# Patient Record
Sex: FEMALE | Race: WHITE | URBAN - METROPOLITAN AREA
[De-identification: names, ages, dates, MRNs, and addresses within clinical notes are randomized per-mention and may not be internally consistent; named-entity substitution may affect disease eponyms.]

---

## 2017-05-04 ENCOUNTER — OUTPATIENT (OUTPATIENT)
Dept: OUTPATIENT SERVICES | Facility: HOSPITAL | Age: 77
LOS: 1 days | Discharge: HOME | End: 2017-05-04

## 2017-05-25 ENCOUNTER — APPOINTMENT (OUTPATIENT)
Dept: UROLOGY | Facility: CLINIC | Age: 77
End: 2017-05-25

## 2017-05-25 VITALS
WEIGHT: 224 LBS | HEART RATE: 53 BPM | DIASTOLIC BLOOD PRESSURE: 83 MMHG | BODY MASS INDEX: 39.69 KG/M2 | SYSTOLIC BLOOD PRESSURE: 150 MMHG | HEIGHT: 63 IN

## 2017-06-28 DIAGNOSIS — E78.00 PURE HYPERCHOLESTEROLEMIA, UNSPECIFIED: ICD-10-CM

## 2017-06-28 DIAGNOSIS — E06.3 AUTOIMMUNE THYROIDITIS: ICD-10-CM

## 2017-06-28 DIAGNOSIS — E11.65 TYPE 2 DIABETES MELLITUS WITH HYPERGLYCEMIA: ICD-10-CM

## 2017-11-16 ENCOUNTER — OUTPATIENT (OUTPATIENT)
Dept: OUTPATIENT SERVICES | Facility: HOSPITAL | Age: 77
LOS: 1 days | Discharge: HOME | End: 2017-11-16

## 2017-11-16 DIAGNOSIS — E06.3 AUTOIMMUNE THYROIDITIS: ICD-10-CM

## 2017-11-16 DIAGNOSIS — E11.65 TYPE 2 DIABETES MELLITUS WITH HYPERGLYCEMIA: ICD-10-CM

## 2017-11-16 DIAGNOSIS — E78.00 PURE HYPERCHOLESTEROLEMIA, UNSPECIFIED: ICD-10-CM

## 2018-03-22 ENCOUNTER — RX RENEWAL (OUTPATIENT)
Age: 78
End: 2018-03-22

## 2018-05-15 ENCOUNTER — RX RENEWAL (OUTPATIENT)
Age: 78
End: 2018-05-15

## 2018-07-27 NOTE — ASU PATIENT PROFILE, ADULT - PMH
Hyperlipidemia, unspecified hyperlipidemia type    Hypertension, unspecified type    Type 2 diabetes mellitus with complication, without long-term current use of insulin

## 2018-07-27 NOTE — ASU PATIENT PROFILE, ADULT - PSH
History of surgery  RIGHT PARTIAL NEPHRECTOMY  History of surgery  CHOLECYSTECOMY  History of surgery  CYST REMOVED  RIGHT BREAST  History of surgery  TUBAL LIGATION  History of surgery  CATARACT RIGHT EYE  History of surgery  BILATERAL KNEE REPLACEMENT  History of surgery  LEFT LOWER LOBECTOMY

## 2018-07-30 ENCOUNTER — OUTPATIENT (OUTPATIENT)
Dept: OUTPATIENT SERVICES | Facility: HOSPITAL | Age: 78
LOS: 1 days | Discharge: HOME | End: 2018-07-30

## 2018-07-30 VITALS — DIASTOLIC BLOOD PRESSURE: 78 MMHG | RESPIRATION RATE: 17 BRPM | HEART RATE: 62 BPM | SYSTOLIC BLOOD PRESSURE: 166 MMHG

## 2018-07-30 VITALS
HEART RATE: 57 BPM | TEMPERATURE: 207 F | WEIGHT: 293 LBS | DIASTOLIC BLOOD PRESSURE: 84 MMHG | SYSTOLIC BLOOD PRESSURE: 174 MMHG | HEIGHT: 55 IN | OXYGEN SATURATION: 95 % | RESPIRATION RATE: 17 BRPM

## 2018-07-30 DIAGNOSIS — Z98.890 OTHER SPECIFIED POSTPROCEDURAL STATES: Chronic | ICD-10-CM

## 2018-08-02 DIAGNOSIS — I10 ESSENTIAL (PRIMARY) HYPERTENSION: ICD-10-CM

## 2018-08-02 DIAGNOSIS — E78.00 PURE HYPERCHOLESTEROLEMIA, UNSPECIFIED: ICD-10-CM

## 2018-08-02 DIAGNOSIS — H26.9 UNSPECIFIED CATARACT: ICD-10-CM

## 2018-08-02 DIAGNOSIS — E11.9 TYPE 2 DIABETES MELLITUS WITHOUT COMPLICATIONS: ICD-10-CM

## 2018-09-15 ENCOUNTER — EMERGENCY (EMERGENCY)
Facility: HOSPITAL | Age: 78
LOS: 0 days | Discharge: HOME | End: 2018-09-15
Attending: EMERGENCY MEDICINE | Admitting: EMERGENCY MEDICINE

## 2018-09-15 VITALS
DIASTOLIC BLOOD PRESSURE: 93 MMHG | RESPIRATION RATE: 18 BRPM | SYSTOLIC BLOOD PRESSURE: 184 MMHG | OXYGEN SATURATION: 98 % | HEIGHT: 63 IN | TEMPERATURE: 98 F | WEIGHT: 225.09 LBS | HEART RATE: 59 BPM

## 2018-09-15 DIAGNOSIS — Z98.890 OTHER SPECIFIED POSTPROCEDURAL STATES: Chronic | ICD-10-CM

## 2018-09-15 DIAGNOSIS — S80.211A ABRASION, RIGHT KNEE, INITIAL ENCOUNTER: ICD-10-CM
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
## 2018-09-15 DIAGNOSIS — S09.90XA UNSPECIFIED INJURY OF HEAD, INITIAL ENCOUNTER: ICD-10-CM
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
## 2018-09-15 DIAGNOSIS — Z23 ENCOUNTER FOR IMMUNIZATION: ICD-10-CM

## 2018-09-15 DIAGNOSIS — E78.5 HYPERLIPIDEMIA, UNSPECIFIED: ICD-10-CM

## 2018-09-15 DIAGNOSIS — E11.9 TYPE 2 DIABETES MELLITUS WITHOUT COMPLICATIONS: ICD-10-CM

## 2018-09-15 DIAGNOSIS — S00.01XA ABRASION OF SCALP, INITIAL ENCOUNTER: ICD-10-CM
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
## 2018-09-15 DIAGNOSIS — I10 ESSENTIAL (PRIMARY) HYPERTENSION: ICD-10-CM
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
## 2018-09-15 DIAGNOSIS — S80.212A ABRASION, LEFT KNEE, INITIAL ENCOUNTER: ICD-10-CM

## 2018-09-15 RX ORDER — TETANUS TOXOID, REDUCED DIPHTHERIA TOXOID AND ACELLULAR PERTUSSIS VACCINE, ADSORBED 5; 2.5; 8; 8; 2.5 [IU]/.5ML; [IU]/.5ML; UG/.5ML; UG/.5ML; UG/.5ML
0.5 SUSPENSION INTRAMUSCULAR ONCE
Qty: 0 | Refills: 0 | Status: COMPLETED | OUTPATIENT
Start: 2018-09-15 | End: 2018-09-15

## 2018-09-15 RX ORDER — TETANUS TOXOID, REDUCED DIPHTHERIA TOXOID AND ACELLULAR PERTUSSIS VACCINE, ADSORBED 5; 2.5; 8; 8; 2.5 [IU]/.5ML; [IU]/.5ML; UG/.5ML; UG/.5ML; UG/.5ML
0.5 SUSPENSION INTRAMUSCULAR ONCE
Qty: 0 | Refills: 0 | Status: DISCONTINUED | OUTPATIENT
Start: 2018-09-15 | End: 2018-09-15

## 2018-09-15 RX ORDER — AMLODIPINE BESYLATE 2.5 MG/1
1 TABLET ORAL
Qty: 0 | Refills: 0 | COMMUNITY

## 2018-09-15 RX ADMIN — TETANUS TOXOID, REDUCED DIPHTHERIA TOXOID AND ACELLULAR PERTUSSIS VACCINE, ADSORBED 0.5 MILLILITER(S): 5; 2.5; 8; 8; 2.5 SUSPENSION INTRAMUSCULAR at 18:40

## 2018-09-15 NOTE — ED PROVIDER NOTE - OBJECTIVE STATEMENT
77 y/o female presents to the ED with daughter c/o "I was walking down the stairs on Wednesday and I made a quick turn lost my footing and found myself at the bottom of the flight. I have a headache, neck pain  and feel dizzy. I was seen at [facility] today and they send me here for a cat scan." no nausea/ vomiting

## 2018-09-15 NOTE — ED ADULT TRIAGE NOTE - CHIEF COMPLAINT QUOTE
Pt fell down 6 steps on Wednesday and hit head pt is unsure of LOC Pt has abrasions to both lower legs. Pt has been compalining of dizziness and not feeling right

## 2018-09-15 NOTE — ED PROVIDER NOTE - ATTENDING CONTRIBUTION TO CARE
78 year old woman p/w fall. 3 nights ago lost her footing and tripped fell down stairs unsure about LOC but sustained abrasions to both knees and hematoma/laceration to right side of scalp. Bleeding stopped with pressure. Felt well afterward so did not seek medical attention until daughter convinced her to come today. Continues to have intermittent headaches, occasional dizziness but otherwise feels well. Denies pain/trauma elsewhere. Unk last tdap. On exam well appearing, large hematoma with old abrasion/laceration to right side of scalp, head otherwise atraumatic, PERRL/EOMI, moist oral mucosa, no c/t/l spine tenderness, FROM throughout extremities, normal gait, pelvis stable, abrasions to b/l shins, lungs clear, heart RRR, abd s/ntnd, neuro nonfocal GCS 15. A/p: fall, closed head trauma, abrasions - CT, update tetanus, reassess.

## 2018-09-15 NOTE — ED ADULT NURSE NOTE - NSIMPLEMENTINTERV_GEN_ALL_ED
Implemented All Fall Risk Interventions:  [place] to call system. Call bell, personal items and telephone within reach. Instruct patient to call for assistance. Room bathroom lighting operational. Non-slip footwear when patient is off stretcher. Physically safe environment: no spills, clutter or unnecessary equipment. Stretcher in lowest position, wheels locked, appropriate side rails in place. Provide visual cue, wrist band, yellow gown, etc. Monitor gait and stability. Monitor for mental status changes and reorient to person, place, and time. Review medications for side effects contributing to fall risk. Reinforce activity limits and safety measures with patient and family.

## 2018-09-15 NOTE — ED PROVIDER NOTE - MEDICAL DECISION MAKING DETAILS
I personally evaluated the patient. I reviewed the Resident’s or Physician Assistant’s note (as assigned above), and agree with the findings and plan except as documented in my note.  Chart reviewed. CT head and cspine negative. Will D/C to follow up with PCP.

## 2018-09-16 PROBLEM — E11.8 TYPE 2 DIABETES MELLITUS WITH UNSPECIFIED COMPLICATIONS: Chronic | Status: ACTIVE | Noted: 2018-07-30

## 2018-09-16 PROBLEM — E78.5 HYPERLIPIDEMIA, UNSPECIFIED: Chronic | Status: ACTIVE | Noted: 2018-07-30

## 2018-09-16 PROBLEM — I10 ESSENTIAL (PRIMARY) HYPERTENSION: Chronic | Status: ACTIVE | Noted: 2018-07-30

## 2019-02-21 ENCOUNTER — OUTPATIENT (OUTPATIENT)
Dept: OUTPATIENT SERVICES | Facility: HOSPITAL | Age: 79
LOS: 1 days | Discharge: HOME | End: 2019-02-21

## 2019-02-21 DIAGNOSIS — Z98.890 OTHER SPECIFIED POSTPROCEDURAL STATES: Chronic | ICD-10-CM

## 2019-02-21 DIAGNOSIS — R91.1 SOLITARY PULMONARY NODULE: ICD-10-CM

## 2019-02-21 LAB — GLUCOSE BLDC GLUCOMTR-MCNC: 94 MG/DL — SIGNIFICANT CHANGE UP (ref 70–99)

## 2019-03-08 ENCOUNTER — OUTPATIENT (OUTPATIENT)
Dept: OUTPATIENT SERVICES | Facility: HOSPITAL | Age: 79
LOS: 1 days | Discharge: HOME | End: 2019-03-08

## 2019-03-08 VITALS
TEMPERATURE: 97 F | OXYGEN SATURATION: 96 % | HEIGHT: 63 IN | SYSTOLIC BLOOD PRESSURE: 148 MMHG | RESPIRATION RATE: 17 BRPM | HEART RATE: 63 BPM | WEIGHT: 225.53 LBS | DIASTOLIC BLOOD PRESSURE: 70 MMHG

## 2019-03-08 DIAGNOSIS — Z98.890 OTHER SPECIFIED POSTPROCEDURAL STATES: Chronic | ICD-10-CM

## 2019-03-08 DIAGNOSIS — F17.200 NICOTINE DEPENDENCE, UNSPECIFIED, UNCOMPLICATED: ICD-10-CM

## 2019-03-08 DIAGNOSIS — J44.9 CHRONIC OBSTRUCTIVE PULMONARY DISEASE, UNSPECIFIED: ICD-10-CM

## 2019-03-08 DIAGNOSIS — R06.02 SHORTNESS OF BREATH: ICD-10-CM

## 2019-03-08 DIAGNOSIS — E78.1 PURE HYPERGLYCERIDEMIA: ICD-10-CM

## 2019-03-08 DIAGNOSIS — K21.9 GASTRO-ESOPHAGEAL REFLUX DISEASE WITHOUT ESOPHAGITIS: ICD-10-CM

## 2019-03-08 DIAGNOSIS — C80.1 MALIGNANT (PRIMARY) NEOPLASM, UNSPECIFIED: ICD-10-CM

## 2019-03-08 DIAGNOSIS — Z01.818 ENCOUNTER FOR OTHER PREPROCEDURAL EXAMINATION: ICD-10-CM

## 2019-03-08 DIAGNOSIS — I10 ESSENTIAL (PRIMARY) HYPERTENSION: ICD-10-CM

## 2019-03-08 DIAGNOSIS — E66.9 OBESITY, UNSPECIFIED: ICD-10-CM

## 2019-03-08 DIAGNOSIS — R06.01 ORTHOPNEA: ICD-10-CM

## 2019-03-08 LAB
ALBUMIN SERPL ELPH-MCNC: 4.1 G/DL — SIGNIFICANT CHANGE UP (ref 3.5–5.2)
ALP SERPL-CCNC: 111 U/L — SIGNIFICANT CHANGE UP (ref 30–115)
ALT FLD-CCNC: 23 U/L — SIGNIFICANT CHANGE UP (ref 0–41)
ANION GAP SERPL CALC-SCNC: 12 MMOL/L — SIGNIFICANT CHANGE UP (ref 7–14)
APTT BLD: 29.4 SEC — SIGNIFICANT CHANGE UP (ref 27–39.2)
AST SERPL-CCNC: 14 U/L — SIGNIFICANT CHANGE UP (ref 0–41)
BILIRUB SERPL-MCNC: 0.3 MG/DL — SIGNIFICANT CHANGE UP (ref 0.2–1.2)
BUN SERPL-MCNC: 26 MG/DL — HIGH (ref 10–20)
CALCIUM SERPL-MCNC: 10.2 MG/DL — HIGH (ref 8.5–10.1)
CHLORIDE SERPL-SCNC: 103 MMOL/L — SIGNIFICANT CHANGE UP (ref 98–110)
CO2 SERPL-SCNC: 25 MMOL/L — SIGNIFICANT CHANGE UP (ref 17–32)
CREAT SERPL-MCNC: 0.9 MG/DL — SIGNIFICANT CHANGE UP (ref 0.7–1.5)
ESTIMATED AVERAGE GLUCOSE: 143 MG/DL — HIGH (ref 68–114)
GLUCOSE SERPL-MCNC: 165 MG/DL — HIGH (ref 70–99)
HBA1C BLD-MCNC: 6.6 % — HIGH (ref 4–5.6)
HCT VFR BLD CALC: 38.5 % — SIGNIFICANT CHANGE UP (ref 37–47)
HGB BLD-MCNC: 12.3 G/DL — SIGNIFICANT CHANGE UP (ref 12–16)
INR BLD: 0.97 RATIO — SIGNIFICANT CHANGE UP (ref 0.65–1.3)
MCHC RBC-ENTMCNC: 27.9 PG — SIGNIFICANT CHANGE UP (ref 27–31)
MCHC RBC-ENTMCNC: 31.9 G/DL — LOW (ref 32–37)
MCV RBC AUTO: 87.3 FL — SIGNIFICANT CHANGE UP (ref 81–99)
NRBC # BLD: 0 /100 WBCS — SIGNIFICANT CHANGE UP (ref 0–0)
PLATELET # BLD AUTO: 198 K/UL — SIGNIFICANT CHANGE UP (ref 130–400)
POTASSIUM SERPL-MCNC: 4.8 MMOL/L — SIGNIFICANT CHANGE UP (ref 3.5–5)
POTASSIUM SERPL-SCNC: 4.8 MMOL/L — SIGNIFICANT CHANGE UP (ref 3.5–5)
PROT SERPL-MCNC: 6.2 G/DL — SIGNIFICANT CHANGE UP (ref 6–8)
PROTHROM AB SERPL-ACNC: 11.2 SEC — SIGNIFICANT CHANGE UP (ref 9.95–12.87)
RBC # BLD: 4.41 M/UL — SIGNIFICANT CHANGE UP (ref 4.2–5.4)
RBC # FLD: 14.5 % — SIGNIFICANT CHANGE UP (ref 11.5–14.5)
SODIUM SERPL-SCNC: 140 MMOL/L — SIGNIFICANT CHANGE UP (ref 135–146)
WBC # BLD: 8.91 K/UL — SIGNIFICANT CHANGE UP (ref 4.8–10.8)
WBC # FLD AUTO: 8.91 K/UL — SIGNIFICANT CHANGE UP (ref 4.8–10.8)

## 2019-03-08 RX ORDER — DULAGLUTIDE 4.5 MG/.5ML
0 INJECTION, SOLUTION SUBCUTANEOUS
Qty: 0 | Refills: 0 | COMMUNITY

## 2019-03-08 RX ORDER — OMEPRAZOLE 10 MG/1
0 CAPSULE, DELAYED RELEASE ORAL
Qty: 0 | Refills: 0 | COMMUNITY

## 2019-03-08 RX ORDER — SACCHAROMYCES BOULARDII 250 MG
0 POWDER IN PACKET (EA) ORAL
Qty: 0 | Refills: 0 | COMMUNITY

## 2019-03-08 RX ORDER — LINAGLIPTIN 5 MG/1
1 TABLET, FILM COATED ORAL
Qty: 0 | Refills: 0 | COMMUNITY

## 2019-03-08 RX ORDER — METOPROLOL TARTRATE 50 MG
1 TABLET ORAL
Qty: 0 | Refills: 0 | COMMUNITY

## 2019-03-08 RX ORDER — METFORMIN HYDROCHLORIDE 850 MG/1
1 TABLET ORAL
Qty: 0 | Refills: 0 | COMMUNITY

## 2019-03-08 NOTE — H&P PST ADULT - HISTORY OF PRESENT ILLNESS
78 year old female here for lymph node bx had a pet scan and lymph node "appeared"  fos=1  denies chest palp + sob  denies recent uri or uti within past 2 weeks patient had gi virus feeling well now

## 2019-03-08 NOTE — H&P PST ADULT - PMH
COPD (chronic obstructive pulmonary disease)    Diverticulitis    GERD (gastroesophageal reflux disease)    Hyperlipidemia, unspecified hyperlipidemia type    Hypertension, unspecified type    Lung cancer    Obesity    SOB (shortness of breath)    Type 2 diabetes mellitus with complication, without long-term current use of insulin

## 2019-03-14 ENCOUNTER — OUTPATIENT (OUTPATIENT)
Dept: OUTPATIENT SERVICES | Facility: HOSPITAL | Age: 79
LOS: 1 days | Discharge: HOME | End: 2019-03-14
Payer: MEDICARE

## 2019-03-14 ENCOUNTER — RESULT REVIEW (OUTPATIENT)
Age: 79
End: 2019-03-14

## 2019-03-14 DIAGNOSIS — Z98.890 OTHER SPECIFIED POSTPROCEDURAL STATES: Chronic | ICD-10-CM

## 2019-03-14 PROCEDURE — 88188 FLOWCYTOMETRY/READ 9-15: CPT

## 2019-03-14 NOTE — PROGRESS NOTE ADULT - SUBJECTIVE AND OBJECTIVE BOX
Interventional Radiology Outpatient Documentation    PREOPERATIVE DAY OF PROCEDURE EVALUATION:     I have personally seen and examined this patient. I agree with the history and physical which I have reviewed and noted any changes below:     Plan is for     biopsy of a retroperitoneal lymph node with ct guidance and conscious sedation    Procedure/ risks/ benefits/ goals/ alternatives were explained. All questions answered. Informed content obtained from patient. Consent placed in chart.

## 2019-03-14 NOTE — PROGRESS NOTE ADULT - SUBJECTIVE AND OBJECTIVE BOX
INTERVENTIONAL RADIOLOGY BRIEF-OPERATIVE NOTE    Procedure: CT guided biopsy of a 1.8 cm FDG avid left paraaortic retroperitoneal lymph node    Pre-Op Diagnosis: FDG avid left paraaortic retroperitoneal lymph node of unknown primary; right renal cell carcinoma s/p partial nephrectomy, thyroid carcinoma, lung carcinoma s/p left lower lobectomy, multiple FDG avid left pleural masses    Post-Op Diagnosis: Same    Attending: Alexandre  Resident: Ananya    Anesthesia (type):  [ ] General Anesthesia  [x ] Sedation  [ ] Spinal Anesthesia  [ x] Local/Regional    Contrast: None    Estimated Blood Loss: Minimal, < 5 cc    Condition:   [ ] Critical  [ ] Serious  [ ] Fair   [ c] Good    Findings/Follow up Plan of Care:  1. Initial CT scan once again identifies a 1.8 cm left paraaortic lymph node   2. Under serial CT guidance a 20G/19G coaxial needle system was advanced into the lesion of concern  3. Four 20G core needle biopsies were taken with specimen adequacy confirmed by attending pathologist   4. No immediate complications   5. Follow up cytology and surgical pathology results   6. See full dictated report in PACS for further procedural details.     Specimens Removed: Four 20G core needle biopsy specimens of a left 1.8 cm FDG avid paraaortic retroperitoneal lymph node    Implants: None    Complications: None    Disposition: IR recovery room      Please call Interventional Radiology [phone] with any questions, concerns, or issues. PO feed based on infant cues.

## 2019-03-18 LAB — TM INTERPRETATION: SIGNIFICANT CHANGE UP

## 2019-03-19 DIAGNOSIS — R59.0 LOCALIZED ENLARGED LYMPH NODES: ICD-10-CM

## 2019-03-19 DIAGNOSIS — Z85.850 PERSONAL HISTORY OF MALIGNANT NEOPLASM OF THYROID: ICD-10-CM

## 2019-03-19 DIAGNOSIS — Z90.2 ACQUIRED ABSENCE OF LUNG [PART OF]: ICD-10-CM

## 2019-03-19 DIAGNOSIS — Z90.5 ACQUIRED ABSENCE OF KIDNEY: ICD-10-CM

## 2019-03-19 DIAGNOSIS — Z85.528 PERSONAL HISTORY OF OTHER MALIGNANT NEOPLASM OF KIDNEY: ICD-10-CM

## 2019-03-19 DIAGNOSIS — Z85.118 PERSONAL HISTORY OF OTHER MALIGNANT NEOPLASM OF BRONCHUS AND LUNG: ICD-10-CM

## 2019-03-19 LAB — NON-GYNECOLOGICAL CYTOLOGY STUDY: SIGNIFICANT CHANGE UP

## 2019-03-21 LAB — SURGICAL PATHOLOGY STUDY: SIGNIFICANT CHANGE UP

## 2019-03-28 PROBLEM — J44.9 CHRONIC OBSTRUCTIVE PULMONARY DISEASE, UNSPECIFIED: Chronic | Status: ACTIVE | Noted: 2019-03-08

## 2019-03-28 PROBLEM — K57.92 DIVERTICULITIS OF INTESTINE, PART UNSPECIFIED, WITHOUT PERFORATION OR ABSCESS WITHOUT BLEEDING: Chronic | Status: ACTIVE | Noted: 2019-03-08

## 2019-03-28 PROBLEM — C34.90 MALIGNANT NEOPLASM OF UNSPECIFIED PART OF UNSPECIFIED BRONCHUS OR LUNG: Chronic | Status: ACTIVE | Noted: 2019-03-08

## 2019-03-28 PROBLEM — E66.9 OBESITY, UNSPECIFIED: Chronic | Status: ACTIVE | Noted: 2019-03-08

## 2019-03-28 PROBLEM — K21.9 GASTRO-ESOPHAGEAL REFLUX DISEASE WITHOUT ESOPHAGITIS: Chronic | Status: ACTIVE | Noted: 2019-03-08

## 2019-03-28 PROBLEM — R06.02 SHORTNESS OF BREATH: Chronic | Status: ACTIVE | Noted: 2019-03-08

## 2019-04-03 ENCOUNTER — OUTPATIENT (OUTPATIENT)
Dept: OUTPATIENT SERVICES | Facility: HOSPITAL | Age: 79
LOS: 1 days | Discharge: HOME | End: 2019-04-03

## 2019-04-03 ENCOUNTER — APPOINTMENT (OUTPATIENT)
Dept: HEMATOLOGY ONCOLOGY | Facility: CLINIC | Age: 79
End: 2019-04-03

## 2019-04-03 ENCOUNTER — LABORATORY RESULT (OUTPATIENT)
Age: 79
End: 2019-04-03

## 2019-04-03 VITALS
BODY MASS INDEX: 39.69 KG/M2 | HEIGHT: 63 IN | WEIGHT: 224 LBS | HEART RATE: 56 BPM | RESPIRATION RATE: 14 BRPM | TEMPERATURE: 96.7 F | DIASTOLIC BLOOD PRESSURE: 70 MMHG | SYSTOLIC BLOOD PRESSURE: 140 MMHG

## 2019-04-03 DIAGNOSIS — Z87.891 PERSONAL HISTORY OF NICOTINE DEPENDENCE: ICD-10-CM

## 2019-04-03 DIAGNOSIS — Z86.69 PERSONAL HISTORY OF OTHER DISEASES OF THE NERVOUS SYSTEM AND SENSE ORGANS: ICD-10-CM

## 2019-04-03 DIAGNOSIS — Z85.850 PERSONAL HISTORY OF MALIGNANT NEOPLASM OF THYROID: ICD-10-CM

## 2019-04-03 DIAGNOSIS — Z80.42 FAMILY HISTORY OF MALIGNANT NEOPLASM OF PROSTATE: ICD-10-CM

## 2019-04-03 DIAGNOSIS — Z78.9 OTHER SPECIFIED HEALTH STATUS: ICD-10-CM

## 2019-04-03 DIAGNOSIS — Z98.890 OTHER SPECIFIED POSTPROCEDURAL STATES: Chronic | ICD-10-CM

## 2019-04-03 DIAGNOSIS — Z86.79 PERSONAL HISTORY OF OTHER DISEASES OF THE CIRCULATORY SYSTEM: ICD-10-CM

## 2019-04-03 DIAGNOSIS — Z87.19 PERSONAL HISTORY OF OTHER DISEASES OF THE DIGESTIVE SYSTEM: ICD-10-CM

## 2019-04-03 DIAGNOSIS — Z81.8 FAMILY HISTORY OF OTHER MENTAL AND BEHAVIORAL DISORDERS: ICD-10-CM

## 2019-04-03 DIAGNOSIS — Z82.49 FAMILY HISTORY OF ISCHEMIC HEART DISEASE AND OTHER DISEASES OF THE CIRCULATORY SYSTEM: ICD-10-CM

## 2019-04-03 DIAGNOSIS — Z85.528 PERSONAL HISTORY OF OTHER MALIGNANT NEOPLASM OF KIDNEY: ICD-10-CM

## 2019-04-04 LAB — TSH SERPL-ACNC: 1.67 UIU/ML

## 2019-04-04 NOTE — HISTORY OF PRESENT ILLNESS
[de-identified] : Ms. 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 Cody Calixto.  She is here with her daughter Brandy.  \par \par She had a history of Kidney Cancer s/p partial nephrectomy and distant history of Thyroid Cancer s/p partial thyroidectomy.  She also had a Left Lower Lobectomy for Stage IA lung cancer. For dates see bellow.\par \par She had CT scan and subsequent PET/CT that showed multiple left pleural based nodules in addition to a para-aortic lymph node.  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 The differential diagnosis includes low grade follicular lymphoma and CD10+ marginal zone lymphoma. She denies any B-symptoms at this time other than recent weight loss of 10lbs over last month, however she reports she had not been eating due to a recent GI bug.\par \par She is here for further recommendations. \par Radiological Work-up:\par PET/CT (2/21/19) IMPRESSION: Since August 20, 2010: 1. Multiple sites of left pleural-based FDG avid nodularity, catalogued above, with max SUV 5.2, suspicious for biological tumor activity. Metastatic or primary pleural neoplasm are considerations. 2. Intra-abdominal FDG avid 1.7 x 1.2 cm left para-aortic lymph node, max SUV 5.4, suspicious for lymph node metastasis. \par 1/30/2019: [facility] [place]:  comparison was made to Ct from 7/2018 Multiple Plural nodules along the posterior paramediastinal surface, some are mildly larger there are new nodules as well. largest nodule 10 mm\par CT Head (9/15/18) IMPRESSION: 1. Mild periventricular and subcortical white matter chronic small vessel ischemic changes. 2. No acute fractures, mass effect, midline shift or hemorrhage. \par CT cervical spine (9/15/18) IMPRESSION: 1. Degenerative changes of the cervical spine C2-3 through C6-7 worse at C5-6 as described above. 2. Straightening of normal cervical lordosis with mild anterolisthesis of C3 anterior on C4, C6 on C7, C7 on T1 and T1 on T2. 3. No acute fractures or dislocations. \par 1/22/18: MR L spine: advance lymbar spondylosis, disck osteophytes, severe neuroforamin narrowing with  ipingment of L4 nerve,  3.5 cm AAA, 8 mm adrenal nodule.\par Pathology:\par (3/14/19) Final Diagnosis Left parotid lymph node, needle biopsy: Low grade B-cell lymphoma. \par Immunostains performed with adequate controls on sections from block 1A show the infiltrating cells are CD20+ CD79a+ B-cells that coexpress CD10, CD23(dim variable), and BCL2. They are negative for CD5, CD43, cyclin D1, and MUM1. BCL6 is difficult to determine due to the high background staining. Ki67 labels 5-10% of these cells. CD21 highlights scattered follicular dendritic cell meshworks.  Per report ([id number]), flow cytometry studies performed at [facility] show monotypic B-cells (10% of cells), positive for kappa, CD19, CD20, CD10; negative CD5. Heterogeneous population of T-cells (with normal CD4 to CD8 ratio), and polytypic B-cells are also present. The findings are diagnostic of low grade B-cell lymphoma. The differential diagnosis includes low grade follicular lymphoma and CD10+ marginal zone lymphoma.\par \par Health Maintenance:\par Colonoscopy \par Mammogram \par \par Recent blood work is in HIE. Was normal except for a high sugar and calcium of 10.2

## 2019-04-04 NOTE — REASON FOR VISIT
[Initial Consultation] : an initial consultation [Family Member] : family member [FreeTextEntry2] : Low grade Lymphoma

## 2019-04-04 NOTE — END OF VISIT
[FreeTextEntry3] : Patient seen and examined with Dr. Guerrero, who agrees with the above assessment and plan.\par

## 2019-04-04 NOTE — REVIEW OF SYSTEMS
[Fatigue] : fatigue [Recent Change In Weight] : ~T recent weight change [Constipation] : constipation [Diarrhea] : diarrhea [Negative] : Allergic/Immunologic [Fever] : no fever [Night Sweats] : no night sweats [Chest Pain] : no chest pain [Shortness Of Breath] : no shortness of breath [Easy Bleeding] : no tendency for easy bleeding [FreeTextEntry2] : reports 10lb weight loss due to recent GI bug [FreeTextEntry4] : reports dry mouth from medication [FreeTextEntry7] : reports diverticulosis [FreeTextEntry8] : urinary leakage sees Dr. Cobb [de-identified] : sciatica pain on lyrica

## 2019-04-04 NOTE — ASSESSMENT
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 if only area affected can consider Radiation or observation since she is asymptomatic \par - denies B symptoms\par -\par \par # Pleural Nodules on scans, these are not new, at least since 2018. They are growing slowly, asymptomatic \par - unclear etiology maybe her indolent  lymphoma,  recurrence of RCC, thyroid cancer unlike lung \par -we discuses options including further observation or a possible FNA biopsy they agreed for biopsy\par - will arrange for lung nodule biopsy\par \par She will RTC post biopsy and we will go from there.\par \par Will order thyroid cancer markers including TSH, thyroglobulin today.\par \par RTC in about 1 month, pending biopsy report

## 2019-04-04 NOTE — CONSULT LETTER
[Dear  ___] : Dear  [unfilled], [Consult Letter:] : I had the pleasure of evaluating your patient, [unfilled]. [Please see my note below.] : Please see my note below. [Consult Closing:] : Thank you very much for allowing me to participate in the care of this patient.  If you have any questions, please do not hesitate to contact me. [Sincerely,] : Sincerely, [DrJoaquín  ___] : Dr. RANGEL [FreeTextEntry3] : Dr. Zaheer Guerrero [DrJoaquín ___] : Dr. RANGEL

## 2019-04-04 NOTE — PHYSICAL EXAM
[Restricted in physically strenuous activity but ambulatory and able to carry out work of a light or sedentary nature] : Status 1- Restricted in physically strenuous activity but ambulatory and able to carry out work of a light or sedentary nature, e.g., light house work, office work [Obese] : obese [Normal] : affect appropriate [de-identified] : wearing glasses [de-identified] : trace LE edema

## 2019-04-05 LAB
THYROGLOB AB SERPL IA-ACNC: <1.8 IU/ML
THYROGLOB AB SERPL-ACNC: <20 IU/ML
THYROGLOB SERPL-MCNC: 38 NG/ML

## 2019-04-09 DIAGNOSIS — R91.8 OTHER NONSPECIFIC ABNORMAL FINDING OF LUNG FIELD: ICD-10-CM

## 2019-04-09 DIAGNOSIS — C85.90 NON-HODGKIN LYMPHOMA, UNSPECIFIED, UNSPECIFIED SITE: ICD-10-CM

## 2019-04-09 DIAGNOSIS — Z85.850 PERSONAL HISTORY OF MALIGNANT NEOPLASM OF THYROID: ICD-10-CM

## 2019-04-09 DIAGNOSIS — Z85.118 PERSONAL HISTORY OF OTHER MALIGNANT NEOPLASM OF BRONCHUS AND LUNG: ICD-10-CM

## 2019-04-09 DIAGNOSIS — Z85.528 PERSONAL HISTORY OF OTHER MALIGNANT NEOPLASM OF KIDNEY: ICD-10-CM

## 2019-04-10 ENCOUNTER — FORM ENCOUNTER (OUTPATIENT)
Age: 79
End: 2019-04-10

## 2019-07-10 ENCOUNTER — LABORATORY RESULT (OUTPATIENT)
Age: 79
End: 2019-07-10

## 2019-07-10 ENCOUNTER — APPOINTMENT (OUTPATIENT)
Dept: HEMATOLOGY ONCOLOGY | Facility: CLINIC | Age: 79
End: 2019-07-10
Payer: MEDICARE

## 2019-07-10 VITALS
WEIGHT: 212 LBS | SYSTOLIC BLOOD PRESSURE: 141 MMHG | DIASTOLIC BLOOD PRESSURE: 79 MMHG | TEMPERATURE: 96.5 F | HEIGHT: 63 IN | BODY MASS INDEX: 37.56 KG/M2 | HEART RATE: 66 BPM | RESPIRATION RATE: 18 BRPM

## 2019-07-10 LAB
HCT VFR BLD CALC: 40.3 %
HGB BLD-MCNC: 13.2 G/DL
MCHC RBC-ENTMCNC: 29 PG
MCHC RBC-ENTMCNC: 32.8 G/DL
MCV RBC AUTO: 88.6 FL
PLATELET # BLD AUTO: 241 K/UL
PMV BLD: 11 FL
RBC # BLD: 4.55 M/UL
RBC # FLD: 15 %
WBC # FLD AUTO: 8.97 K/UL

## 2019-07-10 PROCEDURE — 99213 OFFICE O/P EST LOW 20 MIN: CPT

## 2019-07-11 LAB
ALBUMIN SERPL ELPH-MCNC: 4.4 G/DL
ALP BLD-CCNC: 98 U/L
ALT SERPL-CCNC: 20 U/L
ANION GAP SERPL CALC-SCNC: 11 MMOL/L
AST SERPL-CCNC: 13 U/L
BILIRUB SERPL-MCNC: 0.4 MG/DL
BUN SERPL-MCNC: 25 MG/DL
CALCIUM SERPL-MCNC: 10.5 MG/DL
CHLORIDE SERPL-SCNC: 103 MMOL/L
CO2 SERPL-SCNC: 27 MMOL/L
CREAT SERPL-MCNC: 0.9 MG/DL
GLUCOSE SERPL-MCNC: 139 MG/DL
POTASSIUM SERPL-SCNC: 4.4 MMOL/L
PROT SERPL-MCNC: 6.8 G/DL
SODIUM SERPL-SCNC: 141 MMOL/L

## 2019-07-17 ENCOUNTER — FORM ENCOUNTER (OUTPATIENT)
Age: 79
End: 2019-07-17

## 2019-07-18 ENCOUNTER — OUTPATIENT (OUTPATIENT)
Dept: OUTPATIENT SERVICES | Facility: HOSPITAL | Age: 79
LOS: 1 days | Discharge: HOME | End: 2019-07-18
Payer: MEDICARE

## 2019-07-18 DIAGNOSIS — Z98.890 OTHER SPECIFIED POSTPROCEDURAL STATES: Chronic | ICD-10-CM

## 2019-07-18 DIAGNOSIS — R22.2 LOCALIZED SWELLING, MASS AND LUMP, TRUNK: ICD-10-CM

## 2019-07-18 PROCEDURE — 74177 CT ABD & PELVIS W/CONTRAST: CPT | Mod: 26

## 2019-07-18 PROCEDURE — 71260 CT THORAX DX C+: CPT | Mod: 26

## 2019-07-18 NOTE — REASON FOR VISIT
[Family Member] : family member [Follow-Up Visit] : a follow-up [FreeTextEntry2] : Low grade Lymphoma

## 2019-07-18 NOTE — ASSESSMENT
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 if only area affected can consider Radiation or observation since she is asymptomatic \par - denies B symptoms\par - CT C/A/P ordered if no progression will observe if only one are will consider Radiation \par \par # Pleural Nodules on scans, these are not new, at least since 2018, asymptomatic \par - unclear etiology maybe her indolent  lymphoma, since they resolved \par \par #History of Thyroid caner she states it was a partial thyroidectomy \par -this explains the presence of positive thyroglobulin 21 tumor marker \par \par RTC in 6 months, will call with CT results.

## 2019-07-18 NOTE — REVIEW OF SYSTEMS
[Fatigue] : fatigue [Recent Change In Weight] : ~T recent weight change [Constipation] : constipation [Diarrhea] : diarrhea [Negative] : Allergic/Immunologic [Fever] : no fever [Night Sweats] : no night sweats [Chest Pain] : no chest pain [Shortness Of Breath] : no shortness of breath [Easy Bleeding] : no tendency for easy bleeding [FreeTextEntry2] : Intentional weight loss  [FreeTextEntry4] : reports dry mouth from medication [FreeTextEntry7] : reports diverticulosis [FreeTextEntry8] : urinary leakage sees Dr. Cobb [de-identified] : sciatica pain on lyrica

## 2019-07-18 NOTE — CONSULT LETTER
[Dear  ___] : Dear  [unfilled], [Consult Letter:] : I had the pleasure of evaluating your patient, [unfilled]. [Please see my note below.] : Please see my note below. [Consult Closing:] : Thank you very much for allowing me to participate in the care of this patient.  If you have any questions, please do not hesitate to contact me. [Sincerely,] : Sincerely, [DrJoaquín  ___] : Dr. RANGEL [DrJoaquín ___] : Dr. RANGEL [FreeTextEntry3] : Dr. Zaheer Guerrero

## 2019-07-18 NOTE — HISTORY OF PRESENT ILLNESS
[de-identified] : Ms. 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 Cody Calixto.  She is here with her daughter Brandy.  \par \par She had a history of Kidney Cancer s/p partial nephrectomy and distant history of Thyroid Cancer s/p partial thyroidectomy.  She also had a Left Lower Lobectomy for Stage IA lung cancer. For dates see bellow.\par \par She had CT scan and subsequent PET/CT that showed multiple left pleural based nodules in addition to a para-aortic lymph node.  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 The differential diagnosis includes low grade follicular lymphoma and CD10+ marginal zone lymphoma. She denies any B-symptoms at this time other than recent weight loss of 10lbs over last month, however she reports she had not been eating due to a recent GI bug.\par \par She is here for further recommendations. \par Radiological Work-up:\par PET/CT (2/21/19) IMPRESSION: Since August 20, 2010: 1. Multiple sites of left pleural-based FDG avid nodularity, catalogued above, with max SUV 5.2, suspicious for biological tumor activity. Metastatic or primary pleural neoplasm are considerations. 2. Intra-abdominal FDG avid 1.7 x 1.2 cm left para-aortic lymph node, max SUV 5.4, suspicious for lymph node metastasis. \par 1/30/2019: [facility] [place]:  comparison was made to Ct from 7/2018 Multiple Plural nodules along the posterior paramediastinal surface, some are mildly larger there are new nodules as well. largest nodule 10 mm\par CT Head (9/15/18) IMPRESSION: 1. Mild periventricular and subcortical white matter chronic small vessel ischemic changes. 2. No acute fractures, mass effect, midline shift or hemorrhage. \par CT cervical spine (9/15/18) IMPRESSION: 1. Degenerative changes of the cervical spine C2-3 through C6-7 worse at C5-6 as described above. 2. Straightening of normal cervical lordosis with mild anterolisthesis of C3 anterior on C4, C6 on C7, C7 on T1 and T1 on T2. 3. No acute fractures or dislocations. \par 1/22/18: MR L spine: advance lymbar spondylosis, disck osteophytes, severe neuroforamin narrowing with  ipingment of L4 nerve,  3.5 cm AAA, 8 mm adrenal nodule.\par Pathology:\par (3/14/19) Final Diagnosis Left parotid lymph node, needle biopsy: Low grade B-cell lymphoma. \par Immunostains performed with adequate controls on sections from block 1A show the infiltrating cells are CD20+ CD79a+ B-cells that coexpress CD10, CD23(dim variable), and BCL2. They are negative for CD5, CD43, cyclin D1, and MUM1. BCL6 is difficult to determine due to the high background staining. Ki67 labels 5-10% of these cells. CD21 highlights scattered follicular dendritic cell meshworks.  Per report ([id number]), flow cytometry studies performed at [facility] show monotypic B-cells (10% of cells), positive for kappa, CD19, CD20, CD10; negative CD5. Heterogeneous population of T-cells (with normal CD4 to CD8 ratio), and polytypic B-cells are also present. The findings are diagnostic of low grade B-cell lymphoma. The differential diagnosis includes low grade follicular lymphoma and CD10+ marginal zone lymphoma.\par \par Health Maintenance:\par Colonoscopy \par Mammogram \par \par Recent blood work is in HIE. Was normal except for a high sugar and calcium of 10.2 [de-identified] : 7/10/19\par She was supposed to go for biopsy of pleural based nodularity that resolved prior to procedure.  She will see PULM in a few weeks.  She has been purposely losing weight, using Trajenta.  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  Denies any fevers, chills, unintentional weight loss, or night sweats.\par CT Chest (4/11/19) IMPRESSION:Since February 21, 2019;1. Interval resolution of previously described left-sided FDG avid pleural-based nodularity. No biopsy performed.2. Multiple stable nodules, as above.3. Unchanged left periaortic 1.4 cm lymph node, previously noted to be FDG avid.4. Stable right hydronephrosis and right-sided ureteral calculi.5. Stable infrarenal abdominal aortic aneurysm measuring up to 3.7 cm.

## 2019-07-18 NOTE — PHYSICAL EXAM
[Restricted in physically strenuous activity but ambulatory and able to carry out work of a light or sedentary nature] : Status 1- Restricted in physically strenuous activity but ambulatory and able to carry out work of a light or sedentary nature, e.g., light house work, office work [Obese] : obese [Normal] : affect appropriate [de-identified] : wearing glasses [de-identified] : trace LE edema

## 2019-07-22 ENCOUNTER — OUTPATIENT (OUTPATIENT)
Dept: OUTPATIENT SERVICES | Facility: HOSPITAL | Age: 79
LOS: 1 days | Discharge: HOME | End: 2019-07-22
Payer: MEDICARE

## 2019-07-22 DIAGNOSIS — Z98.890 OTHER SPECIFIED POSTPROCEDURAL STATES: Chronic | ICD-10-CM

## 2019-07-22 DIAGNOSIS — N20.0 CALCULUS OF KIDNEY: ICD-10-CM

## 2019-07-22 PROCEDURE — 74019 RADEX ABDOMEN 2 VIEWS: CPT | Mod: 26

## 2019-07-24 ENCOUNTER — OUTPATIENT (OUTPATIENT)
Dept: OUTPATIENT SERVICES | Facility: HOSPITAL | Age: 79
LOS: 1 days | Discharge: HOME | End: 2019-07-24
Payer: MEDICARE

## 2019-07-24 VITALS
RESPIRATION RATE: 14 BRPM | HEART RATE: 79 BPM | DIASTOLIC BLOOD PRESSURE: 78 MMHG | WEIGHT: 202.6 LBS | HEIGHT: 63 IN | TEMPERATURE: 99 F | SYSTOLIC BLOOD PRESSURE: 145 MMHG

## 2019-07-24 DIAGNOSIS — Z98.890 OTHER SPECIFIED POSTPROCEDURAL STATES: Chronic | ICD-10-CM

## 2019-07-24 DIAGNOSIS — Z01.818 ENCOUNTER FOR OTHER PREPROCEDURAL EXAMINATION: ICD-10-CM

## 2019-07-24 DIAGNOSIS — N13.2 HYDRONEPHROSIS WITH RENAL AND URETERAL CALCULOUS OBSTRUCTION: ICD-10-CM

## 2019-07-24 LAB
ALBUMIN SERPL ELPH-MCNC: 4 G/DL — SIGNIFICANT CHANGE UP (ref 3.5–5.2)
ALP SERPL-CCNC: 99 U/L — SIGNIFICANT CHANGE UP (ref 30–115)
ALT FLD-CCNC: 17 U/L — SIGNIFICANT CHANGE UP (ref 0–41)
ANION GAP SERPL CALC-SCNC: 11 MMOL/L — SIGNIFICANT CHANGE UP (ref 7–14)
APPEARANCE UR: CLEAR — SIGNIFICANT CHANGE UP
APTT BLD: 29.6 SEC — SIGNIFICANT CHANGE UP (ref 27–39.2)
AST SERPL-CCNC: 12 U/L — SIGNIFICANT CHANGE UP (ref 0–41)
BACTERIA # UR AUTO: NEGATIVE — SIGNIFICANT CHANGE UP
BASOPHILS # BLD AUTO: 0.05 K/UL — SIGNIFICANT CHANGE UP (ref 0–0.2)
BASOPHILS NFR BLD AUTO: 0.7 % — SIGNIFICANT CHANGE UP (ref 0–1)
BILIRUB SERPL-MCNC: 0.3 MG/DL — SIGNIFICANT CHANGE UP (ref 0.2–1.2)
BILIRUB UR-MCNC: NEGATIVE — SIGNIFICANT CHANGE UP
BUN SERPL-MCNC: 22 MG/DL — HIGH (ref 10–20)
CALCIUM SERPL-MCNC: 10.7 MG/DL — HIGH (ref 8.5–10.1)
CHLORIDE SERPL-SCNC: 103 MMOL/L — SIGNIFICANT CHANGE UP (ref 98–110)
CO2 SERPL-SCNC: 26 MMOL/L — SIGNIFICANT CHANGE UP (ref 17–32)
COLOR SPEC: SIGNIFICANT CHANGE UP
CREAT SERPL-MCNC: 0.9 MG/DL — SIGNIFICANT CHANGE UP (ref 0.7–1.5)
DIFF PNL FLD: SIGNIFICANT CHANGE UP
EOSINOPHIL # BLD AUTO: 0.13 K/UL — SIGNIFICANT CHANGE UP (ref 0–0.7)
EOSINOPHIL NFR BLD AUTO: 1.7 % — SIGNIFICANT CHANGE UP (ref 0–8)
EPI CELLS # UR: 2 /HPF — SIGNIFICANT CHANGE UP (ref 0–5)
GLUCOSE SERPL-MCNC: 106 MG/DL — HIGH (ref 70–99)
GLUCOSE UR QL: NEGATIVE — SIGNIFICANT CHANGE UP
HCT VFR BLD CALC: 36.4 % — LOW (ref 37–47)
HGB BLD-MCNC: 11.8 G/DL — LOW (ref 12–16)
HYALINE CASTS # UR AUTO: 1 /LPF — SIGNIFICANT CHANGE UP (ref 0–7)
IMM GRANULOCYTES NFR BLD AUTO: 0.3 % — SIGNIFICANT CHANGE UP (ref 0.1–0.3)
INR BLD: 0.93 RATIO — SIGNIFICANT CHANGE UP (ref 0.65–1.3)
KETONES UR-MCNC: NEGATIVE — SIGNIFICANT CHANGE UP
LEUKOCYTE ESTERASE UR-ACNC: ABNORMAL
LYMPHOCYTES # BLD AUTO: 1.85 K/UL — SIGNIFICANT CHANGE UP (ref 1.2–3.4)
LYMPHOCYTES # BLD AUTO: 24.5 % — SIGNIFICANT CHANGE UP (ref 20.5–51.1)
MCHC RBC-ENTMCNC: 28.2 PG — SIGNIFICANT CHANGE UP (ref 27–31)
MCHC RBC-ENTMCNC: 32.4 G/DL — SIGNIFICANT CHANGE UP (ref 32–37)
MCV RBC AUTO: 87.1 FL — SIGNIFICANT CHANGE UP (ref 81–99)
MONOCYTES # BLD AUTO: 0.6 K/UL — SIGNIFICANT CHANGE UP (ref 0.1–0.6)
MONOCYTES NFR BLD AUTO: 7.9 % — SIGNIFICANT CHANGE UP (ref 1.7–9.3)
NEUTROPHILS # BLD AUTO: 4.9 K/UL — SIGNIFICANT CHANGE UP (ref 1.4–6.5)
NEUTROPHILS NFR BLD AUTO: 64.9 % — SIGNIFICANT CHANGE UP (ref 42.2–75.2)
NITRITE UR-MCNC: NEGATIVE — SIGNIFICANT CHANGE UP
NRBC # BLD: 0 /100 WBCS — SIGNIFICANT CHANGE UP (ref 0–0)
PH UR: 6 — SIGNIFICANT CHANGE UP (ref 5–8)
PLATELET # BLD AUTO: 197 K/UL — SIGNIFICANT CHANGE UP (ref 130–400)
POTASSIUM SERPL-MCNC: 4.3 MMOL/L — SIGNIFICANT CHANGE UP (ref 3.5–5)
POTASSIUM SERPL-SCNC: 4.3 MMOL/L — SIGNIFICANT CHANGE UP (ref 3.5–5)
PROT SERPL-MCNC: 6.6 G/DL — SIGNIFICANT CHANGE UP (ref 6–8)
PROT UR-MCNC: NEGATIVE — SIGNIFICANT CHANGE UP
PROTHROM AB SERPL-ACNC: 10.7 SEC — SIGNIFICANT CHANGE UP (ref 9.95–12.87)
RBC # BLD: 4.18 M/UL — LOW (ref 4.2–5.4)
RBC # FLD: 14.7 % — HIGH (ref 11.5–14.5)
RBC CASTS # UR COMP ASSIST: 5 /HPF — HIGH (ref 0–4)
SODIUM SERPL-SCNC: 140 MMOL/L — SIGNIFICANT CHANGE UP (ref 135–146)
SP GR SPEC: 1.02 — SIGNIFICANT CHANGE UP (ref 1.01–1.02)
UROBILINOGEN FLD QL: SIGNIFICANT CHANGE UP
WBC # BLD: 7.55 K/UL — SIGNIFICANT CHANGE UP (ref 4.8–10.8)
WBC # FLD AUTO: 7.55 K/UL — SIGNIFICANT CHANGE UP (ref 4.8–10.8)
WBC UR QL: 11 /HPF — HIGH (ref 0–5)

## 2019-07-24 PROCEDURE — 93010 ELECTROCARDIOGRAM REPORT: CPT

## 2019-07-24 NOTE — H&P PST ADULT - REASON FOR ADMISSION
PT PRESENTS TO PAST WITH NO SOB, CP, PALPITATIONS, DYSURIA, UTI OR URI AT PRESENT.   PT ABLE TO WALK UP 1-- FLIGHTS OF STEPS WITH NO SOB- VERY SLOW.   AS PER THE PT, THIS IS HIS/HER COMPLETE MEDICAL AND SURGICAL HX, INCLUDING MEDICATIONS PRESCRIBED AND OVER THE COUNTER  PT PRESENTS TO PAST WITH H/O-   KIDNEY  STONES.

## 2019-07-24 NOTE — H&P PST ADULT - NSICDXPASTMEDICALHX_GEN_ALL_CORE_FT
PAST MEDICAL HISTORY:  Cancer of kidney     Cancer of thyroid     COPD (chronic obstructive pulmonary disease)     Diverticulitis     GERD (gastroesophageal reflux disease)     Hyperlipidemia, unspecified hyperlipidemia type     Hypertension, unspecified type     Lung cancer     Obesity     ARTIE (obstructive sleep apnea) NO CPAP MACHINE PER PT.    SOB (shortness of breath)     Type 2 diabetes mellitus with complication, without long-term current use of insulin

## 2019-07-24 NOTE — H&P PST ADULT - ATTENDING COMMENTS
History of moderate to severe RIGHT hydronephrosis secondary to 2 proximal right ureteral stones measuring 5 mm and 7 mm in size.   oral possible risks, benefits, options were fully reviewed in detail regarding stent placement versus ESWL versus ureteroscopy, laser lithotripsy and stent placement, etc.  The patient has opted for cystoscopy, RIGHT ureteroscopy, laser lithotripsy and RIGHT ureteral stent placement which will be performed today.  The informed consent series was fully reviewed in detail.  All questions have been answered.

## 2019-07-24 NOTE — H&P PST ADULT - NSICDXPASTSURGICALHX_GEN_ALL_CORE_FT
PAST SURGICAL HISTORY:  History of surgery LEFT LOWER LOBECTOMY    History of surgery BILATERAL KNEE REPLACEMENT    History of surgery CATARACT RIGHT EYE    History of surgery TUBAL LIGATION    History of surgery CYST REMOVED  RIGHT BREAST    History of surgery CHOLECYSTECOMY    History of surgery RIGHT PARTIAL NEPHRECTOMY

## 2019-07-25 LAB
ESTIMATED AVERAGE GLUCOSE: 140 MG/DL — HIGH (ref 68–114)
HBA1C BLD-MCNC: 6.5 % — HIGH (ref 4–5.6)

## 2019-07-26 LAB
CULTURE RESULTS: NO GROWTH — SIGNIFICANT CHANGE UP
SPECIMEN SOURCE: SIGNIFICANT CHANGE UP

## 2019-07-26 NOTE — ASU PATIENT PROFILE, ADULT - PMH
Cancer of kidney    Cancer of thyroid    COPD (chronic obstructive pulmonary disease)    Diverticulitis    GERD (gastroesophageal reflux disease)    Hyperlipidemia, unspecified hyperlipidemia type    Hypertension, unspecified type    Lung cancer    Obesity    ARTIE (obstructive sleep apnea)  NO CPAP MACHINE PER PT.  SOB (shortness of breath)    Type 2 diabetes mellitus with complication, without long-term current use of insulin

## 2019-07-26 NOTE — ASU PATIENT PROFILE, ADULT - PSH
History of surgery  BILATERAL CATARACT SURGERY  History of surgery  RIGHT PARTIAL NEPHRECTOMY  History of surgery  CHOLECYSTECOMY  History of surgery  CYST REMOVED  RIGHT BREAST  History of surgery  TUBAL LIGATION  History of surgery  CATARACT RIGHT EYE  History of surgery  BILATERAL KNEE REPLACEMENT  History of surgery  LEFT LOWER LOBECTOMY

## 2019-07-29 ENCOUNTER — OUTPATIENT (OUTPATIENT)
Dept: OUTPATIENT SERVICES | Facility: HOSPITAL | Age: 79
LOS: 1 days | Discharge: HOME | End: 2019-07-29

## 2019-07-29 VITALS
WEIGHT: 212.08 LBS | SYSTOLIC BLOOD PRESSURE: 140 MMHG | HEIGHT: 63 IN | DIASTOLIC BLOOD PRESSURE: 72 MMHG | HEART RATE: 67 BPM | TEMPERATURE: 97 F | OXYGEN SATURATION: 98 % | RESPIRATION RATE: 17 BRPM

## 2019-07-29 VITALS — SYSTOLIC BLOOD PRESSURE: 130 MMHG | HEART RATE: 67 BPM | DIASTOLIC BLOOD PRESSURE: 67 MMHG | RESPIRATION RATE: 18 BRPM

## 2019-07-29 DIAGNOSIS — Z98.890 OTHER SPECIFIED POSTPROCEDURAL STATES: Chronic | ICD-10-CM

## 2019-07-29 DIAGNOSIS — N13.2 HYDRONEPHROSIS WITH RENAL AND URETERAL CALCULOUS OBSTRUCTION: ICD-10-CM

## 2019-07-29 LAB — GLUCOSE BLDC GLUCOMTR-MCNC: 122 MG/DL — HIGH (ref 70–99)

## 2019-07-29 RX ORDER — ONDANSETRON 8 MG/1
4 TABLET, FILM COATED ORAL ONCE
Refills: 0 | Status: DISCONTINUED | OUTPATIENT
Start: 2019-07-29 | End: 2019-07-30

## 2019-07-29 RX ORDER — DULAGLUTIDE 4.5 MG/.5ML
0 INJECTION, SOLUTION SUBCUTANEOUS
Qty: 0 | Refills: 0 | DISCHARGE

## 2019-07-29 RX ORDER — SODIUM CHLORIDE 9 MG/ML
1000 INJECTION, SOLUTION INTRAVENOUS
Refills: 0 | Status: DISCONTINUED | OUTPATIENT
Start: 2019-07-29 | End: 2019-07-30

## 2019-07-29 RX ORDER — HYDROMORPHONE HYDROCHLORIDE 2 MG/ML
0.5 INJECTION INTRAMUSCULAR; INTRAVENOUS; SUBCUTANEOUS
Refills: 0 | Status: DISCONTINUED | OUTPATIENT
Start: 2019-07-29 | End: 2019-07-30

## 2019-07-29 RX ADMIN — SODIUM CHLORIDE 100 MILLILITER(S): 9 INJECTION, SOLUTION INTRAVENOUS at 16:58

## 2019-07-29 NOTE — CHART NOTE - NSCHARTNOTEFT_GEN_A_CORE
PACU ANESTHESIA ADMISSION NOTE      Procedure:   Post op diagnosis:      ____  Intubated  TV:______       Rate: ______      FiO2: ______    ___x_  Patent Airway    __x__  Full return of protective reflexes    __x__  Full recovery from anesthesia / back to baseline status    Vitals:  T(C): 35.9 (07-29-19 @ 15:19), Max: 35.9 (07-29-19 @ 14:13)  HR: 67 (07-29-19 @ 15:19) (67 - 67)  BP: 140/72 (07-29-19 @ 15:19) (140/72 - 140/72)  RR: 17 (07-29-19 @ 15:19) (17 - 17)  SpO2: 98% (07-29-19 @ 15:19) (98% - 98%)    Mental Status:  _x___ Awake   _x____ Alert   _____ Drowsy   _____ Sedated    Nausea/Vomiting:  ____ NO  ___x__Yes,   See Post - Op Orders          Pain Scale (0-10):  _____    Treatment: ____ None    __x__ See Post - Op/PCA Orders    Post - Operative Fluids:   ____ Oral   ____x See Post - Op Orders    Plan: Discharge:   _x___Home       _____Floor     _____Critical Care    _____  Other:_________________    Comments: uneventful anesthesia course no complications. VItals stable. Pt transferred to PACU

## 2019-08-01 DIAGNOSIS — N20.1 CALCULUS OF URETER: ICD-10-CM

## 2019-08-01 DIAGNOSIS — J44.9 CHRONIC OBSTRUCTIVE PULMONARY DISEASE, UNSPECIFIED: ICD-10-CM

## 2019-08-01 DIAGNOSIS — I10 ESSENTIAL (PRIMARY) HYPERTENSION: ICD-10-CM

## 2019-08-01 DIAGNOSIS — Z79.84 LONG TERM (CURRENT) USE OF ORAL HYPOGLYCEMIC DRUGS: ICD-10-CM

## 2019-08-01 DIAGNOSIS — G47.33 OBSTRUCTIVE SLEEP APNEA (ADULT) (PEDIATRIC): ICD-10-CM

## 2019-08-01 DIAGNOSIS — E11.9 TYPE 2 DIABETES MELLITUS WITHOUT COMPLICATIONS: ICD-10-CM

## 2019-08-01 DIAGNOSIS — N13.30 UNSPECIFIED HYDRONEPHROSIS: ICD-10-CM

## 2019-08-01 DIAGNOSIS — E66.9 OBESITY, UNSPECIFIED: ICD-10-CM

## 2019-08-09 ENCOUNTER — OUTPATIENT (OUTPATIENT)
Dept: OUTPATIENT SERVICES | Facility: HOSPITAL | Age: 79
LOS: 1 days | Discharge: HOME | End: 2019-08-09
Payer: MEDICARE

## 2019-08-09 DIAGNOSIS — Z98.890 OTHER SPECIFIED POSTPROCEDURAL STATES: Chronic | ICD-10-CM

## 2019-08-09 DIAGNOSIS — N20.0 CALCULUS OF KIDNEY: ICD-10-CM

## 2019-08-09 PROBLEM — C64.9 MALIGNANT NEOPLASM OF UNSPECIFIED KIDNEY, EXCEPT RENAL PELVIS: Chronic | Status: ACTIVE | Noted: 2019-07-24

## 2019-08-09 PROBLEM — G47.33 OBSTRUCTIVE SLEEP APNEA (ADULT) (PEDIATRIC): Chronic | Status: ACTIVE | Noted: 2019-07-24

## 2019-08-09 PROBLEM — C73 MALIGNANT NEOPLASM OF THYROID GLAND: Chronic | Status: ACTIVE | Noted: 2019-07-24

## 2019-08-09 PROCEDURE — 74019 RADEX ABDOMEN 2 VIEWS: CPT | Mod: 26

## 2019-08-10 NOTE — END OF VISIT
[FreeTextEntry1] : Healthy appearing 4 year-old child. \par The patient is awake, alert and in no acute distress.  \par Normal appearing eyes, lips, ears, nose.  \par Skin in warm, pink, well perfused.\par Good respiratory effort with no audible wheezing without use of a stethoscope. \par \par Exam of right thumb\par Cast removed today, tolerated well. \par +rash on the dorsal aspect of the wrist. No signs of infection \par +ecchymosis over nail bed, new nail appears to be forming. \par no ttp over fracture site \par Able to flex and extend the IP and MCP joint without difficulty. \par Sensation grossly intact\par BCR in digits  [FreeTextEntry3] : Patient seen and examined with Dr. Guerrero, who agrees with the above assessment and plan.\par

## 2019-08-28 ENCOUNTER — APPOINTMENT (OUTPATIENT)
Dept: HEMATOLOGY ONCOLOGY | Facility: CLINIC | Age: 79
End: 2019-08-28

## 2019-08-28 ENCOUNTER — OUTPATIENT (OUTPATIENT)
Dept: OUTPATIENT SERVICES | Facility: HOSPITAL | Age: 79
LOS: 1 days | Discharge: HOME | End: 2019-08-28
Payer: MEDICARE

## 2019-08-28 ENCOUNTER — LABORATORY RESULT (OUTPATIENT)
Age: 79
End: 2019-08-28

## 2019-08-28 ENCOUNTER — OUTPATIENT (OUTPATIENT)
Dept: OUTPATIENT SERVICES | Facility: HOSPITAL | Age: 79
LOS: 1 days | Discharge: HOME | End: 2019-08-28

## 2019-08-28 DIAGNOSIS — Z98.890 OTHER SPECIFIED POSTPROCEDURAL STATES: Chronic | ICD-10-CM

## 2019-08-28 DIAGNOSIS — Z85.118 PERSONAL HISTORY OF OTHER MALIGNANT NEOPLASM OF BRONCHUS AND LUNG: ICD-10-CM

## 2019-08-28 DIAGNOSIS — N20.1 CALCULUS OF URETER: ICD-10-CM

## 2019-08-28 DIAGNOSIS — C85.90 NON-HODGKIN LYMPHOMA, UNSPECIFIED, UNSPECIFIED SITE: ICD-10-CM

## 2019-08-28 DIAGNOSIS — R22.2 LOCALIZED SWELLING, MASS AND LUMP, TRUNK: ICD-10-CM

## 2019-08-28 LAB
ALBUMIN SERPL ELPH-MCNC: 4.2 G/DL
ALP BLD-CCNC: 103 U/L
ALT SERPL-CCNC: 19 U/L
ANION GAP SERPL CALC-SCNC: 11 MMOL/L
AST SERPL-CCNC: 15 U/L
BILIRUB SERPL-MCNC: 0.3 MG/DL
BUN SERPL-MCNC: 28 MG/DL
CALCIUM SERPL-MCNC: 10 MG/DL
CHLORIDE SERPL-SCNC: 105 MMOL/L
CO2 SERPL-SCNC: 26 MMOL/L
CREAT SERPL-MCNC: 0.9 MG/DL
GLUCOSE SERPL-MCNC: 233 MG/DL
HCT VFR BLD CALC: 37.2 %
HGB BLD-MCNC: 11.8 G/DL
MCHC RBC-ENTMCNC: 28.9 PG
MCHC RBC-ENTMCNC: 31.7 G/DL
MCV RBC AUTO: 91 FL
PLATELET # BLD AUTO: 186 K/UL
PMV BLD: 11.4 FL
POTASSIUM SERPL-SCNC: 4.9 MMOL/L
PROT SERPL-MCNC: 6.2 G/DL
RBC # BLD: 4.09 M/UL
RBC # FLD: 14 %
SODIUM SERPL-SCNC: 142 MMOL/L
WBC # FLD AUTO: 6.73 K/UL

## 2019-08-28 PROCEDURE — 74019 RADEX ABDOMEN 2 VIEWS: CPT | Mod: 26

## 2019-08-30 DIAGNOSIS — N20.1 CALCULUS OF URETER: ICD-10-CM

## 2019-08-30 DIAGNOSIS — Z02.9 ENCOUNTER FOR ADMINISTRATIVE EXAMINATIONS, UNSPECIFIED: ICD-10-CM

## 2019-09-09 NOTE — ED ADULT TRIAGE NOTE - MODE OF ARRIVAL
[Dear  ___] : Dear  [unfilled], [Consult Letter:] : I had the pleasure of evaluating your patient, [unfilled]. [Please see my note below.] : Please see my note below. [( Thank you for referring [unfilled] for consultation for _____ )] : Thank you for referring [unfilled] for consultation for [unfilled] [Sincerely,] : Sincerely, [Consult Closing:] : Thank you very much for allowing me to participate in the care of this patient.  If you have any questions, please do not hesitate to contact me. [DrLewis  ___] : Dr. JOHNSON [DrLewis ___] : Dr. JOHNSON Walk in [FreeTextEntry3] : Ken Sol MD, MPH \par System Director of Thoracic Surgery \par Director of Comprehensive Lung and Foregut [facility] \par Professor Cardiovascular & Thoracic Surgery  \par [employer] at [facility]\par  [FreeTextEntry2] : Liliana Sanchez MD (PCP) \par Tiffanie Brooks MD (OB/GYN) \par Geo Cuevas MD (Hem/Onc)

## 2019-10-30 ENCOUNTER — OUTPATIENT (OUTPATIENT)
Dept: OUTPATIENT SERVICES | Facility: HOSPITAL | Age: 79
LOS: 1 days | Discharge: HOME | End: 2019-10-30
Payer: MEDICARE

## 2019-10-30 DIAGNOSIS — Z98.890 OTHER SPECIFIED POSTPROCEDURAL STATES: Chronic | ICD-10-CM

## 2019-10-30 DIAGNOSIS — N20.1 CALCULUS OF URETER: ICD-10-CM

## 2019-10-30 PROCEDURE — 76775 US EXAM ABDO BACK WALL LIM: CPT | Mod: 26

## 2019-11-06 NOTE — ASU DISCHARGE PLAN (ADULT/PEDIATRIC) - FOR NEXT 24 HOURS DO NOT:
Statement Selected Pt is a 64  male with hx of polysubstance abuse, and mood disorder, here for s/p fall, and iliopsoas abscess, presented with mild mood lability symptoms while here. Pt seen, upset about his medical condition, voiced thoughts to hurt himself out of frustration, but denies si/hi. pt has two outpt psychiatrists for medication management. he reports depressed mood, but denies feeling hopeless or helpless. pt aware that his home psychiatric medications will be re-started, VPA raised to 500 mg BID and will slowly add seroquel 100 mg qhs as his home dose is 200 mg qhs. can continue with remeron 30 mg qhs. no indication for psychiatric admission.

## 2019-12-09 ENCOUNTER — OUTPATIENT (OUTPATIENT)
Dept: OUTPATIENT SERVICES | Facility: HOSPITAL | Age: 79
LOS: 1 days | Discharge: HOME | End: 2019-12-09
Payer: MEDICARE

## 2019-12-09 DIAGNOSIS — N20.0 CALCULUS OF KIDNEY: ICD-10-CM

## 2019-12-09 DIAGNOSIS — Z98.890 OTHER SPECIFIED POSTPROCEDURAL STATES: Chronic | ICD-10-CM

## 2019-12-09 PROCEDURE — 74019 RADEX ABDOMEN 2 VIEWS: CPT | Mod: 26

## 2020-01-08 ENCOUNTER — APPOINTMENT (OUTPATIENT)
Dept: HEMATOLOGY ONCOLOGY | Facility: CLINIC | Age: 80
End: 2020-01-08
Payer: MEDICARE

## 2020-01-08 ENCOUNTER — OUTPATIENT (OUTPATIENT)
Dept: OUTPATIENT SERVICES | Facility: HOSPITAL | Age: 80
LOS: 1 days | Discharge: HOME | End: 2020-01-08

## 2020-01-08 ENCOUNTER — LABORATORY RESULT (OUTPATIENT)
Age: 80
End: 2020-01-08

## 2020-01-08 VITALS
RESPIRATION RATE: 18 BRPM | HEIGHT: 63 IN | SYSTOLIC BLOOD PRESSURE: 136 MMHG | HEART RATE: 57 BPM | BODY MASS INDEX: 38.98 KG/M2 | WEIGHT: 220 LBS | DIASTOLIC BLOOD PRESSURE: 91 MMHG | TEMPERATURE: 97.6 F

## 2020-01-08 DIAGNOSIS — Z98.890 OTHER SPECIFIED POSTPROCEDURAL STATES: Chronic | ICD-10-CM

## 2020-01-08 LAB
ALBUMIN SERPL ELPH-MCNC: 3.8 G/DL
ALP BLD-CCNC: 99 U/L
ALT SERPL-CCNC: 22 U/L
ANION GAP SERPL CALC-SCNC: 15 MMOL/L
AST SERPL-CCNC: 18 U/L
BILIRUB SERPL-MCNC: <0.2 MG/DL
BUN SERPL-MCNC: 29 MG/DL
CALCIUM SERPL-MCNC: 9.9 MG/DL
CHLORIDE SERPL-SCNC: 106 MMOL/L
CO2 SERPL-SCNC: 23 MMOL/L
CREAT SERPL-MCNC: 1 MG/DL
GLUCOSE SERPL-MCNC: 177 MG/DL
HCT VFR BLD CALC: 35.7 %
HGB BLD-MCNC: 11.3 G/DL
LDH SERPL-CCNC: 166
MCHC RBC-ENTMCNC: 27.5 PG
MCHC RBC-ENTMCNC: 31.7 G/DL
MCV RBC AUTO: 86.9 FL
PLATELET # BLD AUTO: 218 K/UL
PMV BLD: 11.7 FL
POTASSIUM SERPL-SCNC: 4.8 MMOL/L
PROT SERPL-MCNC: 6.1 G/DL
RBC # BLD: 4.11 M/UL
RBC # FLD: 14.8 %
SODIUM SERPL-SCNC: 144 MMOL/L
WBC # FLD AUTO: 7.77 K/UL

## 2020-01-08 PROCEDURE — 99213 OFFICE O/P EST LOW 20 MIN: CPT

## 2020-01-10 NOTE — PHYSICAL EXAM
[Restricted in physically strenuous activity but ambulatory and able to carry out work of a light or sedentary nature] : Status 1- Restricted in physically strenuous activity but ambulatory and able to carry out work of a light or sedentary nature, e.g., light house work, office work [Obese] : obese [de-identified] : wearing glasses [Normal] : affect appropriate [de-identified] : trace LE edema

## 2020-01-10 NOTE — ASSESSMENT
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

## 2020-01-10 NOTE — CONSULT LETTER
[Consult Letter:] : I had the pleasure of evaluating your patient, [unfilled]. [Dear  ___] : Dear  [unfilled], [Please see my note below.] : Please see my note below. [Consult Closing:] : Thank you very much for allowing me to participate in the care of this patient.  If you have any questions, please do not hesitate to contact me. [Sincerely,] : Sincerely, [FreeTextEntry3] : Dr. Zaheer Guerrero [DrJoaquín  ___] : Dr. RANGEL [DrJoaquín ___] : Dr. RANGEL

## 2020-01-10 NOTE — REVIEW OF SYSTEMS
[Night Sweats] : no night sweats [Fever] : no fever [Chest Pain] : no chest pain [Recent Change In Weight] : ~T recent weight change [Fatigue] : fatigue [Shortness Of Breath] : no shortness of breath [Diarrhea] : diarrhea [Constipation] : constipation [Easy Bleeding] : no tendency for easy bleeding [Negative] : Allergic/Immunologic [FreeTextEntry2] : Intentional weight loss  [FreeTextEntry7] : reports diverticulosis [FreeTextEntry8] : urinary leakage sees Dr. Cobb [de-identified] : sciatica pain on lyrica [FreeTextEntry4] : reports dry mouth from medication

## 2020-01-10 NOTE — HISTORY OF PRESENT ILLNESS
[de-identified] : 7/10/19\par She was supposed to go for biopsy of pleural based nodularity that resolved prior to procedure.  She will see PULM in a few weeks.  She has been purposely losing weight, using Trajenta.  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  Denies any fevers, chills, unintentional weight loss, or night sweats.\par CT Chest (4/11/19) IMPRESSION:Since February 21, 2019;1. Interval resolution of previously described left-sided FDG avid pleural-based nodularity. No biopsy performed.2. Multiple stable nodules, as above.3. Unchanged left periaortic 1.4 cm lymph node, previously noted to be FDG avid.4. Stable right hydronephrosis and right-sided ureteral calculi.5. Stable infrarenal abdominal aortic aneurysm measuring up to 3.7 cm.\par \par 1/8/20\par She is here for follow up. Since last visit she had a CT C/A/P in 7/18/2019 that showed  Stable 1.1 cm left para-aortic lymph node on series 2 image 62. No new lymph nodes in the abdomen and pelvis. \par Interval development of moderate to severe right hydroureteronephrosis leading to 2 proximal ureteral calculi \par (superior calculus measuring 5 x 4 x 5 mm, 1000 Hounsfield units and the inferior adjacent calculus measuring 5 \par x 7 x 6 mm, 573 Hounsfield units). \par Bilateral intrarenal calculi. \par Stable 3.7 x 4.3 cm simple appearing right adnexal cyst. Recommend one-year follow-up pelvic ultrasound.\par Impression: \par Since April 11, 2019. \par No significant interval change in multiple left-sided pleural-based nodules, largest approximately 1.6 cm \par (remeasured on earlier study), left lung base (series 5/149). \par Post left lower lobectomy.\par \par She was seen by Urology and had lithotripsy. \par \par She feels well.  [de-identified] : Ms. 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 Cody Calixto.  She is here with her daughter Brandy.  \par \par She had a history of Kidney Cancer s/p partial nephrectomy and distant history of Thyroid Cancer s/p partial thyroidectomy.  She also had a Left Lower Lobectomy for Stage IA lung cancer. For dates see bellow.\par \par She had CT scan and subsequent PET/CT that showed multiple left pleural based nodules in addition to a para-aortic lymph node.  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 The differential diagnosis includes low grade follicular lymphoma and CD10+ marginal zone lymphoma. She denies any B-symptoms at this time other than recent weight loss of 10lbs over last month, however she reports she had not been eating due to a recent GI bug.\par \par She is here for further recommendations. \par Radiological Work-up:\par PET/CT (2/21/19) IMPRESSION: Since August 20, 2010: 1. Multiple sites of left pleural-based FDG avid nodularity, catalogued above, with max SUV 5.2, suspicious for biological tumor activity. Metastatic or primary pleural neoplasm are considerations. 2. Intra-abdominal FDG avid 1.7 x 1.2 cm left para-aortic lymph node, max SUV 5.4, suspicious for lymph node metastasis. \par 1/30/2019: [facility] [place]:  comparison was made to Ct from 7/2018 Multiple Plural nodules along the posterior paramediastinal surface, some are mildly larger there are new nodules as well. largest nodule 10 mm\par CT Head (9/15/18) IMPRESSION: 1. Mild periventricular and subcortical white matter chronic small vessel ischemic changes. 2. No acute fractures, mass effect, midline shift or hemorrhage. \par CT cervical spine (9/15/18) IMPRESSION: 1. Degenerative changes of the cervical spine C2-3 through C6-7 worse at C5-6 as described above. 2. Straightening of normal cervical lordosis with mild anterolisthesis of C3 anterior on C4, C6 on C7, C7 on T1 and T1 on T2. 3. No acute fractures or dislocations. \par 1/22/18: MR L spine: advance lymbar spondylosis, disck osteophytes, severe neuroforamin narrowing with  ipingment of L4 nerve,  3.5 cm AAA, 8 mm adrenal nodule.\par Pathology:\par (3/14/19) Final Diagnosis Left parotid lymph node, needle biopsy: Low grade B-cell lymphoma. \par Immunostains performed with adequate controls on sections from block 1A show the infiltrating cells are CD20+ CD79a+ B-cells that coexpress CD10, CD23(dim variable), and BCL2. They are negative for CD5, CD43, cyclin D1, and MUM1. BCL6 is difficult to determine due to the high background staining. Ki67 labels 5-10% of these cells. CD21 highlights scattered follicular dendritic cell meshworks.  Per report ([id number]), flow cytometry studies performed at [facility] [email or web address] show monotypic B-cells (10% of cells), positive for kappa, CD19, CD20, CD10; negative CD5. Heterogeneous population of T-cells (with normal CD4 to CD8 ratio), and polytypic B-cells are also present. The findings are diagnostic of low grade B-cell lymphoma. The differential diagnosis includes low grade follicular lymphoma and CD10+ marginal zone lymphoma.\par \par Health Maintenance:\par Colonoscopy \par Mammogram \par \par Recent blood work is in [facility]. Was normal except for a high sugar and calcium of 10.2

## 2020-01-10 NOTE — REASON FOR VISIT
[Follow-Up Visit] : a follow-up [Family Member] : family member [FreeTextEntry2] : Low grade Lymphoma

## 2020-01-13 DIAGNOSIS — C85.90 NON-HODGKIN LYMPHOMA, UNSPECIFIED, UNSPECIFIED SITE: ICD-10-CM

## 2020-06-04 ENCOUNTER — OUTPATIENT (OUTPATIENT)
Dept: OUTPATIENT SERVICES | Facility: HOSPITAL | Age: 80
LOS: 1 days | Discharge: HOME | End: 2020-06-04
Payer: MEDICARE

## 2020-06-04 DIAGNOSIS — Z98.890 OTHER SPECIFIED POSTPROCEDURAL STATES: Chronic | ICD-10-CM

## 2020-06-04 DIAGNOSIS — R22.31 LOCALIZED SWELLING, MASS AND LUMP, RIGHT UPPER LIMB: ICD-10-CM

## 2020-06-04 PROCEDURE — 93971 EXTREMITY STUDY: CPT | Mod: 26,RT

## 2020-07-08 ENCOUNTER — APPOINTMENT (OUTPATIENT)
Dept: HEMATOLOGY ONCOLOGY | Facility: CLINIC | Age: 80
End: 2020-07-08
Payer: MEDICARE

## 2020-07-08 ENCOUNTER — OUTPATIENT (OUTPATIENT)
Dept: OUTPATIENT SERVICES | Facility: HOSPITAL | Age: 80
LOS: 1 days | Discharge: HOME | End: 2020-07-08

## 2020-07-08 ENCOUNTER — LABORATORY RESULT (OUTPATIENT)
Age: 80
End: 2020-07-08

## 2020-07-08 VITALS
HEIGHT: 63 IN | HEART RATE: 55 BPM | WEIGHT: 209 LBS | SYSTOLIC BLOOD PRESSURE: 155 MMHG | BODY MASS INDEX: 37.03 KG/M2 | DIASTOLIC BLOOD PRESSURE: 70 MMHG | RESPIRATION RATE: 18 BRPM

## 2020-07-08 DIAGNOSIS — Z98.890 OTHER SPECIFIED POSTPROCEDURAL STATES: Chronic | ICD-10-CM

## 2020-07-08 LAB
ALBUMIN SERPL ELPH-MCNC: 4.2 G/DL
ALP BLD-CCNC: 98 U/L
ALT SERPL-CCNC: 18 U/L
ANION GAP SERPL CALC-SCNC: 13 MMOL/L
AST SERPL-CCNC: 15 U/L
BILIRUB SERPL-MCNC: 0.3 MG/DL
BUN SERPL-MCNC: 33 MG/DL
CALCIUM SERPL-MCNC: 10.2 MG/DL
CHLORIDE SERPL-SCNC: 102 MMOL/L
CO2 SERPL-SCNC: 24 MMOL/L
CREAT SERPL-MCNC: 1.1 MG/DL
GLUCOSE SERPL-MCNC: 138 MG/DL
HCT VFR BLD CALC: 36.1 %
HGB BLD-MCNC: 11.3 G/DL
LDH SERPL-CCNC: 147
MCHC RBC-ENTMCNC: 25.9 PG
MCHC RBC-ENTMCNC: 31.3 G/DL
MCV RBC AUTO: 82.8 FL
PLATELET # BLD AUTO: 213 K/UL
PMV BLD: 10.7 FL
POTASSIUM SERPL-SCNC: 5 MMOL/L
PROT SERPL-MCNC: 6.5 G/DL
RBC # BLD: 4.36 M/UL
RBC # FLD: 14.9 %
SODIUM SERPL-SCNC: 139 MMOL/L
WBC # FLD AUTO: 6.25 K/UL

## 2020-07-08 PROCEDURE — 99213 OFFICE O/P EST LOW 20 MIN: CPT

## 2020-07-12 NOTE — END OF VISIT
[FreeTextEntry3] : Patient seen and examined with Dr. Guerrero, who agrees with the above assessment and plan.\par 
[facility]

## 2020-07-12 NOTE — ASSESSMENT
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 We decided on observation \par - denies B symptoms\par - No change in Node size in July of 2019\par -She is pending a  CT  in July 2020  will follow up the results\par \par \par # Pleural Nodules on scans, these are not new, at least since 2018, \par - unclear etiology maybe her indolent  lymphoma, since they resolved \par -repeat CT chest pending\par \par #History of Thyroid caner she states it was a partial thyroidectomy \par -this explains the presence of positive thyroglobulin 21 tumor marker \par \par #History of Lung Cancer and Plural Based Nodules \par -CT chest is pending \par \par #Adnexal  cyst  was simple appearing  Pelvis sono was recommended. If CT chest looks clear will order Pelvic Sono\par \par RTC in 6 months,  blood work today

## 2020-07-12 NOTE — CONSULT LETTER
[Dear  ___] : Dear  [unfilled], [Consult Letter:] : I had the pleasure of evaluating your patient, [unfilled]. [Sincerely,] : Sincerely, [Consult Closing:] : Thank you very much for allowing me to participate in the care of this patient.  If you have any questions, please do not hesitate to contact me. [Please see my note below.] : Please see my note below. [FreeTextEntry3] : Dr. Zaheer Guerrero [DrJoaquín  ___] : Dr. RANGEL [DrJoaquín ___] : Dr. RANGEL

## 2020-07-12 NOTE — HISTORY OF PRESENT ILLNESS
[de-identified] : Ms. 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 Cody Calixto.  She is here with her daughter Brandy.  \par \par She had a history of Kidney Cancer s/p partial nephrectomy and distant history of Thyroid Cancer s/p partial thyroidectomy.  She also had a Left Lower Lobectomy for Stage IA lung cancer. For dates see bellow.\par \par She had CT scan and subsequent PET/CT that showed multiple left pleural based nodules in addition to a para-aortic lymph node.  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 The differential diagnosis includes low grade follicular lymphoma and CD10+ marginal zone lymphoma. She denies any B-symptoms at this time other than recent weight loss of 10lbs over last month, however she reports she had not been eating due to a recent GI bug.\par \par She is here for further recommendations. \par Radiological Work-up:\par PET/CT (2/21/19) IMPRESSION: Since August 20, 2010: 1. Multiple sites of left pleural-based FDG avid nodularity, catalogued above, with max SUV 5.2, suspicious for biological tumor activity. Metastatic or primary pleural neoplasm are considerations. 2. Intra-abdominal FDG avid 1.7 x 1.2 cm left para-aortic lymph node, max SUV 5.4, suspicious for lymph node metastasis. \par 1/30/2019: [facility] [place]:  comparison was made to Ct from 7/2018 Multiple Plural nodules along the posterior paramediastinal surface, some are mildly larger there are new nodules as well. largest nodule 10 mm\par CT Head (9/15/18) IMPRESSION: 1. Mild periventricular and subcortical white matter chronic small vessel ischemic changes. 2. No acute fractures, mass effect, midline shift or hemorrhage. \par CT cervical spine (9/15/18) IMPRESSION: 1. Degenerative changes of the cervical spine C2-3 through C6-7 worse at C5-6 as described above. 2. Straightening of normal cervical lordosis with mild anterolisthesis of C3 anterior on C4, C6 on C7, C7 on T1 and T1 on T2. 3. No acute fractures or dislocations. \par 1/22/18: MR L spine: advance lymbar spondylosis, disck osteophytes, severe neuroforamin narrowing with  ipingment of L4 nerve,  3.5 cm AAA, 8 mm adrenal nodule.\par Pathology:\par (3/14/19) Final Diagnosis Left parotid lymph node, needle biopsy: Low grade B-cell lymphoma. \par Immunostains performed with adequate controls on sections from block 1A show the infiltrating cells are CD20+ CD79a+ B-cells that coexpress CD10, CD23(dim variable), and BCL2. They are negative for CD5, CD43, cyclin D1, and MUM1. BCL6 is difficult to determine due to the high background staining. Ki67 labels 5-10% of these cells. CD21 highlights scattered follicular dendritic cell meshworks.  Per report ([id number]), flow cytometry studies performed at [facility] [employer] show monotypic B-cells (10% of cells), positive for kappa, CD19, CD20, CD10; negative CD5. Heterogeneous population of T-cells (with normal CD4 to CD8 ratio), and polytypic B-cells are also present. The findings are diagnostic of low grade B-cell lymphoma. The differential diagnosis includes low grade follicular lymphoma and CD10+ marginal zone lymphoma.\par \par Health Maintenance:\par Colonoscopy \par Mammogram \par \par Recent blood work is in [facility]. Was normal except for a high sugar and calcium of 10.2 [de-identified] : 7/10/19\par She was supposed to go for biopsy of pleural based nodularity that resolved prior to procedure.  She will see PULM in a few weeks.  She has been purposely losing weight, using Trajenta.  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  Denies any fevers, chills, unintentional weight loss, or night sweats.\par CT Chest (4/11/19) IMPRESSION:Since February 21, 2019;1. Interval resolution of previously described left-sided FDG avid pleural-based nodularity. No biopsy performed.2. Multiple stable nodules, as above.3. Unchanged left periaortic 1.4 cm lymph node, previously noted to be FDG avid.4. Stable right hydronephrosis and right-sided ureteral calculi.5. Stable infrarenal abdominal aortic aneurysm measuring up to 3.7 cm.\par \par 1/8/20\par She is here for follow up. Since last visit she had a CT C/A/P in 7/18/2019 that showed  Stable 1.1 cm left para-aortic lymph node on series 2 image 62. No new lymph nodes in the abdomen and pelvis. \par Interval development of moderate to severe right hydroureteronephrosis leading to 2 proximal ureteral calculi \par (superior calculus measuring 5 x 4 x 5 mm, 1000 Hounsfield units and the inferior adjacent calculus measuring 5 \par x 7 x 6 mm, 573 Hounsfield units). \par Bilateral intrarenal calculi. \par Stable 3.7 x 4.3 cm simple appearing right adnexal cyst. Recommend one-year follow-up pelvic ultrasound.\par Impression: \par Since April 11, 2019. \par No significant interval change in multiple left-sided pleural-based nodules, largest approximately 1.6 cm \par (remeasured on earlier study), left lung base (series 5/149). \par Post left lower lobectomy.\par \par She was seen by Urology and had lithotripsy. \par \par She feels well. \par \par 7/8/20\par She is here for follow up. She feels well. No B symptoms. She is pening a CT chest ordered by Dr. Morales

## 2020-07-12 NOTE — PHYSICAL EXAM
[Restricted in physically strenuous activity but ambulatory and able to carry out work of a light or sedentary nature] : Status 1- Restricted in physically strenuous activity but ambulatory and able to carry out work of a light or sedentary nature, e.g., light house work, office work [Obese] : obese [Normal] : affect appropriate [de-identified] : trace LE edema [de-identified] : wearing glasses

## 2020-07-12 NOTE — REVIEW OF SYSTEMS
[Fever] : no fever [Night Sweats] : no night sweats [Fatigue] : fatigue [Recent Change In Weight] : ~T recent weight change [Chest Pain] : no chest pain [Constipation] : constipation [Diarrhea] : diarrhea [Shortness Of Breath] : no shortness of breath [Easy Bleeding] : no tendency for easy bleeding [FreeTextEntry2] : Intentional weight loss  [Negative] : Allergic/Immunologic [FreeTextEntry7] : reports diverticulosis [FreeTextEntry8] : urinary leakage sees Dr. Cobb [FreeTextEntry4] : reports dry mouth from medication [de-identified] : sciatica pain on lyrica

## 2020-07-14 ENCOUNTER — OUTPATIENT (OUTPATIENT)
Dept: OUTPATIENT SERVICES | Facility: HOSPITAL | Age: 80
LOS: 1 days | Discharge: HOME | End: 2020-07-14
Payer: MEDICARE

## 2020-07-14 ENCOUNTER — RESULT REVIEW (OUTPATIENT)
Age: 80
End: 2020-07-14

## 2020-07-14 DIAGNOSIS — Z98.890 OTHER SPECIFIED POSTPROCEDURAL STATES: Chronic | ICD-10-CM

## 2020-07-14 DIAGNOSIS — R91.8 OTHER NONSPECIFIC ABNORMAL FINDING OF LUNG FIELD: ICD-10-CM

## 2020-07-14 PROCEDURE — 71250 CT THORAX DX C-: CPT | Mod: 26

## 2020-07-28 DIAGNOSIS — C85.90 NON-HODGKIN LYMPHOMA, UNSPECIFIED, UNSPECIFIED SITE: ICD-10-CM

## 2020-08-11 ENCOUNTER — OUTPATIENT (OUTPATIENT)
Dept: OUTPATIENT SERVICES | Facility: HOSPITAL | Age: 80
LOS: 1 days | Discharge: HOME | End: 2020-08-11
Payer: MEDICARE

## 2020-08-11 DIAGNOSIS — Z98.890 OTHER SPECIFIED POSTPROCEDURAL STATES: Chronic | ICD-10-CM

## 2020-08-11 DIAGNOSIS — N20.0 CALCULUS OF KIDNEY: ICD-10-CM

## 2020-08-11 PROCEDURE — 76775 US EXAM ABDO BACK WALL LIM: CPT | Mod: 26

## 2020-10-30 NOTE — H&P PST ADULT - FAMILY HISTORY
independent
Mother  Still living? Unknown  Family history of dementia, Age at diagnosis: Age Unknown     Father  Still living? Unknown  Family history of cancer, Age at diagnosis: Age Unknown     Grandparent  Still living? Unknown  Family history of cancer, Age at diagnosis: Age Unknown

## 2021-01-11 ENCOUNTER — OUTPATIENT (OUTPATIENT)
Dept: OUTPATIENT SERVICES | Facility: HOSPITAL | Age: 81
LOS: 1 days | Discharge: HOME | End: 2021-01-11

## 2021-01-11 ENCOUNTER — APPOINTMENT (OUTPATIENT)
Dept: HEMATOLOGY ONCOLOGY | Facility: CLINIC | Age: 81
End: 2021-01-11
Payer: MEDICARE

## 2021-01-11 ENCOUNTER — LABORATORY RESULT (OUTPATIENT)
Age: 81
End: 2021-01-11

## 2021-01-11 VITALS
RESPIRATION RATE: 18 BRPM | HEART RATE: 70 BPM | WEIGHT: 216 LBS | BODY MASS INDEX: 38.27 KG/M2 | DIASTOLIC BLOOD PRESSURE: 80 MMHG | HEIGHT: 63 IN | SYSTOLIC BLOOD PRESSURE: 130 MMHG

## 2021-01-11 DIAGNOSIS — Z98.890 OTHER SPECIFIED POSTPROCEDURAL STATES: Chronic | ICD-10-CM

## 2021-01-11 PROCEDURE — 99213 OFFICE O/P EST LOW 20 MIN: CPT

## 2021-01-14 NOTE — PHYSICAL EXAM
[Restricted in physically strenuous activity but ambulatory and able to carry out work of a light or sedentary nature] : Status 1- Restricted in physically strenuous activity but ambulatory and able to carry out work of a light or sedentary nature, e.g., light house work, office work [Obese] : obese [Normal] : affect appropriate [de-identified] : wearing glasses [de-identified] : trace LE edema [de-identified] : Erythematous skin in bilateral LEs

## 2021-01-14 NOTE — HISTORY OF PRESENT ILLNESS
[de-identified] : Ms. 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 Cody Calixto.  She is here with her daughter Brandy.  \par \par She had a history of Kidney Cancer s/p partial nephrectomy and distant history of Thyroid Cancer s/p partial thyroidectomy.  She also had a Left Lower Lobectomy for Stage IA lung cancer. For dates see bellow.\par \par She had CT scan and subsequent PET/CT that showed multiple left pleural based nodules in addition to a para-aortic lymph node.  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 The differential diagnosis includes low grade follicular lymphoma and CD10+ marginal zone lymphoma. She denies any B-symptoms at this time other than recent weight loss of 10lbs over last month, however she reports she had not been eating due to a recent GI bug.\par \par She is here for further recommendations. \par Radiological Work-up:\par PET/CT (2/21/19) IMPRESSION: Since August 20, 2010: 1. Multiple sites of left pleural-based FDG avid nodularity, catalogued above, with max SUV 5.2, suspicious for biological tumor activity. Metastatic or primary pleural neoplasm are considerations. 2. Intra-abdominal FDG avid 1.7 x 1.2 cm left para-aortic lymph node, max SUV 5.4, suspicious for lymph node metastasis. \par 1/30/2019: [facility] [place]:  comparison was made to Ct from 7/2018 Multiple Plural nodules along the posterior paramediastinal surface, some are mildly larger there are new nodules as well. largest nodule 10 mm\par CT Head (9/15/18) IMPRESSION: 1. Mild periventricular and subcortical white matter chronic small vessel ischemic changes. 2. No acute fractures, mass effect, midline shift or hemorrhage. \par CT cervical spine (9/15/18) IMPRESSION: 1. Degenerative changes of the cervical spine C2-3 through C6-7 worse at C5-6 as described above. 2. Straightening of normal cervical lordosis with mild anterolisthesis of C3 anterior on C4, C6 on C7, C7 on T1 and T1 on T2. 3. No acute fractures or dislocations. \par 1/22/18: MR L spine: advance lymbar spondylosis, disck osteophytes, severe neuroforamin narrowing with  ipingment of L4 nerve,  3.5 cm AAA, 8 mm adrenal nodule.\par Pathology:\par (3/14/19) Final Diagnosis Left parotid lymph node, needle biopsy: Low grade B-cell lymphoma. \par Immunostains performed with adequate controls on sections from block 1A show the infiltrating cells are CD20+ CD79a+ B-cells that coexpress CD10, CD23(dim variable), and BCL2. They are negative for CD5, CD43, cyclin D1, and MUM1. BCL6 is difficult to determine due to the high background staining. Ki67 labels 5-10% of these cells. CD21 highlights scattered follicular dendritic cell meshworks.  Per report ([id number]), flow cytometry studies performed at [facility] [employer] show monotypic B-cells (10% of cells), positive for kappa, CD19, CD20, CD10; negative CD5. Heterogeneous population of T-cells (with normal CD4 to CD8 ratio), and polytypic B-cells are also present. The findings are diagnostic of low grade B-cell lymphoma. The differential diagnosis includes low grade follicular lymphoma and CD10+ marginal zone lymphoma.\par \par Health Maintenance:\par Colonoscopy \par Mammogram \par \par Recent blood work is in [facility]. Was normal except for a high sugar and calcium of 10.2 [de-identified] : 7/10/19\par She was supposed to go for biopsy of pleural based nodularity that resolved prior to procedure.  She will see PULM in a few weeks.  She has been purposely losing weight, using Trajenta.  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  Denies any fevers, chills, unintentional weight loss, or night sweats.\par CT Chest (4/11/19) IMPRESSION:Since February 21, 2019;1. Interval resolution of previously described left-sided FDG avid pleural-based nodularity. No biopsy performed.2. Multiple stable nodules, as above.3. Unchanged left periaortic 1.4 cm lymph node, previously noted to be FDG avid.4. Stable right hydronephrosis and right-sided ureteral calculi.5. Stable infrarenal abdominal aortic aneurysm measuring up to 3.7 cm.\par \par 1/8/20\par She is here for follow up. Since last visit she had a CT C/A/P in 7/18/2019 that showed  Stable 1.1 cm left para-aortic lymph node on series 2 image 62. No new lymph nodes in the abdomen and pelvis. \par Interval development of moderate to severe right hydroureteronephrosis leading to 2 proximal ureteral calculi \par (superior calculus measuring 5 x 4 x 5 mm, 1000 Hounsfield units and the inferior adjacent calculus measuring 5 \par x 7 x 6 mm, 573 Hounsfield units). \par Bilateral intrarenal calculi. \par Stable 3.7 x 4.3 cm simple appearing right adnexal cyst. Recommend one-year follow-up pelvic ultrasound.\par Impression: \par Since April 11, 2019. \par No significant interval change in multiple left-sided pleural-based nodules, largest approximately 1.6 cm \par (remeasured on earlier study), left lung base (series 5/149). \par Post left lower lobectomy.\par \par She was seen by Urology and had lithotripsy. \par \par She feels well. \par \par 7/8/20\par She is here for follow up. She feels well. No B symptoms. She is pening a CT chest ordered by Dr. Morales\par \par 1/11/2021: The patient is here for follow up. She has no B symptoms , no cough , no chest pain , no new palpable lymph nodes. She is complaing of sciatica, going for an injection next week. She also had LEs cellulitis , completed once course of antibiotics.

## 2021-01-14 NOTE — ASSESSMENT
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 On observation since she is asymptomatic. 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 We will repeat CT now\par - She will follow with her pulmonologist Dr Benjamin Lake on 1/20/21\par \par # History of Thyroid caner she states it was a partial thyroidectomy \par - This explains the presence of positive thyroglobulin 21 tumor marker \par \par # History of Lung Cancer and Plural Based Nodules \par - CT chest 7/2020 reviewed\par \par # Adnexal cyst :  simple appearing.   Pelvis sono was recommended. \par \par RTC in 6 months,  blood work today \par We will call her with results of blood work and CTs  and if any changes will bring her back sooner\par The patient was seen and examined by Dr Guerrero who agreed with the above plan

## 2021-01-14 NOTE — REVIEW OF SYSTEMS
[Fatigue] : fatigue [Recent Change In Weight] : ~T recent weight change [Constipation] : constipation [Diarrhea] : diarrhea [Negative] : Allergic/Immunologic [Fever] : no fever [Night Sweats] : no night sweats [Chest Pain] : no chest pain [Shortness Of Breath] : no shortness of breath [Easy Bleeding] : no tendency for easy bleeding [FreeTextEntry2] : Intentional weight loss  [FreeTextEntry4] : reports dry mouth from medication [FreeTextEntry7] : reports diverticulosis [FreeTextEntry8] : urinary leakage sees Dr. Cobb [de-identified] : sciatica pain on lyrica

## 2021-01-21 DIAGNOSIS — C85.90 NON-HODGKIN LYMPHOMA, UNSPECIFIED, UNSPECIFIED SITE: ICD-10-CM

## 2021-02-09 ENCOUNTER — RESULT REVIEW (OUTPATIENT)
Age: 81
End: 2021-02-09

## 2021-02-09 ENCOUNTER — OUTPATIENT (OUTPATIENT)
Dept: OUTPATIENT SERVICES | Facility: HOSPITAL | Age: 81
LOS: 1 days | Discharge: HOME | End: 2021-02-09
Payer: MEDICARE

## 2021-02-09 DIAGNOSIS — Z98.890 OTHER SPECIFIED POSTPROCEDURAL STATES: Chronic | ICD-10-CM

## 2021-02-09 DIAGNOSIS — C85.90 NON-HODGKIN LYMPHOMA, UNSPECIFIED, UNSPECIFIED SITE: ICD-10-CM

## 2021-02-09 PROCEDURE — 71260 CT THORAX DX C+: CPT | Mod: 26

## 2021-02-09 PROCEDURE — 74177 CT ABD & PELVIS W/CONTRAST: CPT | Mod: 26

## 2021-02-16 LAB
ALBUMIN MFR SERPL ELPH: 52.4 %
ALBUMIN SERPL ELPH-MCNC: 3.7 G/DL
ALBUMIN SERPL-MCNC: 3.5 G/DL
ALBUMIN/GLOB SERPL: 1.1 RATIO
ALP BLD-CCNC: 107 U/L
ALPHA1 GLOB MFR SERPL ELPH: 6.1 %
ALPHA1 GLOB SERPL ELPH-MCNC: 0.4 G/DL
ALPHA2 GLOB MFR SERPL ELPH: 11.6 %
ALPHA2 GLOB SERPL ELPH-MCNC: 0.8 G/DL
ALT SERPL-CCNC: 16 U/L
ANION GAP SERPL CALC-SCNC: 10 MMOL/L
AST SERPL-CCNC: 13 U/L
B-GLOBULIN MFR SERPL ELPH: 13.6 %
B-GLOBULIN SERPL ELPH-MCNC: 0.9 G/DL
BILIRUB SERPL-MCNC: 0.2 MG/DL
BUN SERPL-MCNC: 21 MG/DL
CALCIUM SERPL-MCNC: 10.5 MG/DL
CHLORIDE SERPL-SCNC: 103 MMOL/L
CO2 SERPL-SCNC: 27 MMOL/L
CREAT SERPL-MCNC: 0.8 MG/DL
DEPRECATED KAPPA LC FREE/LAMBDA SER: 1.58 RATIO
GAMMA GLOB FLD ELPH-MCNC: 1.1 G/DL
GAMMA GLOB MFR SERPL ELPH: 16.3 %
GLUCOSE SERPL-MCNC: 205 MG/DL
HCT VFR BLD CALC: 33.3 %
HGB BLD-MCNC: 10 G/DL
IGA SER QL IEP: 204 MG/DL
IGG SER QL IEP: 1184 MG/DL
IGM SER QL IEP: 40 MG/DL
INTERPRETATION SERPL IEP-IMP: NORMAL
KAPPA LC CSF-MCNC: 2.49 MG/DL
KAPPA LC SERPL-MCNC: 3.93 MG/DL
LDH SERPL-CCNC: 199
M PROTEIN SPEC IFE-MCNC: NORMAL
MCHC RBC-ENTMCNC: 24.9 PG
MCHC RBC-ENTMCNC: 30 G/DL
MCV RBC AUTO: 83 FL
PLATELET # BLD AUTO: 294 K/UL
PMV BLD: 11.3 FL
POTASSIUM SERPL-SCNC: 4.9 MMOL/L
PROT SERPL-MCNC: 6.7 G/DL
RBC # BLD: 4.01 M/UL
RBC # FLD: 18.5 %
SODIUM SERPL-SCNC: 140 MMOL/L
WBC # FLD AUTO: 8.72 K/UL

## 2021-02-18 ENCOUNTER — NON-APPOINTMENT (OUTPATIENT)
Age: 81
End: 2021-02-18

## 2021-03-04 ENCOUNTER — OUTPATIENT (OUTPATIENT)
Dept: OUTPATIENT SERVICES | Facility: HOSPITAL | Age: 81
LOS: 1 days | Discharge: HOME | End: 2021-03-04
Payer: MEDICARE

## 2021-03-04 DIAGNOSIS — Z98.890 OTHER SPECIFIED POSTPROCEDURAL STATES: Chronic | ICD-10-CM

## 2021-03-04 DIAGNOSIS — N39.0 URINARY TRACT INFECTION, SITE NOT SPECIFIED: ICD-10-CM

## 2021-03-04 DIAGNOSIS — N20.0 CALCULUS OF KIDNEY: ICD-10-CM

## 2021-03-04 DIAGNOSIS — R30.0 DYSURIA: ICD-10-CM

## 2021-03-04 PROCEDURE — 76770 US EXAM ABDO BACK WALL COMP: CPT | Mod: 26

## 2021-03-08 DIAGNOSIS — E11.9 TYPE 2 DIABETES MELLITUS W/OUT COMPLICATIONS: ICD-10-CM

## 2021-03-08 DIAGNOSIS — E78.01 FAMILIAL HYPERCHOLESTEROLEMIA: ICD-10-CM

## 2021-03-08 DIAGNOSIS — K21.00 GASTRO-ESOPHAGEAL REFLUX DISEASE WITH ESOPHAGITIS, WITHOUT BLEEDING: ICD-10-CM

## 2021-03-22 ENCOUNTER — APPOINTMENT (OUTPATIENT)
Dept: PULMONOLOGY | Facility: CLINIC | Age: 81
End: 2021-03-22
Payer: MEDICARE

## 2021-03-22 VITALS
SYSTOLIC BLOOD PRESSURE: 128 MMHG | BODY MASS INDEX: 34.55 KG/M2 | OXYGEN SATURATION: 93 % | HEIGHT: 63 IN | WEIGHT: 195 LBS | RESPIRATION RATE: 16 BRPM | HEART RATE: 70 BPM | DIASTOLIC BLOOD PRESSURE: 78 MMHG

## 2021-03-22 PROCEDURE — 99213 OFFICE O/P EST LOW 20 MIN: CPT

## 2021-03-22 NOTE — PHYSICAL EXAM
[No Acute Distress] : no acute distress [Normal Oropharynx] : normal oropharynx [Normal Appearance] : normal appearance [No Neck Mass] : no neck mass [Normal Rate/Rhythm] : normal rate/rhythm [Normal S1, S2] : normal s1, s2 [No Murmurs] : no murmurs [No Abnormalities] : no abnormalities [Benign] : benign [Normal Gait] : normal gait [No Clubbing] : no clubbing [No Cyanosis] : no cyanosis [No Edema] : no edema [FROM] : FROM [Normal Color/ Pigmentation] : normal color/ pigmentation [No Focal Deficits] : no focal deficits [Oriented x3] : oriented x3 [Normal Affect] : normal affect [TextBox_68] : DEC BS BOTH BASES

## 2021-03-22 NOTE — HISTORY OF PRESENT ILLNESS
[TextBox_4] : SP RECENT CHEST C IN 2/21 DO NOT WANT BIOPSY, SHE SPOKE WITH ELIZABETH ESTEVES, SOB ON EXERTION

## 2021-05-19 ENCOUNTER — APPOINTMENT (OUTPATIENT)
Dept: HEMATOLOGY ONCOLOGY | Facility: CLINIC | Age: 81
End: 2021-05-19
Payer: MEDICARE

## 2021-05-19 PROCEDURE — 99442: CPT | Mod: 95

## 2021-05-21 NOTE — HISTORY OF PRESENT ILLNESS
[Home] : at home, [unfilled] , at the time of the visit. [Medical Office: ([facility])___] : at the medical office located in  [Verbal consent obtained from patient] : the patient, [unfilled] [de-identified] : Ms. 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 Cody Calixto.  She is here with her daughter Brandy.  \par \par She had a history of Kidney Cancer s/p partial nephrectomy and distant history of Thyroid Cancer s/p partial thyroidectomy.  She also had a Left Lower Lobectomy for Stage IA lung cancer. For dates see bellow.\par \par She had CT scan and subsequent PET/CT that showed multiple left pleural based nodules in addition to a para-aortic lymph node.  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 The differential diagnosis includes low grade follicular lymphoma and CD10+ marginal zone lymphoma. She denies any B-symptoms at this time other than recent weight loss of 10lbs over last month, however she reports she had not been eating due to a recent GI bug.\par \par She is here for further recommendations. \par Radiological Work-up:\par PET/CT (2/21/19) IMPRESSION: Since August 20, 2010: 1. Multiple sites of left pleural-based FDG avid nodularity, catalogued above, with max SUV 5.2, suspicious for biological tumor activity. Metastatic or primary pleural neoplasm are considerations. 2. Intra-abdominal FDG avid 1.7 x 1.2 cm left para-aortic lymph node, max SUV 5.4, suspicious for lymph node metastasis. \par 1/30/2019: [facility] of [place]:  comparison was made to Ct from 7/2018 Multiple Plural nodules along the posterior paramediastinal surface, some are mildly larger there are new nodules as well. largest nodule 10 mm\par CT Head (9/15/18) IMPRESSION: 1. Mild periventricular and subcortical white matter chronic small vessel ischemic changes. 2. No acute fractures, mass effect, midline shift or hemorrhage. \par CT cervical spine (9/15/18) IMPRESSION: 1. Degenerative changes of the cervical spine C2-3 through C6-7 worse at C5-6 as described above. 2. Straightening of normal cervical lordosis with mild anterolisthesis of C3 anterior on C4, C6 on C7, C7 on T1 and T1 on T2. 3. No acute fractures or dislocations. \par 1/22/18: MR L spine: advance lymbar spondylosis, disck osteophytes, severe neuroforamin narrowing with  ipingment of L4 nerve,  3.5 cm AAA, 8 mm adrenal nodule.\par Pathology:\par (3/14/19) Final Diagnosis Left parotid lymph node, needle biopsy: Low grade B-cell lymphoma. \par Immunostains performed with adequate controls on sections from block 1A show the infiltrating cells are CD20+ CD79a+ B-cells that coexpress CD10, CD23(dim variable), and BCL2. They are negative for CD5, CD43, cyclin D1, and MUM1. BCL6 is difficult to determine due to the high background staining. Ki67 labels 5-10% of these cells. CD21 highlights scattered follicular dendritic cell meshworks.  Per report ([id number]), flow cytometry studies performed at [facility] [employer] show monotypic B-cells (10% of cells), positive for kappa, CD19, CD20, CD10; negative CD5. Heterogeneous population of T-cells (with normal CD4 to CD8 ratio), and polytypic B-cells are also present. The findings are diagnostic of low grade B-cell lymphoma. The differential diagnosis includes low grade follicular lymphoma and CD10+ marginal zone lymphoma.\par \par Health Maintenance:\par Colonoscopy \par Mammogram \par \par Recent blood work is in HIE. Was normal except for a high sugar and calcium of 10.2 [de-identified] : 7/10/19\par She was supposed to go for biopsy of pleural based nodularity that resolved prior to procedure.  She will see PULM in a few weeks.  She has been purposely losing weight, using Trajenta.  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  Denies any fevers, chills, unintentional weight loss, or night sweats.\par CT Chest (4/11/19) IMPRESSION:Since February 21, 2019;1. Interval resolution of previously described left-sided FDG avid pleural-based nodularity. No biopsy performed.2. Multiple stable nodules, as above.3. Unchanged left periaortic 1.4 cm lymph node, previously noted to be FDG avid.4. Stable right hydronephrosis and right-sided ureteral calculi.5. Stable infrarenal abdominal aortic aneurysm measuring up to 3.7 cm.\par \par 1/8/20\par She is here for follow up. Since last visit she had a CT C/A/P in 7/18/2019 that showed  Stable 1.1 cm left para-aortic lymph node on series 2 image 62. No new lymph nodes in the abdomen and pelvis. \par Interval development of moderate to severe right hydroureteronephrosis leading to 2 proximal ureteral calculi \par (superior calculus measuring 5 x 4 x 5 mm, 1000 Hounsfield units and the inferior adjacent calculus measuring 5 \par x 7 x 6 mm, 573 Hounsfield units). \par Bilateral intrarenal calculi. \par Stable 3.7 x 4.3 cm simple appearing right adnexal cyst. Recommend one-year follow-up pelvic ultrasound.\par Impression: \par Since April 11, 2019. \par No significant interval change in multiple left-sided pleural-based nodules, largest approximately 1.6 cm \par (remeasured on earlier study), left lung base (series 5/149). \par Post left lower lobectomy.\par \par She was seen by Urology and had lithotripsy. \par \par She feels well. \par \par 7/8/20\par She is here for follow up. She feels well. No B symptoms. She is pening a CT chest ordered by Dr. Morales\par \par 1/11/2021: The patient is here for follow up. She has no B symptoms , no cough , no chest pain , no new palpable lymph nodes. She is complaing of sciatica, going for an injection next week. She also had LEs cellulitis , completed once course of antibiotics. \par \par \par 5/19/21\par We had a telephone visit today. She feels well.  She had CT chest on 2/21 that showed Since July 14, 2020,\par \par  interval development of wedge-shaped consolidation posterior left upper lobe. (2/31).\par \par Minimal interval growth of multiple pleural-based nodules as described above\par \par Numerous stable left-sided pleural based nodules and adjacent parenchymal nodules..\par \par Reviewed her CT in tumor board. Chest. We were diveded on if we should rescan in 3 months with CT or check PET/cT now. I spoke to her and we decided to check a CT Chest in 3 months. \par \par She feels well. \par

## 2021-05-21 NOTE — REVIEW OF SYSTEMS
[Fatigue] : fatigue [Recent Change In Weight] : ~T recent weight change [Constipation] : constipation [Diarrhea] : diarrhea [Negative] : Allergic/Immunologic [Fever] : no fever [Night Sweats] : no night sweats [Chest Pain] : no chest pain [Shortness Of Breath] : no shortness of breath [Easy Bleeding] : no tendency for easy bleeding [FreeTextEntry2] : Intentional weight loss  [FreeTextEntry4] : reports dry mouth from medication [FreeTextEntry8] : urinary leakage sees Dr. Cobb [de-identified] : sciatica pain on lyrica

## 2021-05-21 NOTE — ASSESSMENT
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 On observation since she is asymptomatic. 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   Pelvis sono was recommended. \par \par RTC depending on CT chest results. Will call once CT is back and go from there

## 2021-06-02 ENCOUNTER — RESULT REVIEW (OUTPATIENT)
Age: 81
End: 2021-06-02

## 2021-06-02 ENCOUNTER — OUTPATIENT (OUTPATIENT)
Dept: OUTPATIENT SERVICES | Facility: HOSPITAL | Age: 81
LOS: 1 days | Discharge: HOME | End: 2021-06-02
Payer: MEDICARE

## 2021-06-02 DIAGNOSIS — Z98.890 OTHER SPECIFIED POSTPROCEDURAL STATES: Chronic | ICD-10-CM

## 2021-06-02 DIAGNOSIS — R91.1 SOLITARY PULMONARY NODULE: ICD-10-CM

## 2021-06-02 PROCEDURE — 71250 CT THORAX DX C-: CPT | Mod: 26,MH

## 2021-07-19 ENCOUNTER — APPOINTMENT (OUTPATIENT)
Dept: HEMATOLOGY ONCOLOGY | Facility: CLINIC | Age: 81
End: 2021-07-19

## 2021-09-13 ENCOUNTER — APPOINTMENT (OUTPATIENT)
Dept: PULMONOLOGY | Facility: CLINIC | Age: 81
End: 2021-09-13
Payer: MEDICARE

## 2021-09-13 VITALS
HEART RATE: 61 BPM | BODY MASS INDEX: 34.38 KG/M2 | OXYGEN SATURATION: 91 % | DIASTOLIC BLOOD PRESSURE: 78 MMHG | WEIGHT: 194 LBS | SYSTOLIC BLOOD PRESSURE: 138 MMHG | HEIGHT: 63 IN

## 2021-09-13 PROCEDURE — 99213 OFFICE O/P EST LOW 20 MIN: CPT

## 2021-09-13 NOTE — REASON FOR VISIT
[Follow-Up] : a follow-up visit [Cough] : cough [COPD] : COPD [Pulmonary Nodules] : pulmonary nodules

## 2021-09-15 ENCOUNTER — APPOINTMENT (OUTPATIENT)
Dept: HEMATOLOGY ONCOLOGY | Facility: CLINIC | Age: 81
End: 2021-09-15
Payer: MEDICARE

## 2021-09-15 ENCOUNTER — OUTPATIENT (OUTPATIENT)
Dept: OUTPATIENT SERVICES | Facility: HOSPITAL | Age: 81
LOS: 1 days | Discharge: HOME | End: 2021-09-15

## 2021-09-15 ENCOUNTER — LABORATORY RESULT (OUTPATIENT)
Age: 81
End: 2021-09-15

## 2021-09-15 VITALS
HEIGHT: 63 IN | WEIGHT: 199 LBS | RESPIRATION RATE: 16 BRPM | HEART RATE: 61 BPM | BODY MASS INDEX: 35.26 KG/M2 | DIASTOLIC BLOOD PRESSURE: 69 MMHG | SYSTOLIC BLOOD PRESSURE: 156 MMHG | TEMPERATURE: 97.1 F

## 2021-09-15 VITALS
RESPIRATION RATE: 16 BRPM | DIASTOLIC BLOOD PRESSURE: 74 MMHG | TEMPERATURE: 97.1 F | HEART RATE: 96 BPM | SYSTOLIC BLOOD PRESSURE: 128 MMHG | HEIGHT: 72 IN

## 2021-09-15 DIAGNOSIS — Z98.890 OTHER SPECIFIED POSTPROCEDURAL STATES: Chronic | ICD-10-CM

## 2021-09-15 DIAGNOSIS — Z85.118 PERSONAL HISTORY OF OTHER MALIGNANT NEOPLASM OF BRONCHUS AND LUNG: ICD-10-CM

## 2021-09-15 LAB
HCT VFR BLD CALC: 35.6 %
HGB BLD-MCNC: 10.9 G/DL
MCHC RBC-ENTMCNC: 23.2 PG
MCHC RBC-ENTMCNC: 30.6 G/DL
MCV RBC AUTO: 75.7 FL
PLATELET # BLD AUTO: 204 K/UL
PMV BLD: 9.9 FL
RBC # BLD: 4.7 M/UL
RBC # FLD: 18.3 %
RETICS # AUTO: 1.4 %
RETICS AGGREG/RBC NFR: 64.3 K/UL
WBC # FLD AUTO: 7.03 K/UL

## 2021-09-15 PROCEDURE — 99214 OFFICE O/P EST MOD 30 MIN: CPT

## 2021-09-16 LAB
ALBUMIN SERPL ELPH-MCNC: 4 G/DL
ALP BLD-CCNC: 120 U/L
ALT SERPL-CCNC: 17 U/L
ANION GAP SERPL CALC-SCNC: 11 MMOL/L
AST SERPL-CCNC: 18 U/L
BILIRUB SERPL-MCNC: 0.3 MG/DL
BUN SERPL-MCNC: 28 MG/DL
CALCIUM SERPL-MCNC: 10.3 MG/DL
CHLORIDE SERPL-SCNC: 103 MMOL/L
CO2 SERPL-SCNC: 25 MMOL/L
CREAT SERPL-MCNC: 1 MG/DL
FERRITIN SERPL-MCNC: 18 NG/ML
GLUCOSE SERPL-MCNC: 111 MG/DL
IRON SATN MFR SERPL: 10 %
IRON SERPL-MCNC: 37 UG/DL
LDH SERPL-CCNC: 172
POTASSIUM SERPL-SCNC: 4.6 MMOL/L
PROT SERPL-MCNC: 6.5 G/DL
SODIUM SERPL-SCNC: 139 MMOL/L
TIBC SERPL-MCNC: 380 UG/DL
UIBC SERPL-MCNC: 343 UG/DL

## 2021-09-16 NOTE — REVIEW OF SYSTEMS
[Fatigue] : fatigue [Negative] : Allergic/Immunologic [Lower Ext Edema] : lower extremity edema [Fever] : no fever [Night Sweats] : no night sweats [Chest Pain] : no chest pain [Shortness Of Breath] : no shortness of breath [Easy Bleeding] : no tendency for easy bleeding [FreeTextEntry7] : occasional nausea [FreeTextEntry8] : urinary leakage sees Dr. Cobb [de-identified] : sciatica pain on lyrica

## 2021-09-16 NOTE — ASSESSMENT
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 On observation since she is asymptomatic. 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 Calixto, note reviewed\par \par #Anemia, mild, microcytic\par - CBC, CMP, LDH, retic count, iron studies, ferritin today\par - will call with results; if iron stores are low, plan to start oral iron every other day + will send to GI for full workup\par \par # Adnexal cyst :  simple appearing.   Pelvis sono was recommended. \par \par RTC in 6 months with CBC, CMP, LDH, iron studies, ferritin; sooner if needed

## 2021-09-16 NOTE — END OF VISIT
[FreeTextEntry3] : I was physically present for the key portions of the evaluation and management service provided.  I agree with the history and physical, and plan which I have reviewed and edited where appropriate.\par \par She returns for follow-up today.  She has no clinical signs or symptoms of active non-Hodgkin's lymphoma.  Her most recent CT chest from June 2021 did not show any obvious progression of her lymphoma or new nodules concerning for any malignancy.  Plan is to repeat a CAT scan of the chest in 6 to 12 months.\par \par Her CBC from the visit did come back showing microcytosis and iron studies were consistent with iron deficiency I did call her and we went over the results and suggest that she starts oral iron replacement therapy 1 tablet every other day and to monitor for constipation and GI upset.  We also discussed possibility of endoscopic evaluation and she will speak to her gastroenterologist.\par \par She will come back in 6 months to see us.  A CMP was also ordered due to her history of non-Hodgkin's lymphoma.\par \par .   Dr. Mcdonald note reviewed.

## 2021-09-16 NOTE — HISTORY OF PRESENT ILLNESS
[de-identified] : Ms. 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 Cody Calixto.  She is here with her daughter Brandy.  \par \par She had a history of Kidney Cancer s/p partial nephrectomy and distant history of Thyroid Cancer s/p partial thyroidectomy.  She also had a Left Lower Lobectomy for Stage IA lung cancer. For dates see bellow.\par \par She had CT scan and subsequent PET/CT that showed multiple left pleural based nodules in addition to a para-aortic lymph node.  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 The differential diagnosis includes low grade follicular lymphoma and CD10+ marginal zone lymphoma. She denies any B-symptoms at this time other than recent weight loss of 10lbs over last month, however she reports she had not been eating due to a recent GI bug.\par \par She is here for further recommendations. \par Radiological Work-up:\par PET/CT (2/21/19) IMPRESSION: Since August 20, 2010: 1. Multiple sites of left pleural-based FDG avid nodularity, catalogued above, with max SUV 5.2, suspicious for biological tumor activity. Metastatic or primary pleural neoplasm are considerations. 2. Intra-abdominal FDG avid 1.7 x 1.2 cm left para-aortic lymph node, max SUV 5.4, suspicious for lymph node metastasis. \par 1/30/2019: [facility] of [place]:  comparison was made to Ct from 7/2018 Multiple Plural nodules along the posterior paramediastinal surface, some are mildly larger there are new nodules as well. largest nodule 10 mm\par CT Head (9/15/18) IMPRESSION: 1. Mild periventricular and subcortical white matter chronic small vessel ischemic changes. 2. No acute fractures, mass effect, midline shift or hemorrhage. \par CT cervical spine (9/15/18) IMPRESSION: 1. Degenerative changes of the cervical spine C2-3 through C6-7 worse at C5-6 as described above. 2. Straightening of normal cervical lordosis with mild anterolisthesis of C3 anterior on C4, C6 on C7, C7 on T1 and T1 on T2. 3. No acute fractures or dislocations. \par 1/22/18: MR L spine: advance lymbar spondylosis, disck osteophytes, severe neuroforamin narrowing with  ipingment of L4 nerve,  3.5 cm AAA, 8 mm adrenal nodule.\par Pathology:\par (3/14/19) Final Diagnosis Left parotid lymph node, needle biopsy: Low grade B-cell lymphoma. \par Immunostains performed with adequate controls on sections from block 1A show the infiltrating cells are CD20+ CD79a+ B-cells that coexpress CD10, CD23(dim variable), and BCL2. They are negative for CD5, CD43, cyclin D1, and MUM1. BCL6 is difficult to determine due to the high background staining. Ki67 labels 5-10% of these cells. CD21 highlights scattered follicular dendritic cell meshworks.  Per report ([id number]), flow cytometry studies performed at [facility] show monotypic B-cells (10% of cells), positive for kappa, CD19, CD20, CD10; negative CD5. Heterogeneous population of T-cells (with normal CD4 to CD8 ratio), and polytypic B-cells are also present. The findings are diagnostic of low grade B-cell lymphoma. The differential diagnosis includes low grade follicular lymphoma and CD10+ marginal zone lymphoma.\par \par Health Maintenance:\par Colonoscopy \par Mammogram \par \par Recent blood work is in HIE. Was normal except for a high sugar and calcium of 10.2 [de-identified] : 7/10/19\par She was supposed to go for biopsy of pleural based nodularity that resolved prior to procedure.  She will see PULM in a few weeks.  She has been purposely losing weight, using Trajenta.  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  Denies any fevers, chills, unintentional weight loss, or night sweats.\par CT Chest (4/11/19) IMPRESSION:Since February 21, 2019;1. Interval resolution of previously described left-sided FDG avid pleural-based nodularity. No biopsy performed.2. Multiple stable nodules, as above.3. Unchanged left periaortic 1.4 cm lymph node, previously noted to be FDG avid.4. Stable right hydronephrosis and right-sided ureteral calculi.5. Stable infrarenal abdominal aortic aneurysm measuring up to 3.7 cm.\par \par 1/8/20\par She is here for follow up. Since last visit she had a CT C/A/P in 7/18/2019 that showed  Stable 1.1 cm left para-aortic lymph node on series 2 image 62. No new lymph nodes in the abdomen and pelvis. \par Interval development of moderate to severe right hydroureteronephrosis leading to 2 proximal ureteral calculi \par (superior calculus measuring 5 x 4 x 5 mm, 1000 Hounsfield units and the inferior adjacent calculus measuring 5 \par x 7 x 6 mm, 573 Hounsfield units). \par Bilateral intrarenal calculi. \par Stable 3.7 x 4.3 cm simple appearing right adnexal cyst. Recommend one-year follow-up pelvic ultrasound.\par Impression: \par Since April 11, 2019. \par No significant interval change in multiple left-sided pleural-based nodules, largest approximately 1.6 cm \par (remeasured on earlier study), left lung base (series 5/149). \par Post left lower lobectomy.\par \par She was seen by Urology and had lithotripsy. \par \par She feels well. \par \par 7/8/20\par She is here for follow up. She feels well. No B symptoms. She is pening a CT chest ordered by Dr. Morales\par \par 1/11/2021: The patient is here for follow up. She has no B symptoms , no cough , no chest pain , no new palpable lymph nodes. She is complaing of sciatica, going for an injection next week. She also had LEs cellulitis , completed once course of antibiotics. \par \par \par 5/19/21\par We had a telephone visit today. She feels well.  She had CT chest on 2/21 that showed Since July 14, 2020,\par \par  interval development of wedge-shaped consolidation posterior left upper lobe. (2/31).\par \par Minimal interval growth of multiple pleural-based nodules as described above\par \par Numerous stable left-sided pleural based nodules and adjacent parenchymal nodules..\par \par Reviewed her CT in tumor board. Chest. We were diveded on if we should rescan in 3 months with CT or check PET/cT now. I spoke to her and we decided to check a CT Chest in 3 months. \par \par She feels well. \par \par 9/15/21\par Patient is here for a follow-up visit for hx of lung cancer, NHL and pulmonary nodules.  She is feeling well with no new complaints.  Reviewed most recent CBC, which shows mild microcytic anemia.  Patient denies fever, chills, nausea, vomiting, dyspnea or bleeding.  She has not had recent colonoscopy, but has done Cologuard at home which was reportedly benign in recent years.  \par CT Chest (6.2.2021)IMPRESSION:Unchanged left circumferential subpleural nodularity largest measuring 1.6 cm.Resolved left lower lobe consolidation.

## 2021-09-16 NOTE — PHYSICAL EXAM
[Restricted in physically strenuous activity but ambulatory and able to carry out work of a light or sedentary nature] : Status 1- Restricted in physically strenuous activity but ambulatory and able to carry out work of a light or sedentary nature, e.g., light house work, office work [Normal] : affect appropriate [de-identified] : wears glasses [de-identified] : bilateral +1/2 lower extremity edema

## 2021-09-20 DIAGNOSIS — D50.9 IRON DEFICIENCY ANEMIA, UNSPECIFIED: ICD-10-CM

## 2021-09-20 DIAGNOSIS — C85.90 NON-HODGKIN LYMPHOMA, UNSPECIFIED, UNSPECIFIED SITE: ICD-10-CM

## 2021-09-20 DIAGNOSIS — R91.1 SOLITARY PULMONARY NODULE: ICD-10-CM

## 2021-09-20 DIAGNOSIS — Z85.118 PERSONAL HISTORY OF OTHER MALIGNANT NEOPLASM OF BRONCHUS AND LUNG: ICD-10-CM

## 2021-11-08 VITALS — DIASTOLIC BLOOD PRESSURE: 80 MMHG | WEIGHT: 187 LBS | BODY MASS INDEX: 33.13 KG/M2 | SYSTOLIC BLOOD PRESSURE: 144 MMHG

## 2021-12-01 ENCOUNTER — RX RENEWAL (OUTPATIENT)
Age: 81
End: 2021-12-01

## 2021-12-30 NOTE — ASU PATIENT PROFILE, ADULT - AS SC BRADEN NUTRITION
(3) adequate
I have personally performed a face to face diagnostic evaluation on this patient. I have reviewed the ACP note and agree with the history, exam and plan of care, except as noted.

## 2022-01-12 NOTE — ASU PREOP CHECKLIST, PEDIATRIC - TO WHOM
Senior Purposeful Rounding    Position:Repositions Self    Physical Environment:No safety hazards noted    Pain Rating/ Nonverbal Pain Behaviors:0; none    Pain interventions Attempted: NA    Patient Toileted:Independent ANGEL CORNELIUS

## 2022-03-14 ENCOUNTER — APPOINTMENT (OUTPATIENT)
Age: 82
End: 2022-03-14
Payer: MEDICARE

## 2022-03-14 VITALS
DIASTOLIC BLOOD PRESSURE: 80 MMHG | BODY MASS INDEX: 33.13 KG/M2 | HEIGHT: 62 IN | RESPIRATION RATE: 14 BRPM | HEART RATE: 61 BPM | OXYGEN SATURATION: 96 % | WEIGHT: 180 LBS | SYSTOLIC BLOOD PRESSURE: 134 MMHG

## 2022-03-14 PROCEDURE — 99213 OFFICE O/P EST LOW 20 MIN: CPT

## 2022-03-14 NOTE — DISCUSSION/SUMMARY
[FreeTextEntry1] : COPD STABLE\par SOB ON EXERTION\par LUNG NODULE REPEAT CHEST CT\par HEMOC NOTE REVIEWED

## 2022-03-21 ENCOUNTER — LABORATORY RESULT (OUTPATIENT)
Age: 82
End: 2022-03-21

## 2022-03-21 ENCOUNTER — APPOINTMENT (OUTPATIENT)
Dept: HEMATOLOGY ONCOLOGY | Facility: CLINIC | Age: 82
End: 2022-03-21
Payer: MEDICARE

## 2022-03-21 VITALS
HEIGHT: 62 IN | TEMPERATURE: 97.4 F | RESPIRATION RATE: 14 BRPM | SYSTOLIC BLOOD PRESSURE: 146 MMHG | BODY MASS INDEX: 33.13 KG/M2 | DIASTOLIC BLOOD PRESSURE: 69 MMHG | HEART RATE: 63 BPM | WEIGHT: 180 LBS

## 2022-03-21 LAB
ALBUMIN SERPL ELPH-MCNC: 4.1 G/DL
ALP BLD-CCNC: 140 U/L
ALT SERPL-CCNC: 11 U/L
ANION GAP SERPL CALC-SCNC: 12 MMOL/L
AST SERPL-CCNC: 10 U/L
BILIRUB SERPL-MCNC: 0.3 MG/DL
BUN SERPL-MCNC: 20 MG/DL
CALCIUM SERPL-MCNC: 10.3 MG/DL
CHLORIDE SERPL-SCNC: 107 MMOL/L
CO2 SERPL-SCNC: 24 MMOL/L
CREAT SERPL-MCNC: 0.9 MG/DL
EGFR: 65 ML/MIN/1.73M2
GLUCOSE SERPL-MCNC: 132 MG/DL
HCT VFR BLD CALC: 38.8 %
HGB BLD-MCNC: 11.9 G/DL
IRON SATN MFR SERPL: 9 %
IRON SERPL-MCNC: 38 UG/DL
MCHC RBC-ENTMCNC: 23.7 PG
MCHC RBC-ENTMCNC: 30.7 G/DL
MCV RBC AUTO: 77.1 FL
PLATELET # BLD AUTO: 239 K/UL
PMV BLD: 10.9 FL
POTASSIUM SERPL-SCNC: 4.8 MMOL/L
PROT SERPL-MCNC: 6.4 G/DL
RBC # BLD: 5.03 M/UL
RBC # FLD: 17.1 %
SODIUM SERPL-SCNC: 143 MMOL/L
TIBC SERPL-MCNC: 404 UG/DL
UIBC SERPL-MCNC: 366 UG/DL
WBC # FLD AUTO: 5.64 K/UL

## 2022-03-21 PROCEDURE — 99214 OFFICE O/P EST MOD 30 MIN: CPT | Mod: GC

## 2022-03-22 LAB — FERRITIN SERPL-MCNC: 17 NG/ML

## 2022-03-22 RX ORDER — FERROUS SULFATE 324(65)MG
324 TABLET, DELAYED RELEASE (ENTERIC COATED) ORAL
Qty: 45 | Refills: 2 | Status: COMPLETED | COMMUNITY
Start: 2021-09-16 | End: 2022-03-22

## 2022-03-22 NOTE — PHYSICAL EXAM
[Restricted in physically strenuous activity but ambulatory and able to carry out work of a light or sedentary nature] : Status 1- Restricted in physically strenuous activity but ambulatory and able to carry out work of a light or sedentary nature, e.g., light house work, office work [Normal] : affect appropriate [de-identified] : wears glasses [de-identified] : bilateral +1/2 lower extremity edema

## 2022-03-22 NOTE — REVIEW OF SYSTEMS
[Fatigue] : fatigue [Lower Ext Edema] : lower extremity edema [Negative] : Allergic/Immunologic [Fever] : no fever [Night Sweats] : no night sweats [Chest Pain] : no chest pain [Shortness Of Breath] : no shortness of breath [Easy Bleeding] : no tendency for easy bleeding [FreeTextEntry7] : occasional nausea [FreeTextEntry8] : urinary leakage sees Dr. Cobb [de-identified] : sciatica pain on lyrica

## 2022-03-22 NOTE — ASSESSMENT
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 On observation since she is asymptomatic. \par Recent diverticulitis imaging done at that time showed no lymphadenopathy as per pt \par \par - No B symptoms\par - will monitor clinically and if any symptoms will repeat CT AP\par -now with diverticulitis and with h/o WILDER recommended to discuss with GI about doing a colonoscopy.\par \par #Anemia, mild, microcytic Hb 11.8 today with MCV 77 \par -pt is unable to tolerate PO iron , will repeat iron studies , if ferritin is low will give her IV iron . \par - will call with results, also recommended to f/u with GI. \par \par \par # Pleural Nodules on scans, these are not new, at least since 2018\par - unclear etiology maybe her indolent  lymphoma, since they resolved \par - CT chest 06/2021 was stable also repeat done at [facility] in [facility] showed stable findings as per pt ( no results available for us to review ; plan to repeat in 3 months  (~5/2022)\par \par # History of Thyroid cancer she states it was a partial thyroidectomy \par - This explains the presence of positive thyroglobulin 21 tumor marker \par \par # History of Lung Cancer and Plural Based Nodules \par - PULM, Dr. Calixto, note reviewed\par \par \par # Adnexal cyst :  simple appearing.   Pelvis sono was recommended. \par \par RTC in 3 months with CBC, CMP, LDH, iron studies, ferritin. \par \par Pt was seen and examined with Dr Guerrero

## 2022-03-22 NOTE — HISTORY OF PRESENT ILLNESS
[de-identified] : Ms. 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 Cody Calixto.  She is here with her daughter Brandy.  \par \par She had a history of Kidney Cancer s/p partial nephrectomy and distant history of Thyroid Cancer s/p partial thyroidectomy.  She also had a Left Lower Lobectomy for Stage IA lung cancer. For dates see bellow.\par \par She had CT scan and subsequent PET/CT that showed multiple left pleural based nodules in addition to a para-aortic lymph node.  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 The differential diagnosis includes low grade follicular lymphoma and CD10+ marginal zone lymphoma. She denies any B-symptoms at this time other than recent weight loss of 10lbs over last month, however she reports she had not been eating due to a recent GI bug.\par \par She is here for further recommendations. \par Radiological Work-up:\par PET/CT (2/21/19) IMPRESSION: Since August 20, 2010: 1. Multiple sites of left pleural-based FDG avid nodularity, catalogued above, with max SUV 5.2, suspicious for biological tumor activity. Metastatic or primary pleural neoplasm are considerations. 2. Intra-abdominal FDG avid 1.7 x 1.2 cm left para-aortic lymph node, max SUV 5.4, suspicious for lymph node metastasis. \par 1/30/2019: [facility] of [place]:  comparison was made to Ct from 7/2018 Multiple Plural nodules along the posterior paramediastinal surface, some are mildly larger there are new nodules as well. largest nodule 10 mm\par CT Head (9/15/18) IMPRESSION: 1. Mild periventricular and subcortical white matter chronic small vessel ischemic changes. 2. No acute fractures, mass effect, midline shift or hemorrhage. \par CT cervical spine (9/15/18) IMPRESSION: 1. Degenerative changes of the cervical spine C2-3 through C6-7 worse at C5-6 as described above. 2. Straightening of normal cervical lordosis with mild anterolisthesis of C3 anterior on C4, C6 on C7, C7 on T1 and T1 on T2. 3. No acute fractures or dislocations. \par 1/22/18: MR L spine: advance lymbar spondylosis, disck osteophytes, severe neuroforamin narrowing with  ipingment of L4 nerve,  3.5 cm AAA, 8 mm adrenal nodule.\par Pathology:\par (3/14/19) Final Diagnosis Left parotid lymph node, needle biopsy: Low grade B-cell lymphoma. \par Immunostains performed with adequate controls on sections from block 1A show the infiltrating cells are CD20+ CD79a+ B-cells that coexpress CD10, CD23(dim variable), and BCL2. They are negative for CD5, CD43, cyclin D1, and MUM1. BCL6 is difficult to determine due to the high background staining. Ki67 labels 5-10% of these cells. CD21 highlights scattered follicular dendritic cell meshworks.  Per report ([id number]), flow cytometry studies performed at [facility] [employer] show monotypic B-cells (10% of cells), positive for kappa, CD19, CD20, CD10; negative CD5. Heterogeneous population of T-cells (with normal CD4 to CD8 ratio), and polytypic B-cells are also present. The findings are diagnostic of low grade B-cell lymphoma. The differential diagnosis includes low grade follicular lymphoma and CD10+ marginal zone lymphoma.\par \par Health Maintenance:\par Colonoscopy \par Mammogram \par \par Recent blood work is in HIE. Was normal except for a high sugar and calcium of 10.2 [de-identified] : 7/10/19\par She was supposed to go for biopsy of pleural based nodularity that resolved prior to procedure.  She will see PULM in a few weeks.  She has been purposely losing weight, using Trajenta.  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  Denies any fevers, chills, unintentional weight loss, or night sweats.\par CT Chest (4/11/19) IMPRESSION:Since February 21, 2019;1. Interval resolution of previously described left-sided FDG avid pleural-based nodularity. No biopsy performed.2. Multiple stable nodules, as above.3. Unchanged left periaortic 1.4 cm lymph node, previously noted to be FDG avid.4. Stable right hydronephrosis and right-sided ureteral calculi.5. Stable infrarenal abdominal aortic aneurysm measuring up to 3.7 cm.\par \par 1/8/20\par She is here for follow up. Since last visit she had a CT C/A/P in 7/18/2019 that showed  Stable 1.1 cm left para-aortic lymph node on series 2 image 62. No new lymph nodes in the abdomen and pelvis. \par Interval development of moderate to severe right hydroureteronephrosis leading to 2 proximal ureteral calculi \par (superior calculus measuring 5 x 4 x 5 mm, 1000 Hounsfield units and the inferior adjacent calculus measuring 5 \par x 7 x 6 mm, 573 Hounsfield units). \par Bilateral intrarenal calculi. \par Stable 3.7 x 4.3 cm simple appearing right adnexal cyst. Recommend one-year follow-up pelvic ultrasound.\par Impression: \par Since April 11, 2019. \par No significant interval change in multiple left-sided pleural-based nodules, largest approximately 1.6 cm \par (remeasured on earlier study), left lung base (series 5/149). \par Post left lower lobectomy.\par \par She was seen by Urology and had lithotripsy. \par \par She feels well. \par \par 7/8/20\par She is here for follow up. She feels well. No B symptoms. She is pening a CT chest ordered by Dr. Morales\par \par 1/11/2021: The patient is here for follow up. She has no B symptoms , no cough , no chest pain , no new palpable lymph nodes. She is complaing of sciatica, going for an injection next week. She also had LEs cellulitis , completed once course of antibiotics. \par \par \par 5/19/21\par We had a telephone visit today. She feels well.  She had CT chest on 2/21 that showed Since July 14, 2020,\par \par  interval development of wedge-shaped consolidation posterior left upper lobe. (2/31).\par \par Minimal interval growth of multiple pleural-based nodules as described above\par \par Numerous stable left-sided pleural based nodules and adjacent parenchymal nodules..\par \par Reviewed her CT in tumor board. Chest. We were diveded on if we should rescan in 3 months with CT or check PET/cT now. I spoke to her and we decided to check a CT Chest in 3 months. \par \par She feels well. \par \par 9/15/21\par Patient is here for a follow-up visit for hx of lung cancer, NHL and pulmonary nodules.  She is feeling well with no new complaints.  Reviewed most recent CBC, which shows mild microcytic anemia.  Patient denies fever, chills, nausea, vomiting, dyspnea or bleeding.  She has not had recent colonoscopy, but has done Cologuard at home which was reportedly benign in recent years.  \par CT Chest (6.2.2021)IMPRESSION:Unchanged left circumferential subpleural nodularity largest measuring 1.6 cm.Resolved left lower lobe consolidation.\par \par 3/21/22\par Pt returns for f/u today . She reports that she was admitted at [facility] for episode for acute diverticulitis in November 2021, she was treated with IV abx , pt reports that she had imaging done at that time and it was only significant for acute diverticulitis ( we do not have the imaging results available .Since that time she reports  that she has been having episodes of constipation and diarrhea , she is on laxatives . She stopped taking po iron as it was making her GI symptoms worse . Her gastroenterologist did not suggest doing a colonoscopy. Pt denies any blood in stools. Denies any fever , chills , weight loss or loss of appetite. She was also seen by Dr Sweet.

## 2022-03-22 NOTE — END OF VISIT
[] : Fellow [FreeTextEntry3] : She returns for follow-up today.\par \par \par In regards to her low-grade non-Hodgkin's lymphoma she is in observation and remains asymptomatic.\par \par \par In regards to her iron deficiency anemia she is not able to tolerate oral iron although hemoglobin is just below normal values she continues to be microcytic so we will repeat iron studies, if remain low we recommended Venofer for 5 doses.\par \par She also recently had diverticulitis and hence iron deficient I suggested to consider a colonoscopy but patient and her gastroenterologist are hesitant since she has a history of perforation which is understandable.\par \par \par In regards to her pulmonary nodules and lung cancer we will repeat CT chest   in approximately  3 months unless done earlier with Dr. Morales, she also follows with Dr. AURELIO COATS who she just recently seen  who recommneded a reapeat CT chest as well

## 2022-03-29 ENCOUNTER — APPOINTMENT (OUTPATIENT)
Dept: INFUSION THERAPY | Facility: CLINIC | Age: 82
End: 2022-03-29

## 2022-03-29 RX ORDER — IRON SUCROSE 20 MG/ML
200 INJECTION, SOLUTION INTRAVENOUS ONCE
Refills: 0 | Status: COMPLETED | OUTPATIENT
Start: 2022-03-29 | End: 2022-03-29

## 2022-03-29 RX ADMIN — IRON SUCROSE 110 MILLIGRAM(S): 20 INJECTION, SOLUTION INTRAVENOUS at 12:36

## 2022-04-07 ENCOUNTER — APPOINTMENT (OUTPATIENT)
Dept: INFUSION THERAPY | Facility: CLINIC | Age: 82
End: 2022-04-07

## 2022-04-07 RX ORDER — IRON SUCROSE 20 MG/ML
200 INJECTION, SOLUTION INTRAVENOUS ONCE
Refills: 0 | Status: COMPLETED | OUTPATIENT
Start: 2022-04-07 | End: 2022-04-07

## 2022-04-07 RX ADMIN — IRON SUCROSE 110 MILLIGRAM(S): 20 INJECTION, SOLUTION INTRAVENOUS at 12:05

## 2022-04-12 ENCOUNTER — APPOINTMENT (OUTPATIENT)
Dept: INFUSION THERAPY | Facility: CLINIC | Age: 82
End: 2022-04-12

## 2022-04-12 RX ORDER — IRON SUCROSE 20 MG/ML
200 INJECTION, SOLUTION INTRAVENOUS ONCE
Refills: 0 | Status: COMPLETED | OUTPATIENT
Start: 2022-04-12 | End: 2022-04-12

## 2022-04-12 RX ADMIN — IRON SUCROSE 110 MILLIGRAM(S): 20 INJECTION, SOLUTION INTRAVENOUS at 12:17

## 2022-04-19 ENCOUNTER — APPOINTMENT (OUTPATIENT)
Dept: INFUSION THERAPY | Facility: CLINIC | Age: 82
End: 2022-04-19

## 2022-04-19 RX ORDER — IRON SUCROSE 20 MG/ML
200 INJECTION, SOLUTION INTRAVENOUS ONCE
Refills: 0 | Status: COMPLETED | OUTPATIENT
Start: 2022-04-19 | End: 2022-04-19

## 2022-04-19 RX ADMIN — IRON SUCROSE 110 MILLIGRAM(S): 20 INJECTION, SOLUTION INTRAVENOUS at 12:16

## 2022-04-27 ENCOUNTER — APPOINTMENT (OUTPATIENT)
Dept: INFUSION THERAPY | Facility: CLINIC | Age: 82
End: 2022-04-27

## 2022-04-27 RX ORDER — IRON SUCROSE 20 MG/ML
200 INJECTION, SOLUTION INTRAVENOUS ONCE
Refills: 0 | Status: COMPLETED | OUTPATIENT
Start: 2022-04-27 | End: 2022-04-27

## 2022-04-27 RX ADMIN — IRON SUCROSE 110 MILLIGRAM(S): 20 INJECTION, SOLUTION INTRAVENOUS at 12:24

## 2022-06-07 ENCOUNTER — OUTPATIENT (OUTPATIENT)
Dept: OUTPATIENT SERVICES | Facility: HOSPITAL | Age: 82
LOS: 1 days | Discharge: HOME | End: 2022-06-07
Payer: MEDICARE

## 2022-06-07 DIAGNOSIS — Z98.890 OTHER SPECIFIED POSTPROCEDURAL STATES: Chronic | ICD-10-CM

## 2022-06-07 DIAGNOSIS — C34.90 MALIGNANT NEOPLASM OF UNSPECIFIED PART OF UNSPECIFIED BRONCHUS OR LUNG: ICD-10-CM

## 2022-06-07 DIAGNOSIS — C18.9 MALIGNANT NEOPLASM OF COLON, UNSPECIFIED: ICD-10-CM

## 2022-06-07 DIAGNOSIS — C64.9 MALIGNANT NEOPLASM OF UNSPECIFIED KIDNEY, EXCEPT RENAL PELVIS: ICD-10-CM

## 2022-06-07 PROCEDURE — 76775 US EXAM ABDO BACK WALL LIM: CPT | Mod: 26

## 2022-06-13 ENCOUNTER — OUTPATIENT (OUTPATIENT)
Dept: OUTPATIENT SERVICES | Facility: HOSPITAL | Age: 82
LOS: 1 days | Discharge: HOME | End: 2022-06-13

## 2022-06-13 ENCOUNTER — APPOINTMENT (OUTPATIENT)
Dept: HEMATOLOGY ONCOLOGY | Facility: CLINIC | Age: 82
End: 2022-06-13

## 2022-06-13 ENCOUNTER — LABORATORY RESULT (OUTPATIENT)
Age: 82
End: 2022-06-13

## 2022-06-13 DIAGNOSIS — Z98.890 OTHER SPECIFIED POSTPROCEDURAL STATES: Chronic | ICD-10-CM

## 2022-06-13 DIAGNOSIS — D50.9 IRON DEFICIENCY ANEMIA, UNSPECIFIED: ICD-10-CM

## 2022-06-13 DIAGNOSIS — C85.90 NON-HODGKIN LYMPHOMA, UNSPECIFIED, UNSPECIFIED SITE: ICD-10-CM

## 2022-06-17 LAB
ANION GAP SERPL CALC-SCNC: 13 MMOL/L
BUN SERPL-MCNC: 24 MG/DL
CALCIUM SERPL-MCNC: 9.6 MG/DL
CHLORIDE SERPL-SCNC: 102 MMOL/L
CO2 SERPL-SCNC: 23 MMOL/L
CREAT SERPL-MCNC: 0.8 MG/DL
EGFR: 74 ML/MIN/1.73M2
FERRITIN SERPL-MCNC: 112 NG/ML
GLUCOSE SERPL-MCNC: 155 MG/DL
HCT VFR BLD CALC: 41.5 %
HGB BLD-MCNC: 13.3 G/DL
IRON SATN MFR SERPL: 13 %
IRON SERPL-MCNC: 37 UG/DL
MCHC RBC-ENTMCNC: 27.2 PG
MCHC RBC-ENTMCNC: 32 G/DL
MCV RBC AUTO: 84.9 FL
PLATELET # BLD AUTO: 151 K/UL
PMV BLD: 9.8 FL
POTASSIUM SERPL-SCNC: 4.3 MMOL/L
RBC # BLD: 4.89 M/UL
RBC # FLD: 19.7 %
SODIUM SERPL-SCNC: 138 MMOL/L
TIBC SERPL-MCNC: 286 UG/DL
UIBC SERPL-MCNC: 249 UG/DL
WBC # FLD AUTO: 6.99 K/UL

## 2022-06-20 ENCOUNTER — OUTPATIENT (OUTPATIENT)
Dept: OUTPATIENT SERVICES | Facility: HOSPITAL | Age: 82
LOS: 1 days | Discharge: HOME | End: 2022-06-20

## 2022-06-20 ENCOUNTER — APPOINTMENT (OUTPATIENT)
Dept: HEMATOLOGY ONCOLOGY | Facility: CLINIC | Age: 82
End: 2022-06-20
Payer: MEDICARE

## 2022-06-20 VITALS
RESPIRATION RATE: 14 BRPM | BODY MASS INDEX: 32.39 KG/M2 | TEMPERATURE: 97.1 F | DIASTOLIC BLOOD PRESSURE: 76 MMHG | HEART RATE: 57 BPM | HEIGHT: 62 IN | SYSTOLIC BLOOD PRESSURE: 180 MMHG | WEIGHT: 176 LBS

## 2022-06-20 DIAGNOSIS — Z98.890 OTHER SPECIFIED POSTPROCEDURAL STATES: Chronic | ICD-10-CM

## 2022-06-20 PROCEDURE — 99214 OFFICE O/P EST MOD 30 MIN: CPT

## 2022-06-20 NOTE — PHYSICAL EXAM
[Restricted in physically strenuous activity but ambulatory and able to carry out work of a light or sedentary nature] : Status 1- Restricted in physically strenuous activity but ambulatory and able to carry out work of a light or sedentary nature, e.g., light house work, office work [Normal] : affect appropriate [de-identified] : wears glasses [de-identified] : bilateral +1/2 lower extremity edema

## 2022-06-20 NOTE — REVIEW OF SYSTEMS
[Fatigue] : fatigue [Lower Ext Edema] : lower extremity edema [Negative] : Allergic/Immunologic [Fever] : no fever [Night Sweats] : no night sweats [Chest Pain] : no chest pain [Shortness Of Breath] : no shortness of breath [Easy Bleeding] : no tendency for easy bleeding [FreeTextEntry7] : occasional nausea [FreeTextEntry8] : urinary leakage sees Dr. Cobb [de-identified] : sciatica pain on lyrica

## 2022-06-20 NOTE — END OF VISIT
[FreeTextEntry3] : I was physically present for the key portions of the evaluation and management service provided.  I agree with the history and physical, and plan which I have reviewed and edited where appropriate.\par \par \par She returns for follow-up.  For her iron deficiency anemia she completed a course of Venofer with resolution of iron deficiency anemia and we suggest she follows up with gastroenterology for possible endoscopic interventions\par \par #In regards to her low-grade lymphoma remains asymptomatic\par -She is pending a CT Abdo pelvis for renal cysts\par -I also ordered a CT chest because of history of pulmonary nodules just to make sure there is no new findings\par \par She can RTC in 6 months if CAT scans are ALIYAH

## 2022-06-20 NOTE — ASSESSMENT
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 On observation since she is asymptomatic. \par Recent diverticulitis imaging done at that time showed no lymphadenopathy as per pt \par \par - No B symptoms\par - will monitor clinically and if any symptoms will repeat CT AP, regardless she has CT abd pelding for renal cysts \par -now with diverticulitis and with h/o WILDER recommended to discuss with GI about doing a colonoscopy.\par \par #Anemia, mild, microcytic Hb 13.3 today with MCV 84.9, due to WILDER - resolved \par - S/p 5 doses of venofer in April. Ferritin improved 112, Iron sat 10%\par - recommended to f/u with GI. \par \par # Pleural Nodules on scans, these are not new, at least since 2018\par - unclear etiology maybe her indolent  lymphoma, since they resolved \par - CT chest 06/2021 was stable also repeat done at [facility] in Nove[place]ber showed stable findings as per pt ( no results available for us to review ; WIll order CT chest for reevaluation \par \par # History of Thyroid cancer she states it was a partial thyroidectomy \par - This explains the presence of positive thyroglobulin 21 tumor marker \par \par # History of Lung Cancer and Plural Based Nodules \par - PULM, Dr. Calixto, note reviewed\par \par \par # Adnexal cyst :  simple appearing.   Pelvis sono was recommended. \par \par RTC if CT chest is stable will RTC in 6 months with CBC, CMP, LDH  \par \par Pt was seen and examined with Dr Guerrero

## 2022-06-20 NOTE — HISTORY OF PRESENT ILLNESS
[de-identified] : Ms. 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 Cody Calixto.  She is here with her daughter Brandy.  \par \par She had a history of Kidney Cancer s/p partial nephrectomy and distant history of Thyroid Cancer s/p partial thyroidectomy.  She also had a Left Lower Lobectomy for Stage IA lung cancer. For dates see bellow.\par \par She had CT scan and subsequent PET/CT that showed multiple left pleural based nodules in addition to a para-aortic lymph node.  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 The differential diagnosis includes low grade follicular lymphoma and CD10+ marginal zone lymphoma. She denies any B-symptoms at this time other than recent weight loss of 10lbs over last month, however she reports she had not been eating due to a recent GI bug.\par \par She is here for further recommendations. \par Radiological Work-up:\par PET/CT (2/21/19) IMPRESSION: Since August 20, 2010: 1. Multiple sites of left pleural-based FDG avid nodularity, catalogued above, with max SUV 5.2, suspicious for biological tumor activity. Metastatic or primary pleural neoplasm are considerations. 2. Intra-abdominal FDG avid 1.7 x 1.2 cm left para-aortic lymph node, max SUV 5.4, suspicious for lymph node metastasis. \par 1/30/2019: [facility] of [place]:  comparison was made to Ct from 7/2018 Multiple Plural nodules along the posterior paramediastinal surface, some are mildly larger there are new nodules as well. largest nodule 10 mm\par CT Head (9/15/18) IMPRESSION: 1. Mild periventricular and subcortical white matter chronic small vessel ischemic changes. 2. No acute fractures, mass effect, midline shift or hemorrhage. \par CT cervical spine (9/15/18) IMPRESSION: 1. Degenerative changes of the cervical spine C2-3 through C6-7 worse at C5-6 as described above. 2. Straightening of normal cervical lordosis with mild anterolisthesis of C3 anterior on C4, C6 on C7, C7 on T1 and T1 on T2. 3. No acute fractures or dislocations. \par 1/22/18: MR L spine: advance lymbar spondylosis, disck osteophytes, severe neuroforamin narrowing with  ipingment of L4 nerve,  3.5 cm AAA, 8 mm adrenal nodule.\par Pathology:\par (3/14/19) Final Diagnosis Left parotid lymph node, needle biopsy: Low grade B-cell lymphoma. \par Immunostains performed with adequate controls on sections from block 1A show the infiltrating cells are CD20+ CD79a+ B-cells that coexpress CD10, CD23(dim variable), and BCL2. They are negative for CD5, CD43, cyclin D1, and MUM1. BCL6 is difficult to determine due to the high background staining. Ki67 labels 5-10% of these cells. CD21 highlights scattered follicular dendritic cell meshworks.  Per report ([id number]), flow cytometry studies performed at [facility] [employer] show monotypic B-cells (10% of cells), positive for kappa, CD19, CD20, CD10; negative CD5. Heterogeneous population of T-cells (with normal CD4 to CD8 ratio), and polytypic B-cells are also present. The findings are diagnostic of low grade B-cell lymphoma. The differential diagnosis includes low grade follicular lymphoma and CD10+ marginal zone lymphoma.\par \par Health Maintenance:\par Colonoscopy \par Mammogram \par \par Recent blood work is in HIE. Was normal except for a high sugar and calcium of 10.2 [de-identified] : 7/10/19\par She was supposed to go for biopsy of pleural based nodularity that resolved prior to procedure.  She will see PULM in a few weeks.  She has been purposely losing weight, using Trajenta.  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  Denies any fevers, chills, unintentional weight loss, or night sweats.\par CT Chest (4/11/19) IMPRESSION:Since February 21, 2019;1. Interval resolution of previously described left-sided FDG avid pleural-based nodularity. No biopsy performed.2. Multiple stable nodules, as above.3. Unchanged left periaortic 1.4 cm lymph node, previously noted to be FDG avid.4. Stable right hydronephrosis and right-sided ureteral calculi.5. Stable infrarenal abdominal aortic aneurysm measuring up to 3.7 cm.\par \par 1/8/20\par She is here for follow up. Since last visit she had a CT C/A/P in 7/18/2019 that showed  Stable 1.1 cm left para-aortic lymph node on series 2 image 62. No new lymph nodes in the abdomen and pelvis. \par Interval development of moderate to severe right hydroureteronephrosis leading to 2 proximal ureteral calculi \par (superior calculus measuring 5 x 4 x 5 mm, 1000 Hounsfield units and the inferior adjacent calculus measuring 5 \par x 7 x 6 mm, 573 Hounsfield units). \par Bilateral intrarenal calculi. \par Stable 3.7 x 4.3 cm simple appearing right adnexal cyst. Recommend one-year follow-up pelvic ultrasound.\par Impression: \par Since April 11, 2019. \par No significant interval change in multiple left-sided pleural-based nodules, largest approximately 1.6 cm \par (remeasured on earlier study), left lung base (series 5/149). \par Post left lower lobectomy.\par \par She was seen by Urology and had lithotripsy. \par \par She feels well. \par \par 7/8/20\par She is here for follow up. She feels well. No B symptoms. She is pening a CT chest ordered by Dr. Morales\par \par 1/11/2021: The patient is here for follow up. She has no B symptoms , no cough , no chest pain , no new palpable lymph nodes. She is complaing of sciatica, going for an injection next week. She also had LEs cellulitis , completed once course of antibiotics. \par \par \par 5/19/21\par We had a telephone visit today. She feels well.  She had CT chest on 2/21 that showed Since July 14, 2020,\par \par  interval development of wedge-shaped consolidation posterior left upper lobe. (2/31).\par \par Minimal interval growth of multiple pleural-based nodules as described above\par \par Numerous stable left-sided pleural based nodules and adjacent parenchymal nodules..\par \par Reviewed her CT in tumor board. Chest. We were diveded on if we should rescan in 3 months with CT or check PET/cT now. I spoke to her and we decided to check a CT Chest in 3 months. \par \par She feels well. \par \par 9/15/21\par Patient is here for a follow-up visit for hx of lung cancer, NHL and pulmonary nodules.  She is feeling well with no new complaints.  Reviewed most recent CBC, which shows mild microcytic anemia.  Patient denies fever, chills, nausea, vomiting, dyspnea or bleeding.  She has not had recent colonoscopy, but has done Cologuard at home which was reportedly benign in recent years.  \par CT Chest (6.2.2021)IMPRESSION:Unchanged left circumferential subpleural nodularity largest measuring 1.6 cm.Resolved left lower lobe consolidation.\par \par 3/21/22\par Pt returns for f/u today . She reports that she was admitted at [facility] for episode for acute diverticulitis in November 2021, she was treated with IV abx , pt reports that she had imaging done at that time and it was only significant for acute diverticulitis ( we do not have the imaging results available .Since that time she reports  that she has been having episodes of constipation and diarrhea , she is on laxatives . She stopped taking po iron as it was making her GI symptoms worse . Her gastroenterologist did not suggest doing a colonoscopy. Pt denies any blood in stools. Denies any fever , chills , weight loss or loss of appetite. She was also seen by Dr Sweet. \par \par 6/20/22\par Pt returns for f/u today. Denies any fever , chills , weight loss or loss of appetite. In March ferritin was 17, Iron 38, iron sat 9%, Hgb 11.9, MCV 77.1. She had 5 doses of IV venofer tolerated well, she reports cold intolerance improved. Labs reviewed 6/13/22 hgb 13.3, MCV 84.9, Ferritin 112, Iron sat 13%, Iron 37. She had Kidney US on 6/8 which showed Indeterminate hypoechoic area involving the lower pole of the left kidney. Further evaluation with outpatient renal mass protocol CT is recommended. Focal dilatation of the right upper pole calyx. Bilateral renal calculi. Incidentally noted is an abdominal aortic aneurysm, similar in size to prior CT scan, measuring 4.2 cm.

## 2022-06-21 DIAGNOSIS — C85.90 NON-HODGKIN LYMPHOMA, UNSPECIFIED, UNSPECIFIED SITE: ICD-10-CM

## 2022-06-21 DIAGNOSIS — R22.2 LOCALIZED SWELLING, MASS AND LUMP, TRUNK: ICD-10-CM

## 2022-06-30 ENCOUNTER — APPOINTMENT (OUTPATIENT)
Dept: OBGYN | Facility: CLINIC | Age: 82
End: 2022-06-30

## 2022-06-30 VITALS
BODY MASS INDEX: 32.39 KG/M2 | WEIGHT: 176 LBS | DIASTOLIC BLOOD PRESSURE: 88 MMHG | SYSTOLIC BLOOD PRESSURE: 168 MMHG | HEIGHT: 62 IN

## 2022-06-30 PROCEDURE — G0101: CPT

## 2022-07-01 ENCOUNTER — RESULT REVIEW (OUTPATIENT)
Age: 82
End: 2022-07-01

## 2022-07-01 ENCOUNTER — OUTPATIENT (OUTPATIENT)
Dept: OUTPATIENT SERVICES | Facility: HOSPITAL | Age: 82
LOS: 1 days | Discharge: HOME | End: 2022-07-01

## 2022-07-01 DIAGNOSIS — Z98.890 OTHER SPECIFIED POSTPROCEDURAL STATES: Chronic | ICD-10-CM

## 2022-07-01 DIAGNOSIS — R31.9 HEMATURIA, UNSPECIFIED: ICD-10-CM

## 2022-07-01 DIAGNOSIS — C64.9 MALIGNANT NEOPLASM OF UNSPECIFIED KIDNEY, EXCEPT RENAL PELVIS: ICD-10-CM

## 2022-07-01 DIAGNOSIS — R07.9 CHEST PAIN, UNSPECIFIED: ICD-10-CM

## 2022-07-01 DIAGNOSIS — D41.02 NEOPLASM OF UNCERTAIN BEHAVIOR OF LEFT KIDNEY: ICD-10-CM

## 2022-07-01 DIAGNOSIS — C85.90 NON-HODGKIN LYMPHOMA, UNSPECIFIED, UNSPECIFIED SITE: ICD-10-CM

## 2022-07-01 PROCEDURE — 74178 CT ABD&PLV WO CNTR FLWD CNTR: CPT | Mod: 26,MH

## 2022-07-01 PROCEDURE — 71250 CT THORAX DX C-: CPT | Mod: 26,MH

## 2022-07-05 DIAGNOSIS — N94.9 UNSPECIFIED CONDITION ASSOCIATED WITH FEMALE GENITAL ORGANS AND MENSTRUAL CYCLE: ICD-10-CM

## 2022-08-01 ENCOUNTER — OUTPATIENT (OUTPATIENT)
Dept: OUTPATIENT SERVICES | Facility: HOSPITAL | Age: 82
LOS: 1 days | Discharge: HOME | End: 2022-08-01

## 2022-08-01 DIAGNOSIS — Z98.890 OTHER SPECIFIED POSTPROCEDURAL STATES: Chronic | ICD-10-CM

## 2022-08-01 DIAGNOSIS — M54.59 OTHER LOW BACK PAIN: ICD-10-CM

## 2022-08-01 PROCEDURE — 72148 MRI LUMBAR SPINE W/O DYE: CPT | Mod: 26,MH

## 2022-08-15 ENCOUNTER — OUTPATIENT (OUTPATIENT)
Dept: OUTPATIENT SERVICES | Facility: HOSPITAL | Age: 82
LOS: 1 days | Discharge: HOME | End: 2022-08-15

## 2022-08-15 DIAGNOSIS — Z98.890 OTHER SPECIFIED POSTPROCEDURAL STATES: Chronic | ICD-10-CM

## 2022-08-15 DIAGNOSIS — N94.9 UNSPECIFIED CONDITION ASSOCIATED WITH FEMALE GENITAL ORGANS AND MENSTRUAL CYCLE: ICD-10-CM

## 2022-08-15 PROCEDURE — 76856 US EXAM PELVIC COMPLETE: CPT | Mod: 26

## 2022-09-15 ENCOUNTER — APPOINTMENT (OUTPATIENT)
Dept: NEUROSURGERY | Facility: CLINIC | Age: 82
End: 2022-09-15

## 2022-09-15 ENCOUNTER — OUTPATIENT (OUTPATIENT)
Dept: OUTPATIENT SERVICES | Facility: HOSPITAL | Age: 82
LOS: 1 days | Discharge: HOME | End: 2022-09-15

## 2022-09-15 ENCOUNTER — RESULT REVIEW (OUTPATIENT)
Age: 82
End: 2022-09-15

## 2022-09-15 VITALS — HEIGHT: 63 IN | WEIGHT: 174 LBS | BODY MASS INDEX: 30.83 KG/M2

## 2022-09-15 DIAGNOSIS — M43.17 SPONDYLOLISTHESIS, LUMBOSACRAL REGION: ICD-10-CM

## 2022-09-15 DIAGNOSIS — Z98.890 OTHER SPECIFIED POSTPROCEDURAL STATES: Chronic | ICD-10-CM

## 2022-09-15 DIAGNOSIS — Z87.39 PERSONAL HISTORY OF OTHER DISEASES OF THE MUSCULOSKELETAL SYSTEM AND CONNECTIVE TISSUE: ICD-10-CM

## 2022-09-15 DIAGNOSIS — Z87.448 PERSONAL HISTORY OF OTHER DISEASES OF URINARY SYSTEM: ICD-10-CM

## 2022-09-15 PROCEDURE — 99204 OFFICE O/P NEW MOD 45 MIN: CPT

## 2022-09-15 PROCEDURE — 72110 X-RAY EXAM L-2 SPINE 4/>VWS: CPT | Mod: 26

## 2022-09-15 NOTE — ADDENDUM
[FreeTextEntry1] : xrays reviewed, no significant motion on flexion and extension\par \par will proceed with decompression left L4-5, L5-S1 only

## 2022-09-15 NOTE — ASSESSMENT
[FreeTextEntry1] : We have had a thorough discussion regarding her current condition, findings, and treatment options. Ms. DIAZ is found to have minimal lumbar spondylolisthesis with severe left foraminal stenosis along with a left L5-S1 disc herniation/stenosis.  She continues to experience persistent left lower extremity radiculopathy despite conservative management with interventional procedures.  We have discussed the option for a left L4-5 L5-S1 laminoforaminotomy. Prior to finalizing surgical intervention I would like a dynamic x-ray of the lumbar spine to rule out instability.  This will be done at  today.  I will call her with those results.  The patient and her family member are agreeable. All questions answered today.\par

## 2022-09-15 NOTE — HISTORY OF PRESENT ILLNESS
[de-identified] : Ms. DIAZ has a longstanding history of chronic lower back pain with radiculopathy.  She has had physical therapy and pain management injections for years now.  Unfortunately over the past 6 months she has developed an increase in radiculopathy in the left leg.  Since April she has had LESIs with Dr. Oritz. Her pain has been refractory to interventional procedures.  Her pain scale is a 9/10.  She is unable to sit stand or walk for long durations due to the severity of her left leg pain.  She is currently on Lyrica 100 mg 3 times daily and uses CBD.\par \par Today we have reviewed an MRI of the lumbar spine from 8/2022 [facility].  She is found to have a spondylolisthesis at L4-5 with severe left foraminal stenosis.  There is also a left L5-S1 disc herniation causing foraminal stenosis.\par \par PHYSICAL EXAM: \par Constitutional: Well appearing, no distress\par HEENT: Normocephalic Atraumatic\par Psychiatric: Alert and oriented to all spheres, normal mood\par Pulmonary: No respiratory distress\par Neurologic: \par CN II-XII grossly intact\par Palpation: no pain to palpation \par Strength: Full strength in all major muscle groups\par Sensation: Full sensation to light touch in all extremities\par Reflexes: \par  2+ patellar\par  2+ ankle jerk\par Restriction in ROM LS spine. \par SLR + left. \par Gait: antalgic, walking with a cane. \par \par \par \par \par

## 2022-09-21 ENCOUNTER — APPOINTMENT (OUTPATIENT)
Age: 82
End: 2022-09-21

## 2022-09-21 VITALS
HEIGHT: 63 IN | SYSTOLIC BLOOD PRESSURE: 140 MMHG | RESPIRATION RATE: 14 BRPM | OXYGEN SATURATION: 97 % | DIASTOLIC BLOOD PRESSURE: 90 MMHG | HEART RATE: 74 BPM | BODY MASS INDEX: 30.83 KG/M2 | WEIGHT: 174 LBS

## 2022-09-21 DIAGNOSIS — Z01.811 ENCOUNTER FOR PREPROCEDURAL RESPIRATORY EXAMINATION: ICD-10-CM

## 2022-09-21 PROCEDURE — 99213 OFFICE O/P EST LOW 20 MIN: CPT

## 2022-09-21 NOTE — REASON FOR VISIT
[Follow-Up] : a follow-up visit [Cough] : cough [COPD] : COPD [Shortness of Breath] : shortness of breath [Pulmonary Nodules] : pulmonary nodules

## 2022-09-21 NOTE — HISTORY OF PRESENT ILLNESS
[TextBox_4] : DOING WELL, LOST WEIGHT, SOB ON EXERTION, LAST CHEST CT  7/22 REVIEWED MILD INCREASE IN SIZE\par COPD ON INHALERS AS NEEDED

## 2022-09-21 NOTE — DISCUSSION/SUMMARY
[FreeTextEntry1] : COPD STABLE AS NEEDED RESCUE\par SOB ON EXERTION\par LUNG NODULE REPEAT CHEST CT REVIEWED MILD INCREASE ??? NEED FUP\par HEMOC NOTE REVIEWED\par PULMONARY STANDPOINT NO CONTRAINDICATION TO PLANNED PROCEDURE. INTERMEDIATE RISK, NEED TO USE HER INHALERS AS PRESCRIBED\par

## 2022-10-06 ENCOUNTER — EMERGENCY (EMERGENCY)
Facility: HOSPITAL | Age: 82
LOS: 0 days | Discharge: HOME | End: 2022-10-06
Attending: EMERGENCY MEDICINE | Admitting: EMERGENCY MEDICINE

## 2022-10-06 VITALS
DIASTOLIC BLOOD PRESSURE: 75 MMHG | TEMPERATURE: 98 F | HEIGHT: 63 IN | RESPIRATION RATE: 18 BRPM | OXYGEN SATURATION: 96 % | SYSTOLIC BLOOD PRESSURE: 175 MMHG | HEART RATE: 77 BPM

## 2022-10-06 VITALS
DIASTOLIC BLOOD PRESSURE: 76 MMHG | OXYGEN SATURATION: 97 % | HEART RATE: 76 BPM | RESPIRATION RATE: 18 BRPM | SYSTOLIC BLOOD PRESSURE: 143 MMHG

## 2022-10-06 DIAGNOSIS — E78.5 HYPERLIPIDEMIA, UNSPECIFIED: ICD-10-CM

## 2022-10-06 DIAGNOSIS — Z87.891 PERSONAL HISTORY OF NICOTINE DEPENDENCE: ICD-10-CM

## 2022-10-06 DIAGNOSIS — Z98.890 OTHER SPECIFIED POSTPROCEDURAL STATES: Chronic | ICD-10-CM

## 2022-10-06 DIAGNOSIS — Z90.6 ACQUIRED ABSENCE OF OTHER PARTS OF URINARY TRACT: ICD-10-CM

## 2022-10-06 DIAGNOSIS — Z85.850 PERSONAL HISTORY OF MALIGNANT NEOPLASM OF THYROID: ICD-10-CM

## 2022-10-06 DIAGNOSIS — Z79.4 LONG TERM (CURRENT) USE OF INSULIN: ICD-10-CM

## 2022-10-06 DIAGNOSIS — Z90.49 ACQUIRED ABSENCE OF OTHER SPECIFIED PARTS OF DIGESTIVE TRACT: ICD-10-CM

## 2022-10-06 DIAGNOSIS — Z85.528 PERSONAL HISTORY OF OTHER MALIGNANT NEOPLASM OF KIDNEY: ICD-10-CM

## 2022-10-06 DIAGNOSIS — Z20.822 CONTACT WITH AND (SUSPECTED) EXPOSURE TO COVID-19: ICD-10-CM

## 2022-10-06 DIAGNOSIS — N13.2 HYDRONEPHROSIS WITH RENAL AND URETERAL CALCULOUS OBSTRUCTION: ICD-10-CM

## 2022-10-06 DIAGNOSIS — Z96.653 PRESENCE OF ARTIFICIAL KNEE JOINT, BILATERAL: ICD-10-CM

## 2022-10-06 DIAGNOSIS — Z85.118 PERSONAL HISTORY OF OTHER MALIGNANT NEOPLASM OF BRONCHUS AND LUNG: ICD-10-CM

## 2022-10-06 DIAGNOSIS — R10.30 LOWER ABDOMINAL PAIN, UNSPECIFIED: ICD-10-CM

## 2022-10-06 DIAGNOSIS — Z87.42 PERSONAL HISTORY OF OTHER DISEASES OF THE FEMALE GENITAL TRACT: ICD-10-CM

## 2022-10-06 DIAGNOSIS — Z87.19 PERSONAL HISTORY OF OTHER DISEASES OF THE DIGESTIVE SYSTEM: ICD-10-CM

## 2022-10-06 DIAGNOSIS — Z98.42 CATARACT EXTRACTION STATUS, LEFT EYE: ICD-10-CM

## 2022-10-06 DIAGNOSIS — G47.33 OBSTRUCTIVE SLEEP APNEA (ADULT) (PEDIATRIC): ICD-10-CM

## 2022-10-06 DIAGNOSIS — R11.2 NAUSEA WITH VOMITING, UNSPECIFIED: ICD-10-CM

## 2022-10-06 DIAGNOSIS — Z98.41 CATARACT EXTRACTION STATUS, RIGHT EYE: ICD-10-CM

## 2022-10-06 DIAGNOSIS — Z90.2 ACQUIRED ABSENCE OF LUNG [PART OF]: ICD-10-CM

## 2022-10-06 DIAGNOSIS — E11.8 TYPE 2 DIABETES MELLITUS WITH UNSPECIFIED COMPLICATIONS: ICD-10-CM

## 2022-10-06 DIAGNOSIS — E66.9 OBESITY, UNSPECIFIED: ICD-10-CM

## 2022-10-06 DIAGNOSIS — I71.40 ABDOMINAL AORTIC ANEURYSM, WITHOUT RUPTURE, UNSPECIFIED: ICD-10-CM

## 2022-10-06 DIAGNOSIS — I10 ESSENTIAL (PRIMARY) HYPERTENSION: ICD-10-CM

## 2022-10-06 DIAGNOSIS — R19.7 DIARRHEA, UNSPECIFIED: ICD-10-CM

## 2022-10-06 DIAGNOSIS — J44.9 CHRONIC OBSTRUCTIVE PULMONARY DISEASE, UNSPECIFIED: ICD-10-CM

## 2022-10-06 DIAGNOSIS — K21.9 GASTRO-ESOPHAGEAL REFLUX DISEASE WITHOUT ESOPHAGITIS: ICD-10-CM

## 2022-10-06 LAB
ALBUMIN SERPL ELPH-MCNC: 4.2 G/DL — SIGNIFICANT CHANGE UP (ref 3.5–5.2)
ALP SERPL-CCNC: 118 U/L — HIGH (ref 30–115)
ALT FLD-CCNC: 13 U/L — SIGNIFICANT CHANGE UP (ref 0–41)
ANION GAP SERPL CALC-SCNC: 15 MMOL/L — HIGH (ref 7–14)
AST SERPL-CCNC: 18 U/L — SIGNIFICANT CHANGE UP (ref 0–41)
BASOPHILS # BLD AUTO: 0.03 K/UL — SIGNIFICANT CHANGE UP (ref 0–0.2)
BASOPHILS NFR BLD AUTO: 0.3 % — SIGNIFICANT CHANGE UP (ref 0–1)
BILIRUB SERPL-MCNC: 0.8 MG/DL — SIGNIFICANT CHANGE UP (ref 0.2–1.2)
BUN SERPL-MCNC: 18 MG/DL — SIGNIFICANT CHANGE UP (ref 10–20)
CALCIUM SERPL-MCNC: 10.4 MG/DL — SIGNIFICANT CHANGE UP (ref 8.4–10.4)
CHLORIDE SERPL-SCNC: 96 MMOL/L — LOW (ref 98–110)
CO2 SERPL-SCNC: 24 MMOL/L — SIGNIFICANT CHANGE UP (ref 17–32)
CREAT SERPL-MCNC: 1.2 MG/DL — SIGNIFICANT CHANGE UP (ref 0.7–1.5)
EGFR: 45 ML/MIN/1.73M2 — LOW
EOSINOPHIL # BLD AUTO: 0.04 K/UL — SIGNIFICANT CHANGE UP (ref 0–0.7)
EOSINOPHIL NFR BLD AUTO: 0.4 % — SIGNIFICANT CHANGE UP (ref 0–8)
GLUCOSE SERPL-MCNC: 119 MG/DL — HIGH (ref 70–99)
HCT VFR BLD CALC: 46.9 % — SIGNIFICANT CHANGE UP (ref 37–47)
HGB BLD-MCNC: 16.7 G/DL — HIGH (ref 12–16)
IMM GRANULOCYTES NFR BLD AUTO: 0.5 % — HIGH (ref 0.1–0.3)
LDH SERPL L TO P-CCNC: 262 — HIGH (ref 50–242)
LYMPHOCYTES # BLD AUTO: 1.11 K/UL — LOW (ref 1.2–3.4)
LYMPHOCYTES # BLD AUTO: 10 % — LOW (ref 20.5–51.1)
MCHC RBC-ENTMCNC: 30.5 PG — SIGNIFICANT CHANGE UP (ref 27–31)
MCHC RBC-ENTMCNC: 35.6 G/DL — SIGNIFICANT CHANGE UP (ref 32–37)
MCV RBC AUTO: 85.6 FL — SIGNIFICANT CHANGE UP (ref 81–99)
MONOCYTES # BLD AUTO: 0.95 K/UL — HIGH (ref 0.1–0.6)
MONOCYTES NFR BLD AUTO: 8.6 % — SIGNIFICANT CHANGE UP (ref 1.7–9.3)
NEUTROPHILS # BLD AUTO: 8.9 K/UL — HIGH (ref 1.4–6.5)
NEUTROPHILS NFR BLD AUTO: 80.2 % — HIGH (ref 42.2–75.2)
NRBC # BLD: 0 /100 WBCS — SIGNIFICANT CHANGE UP (ref 0–0)
PLATELET # BLD AUTO: 198 K/UL — SIGNIFICANT CHANGE UP (ref 130–400)
POTASSIUM SERPL-MCNC: 4.4 MMOL/L — SIGNIFICANT CHANGE UP (ref 3.5–5)
POTASSIUM SERPL-SCNC: 4.4 MMOL/L — SIGNIFICANT CHANGE UP (ref 3.5–5)
PROT SERPL-MCNC: 6.7 G/DL — SIGNIFICANT CHANGE UP (ref 6–8)
RBC # BLD: 5.48 M/UL — HIGH (ref 4.2–5.4)
RBC # FLD: 12.9 % — SIGNIFICANT CHANGE UP (ref 11.5–14.5)
SARS-COV-2 RNA SPEC QL NAA+PROBE: SIGNIFICANT CHANGE UP
SODIUM SERPL-SCNC: 135 MMOL/L — SIGNIFICANT CHANGE UP (ref 135–146)
WBC # BLD: 11.09 K/UL — HIGH (ref 4.8–10.8)
WBC # FLD AUTO: 11.09 K/UL — HIGH (ref 4.8–10.8)

## 2022-10-06 PROCEDURE — 99284 EMERGENCY DEPT VISIT MOD MDM: CPT | Mod: GC

## 2022-10-06 PROCEDURE — G1004: CPT

## 2022-10-06 PROCEDURE — 74177 CT ABD & PELVIS W/CONTRAST: CPT | Mod: 26,ME

## 2022-10-06 PROCEDURE — 93010 ELECTROCARDIOGRAM REPORT: CPT

## 2022-10-06 RX ORDER — METOCLOPRAMIDE HCL 10 MG
10 TABLET ORAL ONCE
Refills: 0 | Status: COMPLETED | OUTPATIENT
Start: 2022-10-06 | End: 2022-10-06

## 2022-10-06 RX ORDER — METFORMIN HYDROCHLORIDE 850 MG/1
1 TABLET ORAL
Qty: 0 | Refills: 0 | DISCHARGE

## 2022-10-06 RX ORDER — SACCHAROMYCES BOULARDII 250 MG
0 POWDER IN PACKET (EA) ORAL
Qty: 0 | Refills: 0 | DISCHARGE

## 2022-10-06 RX ORDER — OXYCODONE AND ACETAMINOPHEN 5; 325 MG/1; MG/1
1 TABLET ORAL
Qty: 10 | Refills: 0
Start: 2022-10-06 | End: 2022-10-08

## 2022-10-06 RX ORDER — TAMSULOSIN HYDROCHLORIDE 0.4 MG/1
1 CAPSULE ORAL
Qty: 7 | Refills: 0
Start: 2022-10-06 | End: 2022-10-12

## 2022-10-06 RX ORDER — ATORVASTATIN CALCIUM 80 MG/1
1 TABLET, FILM COATED ORAL
Qty: 0 | Refills: 0 | DISCHARGE

## 2022-10-06 RX ORDER — ONDANSETRON 8 MG/1
1 TABLET, FILM COATED ORAL
Qty: 12 | Refills: 0
Start: 2022-10-06 | End: 2022-10-09

## 2022-10-06 RX ORDER — TAMSULOSIN HYDROCHLORIDE 0.4 MG/1
1 CAPSULE ORAL
Qty: 15 | Refills: 0
Start: 2022-10-06 | End: 2022-10-20

## 2022-10-06 RX ORDER — ONDANSETRON 8 MG/1
4 TABLET, FILM COATED ORAL ONCE
Refills: 0 | Status: COMPLETED | OUTPATIENT
Start: 2022-10-06 | End: 2022-10-06

## 2022-10-06 RX ORDER — ONDANSETRON 8 MG/1
1 TABLET, FILM COATED ORAL
Qty: 15 | Refills: 0
Start: 2022-10-06 | End: 2022-10-20

## 2022-10-06 RX ORDER — ACETAMINOPHEN 500 MG
1000 TABLET ORAL ONCE
Refills: 0 | Status: COMPLETED | OUTPATIENT
Start: 2022-10-06 | End: 2022-10-06

## 2022-10-06 RX ORDER — SODIUM CHLORIDE 9 MG/ML
1000 INJECTION, SOLUTION INTRAVENOUS ONCE
Refills: 0 | Status: COMPLETED | OUTPATIENT
Start: 2022-10-06 | End: 2022-10-06

## 2022-10-06 RX ADMIN — ONDANSETRON 4 MILLIGRAM(S): 8 TABLET, FILM COATED ORAL at 14:27

## 2022-10-06 RX ADMIN — SODIUM CHLORIDE 1000 MILLILITER(S): 9 INJECTION, SOLUTION INTRAVENOUS at 14:27

## 2022-10-06 RX ADMIN — Medication 10 MILLIGRAM(S): at 17:42

## 2022-10-06 RX ADMIN — Medication 1000 MILLIGRAM(S): at 15:40

## 2022-10-06 RX ADMIN — Medication 400 MILLIGRAM(S): at 15:05

## 2022-10-06 NOTE — ED PROVIDER NOTE - NSICDXFAMILYHX_GEN_ALL_CORE_FT
FAMILY HISTORY:  Father  Still living? Unknown  Family history of cancer, Age at diagnosis: Age Unknown    Mother  Still living? Unknown  Family history of dementia, Age at diagnosis: Age Unknown    Grandparent  Still living? Unknown  Family history of cancer, Age at diagnosis: Age Unknown

## 2022-10-06 NOTE — ED PROVIDER NOTE - CLINICAL SUMMARY MEDICAL DECISION MAKING FREE TEXT BOX
Work up consistent with a symptomatic kidney stone.  Pain controlled in the Emergency Department.  Labs, imaging and urine reviewed. Patient is well appearing and a good candidate for outpatient management.  Size of stone and chance of passing discussed with patient and daughter. Patient to see her Urologist Dr. Cobb. Urology recommendations appreciated.    Full DC instructions discussed and patient knows when to seek immediate medical attention.  Patient has proper follow up with Urology.  Strict return precautions discussed. Kidney stone education given.  Both ED and home meds (side effects/precautions) discussed in detail. Medications administered and prescribed/OTC home meds discussed.  All questions and concerns from patient or family addressed. Understanding of instructions verbalized.

## 2022-10-06 NOTE — ED ADULT NURSE NOTE - NSICDXPASTSURGICALHX_GEN_ALL_CORE_FT
PAST SURGICAL HISTORY:  History of surgery LEFT LOWER LOBECTOMY    History of surgery BILATERAL KNEE REPLACEMENT    History of surgery CATARACT RIGHT EYE    History of surgery TUBAL LIGATION    History of surgery CYST REMOVED  RIGHT BREAST    History of surgery CHOLECYSTECOMY    History of surgery RIGHT PARTIAL NEPHRECTOMY    History of surgery BILATERAL CATARACT SURGERY

## 2022-10-06 NOTE — ED ADULT NURSE NOTE - OBJECTIVE STATEMENT
Pt complaining of abdominal pain with N/V/D.  Pt A&Ox4, denies dizziness.  Ambulating well.  Pt states they have lower abdominal cramping.  Pt has hx of diverticulitis and bowel perforation.

## 2022-10-06 NOTE — ED ADULT NURSE NOTE - CAS EDN DISCHARGE ASSESSMENT
I (Esther) agree with above, I performed a history and physical. Counseled angelina medical staff, physician assistant, and/or medical student on medical decision making as documented. Medical decisions and treatment interventions were made in real time during the patient encounter. Additionally and/or with the following exceptions: The patient presented to the ED with elevated creatinine in the setting of a planned renal biopsy, exam non focal as above, Cr elevated above baseline, admitted to medicine. The patient's condition was not amenable to outpatient treatment due either the lack of feasibility of outpatient care coordination, possibility for further decompensation with adverse outcome if discharge, or treatments and diagnostic  modalities only available during an inpatient hospitalization. Alert and oriented to person, place and time

## 2022-10-06 NOTE — ED PROVIDER NOTE - CARE PROVIDER_API CALL
Prosper Cobb)  Urology  [address] [facility] [place] [address]  Phone: [phone]  Fax: [phone]  Established Patient  Follow Up Time: 1-3 Days

## 2022-10-06 NOTE — CHART NOTE - NSCHARTNOTEFT_GEN_A_CORE
BELINDA    Pt seen and examined in the ED  Pt feels better  pain is controlled she wants to go home and follow up with her urologist.  Dr. Cobb    Pt to be d/c with Toradol, Flomax, and Zofran  strainer  pt knows to go to [facility] if pain returns, she has fever or chills   no acute urologic intervention at this time

## 2022-10-06 NOTE — ED PROVIDER NOTE - PHYSICAL EXAMINATION
PHYSICAL EXAM:    GENERAL: well-groomed, well-developed, complaining of increasing abdominal pain.  HEAD:  Atraumatic, Normocephalic  EYES: EOMI, PERRLA, conjunctiva and sclera clear  ENMT:  Moist mucous membranes   NECK: Supple, No JVD,   HEART: Regular rate and rhythm; No murmurs, rubs, or gallops  RESPIRATORY: CTA B/L, No W/R/R  ABDOMEN: Soft, Nondistended + pain in the periumbilical and lower abdomen with deep palpation; Bowel sounds present  NEUROLOGY: A&Ox3, nonfocal, moving all extremities  EXTREMITIES:  2+ Peripheral Pulses, No clubbing, cyanosis, or edema  SKIN: warm, dry, normal color, no rash or abnormal lesions PHYSICAL EXAM:    GENERAL: well-groomed, well-developed, complaining of increasing abdominal pain.  HEAD:  Atraumatic, Normocephalic  EYES: EOMI, PERRLA, conjunctiva and sclera clear  ENMT:  Moist mucous membranes   NECK: Supple, No JVD,   HEART: Regular rate and rhythm; No murmurs, rubs, or gallops  RESPIRATORY: CTA B/L, No W/R/R  ABDOMEN: Soft, Nondistended + pain in the periumbilical and lower abdomen with deep palpation; Bowel sounds present in all 4 quadrants  NEUROLOGY: A&Ox3, nonfocal, moving all extremities  EXTREMITIES:  2+ Peripheral Pulses, No clubbing, cyanosis, or edema  SKIN: warm, dry, normal color, no rash or abnormal lesions PHYSICAL EXAM:    GENERAL: well-groomed, well-developed, complaining of abdominal pain.  HEAD:  Atraumatic, Normocephalic  EYES: EOMI, PERRLA, conjunctiva and sclera clear  ENMT:  Moist mucous membranes   NECK: Supple, No JVD,   HEART: Regular rate and rhythm; No murmurs, rubs, or gallops  RESPIRATORY: CTA B/L, No W/R/R  ABDOMEN: Soft, Nondistended + pain in the periumbilical and lower abdomen with deep palpation; Bowel sounds present in all 4 quadrants  BACK: no CVA tenderness b/l  NEUROLOGY: A&Ox3, nonfocal, moving all extremities  EXTREMITIES:  2+ Peripheral Pulses, No clubbing, cyanosis, or edema  SKIN: warm, dry, normal color, no rash or abnormal lesions

## 2022-10-06 NOTE — ED PROVIDER NOTE - NSFOLLOWUPINSTRUCTIONS_ED_ALL_ED_FT
Renal Colic    The urinary tract with a close-up of a kidney with kidney stones.   Renal colic is pain that is caused by a kidney stone. The pain can be sharp and very bad. It may be felt in the back, belly, side (flank), or groin. It can cause nausea. Renal colic can come and go.    Follow these instructions at home:    Medicines  •Take over-the-counter and prescription medicines only as told by your doctor.  • Do not drive or use heavy machinery while taking prescription pain medicine.    Eating and drinking     A comparison of three sample cups showing dark yellow, yellow, and pale yellow urine.   •Drink enough fluid to keep your pee (urine) pale yellow. You may be told to drink at least 8–10 glasses of water each day. Follow instructions from your doctor.  •If told, change your diet. This may include eating:  •Less salt (sodium). Eat less than 2 grams (2,000 mg) of salt per day.  •Less meat, poultry, fish, and eggs.  •More fruits and vegetables.  •Try not to eat spinach, rhubarb, sweet potatoes, or nuts.  •Follow instructions from your doctor about what foods and drinks to avoid.      General instructions     •Keep all follow-up visits as told by your doctor. This is important.  •Collect pee samples as told by your doctor.  •Strain your pee every time you pee, as told by your doctor. Use the strainer that your doctor recommends.  • Do not throw out the kidney stone after passing it. Keep the stone so it can be tested by your doctor.    Contact a doctor if:    •You have a fever or chills.  •Your pee smells bad or looks cloudy.  •You have pain or burning when you pee.    Get help right away if:    •The pain in your side (flank) or your groin suddenly gets worse.  •You get confused.  •You pass out.    Summary    •Renal colic is pain that is caused by a kidney stone.  •Take over-the-counter and prescription medicines only as told by your doctor.  •Drink enough fluid to keep your pee pale yellow. You may be told to drink at least 8–10 glasses of water each day. Follow instructions from your doctor.  •Strain your pee every time you pee, as told by your doctor. Use the strainer that your doctor recommends.  • Do not throw out the kidney stone after passing it. Keep the stone so it can be tested by your doctor.    It is recommended that you follow up with your nephrologist Dr. Cobb out patient for further work up and interventions. If you find yourself experiencing fevers, chills, SOB, dizziness, fatigue, please visit your nearest emergency as soon as possible, as these can be symptoms need to be addressed immediately.

## 2022-10-06 NOTE — ED PROVIDER NOTE - NS ED ROS FT
Constitutional:  No fever, chills, lethargy, or abnormal weight loss  Eyes:  No eye pain or visual changes  ENMT: No nasal discharge, no toothache, no sore throat. No neck pain or stiffness  Cardiac:  No chest pain or palpitations  Respiratory:  No cough or respiratory distress.   GI:  + nausea, + vomiting, + abdominal pain.  :  No dysuria, frequency or burning.  MS:  No back or joint pain.  Neuro:  No headache. No numbness, weakness, or tingling.   Skin:  No skin rash  Except as documented in the HPI,  all other systems are negative

## 2022-10-06 NOTE — ED PROVIDER NOTE - OBJECTIVE STATEMENT
81 yo F with PMHx s/f Perforated diverticulitis, Abdominal Aortic Aneurysm (followed by Dr. Stover), HTN, DLD, T2DM on insulin, COPD (not on home O2), ARTIE p/w two weeks of persistent Nausea and vomiting. The patient states that the nausea began 2 weeks ago and that she had one episode of vomiting clear fluid since that time. She states that she reached out to Dr. Hilliard's office and was prescribed amoxicillin and ordered to start a diet of broth. She states that sips of fluid previously helped alleviate her symptoms but currently do not.     The patient endorses lower abdomen cramps and one episode of diarrhea yesterday. The patient denies fevers, chilla,  HA, dizziness, CP, palpitations, dysuria, LE weakness/pain.

## 2022-10-06 NOTE — ED PROVIDER NOTE - NSICDXPASTMEDICALHX_GEN_ALL_CORE_FT
PAST MEDICAL HISTORY:  Cancer of kidney     Cancer of thyroid     COPD (chronic obstructive pulmonary disease)     Diverticulitis     GERD (gastroesophageal reflux disease)     History of abdominal aortic aneurysm     Hyperlipidemia, unspecified hyperlipidemia type     Hypertension, unspecified type     Lung cancer     Obesity     ARTIE (obstructive sleep apnea) NO CPAP MACHINE PER PT.    SOB (shortness of breath)     Type 2 diabetes mellitus with complication, without long-term current use of insulin

## 2022-10-06 NOTE — ED PROVIDER NOTE - PATIENT PORTAL LINK FT
You can access the FollowMyHealth Patient Portal offered by [facility] by registering at the following website: [email or web address]. By joining [facility]’s FollowMyHealth portal, you will also be able to view your health information using other applications (apps) compatible with our system.

## 2022-10-06 NOTE — ED PROVIDER NOTE - ATTENDING CONTRIBUTION TO CARE
I personally evaluated patient. I agree with the findings and plan with all documentation on chart except as documented  in my note.    Work up consistent with a symptomatic kidney stone.  Pain controlled in the Emergency Department.  Labs, imaging and urine reviewed. Patient is well appearing and a good candidate for outpatient management.  Size of stone and chance of passing discussed with patient and daughter. Patient to see her Urologist Dr. Cobb. Urology recommendations appreciated.    Full DC instructions discussed and patient knows when to seek immediate medical attention.  Patient has proper follow up with Urology.  Strict return precautions discussed. Kidney stone education given.  Both ED and home meds (side effects/precautions) discussed in detail. Medications administered and prescribed/OTC home meds discussed.  All questions and concerns from patient or family addressed. Understanding of instructions verbalized.

## 2022-10-12 ENCOUNTER — OUTPATIENT (OUTPATIENT)
Dept: OUTPATIENT SERVICES | Facility: HOSPITAL | Age: 82
LOS: 1 days | End: 2022-10-12

## 2022-10-12 ENCOUNTER — APPOINTMENT (OUTPATIENT)
Dept: HEMATOLOGY ONCOLOGY | Facility: CLINIC | Age: 82
End: 2022-10-12

## 2022-10-12 VITALS
TEMPERATURE: 98.5 F | WEIGHT: 166 LBS | SYSTOLIC BLOOD PRESSURE: 132 MMHG | OXYGEN SATURATION: 98 % | BODY MASS INDEX: 29.41 KG/M2 | DIASTOLIC BLOOD PRESSURE: 71 MMHG | RESPIRATION RATE: 16 BRPM | HEART RATE: 69 BPM | HEIGHT: 63 IN

## 2022-10-12 DIAGNOSIS — Z98.890 OTHER SPECIFIED POSTPROCEDURAL STATES: Chronic | ICD-10-CM

## 2022-10-12 DIAGNOSIS — D50.9 IRON DEFICIENCY ANEMIA, UNSPECIFIED: ICD-10-CM

## 2022-10-12 PROCEDURE — 99214 OFFICE O/P EST MOD 30 MIN: CPT

## 2022-10-17 ENCOUNTER — OUTPATIENT (OUTPATIENT)
Dept: OUTPATIENT SERVICES | Facility: HOSPITAL | Age: 82
LOS: 1 days | Discharge: HOME | End: 2022-10-17

## 2022-10-17 VITALS
OXYGEN SATURATION: 96 % | TEMPERATURE: 98 F | HEART RATE: 68 BPM | RESPIRATION RATE: 16 BRPM | WEIGHT: 164.02 LBS | SYSTOLIC BLOOD PRESSURE: 141 MMHG | HEIGHT: 63 IN | DIASTOLIC BLOOD PRESSURE: 78 MMHG

## 2022-10-17 DIAGNOSIS — Z98.890 OTHER SPECIFIED POSTPROCEDURAL STATES: Chronic | ICD-10-CM

## 2022-10-17 DIAGNOSIS — Z01.818 ENCOUNTER FOR OTHER PREPROCEDURAL EXAMINATION: ICD-10-CM

## 2022-10-17 DIAGNOSIS — M54.16 RADICULOPATHY, LUMBAR REGION: ICD-10-CM

## 2022-10-17 LAB
A1C WITH ESTIMATED AVERAGE GLUCOSE RESULT: 6.6 % — HIGH (ref 4–5.6)
ALBUMIN SERPL ELPH-MCNC: 4.6 G/DL — SIGNIFICANT CHANGE UP (ref 3.5–5.2)
ALP SERPL-CCNC: 108 U/L — SIGNIFICANT CHANGE UP (ref 30–115)
ALT FLD-CCNC: 12 U/L — SIGNIFICANT CHANGE UP (ref 0–41)
ANION GAP SERPL CALC-SCNC: 12 MMOL/L — SIGNIFICANT CHANGE UP (ref 7–14)
APPEARANCE UR: CLEAR — SIGNIFICANT CHANGE UP
APTT BLD: 29.8 SEC — SIGNIFICANT CHANGE UP (ref 27–39.2)
AST SERPL-CCNC: 13 U/L — SIGNIFICANT CHANGE UP (ref 0–41)
BASOPHILS # BLD AUTO: 0.06 K/UL — SIGNIFICANT CHANGE UP (ref 0–0.2)
BASOPHILS NFR BLD AUTO: 0.9 % — SIGNIFICANT CHANGE UP (ref 0–1)
BILIRUB SERPL-MCNC: 0.4 MG/DL — SIGNIFICANT CHANGE UP (ref 0.2–1.2)
BILIRUB UR-MCNC: NEGATIVE — SIGNIFICANT CHANGE UP
BLD GP AB SCN SERPL QL: SIGNIFICANT CHANGE UP
BUN SERPL-MCNC: 11 MG/DL — SIGNIFICANT CHANGE UP (ref 10–20)
CALCIUM SERPL-MCNC: 10.5 MG/DL — SIGNIFICANT CHANGE UP (ref 8.4–10.5)
CHLORIDE SERPL-SCNC: 98 MMOL/L — SIGNIFICANT CHANGE UP (ref 98–110)
CO2 SERPL-SCNC: 26 MMOL/L — SIGNIFICANT CHANGE UP (ref 17–32)
COLOR SPEC: SIGNIFICANT CHANGE UP
CREAT SERPL-MCNC: 0.8 MG/DL — SIGNIFICANT CHANGE UP (ref 0.7–1.5)
DIFF PNL FLD: ABNORMAL
EGFR: 74 ML/MIN/1.73M2 — SIGNIFICANT CHANGE UP
EOSINOPHIL # BLD AUTO: 0.16 K/UL — SIGNIFICANT CHANGE UP (ref 0–0.7)
EOSINOPHIL NFR BLD AUTO: 2.3 % — SIGNIFICANT CHANGE UP (ref 0–8)
ESTIMATED AVERAGE GLUCOSE: 143 MG/DL — HIGH (ref 68–114)
GLUCOSE SERPL-MCNC: 104 MG/DL — HIGH (ref 70–99)
GLUCOSE UR QL: NEGATIVE — SIGNIFICANT CHANGE UP
HCT VFR BLD CALC: 46.1 % — SIGNIFICANT CHANGE UP (ref 37–47)
HGB BLD-MCNC: 15.7 G/DL — SIGNIFICANT CHANGE UP (ref 12–16)
IMM GRANULOCYTES NFR BLD AUTO: 0.4 % — HIGH (ref 0.1–0.3)
INR BLD: 0.96 RATIO — SIGNIFICANT CHANGE UP (ref 0.65–1.3)
KETONES UR-MCNC: NEGATIVE — SIGNIFICANT CHANGE UP
LEUKOCYTE ESTERASE UR-ACNC: ABNORMAL
LYMPHOCYTES # BLD AUTO: 1.76 K/UL — SIGNIFICANT CHANGE UP (ref 1.2–3.4)
LYMPHOCYTES # BLD AUTO: 25.4 % — SIGNIFICANT CHANGE UP (ref 20.5–51.1)
MCHC RBC-ENTMCNC: 30 PG — SIGNIFICANT CHANGE UP (ref 27–31)
MCHC RBC-ENTMCNC: 34.1 G/DL — SIGNIFICANT CHANGE UP (ref 32–37)
MCV RBC AUTO: 88.1 FL — SIGNIFICANT CHANGE UP (ref 81–99)
MONOCYTES # BLD AUTO: 0.67 K/UL — HIGH (ref 0.1–0.6)
MONOCYTES NFR BLD AUTO: 9.7 % — HIGH (ref 1.7–9.3)
NEUTROPHILS # BLD AUTO: 4.24 K/UL — SIGNIFICANT CHANGE UP (ref 1.4–6.5)
NEUTROPHILS NFR BLD AUTO: 61.3 % — SIGNIFICANT CHANGE UP (ref 42.2–75.2)
NITRITE UR-MCNC: NEGATIVE — SIGNIFICANT CHANGE UP
NRBC # BLD: 0 /100 WBCS — SIGNIFICANT CHANGE UP (ref 0–0)
PH UR: 7 — SIGNIFICANT CHANGE UP (ref 5–8)
PLATELET # BLD AUTO: 266 K/UL — SIGNIFICANT CHANGE UP (ref 130–400)
POTASSIUM SERPL-MCNC: 4.3 MMOL/L — SIGNIFICANT CHANGE UP (ref 3.5–5)
POTASSIUM SERPL-SCNC: 4.3 MMOL/L — SIGNIFICANT CHANGE UP (ref 3.5–5)
PROT SERPL-MCNC: 7 G/DL — SIGNIFICANT CHANGE UP (ref 6–8)
PROT UR-MCNC: SIGNIFICANT CHANGE UP
PROTHROM AB SERPL-ACNC: 10.9 SEC — SIGNIFICANT CHANGE UP (ref 9.95–12.87)
RBC # BLD: 5.23 M/UL — SIGNIFICANT CHANGE UP (ref 4.2–5.4)
RBC # FLD: 13.1 % — SIGNIFICANT CHANGE UP (ref 11.5–14.5)
SODIUM SERPL-SCNC: 136 MMOL/L — SIGNIFICANT CHANGE UP (ref 135–146)
SP GR SPEC: 1.01 — SIGNIFICANT CHANGE UP (ref 1.01–1.03)
UROBILINOGEN FLD QL: SIGNIFICANT CHANGE UP
WBC # BLD: 6.92 K/UL — SIGNIFICANT CHANGE UP (ref 4.8–10.8)
WBC # FLD AUTO: 6.92 K/UL — SIGNIFICANT CHANGE UP (ref 4.8–10.8)

## 2022-10-17 RX ORDER — AMLODIPINE BESYLATE 2.5 MG/1
2.5 TABLET ORAL
Qty: 0 | Refills: 0 | DISCHARGE

## 2022-10-17 NOTE — H&P PST ADULT - MUSCULOSKELETAL
b/l 1-2 + pitting/normal/ROM intact/normal gait/strength 5/5 bilateral upper extremities/strength 5/5 bilateral lower extremities/extremities exam/abnormal gait

## 2022-10-17 NOTE — H&P PST ADULT - NSICDXPASTMEDICALHX_GEN_ALL_CORE_FT
PAST MEDICAL HISTORY:  Cancer of kidney     Cancer of thyroid     COPD (chronic obstructive pulmonary disease)     Diverticulitis     Former smoker     GERD (gastroesophageal reflux disease)     History of abdominal aortic aneurysm     Hyperlipidemia, unspecified hyperlipidemia type     Hypertension, unspecified type     Lung cancer     NHL (non-Hodgkin's lymphoma) "low grade" per heme/ onc note    Obesity     ARTIE (obstructive sleep apnea) NO CPAP MACHINE PER PT.    SOB (shortness of breath)     Type 2 diabetes mellitus with complication, without long-term current use of insulin

## 2022-10-17 NOTE — H&P PST ADULT - HISTORY OF PRESENT ILLNESS
Patient is a 82  year old  female presenting to PAST in preparation for LEFT LUMBAR 4-5, LUMBAR 5 - SACRAL 1 LAMINOFORAMINOTOMY on 10/28/22 under [place] by Dr Weeks & then   CYSTOSCOPY, LEFT URETEROSCOPY, LASER LITHOTRIPSY, LEFT URETERAL STENT REPLACEMENT  on11/1/22  under [facility]sb anesthesia by Dr. briones  .    pt reports ~ 6 months having low back pain w/ radiation down left leg, describes as burning, nothing helps the pain, presently 7/10    pt reported right side abd pain, belching& nausea went to ED 10/6/22, was dcd& saw dr briones for fu, "stone was blocking kidney & stent was placed in office 10/13 occasional hematuria, no pain    PATIENT CURRENTLY DENIES CHEST PAIN  SHORTNESS OF BREATH  PALPITATIONS,  DYSURIA, OR UPPER RESPIRATORY INFECTION IN PAST 2 WEEKS  EXERCISE  TOLERANCE  1-2 FLIGHT OF STAIRS  WITHOUT SHORTNESS OF BREATH, ambulates w/ cane, back problems  denies travel outside the [place] in the past 30 days  Patient denies any signs or symptoms of COVID 19 and denies contact with known positive individuals.  They have an appointment for repeat COVID testing pre-procedure and acknowledge its time and place.  They were instructed to quarantine pre-procedure, practice exposure control measures, continue to self-monitor and report any concerns to their proceduralist.  pt advised self quarantine till day of procedure  Anesthesia Alert  NO--Difficult Airway  NO--History of neck surgery or radiation  NO--Limited ROM of neck  NO--History of Malignant hyperthermia  NO--No personal or family history of Pseudocholinesterase deficiency.  NO--Prior Anesthesia Complication  NO--Latex Allergy  NO--Loose teeth  NO--History of Rheumatoid Arthritis  NO--Bleeding risk  NO--ARTIE  NO--Other_____    PT DENIES ANY RASHES, ABRASION, OR OPEN WOUNDS OR CUTS    AS PER THE PT, THIS IS HIS/HER COMPLETE MEDICAL AND SURGICAL HX, INCLUDING MEDICATIONS PRESCRIBED AND OVER THE COUNTER    Patient verbalized understanding of instructions and was given the opportunity to ask questions and have them answered.    pt denies any suicidal ideation or thoughts, pt states not a threat to self or others    Opioid Risk Assessment Tool (Female)       Family history of substance abuse            Alcohol (1)            Illegal Drugs (2)            Prescription drugs (4)       Personal history of substance abuse            Alcohol (3)            Illegal Drugs (4)            Prescription drugs (5)       Age between 16-45 (1)       History of preadolescent sexual abuse (3)       Psychological disease (ADD, ADHD, OCD, Bipolar Disorder, Schizophrenia, Depression) (2)    Scoring Totals:  Low Risk (0-3)  Moderate Risk (4-7)  High Risk (>/=8)  score 0

## 2022-10-17 NOTE — H&P PST ADULT - REASON FOR ADMISSION
PAST for   1. LEFT LUMBAR 4-5, LUMBAR 5 - SACRAL 1 LAMINOFORAMINOTOMY&   2. CYSTOSCOPY, LEFT URETEROSCOPY, LASER LITHOTRIPSY, LEFT URETERAL STENT REPLACEMENT

## 2022-10-18 PROBLEM — D50.9 MICROCYTIC ANEMIA: Status: ACTIVE | Noted: 2021-09-15

## 2022-10-18 LAB
BACTERIA # UR AUTO: NEGATIVE — SIGNIFICANT CHANGE UP
CULTURE RESULTS: NO GROWTH — SIGNIFICANT CHANGE UP
EPI CELLS # UR: 4 /HPF — SIGNIFICANT CHANGE UP (ref 0–5)
HYALINE CASTS # UR AUTO: 3 /LPF — SIGNIFICANT CHANGE UP (ref 0–7)
MRSA PCR RESULT.: POSITIVE
RBC CASTS # UR COMP ASSIST: 27 /HPF — HIGH (ref 0–4)
SPECIMEN SOURCE: SIGNIFICANT CHANGE UP
WBC UR QL: 4 /HPF — SIGNIFICANT CHANGE UP (ref 0–5)

## 2022-10-18 NOTE — ASSESSMENT
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 On observation since she is asymptomatic. \par Recent diverticulitis imaging done at that time showed no lymphadenopathy as per pt \par \par - No B symptoms\par - will monitor clinically and if any symptoms will repeat CT AP, regardless she has CT abd pelding for renal cysts \par -now with diverticulitis and with h/o WILDER recommended to discuss with GI about doing a colonoscopy.\par \par #Anemia, mild, microcytic Hb 13.3 today with MCV 84.9, due to WILDER - resolved \par - S/p 5 doses of venofer in April. Ferritin improved 112, Iron sat 10%\par - recommended to f/u with GI she did but not a candidate for colonoscopy due to previous peroration and will monbitor  \par \par # Pleural Nodules on scans, these are not new, at least since 2018. 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 Calixto, note reviewed\par \par \par # Adnexal cyst :  simple appearing.   Pelvis sono was recommended. \par \par RTC if CT chest is stable will RTC in 6 months with CBC, CMP, LDH  \par \par

## 2022-10-18 NOTE — HISTORY OF PRESENT ILLNESS
[de-identified] : Ms. 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 Cody Calixto.  She is here with her daughter Brandy.  \par \par She had a history of Kidney Cancer s/p partial nephrectomy and distant history of Thyroid Cancer s/p partial thyroidectomy.  She also had a Left Lower Lobectomy for Stage IA lung cancer. For dates see bellow.\par \par She had CT scan and subsequent PET/CT that showed multiple left pleural based nodules in addition to a para-aortic lymph node.  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 The differential diagnosis includes low grade follicular lymphoma and CD10+ marginal zone lymphoma. She denies any B-symptoms at this time other than recent weight loss of 10lbs over last month, however she reports she had not been eating due to a recent GI bug.\par \par She is here for further recommendations. \par Radiological Work-up:\par PET/CT (2/21/19) IMPRESSION: Since August 20, 2010: 1. Multiple sites of left pleural-based FDG avid nodularity, catalogued above, with max SUV 5.2, suspicious for biological tumor activity. Metastatic or primary pleural neoplasm are considerations. 2. Intra-abdominal FDG avid 1.7 x 1.2 cm left para-aortic lymph node, max SUV 5.4, suspicious for lymph node metastasis. \par 1/30/2019: [facility] [place]:  comparison was made to Ct from 7/2018 Multiple Plural nodules along the posterior paramediastinal surface, some are mildly larger there are new nodules as well. largest nodule 10 mm\par CT Head (9/15/18) IMPRESSION: 1. Mild periventricular and subcortical white matter chronic small vessel ischemic changes. 2. No acute fractures, mass effect, midline shift or hemorrhage. \par CT cervical spine (9/15/18) IMPRESSION: 1. Degenerative changes of the cervical spine C2-3 through C6-7 worse at C5-6 as described above. 2. Straightening of normal cervical lordosis with mild anterolisthesis of C3 anterior on C4, C6 on C7, C7 on T1 and T1 on T2. 3. No acute fractures or dislocations. \par 1/22/18: MR L spine: advance lymbar spondylosis, disck osteophytes, severe neuroforamin narrowing with  ipingment of L4 nerve,  3.5 cm AAA, 8 mm adrenal nodule.\par Pathology:\par (3/14/19) Final Diagnosis Left parotid lymph node, needle biopsy: Low grade B-cell lymphoma. \par Immunostains performed with adequate controls on sections from block 1A show the infiltrating cells are CD20+ CD79a+ B-cells that coexpress CD10, CD23(dim variable), and BCL2. They are negative for CD5, CD43, cyclin D1, and MUM1. BCL6 is difficult to determine due to the high background staining. Ki67 labels 5-10% of these cells. CD21 highlights scattered follicular dendritic cell meshworks.  Per report ([id number]), flow cytometry studies performed at [facility] show monotypic B-cells (10% of cells), positive for kappa, CD19, CD20, CD10; negative CD5. Heterogeneous population of T-cells (with normal CD4 to CD8 ratio), and polytypic B-cells are also present. The findings are diagnostic of low grade B-cell lymphoma. The differential diagnosis includes low grade follicular lymphoma and CD10+ marginal zone lymphoma.\par \par Health Maintenance:\par Colonoscopy \par Mammogram \par \par Recent blood work is in HIE. Was normal except for a high sugar and calcium of 10.2 [de-identified] : 7/10/19\par She was supposed to go for biopsy of pleural based nodularity that resolved prior to procedure.  She will see PULM in a few weeks.  She has been purposely losing weight, using Trajenta.  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  Denies any fevers, chills, unintentional weight loss, or night sweats.\par CT Chest (4/11/19) IMPRESSION:Since February 21, 2019;1. Interval resolution of previously described left-sided FDG avid pleural-based nodularity. No biopsy performed.2. Multiple stable nodules, as above.3. Unchanged left periaortic 1.4 cm lymph node, previously noted to be FDG avid.4. Stable right hydronephrosis and right-sided ureteral calculi.5. Stable infrarenal abdominal aortic aneurysm measuring up to 3.7 cm.\par \par 1/8/20\par She is here for follow up. Since last visit she had a CT C/A/P in 7/18/2019 that showed  Stable 1.1 cm left para-aortic lymph node on series 2 image 62. No new lymph nodes in the abdomen and pelvis. \par Interval development of moderate to severe right hydroureteronephrosis leading to 2 proximal ureteral calculi \par (superior calculus measuring 5 x 4 x 5 mm, 1000 Hounsfield units and the inferior adjacent calculus measuring 5 \par x 7 x 6 mm, 573 Hounsfield units). \par Bilateral intrarenal calculi. \par Stable 3.7 x 4.3 cm simple appearing right adnexal cyst. Recommend one-year follow-up pelvic ultrasound.\par Impression: \par Since April 11, 2019. \par No significant interval change in multiple left-sided pleural-based nodules, largest approximately 1.6 cm \par (remeasured on earlier study), left lung base (series 5/149). \par Post left lower lobectomy.\par \par She was seen by Urology and had lithotripsy. \par \par She feels well. \par \par 7/8/20\par She is here for follow up. She feels well. No B symptoms. She is pening a CT chest ordered by Dr. Morales\par \par 1/11/2021: The patient is here for follow up. She has no B symptoms , no cough , no chest pain , no new palpable lymph nodes. She is complaing of sciatica, going for an injection next week. She also had LEs cellulitis , completed once course of antibiotics. \par \par \par 5/19/21\par We had a telephone visit today. She feels well.  She had CT chest on 2/21 that showed Since July 14, 2020,\par \par  interval development of wedge-shaped consolidation posterior left upper lobe. (2/31).\par \par Minimal interval growth of multiple pleural-based nodules as described above\par \par Numerous stable left-sided pleural based nodules and adjacent parenchymal nodules..\par \par Reviewed her CT in tumor board. Chest. We were diveded on if we should rescan in 3 months with CT or check PET/cT now. I spoke to her and we decided to check a CT Chest in 3 months. \par \par She feels well. \par \par 9/15/21\par Patient is here for a follow-up visit for hx of lung cancer, NHL and pulmonary nodules.  She is feeling well with no new complaints.  Reviewed most recent CBC, which shows mild microcytic anemia.  Patient denies fever, chills, nausea, vomiting, dyspnea or bleeding.  She has not had recent colonoscopy, but has done Cologuard at home which was reportedly benign in recent years.  \par CT Chest (6.2.2021)IMPRESSION:Unchanged left circumferential subpleural nodularity largest measuring 1.6 cm.Resolved left lower lobe consolidation.\par \par 3/21/22\par Pt returns for f/u today . She reports that she was admitted at [facility] for episode for acute diverticulitis in November 2021, she was treated with IV abx , pt reports that she had imaging done at that time and it was only significant for acute diverticulitis ( we do not have the imaging results available .Since that time she reports  that she has been having episodes of constipation and diarrhea , she is on laxatives . She stopped taking po iron as it was making her GI symptoms worse . Her gastroenterologist did not suggest doing a colonoscopy. Pt denies any blood in stools. Denies any fever , chills , weight loss or loss of appetite. She was also seen by Dr Sweet. \par \par 6/20/22\par Pt returns for f/u today. Denies any fever , chills , weight loss or loss of appetite. In March ferritin was 17, Iron 38, iron sat 9%, Hgb 11.9, MCV 77.1. She had 5 doses of IV venofer tolerated well, she reports cold intolerance improved. Labs reviewed 6/13/22 hgb 13.3, MCV 84.9, Ferritin 112, Iron sat 13%, Iron 37. She had Kidney US on 6/8 which showed Indeterminate hypoechoic area involving the lower pole of the left kidney. Further evaluation with outpatient renal mass protocol CT is recommended. Focal dilatation of the right upper pole calyx. Bilateral renal calculi. Incidentally noted is an abdominal aortic aneurysm, similar in size to prior CT scan, measuring 4.2 cm. \par \par 10/12/2022\par She is here for follow up.   she had CT C/A/P in July 2022 \par Lungs, Pleura, and Airways:Trachea and central airways are patent. Post left lower lobectomy with expected postsurgical changes. Extensive left subpleural nodularity demonstrating slight interval increase since prior examinations including medial left upper lobe 2.7 x 1.0 cm nodular density (series 4 image 27) and anterior left upper lobe 1.1 x 1.0 cm nodular density (series 4 image 55).\par Stable right adnexal cystic lesion measuring 4.4 x 3.8 cm.\par \par Tubular/cystic appearing left adnexal lesion could represent a cyst or hydrosalpinx, not well seen on the 2/9/2021 CT. Recommend follow-up pelvic ultrasound.\par \par \par  I ordered a US pelvis hat showed   IMPRESSION:\par 1.  Likely benign 4 cm right adnexal/ovarian cyst. A follow-up pelvic ultrasound can be obtained in 6 months to assess for stability.\par 2.  Tubular cystic left adnexal lesion, likely hydrosalpinx.\par \par She was just in ER for pain and was noted to have a kidney stone  that moved and cauased hydroneprhosis. IMPRESSION: 10/6/2022\par \par 1. Moderate left hydroureteronephrosis with a 0.5 x 0.7 x 0.7 cm left \par midureter left mid ureter calculus.\par 2. Additional multiple nonobstructing bilateral renal calculi.\par \par She has follow up pending with Dr. Davis \par \par CBC from 10/6/2022 in ED showed hgb of 16.6 with normal MCV\par \par \par \par She is pending  surgery with Dr. Guaman for DJD\par

## 2022-10-18 NOTE — REVIEW OF SYSTEMS
[Fatigue] : fatigue [Lower Ext Edema] : lower extremity edema [Negative] : Allergic/Immunologic [Fever] : no fever [Night Sweats] : no night sweats [Chest Pain] : no chest pain [Shortness Of Breath] : no shortness of breath [Easy Bleeding] : no tendency for easy bleeding [FreeTextEntry7] : occasional nausea [FreeTextEntry8] : urinary leakage sees Dr. Cobb [de-identified] : sciatica pain on lyrica

## 2022-10-18 NOTE — PHYSICAL EXAM
[Restricted in physically strenuous activity but ambulatory and able to carry out work of a light or sedentary nature] : Status 1- Restricted in physically strenuous activity but ambulatory and able to carry out work of a light or sedentary nature, e.g., light house work, office work [Normal] : affect appropriate [de-identified] : wears glasses [de-identified] : bilateral +1/2 lower extremity edema

## 2022-10-18 NOTE — END OF VISIT
[FreeTextEntry3] : I was physically present for the key portions of the evaluation and management service provided.  I agree with the history and physical, and plan which I have reviewed and edited where appropriate.\par \par She returns for follow-up.  She had a CAT scan in July that showed somewhat increase in the nodularity which is subpleural.  The etiology of these is unclear and she does not want to be aggressive and since this may be due to her lymphoma versus another etiology we will just continue with observation and plan for repeat CAT scan next month.  If there is significant change she will need a biopsy.  She did have a pelvic ultrasound which suggested the ovarian cyst is likely benign and repeat is recommended in 6 months.  In regards to her iron deficiency anemia we suggested she sees gastroenterology.  She did have IV iron in the past.  Most recent CBC from October did not demonstrate any anemia.  An MCV was normal.\par \par Will call with results of CAT scans and go from there

## 2022-10-21 DIAGNOSIS — R91.1 SOLITARY PULMONARY NODULE: ICD-10-CM

## 2022-10-21 DIAGNOSIS — C85.90 NON-HODGKIN LYMPHOMA, UNSPECIFIED, UNSPECIFIED SITE: ICD-10-CM

## 2022-10-21 DIAGNOSIS — D50.9 IRON DEFICIENCY ANEMIA, UNSPECIFIED: ICD-10-CM

## 2022-10-25 ENCOUNTER — LABORATORY RESULT (OUTPATIENT)
Age: 82
End: 2022-10-25

## 2022-10-27 NOTE — ASU PATIENT PROFILE, ADULT - FALL HARM RISK - RISK INTERVENTIONS

## 2022-10-28 ENCOUNTER — OUTPATIENT (OUTPATIENT)
Dept: OUTPATIENT SERVICES | Facility: HOSPITAL | Age: 82
LOS: 1 days | Discharge: HOME | End: 2022-10-28

## 2022-10-28 ENCOUNTER — TRANSCRIPTION ENCOUNTER (OUTPATIENT)
Age: 82
End: 2022-10-28

## 2022-10-28 ENCOUNTER — APPOINTMENT (OUTPATIENT)
Dept: NEUROSURGERY | Facility: HOSPITAL | Age: 82
End: 2022-10-28

## 2022-10-28 VITALS
RESPIRATION RATE: 17 BRPM | HEART RATE: 62 BPM | SYSTOLIC BLOOD PRESSURE: 105 MMHG | DIASTOLIC BLOOD PRESSURE: 57 MMHG | OXYGEN SATURATION: 96 %

## 2022-10-28 VITALS
WEIGHT: 160.94 LBS | OXYGEN SATURATION: 99 % | RESPIRATION RATE: 18 BRPM | DIASTOLIC BLOOD PRESSURE: 68 MMHG | HEIGHT: 63 IN | TEMPERATURE: 98 F | HEART RATE: 66 BPM | SYSTOLIC BLOOD PRESSURE: 137 MMHG

## 2022-10-28 DIAGNOSIS — Z98.890 OTHER SPECIFIED POSTPROCEDURAL STATES: Chronic | ICD-10-CM

## 2022-10-28 LAB — GLUCOSE BLDC GLUCOMTR-MCNC: 98 MG/DL — SIGNIFICANT CHANGE UP (ref 70–99)

## 2022-10-28 PROCEDURE — 63047 LAM FACETEC & FORAMOT LUMBAR: CPT | Mod: 62

## 2022-10-28 PROCEDURE — 63048 LAM FACETEC &FORAMOT EA ADDL: CPT | Mod: 62

## 2022-10-28 RX ORDER — APREPITANT 80 MG/1
40 CAPSULE ORAL ONCE
Refills: 0 | Status: COMPLETED | OUTPATIENT
Start: 2022-10-28 | End: 2022-10-28

## 2022-10-28 RX ORDER — DOCUSATE SODIUM 100 MG
1 CAPSULE ORAL
Qty: 21 | Refills: 0
Start: 2022-10-28 | End: 2022-11-03

## 2022-10-28 RX ORDER — ACETAMINOPHEN 500 MG
975 TABLET ORAL ONCE
Refills: 0 | Status: COMPLETED | OUTPATIENT
Start: 2022-10-28 | End: 2022-10-28

## 2022-10-28 RX ORDER — METHOCARBAMOL 500 MG/1
1 TABLET, FILM COATED ORAL
Qty: 28 | Refills: 0
Start: 2022-10-28 | End: 2022-11-03

## 2022-10-28 RX ORDER — ONDANSETRON 8 MG/1
4 TABLET, FILM COATED ORAL ONCE
Refills: 0 | Status: DISCONTINUED | OUTPATIENT
Start: 2022-10-28 | End: 2022-11-11

## 2022-10-28 RX ORDER — CELECOXIB 200 MG/1
200 CAPSULE ORAL ONCE
Refills: 0 | Status: COMPLETED | OUTPATIENT
Start: 2022-10-28 | End: 2022-10-28

## 2022-10-28 RX ORDER — GABAPENTIN 400 MG/1
300 CAPSULE ORAL ONCE
Refills: 0 | Status: COMPLETED | OUTPATIENT
Start: 2022-10-28 | End: 2022-10-28

## 2022-10-28 RX ORDER — SODIUM CHLORIDE 9 MG/ML
1000 INJECTION, SOLUTION INTRAVENOUS
Refills: 0 | Status: DISCONTINUED | OUTPATIENT
Start: 2022-10-28 | End: 2022-11-11

## 2022-10-28 RX ORDER — OXYCODONE AND ACETAMINOPHEN 5; 325 MG/1; MG/1
1 TABLET ORAL
Qty: 28 | Refills: 0
Start: 2022-10-28 | End: 2022-11-03

## 2022-10-28 RX ORDER — HYDROMORPHONE HYDROCHLORIDE 2 MG/ML
0.5 INJECTION INTRAMUSCULAR; INTRAVENOUS; SUBCUTANEOUS
Refills: 0 | Status: DISCONTINUED | OUTPATIENT
Start: 2022-10-28 | End: 2022-10-28

## 2022-10-28 RX ORDER — HYDROMORPHONE HYDROCHLORIDE 2 MG/ML
0.25 INJECTION INTRAMUSCULAR; INTRAVENOUS; SUBCUTANEOUS
Refills: 0 | Status: DISCONTINUED | OUTPATIENT
Start: 2022-10-28 | End: 2022-10-28

## 2022-10-28 RX ADMIN — GABAPENTIN 300 MILLIGRAM(S): 400 CAPSULE ORAL at 10:31

## 2022-10-28 RX ADMIN — Medication 975 MILLIGRAM(S): at 10:30

## 2022-10-28 RX ADMIN — APREPITANT 40 MILLIGRAM(S): 80 CAPSULE ORAL at 10:32

## 2022-10-28 RX ADMIN — CELECOXIB 200 MILLIGRAM(S): 200 CAPSULE ORAL at 10:31

## 2022-10-28 NOTE — ASU DISCHARGE PLAN (ADULT/PEDIATRIC) - NS MD DC FALL RISK RISK
For information on Fall & Injury Prevention, visit: [email or web address] OR  [email or web address] OR  https://www.cdc.gov/steadi/patient.html

## 2022-10-28 NOTE — BRIEF OPERATIVE NOTE - NSICDXBRIEFPREOP_GEN_ALL_CORE_FT
PRE-OP DIAGNOSIS:  Lumbar stenosis with neurogenic claudication 28-Oct-2022 15:29:03  Angelo Younger

## 2022-10-28 NOTE — ASU DISCHARGE PLAN (ADULT/PEDIATRIC) - CARE PROVIDER_API CALL
Laura Weeks)  Neurosurgery  [address], [address]  [place] [address]  Phone: [phone]  Fax: [phone]  Follow Up Time:

## 2022-10-28 NOTE — BRIEF OPERATIVE NOTE - NSICDXBRIEFPROCEDURE_GEN_ALL_CORE_FT
PROCEDURES:  Decompression, cauda equina 28-Oct-2022 14:21:52  Laura Weeks  
PROCEDURES:  Decompression, cauda equina 28-Oct-2022 14:21:52  Laura Weeks

## 2022-10-28 NOTE — BRIEF OPERATIVE NOTE - NSICDXBRIEFPOSTOP_GEN_ALL_CORE_FT
POST-OP DIAGNOSIS:  Lumbar stenosis with neurogenic claudication 28-Oct-2022 15:29:06  Angelo Younger

## 2022-10-28 NOTE — ASU PREOP CHECKLIST - HAIR REMOVAL
As detailed in HPI  Streaking areas of inflammation of skin overlying intrathecal Morphine pump Left lower quadrant, warmth to touch in comparison to other abdominal areas  Received 5 bolus doses per day, has momentary burning sensation in area every time he is due for bolus dose  Tenderness to touch left lower quadrant area overlying pain pump  Incision intact no incision dehiscence or , drainage  Denied fever , chills  Refer to attachment photo  Notify office immediately in changes in condition , inflammation appearance of incision worsens dehiscence, drainage, or fever 101 or higher          Plan;  ABX prescribed cephalexin x 10 days continues , Dr Pedro Apple recommend adding doxycycline    Drug interaction with doxycycline -stop calcium and multiviti w/ iron supplements while taking medication  RTO  in 2 weeks for reassess hair removal not indicated

## 2022-10-28 NOTE — CHART NOTE - NSCHARTNOTEFT_GEN_A_CORE
PACU ANESTHESIA ADMISSION NOTE      Procedure:   Post op diagnosis:      ____  Intubated  TV:______       Rate: ______      FiO2: ______    __x__  Patent Airway    _x___  Full return of protective reflexes    __x__  Full recovery from anesthesia / back to baseline status    Vitals:  temp(F) 97  /60  spo2 98  RR 17  pulse 64    Mental Status:  ___x _ Awake   _____ Alert   _____ Drowsy   _____ Sedated    Nausea/Vomiting:  ___x _ NO  ______Yes,   See Post - Op Orders          Pain Scale (0-10):  _____    Treatment: ____ None    __x __ See Post - Op/PCA Orders    Post - Operative Fluids:   ____ Oral   _x ___ See Post - Op Orders    Plan: Discharge:   __x __Home       ___Floor     _____Critical Care    _____  Other:_________________    Comments: uneventful anesthesia course no complications. Vitals stable. Pt transferred to PACU

## 2022-10-28 NOTE — ASU DISCHARGE PLAN (ADULT/PEDIATRIC) - ASU DC SPECIAL INSTRUCTIONSFT
- Upon discharge,  please call to schedule a follow up with Dr. Weeks in 1-2 weeks.  - Upon discharge, please call to make a follow up appointment with your primary care provider to discuss your recent hospitalization/operation.  - Keep dressings dry for 48 hours after which you may remove dressing and cleanse with soap and water in the shower (no scrubbing). Leave the steri strips (white tape) on your incisions, they will fall off on their own. Run water and soap over incision sites and pat dry (no scrubbing). No submerging your incision sites in water (i.e. no swimming or baths) for 2-3 weeks and avoid exposing the area to jets/streams of water.   - You can resume your normal activities as tolerated, but avoid heavy (>15lb.) lifting and strenuous exercise for 4-6 weeks.    - You were prescribed percocet (oxycodone-acetaminophen) for pain, take these only as needed.  Your pain should subside over the next few days.  While taking narcotic pain medications, you should not drive or operate heavy machinery. If taking with other medications containing acetaminophen (Tylenol), reduce your dose of percocet so that you  do not exceed 3000mg of acetaminophen per day.  - Narcotic pain medicine tends to cause constipation, you have been prescribed colace 3 times a day to help prevent that. Hold the colace if you start to have loose stools.  - If you experience fevers, chills, increasing abdominal pain, nausea, vomiting, inability to pass stool or gas, bleeding, or any other acute symptoms, please call your doctor and report to the emergency room immediately for further management.

## 2022-11-01 ENCOUNTER — TRANSCRIPTION ENCOUNTER (OUTPATIENT)
Age: 82
End: 2022-11-01

## 2022-11-01 ENCOUNTER — OUTPATIENT (OUTPATIENT)
Dept: OUTPATIENT SERVICES | Facility: HOSPITAL | Age: 82
LOS: 1 days | Discharge: HOME | End: 2022-11-01

## 2022-11-01 VITALS
RESPIRATION RATE: 17 BRPM | OXYGEN SATURATION: 96 % | SYSTOLIC BLOOD PRESSURE: 185 MMHG | DIASTOLIC BLOOD PRESSURE: 84 MMHG | HEART RATE: 86 BPM | TEMPERATURE: 99 F | WEIGHT: 164.02 LBS | HEIGHT: 63 IN

## 2022-11-01 VITALS — SYSTOLIC BLOOD PRESSURE: 167 MMHG | HEART RATE: 64 BPM | RESPIRATION RATE: 15 BRPM | DIASTOLIC BLOOD PRESSURE: 81 MMHG

## 2022-11-01 DIAGNOSIS — Z98.890 OTHER SPECIFIED POSTPROCEDURAL STATES: Chronic | ICD-10-CM

## 2022-11-01 DIAGNOSIS — N20.1 CALCULUS OF URETER: ICD-10-CM

## 2022-11-01 PROBLEM — C85.90 NON-HODGKIN LYMPHOMA, UNSPECIFIED, UNSPECIFIED SITE: Chronic | Status: ACTIVE | Noted: 2022-10-17

## 2022-11-01 PROBLEM — Z87.891 PERSONAL HISTORY OF NICOTINE DEPENDENCE: Chronic | Status: ACTIVE | Noted: 2022-10-17

## 2022-11-01 LAB
GLUCOSE BLDC GLUCOMTR-MCNC: 117 MG/DL — HIGH (ref 70–99)
GLUCOSE BLDC GLUCOMTR-MCNC: 96 MG/DL — SIGNIFICANT CHANGE UP (ref 70–99)

## 2022-11-01 PROCEDURE — 72170 X-RAY EXAM OF PELVIS: CPT | Mod: 26

## 2022-11-01 RX ORDER — HYDROMORPHONE HYDROCHLORIDE 2 MG/ML
0.5 INJECTION INTRAMUSCULAR; INTRAVENOUS; SUBCUTANEOUS
Refills: 0 | Status: DISCONTINUED | OUTPATIENT
Start: 2022-11-01 | End: 2022-11-01

## 2022-11-01 RX ORDER — SODIUM CHLORIDE 9 MG/ML
1000 INJECTION, SOLUTION INTRAVENOUS
Refills: 0 | Status: DISCONTINUED | OUTPATIENT
Start: 2022-11-01 | End: 2022-11-01

## 2022-11-01 RX ORDER — ONDANSETRON 8 MG/1
4 TABLET, FILM COATED ORAL ONCE
Refills: 0 | Status: DISCONTINUED | OUTPATIENT
Start: 2022-11-01 | End: 2022-11-01

## 2022-11-01 NOTE — ASU PATIENT PROFILE, ADULT - FALL HARM RISK - RISK INTERVENTIONS

## 2022-11-02 DIAGNOSIS — Z85.72 PERSONAL HISTORY OF NON-HODGKIN LYMPHOMAS: ICD-10-CM

## 2022-11-02 DIAGNOSIS — E11.9 TYPE 2 DIABETES MELLITUS WITHOUT COMPLICATIONS: ICD-10-CM

## 2022-11-02 DIAGNOSIS — Z85.118 PERSONAL HISTORY OF OTHER MALIGNANT NEOPLASM OF BRONCHUS AND LUNG: ICD-10-CM

## 2022-11-02 DIAGNOSIS — M43.17 SPONDYLOLISTHESIS, LUMBOSACRAL REGION: ICD-10-CM

## 2022-11-02 DIAGNOSIS — J44.9 CHRONIC OBSTRUCTIVE PULMONARY DISEASE, UNSPECIFIED: ICD-10-CM

## 2022-11-02 DIAGNOSIS — K21.9 GASTRO-ESOPHAGEAL REFLUX DISEASE WITHOUT ESOPHAGITIS: ICD-10-CM

## 2022-11-02 DIAGNOSIS — M54.17 RADICULOPATHY, LUMBOSACRAL REGION: ICD-10-CM

## 2022-11-02 DIAGNOSIS — E78.5 HYPERLIPIDEMIA, UNSPECIFIED: ICD-10-CM

## 2022-11-02 DIAGNOSIS — Z86.718 PERSONAL HISTORY OF OTHER VENOUS THROMBOSIS AND EMBOLISM: ICD-10-CM

## 2022-11-02 DIAGNOSIS — M48.07 SPINAL STENOSIS, LUMBOSACRAL REGION: ICD-10-CM

## 2022-11-02 DIAGNOSIS — Z79.85 LONG-TERM (CURRENT) USE OF INJECTABLE NON-INSULIN ANTIDIABETIC DRUGS: ICD-10-CM

## 2022-11-02 DIAGNOSIS — I10 ESSENTIAL (PRIMARY) HYPERTENSION: ICD-10-CM

## 2022-11-02 DIAGNOSIS — Z87.891 PERSONAL HISTORY OF NICOTINE DEPENDENCE: ICD-10-CM

## 2022-11-02 DIAGNOSIS — Z85.528 PERSONAL HISTORY OF OTHER MALIGNANT NEOPLASM OF KIDNEY: ICD-10-CM

## 2022-11-04 DIAGNOSIS — N20.1 CALCULUS OF URETER: ICD-10-CM

## 2022-11-04 DIAGNOSIS — Z86.718 PERSONAL HISTORY OF OTHER VENOUS THROMBOSIS AND EMBOLISM: ICD-10-CM

## 2022-11-04 DIAGNOSIS — Z87.891 PERSONAL HISTORY OF NICOTINE DEPENDENCE: ICD-10-CM

## 2022-11-04 DIAGNOSIS — Z85.118 PERSONAL HISTORY OF OTHER MALIGNANT NEOPLASM OF BRONCHUS AND LUNG: ICD-10-CM

## 2022-11-04 DIAGNOSIS — E78.5 HYPERLIPIDEMIA, UNSPECIFIED: ICD-10-CM

## 2022-11-04 DIAGNOSIS — Z85.850 PERSONAL HISTORY OF MALIGNANT NEOPLASM OF THYROID: ICD-10-CM

## 2022-11-04 DIAGNOSIS — Z85.72 PERSONAL HISTORY OF NON-HODGKIN LYMPHOMAS: ICD-10-CM

## 2022-11-04 DIAGNOSIS — K21.9 GASTRO-ESOPHAGEAL REFLUX DISEASE WITHOUT ESOPHAGITIS: ICD-10-CM

## 2022-11-04 DIAGNOSIS — E11.9 TYPE 2 DIABETES MELLITUS WITHOUT COMPLICATIONS: ICD-10-CM

## 2022-11-04 DIAGNOSIS — Z79.85 LONG-TERM (CURRENT) USE OF INJECTABLE NON-INSULIN ANTIDIABETIC DRUGS: ICD-10-CM

## 2022-11-07 ENCOUNTER — OUTPATIENT (OUTPATIENT)
Dept: OUTPATIENT SERVICES | Facility: HOSPITAL | Age: 82
LOS: 1 days | Discharge: HOME | End: 2022-11-07

## 2022-11-07 DIAGNOSIS — Z98.890 OTHER SPECIFIED POSTPROCEDURAL STATES: Chronic | ICD-10-CM

## 2022-11-07 DIAGNOSIS — N13.2 HYDRONEPHROSIS WITH RENAL AND URETERAL CALCULOUS OBSTRUCTION: ICD-10-CM

## 2022-11-07 PROCEDURE — 74019 RADEX ABDOMEN 2 VIEWS: CPT | Mod: 26

## 2022-11-08 ENCOUNTER — EMERGENCY (EMERGENCY)
Facility: HOSPITAL | Age: 82
LOS: 0 days | Discharge: HOME | End: 2022-11-08
Attending: STUDENT IN AN ORGANIZED HEALTH CARE EDUCATION/TRAINING PROGRAM | Admitting: STUDENT IN AN ORGANIZED HEALTH CARE EDUCATION/TRAINING PROGRAM

## 2022-11-08 VITALS
TEMPERATURE: 98 F | RESPIRATION RATE: 18 BRPM | HEIGHT: 63 IN | OXYGEN SATURATION: 93 % | SYSTOLIC BLOOD PRESSURE: 204 MMHG | WEIGHT: 171.08 LBS | DIASTOLIC BLOOD PRESSURE: 107 MMHG | HEART RATE: 71 BPM

## 2022-11-08 VITALS
DIASTOLIC BLOOD PRESSURE: 93 MMHG | RESPIRATION RATE: 18 BRPM | OXYGEN SATURATION: 99 % | HEART RATE: 66 BPM | SYSTOLIC BLOOD PRESSURE: 194 MMHG

## 2022-11-08 DIAGNOSIS — L03.116 CELLULITIS OF LEFT LOWER LIMB: ICD-10-CM

## 2022-11-08 DIAGNOSIS — Z87.442 PERSONAL HISTORY OF URINARY CALCULI: ICD-10-CM

## 2022-11-08 DIAGNOSIS — Z96.653 PRESENCE OF ARTIFICIAL KNEE JOINT, BILATERAL: ICD-10-CM

## 2022-11-08 DIAGNOSIS — M79.662 PAIN IN LEFT LOWER LEG: ICD-10-CM

## 2022-11-08 DIAGNOSIS — I10 ESSENTIAL (PRIMARY) HYPERTENSION: ICD-10-CM

## 2022-11-08 DIAGNOSIS — Z90.49 ACQUIRED ABSENCE OF OTHER SPECIFIED PARTS OF DIGESTIVE TRACT: ICD-10-CM

## 2022-11-08 DIAGNOSIS — Z98.890 OTHER SPECIFIED POSTPROCEDURAL STATES: Chronic | ICD-10-CM

## 2022-11-08 DIAGNOSIS — Z85.72 PERSONAL HISTORY OF NON-HODGKIN LYMPHOMAS: ICD-10-CM

## 2022-11-08 DIAGNOSIS — E78.5 HYPERLIPIDEMIA, UNSPECIFIED: ICD-10-CM

## 2022-11-08 DIAGNOSIS — Z85.850 PERSONAL HISTORY OF MALIGNANT NEOPLASM OF THYROID: ICD-10-CM

## 2022-11-08 DIAGNOSIS — E11.9 TYPE 2 DIABETES MELLITUS WITHOUT COMPLICATIONS: ICD-10-CM

## 2022-11-08 DIAGNOSIS — M79.89 OTHER SPECIFIED SOFT TISSUE DISORDERS: ICD-10-CM

## 2022-11-08 DIAGNOSIS — Z87.19 PERSONAL HISTORY OF OTHER DISEASES OF THE DIGESTIVE SYSTEM: ICD-10-CM

## 2022-11-08 DIAGNOSIS — Z98.42 CATARACT EXTRACTION STATUS, LEFT EYE: ICD-10-CM

## 2022-11-08 DIAGNOSIS — Z90.2 ACQUIRED ABSENCE OF LUNG [PART OF]: ICD-10-CM

## 2022-11-08 DIAGNOSIS — J44.9 CHRONIC OBSTRUCTIVE PULMONARY DISEASE, UNSPECIFIED: ICD-10-CM

## 2022-11-08 DIAGNOSIS — Z86.79 PERSONAL HISTORY OF OTHER DISEASES OF THE CIRCULATORY SYSTEM: ICD-10-CM

## 2022-11-08 DIAGNOSIS — Z90.5 ACQUIRED ABSENCE OF KIDNEY: ICD-10-CM

## 2022-11-08 DIAGNOSIS — Z87.891 PERSONAL HISTORY OF NICOTINE DEPENDENCE: ICD-10-CM

## 2022-11-08 DIAGNOSIS — Z02.9 ENCOUNTER FOR ADMINISTRATIVE EXAMINATIONS, UNSPECIFIED: ICD-10-CM

## 2022-11-08 DIAGNOSIS — K21.9 GASTRO-ESOPHAGEAL REFLUX DISEASE WITHOUT ESOPHAGITIS: ICD-10-CM

## 2022-11-08 DIAGNOSIS — Z85.528 PERSONAL HISTORY OF OTHER MALIGNANT NEOPLASM OF KIDNEY: ICD-10-CM

## 2022-11-08 DIAGNOSIS — Z98.41 CATARACT EXTRACTION STATUS, RIGHT EYE: ICD-10-CM

## 2022-11-08 DIAGNOSIS — G47.33 OBSTRUCTIVE SLEEP APNEA (ADULT) (PEDIATRIC): ICD-10-CM

## 2022-11-08 DIAGNOSIS — Z98.51 TUBAL LIGATION STATUS: ICD-10-CM

## 2022-11-08 PROCEDURE — 99284 EMERGENCY DEPT VISIT MOD MDM: CPT | Mod: FS

## 2022-11-08 PROCEDURE — 93970 EXTREMITY STUDY: CPT | Mod: 26

## 2022-11-08 RX ORDER — CEPHALEXIN 500 MG
1 CAPSULE ORAL
Qty: 40 | Refills: 0
Start: 2022-11-08 | End: 2022-11-17

## 2022-11-08 NOTE — ED PROVIDER NOTE - OBJECTIVE STATEMENT
82-year-old female past medical history of aaa, hypertension, dyslipidemia, diabetes, COPD, ARTIE, NHL in remission, kidney stones presents for evaluation of left lower extremity swelling.  Patient states she woke this morning with unilateral swelling of her left lower extremity and, unable to fit her foot into any of her shoe lines.  Endorses mild pressure pain, no aggravating or relieving factors.

## 2022-11-08 NOTE — ED PROVIDER NOTE - PATIENT PORTAL LINK FT
You can access the FollowMyHealth Patient Portal offered by [facility] by registering at the following website: [email or web address]. By joining [facility]’s FollowMyHealth portal, you will also be able to view your health information using other applications (apps) compatible with our system.

## 2022-11-08 NOTE — ED ADULT NURSE NOTE - NS ED NURSE DC INFO COMPLEXITY
Prescription electronically sent to pharmacy.       Simple: Patient demonstrates quick and easy understanding

## 2022-11-08 NOTE — ED PROVIDER NOTE - NS ED ROS FT
Constitutional: (-) fever  Eyes/ENT: (-) blurry vision, (-) epistaxis  Cardiovascular: (-) chest pain, (-) syncope  Respiratory: (-) cough, (-) shortness of breath  Gastrointestinal: (-) vomiting, (-) diarrhea  Musculoskeletal: (-) neck pain, (-) back pain, (-) joint pain  Integumentary: (+) LLE swelling, (-) rash  Neurological: (-) headache, (-) altered mental status  Psychiatric: (-) hallucinations  Allergic/Immunologic: (-) pruritus

## 2022-11-08 NOTE — ED PROVIDER NOTE - CLINICAL SUMMARY MEDICAL DECISION MAKING FREE TEXT BOX
82-year-old female presents today with lower leg pain.  Patient is clinically evident to have a cellulitis.  Patient has an ultrasound performed that is negative for DVT.  Patient is well-appearing, nontoxic in appearance, has stable vitals and is afebrile.  Patient is started on antibiotics and discharged with close follow-up with PMD.  Return precautions provided.

## 2022-11-08 NOTE — ED PROVIDER NOTE - PHYSICAL EXAMINATION
CONST: Well appearing in NAD  EYES: Sclera and conjunctiva clear.   ENT: No nasal discharge. Oropharynx normal appearing.   NECK: Non-tender, no meningeal signs. normal ROM. supple   CARD: S1 S2; No jvd  RESP: Equal BS B/L, No wheezes, rhonchi or rales. No distress  GI: Soft, non-tender, non-distended. no cva tenderness. normal BS  MS: LLE pitting edema. Normal ROM in all extremities. pulses 2 +.   SKIN: Warm, dry, no acute rashes. Good turgor  NEURO: A&Ox3, No focal deficits. Strength 5/5 with no sensory deficits. Steady gait

## 2022-11-11 ENCOUNTER — NON-APPOINTMENT (OUTPATIENT)
Age: 82
End: 2022-11-11

## 2022-11-11 ENCOUNTER — INPATIENT (INPATIENT)
Facility: HOSPITAL | Age: 82
LOS: 1 days | Discharge: HOME | End: 2022-11-13
Attending: INTERNAL MEDICINE | Admitting: INTERNAL MEDICINE

## 2022-11-11 VITALS
SYSTOLIC BLOOD PRESSURE: 202 MMHG | RESPIRATION RATE: 17 BRPM | WEIGHT: 167.99 LBS | DIASTOLIC BLOOD PRESSURE: 82 MMHG | TEMPERATURE: 97 F | HEIGHT: 63 IN | HEART RATE: 88 BPM | OXYGEN SATURATION: 98 %

## 2022-11-11 DIAGNOSIS — Z98.890 OTHER SPECIFIED POSTPROCEDURAL STATES: Chronic | ICD-10-CM

## 2022-11-11 LAB
ALBUMIN SERPL ELPH-MCNC: 4 G/DL — SIGNIFICANT CHANGE UP (ref 3.5–5.2)
ALP SERPL-CCNC: 105 U/L — SIGNIFICANT CHANGE UP (ref 30–115)
ALT FLD-CCNC: 16 U/L — SIGNIFICANT CHANGE UP (ref 0–41)
ANION GAP SERPL CALC-SCNC: 13 MMOL/L — SIGNIFICANT CHANGE UP (ref 7–14)
APTT BLD: 32.4 SEC — SIGNIFICANT CHANGE UP (ref 27–39.2)
AST SERPL-CCNC: 20 U/L — SIGNIFICANT CHANGE UP (ref 0–41)
BASOPHILS # BLD AUTO: 0.07 K/UL — SIGNIFICANT CHANGE UP (ref 0–0.2)
BASOPHILS NFR BLD AUTO: 0.8 % — SIGNIFICANT CHANGE UP (ref 0–1)
BILIRUB SERPL-MCNC: 0.4 MG/DL — SIGNIFICANT CHANGE UP (ref 0.2–1.2)
BUN SERPL-MCNC: 19 MG/DL — SIGNIFICANT CHANGE UP (ref 10–20)
CALCIUM SERPL-MCNC: 10.4 MG/DL — SIGNIFICANT CHANGE UP (ref 8.4–10.5)
CHLORIDE SERPL-SCNC: 103 MMOL/L — SIGNIFICANT CHANGE UP (ref 98–110)
CO2 SERPL-SCNC: 24 MMOL/L — SIGNIFICANT CHANGE UP (ref 17–32)
CREAT SERPL-MCNC: 0.7 MG/DL — SIGNIFICANT CHANGE UP (ref 0.7–1.5)
EGFR: 87 ML/MIN/1.73M2 — SIGNIFICANT CHANGE UP
EOSINOPHIL # BLD AUTO: 0.26 K/UL — SIGNIFICANT CHANGE UP (ref 0–0.7)
EOSINOPHIL NFR BLD AUTO: 3 % — SIGNIFICANT CHANGE UP (ref 0–8)
GLUCOSE SERPL-MCNC: 77 MG/DL — SIGNIFICANT CHANGE UP (ref 70–99)
HCT VFR BLD CALC: 41.1 % — SIGNIFICANT CHANGE UP (ref 37–47)
HGB BLD-MCNC: 13.6 G/DL — SIGNIFICANT CHANGE UP (ref 12–16)
IMM GRANULOCYTES NFR BLD AUTO: 0.4 % — HIGH (ref 0.1–0.3)
INR BLD: 0.99 RATIO — SIGNIFICANT CHANGE UP (ref 0.65–1.3)
LYMPHOCYTES # BLD AUTO: 2.16 K/UL — SIGNIFICANT CHANGE UP (ref 1.2–3.4)
LYMPHOCYTES # BLD AUTO: 25.3 % — SIGNIFICANT CHANGE UP (ref 20.5–51.1)
MAGNESIUM SERPL-MCNC: 1.8 MG/DL — SIGNIFICANT CHANGE UP (ref 1.8–2.4)
MCHC RBC-ENTMCNC: 29.8 PG — SIGNIFICANT CHANGE UP (ref 27–31)
MCHC RBC-ENTMCNC: 33.1 G/DL — SIGNIFICANT CHANGE UP (ref 32–37)
MCV RBC AUTO: 89.9 FL — SIGNIFICANT CHANGE UP (ref 81–99)
MONOCYTES # BLD AUTO: 0.76 K/UL — HIGH (ref 0.1–0.6)
MONOCYTES NFR BLD AUTO: 8.9 % — SIGNIFICANT CHANGE UP (ref 1.7–9.3)
NEUTROPHILS # BLD AUTO: 5.26 K/UL — SIGNIFICANT CHANGE UP (ref 1.4–6.5)
NEUTROPHILS NFR BLD AUTO: 61.6 % — SIGNIFICANT CHANGE UP (ref 42.2–75.2)
NRBC # BLD: 0 /100 WBCS — SIGNIFICANT CHANGE UP (ref 0–0)
NT-PROBNP SERPL-SCNC: 431 PG/ML — HIGH (ref 0–300)
PLATELET # BLD AUTO: 249 K/UL — SIGNIFICANT CHANGE UP (ref 130–400)
POTASSIUM SERPL-MCNC: 4.1 MMOL/L — SIGNIFICANT CHANGE UP (ref 3.5–5)
POTASSIUM SERPL-SCNC: 4.1 MMOL/L — SIGNIFICANT CHANGE UP (ref 3.5–5)
PROT SERPL-MCNC: 6.3 G/DL — SIGNIFICANT CHANGE UP (ref 6–8)
PROTHROM AB SERPL-ACNC: 11.3 SEC — SIGNIFICANT CHANGE UP (ref 9.95–12.87)
RBC # BLD: 4.57 M/UL — SIGNIFICANT CHANGE UP (ref 4.2–5.4)
RBC # FLD: 13.5 % — SIGNIFICANT CHANGE UP (ref 11.5–14.5)
SARS-COV-2 RNA SPEC QL NAA+PROBE: SIGNIFICANT CHANGE UP
SODIUM SERPL-SCNC: 140 MMOL/L — SIGNIFICANT CHANGE UP (ref 135–146)
WBC # BLD: 8.54 K/UL — SIGNIFICANT CHANGE UP (ref 4.8–10.8)
WBC # FLD AUTO: 8.54 K/UL — SIGNIFICANT CHANGE UP (ref 4.8–10.8)

## 2022-11-11 PROCEDURE — 93010 ELECTROCARDIOGRAM REPORT: CPT

## 2022-11-11 PROCEDURE — 99285 EMERGENCY DEPT VISIT HI MDM: CPT | Mod: FS

## 2022-11-11 PROCEDURE — 99221 1ST HOSP IP/OBS SF/LOW 40: CPT | Mod: AI

## 2022-11-11 RX ORDER — ALBUTEROL 90 UG/1
2 AEROSOL, METERED ORAL EVERY 6 HOURS
Refills: 0 | Status: DISCONTINUED | OUTPATIENT
Start: 2022-11-11 | End: 2022-11-13

## 2022-11-11 RX ORDER — AMLODIPINE BESYLATE 2.5 MG/1
2.5 TABLET ORAL AT BEDTIME
Refills: 0 | Status: DISCONTINUED | OUTPATIENT
Start: 2022-11-11 | End: 2022-11-13

## 2022-11-11 RX ORDER — OXYCODONE AND ACETAMINOPHEN 5; 325 MG/1; MG/1
1 TABLET ORAL EVERY 4 HOURS
Refills: 0 | Status: DISCONTINUED | OUTPATIENT
Start: 2022-11-11 | End: 2022-11-11

## 2022-11-11 RX ORDER — PANTOPRAZOLE SODIUM 20 MG/1
40 TABLET, DELAYED RELEASE ORAL
Refills: 0 | Status: DISCONTINUED | OUTPATIENT
Start: 2022-11-11 | End: 2022-11-13

## 2022-11-11 RX ORDER — OXYCODONE AND ACETAMINOPHEN 5; 325 MG/1; MG/1
1 TABLET ORAL EVERY 6 HOURS
Refills: 0 | Status: DISCONTINUED | OUTPATIENT
Start: 2022-11-11 | End: 2022-11-13

## 2022-11-11 RX ORDER — ENOXAPARIN SODIUM 100 MG/ML
40 INJECTION SUBCUTANEOUS EVERY 24 HOURS
Refills: 0 | Status: DISCONTINUED | OUTPATIENT
Start: 2022-11-12 | End: 2022-11-13

## 2022-11-11 RX ORDER — METOPROLOL TARTRATE 50 MG
50 TABLET ORAL DAILY
Refills: 0 | Status: DISCONTINUED | OUTPATIENT
Start: 2022-11-11 | End: 2022-11-13

## 2022-11-11 RX ORDER — OXYBUTYNIN CHLORIDE 5 MG
5 TABLET ORAL
Refills: 0 | Status: DISCONTINUED | OUTPATIENT
Start: 2022-11-11 | End: 2022-11-13

## 2022-11-11 RX ORDER — CEFAZOLIN SODIUM 1 G
2000 VIAL (EA) INJECTION ONCE
Refills: 0 | Status: COMPLETED | OUTPATIENT
Start: 2022-11-11 | End: 2022-11-11

## 2022-11-11 RX ORDER — METOPROLOL TARTRATE 50 MG
50 TABLET ORAL ONCE
Refills: 0 | Status: COMPLETED | OUTPATIENT
Start: 2022-11-11 | End: 2022-11-11

## 2022-11-11 RX ORDER — CEFAZOLIN SODIUM 1 G
1000 VIAL (EA) INJECTION EVERY 8 HOURS
Refills: 0 | Status: DISCONTINUED | OUTPATIENT
Start: 2022-11-11 | End: 2022-11-13

## 2022-11-11 RX ORDER — LOSARTAN POTASSIUM 100 MG/1
100 TABLET, FILM COATED ORAL DAILY
Refills: 0 | Status: DISCONTINUED | OUTPATIENT
Start: 2022-11-11 | End: 2022-11-13

## 2022-11-11 RX ADMIN — Medication 100 MILLIGRAM(S): at 21:57

## 2022-11-11 RX ADMIN — Medication 100 MILLIGRAM(S): at 19:35

## 2022-11-11 RX ADMIN — Medication 5 MILLIGRAM(S): at 21:59

## 2022-11-11 RX ADMIN — AMLODIPINE BESYLATE 2.5 MILLIGRAM(S): 2.5 TABLET ORAL at 21:58

## 2022-11-11 RX ADMIN — Medication 50 MILLIGRAM(S): at 19:04

## 2022-11-11 RX ADMIN — LOSARTAN POTASSIUM 100 MILLIGRAM(S): 100 TABLET, FILM COATED ORAL at 21:58

## 2022-11-11 RX ADMIN — Medication 50 MILLIGRAM(S): at 21:58

## 2022-11-11 NOTE — PATIENT PROFILE ADULT - STATED REASON FOR ADMISSION
Here Three days ago for pain in legs, Back Sx done on 10/28, on 11/8 woke up with sever swelling on Left leg. Pt went to ED  with sent home with treatment for cellulitis, Returned to ED today on 11/11 due to worsening symptoms despite home treatment.  c/o pain when walking.

## 2022-11-11 NOTE — ED PROVIDER NOTE - CLINICAL SUMMARY MEDICAL DECISION MAKING FREE TEXT BOX
82-year-old female past medical history of spinal surgery 1 November presents with left-sided leg foot swelling for 3 days not improved after Keflex ultrasound negative for DVT.  Well appearing, NAD, non toxic. NCAT PERRLA EOMI neck supple non tender normal wob cta bl rrr abdomen s nt nd no rebound no guarding WWPx4 neuro non focal, left-sided edema with erythema, right-sided leg erythema.  Labs IV antibiotics admit for failure of outpatient antibiotics

## 2022-11-11 NOTE — H&P ADULT - HISTORY OF PRESENT ILLNESS
83yo female with recent kidney stone and later back surgeries and who was recently started on oral antibiotics for apparent leg cellulitis, presents to ER with worsening pain to leg. Pain reaches up to 10/10 and is sometimes so bad that she can't walk  81yo female with recent kidney stone and later back surgeries and who was recently started on oral antibiotics for apparent leg cellulitis, presents to ER with worsening pain to leg. Pain reaches up to 10/10 and is sometimes so bad that she can't walk. She describe nature of pain as if the leg was being "torn apart".  81yo female with recent kidney stone and later back surgeries and who was recently started on oral antibiotics for apparent leg cellulitis, presents to ER with worsening pain to leg. Pain reaches up to 10/10 and is sometimes so bad that she can't walk. She describe nature of pain as if the leg was being "torn apart". Denies fever. 81yo female with recent kidney stone and later back surgeries and who was recently started on oral antibiotics for apparent leg cellulitis, presents to ER with worsening pain to leg. Pain reaches up to 10/10 and is sometimes so bad that she can't walk. She describe nature of pain as if the leg was being "torn apart". Denies fever and recent sonogram did not show any leg clots

## 2022-11-11 NOTE — PATIENT PROFILE ADULT - FALL HARM RISK - HARM RISK INTERVENTIONS

## 2022-11-11 NOTE — ED PROVIDER NOTE - OBJECTIVE STATEMENT
Patient c/o left leg/foot swelling for past 3 days, Had back surgery last week, Seen in ED 2 days ago, sono neg for DVT, Placed in keflex for redness, and possible infection, Swelling has not improved redness, increasing, and pain worse today, no chest pain, no SOB, no fever

## 2022-11-11 NOTE — H&P ADULT - ASSESSMENT
cellulitis     DM cellulitis to leg (failed out patient treatment)     DM - on Trulicity but eats regular food     recent back surgery    recent kidney stone surgery  cellulitis to leg (failed out patient treatment)     DM - on Trulicity but eats regular food     recent back surgery    recent kidney stone surgery     NHL    HTN    HLD    GERD  cellulitis to leg (failed out patient treatment)  - for now continue Ancef started in ER with ID consult     DM - on Trulicity but eats regular food - monitor FSBS    recent back surgery    recent kidney stone surgery     NHL    HTN    HLD    GERD  cellulitis to leg (failed out patient treatment)  - for now continue Ancef started in ER with ID consult     DM - on Trulicity but eats regular food - monitor FSBS    recent back surgery - noted    recent kidney stone surgery - noted     NHL - noted    HTN - monitor on current meds    HLD - monitor     GERD - monitor on current treatment  cellulitis to leg (failed out patient treatment)  - for now continue Ancef started in ER with ID consult (Addendum 11/12/2022 at 2036 - to me sepsis is not present on admission)     DM - on Trulicity but eats regular food - monitor FSBS    recent back surgery - noted    recent kidney stone surgery - noted     NHL - noted    HTN - monitor on current meds    HLD - monitor     GERD - monitor on current treatment

## 2022-11-11 NOTE — PATIENT PROFILE ADULT - NSPROEXTENSIONSOFSELF_GEN_A_NUR
After Knee replacement sx used walker, down graded to cane before current symptoms began./walker After Knee replacement sx used walker, down graded to cane before current symptoms began./cane/eyeglasses/walker

## 2022-11-11 NOTE — H&P ADULT - NSHPLABSRESULTS_GEN_ALL_CORE
13.6   8.54  )-----------( 249      ( 11 Nov 2022 17:10 )             41.1     11-11    140  |  103  |  19  ----------------------------<  77  4.1   |  24  |  0.7    Ca    10.4      11 Nov 2022 17:10  Mg     1.8     11-11    TPro  6.3  /  Alb  4.0  /  TBili  0.4  /  DBili  x   /  AST  20  /  ALT  16  /  AlkPhos  105  11-11            PT/INR - ( 11 Nov 2022 17:10 )   PT: 11.30 sec;   INR: 0.99 ratio         PTT - ( 11 Nov 2022 17:10 )  PTT:32.4 sec  Lactate Trend        CAPILLARY BLOOD GLUCOSE        Culture Results:   No growth (10-17 @ 16:42)

## 2022-11-12 LAB
ALBUMIN SERPL ELPH-MCNC: 3.7 G/DL — SIGNIFICANT CHANGE UP (ref 3.5–5.2)
ALP SERPL-CCNC: 105 U/L — SIGNIFICANT CHANGE UP (ref 30–115)
ALT FLD-CCNC: 14 U/L — SIGNIFICANT CHANGE UP (ref 0–41)
ANION GAP SERPL CALC-SCNC: 9 MMOL/L — SIGNIFICANT CHANGE UP (ref 7–14)
AST SERPL-CCNC: 16 U/L — SIGNIFICANT CHANGE UP (ref 0–41)
BILIRUB SERPL-MCNC: 0.5 MG/DL — SIGNIFICANT CHANGE UP (ref 0.2–1.2)
BUN SERPL-MCNC: 13 MG/DL — SIGNIFICANT CHANGE UP (ref 10–20)
CALCIUM SERPL-MCNC: 10.1 MG/DL — SIGNIFICANT CHANGE UP (ref 8.4–10.5)
CHLORIDE SERPL-SCNC: 102 MMOL/L — SIGNIFICANT CHANGE UP (ref 98–110)
CO2 SERPL-SCNC: 27 MMOL/L — SIGNIFICANT CHANGE UP (ref 17–32)
CREAT SERPL-MCNC: 0.6 MG/DL — LOW (ref 0.7–1.5)
EGFR: 90 ML/MIN/1.73M2 — SIGNIFICANT CHANGE UP
GLUCOSE SERPL-MCNC: 91 MG/DL — SIGNIFICANT CHANGE UP (ref 70–99)
HCT VFR BLD CALC: 40.3 % — SIGNIFICANT CHANGE UP (ref 37–47)
HGB BLD-MCNC: 13.2 G/DL — SIGNIFICANT CHANGE UP (ref 12–16)
MCHC RBC-ENTMCNC: 29.4 PG — SIGNIFICANT CHANGE UP (ref 27–31)
MCHC RBC-ENTMCNC: 32.8 G/DL — SIGNIFICANT CHANGE UP (ref 32–37)
MCV RBC AUTO: 89.8 FL — SIGNIFICANT CHANGE UP (ref 81–99)
NRBC # BLD: 0 /100 WBCS — SIGNIFICANT CHANGE UP (ref 0–0)
PLATELET # BLD AUTO: 223 K/UL — SIGNIFICANT CHANGE UP (ref 130–400)
POTASSIUM SERPL-MCNC: 4.6 MMOL/L — SIGNIFICANT CHANGE UP (ref 3.5–5)
POTASSIUM SERPL-SCNC: 4.6 MMOL/L — SIGNIFICANT CHANGE UP (ref 3.5–5)
PROT SERPL-MCNC: 5.9 G/DL — LOW (ref 6–8)
RBC # BLD: 4.49 M/UL — SIGNIFICANT CHANGE UP (ref 4.2–5.4)
RBC # FLD: 13.4 % — SIGNIFICANT CHANGE UP (ref 11.5–14.5)
SODIUM SERPL-SCNC: 138 MMOL/L — SIGNIFICANT CHANGE UP (ref 135–146)
WBC # BLD: 6.46 K/UL — SIGNIFICANT CHANGE UP (ref 4.8–10.8)
WBC # FLD AUTO: 6.46 K/UL — SIGNIFICANT CHANGE UP (ref 4.8–10.8)

## 2022-11-12 PROCEDURE — 99232 SBSQ HOSP IP/OBS MODERATE 35: CPT

## 2022-11-12 RX ADMIN — LOSARTAN POTASSIUM 100 MILLIGRAM(S): 100 TABLET, FILM COATED ORAL at 12:06

## 2022-11-12 RX ADMIN — PANTOPRAZOLE SODIUM 40 MILLIGRAM(S): 20 TABLET, DELAYED RELEASE ORAL at 06:21

## 2022-11-12 RX ADMIN — AMLODIPINE BESYLATE 2.5 MILLIGRAM(S): 2.5 TABLET ORAL at 22:27

## 2022-11-12 RX ADMIN — Medication 5 MILLIGRAM(S): at 06:21

## 2022-11-12 RX ADMIN — Medication 100 MILLIGRAM(S): at 22:26

## 2022-11-12 RX ADMIN — Medication 50 MILLIGRAM(S): at 12:05

## 2022-11-12 RX ADMIN — Medication 100 MILLIGRAM(S): at 06:21

## 2022-11-12 RX ADMIN — ENOXAPARIN SODIUM 40 MILLIGRAM(S): 100 INJECTION SUBCUTANEOUS at 06:20

## 2022-11-12 RX ADMIN — OXYCODONE AND ACETAMINOPHEN 1 TABLET(S): 5; 325 TABLET ORAL at 22:03

## 2022-11-12 RX ADMIN — Medication 5 MILLIGRAM(S): at 17:29

## 2022-11-12 RX ADMIN — Medication 100 MILLIGRAM(S): at 14:33

## 2022-11-12 RX ADMIN — OXYCODONE AND ACETAMINOPHEN 1 TABLET(S): 5; 325 TABLET ORAL at 20:42

## 2022-11-12 NOTE — CONSULT NOTE ADULT - SUBJECTIVE AND OBJECTIVE BOX
CARLIN DIAZMARIE  82y, Female  Allergy: No Known Allergies      CHIEF COMPLAINT:       LOS  1d    HPI  HPI:  81yo female with recent kidney stone and later back surgeries and who was recently started on oral antibiotics for apparent leg cellulitis, presents to ER with worsening pain to leg. Pain reaches up to 10/10 and is sometimes so bad that she can't walk. She describe nature of pain as if the leg was being "torn apart". Denies fever and recent sonogram did not show any leg clots (11 Nov 2022 19:26)      INFECTIOUS DISEASE HISTORY:  ID consulted for cellulitis   Reports had back surgery, then had a L kidney stone, now LLE pain and redness  Took PO keflex x 3 days    Currently ordered for:  ceFAZolin   IVPB 1000 milliGRAM(s) IV Intermittent every 8 hours      PMH  PAST MEDICAL & SURGICAL HISTORY:  Hypertension, unspecified type      Type 2 diabetes mellitus with complication, without long-term current use of insulin      Hyperlipidemia, unspecified hyperlipidemia type      Diverticulitis      Obesity      COPD (chronic obstructive pulmonary disease)      SOB (shortness of breath)      GERD (gastroesophageal reflux disease)      Lung cancer      ARTIE (obstructive sleep apnea)  NO CPAP MACHINE PER PT.      Cancer of kidney      Cancer of thyroid      History of abdominal aortic aneurysm      Former smoker      NHL (non-Hodgkin&#x27;s lymphoma)  &quot;low grade&quot; per heme/ onc note      History of surgery  LEFT LOWER LOBECTOMY      History of surgery  BILATERAL KNEE REPLACEMENT      History of surgery  CATARACT RIGHT EYE      History of surgery  TUBAL LIGATION      History of surgery  CYST REMOVED  RIGHT BREAST      History of surgery  CHOLECYSTECOMY      History of surgery  RIGHT PARTIAL NEPHRECTOMY      History of surgery  BILATERAL CATARACT SURGERY          FAMILY HISTORY  Family history of dementia (Mother)    Family history of cancer (Father, Grandparent)        SOCIAL HISTORY  Social History:  former smoker, no alcohol use (11 Nov 2022 19:26)        ROS  General: Denies rigors, nightsweats  HEENT: Denies headache, rhinorrhea, sore throat, eye pain  CV: Denies CP, palpitations  PULM: Denies wheezing, hemoptysis  GI: Denies hematemesis, hematochezia, melena  : Denies discharge, hematuria  MSK: Denies arthralgias, myalgias  SKIN: as noted above   NEURO: Denies paresthesias, weakness  PSYCH: Denies depression, anxiety     VITALS:  T(F): 98.1, Max: 98.1 (11-12-22 @ 05:00)  HR: 65  BP: 155/75  RR: 18Vital Signs Last 24 Hrs  T(C): 36.7 (12 Nov 2022 05:00), Max: 36.7 (11 Nov 2022 20:57)  T(F): 98.1 (12 Nov 2022 05:00), Max: 98.1 (12 Nov 2022 05:00)  HR: 65 (12 Nov 2022 05:00) (65 - 88)  BP: 155/75 (12 Nov 2022 05:00) (120/59 - 202/82)  BP(mean): --  RR: 18 (12 Nov 2022 05:00) (17 - 18)  SpO2: 98% (11 Nov 2022 19:45) (98% - 98%)    Parameters below as of 11 Nov 2022 19:45  Patient On (Oxygen Delivery Method): room air        PHYSICAL EXAM:  Gen: NAD, resting in bed  HEENT: Normocephalic, atraumatic  Neck: supple, no lymphadenopathy  CV: Regular rate & regular rhythm  Lungs: decreased BS at bases, no fremitus  Abdomen: Soft, BS present  Ext: Warm, well perfused  Neuro: non focal, awake  Skin: b/l LE erythema L> R  Lines: no phlebitis     TESTS & MEASUREMENTS:                        13.2   6.46  )-----------( 223      ( 12 Nov 2022 07:54 )             40.3     11-11    140  |  103  |  19  ----------------------------<  77  4.1   |  24  |  0.7    Ca    10.4      11 Nov 2022 17:10  Mg     1.8     11-11    TPro  6.3  /  Alb  4.0  /  TBili  0.4  /  DBili  x   /  AST  20  /  ALT  16  /  AlkPhos  105  11-11      LIVER FUNCTIONS - ( 11 Nov 2022 17:10 )  Alb: 4.0 g/dL / Pro: 6.3 g/dL / ALK PHOS: 105 U/L / ALT: 16 U/L / AST: 20 U/L / GGT: x               Culture - Urine (collected 10-17-22 @ 16:42)  Source: Clean Catch Clean Catch (Midstream)  Final Report (10-18-22 @ 21:56):    No growth            INFECTIOUS DISEASES TESTING  COVID-19 PCR: NotDetec (11-11-22 @ 17:10)  MRSA PCR Result.: Positive (10-17-22 @ 16:42)  COVID-19 PCR: NotDetec (10-06-22 @ 15:22)      INFLAMMATORY MARKERS      RADIOLOGY & ADDITIONAL TESTS:  I have personally reviewed the last Chest xray  CXR      CT      CARDIOLOGY TESTING  12 Lead ECG:   Ventricular Rate 66 BPM    Atrial Rate 66 BPM    P-R Interval 298 ms    QRS Duration 138 ms    Q-T Interval 450 ms    QTC Calculation(Bazett) 471 ms    P Axis 77 degrees    R Axis -54 degrees    T Axis -13 degrees    Diagnosis Line Sinus rhythm with 1st degree A-V block  Right bundle branch block  Left anterior fascicular block  *** Bifascicular block ***  Minimal voltage criteria for LVH, may be normal variant  Septal infarct , age undetermined  Abnormal ECG    Confirmed by BARRIE VANG MD ([id number]) on 11/12/2022 8:37:25 AM (11-11-22 @ 17:25)      MEDICATIONS  amLODIPine   Tablet 2.5 Oral at bedtime  ceFAZolin   IVPB 1000 IV Intermittent every 8 hours  enoxaparin Injectable 40 SubCutaneous every 24 hours  losartan 100 Oral daily  metoprolol succinate ER 50 Oral daily  oxybutynin 5 Oral two times a day  pantoprazole    Tablet 40 Oral before breakfast        ANTIBIOTICS:  ceFAZolin   IVPB 1000 milliGRAM(s) IV Intermittent every 8 hours      ALLERGIES:  No Known Allergies

## 2022-11-12 NOTE — PROGRESS NOTE ADULT - ASSESSMENT
ASSESSMENT AND PLAN   PRINCIPAL PROBLEM  LLE cellulitis failed OP abx, ruled out sepsis  - cefazolin IV   - ID following   - prn pain meds   - consider dc on augmentin and doxycycline   - c/b DM   - MRSA PCR +ve, doubt MRSA cause of infection   - BLOOD cx pending        ACTIVE PROBLEMS  Type 2 diabetes mellitus with complication  -SSI ac hs     CHRONIC PROBLEMS  Hypertension, unspecified type  - cont norvasc, metoprolol and losartan     Hyperlipidemia, unspecified hyperlipidemia type  - cont statin     COPD (chronic obstructive pulmonary disease)  -ex smoker   - prn MDI     GERD (gastroesophageal reflux disease)  - PPI     ARTIE (obstructive sleep apnea)  NO CPAP MACHINE PER PT.    hx/o lung cancer/ hx/o RIGHT PARTIAL/hx/o NHL  NEPHRECTOMYCancer of R kidney/Cancer of thyroid/   - RIGHT PARTIAL NEPHRECTOMY  - RIGHT PARTIAL NEPHRECTOMY    History of abdominal aortic aneurysm  - stable, cont bp meds, optimize bp meds    BILATERAL KNEE REPLACEMENT  TUBAL LIGATION  CYST REMOVED  RIGHT BREAST  CHOLECYSTECOMY  BILATERAL CATARACT SURGERY    DVT Px  - Lovenox     Full code

## 2022-11-12 NOTE — CONSULT NOTE ADULT - ASSESSMENT
ASSESSMENT  82y F HTN, DM, COPD, Lung ca, NHL, thyroid ca, ARTIE, recent back surgery admitted with CELLULITIS      IMPRESSION  #LLE cellulitis , non-purulent    outpatient keflex 3 day    MRSA PCR +  #Sepsis ruled out on admission   #Obesity BMI (kg/m2): 29.8, 30.3, 29.1, 28.5  #DM   #Ureteroscopy with laser lithotripsy 01-Nov-2022 12:23:42 left Prosper Cobb.    Creatinine, Serum: 0.7 (11-11-22 @ 17:10)    Weight (kg): 76.2 (11-11-22 @ 16:52)    RECOMMENDATIONS  - Appears to be clinically improving on ceFAZolin   IVPB 1000 milliGRAM(s) IV Intermittent every 8 hours (though was on PO keflex as outpatient- pt states was adherent to prescribed regimen ), once improving can d/c on keflex + doxy 100mg BID to complete 7 days    If any questions, please call or send a message on [employer] Teams  Please continue to update ID with any pertinent new laboratory or radiographic findings  #[id number]

## 2022-11-12 NOTE — PROGRESS NOTE ADULT - SUBJECTIVE AND OBJECTIVE BOX
FERMIN [id number]    Subjective/Interval History   LLE distal erythema, edema, pain improving    ROS  -denies fever, chills, or chest pain, or abdominal pain     PHYSICAL EXAM  Vital Signs Last 24 Hrs  T(C): 36.1 (12 Nov 2022 14:30), Max: 36.7 (11 Nov 2022 20:57)  T(F): 97 (12 Nov 2022 14:30), Max: 98.1 (12 Nov 2022 05:00)  HR: 62 (12 Nov 2022 14:30) (62 - 68)  BP: 156/70 (12 Nov 2022 14:30) (120/59 - 197/88)  BP(mean): --  RR: 18 (12 Nov 2022 14:30) (18 - 18)  SpO2: 98% (11 Nov 2022 19:45) (98% - 98%)    Parameters below as of 12 Nov 2022 14:30  Patient On (Oxygen Delivery Method): room air    GA : AAOX3, NAD   HEENT: PERRLA, EOMI  NECK: no JVD, no thyromegaly   CVS: S1 S2 no murmur no rubs no gallop  RESP: CTAB no wheeze, no rhonchi no rales  ABD: Soft, NT, ND, tympanic, no rebound or guarding   : No Reynoso, No CVA tenderness   EXT; +2 LLE pedal edema trace RLE edema, no cyanosis,   MSK: No ML spinal tenderness, normal ROM   NEURO: AAOX3, no new focal deficits     MEDICATIONS  (STANDING):  amLODIPine   Tablet 2.5 milliGRAM(s) Oral at bedtime  ceFAZolin   IVPB 1000 milliGRAM(s) IV Intermittent every 8 hours  enoxaparin Injectable 40 milliGRAM(s) SubCutaneous every 24 hours  losartan 100 milliGRAM(s) Oral daily  metoprolol succinate ER 50 milliGRAM(s) Oral daily  oxybutynin 5 milliGRAM(s) Oral two times a day  pantoprazole    Tablet 40 milliGRAM(s) Oral before breakfast    MEDICATIONS  (PRN):  albuterol    90 MICROgram(s) HFA Inhaler 2 Puff(s) Inhalation every 6 hours PRN Shortness of Breath and/or Wheezing  oxycodone    5 mG/acetaminophen 325 mG 1 Tablet(s) Oral every 6 hours PRN Moderate Pain (4 - 6)      LABS/ IMAGING                        13.2   6.46  )-----------( 223      ( 12 Nov 2022 07:54 )             40.3         11-12    138  |  102  |  13  ----------------------------<  91  4.6   |  27  |  0.6<L>    Ca    10.1      12 Nov 2022 07:54  Mg     1.8     11-11    TPro  5.9<L>  /  Alb  3.7  /  TBili  0.5  /  DBili  x   /  AST  16  /  ALT  14  /  AlkPhos  105  11-12    CAPILLARY BLOOD GLUCOSE          LIVER FUNCTIONS - ( 12 Nov 2022 07:54 )  Alb: 3.7 g/dL / Pro: 5.9 g/dL / ALK PHOS: 105 U/L / ALT: 14 U/L / AST: 16 U/L / GGT: x

## 2022-11-13 ENCOUNTER — TRANSCRIPTION ENCOUNTER (OUTPATIENT)
Age: 82
End: 2022-11-13

## 2022-11-13 VITALS
OXYGEN SATURATION: 96 % | RESPIRATION RATE: 18 BRPM | SYSTOLIC BLOOD PRESSURE: 128 MMHG | TEMPERATURE: 96 F | DIASTOLIC BLOOD PRESSURE: 72 MMHG | HEART RATE: 58 BPM

## 2022-11-13 LAB
ANION GAP SERPL CALC-SCNC: 10 MMOL/L — SIGNIFICANT CHANGE UP (ref 7–14)
APPEARANCE UR: CLEAR — SIGNIFICANT CHANGE UP
BASOPHILS # BLD AUTO: 0.07 K/UL — SIGNIFICANT CHANGE UP (ref 0–0.2)
BASOPHILS NFR BLD AUTO: 1 % — SIGNIFICANT CHANGE UP (ref 0–1)
BILIRUB UR-MCNC: NEGATIVE — SIGNIFICANT CHANGE UP
BUN SERPL-MCNC: 11 MG/DL — SIGNIFICANT CHANGE UP (ref 10–20)
CALCIUM SERPL-MCNC: 9.8 MG/DL — SIGNIFICANT CHANGE UP (ref 8.4–10.5)
CHLORIDE SERPL-SCNC: 103 MMOL/L — SIGNIFICANT CHANGE UP (ref 98–110)
CO2 SERPL-SCNC: 26 MMOL/L — SIGNIFICANT CHANGE UP (ref 17–32)
COLOR SPEC: YELLOW — SIGNIFICANT CHANGE UP
CREAT SERPL-MCNC: 0.7 MG/DL — SIGNIFICANT CHANGE UP (ref 0.7–1.5)
DIFF PNL FLD: NEGATIVE — SIGNIFICANT CHANGE UP
EGFR: 87 ML/MIN/1.73M2 — SIGNIFICANT CHANGE UP
EOSINOPHIL # BLD AUTO: 0.33 K/UL — SIGNIFICANT CHANGE UP (ref 0–0.7)
EOSINOPHIL NFR BLD AUTO: 4.8 % — SIGNIFICANT CHANGE UP (ref 0–8)
GLUCOSE SERPL-MCNC: 116 MG/DL — HIGH (ref 70–99)
GLUCOSE UR QL: NEGATIVE MG/DL — SIGNIFICANT CHANGE UP
HCT VFR BLD CALC: 40.1 % — SIGNIFICANT CHANGE UP (ref 37–47)
HGB BLD-MCNC: 13.2 G/DL — SIGNIFICANT CHANGE UP (ref 12–16)
IMM GRANULOCYTES NFR BLD AUTO: 0.3 % — SIGNIFICANT CHANGE UP (ref 0.1–0.3)
KETONES UR-MCNC: NEGATIVE — SIGNIFICANT CHANGE UP
LEUKOCYTE ESTERASE UR-ACNC: NEGATIVE — SIGNIFICANT CHANGE UP
LYMPHOCYTES # BLD AUTO: 1.58 K/UL — SIGNIFICANT CHANGE UP (ref 1.2–3.4)
LYMPHOCYTES # BLD AUTO: 23.1 % — SIGNIFICANT CHANGE UP (ref 20.5–51.1)
MCHC RBC-ENTMCNC: 29.5 PG — SIGNIFICANT CHANGE UP (ref 27–31)
MCHC RBC-ENTMCNC: 32.9 G/DL — SIGNIFICANT CHANGE UP (ref 32–37)
MCV RBC AUTO: 89.7 FL — SIGNIFICANT CHANGE UP (ref 81–99)
MONOCYTES # BLD AUTO: 0.62 K/UL — HIGH (ref 0.1–0.6)
MONOCYTES NFR BLD AUTO: 9.1 % — SIGNIFICANT CHANGE UP (ref 1.7–9.3)
NEUTROPHILS # BLD AUTO: 4.21 K/UL — SIGNIFICANT CHANGE UP (ref 1.4–6.5)
NEUTROPHILS NFR BLD AUTO: 61.7 % — SIGNIFICANT CHANGE UP (ref 42.2–75.2)
NITRITE UR-MCNC: NEGATIVE — SIGNIFICANT CHANGE UP
NRBC # BLD: 0 /100 WBCS — SIGNIFICANT CHANGE UP (ref 0–0)
PH UR: 7 — SIGNIFICANT CHANGE UP (ref 5–8)
PLATELET # BLD AUTO: 230 K/UL — SIGNIFICANT CHANGE UP (ref 130–400)
POTASSIUM SERPL-MCNC: 4.3 MMOL/L — SIGNIFICANT CHANGE UP (ref 3.5–5)
POTASSIUM SERPL-SCNC: 4.3 MMOL/L — SIGNIFICANT CHANGE UP (ref 3.5–5)
PROT UR-MCNC: NEGATIVE MG/DL — SIGNIFICANT CHANGE UP
RBC # BLD: 4.47 M/UL — SIGNIFICANT CHANGE UP (ref 4.2–5.4)
RBC # FLD: 13.6 % — SIGNIFICANT CHANGE UP (ref 11.5–14.5)
SODIUM SERPL-SCNC: 139 MMOL/L — SIGNIFICANT CHANGE UP (ref 135–146)
SP GR SPEC: 1.02 — SIGNIFICANT CHANGE UP (ref 1.01–1.03)
UROBILINOGEN FLD QL: 0.2 MG/DL — SIGNIFICANT CHANGE UP
WBC # BLD: 6.83 K/UL — SIGNIFICANT CHANGE UP (ref 4.8–10.8)
WBC # FLD AUTO: 6.83 K/UL — SIGNIFICANT CHANGE UP (ref 4.8–10.8)

## 2022-11-13 PROCEDURE — 99239 HOSP IP/OBS DSCHRG MGMT >30: CPT

## 2022-11-13 RX ORDER — ACETAMINOPHEN 500 MG
2 TABLET ORAL
Qty: 15 | Refills: 0
Start: 2022-11-13

## 2022-11-13 RX ADMIN — Medication 100 MILLIGRAM(S): at 05:55

## 2022-11-13 RX ADMIN — Medication 100 MILLIGRAM(S): at 13:13

## 2022-11-13 RX ADMIN — PANTOPRAZOLE SODIUM 40 MILLIGRAM(S): 20 TABLET, DELAYED RELEASE ORAL at 06:07

## 2022-11-13 RX ADMIN — ENOXAPARIN SODIUM 40 MILLIGRAM(S): 100 INJECTION SUBCUTANEOUS at 05:55

## 2022-11-13 RX ADMIN — LOSARTAN POTASSIUM 100 MILLIGRAM(S): 100 TABLET, FILM COATED ORAL at 05:54

## 2022-11-13 RX ADMIN — OXYCODONE AND ACETAMINOPHEN 1 TABLET(S): 5; 325 TABLET ORAL at 06:07

## 2022-11-13 RX ADMIN — Medication 50 MILLIGRAM(S): at 05:54

## 2022-11-13 RX ADMIN — Medication 5 MILLIGRAM(S): at 05:55

## 2022-11-13 NOTE — DISCHARGE NOTE NURSING/CASE MANAGEMENT/SOCIAL WORK - NSDCVIVACCINE_GEN_ALL_CORE_FT
[employer]; 15-Sep-2018 18:40; Rachael Morales (RN); [employer]; [id number] (Exp. Date: 17-Jan-2020); IntraMuscular; Deltoid Left.; 0.5 milliLiter(s); VIS (VIS Published: 09-May-2013, VIS Presented: 15-Sep-2018);

## 2022-11-13 NOTE — DISCHARGE NOTE PROVIDER - NSDCFUSCHEDAPPT_GEN_ALL_CORE_FT
Laura Weeks Physician [facility]  NEUROSURG [address] [place] [address]  Scheduled Appointment: 11/14/2022

## 2022-11-13 NOTE — DISCHARGE NOTE PROVIDER - NSDCFUADDINST_GEN_ALL_CORE_FT
Pls call your primary care doctor in 1 week for follow up.   Pls keep your left lower extremity elevated while lying down.

## 2022-11-13 NOTE — DISCHARGE NOTE PROVIDER - NSDCMRMEDTOKEN_GEN_ALL_CORE_FT
amLODIPine 5 mg oral tablet: 2.5 tab(s) orally once a day (at bedtime)  cephalexin 500 mg oral tablet: 1 tab(s) orally 4 times a day   losartan 100 mg oral tablet: 1 tab(s) orally once a day  metoprolol tartrate 50 mg oral tablet: 1 tab(s) orally once a day  omeprazole 40 mg oral delayed release capsule: 1 cap(s) orally once a day  oxybutynin 10 mg/24 hr oral tablet, extended release: 1 tab(s) orally once a day  oxycodone-acetaminophen 5 mg-325 mg oral tablet: 1 tab(s) orally every 6 hours, As Needed -for severe pain MDD:4  Trulicity Pen 1.5 mg/0.5 mL subcutaneous solution: 1.5 milligram(s) subcutaneous once a week  Ventolin HFA 90 mcg/inh inhalation aerosol: 2 puff(s) inhaled every 6 hours   amLODIPine 5 mg oral tablet: 2.5 tab(s) orally once a day (at bedtime)  amoxicillin-clavulanate 875 mg-125 mg oral tablet: 1  orally 2 times a day   cephalexin 500 mg oral tablet: 1 tab(s) orally 4 times a day   doxycycline hyclate 100 mg oral tablet: 1 tab(s) orally 2 times a day   losartan 100 mg oral tablet: 1 tab(s) orally once a day  metoprolol tartrate 50 mg oral tablet: 1 tab(s) orally once a day  omeprazole 40 mg oral delayed release capsule: 1 cap(s) orally once a day  oxybutynin 10 mg/24 hr oral tablet, extended release: 1 tab(s) orally once a day  oxycodone-acetaminophen 5 mg-325 mg oral tablet: 1 tab(s) orally every 6 hours, As Needed -for severe pain MDD:4  Trulicity Pen 1.5 mg/0.5 mL subcutaneous solution: 1.5 milligram(s) subcutaneous once a week  Tylenol 325 mg oral capsule: 2 cap(s) orally every 6 hours, As Needed   mild to moderate pain   Ventolin HFA 90 mcg/inh inhalation aerosol: 2 puff(s) inhaled every 6 hours   amLODIPine 5 mg oral tablet: 2.5 tab(s) orally once a day (at bedtime)  amoxicillin-clavulanate 875 mg-125 mg oral tablet: 1  orally 2 times a day   doxycycline hyclate 100 mg oral tablet: 1 tab(s) orally 2 times a day   losartan 100 mg oral tablet: 1 tab(s) orally once a day  metoprolol tartrate 50 mg oral tablet: 1 tab(s) orally once a day  omeprazole 40 mg oral delayed release capsule: 1 cap(s) orally once a day  oxybutynin 10 mg/24 hr oral tablet, extended release: 1 tab(s) orally once a day  oxycodone-acetaminophen 5 mg-325 mg oral tablet: 1 tab(s) orally every 6 hours, As Needed -for severe pain MDD:4  Trulicity Pen 1.5 mg/0.5 mL subcutaneous solution: 1.5 milligram(s) subcutaneous once a week  Tylenol 325 mg oral capsule: 2 cap(s) orally every 6 hours, As Needed   mild to moderate pain   Ventolin HFA 90 mcg/inh inhalation aerosol: 2 puff(s) inhaled every 6 hours

## 2022-11-13 NOTE — DISCHARGE NOTE PROVIDER - ATTENDING DISCHARGE PHYSICAL EXAMINATION:
VITALS:   T(C): 36.4 (11-13-22 @ 05:04), Max: 36.8 (11-12-22 @ 20:50)  HR: 59 (11-13-22 @ 05:04) (59 - 62)  BP: 159/70 (11-13-22 @ 05:04) (139/65 - 159/70)  RR: 18 (11-13-22 @ 05:04) (18 - 18)  SpO2: --    GENERAL: AAOx3   HEAD:  Atraumatic, Normocephalic  EYES: EOMI, PERRLA, no pallor   ENT:  normal oropharynx , no thyromegaly   NECK: Supple, No JVD  CHEST/LUNG: Clear to auscultation bilaterally; No rales, rhonchi, wheezing, or rubs.  Symmetric expansion   HEART:  S1 S2 RRR ; No murmurs or rubs   ABDOMEN: Soft, nontender, nondistended, BS wnl   EXTREMITIES:  +2 LLE edema, with distal erythema and mild tenderness   NERVOUS SYSTEM:  A&Ox3, no focal deficits

## 2022-11-13 NOTE — DISCHARGE NOTE PROVIDER - NSDCCPCAREPLAN_GEN_ALL_CORE_FT
PRINCIPAL DISCHARGE DIAGNOSIS  Diagnosis: Cellulitis of left lower extremity  Assessment and Plan of Treatment: - s/p cefazolin IV   - ID following   - prn pain meds   - consider dc on augmentin and doxycycline   - c/b DM   - MRSA PCR +ve, doubt MRSA cause of infection   - discharged with doxycycline and augmentin for 4 more days  - blood cx x  2 ngtd      SECONDARY DISCHARGE DIAGNOSES  Diagnosis: Type 2 diabetes mellitus  Assessment and Plan of Treatment: - during hospital stay was on sliding scale insulin   - can resume home diabetic medication on discharge

## 2022-11-13 NOTE — DISCHARGE NOTE PROVIDER - CARE PROVIDER_API CALL
Eduard Dumont  PEDIATRICS  [address] [place], [place] [address]  Phone: [phone]  Fax: [phone]  Established Patient  Follow Up Time:

## 2022-11-13 NOTE — DISCHARGE NOTE NURSING/CASE MANAGEMENT/SOCIAL WORK - NSPROEXTENSIONSOFSELF_GEN_A_NUR
After Knee replacement sx used walker, down graded to cane before current symptoms began./cane/eyeglasses/walker

## 2022-11-13 NOTE — DISCHARGE NOTE NURSING/CASE MANAGEMENT/SOCIAL WORK - PATIENT PORTAL LINK FT
You can access the FollowMyHealth Patient Portal offered by [facility] by registering at the following website: [email or web address]. By joining [facility]’s FollowMyHealth portal, you will also be able to view your health information using other applications (apps) compatible with our system.

## 2022-11-13 NOTE — DISCHARGE NOTE PROVIDER - HOSPITAL COURSE
83yo female history of recent kidney stone, obesity, DMT2, HTN, hx/o lung cancer s/p resection, HLD, COPD, hx/o renal ca s/p partial nephrectomy  who was recently started on oral antibiotics for apparent leg cellulitis,  presents to ER with worsening  pain to leg.  Initially pain was severe,   with associated edema and erythema from distal LLE upwards.  She started on oral antibiotics, Keflex, however symptoms did not improve.     Patient was admitted to med/sug.  Hospital course as below.     LLE cellulitis failed OP abx, ruled out sepsis.   - started cefazolin IV   - ID was consulted.    - prn pain meds   - will dc on augmentin and doxycycline for 4 more days.   - c/b DMT2   - MRSA PCR +ve, doubt MRSA cause of infection   - BLOOD cx NGTD       ACTIVE PROBLEMS  Type 2 diabetes mellitus with complication  -SSI ac hs     CHRONIC PROBLEMS  Hypertension, unspecified type  - cont norvasc, metoprolol and losartan     Hyperlipidemia, unspecified hyperlipidemia type  - cont statin     COPD (chronic obstructive pulmonary disease)  -ex smoker   - prn MDI     GERD (gastroesophageal reflux disease)  - PPI     ARTIE (obstructive sleep apnea)  NO CPAP MACHINE PER PT.    hx/o lung cancer/ hx/o RIGHT PARTIAL/hx/o NHL  NEPHRECTOMYCancer of R kidney/Cancer of thyroid/   - RIGHT PARTIAL NEPHRECTOMY  - RIGHT PARTIAL NEPHRECTOMY    History of abdominal aortic aneurysm  - stable, cont bp meds, optimize bp meds    BILATERAL KNEE REPLACEMENT  TUBAL LIGATION  CYST REMOVED  RIGHT BREAST  CHOLECYSTECOMY  BILATERAL CATARACT SURGERY    Patient's condition improved and she is being discharged in stable condition home. 83yo female history of recent kidney stone, obesity, DMT2, HTN, hx/o lung cancer s/p resection, HLD, COPD, hx/o renal ca s/p partial nephrectomy  who was recently started on oral antibiotics for apparent leg cellulitis,  presents to ER with worsening  pain to leg.  Initially pain was severe,   with associated edema and erythema from distal LLE upwards.  She started on oral antibiotics, Keflex, however symptoms did not improve.     Patient was admitted to med/sug.  Hospital course as below.     LLE cellulitis failed OP abx, ruled out sepsis.   - started cefazolin IV   - ID was consulted.    - prn pain meds   - will dc on augmentin and doxycycline for 4 more days.   - c/b DMT2   - MRSA PCR +ve, doubt MRSA cause of infection   - BLOOD cx NGTD     -venous duplex US -ve for DVT     ACTIVE PROBLEMS  Type 2 diabetes mellitus with complication  -SSI ac hs     CHRONIC PROBLEMS  Hypertension, unspecified type  - cont norvasc, metoprolol and losartan     Hyperlipidemia, unspecified hyperlipidemia type  - cont statin     COPD (chronic obstructive pulmonary disease)  -ex smoker   - prn MDI     GERD (gastroesophageal reflux disease)  - PPI     ARTIE (obstructive sleep apnea)  NO CPAP MACHINE PER PT.    hx/o lung cancer/ hx/o RIGHT PARTIAL/hx/o NHL  NEPHRECTOMYCancer of R kidney/Cancer of thyroid/   - RIGHT PARTIAL NEPHRECTOMY  - RIGHT PARTIAL NEPHRECTOMY    History of abdominal aortic aneurysm  - stable, cont bp meds, optimize bp meds    BILATERAL KNEE REPLACEMENT  TUBAL LIGATION  CYST REMOVED  RIGHT BREAST  CHOLECYSTECOMY  BILATERAL CATARACT SURGERY    Patient's condition improved and she is being discharged in stable condition home.

## 2022-11-14 ENCOUNTER — APPOINTMENT (OUTPATIENT)
Dept: NEUROSURGERY | Facility: CLINIC | Age: 82
End: 2022-11-14

## 2022-11-14 VITALS — WEIGHT: 163 LBS | HEIGHT: 63 IN | BODY MASS INDEX: 28.88 KG/M2

## 2022-11-14 LAB
CULTURE RESULTS: NO GROWTH — SIGNIFICANT CHANGE UP
SPECIMEN SOURCE: SIGNIFICANT CHANGE UP

## 2022-11-14 PROCEDURE — 99024 POSTOP FOLLOW-UP VISIT: CPT

## 2022-11-14 NOTE — HISTORY OF PRESENT ILLNESS
[FreeTextEntry1] : Ms Holly is  a pleasant 81 yo female here for a post operative visit. Patine underwent, LEFT L4-5-S1 Laminoforaminotomy n 10/28/2022. Patient noted to have MRSA and treated post operatively. Since her surgery, she experienced LEFT foot swelling and pain radiating from her buttock to the LEFT foot. She admits that she experienced something similar last year with swelling in her foot, however this time she notes that it was more painful and swollen. Today, admits after taking PO antibiotics (Keflex) as well as having IV Abx as an inpatient, she is doing better. Is now able to get her foot into a shoe and the pain has improved overall as well. \par \par \par Prior to her hospital admission, she was assessed for DVT with Ultrasound of her leg, which was negative for DVT. As part of her admission workup, urinalysis and blood cultures were all negative. \par After a consult by ID, was placed on IV antibiotics over three days, after which she was discharged home on PO medications (Keflex and Doxycycline 100mg PO BID x 7days).  Today she denies any fevers, night sweats or chills. At times though she experiences pain radiating from her LEFT buttocks down her leg, but states it is not as severe as before the surgery. \par \par Denies any bowel or bladder incontinence at this time. \par \par PE:\par Constitutional: Well appearing, no distress sitting on the chair in mild discomfort, here with daughter\par HEENT: Normocephalic Atraumatic, sclerae anicteric, mucus membranes moist, trachea midline\par Psychiatric: Alert and oriented to all spheres, normal mood\par Pulmonary: No respiratory distress or accessory muscle use\par Extremities: mild erythema distal LEFT leg with 2+ edema and tenderness to palpation; mild edema on the RIGHT lower extremity with minimal erythema. No demarcation noted on either extremity. Palpable DP pulses bilaterally.\par WOUND: Well healed no erythema, edema or drainage. \par \par Neurologic:\par CN II-XII grossly intact \par Palpation: no pain to palpation\par Strength: Decreased strength LEFT knee extension secondary to discomfort in lower leg, no atrophy\par Sensation: Mildly decreased sensation to light touch distal lower extremities\par \par Reflexes:\par 2+ patellar\par 2+ biceps\par 2+ ankle jerk\par No Lu's\par No clonus\par No babinski\par \par ROM intact through all planes with passive movement\par \par Signs:\par SLR negative; Abdiel's maneuver negative\par \par Gait: Antalgic with walking cane\par

## 2022-11-14 NOTE — REASON FOR VISIT
[Family Member] : family member [de-identified] : s/p L4-5-S1 Laminoforaminotomy [de-identified] : 10/28/2022

## 2022-11-14 NOTE — REVIEW OF SYSTEMS
[As Noted in HPI] : as noted in HPI [Negative] : Psychiatric [Fever] : no fever [Chills] : no chills [Eye Pain] : no eye pain [Red Eyes] : eyes not red [Chest Pain] : no chest pain [Palpitations] : no palpitations [Shortness Of Breath] : no shortness of breath [Wheezing] : no wheezing [Cough] : no cough [Vomiting] : no vomiting [Incontinence] : no incontinence [FreeTextEntry4] : Diminished hearing

## 2022-11-17 DIAGNOSIS — Z85.850 PERSONAL HISTORY OF MALIGNANT NEOPLASM OF THYROID: ICD-10-CM

## 2022-11-17 DIAGNOSIS — E66.9 OBESITY, UNSPECIFIED: ICD-10-CM

## 2022-11-17 DIAGNOSIS — N20.0 CALCULUS OF KIDNEY: ICD-10-CM

## 2022-11-17 DIAGNOSIS — Z90.2 ACQUIRED ABSENCE OF LUNG [PART OF]: ICD-10-CM

## 2022-11-17 DIAGNOSIS — C85.90 NON-HODGKIN LYMPHOMA, UNSPECIFIED, UNSPECIFIED SITE: ICD-10-CM

## 2022-11-17 DIAGNOSIS — Z85.528 PERSONAL HISTORY OF OTHER MALIGNANT NEOPLASM OF KIDNEY: ICD-10-CM

## 2022-11-17 DIAGNOSIS — L03.116 CELLULITIS OF LEFT LOWER LIMB: ICD-10-CM

## 2022-11-17 DIAGNOSIS — I10 ESSENTIAL (PRIMARY) HYPERTENSION: ICD-10-CM

## 2022-11-17 DIAGNOSIS — G47.33 OBSTRUCTIVE SLEEP APNEA (ADULT) (PEDIATRIC): ICD-10-CM

## 2022-11-17 DIAGNOSIS — Z85.118 PERSONAL HISTORY OF OTHER MALIGNANT NEOPLASM OF BRONCHUS AND LUNG: ICD-10-CM

## 2022-11-17 DIAGNOSIS — Z79.85 LONG-TERM (CURRENT) USE OF INJECTABLE NON-INSULIN ANTIDIABETIC DRUGS: ICD-10-CM

## 2022-11-17 DIAGNOSIS — J44.9 CHRONIC OBSTRUCTIVE PULMONARY DISEASE, UNSPECIFIED: ICD-10-CM

## 2022-11-17 DIAGNOSIS — Z87.891 PERSONAL HISTORY OF NICOTINE DEPENDENCE: ICD-10-CM

## 2022-11-17 DIAGNOSIS — Z96.643 PRESENCE OF ARTIFICIAL HIP JOINT, BILATERAL: ICD-10-CM

## 2022-11-17 DIAGNOSIS — E78.5 HYPERLIPIDEMIA, UNSPECIFIED: ICD-10-CM

## 2022-11-17 DIAGNOSIS — K21.9 GASTRO-ESOPHAGEAL REFLUX DISEASE WITHOUT ESOPHAGITIS: ICD-10-CM

## 2022-11-17 DIAGNOSIS — E11.9 TYPE 2 DIABETES MELLITUS WITHOUT COMPLICATIONS: ICD-10-CM

## 2022-11-17 DIAGNOSIS — B96.89 OTHER SPECIFIED BACTERIAL AGENTS AS THE CAUSE OF DISEASES CLASSIFIED ELSEWHERE: ICD-10-CM

## 2022-11-17 LAB
CULTURE RESULTS: SIGNIFICANT CHANGE UP
CULTURE RESULTS: SIGNIFICANT CHANGE UP
SPECIMEN SOURCE: SIGNIFICANT CHANGE UP
SPECIMEN SOURCE: SIGNIFICANT CHANGE UP

## 2022-11-23 ENCOUNTER — APPOINTMENT (OUTPATIENT)
Dept: NEUROSURGERY | Facility: CLINIC | Age: 82
End: 2022-11-23

## 2022-11-23 PROCEDURE — 99024 POSTOP FOLLOW-UP VISIT: CPT

## 2022-11-25 NOTE — HISTORY OF PRESENT ILLNESS
[FreeTextEntry1] : s/p laminoforaminotomy.  Radicular symptoms improved.  Wound healed. Leg cellulitis improved.\par \par Start PT\par Return in 6 weeks

## 2023-01-11 ENCOUNTER — APPOINTMENT (OUTPATIENT)
Dept: NEUROSURGERY | Facility: CLINIC | Age: 83
End: 2023-01-11
Payer: MEDICARE

## 2023-01-11 VITALS — WEIGHT: 158 LBS | BODY MASS INDEX: 28 KG/M2 | HEIGHT: 63 IN

## 2023-01-11 DIAGNOSIS — M43.17 SPONDYLOLISTHESIS, LUMBOSACRAL REGION: ICD-10-CM

## 2023-01-11 PROCEDURE — 99024 POSTOP FOLLOW-UP VISIT: CPT

## 2023-01-11 PROCEDURE — 99211 OFF/OP EST MAY X REQ PHY/QHP: CPT | Mod: 24

## 2023-01-11 NOTE — HISTORY OF PRESENT ILLNESS
[FreeTextEntry1] : s/p laminoforaminotomy.  Still complaining of radicular pain.  Missed a few sessions of PT.  Pain is not constant.\par \par Continue PT\par return in 6 weeks\par if pain persists will recommend KRISTAN

## 2023-01-24 DIAGNOSIS — Z12.31 ENCOUNTER FOR SCREENING MAMMOGRAM FOR MALIGNANT NEOPLASM OF BREAST: ICD-10-CM

## 2023-02-07 DIAGNOSIS — Z87.42 PERSONAL HISTORY OF OTHER DISEASES OF THE FEMALE GENITAL TRACT: ICD-10-CM

## 2023-02-07 DIAGNOSIS — Z92.89 PERSONAL HISTORY OF OTHER MEDICAL TREATMENT: ICD-10-CM

## 2023-02-07 DIAGNOSIS — Z78.0 ASYMPTOMATIC MENOPAUSAL STATE: ICD-10-CM

## 2023-02-07 DIAGNOSIS — Z78.9 OTHER SPECIFIED HEALTH STATUS: ICD-10-CM

## 2023-02-07 DIAGNOSIS — Z87.898 PERSONAL HISTORY OF OTHER SPECIFIED CONDITIONS: ICD-10-CM

## 2023-02-13 ENCOUNTER — EMERGENCY (EMERGENCY)
Facility: HOSPITAL | Age: 83
LOS: 0 days | Discharge: HOME | End: 2023-02-13
Attending: EMERGENCY MEDICINE
Payer: MEDICARE

## 2023-02-13 VITALS
RESPIRATION RATE: 20 BRPM | OXYGEN SATURATION: 95 % | SYSTOLIC BLOOD PRESSURE: 195 MMHG | TEMPERATURE: 96 F | DIASTOLIC BLOOD PRESSURE: 93 MMHG | HEART RATE: 60 BPM

## 2023-02-13 VITALS
WEIGHT: 160.06 LBS | DIASTOLIC BLOOD PRESSURE: 94 MMHG | OXYGEN SATURATION: 96 % | HEIGHT: 63 IN | HEART RATE: 65 BPM | SYSTOLIC BLOOD PRESSURE: 201 MMHG | RESPIRATION RATE: 22 BRPM

## 2023-02-13 DIAGNOSIS — Z98.890 OTHER SPECIFIED POSTPROCEDURAL STATES: Chronic | ICD-10-CM

## 2023-02-13 DIAGNOSIS — Z85.72 PERSONAL HISTORY OF NON-HODGKIN LYMPHOMAS: ICD-10-CM

## 2023-02-13 DIAGNOSIS — R91.8 OTHER NONSPECIFIC ABNORMAL FINDING OF LUNG FIELD: ICD-10-CM

## 2023-02-13 DIAGNOSIS — Z86.69 PERSONAL HISTORY OF OTHER DISEASES OF THE NERVOUS SYSTEM AND SENSE ORGANS: ICD-10-CM

## 2023-02-13 DIAGNOSIS — E78.5 HYPERLIPIDEMIA, UNSPECIFIED: ICD-10-CM

## 2023-02-13 DIAGNOSIS — E11.9 TYPE 2 DIABETES MELLITUS WITHOUT COMPLICATIONS: ICD-10-CM

## 2023-02-13 DIAGNOSIS — Z96.653 PRESENCE OF ARTIFICIAL KNEE JOINT, BILATERAL: ICD-10-CM

## 2023-02-13 DIAGNOSIS — Z85.850 PERSONAL HISTORY OF MALIGNANT NEOPLASM OF THYROID: ICD-10-CM

## 2023-02-13 DIAGNOSIS — Z90.6 ACQUIRED ABSENCE OF OTHER PARTS OF URINARY TRACT: ICD-10-CM

## 2023-02-13 DIAGNOSIS — R06.02 SHORTNESS OF BREATH: ICD-10-CM

## 2023-02-13 DIAGNOSIS — Z86.79 PERSONAL HISTORY OF OTHER DISEASES OF THE CIRCULATORY SYSTEM: ICD-10-CM

## 2023-02-13 DIAGNOSIS — Z85.118 PERSONAL HISTORY OF OTHER MALIGNANT NEOPLASM OF BRONCHUS AND LUNG: ICD-10-CM

## 2023-02-13 DIAGNOSIS — Z90.2 ACQUIRED ABSENCE OF LUNG [PART OF]: ICD-10-CM

## 2023-02-13 DIAGNOSIS — Z98.890 OTHER SPECIFIED POSTPROCEDURAL STATES: ICD-10-CM

## 2023-02-13 DIAGNOSIS — R00.1 BRADYCARDIA, UNSPECIFIED: ICD-10-CM

## 2023-02-13 DIAGNOSIS — J44.9 CHRONIC OBSTRUCTIVE PULMONARY DISEASE, UNSPECIFIED: ICD-10-CM

## 2023-02-13 DIAGNOSIS — Z20.822 CONTACT WITH AND (SUSPECTED) EXPOSURE TO COVID-19: ICD-10-CM

## 2023-02-13 DIAGNOSIS — G47.33 OBSTRUCTIVE SLEEP APNEA (ADULT) (PEDIATRIC): ICD-10-CM

## 2023-02-13 DIAGNOSIS — M54.30 SCIATICA, UNSPECIFIED SIDE: ICD-10-CM

## 2023-02-13 DIAGNOSIS — K21.9 GASTRO-ESOPHAGEAL REFLUX DISEASE WITHOUT ESOPHAGITIS: ICD-10-CM

## 2023-02-13 DIAGNOSIS — Z79.84 LONG TERM (CURRENT) USE OF ORAL HYPOGLYCEMIC DRUGS: ICD-10-CM

## 2023-02-13 DIAGNOSIS — Z85.528 PERSONAL HISTORY OF OTHER MALIGNANT NEOPLASM OF KIDNEY: ICD-10-CM

## 2023-02-13 DIAGNOSIS — Z90.49 ACQUIRED ABSENCE OF OTHER SPECIFIED PARTS OF DIGESTIVE TRACT: ICD-10-CM

## 2023-02-13 DIAGNOSIS — Z87.19 PERSONAL HISTORY OF OTHER DISEASES OF THE DIGESTIVE SYSTEM: ICD-10-CM

## 2023-02-13 DIAGNOSIS — M79.662 PAIN IN LEFT LOWER LEG: ICD-10-CM

## 2023-02-13 LAB
ALBUMIN SERPL ELPH-MCNC: 4.5 G/DL — SIGNIFICANT CHANGE UP (ref 3.5–5.2)
ALP SERPL-CCNC: 138 U/L — HIGH (ref 30–115)
ALT FLD-CCNC: 9 U/L — SIGNIFICANT CHANGE UP (ref 0–41)
ANION GAP SERPL CALC-SCNC: 9 MMOL/L — SIGNIFICANT CHANGE UP (ref 7–14)
AST SERPL-CCNC: 13 U/L — SIGNIFICANT CHANGE UP (ref 0–41)
BASOPHILS # BLD AUTO: 0.04 K/UL — SIGNIFICANT CHANGE UP (ref 0–0.2)
BASOPHILS NFR BLD AUTO: 0.6 % — SIGNIFICANT CHANGE UP (ref 0–1)
BILIRUB SERPL-MCNC: 0.3 MG/DL — SIGNIFICANT CHANGE UP (ref 0.2–1.2)
BUN SERPL-MCNC: 24 MG/DL — HIGH (ref 10–20)
CALCIUM SERPL-MCNC: 10.9 MG/DL — HIGH (ref 8.4–10.5)
CHLORIDE SERPL-SCNC: 104 MMOL/L — SIGNIFICANT CHANGE UP (ref 98–110)
CO2 SERPL-SCNC: 27 MMOL/L — SIGNIFICANT CHANGE UP (ref 17–32)
CREAT SERPL-MCNC: 0.7 MG/DL — SIGNIFICANT CHANGE UP (ref 0.7–1.5)
EGFR: 87 ML/MIN/1.73M2 — SIGNIFICANT CHANGE UP
EOSINOPHIL # BLD AUTO: 0.17 K/UL — SIGNIFICANT CHANGE UP (ref 0–0.7)
EOSINOPHIL NFR BLD AUTO: 2.7 % — SIGNIFICANT CHANGE UP (ref 0–8)
FLUAV AG NPH QL: SIGNIFICANT CHANGE UP
FLUBV AG NPH QL: SIGNIFICANT CHANGE UP
GLUCOSE SERPL-MCNC: 140 MG/DL — HIGH (ref 70–99)
HCT VFR BLD CALC: 47.4 % — HIGH (ref 37–47)
HGB BLD-MCNC: 15.5 G/DL — SIGNIFICANT CHANGE UP (ref 12–16)
IMM GRANULOCYTES NFR BLD AUTO: 0.2 % — SIGNIFICANT CHANGE UP (ref 0.1–0.3)
LYMPHOCYTES # BLD AUTO: 2.05 K/UL — SIGNIFICANT CHANGE UP (ref 1.2–3.4)
LYMPHOCYTES # BLD AUTO: 32.1 % — SIGNIFICANT CHANGE UP (ref 20.5–51.1)
MCHC RBC-ENTMCNC: 29.1 PG — SIGNIFICANT CHANGE UP (ref 27–31)
MCHC RBC-ENTMCNC: 32.7 G/DL — SIGNIFICANT CHANGE UP (ref 32–37)
MCV RBC AUTO: 88.9 FL — SIGNIFICANT CHANGE UP (ref 81–99)
MONOCYTES # BLD AUTO: 0.43 K/UL — SIGNIFICANT CHANGE UP (ref 0.1–0.6)
MONOCYTES NFR BLD AUTO: 6.7 % — SIGNIFICANT CHANGE UP (ref 1.7–9.3)
NEUTROPHILS # BLD AUTO: 3.69 K/UL — SIGNIFICANT CHANGE UP (ref 1.4–6.5)
NEUTROPHILS NFR BLD AUTO: 57.7 % — SIGNIFICANT CHANGE UP (ref 42.2–75.2)
NRBC # BLD: 0 /100 WBCS — SIGNIFICANT CHANGE UP (ref 0–0)
NT-PROBNP SERPL-SCNC: 335 PG/ML — HIGH (ref 0–300)
PLATELET # BLD AUTO: 219 K/UL — SIGNIFICANT CHANGE UP (ref 130–400)
POTASSIUM SERPL-MCNC: 4.1 MMOL/L — SIGNIFICANT CHANGE UP (ref 3.5–5)
POTASSIUM SERPL-SCNC: 4.1 MMOL/L — SIGNIFICANT CHANGE UP (ref 3.5–5)
PROT SERPL-MCNC: 6.7 G/DL — SIGNIFICANT CHANGE UP (ref 6–8)
RBC # BLD: 5.33 M/UL — SIGNIFICANT CHANGE UP (ref 4.2–5.4)
RBC # FLD: 12.7 % — SIGNIFICANT CHANGE UP (ref 11.5–14.5)
RSV RNA NPH QL NAA+NON-PROBE: SIGNIFICANT CHANGE UP
SARS-COV-2 RNA SPEC QL NAA+PROBE: SIGNIFICANT CHANGE UP
SODIUM SERPL-SCNC: 140 MMOL/L — SIGNIFICANT CHANGE UP (ref 135–146)
TROPONIN T SERPL-MCNC: <0.01 NG/ML — SIGNIFICANT CHANGE UP
WBC # BLD: 6.39 K/UL — SIGNIFICANT CHANGE UP (ref 4.8–10.8)
WBC # FLD AUTO: 6.39 K/UL — SIGNIFICANT CHANGE UP (ref 4.8–10.8)

## 2023-02-13 PROCEDURE — 93005 ELECTROCARDIOGRAM TRACING: CPT

## 2023-02-13 PROCEDURE — 71045 X-RAY EXAM CHEST 1 VIEW: CPT | Mod: 26

## 2023-02-13 PROCEDURE — 83880 ASSAY OF NATRIURETIC PEPTIDE: CPT

## 2023-02-13 PROCEDURE — 84484 ASSAY OF TROPONIN QUANT: CPT

## 2023-02-13 PROCEDURE — 99285 EMERGENCY DEPT VISIT HI MDM: CPT | Mod: 25

## 2023-02-13 PROCEDURE — 71275 CT ANGIOGRAPHY CHEST: CPT | Mod: MA

## 2023-02-13 PROCEDURE — 71275 CT ANGIOGRAPHY CHEST: CPT | Mod: 26,MA

## 2023-02-13 PROCEDURE — 71045 X-RAY EXAM CHEST 1 VIEW: CPT

## 2023-02-13 PROCEDURE — 99285 EMERGENCY DEPT VISIT HI MDM: CPT | Mod: CS

## 2023-02-13 PROCEDURE — 93010 ELECTROCARDIOGRAM REPORT: CPT

## 2023-02-13 PROCEDURE — 85025 COMPLETE CBC W/AUTO DIFF WBC: CPT

## 2023-02-13 PROCEDURE — 80053 COMPREHEN METABOLIC PANEL: CPT

## 2023-02-13 PROCEDURE — 0241U: CPT

## 2023-02-13 PROCEDURE — 93970 EXTREMITY STUDY: CPT | Mod: 26

## 2023-02-13 PROCEDURE — 93970 EXTREMITY STUDY: CPT

## 2023-02-13 NOTE — ED ADULT TRIAGE NOTE - CHIEF COMPLAINT QUOTE
Pt states " I been having increase shortness of breath. My Physical therapist sent me in cause I have pain in my calf and said I should be seen"

## 2023-02-13 NOTE — ED PROVIDER NOTE - NSFOLLOWUPINSTRUCTIONS_ED_ALL_ED_FT
FOLLOW UP WITH DR. DEJON MELARA.    Shortness of breath    Shortness of breath (dyspnea) means you have trouble breathing and could indicate a medical problem. Causes include lung disease, heart disease, low amount of red blood cells (anemia), poor physical fitness, being overweight, smoking, etc. Your health care provider today may not be able to find a cause for your shortness of breath after your exam. In this case, it is important to have a follow-up exam with your primary care physician as instructed. If medicines were prescribed, take them as directed for the full length of time directed. Refrain from tobacco products.    SEEK IMMEDIATE MEDICAL CARE IF YOU HAVE ANY OF THE FOLLOWING SYMPTOMS: worsening shortness of breath, chest pain, back pain, abdominal pain, fever, coughing up blood, lightheadedness/dizziness.

## 2023-02-13 NOTE — ED PROVIDER NOTE - ATTENDING CONTRIBUTION TO CARE
89-year-old female past medical history noted including thyroid cancer, lung cancer, lymphoma, COPD, hypertension and diabetes presents with daughter for evaluation of left-sided calf tenderness that was noticed today during her physical therapy.  Patient also noted to be breathing a little bit fast so advised to go to the ED.  No chest pain, no fever or chills, on exam patient in NAD, AAOx3, speaks full clear symptoms, seen ambulate with steady gait, lungs CTA B/L, no wheeze rhonchi or rales, abdomen soft nontender nondistended, no edema, positive calf tenderness on the left, good range of motion

## 2023-02-13 NOTE — ED PROVIDER NOTE - PATIENT PORTAL LINK FT
You can access the FollowMyHealth Patient Portal offered by [facility] by registering at the following website: [email or web address]. By joining [employer]’s FollowMyHealth portal, you will also be able to view your health information using other applications (apps) compatible with our system.

## 2023-02-13 NOTE — ED PROVIDER NOTE - CARE PLAN
1 Principal Discharge DX:	Shortness of breath   Principal Discharge DX:	Shortness of breath  Secondary Diagnosis:	Pulmonary nodules

## 2023-02-13 NOTE — ED ADULT NURSE NOTE - OBJECTIVE STATEMENT
pt was at physical therapy, became short of breath with pain to left leg   pt sent in to rule out blood clot  pt short of breath, worse with ambulating   pt tachypneic

## 2023-02-13 NOTE — ED PROVIDER NOTE - OBJECTIVE STATEMENT
82 y/o F with PMH Kidney CA, thyroid CA, COPD,   AAA, HTN, HLD GERD, diverticulitis,       Hypertension, unspecified type     Lung cancer     NHL (non-Hodgkin's lymphoma) "low grade" per heme/ onc note    Obesity     ARTIE (obstructive sleep apnea) NO CPAP MACHINE PER PT.    SOB (shortness of breath)     Type 2 diabetes mellitus with complication, without long-term current use of insulin 80 y/o F with PMH Kidney CA, thyroid CA, lung CA, non-Hodgkin's lymphoma, COPD, AAA, DM, HTN, HLD GERD, diverticulitis, presenting for left calf tenderness. Pt was at rehab today for knee when therapist. 82 y/o F with PMH Kidney CA, thyroid CA, lung CA, non-Hodgkin's lymphoma, COPD, AAA, DM, HTN, HLD GERD, diverticulitis, presenting for left calf tenderness. Recent sciatic nerve decompression; pt was at rehab today for sciatica when therapist noted that left calf was swollen and that pt was breathing fast and advised going to ED for eval. Pt states calf tenderness is new; notes that she has pain laterally due to sciatica but this feels different. Denies chest pain, SOB

## 2023-02-13 NOTE — ED PROVIDER NOTE - PHYSICAL EXAMINATION
CONSTITUTIONAL: Well-developed; well-nourished; in no acute distress.   SKIN: Warm, dry  HEAD: Normocephalic; atraumatic  EYES: PERRL, EOMI, normal sclera and conjunctiva   ENT: No nasal discharge; airway clear. MMM  NECK: Supple; non tender.  CARD:  Regular rate and rhythm. Normal S1, S2. 2+ radial, DP pulses.  RESP: Mildly tachypneic without accessory muscle use. CTA b/l without wheezes, crackles, rhonchi  ABD: Normoactive BS. Soft, nontender, nondistended.  EXT: Normal ROM. Left calf tender with squeeze.  NEURO: Alert, oriented, grossly unremarkable  PSYCH: Cooperative, appropriate.

## 2023-02-13 NOTE — ED PROVIDER NOTE - NSICDXPASTSURGICALHX_GEN_ALL_CORE_FT
PAST SURGICAL HISTORY:  History of back surgery     History of surgery LEFT LOWER LOBECTOMY    History of surgery BILATERAL KNEE REPLACEMENT    History of surgery CATARACT RIGHT EYE    History of surgery TUBAL LIGATION    History of surgery CYST REMOVED  RIGHT BREAST    History of surgery CHOLECYSTECOMY    History of surgery RIGHT PARTIAL NEPHRECTOMY    History of surgery BILATERAL CATARACT SURGERY

## 2023-02-13 NOTE — ED PROVIDER NOTE - CLINICAL SUMMARY MEDICAL DECISION MAKING FREE TEXT BOX
EKG, labs and imaging obtained.  Results reviewed and discussed with patient and daughter.  Patient CT concerning for increased pulmonary nodules.  Patient has history of lung cancer.  I spoke with patient's pulmonologist who can follow-up with patient this week.  Patient will also need to follow-up with her heme/onc specialist.

## 2023-02-13 NOTE — ED ADULT NURSE NOTE - NSICDXPASTSURGICALHX_GEN_ALL_CORE_FT
PAST SURGICAL HISTORY:  History of surgery LEFT LOWER LOBECTOMY    History of surgery BILATERAL KNEE REPLACEMENT    History of surgery CATARACT RIGHT EYE    History of surgery TUBAL LIGATION    History of surgery CYST REMOVED  RIGHT BREAST    History of surgery CHOLECYSTECOMY    History of surgery RIGHT PARTIAL NEPHRECTOMY    History of surgery BILATERAL CATARACT SURGERY     PAST SURGICAL HISTORY:  History of back surgery     History of surgery LEFT LOWER LOBECTOMY    History of surgery BILATERAL KNEE REPLACEMENT    History of surgery CATARACT RIGHT EYE    History of surgery TUBAL LIGATION    History of surgery CYST REMOVED  RIGHT BREAST    History of surgery CHOLECYSTECOMY    History of surgery RIGHT PARTIAL NEPHRECTOMY    History of surgery BILATERAL CATARACT SURGERY

## 2023-02-27 ENCOUNTER — APPOINTMENT (OUTPATIENT)
Dept: NEUROSURGERY | Facility: CLINIC | Age: 83
End: 2023-02-27
Payer: MEDICARE

## 2023-02-27 ENCOUNTER — APPOINTMENT (OUTPATIENT)
Dept: PULMONOLOGY | Facility: CLINIC | Age: 83
End: 2023-02-27
Payer: MEDICARE

## 2023-02-27 VITALS
SYSTOLIC BLOOD PRESSURE: 130 MMHG | BODY MASS INDEX: 29.06 KG/M2 | HEART RATE: 62 BPM | RESPIRATION RATE: 12 BRPM | OXYGEN SATURATION: 95 % | HEIGHT: 63 IN | WEIGHT: 164 LBS | DIASTOLIC BLOOD PRESSURE: 80 MMHG

## 2023-02-27 VITALS — WEIGHT: 158 LBS | HEIGHT: 63 IN | BODY MASS INDEX: 28 KG/M2

## 2023-02-27 DIAGNOSIS — M54.16 RADICULOPATHY, LUMBAR REGION: ICD-10-CM

## 2023-02-27 DIAGNOSIS — G47.33 OBSTRUCTIVE SLEEP APNEA (ADULT) (PEDIATRIC): ICD-10-CM

## 2023-02-27 DIAGNOSIS — J44.9 CHRONIC OBSTRUCTIVE PULMONARY DISEASE, UNSPECIFIED: ICD-10-CM

## 2023-02-27 PROCEDURE — 99212 OFFICE O/P EST SF 10 MIN: CPT

## 2023-02-27 PROCEDURE — 99213 OFFICE O/P EST LOW 20 MIN: CPT

## 2023-02-27 NOTE — DISCUSSION/SUMMARY
[FreeTextEntry1] : COPD STABLE AS NEEDED RESCUE\par SOB ON EXERTION\par LUNG NODULE REPEAT CHEST CT REVIEWED MILD INCREASE/ PET SCAN\par HEMOC NOTE REVIEWED\par WILL START MAINTENANCE\par

## 2023-02-27 NOTE — ASSESSMENT
[FreeTextEntry1] : This is an 82-year-old female who presents for neurosurgical revisit, she has a pertinent past surgical history which includes a left L4-S1 laminoforaminotomy which took place in late October 2022.  She has done exceptionally well postoperatively with significant improvements with regards to pain and overall function and activity.  She remains largely content with her response to treatment and will continue with physical therapy efforts to improve her overall strength and mobility.\par \par All questions and concerns have been answered on the part of the patient and her daughter at this time she will return to the office as needed moving forward and has my business card if any issues were to develop in the interim.\par \par Carolina Bain PA-C\par Laura Weeks MD

## 2023-02-27 NOTE — HISTORY OF PRESENT ILLNESS
[TextBox_4] : DOING WELL, LOST WEIGHT, SOB ON EXERTION, LAST CHEST CT  7/22 REVIEWED MILD INCREASE IN SIZE\par COPD ON INHALERS AS NEEDES\par SP RECENT ED VISIT REPEAT CT NOTED

## 2023-02-27 NOTE — HISTORY OF PRESENT ILLNESS
[FreeTextEntry1] : This is an 82-year-old female who presents for neurosurgical revisit.  As a review, in October 2022 she had undergone a laminoforaminotomy at left L4-5, L5-S1.  She has noted excellent relief thus far.  She has noted significant improvements with regards to pain as well as improvements with regards to her overall functional activity; pain level of 0 reported at this time.  She is able to stand at her counter and prepare food, wash dishes.  She is also able to stand and ambulate for an unlimited distance.  School therapy has been quite helpful thus far and she wishes to continue with such efforts.\par \par PHYSICAL EXAM: \par \par Constitutional: Well appearing, no distress\par HEENT: Normocephalic Atraumatic\par Psychiatric: Alert and oriented to all spheres, normal mood\par Pulmonary: No respiratory distress\par \par Neurologic: \par CN II-XII grossly intact\par Palpation: no pain to palpation \par Strength: LLE: hip flexor 4+/5\par Sensation: Full sensation to light touch in all extremities\par Reflexes: \par  2+ patellar\par  2+ ankle jerk\par \par ROM intact\par \par Signs:\par SLR negative\par \par Gait: steady, walking w/o assistance.\par

## 2023-02-27 NOTE — REASON FOR VISIT
[Follow-Up] : a follow-up visit [Abnormal CXR/ Chest CT] : an abnormal CXR/ chest CT [Cough] : cough [COPD] : COPD [Shortness of Breath] : shortness of breath [Pulmonary Nodules] : pulmonary nodules

## 2023-02-28 DIAGNOSIS — R92.8 OTHER ABNORMAL AND INCONCLUSIVE FINDINGS ON DIAGNOSTIC IMAGING OF BREAST: ICD-10-CM

## 2023-03-01 ENCOUNTER — APPOINTMENT (OUTPATIENT)
Dept: HEMATOLOGY ONCOLOGY | Facility: CLINIC | Age: 83
End: 2023-03-01

## 2023-03-06 ENCOUNTER — OUTPATIENT (OUTPATIENT)
Dept: OUTPATIENT SERVICES | Facility: HOSPITAL | Age: 83
LOS: 1 days | End: 2023-03-06
Payer: MEDICARE

## 2023-03-06 DIAGNOSIS — Z98.890 OTHER SPECIFIED POSTPROCEDURAL STATES: Chronic | ICD-10-CM

## 2023-03-06 DIAGNOSIS — C18.9 MALIGNANT NEOPLASM OF COLON, UNSPECIFIED: ICD-10-CM

## 2023-03-06 DIAGNOSIS — C64.9 MALIGNANT NEOPLASM OF UNSPECIFIED KIDNEY, EXCEPT RENAL PELVIS: ICD-10-CM

## 2023-03-06 DIAGNOSIS — Z00.8 ENCOUNTER FOR OTHER GENERAL EXAMINATION: ICD-10-CM

## 2023-03-06 PROCEDURE — 76775 US EXAM ABDO BACK WALL LIM: CPT

## 2023-03-06 PROCEDURE — 76775 US EXAM ABDO BACK WALL LIM: CPT | Mod: 26

## 2023-03-07 ENCOUNTER — OUTPATIENT (OUTPATIENT)
Dept: OUTPATIENT SERVICES | Facility: HOSPITAL | Age: 83
LOS: 1 days | End: 2023-03-07
Payer: MEDICARE

## 2023-03-07 ENCOUNTER — RESULT REVIEW (OUTPATIENT)
Age: 83
End: 2023-03-07

## 2023-03-07 DIAGNOSIS — Z98.890 OTHER SPECIFIED POSTPROCEDURAL STATES: Chronic | ICD-10-CM

## 2023-03-07 DIAGNOSIS — R91.1 SOLITARY PULMONARY NODULE: ICD-10-CM

## 2023-03-07 DIAGNOSIS — C64.9 MALIGNANT NEOPLASM OF UNSPECIFIED KIDNEY, EXCEPT RENAL PELVIS: ICD-10-CM

## 2023-03-07 DIAGNOSIS — Z00.8 ENCOUNTER FOR OTHER GENERAL EXAMINATION: ICD-10-CM

## 2023-03-07 DIAGNOSIS — C18.9 MALIGNANT NEOPLASM OF COLON, UNSPECIFIED: ICD-10-CM

## 2023-03-07 LAB — GLUCOSE BLDC GLUCOMTR-MCNC: 88 MG/DL — SIGNIFICANT CHANGE UP (ref 70–99)

## 2023-03-07 PROCEDURE — 78815 PET IMAGE W/CT SKULL-THIGH: CPT | Mod: MH,PS

## 2023-03-07 PROCEDURE — A9552: CPT

## 2023-03-07 PROCEDURE — 82962 GLUCOSE BLOOD TEST: CPT

## 2023-03-07 PROCEDURE — 78815 PET IMAGE W/CT SKULL-THIGH: CPT | Mod: 26,PS,MH

## 2023-03-08 DIAGNOSIS — R91.1 SOLITARY PULMONARY NODULE: ICD-10-CM

## 2023-03-13 ENCOUNTER — APPOINTMENT (OUTPATIENT)
Dept: HEMATOLOGY ONCOLOGY | Facility: CLINIC | Age: 83
End: 2023-03-13
Payer: MEDICARE

## 2023-03-13 ENCOUNTER — OUTPATIENT (OUTPATIENT)
Dept: OUTPATIENT SERVICES | Facility: HOSPITAL | Age: 83
LOS: 1 days | End: 2023-03-13
Payer: MEDICARE

## 2023-03-13 ENCOUNTER — APPOINTMENT (OUTPATIENT)
Age: 83
End: 2023-03-13

## 2023-03-13 ENCOUNTER — LABORATORY RESULT (OUTPATIENT)
Age: 83
End: 2023-03-13

## 2023-03-13 VITALS
HEART RATE: 53 BPM | SYSTOLIC BLOOD PRESSURE: 150 MMHG | BODY MASS INDEX: 27.64 KG/M2 | TEMPERATURE: 97.4 F | HEIGHT: 63 IN | WEIGHT: 156 LBS | DIASTOLIC BLOOD PRESSURE: 87 MMHG | RESPIRATION RATE: 16 BRPM

## 2023-03-13 DIAGNOSIS — Z98.890 OTHER SPECIFIED POSTPROCEDURAL STATES: Chronic | ICD-10-CM

## 2023-03-13 DIAGNOSIS — J44.9 CHRONIC OBSTRUCTIVE PULMONARY DISEASE, UNSPECIFIED: ICD-10-CM

## 2023-03-13 LAB
ALBUMIN SERPL ELPH-MCNC: 4.3 G/DL
ALP BLD-CCNC: 118 U/L
ALT SERPL-CCNC: 10 U/L
ANION GAP SERPL CALC-SCNC: 11 MMOL/L
AST SERPL-CCNC: 12 U/L
BILIRUB SERPL-MCNC: 0.5 MG/DL
BUN SERPL-MCNC: 24 MG/DL
CALCIUM SERPL-MCNC: 10.3 MG/DL
CHLORIDE SERPL-SCNC: 102 MMOL/L
CO2 SERPL-SCNC: 25 MMOL/L
CREAT SERPL-MCNC: 0.8 MG/DL
EGFR: 74 ML/MIN/1.73M2
GLUCOSE SERPL-MCNC: 106 MG/DL
HCT VFR BLD CALC: 43.7 %
HGB BLD-MCNC: 14.7 G/DL
IRON SATN MFR SERPL: 32 %
IRON SERPL-MCNC: 102 UG/DL
MCHC RBC-ENTMCNC: 29.5 PG
MCHC RBC-ENTMCNC: 33.6 G/DL
MCV RBC AUTO: 87.8 FL
PLATELET # BLD AUTO: 176 K/UL
PMV BLD: 11 FL
POTASSIUM SERPL-SCNC: 4.3 MMOL/L
PROT SERPL-MCNC: 6.8 G/DL
RBC # BLD: 4.98 M/UL
RBC # FLD: 13.4 %
SODIUM SERPL-SCNC: 138 MMOL/L
TIBC SERPL-MCNC: 314 UG/DL
UIBC SERPL-MCNC: 212 UG/DL
WBC # FLD AUTO: 6.27 K/UL

## 2023-03-13 PROCEDURE — 80053 COMPREHEN METABOLIC PANEL: CPT

## 2023-03-13 PROCEDURE — 99215 OFFICE O/P EST HI 40 MIN: CPT

## 2023-03-13 PROCEDURE — 36415 COLL VENOUS BLD VENIPUNCTURE: CPT

## 2023-03-13 PROCEDURE — 83540 ASSAY OF IRON: CPT

## 2023-03-13 PROCEDURE — 82728 ASSAY OF FERRITIN: CPT

## 2023-03-13 PROCEDURE — 85027 COMPLETE CBC AUTOMATED: CPT

## 2023-03-13 PROCEDURE — 83550 IRON BINDING TEST: CPT

## 2023-03-13 NOTE — HISTORY OF PRESENT ILLNESS
[de-identified] : Ms. 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 Cody Calixto.  She is here with her daughter Brandy.  \par \par She had a history of Kidney Cancer s/p partial nephrectomy and distant history of Thyroid Cancer s/p partial thyroidectomy.  She also had a Left Lower Lobectomy for Stage IA lung cancer. For dates see bellow.\par \par She had CT scan and subsequent PET/CT that showed multiple left pleural based nodules in addition to a para-aortic lymph node.  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 The differential diagnosis includes low grade follicular lymphoma and CD10+ marginal zone lymphoma. She denies any B-symptoms at this time other than recent weight loss of 10lbs over last month, however she reports she had not been eating due to a recent GI bug.\par \par She is here for further recommendations. \par Radiological Work-up:\par PET/CT (2/21/19) IMPRESSION: Since August 20, 2010: 1. Multiple sites of left pleural-based FDG avid nodularity, catalogued above, with max SUV 5.2, suspicious for biological tumor activity. Metastatic or primary pleural neoplasm are considerations. 2. Intra-abdominal FDG avid 1.7 x 1.2 cm left para-aortic lymph node, max SUV 5.4, suspicious for lymph node metastasis. \par 1/30/2019: [facility] [place]:  comparison was made to Ct from 7/2018 Multiple Plural nodules along the posterior paramediastinal surface, some are mildly larger there are new nodules as well. largest nodule 10 mm\par CT Head (9/15/18) IMPRESSION: 1. Mild periventricular and subcortical white matter chronic small vessel ischemic changes. 2. No acute fractures, mass effect, midline shift or hemorrhage. \par CT cervical spine (9/15/18) IMPRESSION: 1. Degenerative changes of the cervical spine C2-3 through C6-7 worse at C5-6 as described above. 2. Straightening of normal cervical lordosis with mild anterolisthesis of C3 anterior on C4, C6 on C7, C7 on T1 and T1 on T2. 3. No acute fractures or dislocations. \par 1/22/18: MR L spine: advance lymbar spondylosis, disck osteophytes, severe neuroforamin narrowing with  ipingment of L4 nerve,  3.5 cm AAA, 8 mm adrenal nodule.\par Pathology:\par (3/14/19) Final Diagnosis Left parotid lymph node, needle biopsy: Low grade B-cell lymphoma. \par Immunostains performed with adequate controls on sections from block 1A show the infiltrating cells are CD20+ CD79a+ B-cells that coexpress CD10, CD23(dim variable), and BCL2. They are negative for CD5, CD43, cyclin D1, and MUM1. BCL6 is difficult to determine due to the high background staining. Ki67 labels 5-10% of these cells. CD21 highlights scattered follicular dendritic cell meshworks.  Per report ([id number]), flow cytometry studies performed at [facility] show monotypic B-cells (10% of cells), positive for kappa, CD19, CD20, CD10; negative CD5. Heterogeneous population of T-cells (with normal CD4 to CD8 ratio), and polytypic B-cells are also present. The findings are diagnostic of low grade B-cell lymphoma. The differential diagnosis includes low grade follicular lymphoma and CD10+ marginal zone lymphoma.\par \par Health Maintenance:\par Colonoscopy \par Mammogram \par \par Recent blood work is in HIE. Was normal except for a high sugar and calcium of 10.2 [de-identified] : 7/10/19\par She was supposed to go for biopsy of pleural based nodularity that resolved prior to procedure.  She will see PULM in a few weeks.  She has been purposely losing weight, using Trajenta.  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  Denies any fevers, chills, unintentional weight loss, or night sweats.\par CT Chest (4/11/19) IMPRESSION:Since February 21, 2019;1. Interval resolution of previously described left-sided FDG avid pleural-based nodularity. No biopsy performed.2. Multiple stable nodules, as above.3. Unchanged left periaortic 1.4 cm lymph node, previously noted to be FDG avid.4. Stable right hydronephrosis and right-sided ureteral calculi.5. Stable infrarenal abdominal aortic aneurysm measuring up to 3.7 cm.\par \par 1/8/20\par She is here for follow up. Since last visit she had a CT C/A/P in 7/18/2019 that showed  Stable 1.1 cm left para-aortic lymph node on series 2 image 62. No new lymph nodes in the abdomen and pelvis. \par Interval development of moderate to severe right hydroureteronephrosis leading to 2 proximal ureteral calculi \par (superior calculus measuring 5 x 4 x 5 mm, 1000 Hounsfield units and the inferior adjacent calculus measuring 5 \par x 7 x 6 mm, 573 Hounsfield units). \par Bilateral intrarenal calculi. \par Stable 3.7 x 4.3 cm simple appearing right adnexal cyst. Recommend one-year follow-up pelvic ultrasound.\par Impression: \par Since April 11, 2019. \par No significant interval change in multiple left-sided pleural-based nodules, largest approximately 1.6 cm \par (remeasured on earlier study), left lung base (series 5/149). \par Post left lower lobectomy.\par \par She was seen by Urology and had lithotripsy. \par \par She feels well. \par \par 7/8/20\par She is here for follow up. She feels well. No B symptoms. She is pening a CT chest ordered by Dr. Morales\par \par 1/11/2021: The patient is here for follow up. She has no B symptoms , no cough , no chest pain , no new palpable lymph nodes. She is complaing of sciatica, going for an injection next week. She also had LEs cellulitis , completed once course of antibiotics. \par \par \par 5/19/21\par We had a telephone visit today. She feels well.  She had CT chest on 2/21 that showed Since July 14, 2020,\par \par  interval development of wedge-shaped consolidation posterior left upper lobe. (2/31).\par \par Minimal interval growth of multiple pleural-based nodules as described above\par \par Numerous stable left-sided pleural based nodules and adjacent parenchymal nodules..\par \par Reviewed her CT in tumor board. Chest. We were diveded on if we should rescan in 3 months with CT or check PET/cT now. I spoke to her and we decided to check a CT Chest in 3 months. \par \par She feels well. \par \par 9/15/21\par Patient is here for a follow-up visit for hx of lung cancer, NHL and pulmonary nodules.  She is feeling well with no new complaints.  Reviewed most recent CBC, which shows mild microcytic anemia.  Patient denies fever, chills, nausea, vomiting, dyspnea or bleeding.  She has not had recent colonoscopy, but has done Cologuard at home which was reportedly benign in recent years.  \par CT Chest (6.2.2021)IMPRESSION:Unchanged left circumferential subpleural nodularity largest measuring 1.6 cm.Resolved left lower lobe consolidation.\par \par 3/21/22\par Pt returns for f/u today . She reports that she was admitted at [facility] for episode for acute diverticulitis in November 2021, she was treated with IV abx , pt reports that she had imaging done at that time and it was only significant for acute diverticulitis ( we do not have the imaging results available .Since that time she reports  that she has been having episodes of constipation and diarrhea , she is on laxatives . She stopped taking po iron as it was making her GI symptoms worse . Her gastroenterologist did not suggest doing a colonoscopy. Pt denies any blood in stools. Denies any fever , chills , weight loss or loss of appetite. She was also seen by Dr Sweet. \par \par 6/20/22\par Pt returns for f/u today. Denies any fever , chills , weight loss or loss of appetite. In March ferritin was 17, Iron 38, iron sat 9%, Hgb 11.9, MCV 77.1. She had 5 doses of IV venofer tolerated well, she reports cold intolerance improved. Labs reviewed 6/13/22 hgb 13.3, MCV 84.9, Ferritin 112, Iron sat 13%, Iron 37. She had Kidney US on 6/8 which showed Indeterminate hypoechoic area involving the lower pole of the left kidney. Further evaluation with outpatient renal mass protocol CT is recommended. Focal dilatation of the right upper pole calyx. Bilateral renal calculi. Incidentally noted is an abdominal aortic aneurysm, similar in size to prior CT scan, measuring 4.2 cm. \par \par 10/12/2022\par She is here for follow up.   she had CT C/A/P in July 2022 \par Lungs, Pleura, and Airways:Trachea and central airways are patent. Post left lower lobectomy with expected postsurgical changes. Extensive left subpleural nodularity demonstrating slight interval increase since prior examinations including medial left upper lobe 2.7 x 1.0 cm nodular density (series 4 image 27) and anterior left upper lobe 1.1 x 1.0 cm nodular density (series 4 image 55).\par Stable right adnexal cystic lesion measuring 4.4 x 3.8 cm.\par \par Tubular/cystic appearing left adnexal lesion could represent a cyst or hydrosalpinx, not well seen on the 2/9/2021 CT. Recommend follow-up pelvic ultrasound.\par \par \par  I ordered a US pelvis hat showed   IMPRESSION:\par 1.  Likely benign 4 cm right adnexal/ovarian cyst. A follow-up pelvic ultrasound can be obtained in 6 months to assess for stability.\par 2.  Tubular cystic left adnexal lesion, likely hydrosalpinx.\par \par She was just in ER for pain and was noted to have a kidney stone  that moved and cauased hydroneprhosis. IMPRESSION: 10/6/2022\par \par 1. Moderate left hydroureteronephrosis with a 0.5 x 0.7 x 0.7 cm left \par midureter left mid ureter calculus.\par 2. Additional multiple nonobstructing bilateral renal calculi.\par \par She has follow up pending with Dr. Davis \par \par CBC from 10/6/2022 in ED showed hgb of 16.6 with normal MCV\par \par \par \par She is pending  surgery with Dr. Guaman for DJD\par \par 3/13/2023\par She is here for follow.  She saw Dr. Morales and he checked a PET/CT that was done on 3/7/2023:  IMPRESSION:\par 1.  Since July 18, 2019, substantially increased FDG avid diffuse soft tissue nodularity throughout the left pleural surface, compatible with biologic tumor activity (max SUV 19.5; reflecting 275% increase).\par 2.  New minimal FDG avid 5 mm right upper lobe solid pulmonary nodule ( positive on NAC images).\par 3.  New FDG avid mediastinal lymph nodes, suspicious for elizabeth metastases (max SUV 10.8 in a subcarinal lymph node).\par 4.  Focal FDG uptake in the right thyroid lobe (max SUV 7). Thyroid ultrasound can be obtained for further assessment.\par \par \par CBC today is normal. She is here with daughter. Lost a few pounds due to diverticular disease feels better now.\par \par I showed the images to the patient and reviewed it together she saw the pleural-based uptake as well as the mediastinal nodes\par \par \par

## 2023-03-13 NOTE — REVIEW OF SYSTEMS
[Fatigue] : fatigue [Negative] : Allergic/Immunologic [Recent Change In Weight] : ~T recent weight change [Fever] : no fever [Night Sweats] : no night sweats [Chest Pain] : no chest pain [Lower Ext Edema] : no lower extremity edema [Shortness Of Breath] : no shortness of breath [Easy Bleeding] : no tendency for easy bleeding [FreeTextEntry7] : occasional nausea [FreeTextEntry8] : urinary leakage sees Dr. Cobb [de-identified] : sciatica pain on lyrica

## 2023-03-13 NOTE — PHYSICAL EXAM
[Restricted in physically strenuous activity but ambulatory and able to carry out work of a light or sedentary nature] : Status 1- Restricted in physically strenuous activity but ambulatory and able to carry out work of a light or sedentary nature, e.g., light house work, office work [Normal] : affect appropriate [de-identified] : wears glasses [de-identified] : bilateral +1/2 lower extremity edema

## 2023-03-13 NOTE — ASSESSMENT
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 Left para-aortic lymph\par node". On observation since she is asymptomatic. \par Recent diverticulitis imaging done at that time showed no lymphadenopathy as per pt \par - No B symptoms\par -progression  given PET.CT vs unrelated\par \par #Anemia, mild, microcytic  resolved \par - S/p 5 doses of venofer in April. Ferritin improved 112, Iron sat 10%\par - recommended to f/u with GI she did but not a candidate for colonoscopy due to previous peroration and will monbitor  \par \par # Pleural Nodules on scans, these are not new, at least since 2018 But PET/CT now showed increase activity and new mediastinal node due to  sarcoid?, progression of lymphoma? RCC?\par -Explained she would benefit from a biopsy which include CT-guided biopsy of the pleural nodules versus an EBUS for the mediastinal nodes.  I explained I would speak with Dr. Morales to finalize plan.  He did call me back after the visit and we decided to start with CT-guided biopsy of the pleural nodules given the low likelihood of pneumothorax but of course IR has to agree with us.  If this is not a possibility then she will be referred to Dr. Julian Nicholas for EBUS.  I called the patient after the visit and left a voicemail that we will be going with a CT-guided biopsy around and she will see me once pathology is available\par \par \par # History of Thyroid cancer she states it was a partial thyroidectomy \par - This explains the presence of positive thyroglobulin 21 tumor marker \par -related to the PET.CT findings?\par \par # History of Lung Cancer and Plural Based Nodules \par -plan as above\par -there is also a new right 5 mm nodule but to small to biopsy will monitor \par \par \par # Adnexal cyst : PET/CT negative\par \par RTC post biopsy \par \par

## 2023-03-14 DIAGNOSIS — J44.9 CHRONIC OBSTRUCTIVE PULMONARY DISEASE, UNSPECIFIED: ICD-10-CM

## 2023-03-14 LAB — FERRITIN SERPL-MCNC: 84 NG/ML

## 2023-03-15 DIAGNOSIS — R22.2 LOCALIZED SWELLING, MASS AND LUMP, TRUNK: ICD-10-CM

## 2023-03-15 DIAGNOSIS — C64.9 MALIGNANT NEOPLASM OF UNSPECIFIED KIDNEY, EXCEPT RENAL PELVIS: ICD-10-CM

## 2023-03-21 ENCOUNTER — OUTPATIENT (OUTPATIENT)
Dept: OUTPATIENT SERVICES | Facility: HOSPITAL | Age: 83
LOS: 1 days | End: 2023-03-21
Payer: MEDICARE

## 2023-03-21 VITALS
OXYGEN SATURATION: 99 % | WEIGHT: 158.95 LBS | HEART RATE: 80 BPM | DIASTOLIC BLOOD PRESSURE: 89 MMHG | HEIGHT: 63 IN | RESPIRATION RATE: 19 BRPM | TEMPERATURE: 99 F | SYSTOLIC BLOOD PRESSURE: 190 MMHG

## 2023-03-21 DIAGNOSIS — Z98.890 OTHER SPECIFIED POSTPROCEDURAL STATES: Chronic | ICD-10-CM

## 2023-03-21 DIAGNOSIS — Z01.818 ENCOUNTER FOR OTHER PREPROCEDURAL EXAMINATION: ICD-10-CM

## 2023-03-21 DIAGNOSIS — R22.2 LOCALIZED SWELLING, MASS AND LUMP, TRUNK: ICD-10-CM

## 2023-03-21 LAB
ALBUMIN SERPL ELPH-MCNC: 4.7 G/DL — SIGNIFICANT CHANGE UP (ref 3.5–5.2)
ALP SERPL-CCNC: 125 U/L — HIGH (ref 30–115)
ALT FLD-CCNC: 12 U/L — SIGNIFICANT CHANGE UP (ref 0–41)
ANION GAP SERPL CALC-SCNC: 7 MMOL/L — SIGNIFICANT CHANGE UP (ref 7–14)
APTT BLD: 34.1 SEC — SIGNIFICANT CHANGE UP (ref 27–39.2)
AST SERPL-CCNC: 12 U/L — SIGNIFICANT CHANGE UP (ref 0–41)
BASOPHILS # BLD AUTO: 0.05 K/UL — SIGNIFICANT CHANGE UP (ref 0–0.2)
BASOPHILS NFR BLD AUTO: 0.7 % — SIGNIFICANT CHANGE UP (ref 0–1)
BILIRUB SERPL-MCNC: 0.3 MG/DL — SIGNIFICANT CHANGE UP (ref 0.2–1.2)
BUN SERPL-MCNC: 24 MG/DL — HIGH (ref 10–20)
CALCIUM SERPL-MCNC: 10.6 MG/DL — HIGH (ref 8.4–10.5)
CHLORIDE SERPL-SCNC: 102 MMOL/L — SIGNIFICANT CHANGE UP (ref 98–110)
CO2 SERPL-SCNC: 29 MMOL/L — SIGNIFICANT CHANGE UP (ref 17–32)
CREAT SERPL-MCNC: 0.8 MG/DL — SIGNIFICANT CHANGE UP (ref 0.7–1.5)
EGFR: 74 ML/MIN/1.73M2 — SIGNIFICANT CHANGE UP
EOSINOPHIL # BLD AUTO: 0.12 K/UL — SIGNIFICANT CHANGE UP (ref 0–0.7)
EOSINOPHIL NFR BLD AUTO: 1.8 % — SIGNIFICANT CHANGE UP (ref 0–8)
GLUCOSE SERPL-MCNC: 72 MG/DL — SIGNIFICANT CHANGE UP (ref 70–99)
HCT VFR BLD CALC: 45.3 % — SIGNIFICANT CHANGE UP (ref 37–47)
HGB BLD-MCNC: 14.9 G/DL — SIGNIFICANT CHANGE UP (ref 12–16)
IMM GRANULOCYTES NFR BLD AUTO: 0.3 % — SIGNIFICANT CHANGE UP (ref 0.1–0.3)
INR BLD: 0.91 RATIO — SIGNIFICANT CHANGE UP (ref 0.65–1.3)
LYMPHOCYTES # BLD AUTO: 1.84 K/UL — SIGNIFICANT CHANGE UP (ref 1.2–3.4)
LYMPHOCYTES # BLD AUTO: 27.1 % — SIGNIFICANT CHANGE UP (ref 20.5–51.1)
MCHC RBC-ENTMCNC: 29.3 PG — SIGNIFICANT CHANGE UP (ref 27–31)
MCHC RBC-ENTMCNC: 32.9 G/DL — SIGNIFICANT CHANGE UP (ref 32–37)
MCV RBC AUTO: 89 FL — SIGNIFICANT CHANGE UP (ref 81–99)
MONOCYTES # BLD AUTO: 0.55 K/UL — SIGNIFICANT CHANGE UP (ref 0.1–0.6)
MONOCYTES NFR BLD AUTO: 8.1 % — SIGNIFICANT CHANGE UP (ref 1.7–9.3)
NEUTROPHILS # BLD AUTO: 4.22 K/UL — SIGNIFICANT CHANGE UP (ref 1.4–6.5)
NEUTROPHILS NFR BLD AUTO: 62 % — SIGNIFICANT CHANGE UP (ref 42.2–75.2)
NRBC # BLD: 0 /100 WBCS — SIGNIFICANT CHANGE UP (ref 0–0)
PLATELET # BLD AUTO: 248 K/UL — SIGNIFICANT CHANGE UP (ref 130–400)
POTASSIUM SERPL-MCNC: 4.5 MMOL/L — SIGNIFICANT CHANGE UP (ref 3.5–5)
POTASSIUM SERPL-SCNC: 4.5 MMOL/L — SIGNIFICANT CHANGE UP (ref 3.5–5)
PROT SERPL-MCNC: 6.9 G/DL — SIGNIFICANT CHANGE UP (ref 6–8)
PROTHROM AB SERPL-ACNC: 10.3 SEC — SIGNIFICANT CHANGE UP (ref 9.95–12.87)
RBC # BLD: 5.09 M/UL — SIGNIFICANT CHANGE UP (ref 4.2–5.4)
RBC # FLD: 13.4 % — SIGNIFICANT CHANGE UP (ref 11.5–14.5)
SODIUM SERPL-SCNC: 138 MMOL/L — SIGNIFICANT CHANGE UP (ref 135–146)
WBC # BLD: 6.8 K/UL — SIGNIFICANT CHANGE UP (ref 4.8–10.8)
WBC # FLD AUTO: 6.8 K/UL — SIGNIFICANT CHANGE UP (ref 4.8–10.8)

## 2023-03-21 PROCEDURE — 85730 THROMBOPLASTIN TIME PARTIAL: CPT

## 2023-03-21 PROCEDURE — 36415 COLL VENOUS BLD VENIPUNCTURE: CPT

## 2023-03-21 PROCEDURE — 93005 ELECTROCARDIOGRAM TRACING: CPT

## 2023-03-21 PROCEDURE — 85610 PROTHROMBIN TIME: CPT

## 2023-03-21 PROCEDURE — 80053 COMPREHEN METABOLIC PANEL: CPT

## 2023-03-21 PROCEDURE — 99214 OFFICE O/P EST MOD 30 MIN: CPT | Mod: 25

## 2023-03-21 PROCEDURE — 93010 ELECTROCARDIOGRAM REPORT: CPT

## 2023-03-21 PROCEDURE — 85025 COMPLETE CBC W/AUTO DIFF WBC: CPT

## 2023-03-21 NOTE — H&P PST ADULT - NSICDXPASTMEDICALHX_GEN_ALL_CORE_FT
PAST MEDICAL HISTORY:  Cancer of kidney     Cancer of thyroid     COPD (chronic obstructive pulmonary disease)     Diverticulitis     Former smoker     GERD (gastroesophageal reflux disease)     History of abdominal aortic aneurysm (AAA)     Hyperlipidemia, unspecified hyperlipidemia type     Hypertension, unspecified type     Kidney stones     Lung cancer     NHL (non-Hodgkin's lymphoma) "low grade" per heme/ onc note    Obesity     ARTIE (obstructive sleep apnea) NO CPAP MACHINE PER PT.    SOB (shortness of breath)     Type 2 diabetes mellitus with complication, without long-term current use of insulin

## 2023-03-21 NOTE — H&P PST ADULT - HISTORY OF PRESENT ILLNESS
PATIENT DENIES CHEST PAIN,  PALPITATIONS, COUGHING, FEVER, DYSURIA.  + SOB WITH MINIMAL EXERTION.  CANNOT  WALK UP ANY STEPS WITHOUT SOB.    NO COUGH, FEVER, SORE THROAT, HEADACHE, LOSS OF TASTE OR SMELL. NO KNOWN EXPOSURE TO ANYONE WITH COVID. PATIENT WAS INSTRUCTED TO ISOLATE FROM NOW UNTIL THE SURGERY.    Anesthesia Alert  NO--Difficult Airway (CLASS IV)  NO--History of neck surgery or radiation  NO--Limited ROM of neck  NO--History of Malignant hyperthermia  NO--Personal or family history of Pseudocholinesterase deficiency  NO--Prior Anesthesia Complication  NO--Latex Allergy  NO--Loose teeth  NO--History of Rheumatoid Arthritis  + ARTIE (NO CPAP)  NO--bleeding risk

## 2023-03-21 NOTE — H&P PST ADULT - NSICDXPASTSURGICALHX_GEN_ALL_CORE_FT
PAST SURGICAL HISTORY:  H/O lithotripsy     History of back surgery     History of surgery LEFT LOWER LOBECTOMY    History of surgery BILATERAL KNEE REPLACEMENT    History of surgery CATARACT RIGHT EYE    History of surgery TUBAL LIGATION    History of surgery CYST REMOVED  RIGHT BREAST    History of surgery CHOLECYSTECOMY    History of surgery RIGHT PARTIAL NEPHRECTOMY    History of surgery BILATERAL CATARACT SURGERY

## 2023-03-21 NOTE — H&P PST ADULT - REASON FOR ADMISSION
81 Y/O FEMALE HERE FOR PRE-ADMISSION SURGICAL TESTING. PATIENT REPORTS SHE WAS DIAGNOSED WITH LUNG CANCER IN 2010. S/P LEFT LOWER LOBECTOMY. ROUTINE CT SCAN REVEALED A MASS TO THE LEFT UPPER LOBE. MILD INCREASE SINCE LAST CT SCAN. LOST 30 POUNDS IN 1 YEAR WITHOUT TRYING.  NOW FOR SCHEDULED LUNG BIOPSY.

## 2023-03-21 NOTE — H&P PST ADULT - MUSCULOSKELETAL
normal/ROM intact/normal gait/strength 5/5 bilateral upper extremities/strength 5/5 bilateral lower extremities/back exam

## 2023-03-22 DIAGNOSIS — R22.2 LOCALIZED SWELLING, MASS AND LUMP, TRUNK: ICD-10-CM

## 2023-03-22 DIAGNOSIS — Z01.818 ENCOUNTER FOR OTHER PREPROCEDURAL EXAMINATION: ICD-10-CM

## 2023-03-28 ENCOUNTER — RESULT REVIEW (OUTPATIENT)
Age: 83
End: 2023-03-28

## 2023-03-28 ENCOUNTER — TRANSCRIPTION ENCOUNTER (OUTPATIENT)
Age: 83
End: 2023-03-28

## 2023-03-28 ENCOUNTER — OUTPATIENT (OUTPATIENT)
Dept: OUTPATIENT SERVICES | Facility: HOSPITAL | Age: 83
LOS: 1 days | Discharge: HOME | End: 2023-03-28
Payer: MEDICARE

## 2023-03-28 VITALS
RESPIRATION RATE: 18 BRPM | HEART RATE: 67 BPM | OXYGEN SATURATION: 98 % | DIASTOLIC BLOOD PRESSURE: 74 MMHG | SYSTOLIC BLOOD PRESSURE: 143 MMHG

## 2023-03-28 VITALS
SYSTOLIC BLOOD PRESSURE: 174 MMHG | HEIGHT: 63 IN | TEMPERATURE: 98 F | HEART RATE: 69 BPM | OXYGEN SATURATION: 95 % | RESPIRATION RATE: 18 BRPM | WEIGHT: 154.98 LBS | DIASTOLIC BLOOD PRESSURE: 80 MMHG

## 2023-03-28 DIAGNOSIS — Z98.890 OTHER SPECIFIED POSTPROCEDURAL STATES: Chronic | ICD-10-CM

## 2023-03-28 DIAGNOSIS — J98.8 OTHER SPECIFIED RESPIRATORY DISORDERS: ICD-10-CM

## 2023-03-28 DIAGNOSIS — R22.2 LOCALIZED SWELLING, MASS AND LUMP, TRUNK: ICD-10-CM

## 2023-03-28 LAB — GLUCOSE BLDC GLUCOMTR-MCNC: 88 MG/DL — SIGNIFICANT CHANGE UP (ref 70–99)

## 2023-03-28 PROCEDURE — 77012 CT SCAN FOR NEEDLE BIOPSY: CPT | Mod: 26

## 2023-03-28 PROCEDURE — 88333 PATH CONSLTJ SURG CYTO XM 1: CPT

## 2023-03-28 PROCEDURE — 82962 GLUCOSE BLOOD TEST: CPT

## 2023-03-28 PROCEDURE — 32400 NEEDLE BIOPSY CHEST LINING: CPT | Mod: LT

## 2023-03-28 PROCEDURE — 77012 CT SCAN FOR NEEDLE BIOPSY: CPT

## 2023-03-28 PROCEDURE — 99152 MOD SED SAME PHYS/QHP 5/>YRS: CPT

## 2023-03-28 PROCEDURE — 88333 PATH CONSLTJ SURG CYTO XM 1: CPT | Mod: 26

## 2023-03-28 PROCEDURE — 88305 TISSUE EXAM BY PATHOLOGIST: CPT

## 2023-03-28 PROCEDURE — 99153 MOD SED SAME PHYS/QHP EA: CPT | Mod: 59

## 2023-03-28 PROCEDURE — 88305 TISSUE EXAM BY PATHOLOGIST: CPT | Mod: 26

## 2023-03-28 NOTE — PROGRESS NOTE ADULT - SUBJECTIVE AND OBJECTIVE BOX
INTERVENTIONAL RADIOLOGY BRIEF-OPERATIVE NOTE    Procedure:  Percutaneous, CT-directed core needle biopsy of left pleural mass    Pre-Op Diagnosis:  Left pleural masses; H/o thyroid, renal, left lung cancers and NHL    Post-Op Diagnosis:  Same    Attending:  Sofi (IR) / Edmond (cytopathologist)  Resident:   None    Anesthesia (type):  [ ] General Anesthesia  [X] Sedation-- Versed, 0.5mg and Fentanyl, 50 mcg, iv (in divided doses)  [ ] Spinal Anesthesia  [X] Local/Regional-- 1% Lidocaine, SQ, posterolateral inferior left chest wall, 5 cc    Total Face-to-Face Sedation Time:  65 minutes      Contrast:  None    Blood Loss:  3 cc    Condition:   [ ] Critical  [ ] Serious  [ ] Fair   [X] Good    Findings/Follow up Plan of Care:  Area of pleural thickening corresponding to FDG-avid lesion reported on recent PET-CT was biopsied using a 17/18G, 6/10 cm Temno coaxial needle system with CT-guidance:  Six 18G tissue cores obtained, for which laboratory evaluation is pending.  Post-biopsy CT scan showed no pneumothorax or contusion.  Patient tolerated well, without incident.    Specimens Removed:  Six 18G tissue cores    Implants:  None    Complications:  None immediate    Disposition:  Recovery, then discharge home and f/u Dr. Guerrero.      Please call Interventional Radiology [phone] with any questions, concerns, or issues.        
PRE-OPERATIVE DAY OF PROCEDURE EVALUATION:     I have personally seen and examined this patient.  I agree with the history and physical which I have reviewed and noted any changes below:     Plan is for percutaneous, CT-directed needle biopsy of left pleural mass with intravenous conscious sedation and possible chest tube placement.    Procedure/ risks/ benefits/ goals/ alternatives were explained.  All questions answered.  Informed content obtained from patient.  Consent placed in chart.

## 2023-03-28 NOTE — ASU PATIENT PROFILE, ADULT - NS PRO LACT YNNA
“You can access the FollowHealth Patient Portal, offered by [facility], by registering with the following website: [email or web address]”
no

## 2023-03-29 PROBLEM — N20.0 CALCULUS OF KIDNEY: Chronic | Status: ACTIVE | Noted: 2023-03-21

## 2023-03-29 PROBLEM — Z86.79 PERSONAL HISTORY OF OTHER DISEASES OF THE CIRCULATORY SYSTEM: Chronic | Status: ACTIVE | Noted: 2023-03-21

## 2023-03-31 LAB — NON-GYNECOLOGICAL CYTOLOGY STUDY: SIGNIFICANT CHANGE UP

## 2023-04-20 ENCOUNTER — NON-APPOINTMENT (OUTPATIENT)
Age: 83
End: 2023-04-20

## 2023-04-24 ENCOUNTER — LABORATORY RESULT (OUTPATIENT)
Age: 83
End: 2023-04-24

## 2023-04-24 ENCOUNTER — OUTPATIENT (OUTPATIENT)
Dept: OUTPATIENT SERVICES | Facility: HOSPITAL | Age: 83
LOS: 1 days | End: 2023-04-24
Payer: MEDICARE

## 2023-04-24 ENCOUNTER — APPOINTMENT (OUTPATIENT)
Dept: HEMATOLOGY ONCOLOGY | Facility: CLINIC | Age: 83
End: 2023-04-24
Payer: MEDICARE

## 2023-04-24 VITALS
WEIGHT: 158 LBS | HEIGHT: 63 IN | HEART RATE: 54 BPM | TEMPERATURE: 97.6 F | BODY MASS INDEX: 28 KG/M2 | DIASTOLIC BLOOD PRESSURE: 79 MMHG | SYSTOLIC BLOOD PRESSURE: 148 MMHG

## 2023-04-24 DIAGNOSIS — J44.9 CHRONIC OBSTRUCTIVE PULMONARY DISEASE, UNSPECIFIED: ICD-10-CM

## 2023-04-24 DIAGNOSIS — Z98.890 OTHER SPECIFIED POSTPROCEDURAL STATES: Chronic | ICD-10-CM

## 2023-04-24 DIAGNOSIS — R22.2 LOCALIZED SWELLING, MASS AND LUMP, TRUNK: ICD-10-CM

## 2023-04-24 DIAGNOSIS — C64.9 MALIGNANT NEOPLASM OF UNSPECIFIED KIDNEY, EXCEPT RENAL PELVIS: ICD-10-CM

## 2023-04-24 LAB
HCT VFR BLD CALC: 44.8 %
HGB BLD-MCNC: 14.3 G/DL
MCHC RBC-ENTMCNC: 28.6 PG
MCHC RBC-ENTMCNC: 31.9 G/DL
MCV RBC AUTO: 89.6 FL
PLATELET # BLD AUTO: 204 K/UL
PMV BLD: 10.7 FL
RBC # BLD: 5 M/UL
RBC # FLD: 13.2 %
WBC # FLD AUTO: 6.58 K/UL

## 2023-04-24 PROCEDURE — 99214 OFFICE O/P EST MOD 30 MIN: CPT

## 2023-04-24 PROCEDURE — 36415 COLL VENOUS BLD VENIPUNCTURE: CPT

## 2023-04-24 PROCEDURE — 85027 COMPLETE CBC AUTOMATED: CPT

## 2023-04-24 PROCEDURE — 80053 COMPREHEN METABOLIC PANEL: CPT

## 2023-04-25 LAB
ALBUMIN SERPL ELPH-MCNC: 4.1 G/DL
ALP BLD-CCNC: 116 U/L
ALT SERPL-CCNC: 11 U/L
ANION GAP SERPL CALC-SCNC: 10 MMOL/L
AST SERPL-CCNC: 11 U/L
BILIRUB SERPL-MCNC: 0.5 MG/DL
BUN SERPL-MCNC: 29 MG/DL
CALCIUM SERPL-MCNC: 10.3 MG/DL
CHLORIDE SERPL-SCNC: 106 MMOL/L
CO2 SERPL-SCNC: 25 MMOL/L
CREAT SERPL-MCNC: 0.9 MG/DL
EGFR: 64 ML/MIN/1.73M2
GLUCOSE SERPL-MCNC: 125 MG/DL
POTASSIUM SERPL-SCNC: 4.1 MMOL/L
PROT SERPL-MCNC: 6.4 G/DL
SODIUM SERPL-SCNC: 141 MMOL/L

## 2023-04-27 PROBLEM — R22.2 PLEURAL NODULES: Status: ACTIVE | Noted: 2019-04-03

## 2023-04-27 NOTE — CONSULT LETTER
[Dear  ___] : Dear  [unfilled], [Consult Letter:] : I had the pleasure of evaluating your patient, [unfilled]. [Please see my note below.] : Please see my note below. [Consult Closing:] : Thank you very much for allowing me to participate in the care of this patient.  If you have any questions, please do not hesitate to contact me. [Sincerely,] : Sincerely, [DrJoaquín  ___] : Dr. RANEGL [DrJoaquín ___] : Dr. RANGEL [FreeTextEntry3] : Dr. Zaheer Guerrero

## 2023-04-27 NOTE — ASSESSMENT
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 Left para-aortic lymph\par node". On observation since she is asymptomatic. \par Recent diverticulitis imaging done at that time showed no lymphadenopathy as per pt \par - No B symptoms\par -progression  given PET.CT vs unrelated\par \par #Anemia, mild, microcytic  resolved \par - S/p 5 doses of venofer in April. Ferritin improved 112, Iron sat 10%\par - recommended to f/u with GI she did but not a candidate for colonoscopy due to previous peroration and will monbitor  \par \par # Pleural Nodules on scans, these are not new, at least since 2018 But PET/CT now showed increase activity and new mediastinal node due to  sarcoid?, progression of lymphoma? RCC?\par -CT guided FNA biopsy was non daignostic of the pleural nodule\par -as discussed above she decided to proceed with mediastinoscopy\par \par \par # History of Thyroid cancer she states it was a partial thyroidectomy \par - This explains the presence of positive thyroglobulin 21 tumor marker \par -related to the PET.CT findings?\par \par # History of Lung Cancer and Plural Based Nodules \par -plan as above\par -there is also a new right 5 mm nodule but to small to biopsy will monitor \par \par \par # Adnexal cyst : PET/CT negative\par \par RTC post biopsy results from mediastinoscopy\par \par

## 2023-04-27 NOTE — REVIEW OF SYSTEMS
[Fatigue] : fatigue [Recent Change In Weight] : ~T recent weight change [Negative] : Allergic/Immunologic [Fever] : no fever [Night Sweats] : no night sweats [Chest Pain] : no chest pain [Lower Ext Edema] : no lower extremity edema [Shortness Of Breath] : no shortness of breath [Anxiety] : anxiety [Easy Bleeding] : no tendency for easy bleeding [FreeTextEntry7] : occasional nausea [FreeTextEntry8] : urinary leakage sees Dr. Cobb [de-identified] : sciatica pain on lyrica

## 2023-04-27 NOTE — HISTORY OF PRESENT ILLNESS
[de-identified] : Ms. 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 Cody Calixto.  She is here with her daughter Brandy.  \par \par She had a history of Kidney Cancer s/p partial nephrectomy and distant history of Thyroid Cancer s/p partial thyroidectomy.  She also had a Left Lower Lobectomy for Stage IA lung cancer. For dates see bellow.\par \par She had CT scan and subsequent PET/CT that showed multiple left pleural based nodules in addition to a para-aortic lymph node.  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 The differential diagnosis includes low grade follicular lymphoma and CD10+ marginal zone lymphoma. She denies any B-symptoms at this time other than recent weight loss of 10lbs over last month, however she reports she had not been eating due to a recent GI bug.\par \par She is here for further recommendations. \par Radiological Work-up:\par PET/CT (2/21/19) IMPRESSION: Since August 20, 2010: 1. Multiple sites of left pleural-based FDG avid nodularity, catalogued above, with max SUV 5.2, suspicious for biological tumor activity. Metastatic or primary pleural neoplasm are considerations. 2. Intra-abdominal FDG avid 1.7 x 1.2 cm left para-aortic lymph node, max SUV 5.4, suspicious for lymph node metastasis. \par 1/30/2019: [facility] [place]:  comparison was made to Ct from 7/2018 Multiple Plural nodules along the posterior paramediastinal surface, some are mildly larger there are new nodules as well. largest nodule 10 mm\par CT Head (9/15/18) IMPRESSION: 1. Mild periventricular and subcortical white matter chronic small vessel ischemic changes. 2. No acute fractures, mass effect, midline shift or hemorrhage. \par CT cervical spine (9/15/18) IMPRESSION: 1. Degenerative changes of the cervical spine C2-3 through C6-7 worse at C5-6 as described above. 2. Straightening of normal cervical lordosis with mild anterolisthesis of C3 anterior on C4, C6 on C7, C7 on T1 and T1 on T2. 3. No acute fractures or dislocations. \par 1/22/18: MR L spine: advance lymbar spondylosis, disck osteophytes, severe neuroforamin narrowing with  ipingment of L4 nerve,  3.5 cm AAA, 8 mm adrenal nodule.\par Pathology:\par (3/14/19) Final Diagnosis Left parotid lymph node, needle biopsy: Low grade B-cell lymphoma. \par Immunostains performed with adequate controls on sections from block 1A show the infiltrating cells are CD20+ CD79a+ B-cells that coexpress CD10, CD23(dim variable), and BCL2. They are negative for CD5, CD43, cyclin D1, and MUM1. BCL6 is difficult to determine due to the high background staining. Ki67 labels 5-10% of these cells. CD21 highlights scattered follicular dendritic cell meshworks.  Per report ([id number]), flow cytometry studies performed at [facility] [employer] show monotypic B-cells (10% of cells), positive for kappa, CD19, CD20, CD10; negative CD5. Heterogeneous population of T-cells (with normal CD4 to CD8 ratio), and polytypic B-cells are also present. The findings are diagnostic of low grade B-cell lymphoma. The differential diagnosis includes low grade follicular lymphoma and CD10+ marginal zone lymphoma.\par \par Health Maintenance:\par Colonoscopy \par Mammogram \par \par Recent blood work is in HIE. Was normal except for a high sugar and calcium of 10.2 [de-identified] : 7/10/19\par She was supposed to go for biopsy of pleural based nodularity that resolved prior to procedure.  She will see PULM in a few weeks.  She has been purposely losing weight, using Trajenta.  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  Denies any fevers, chills, unintentional weight loss, or night sweats.\par CT Chest (4/11/19) IMPRESSION:Since February 21, 2019;1. Interval resolution of previously described left-sided FDG avid pleural-based nodularity. No biopsy performed.2. Multiple stable nodules, as above.3. Unchanged left periaortic 1.4 cm lymph node, previously noted to be FDG avid.4. Stable right hydronephrosis and right-sided ureteral calculi.5. Stable infrarenal abdominal aortic aneurysm measuring up to 3.7 cm.\par \par 1/8/20\par She is here for follow up. Since last visit she had a CT C/A/P in 7/18/2019 that showed  Stable 1.1 cm left para-aortic lymph node on series 2 image 62. No new lymph nodes in the abdomen and pelvis. \par Interval development of moderate to severe right hydroureteronephrosis leading to 2 proximal ureteral calculi \par (superior calculus measuring 5 x 4 x 5 mm, 1000 Hounsfield units and the inferior adjacent calculus measuring 5 \par x 7 x 6 mm, 573 Hounsfield units). \par Bilateral intrarenal calculi. \par Stable 3.7 x 4.3 cm simple appearing right adnexal cyst. Recommend one-year follow-up pelvic ultrasound.\par Impression: \par Since April 11, 2019. \par No significant interval change in multiple left-sided pleural-based nodules, largest approximately 1.6 cm \par (remeasured on earlier study), left lung base (series 5/149). \par Post left lower lobectomy.\par \par She was seen by Urology and had lithotripsy. \par \par She feels well. \par \par 7/8/20\par She is here for follow up. She feels well. No B symptoms. She is pening a CT chest ordered by Dr. Morales\par \par 1/11/2021: The patient is here for follow up. She has no B symptoms , no cough , no chest pain , no new palpable lymph nodes. She is complaing of sciatica, going for an injection next week. She also had LEs cellulitis , completed once course of antibiotics. \par \par \par 5/19/21\par We had a telephone visit today. She feels well.  She had CT chest on 2/21 that showed Since July 14, 2020,\par \par  interval development of wedge-shaped consolidation posterior left upper lobe. (2/31).\par \par Minimal interval growth of multiple pleural-based nodules as described above\par \par Numerous stable left-sided pleural based nodules and adjacent parenchymal nodules..\par \par Reviewed her CT in tumor board. Chest. We were diveded on if we should rescan in 3 months with CT or check PET/cT now. I spoke to her and we decided to check a CT Chest in 3 months. \par \par She feels well. \par \par 9/15/21\par Patient is here for a follow-up visit for hx of lung cancer, NHL and pulmonary nodules.  She is feeling well with no new complaints.  Reviewed most recent CBC, which shows mild microcytic anemia.  Patient denies fever, chills, nausea, vomiting, dyspnea or bleeding.  She has not had recent colonoscopy, but has done Cologuard at home which was reportedly benign in recent years.  \par CT Chest (6.2.2021)IMPRESSION:Unchanged left circumferential subpleural nodularity largest measuring 1.6 cm.Resolved left lower lobe consolidation.\par \par 3/21/22\par Pt returns for f/u today . She reports that she was admitted at [facility] for episode for acute diverticulitis in November 2021, she was treated with IV abx , pt reports that she had imaging done at that time and it was only significant for acute diverticulitis ( we do not have the imaging results available .Since that time she reports  that she has been having episodes of constipation and diarrhea , she is on laxatives . She stopped taking po iron as it was making her GI symptoms worse . Her gastroenterologist did not suggest doing a colonoscopy. Pt denies any blood in stools. Denies any fever , chills , weight loss or loss of appetite. She was also seen by Dr Sweet. \par \par 6/20/22\par Pt returns for f/u today. Denies any fever , chills , weight loss or loss of appetite. In March ferritin was 17, Iron 38, iron sat 9%, Hgb 11.9, MCV 77.1. She had 5 doses of IV venofer tolerated well, she reports cold intolerance improved. Labs reviewed 6/13/22 hgb 13.3, MCV 84.9, Ferritin 112, Iron sat 13%, Iron 37. She had Kidney US on 6/8 which showed Indeterminate hypoechoic area involving the lower pole of the left kidney. Further evaluation with outpatient renal mass protocol CT is recommended. Focal dilatation of the right upper pole calyx. Bilateral renal calculi. Incidentally noted is an abdominal aortic aneurysm, similar in size to prior CT scan, measuring 4.2 cm. \par \par 10/12/2022\par She is here for follow up.   she had CT C/A/P in July 2022 \par Lungs, Pleura, and Airways:Trachea and central airways are patent. Post left lower lobectomy with expected postsurgical changes. Extensive left subpleural nodularity demonstrating slight interval increase since prior examinations including medial left upper lobe 2.7 x 1.0 cm nodular density (series 4 image 27) and anterior left upper lobe 1.1 x 1.0 cm nodular density (series 4 image 55).\par Stable right adnexal cystic lesion measuring 4.4 x 3.8 cm.\par \par Tubular/cystic appearing left adnexal lesion could represent a cyst or hydrosalpinx, not well seen on the 2/9/2021 CT. Recommend follow-up pelvic ultrasound.\par \par \par  I ordered a US pelvis hat showed   IMPRESSION:\par 1.  Likely benign 4 cm right adnexal/ovarian cyst. A follow-up pelvic ultrasound can be obtained in 6 months to assess for stability.\par 2.  Tubular cystic left adnexal lesion, likely hydrosalpinx.\par \par She was just in ER for pain and was noted to have a kidney stone  that moved and cauased hydroneprhosis. IMPRESSION: 10/6/2022\par \par 1. Moderate left hydroureteronephrosis with a 0.5 x 0.7 x 0.7 cm left \par midureter left mid ureter calculus.\par 2. Additional multiple nonobstructing bilateral renal calculi.\par \par She has follow up pending with Dr. Davis \par \par CBC from 10/6/2022 in ED showed hgb of 16.6 with normal MCV\par \par \par \par She is pending  surgery with Dr. Guaman for DJD\par \par 3/13/2023\par She is here for follow.  She saw Dr. Morales and he checked a PET/CT that was done on 3/7/2023:  IMPRESSION:\par 1.  Since July 18, 2019, substantially increased FDG avid diffuse soft tissue nodularity throughout the left pleural surface, compatible with biologic tumor activity (max SUV 19.5; reflecting 275% increase).\par 2.  New minimal FDG avid 5 mm right upper lobe solid pulmonary nodule ( positive on NAC images).\par 3.  New FDG avid mediastinal lymph nodes, suspicious for elizabeth metastases (max SUV 10.8 in a subcarinal lymph node).\par 4.  Focal FDG uptake in the right thyroid lobe (max SUV 7). Thyroid ultrasound can be obtained for further assessment.\par \par \par CBC today is normal. She is here with daughter. Lost a few pounds due to diverticular disease feels better now.\par \par I showed the images to the patient and reviewed it together she saw the pleural-based uptake as well as the mediastinal nodes\par \par 4/24/2023\par She is here for follow up. She had biopsy by IR of  left pleural mass and only Fragments of fibroadipose tissue and skeletal muscle . I presented her at thoracic tumor board and Dr. Julian Nicholas can perfomr a mediastinoscopy to confirm the etiology of the findings on the PET scan. She was here with family.  We went over the most recent PET CT scan together and she saw the increase of FDG uptake in the pleural space as well as similar nodes I explained that clinically I suspect this is probably her lymphoma but nevertheless other etiologies are possible.  If she is hesitant about doing the biopsy I can try Rituxan for 4 doses we will repeat imaging which has reasonable side effects but of course immunosuppression is always of a concern, and this is more than acceptable treatment for indolent lymphomas but I explained that we will be treating the lung entity and thus after discussion she agreed to proceed with mediastinoscopy\par \par \par

## 2023-05-02 ENCOUNTER — APPOINTMENT (OUTPATIENT)
Dept: CARDIOTHORACIC SURGERY | Facility: CLINIC | Age: 83
End: 2023-05-02
Payer: MEDICARE

## 2023-05-02 VITALS
DIASTOLIC BLOOD PRESSURE: 96 MMHG | BODY MASS INDEX: 28.17 KG/M2 | TEMPERATURE: 98 F | WEIGHT: 159 LBS | OXYGEN SATURATION: 96 % | RESPIRATION RATE: 12 BRPM | HEART RATE: 46 BPM | HEIGHT: 63 IN | SYSTOLIC BLOOD PRESSURE: 212 MMHG

## 2023-05-02 PROCEDURE — 99204 OFFICE O/P NEW MOD 45 MIN: CPT

## 2023-05-02 NOTE — PHYSICAL EXAM
[General Appearance - Alert] : alert [General Appearance - In No Acute Distress] : in no acute distress [General Appearance - Well Nourished] : well nourished [Sclera] : the sclera and conjunctiva were normal [Outer Ear] : the ears and nose were normal in appearance [Neck Appearance] : the appearance of the neck was normal [Respiration, Rhythm And Depth] : normal respiratory rhythm and effort [Exaggerated Use Of Accessory Muscles For Inspiration] : no accessory muscle use [Apical Impulse] : the apical impulse was normal [Heart Rate And Rhythm] : heart rate was normal and rhythm regular [Heart Sounds] : normal S1 and S2 [Examination Of The Chest] : the chest was normal in appearance [Bowel Sounds] : normal bowel sounds [Abdomen Soft] : soft [Abdomen Tenderness] : non-tender [Abnormal Walk] : normal gait [Nail Clubbing] : no clubbing  or cyanosis of the fingernails [Musculoskeletal - Swelling] : no joint swelling seen [Skin Color & Pigmentation] : normal skin color and pigmentation [Skin Turgor] : normal skin turgor [] : no rash [Cranial Nerves] : cranial nerves 2-12 were intact [Sensation] : the sensory exam was normal to light touch and pinprick [Oriented To Time, Place, And Person] : oriented to person, place, and time [Impaired Insight] : insight and judgment were intact [Affect] : the affect was normal

## 2023-05-04 DIAGNOSIS — C85.90 NON-HODGKIN LYMPHOMA, UNSPECIFIED, UNSPECIFIED SITE: ICD-10-CM

## 2023-05-04 NOTE — ASSESSMENT
[FreeTextEntry1] : Ms. 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 Here today for surgical discussion of mediastinoscopy.    \par \par Plan:\par CT and PET Imaging reviewed with patient\par Concerning for lymphoma, also with history of renal, lung and thyroid ca\par IR guided biopsy was non-diagnostic\par Will plan for Mediastinoscopy next week 5/12 CTOR\par Preop- Cardiac Clearance w/ Tamburrino, PAST\par \par I, Ksenia Suh [facility], am acting as scribe for Dr.Villa Nicholas\par I, Jerome Nicholas saw, examined and reviewed the diagnostic images on patient:  FERMIN DIAZ on 05/02/2023 and agreed with my Nurse Practitioner's clinical note, physical exam findings and treatment plan.\par Patient presented to the office referred by oncology for mediastinal adenopathy.  She has a home oncological history of lung cancer status post left lower lobectomy in 2010 for a stage Ia adenocarcinoma.  She also has a diagnosis of non-Hodgkin lymphoma and thyroid carcinoma for what she underwent thyroidectomy.  Follow-up images revealed enlarged mediastinal lymph nodes and PET/CT showed metabolic activity.  Thoracic surgery consulted for assistance in tissue sampling.  Patient underwent CT-guided fine-needle aspiration of left pleural thickening which was nondiagnostic.  I reviewed in detail the CT and PET/CT images with the patient and recommended mediastinoscopy.  I described the procedure in detail, risk and possible complications as well as expected recovery.  Patient understood and agreed to proceed.  Plan schedule for mediastinoscopy.

## 2023-05-04 NOTE — REVIEW OF SYSTEMS
[Negative] : Heme/Lymph [Fever] : no fever [Chills] : no chills [Feeling Poorly] : not feeling poorly [Feeling Tired] : not feeling tired [Heart Rate Is Slow] : the heart rate was not slow [Heart Rate Is Fast] : the heart rate was not fast [Chest Pain] : no chest pain

## 2023-05-04 NOTE — HISTORY OF PRESENT ILLNESS
[FreeTextEntry1] : Ms. 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  Pt had PET revealing increase of FDG uptake in the pleural space as well new FDG avid mediastinal lymph nodes. Pt had IR biopsy of Left pleural mass - no malignancy- Fragments of fibroadipose tissue and skeletal muscle.   Also with history of obesity, DM, HTN, HLD, COPD, hx/o renal ca s/p partial nephrectomy, thyroid cancer s/p partial thyroidectomy, LE cellulitis, Lymphoma. Here today for surgical discussion of mediastinoscopy.    \par \par ECOG,0 Fully Independent \par Lives alone\par Former Smoker- 1 PPD X 60 years -Quit 2010\par COVID History- \par Denies major psychiatric history\par \par Their Healthcare team is as follows:\par PMD: Dr. Dumont\par Cardiologist: Dr. Gamble\par Pulmonologist: \par \par \par \par

## 2023-05-05 ENCOUNTER — RESULT REVIEW (OUTPATIENT)
Age: 83
End: 2023-05-05

## 2023-05-05 ENCOUNTER — OUTPATIENT (OUTPATIENT)
Dept: OUTPATIENT SERVICES | Facility: HOSPITAL | Age: 83
LOS: 1 days | End: 2023-05-05
Payer: MEDICARE

## 2023-05-05 VITALS
DIASTOLIC BLOOD PRESSURE: 69 MMHG | HEART RATE: 62 BPM | TEMPERATURE: 97 F | SYSTOLIC BLOOD PRESSURE: 129 MMHG | WEIGHT: 158.95 LBS | RESPIRATION RATE: 18 BRPM | OXYGEN SATURATION: 96 % | HEIGHT: 63 IN

## 2023-05-05 DIAGNOSIS — Z98.890 OTHER SPECIFIED POSTPROCEDURAL STATES: Chronic | ICD-10-CM

## 2023-05-05 DIAGNOSIS — Z01.818 ENCOUNTER FOR OTHER PREPROCEDURAL EXAMINATION: ICD-10-CM

## 2023-05-05 DIAGNOSIS — E89.0 POSTPROCEDURAL HYPOTHYROIDISM: Chronic | ICD-10-CM

## 2023-05-05 DIAGNOSIS — R59.0 LOCALIZED ENLARGED LYMPH NODES: ICD-10-CM

## 2023-05-05 LAB
A1C WITH ESTIMATED AVERAGE GLUCOSE RESULT: 6 % — HIGH (ref 4–5.6)
ALBUMIN SERPL ELPH-MCNC: 4.6 G/DL — SIGNIFICANT CHANGE UP (ref 3.5–5.2)
ALP SERPL-CCNC: 133 U/L — HIGH (ref 30–115)
ALT FLD-CCNC: 12 U/L — SIGNIFICANT CHANGE UP (ref 0–41)
ANION GAP SERPL CALC-SCNC: 10 MMOL/L — SIGNIFICANT CHANGE UP (ref 7–14)
APPEARANCE UR: CLEAR — SIGNIFICANT CHANGE UP
APTT BLD: 30.6 SEC — SIGNIFICANT CHANGE UP (ref 27–39.2)
AST SERPL-CCNC: 13 U/L — SIGNIFICANT CHANGE UP (ref 0–41)
BACTERIA # UR AUTO: NEGATIVE — SIGNIFICANT CHANGE UP
BASOPHILS # BLD AUTO: 0.06 K/UL — SIGNIFICANT CHANGE UP (ref 0–0.2)
BASOPHILS NFR BLD AUTO: 0.9 % — SIGNIFICANT CHANGE UP (ref 0–1)
BILIRUB SERPL-MCNC: 0.4 MG/DL — SIGNIFICANT CHANGE UP (ref 0.2–1.2)
BILIRUB UR-MCNC: NEGATIVE — SIGNIFICANT CHANGE UP
BLD GP AB SCN SERPL QL: SIGNIFICANT CHANGE UP
BUN SERPL-MCNC: 33 MG/DL — HIGH (ref 10–20)
CALCIUM SERPL-MCNC: 10.6 MG/DL — HIGH (ref 8.4–10.5)
CHLORIDE SERPL-SCNC: 103 MMOL/L — SIGNIFICANT CHANGE UP (ref 98–110)
CO2 SERPL-SCNC: 28 MMOL/L — SIGNIFICANT CHANGE UP (ref 17–32)
COLOR SPEC: YELLOW — SIGNIFICANT CHANGE UP
CREAT SERPL-MCNC: 0.8 MG/DL — SIGNIFICANT CHANGE UP (ref 0.7–1.5)
DIFF PNL FLD: ABNORMAL
EGFR: 74 ML/MIN/1.73M2 — SIGNIFICANT CHANGE UP
EOSINOPHIL # BLD AUTO: 0.17 K/UL — SIGNIFICANT CHANGE UP (ref 0–0.7)
EOSINOPHIL NFR BLD AUTO: 2.4 % — SIGNIFICANT CHANGE UP (ref 0–8)
EPI CELLS # UR: 2 /HPF — SIGNIFICANT CHANGE UP (ref 0–5)
ESTIMATED AVERAGE GLUCOSE: 126 MG/DL — HIGH (ref 68–114)
GLUCOSE SERPL-MCNC: 91 MG/DL — SIGNIFICANT CHANGE UP (ref 70–99)
GLUCOSE UR QL: NEGATIVE — SIGNIFICANT CHANGE UP
HCT VFR BLD CALC: 44.9 % — SIGNIFICANT CHANGE UP (ref 37–47)
HGB BLD-MCNC: 14.9 G/DL — SIGNIFICANT CHANGE UP (ref 12–16)
HYALINE CASTS # UR AUTO: 2 /LPF — SIGNIFICANT CHANGE UP (ref 0–7)
IMM GRANULOCYTES NFR BLD AUTO: 0.4 % — HIGH (ref 0.1–0.3)
INR BLD: 0.95 RATIO — SIGNIFICANT CHANGE UP (ref 0.65–1.3)
KETONES UR-MCNC: NEGATIVE — SIGNIFICANT CHANGE UP
LEUKOCYTE ESTERASE UR-ACNC: ABNORMAL
LYMPHOCYTES # BLD AUTO: 1.7 K/UL — SIGNIFICANT CHANGE UP (ref 1.2–3.4)
LYMPHOCYTES # BLD AUTO: 24.4 % — SIGNIFICANT CHANGE UP (ref 20.5–51.1)
MCHC RBC-ENTMCNC: 29.5 PG — SIGNIFICANT CHANGE UP (ref 27–31)
MCHC RBC-ENTMCNC: 33.2 G/DL — SIGNIFICANT CHANGE UP (ref 32–37)
MCV RBC AUTO: 88.9 FL — SIGNIFICANT CHANGE UP (ref 81–99)
MONOCYTES # BLD AUTO: 0.59 K/UL — SIGNIFICANT CHANGE UP (ref 0.1–0.6)
MONOCYTES NFR BLD AUTO: 8.5 % — SIGNIFICANT CHANGE UP (ref 1.7–9.3)
NEUTROPHILS # BLD AUTO: 4.43 K/UL — SIGNIFICANT CHANGE UP (ref 1.4–6.5)
NEUTROPHILS NFR BLD AUTO: 63.4 % — SIGNIFICANT CHANGE UP (ref 42.2–75.2)
NITRITE UR-MCNC: NEGATIVE — SIGNIFICANT CHANGE UP
NRBC # BLD: 0 /100 WBCS — SIGNIFICANT CHANGE UP (ref 0–0)
PH UR: 6 — SIGNIFICANT CHANGE UP (ref 5–8)
PLATELET # BLD AUTO: 216 K/UL — SIGNIFICANT CHANGE UP (ref 130–400)
PMV BLD: 10.8 FL — HIGH (ref 7.4–10.4)
POTASSIUM SERPL-MCNC: 4.3 MMOL/L — SIGNIFICANT CHANGE UP (ref 3.5–5)
POTASSIUM SERPL-SCNC: 4.3 MMOL/L — SIGNIFICANT CHANGE UP (ref 3.5–5)
PROT SERPL-MCNC: 6.8 G/DL — SIGNIFICANT CHANGE UP (ref 6–8)
PROT UR-MCNC: ABNORMAL
PROTHROM AB SERPL-ACNC: 10.8 SEC — SIGNIFICANT CHANGE UP (ref 9.95–12.87)
RBC # BLD: 5.05 M/UL — SIGNIFICANT CHANGE UP (ref 4.2–5.4)
RBC # FLD: 13 % — SIGNIFICANT CHANGE UP (ref 11.5–14.5)
RBC CASTS # UR COMP ASSIST: 98 /HPF — HIGH (ref 0–4)
SODIUM SERPL-SCNC: 141 MMOL/L — SIGNIFICANT CHANGE UP (ref 135–146)
SP GR SPEC: 1.02 — SIGNIFICANT CHANGE UP (ref 1.01–1.03)
UROBILINOGEN FLD QL: SIGNIFICANT CHANGE UP
WBC # BLD: 6.98 K/UL — SIGNIFICANT CHANGE UP (ref 4.8–10.8)
WBC # FLD AUTO: 6.98 K/UL — SIGNIFICANT CHANGE UP (ref 4.8–10.8)
WBC UR QL: 8 /HPF — HIGH (ref 0–5)

## 2023-05-05 PROCEDURE — 93005 ELECTROCARDIOGRAM TRACING: CPT

## 2023-05-05 PROCEDURE — 86900 BLOOD TYPING SEROLOGIC ABO: CPT

## 2023-05-05 PROCEDURE — 99214 OFFICE O/P EST MOD 30 MIN: CPT | Mod: 25

## 2023-05-05 PROCEDURE — 93010 ELECTROCARDIOGRAM REPORT: CPT

## 2023-05-05 PROCEDURE — 71046 X-RAY EXAM CHEST 2 VIEWS: CPT

## 2023-05-05 PROCEDURE — 80053 COMPREHEN METABOLIC PANEL: CPT

## 2023-05-05 PROCEDURE — 86850 RBC ANTIBODY SCREEN: CPT

## 2023-05-05 PROCEDURE — 81001 URINALYSIS AUTO W/SCOPE: CPT

## 2023-05-05 PROCEDURE — 85025 COMPLETE CBC W/AUTO DIFF WBC: CPT

## 2023-05-05 PROCEDURE — 85610 PROTHROMBIN TIME: CPT

## 2023-05-05 PROCEDURE — 86901 BLOOD TYPING SEROLOGIC RH(D): CPT

## 2023-05-05 PROCEDURE — 85730 THROMBOPLASTIN TIME PARTIAL: CPT

## 2023-05-05 PROCEDURE — 71046 X-RAY EXAM CHEST 2 VIEWS: CPT | Mod: 26

## 2023-05-05 PROCEDURE — 83036 HEMOGLOBIN GLYCOSYLATED A1C: CPT

## 2023-05-05 PROCEDURE — 36415 COLL VENOUS BLD VENIPUNCTURE: CPT

## 2023-05-05 RX ORDER — AMLODIPINE BESYLATE 2.5 MG/1
2.5 TABLET ORAL
Qty: 0 | Refills: 0 | DISCHARGE

## 2023-05-05 NOTE — H&P PST ADULT - NSANTHTOTALSCORECAL_ENT_A_CORE
2
Is This A New Presentation, Or A Follow-Up?: Skin Lesion
How Severe Is Your Skin Lesion?: mild
Has Your Skin Lesion Been Treated?: not been treated
Additional History: Patient thinks this may be a wart.

## 2023-05-05 NOTE — H&P PST ADULT - REASON FOR ADMISSION
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

## 2023-05-05 NOTE — H&P PST ADULT - NSICDXPASTMEDICALHX_GEN_ALL_CORE_FT
PAST MEDICAL HISTORY:  Cancer of kidney     Cancer of thyroid     COPD (chronic obstructive pulmonary disease)     Diverticulitis     Former smoker     GERD (gastroesophageal reflux disease)     History of abdominal aortic aneurysm (AAA)     [place] (hard of hearing)     Hyperlipidemia, unspecified hyperlipidemia type     Hypertension, unspecified type     Kidney stones     Lung cancer     NHL (non-Hodgkin's lymphoma) "low grade" per heme/ onc note    Obesity     ARTIE (obstructive sleep apnea) NO CPAP MACHINE PER PT.    SOB (shortness of breath)     Type 2 diabetes mellitus with complication, without long-term current use of insulin

## 2023-05-05 NOTE — H&P PST ADULT - HISTORY OF PRESENT ILLNESS
CURRENTLY  DENIES ANY CP, SOB, PALPITATIONS, COUGH OR DYSURIA- PT WITH COPD HAS DYSPNEA ON EXERTION   EXERCISE TOLERANCE 1 FOS WITHOUT SOB    AS PER PATIENT  this is his/her complete medical history including medications - PRESCRIPTIONS  OVER THE COUNTER MEDS    pt denies any covid s/s, or tested positive in the past.  Received covid vaccine  pt advised self quarantine till day of procedure    Anesthesia Alert  NO--Difficult Airway  YES--History of neck surgery or radiation PARTIAL THYROIDECTOMY  NO--Limited ROM of neck  NO--History of Malignant hyperthermia  NO--No personal or family history of Pseudocholinesterase deficiency.  NO--Prior Anesthesia Complication  NO--Latex Allergy  NO--Loose teeth  NO--History of Rheumatoid Arthritis  YES--ARTIE NO CPAP  NO--Bleeding risk  NO--Other_____    Patient verbalized understanding of instructions and was given the opportunity to ask questions and have them answered.

## 2023-05-05 NOTE — H&P PST ADULT - NSICDXPASTSURGICALHX_GEN_ALL_CORE_FT
PAST SURGICAL HISTORY:  H/O lithotripsy     H/O partial thyroidectomy     History of back surgery     History of surgery LEFT LOWER LOBECTOMY    History of surgery BILATERAL KNEE REPLACEMENT    History of surgery CATARACT RIGHT EYE    History of surgery TUBAL LIGATION    History of surgery CYST REMOVED  RIGHT BREAST    History of surgery CHOLECYSTECOMY    History of surgery RIGHT PARTIAL NEPHRECTOMY    History of surgery BILATERAL CATARACT SURGERY

## 2023-05-06 DIAGNOSIS — Z01.818 ENCOUNTER FOR OTHER PREPROCEDURAL EXAMINATION: ICD-10-CM

## 2023-05-06 DIAGNOSIS — R59.0 LOCALIZED ENLARGED LYMPH NODES: ICD-10-CM

## 2023-05-11 VITALS — HEIGHT: 63 IN | WEIGHT: 158.95 LBS

## 2023-05-11 PROBLEM — H91.90 UNSPECIFIED HEARING LOSS, UNSPECIFIED EAR: Chronic | Status: ACTIVE | Noted: 2023-05-05

## 2023-05-11 NOTE — PRE-ANESTHESIA EVALUATION ADULT - NSANTHOSAYNRD_GEN_A_CORE
as per charts, patient has ARTIE, not on treatment/No. ARTIE screening performed.  STOP BANG Legend: 0-2 = LOW Risk; 3-4 = INTERMEDIATE Risk; 5-8 = HIGH Risk Yes...

## 2023-05-12 ENCOUNTER — OUTPATIENT (OUTPATIENT)
Dept: INPATIENT UNIT | Facility: HOSPITAL | Age: 83
LOS: 1 days | Discharge: HOME | End: 2023-05-12
Payer: MEDICARE

## 2023-05-12 ENCOUNTER — RESULT REVIEW (OUTPATIENT)
Age: 83
End: 2023-05-12

## 2023-05-12 ENCOUNTER — APPOINTMENT (OUTPATIENT)
Dept: PULMONOLOGY | Facility: CLINIC | Age: 83
End: 2023-05-12

## 2023-05-12 ENCOUNTER — TRANSCRIPTION ENCOUNTER (OUTPATIENT)
Age: 83
End: 2023-05-12

## 2023-05-12 ENCOUNTER — APPOINTMENT (OUTPATIENT)
Dept: CARDIOTHORACIC SURGERY | Facility: HOSPITAL | Age: 83
End: 2023-05-12

## 2023-05-12 VITALS — DIASTOLIC BLOOD PRESSURE: 60 MMHG | HEART RATE: 72 BPM | OXYGEN SATURATION: 95 % | SYSTOLIC BLOOD PRESSURE: 150 MMHG

## 2023-05-12 DIAGNOSIS — Z98.890 OTHER SPECIFIED POSTPROCEDURAL STATES: Chronic | ICD-10-CM

## 2023-05-12 DIAGNOSIS — E89.0 POSTPROCEDURAL HYPOTHYROIDISM: Chronic | ICD-10-CM

## 2023-05-12 DIAGNOSIS — R59.0 LOCALIZED ENLARGED LYMPH NODES: ICD-10-CM

## 2023-05-12 PROCEDURE — 88189 FLOWCYTOMETRY/READ 16 & >: CPT

## 2023-05-12 PROCEDURE — 39402 MEDIASTINOSCPY W/LMPH NOD BX: CPT

## 2023-05-12 PROCEDURE — 88237 TISSUE CULTURE BONE MARROW: CPT

## 2023-05-12 PROCEDURE — 88307 TISSUE EXAM BY PATHOLOGIST: CPT | Mod: 26

## 2023-05-12 PROCEDURE — 86923 COMPATIBILITY TEST ELECTRIC: CPT

## 2023-05-12 PROCEDURE — 71045 X-RAY EXAM CHEST 1 VIEW: CPT

## 2023-05-12 PROCEDURE — 88342 IMHCHEM/IMCYTCHM 1ST ANTB: CPT | Mod: 26,59

## 2023-05-12 PROCEDURE — C1751: CPT

## 2023-05-12 PROCEDURE — 71045 X-RAY EXAM CHEST 1 VIEW: CPT | Mod: 26

## 2023-05-12 PROCEDURE — 88342 IMHCHEM/IMCYTCHM 1ST ANTB: CPT | Mod: XU

## 2023-05-12 PROCEDURE — 88185 FLOWCYTOMETRY/TC ADD-ON: CPT

## 2023-05-12 PROCEDURE — 36415 COLL VENOUS BLD VENIPUNCTURE: CPT

## 2023-05-12 PROCEDURE — 88184 FLOWCYTOMETRY/ TC 1 MARKER: CPT

## 2023-05-12 PROCEDURE — 87205 SMEAR GRAM STAIN: CPT

## 2023-05-12 PROCEDURE — 88344 IMHCHEM/IMCYTCHM EA MLT ANTB: CPT | Mod: XU

## 2023-05-12 PROCEDURE — C9399: CPT

## 2023-05-12 PROCEDURE — 88341 IMHCHEM/IMCYTCHM EA ADD ANTB: CPT

## 2023-05-12 PROCEDURE — 88307 TISSUE EXAM BY PATHOLOGIST: CPT

## 2023-05-12 PROCEDURE — 88341 IMHCHEM/IMCYTCHM EA ADD ANTB: CPT | Mod: 26

## 2023-05-12 RX ORDER — ACETAMINOPHEN 500 MG
650 TABLET ORAL ONCE
Refills: 0 | Status: COMPLETED | OUTPATIENT
Start: 2023-05-12 | End: 2023-05-12

## 2023-05-12 RX ORDER — ALBUTEROL 90 UG/1
2 AEROSOL, METERED ORAL
Qty: 0 | Refills: 0 | DISCHARGE

## 2023-05-12 RX ORDER — METOPROLOL TARTRATE 50 MG
1 TABLET ORAL
Qty: 0 | Refills: 0 | DISCHARGE

## 2023-05-12 RX ORDER — OXYBUTYNIN CHLORIDE 5 MG
1 TABLET ORAL
Qty: 0 | Refills: 0 | DISCHARGE

## 2023-05-12 RX ORDER — DULAGLUTIDE 4.5 MG/.5ML
1.5 INJECTION, SOLUTION SUBCUTANEOUS
Qty: 0 | Refills: 0 | DISCHARGE

## 2023-05-12 RX ORDER — BUDESONIDE AND FORMOTEROL FUMARATE DIHYDRATE 160; 4.5 UG/1; UG/1
2 AEROSOL RESPIRATORY (INHALATION)
Qty: 0 | Refills: 0 | DISCHARGE

## 2023-05-12 RX ORDER — AMLODIPINE BESYLATE 2.5 MG/1
2 TABLET ORAL
Refills: 0 | DISCHARGE

## 2023-05-12 RX ORDER — HYDROMORPHONE HYDROCHLORIDE 2 MG/ML
1 INJECTION INTRAMUSCULAR; INTRAVENOUS; SUBCUTANEOUS
Refills: 0 | Status: DISCONTINUED | OUTPATIENT
Start: 2023-05-12 | End: 2023-05-12

## 2023-05-12 RX ORDER — OMEPRAZOLE 10 MG/1
1 CAPSULE, DELAYED RELEASE ORAL
Qty: 0 | Refills: 0 | DISCHARGE

## 2023-05-12 RX ORDER — LOSARTAN POTASSIUM 100 MG/1
1 TABLET, FILM COATED ORAL
Qty: 0 | Refills: 0 | DISCHARGE

## 2023-05-12 RX ORDER — CHOLECALCIFEROL (VITAMIN D3) 125 MCG
1 CAPSULE ORAL
Qty: 0 | Refills: 0 | DISCHARGE

## 2023-05-12 RX ORDER — OXYCODONE HYDROCHLORIDE 5 MG/1
1 TABLET ORAL
Qty: 14 | Refills: 0
Start: 2023-05-12 | End: 2023-05-18

## 2023-05-12 RX ORDER — HYDROMORPHONE HYDROCHLORIDE 2 MG/ML
0.5 INJECTION INTRAMUSCULAR; INTRAVENOUS; SUBCUTANEOUS
Refills: 0 | Status: DISCONTINUED | OUTPATIENT
Start: 2023-05-12 | End: 2023-05-12

## 2023-05-12 RX ORDER — SODIUM CHLORIDE 9 MG/ML
1000 INJECTION, SOLUTION INTRAVENOUS
Refills: 0 | Status: DISCONTINUED | OUTPATIENT
Start: 2023-05-12 | End: 2023-05-12

## 2023-05-12 RX ADMIN — Medication 650 MILLIGRAM(S): at 11:21

## 2023-05-12 NOTE — ASU DISCHARGE PLAN (ADULT/PEDIATRIC) - ASU DC SPECIAL INSTRUCTIONSFT
SURGERY DISCHARGE INSTRUCTIONS    FOLLOW-UP - with Dr. Julian Nicholas in 1-2 weeks. Call the office to make an appointment or if you have any questions/concerns.    DIET - regular.     ACTIVITY- No heavy lifting for 4-6 wks over 10-20 lbs. Walking is encouraged. No running or swimming. No driving while taking pain medication.    WOUNDCARE - Some drainage from your incisions or drain sites is normal. If you have drainage from an open incision or drain site- cover loosely with sterile gauze and tape and change daily. If you have clear outer plastic dressing with gauze- remove 3 days after surgery and leave little white bandage strips underneath it on for 7 days. If you have no bandages but have purple glue over your incisions instead, this will come off with time. May shower 24 hours after surgery but no submerging wound under water for 2 weeks (tub bathing). Pat area dry when wet, keep clean and dry. Do not apply powders or lotion to wound area.     PAIN MEDS - Take prescription pain meds as instructed but only if needed as they can be addicting. Take over the counter extra strength tylenol 650mg and/or ibuprofen 400mg with food every 6 hours for pain instead of prescription pain meds if you do not need stronger pain control. Do not take tylenol in addition to your prescription pain med if your prescription pain med already has tylenol in it. No more than 4g of tylenol in 24hrs or 1g in 4 hrs. No mixing alcohol with prescription pain meds. No driving or operating machinery while taking prescription pain meds. Drink plenty of water and increase your fiber intake (unless your diet restricts fiber) while taking prescription pain meds as these can cause constipation and abdominal straining. If you do not have a bowel movement in 3 days take an over the counter stool softener of your choice daily. If you still do not have a bowel movement the following 2 days call your primary care physician.    OTHER MEDS - If you have any questions about your other regular home medications please call your primary care physician or the physician who prescribed those medications to you.     If you develop fever, dizziness, chest pain, trouble breathing, nausea, vomiting, increasing abdominal pain, inability to pass bowel movements, redness/pain/discharge from incisions. Please call the office or go to the emergency room immediately.

## 2023-05-12 NOTE — BRIEF OPERATIVE NOTE - OPERATION/FINDINGS
2cm incision made midline neck. Mediastinoscopy with biopsy of lymph nodes 7, right 2 and right 4. Pathology positive for metastatic carcinoma.

## 2023-05-12 NOTE — ASU DISCHARGE PLAN (ADULT/PEDIATRIC) - NS MD DC FALL RISK RISK
For information on Fall & Injury Prevention, visit: [email or web address] OR  [email or web address] OR  https://www.cdc.gov/steadi/patient.html

## 2023-05-16 LAB
SURGICAL PATHOLOGY STUDY: SIGNIFICANT CHANGE UP
TM INTERPRETATION: SIGNIFICANT CHANGE UP

## 2023-05-17 DIAGNOSIS — E11.9 TYPE 2 DIABETES MELLITUS WITHOUT COMPLICATIONS: ICD-10-CM

## 2023-05-17 DIAGNOSIS — C73 MALIGNANT NEOPLASM OF THYROID GLAND: ICD-10-CM

## 2023-05-17 DIAGNOSIS — E78.5 HYPERLIPIDEMIA, UNSPECIFIED: ICD-10-CM

## 2023-05-17 DIAGNOSIS — C77.1 SECONDARY AND UNSPECIFIED MALIGNANT NEOPLASM OF INTRATHORACIC LYMPH NODES: ICD-10-CM

## 2023-05-17 DIAGNOSIS — C64.9 MALIGNANT NEOPLASM OF UNSPECIFIED KIDNEY, EXCEPT RENAL PELVIS: ICD-10-CM

## 2023-05-17 DIAGNOSIS — I10 ESSENTIAL (PRIMARY) HYPERTENSION: ICD-10-CM

## 2023-05-17 DIAGNOSIS — G47.33 OBSTRUCTIVE SLEEP APNEA (ADULT) (PEDIATRIC): ICD-10-CM

## 2023-05-17 DIAGNOSIS — R91.1 SOLITARY PULMONARY NODULE: ICD-10-CM

## 2023-05-17 DIAGNOSIS — C34.90 MALIGNANT NEOPLASM OF UNSPECIFIED PART OF UNSPECIFIED BRONCHUS OR LUNG: ICD-10-CM

## 2023-05-17 DIAGNOSIS — Z87.891 PERSONAL HISTORY OF NICOTINE DEPENDENCE: ICD-10-CM

## 2023-05-18 ENCOUNTER — NON-APPOINTMENT (OUTPATIENT)
Age: 83
End: 2023-05-18

## 2023-05-18 LAB — CHROM ANALY OVERALL INTERP SPEC-IMP: SIGNIFICANT CHANGE UP

## 2023-05-22 DIAGNOSIS — Z00.00 ENCOUNTER FOR GENERAL ADULT MEDICAL EXAMINATION W/OUT ABNORMAL FINDINGS: ICD-10-CM

## 2023-05-23 ENCOUNTER — OUTPATIENT (OUTPATIENT)
Dept: OUTPATIENT SERVICES | Facility: HOSPITAL | Age: 83
LOS: 1 days | End: 2023-05-23
Payer: MEDICARE

## 2023-05-23 ENCOUNTER — APPOINTMENT (OUTPATIENT)
Dept: CARDIOTHORACIC SURGERY | Facility: CLINIC | Age: 83
End: 2023-05-23
Payer: MEDICARE

## 2023-05-23 ENCOUNTER — RESULT REVIEW (OUTPATIENT)
Age: 83
End: 2023-05-23

## 2023-05-23 VITALS
BODY MASS INDEX: 28.17 KG/M2 | HEIGHT: 63 IN | DIASTOLIC BLOOD PRESSURE: 90 MMHG | HEART RATE: 61 BPM | SYSTOLIC BLOOD PRESSURE: 160 MMHG | OXYGEN SATURATION: 96 % | RESPIRATION RATE: 14 BRPM | WEIGHT: 159 LBS

## 2023-05-23 DIAGNOSIS — Z98.890 OTHER SPECIFIED POSTPROCEDURAL STATES: Chronic | ICD-10-CM

## 2023-05-23 DIAGNOSIS — E89.0 POSTPROCEDURAL HYPOTHYROIDISM: Chronic | ICD-10-CM

## 2023-05-23 DIAGNOSIS — R06.02 SHORTNESS OF BREATH: ICD-10-CM

## 2023-05-23 PROCEDURE — 99212 OFFICE O/P EST SF 10 MIN: CPT

## 2023-05-23 PROCEDURE — 71046 X-RAY EXAM CHEST 2 VIEWS: CPT

## 2023-05-23 PROCEDURE — 71046 X-RAY EXAM CHEST 2 VIEWS: CPT | Mod: 26

## 2023-05-24 DIAGNOSIS — R06.02 SHORTNESS OF BREATH: ICD-10-CM

## 2023-05-24 NOTE — COUNSELING
[Hygeine (Including Daily Shower)] : hygeine (including daily shower) [S/S of infection] : signs and symptoms of infection (and to whom it should be reported)

## 2023-05-25 ENCOUNTER — APPOINTMENT (OUTPATIENT)
Dept: HEMATOLOGY ONCOLOGY | Facility: CLINIC | Age: 83
End: 2023-05-25
Payer: MEDICARE

## 2023-05-25 ENCOUNTER — OUTPATIENT (OUTPATIENT)
Dept: OUTPATIENT SERVICES | Facility: HOSPITAL | Age: 83
LOS: 1 days | End: 2023-05-25
Payer: MEDICARE

## 2023-05-25 VITALS
BODY MASS INDEX: 27.82 KG/M2 | HEART RATE: 60 BPM | TEMPERATURE: 98.7 F | RESPIRATION RATE: 16 BRPM | HEIGHT: 63 IN | WEIGHT: 157 LBS

## 2023-05-25 DIAGNOSIS — Z98.890 OTHER SPECIFIED POSTPROCEDURAL STATES: Chronic | ICD-10-CM

## 2023-05-25 DIAGNOSIS — C64.9 MALIGNANT NEOPLASM OF UNSPECIFIED KIDNEY, EXCEPT RENAL PELVIS: ICD-10-CM

## 2023-05-25 DIAGNOSIS — C85.90 NON-HODGKIN LYMPHOMA, UNSPECIFIED, UNSPECIFIED SITE: ICD-10-CM

## 2023-05-25 DIAGNOSIS — E89.0 POSTPROCEDURAL HYPOTHYROIDISM: Chronic | ICD-10-CM

## 2023-05-25 DIAGNOSIS — R59.0 LOCALIZED ENLARGED LYMPH NODES: ICD-10-CM

## 2023-05-25 DIAGNOSIS — J44.9 CHRONIC OBSTRUCTIVE PULMONARY DISEASE, UNSPECIFIED: ICD-10-CM

## 2023-05-25 DIAGNOSIS — R22.2 LOCALIZED SWELLING, MASS AND LUMP, TRUNK: ICD-10-CM

## 2023-05-25 PROCEDURE — 99215 OFFICE O/P EST HI 40 MIN: CPT

## 2023-05-26 DIAGNOSIS — C34.90 MALIGNANT NEOPLASM OF UNSPECIFIED PART OF UNSPECIFIED BRONCHUS OR LUNG: ICD-10-CM

## 2023-05-26 DIAGNOSIS — R59.0 LOCALIZED ENLARGED LYMPH NODES: ICD-10-CM

## 2023-05-26 NOTE — ASSESSMENT
[FreeTextEntry1] : Ms. 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 Pt had PET revealing increase of FDG uptake in the pleural space as well new FDG avid mediastinal lymph nodes. Pt had IR biopsy of Left pleural mass - no malignancy- Fragments of fibroadipose tissue and skeletal muscle. Also with history of obesity, DM, HTN, HLD, COPD, hx/o renal ca s/p partial nephrectomy, thyroid cancer s/p partial thyroidectomy, LE cellulitis, Lymphoma. She is now s/p mediastinoscopy on 5/12/2023. Here for post op visit.  \par \par Their Healthcare team is as follows:\par PMD: Dr. Dumont\par Cardiologist: Dr. Gamble\par \par Doing well \par Incision healing well \par denies any issues \par Path and CXR reviewed \par Pt has ankit with [facility]emon[facility] this week \par F/U PMD for routine care\par F/U CTS PRN\par \par I, Leandra Gaines [facility], am acting as scribe for Dr. Julian Nicholas \par I, Jerome Nicholas saw, examined and reviewed the diagnostic images on patient:  FERMIN DIAZ on 05/23/2023 and agreed with my Nurse Practitioner's clinical note, physical exam findings and treatment plan.\par Ms. Diaz presented for postoperative follow up, she is s/p mediastinoscopy and mediastinal node biopsy. Date: 05/12/23. No complications. Patient doing well. Surgical wound healing fine. Pathology report reviewed with the patient: metastatic adenocarcinoma, lung origin. She was discussed in tumor board, waiting for markers to determine systemic therapy by Oncology. Plan: continue care with oncology, return to thoracic prn.\par \par \par \par \par \par

## 2023-05-26 NOTE — PHYSICAL EXAM
[Respiration, Rhythm And Depth] : normal respiratory rhythm and effort [Heart Rate And Rhythm] : heart rate was normal and rhythm regular [Clean] : clean [Dry] : dry [Healing Well] : healing well [No Edema] : no edema [FreeTextEntry1] : s/p mediastinoscopy

## 2023-05-26 NOTE — REASON FOR VISIT
[de-identified] : mediastinoscopy  [de-identified] : 5/12/2023 [de-identified] : Ms. Holly is an 82-year-old female with a diagnosis of mediastinal adenopathy, she has an oncological history of non-Hodgkin lymphoma and lung cancer, CT with fine needle aspiration of pleural mass\par was nondiagnostic. Now s/p mediastinoscopy - path - positive for metastatic adenocarcinoma

## 2023-06-06 ENCOUNTER — RESULT REVIEW (OUTPATIENT)
Age: 83
End: 2023-06-06

## 2023-06-06 ENCOUNTER — OUTPATIENT (OUTPATIENT)
Dept: OUTPATIENT SERVICES | Facility: HOSPITAL | Age: 83
LOS: 1 days | End: 2023-06-06
Payer: MEDICARE

## 2023-06-06 DIAGNOSIS — Z98.890 OTHER SPECIFIED POSTPROCEDURAL STATES: Chronic | ICD-10-CM

## 2023-06-06 DIAGNOSIS — Z00.00 ENCOUNTER FOR GENERAL ADULT MEDICAL EXAMINATION WITHOUT ABNORMAL FINDINGS: ICD-10-CM

## 2023-06-06 DIAGNOSIS — R51.9 HEADACHE, UNSPECIFIED: ICD-10-CM

## 2023-06-06 DIAGNOSIS — E89.0 POSTPROCEDURAL HYPOTHYROIDISM: Chronic | ICD-10-CM

## 2023-06-06 PROCEDURE — 70553 MRI BRAIN STEM W/O & W/DYE: CPT | Mod: 26,MH

## 2023-06-06 PROCEDURE — A9579: CPT

## 2023-06-06 PROCEDURE — 70553 MRI BRAIN STEM W/O & W/DYE: CPT

## 2023-06-07 ENCOUNTER — NON-APPOINTMENT (OUTPATIENT)
Age: 83
End: 2023-06-07

## 2023-06-07 DIAGNOSIS — R51.9 HEADACHE, UNSPECIFIED: ICD-10-CM

## 2023-06-07 RX ORDER — OXYBUTYNIN CHLORIDE 2.5 MG/1
TABLET ORAL
Refills: 0 | Status: DISCONTINUED | COMMUNITY
End: 2023-06-07

## 2023-06-07 RX ORDER — ATORVASTATIN CALCIUM 80 MG/1
80 TABLET, FILM COATED ORAL
Refills: 0 | Status: DISCONTINUED | COMMUNITY
End: 2023-06-07

## 2023-06-07 RX ORDER — METFORMIN ER 750 MG 750 MG/1
750 TABLET ORAL
Refills: 0 | Status: DISCONTINUED | COMMUNITY
End: 2023-06-07

## 2023-06-07 RX ORDER — METHOCARBAMOL 750 MG/1
750 TABLET, FILM COATED ORAL 3 TIMES DAILY
Qty: 21 | Refills: 0 | Status: DISCONTINUED | COMMUNITY
Start: 2022-10-27 | End: 2023-06-07

## 2023-06-07 RX ORDER — SACCHAROMYCES BOULARDII 50 MG
CAPSULE ORAL
Refills: 0 | Status: DISCONTINUED | COMMUNITY
End: 2023-06-07

## 2023-06-07 RX ORDER — LOSARTAN POTASSIUM 100 MG/1
100 TABLET, FILM COATED ORAL DAILY
Refills: 0 | Status: ACTIVE | COMMUNITY

## 2023-06-07 RX ORDER — MULTIVIT-MIN/FA/LYCOPEN/LUTEIN .4-300-25
TABLET ORAL
Refills: 0 | Status: DISCONTINUED | COMMUNITY
End: 2023-06-07

## 2023-06-07 RX ORDER — PREGABALIN 75 MG/1
75 CAPSULE ORAL
Refills: 0 | Status: DISCONTINUED | COMMUNITY
End: 2023-06-07

## 2023-06-07 RX ORDER — OXYBUTYNIN CHLORIDE 10 MG/1
10 TABLET, EXTENDED RELEASE ORAL DAILY
Refills: 0 | Status: ACTIVE | COMMUNITY
Start: 2023-06-07

## 2023-06-07 RX ORDER — DOCUSATE SODIUM 100 MG/1
100 CAPSULE ORAL TWICE DAILY
Qty: 14 | Refills: 0 | Status: DISCONTINUED | COMMUNITY
Start: 2022-10-27 | End: 2023-06-07

## 2023-06-07 RX ORDER — PNV NO.95/FERROUS FUM/FOLIC AC 28MG-0.8MG
TABLET ORAL
Refills: 0 | Status: DISCONTINUED | COMMUNITY
End: 2023-06-07

## 2023-06-07 RX ORDER — GABAPENTIN 300 MG/1
300 CAPSULE ORAL 3 TIMES DAILY
Qty: 90 | Refills: 0 | Status: DISCONTINUED | COMMUNITY
Start: 2022-10-27 | End: 2023-06-07

## 2023-06-07 RX ORDER — IBUPROFEN 200 MG/1
TABLET, FILM COATED ORAL
Refills: 0 | Status: DISCONTINUED | COMMUNITY
End: 2023-06-07

## 2023-06-07 RX ORDER — LINAGLIPTIN 5 MG/1
TABLET, FILM COATED ORAL
Refills: 0 | Status: DISCONTINUED | COMMUNITY
End: 2023-06-07

## 2023-06-07 RX ORDER — VITAMIN E (DL,TOCOPHERYL ACET) 180 MG
CAPSULE ORAL
Refills: 0 | Status: DISCONTINUED | COMMUNITY
End: 2023-06-07

## 2023-06-07 RX ORDER — PREDNISONE 20 MG/1
20 TABLET ORAL
Qty: 15 | Refills: 0 | Status: DISCONTINUED | COMMUNITY
Start: 2021-07-02 | End: 2023-06-07

## 2023-06-07 RX ORDER — DULAGLUTIDE 1.5 MG/.5ML
1.5 INJECTION, SOLUTION SUBCUTANEOUS WEEKLY
Refills: 0 | Status: ACTIVE | COMMUNITY
Start: 2023-06-07

## 2023-06-07 NOTE — ADDENDUM
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

## 2023-06-07 NOTE — HISTORY OF PRESENT ILLNESS
[de-identified] : Ms. 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 Cody Calixto.  She is here with her daughter Brandy.  \par \par She had a history of Kidney Cancer s/p partial nephrectomy and distant history of Thyroid Cancer s/p partial thyroidectomy.  She also had a Left Lower Lobectomy for Stage IA lung cancer. For dates see bellow.\par \par She had CT scan and subsequent PET/CT that showed multiple left pleural based nodules in addition to a para-aortic lymph node.  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 The differential diagnosis includes low grade follicular lymphoma and CD10+ marginal zone lymphoma. She denies any B-symptoms at this time other than recent weight loss of 10lbs over last month, however she reports she had not been eating due to a recent GI bug.\par \par She is here for further recommendations. \par Radiological Work-up:\par PET/CT (2/21/19) IMPRESSION: Since August 20, 2010: 1. Multiple sites of left pleural-based FDG avid nodularity, catalogued above, with max SUV 5.2, suspicious for biological tumor activity. Metastatic or primary pleural neoplasm are considerations. 2. Intra-abdominal FDG avid 1.7 x 1.2 cm left para-aortic lymph node, max SUV 5.4, suspicious for lymph node metastasis. \par 1/30/2019: [facility] [place]:  comparison was made to Ct from 7/2018 Multiple Plural nodules along the posterior paramediastinal surface, some are mildly larger there are new nodules as well. largest nodule 10 mm\par CT Head (9/15/18) IMPRESSION: 1. Mild periventricular and subcortical white matter chronic small vessel ischemic changes. 2. No acute fractures, mass effect, midline shift or hemorrhage. \par CT cervical spine (9/15/18) IMPRESSION: 1. Degenerative changes of the cervical spine C2-3 through C6-7 worse at C5-6 as described above. 2. Straightening of normal cervical lordosis with mild anterolisthesis of C3 anterior on C4, C6 on C7, C7 on T1 and T1 on T2. 3. No acute fractures or dislocations. \par 1/22/18: MR L spine: advance lymbar spondylosis, disck osteophytes, severe neuroforamin narrowing with  ipingment of L4 nerve,  3.5 cm AAA, 8 mm adrenal nodule.\par Pathology:\par (3/14/19) Final Diagnosis Left parotid lymph node, needle biopsy: Low grade B-cell lymphoma. \par Immunostains performed with adequate controls on sections from block 1A show the infiltrating cells are CD20+ CD79a+ B-cells that coexpress CD10, CD23(dim variable), and BCL2. They are negative for CD5, CD43, cyclin D1, and MUM1. BCL6 is difficult to determine due to the high background staining. Ki67 labels 5-10% of these cells. CD21 highlights scattered follicular dendritic cell meshworks.  Per report ([id number]), flow cytometry studies performed at [facility] show monotypic B-cells (10% of cells), positive for kappa, CD19, CD20, CD10; negative CD5. Heterogeneous population of T-cells (with normal CD4 to CD8 ratio), and polytypic B-cells are also present. The findings are diagnostic of low grade B-cell lymphoma. The differential diagnosis includes low grade follicular lymphoma and CD10+ marginal zone lymphoma.\par \par Health Maintenance:\par Colonoscopy \par Mammogram \par \par Recent blood work is in HIE. Was normal except for a high sugar and calcium of 10.2 [de-identified] : 7/10/19\par She was supposed to go for biopsy of pleural based nodularity that resolved prior to procedure.  She will see PULM in a few weeks.  She has been purposely losing weight, using Trajenta.  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  Denies any fevers, chills, unintentional weight loss, or night sweats.\par CT Chest (4/11/19) IMPRESSION:Since February 21, 2019;1. Interval resolution of previously described left-sided FDG avid pleural-based nodularity. No biopsy performed.2. Multiple stable nodules, as above.3. Unchanged left periaortic 1.4 cm lymph node, previously noted to be FDG avid.4. Stable right hydronephrosis and right-sided ureteral calculi.5. Stable infrarenal abdominal aortic aneurysm measuring up to 3.7 cm.\par \par 1/8/20\par She is here for follow up. Since last visit she had a CT C/A/P in 7/18/2019 that showed  Stable 1.1 cm left para-aortic lymph node on series 2 image 62. No new lymph nodes in the abdomen and pelvis. \par Interval development of moderate to severe right hydroureteronephrosis leading to 2 proximal ureteral calculi \par (superior calculus measuring 5 x 4 x 5 mm, 1000 Hounsfield units and the inferior adjacent calculus measuring 5 \par x 7 x 6 mm, 573 Hounsfield units). \par Bilateral intrarenal calculi. \par Stable 3.7 x 4.3 cm simple appearing right adnexal cyst. Recommend one-year follow-up pelvic ultrasound.\par Impression: \par Since April 11, 2019. \par No significant interval change in multiple left-sided pleural-based nodules, largest approximately 1.6 cm \par (remeasured on earlier study), left lung base (series 5/149). \par Post left lower lobectomy.\par \par She was seen by Urology and had lithotripsy. \par \par She feels well. \par \par 7/8/20\par She is here for follow up. She feels well. No B symptoms. She is pening a CT chest ordered by Dr. Morales\par \par 1/11/2021: The patient is here for follow up. She has no B symptoms , no cough , no chest pain , no new palpable lymph nodes. She is complaing of sciatica, going for an injection next week. She also had LEs cellulitis , completed once course of antibiotics. \par \par \par 5/19/21\par We had a telephone visit today. She feels well.  She had CT chest on 2/21 that showed Since July 14, 2020,\par \par  interval development of wedge-shaped consolidation posterior left upper lobe. (2/31).\par \par Minimal interval growth of multiple pleural-based nodules as described above\par \par Numerous stable left-sided pleural based nodules and adjacent parenchymal nodules..\par \par Reviewed her CT in tumor board. Chest. We were diveded on if we should rescan in 3 months with CT or check PET/cT now. I spoke to her and we decided to check a CT Chest in 3 months. \par \par She feels well. \par \par 9/15/21\par Patient is here for a follow-up visit for hx of lung cancer, NHL and pulmonary nodules.  She is feeling well with no new complaints.  Reviewed most recent CBC, which shows mild microcytic anemia.  Patient denies fever, chills, nausea, vomiting, dyspnea or bleeding.  She has not had recent colonoscopy, but has done Cologuard at home which was reportedly benign in recent years.  \par CT Chest (6.2.2021)IMPRESSION:Unchanged left circumferential subpleural nodularity largest measuring 1.6 cm.Resolved left lower lobe consolidation.\par \par 3/21/22\par Pt returns for f/u today . She reports that she was admitted at [facility] for episode for acute diverticulitis in November 2021, she was treated with IV abx , pt reports that she had imaging done at that time and it was only significant for acute diverticulitis ( we do not have the imaging results available .Since that time she reports  that she has been having episodes of constipation and diarrhea , she is on laxatives . She stopped taking po iron as it was making her GI symptoms worse . Her gastroenterologist did not suggest doing a colonoscopy. Pt denies any blood in stools. Denies any fever , chills , weight loss or loss of appetite. She was also seen by Dr Sweet. \par \par 6/20/22\par Pt returns for f/u today. Denies any fever , chills , weight loss or loss of appetite. In March ferritin was 17, Iron 38, iron sat 9%, Hgb 11.9, MCV 77.1. She had 5 doses of IV venofer tolerated well, she reports cold intolerance improved. Labs reviewed 6/13/22 hgb 13.3, MCV 84.9, Ferritin 112, Iron sat 13%, Iron 37. She had Kidney US on 6/8 which showed Indeterminate hypoechoic area involving the lower pole of the left kidney. Further evaluation with outpatient renal mass protocol CT is recommended. Focal dilatation of the right upper pole calyx. Bilateral renal calculi. Incidentally noted is an abdominal aortic aneurysm, similar in size to prior CT scan, measuring 4.2 cm. \par \par 10/12/2022\par She is here for follow up.   she had CT C/A/P in July 2022 \par Lungs, Pleura, and Airways:Trachea and central airways are patent. Post left lower lobectomy with expected postsurgical changes. Extensive left subpleural nodularity demonstrating slight interval increase since prior examinations including medial left upper lobe 2.7 x 1.0 cm nodular density (series 4 image 27) and anterior left upper lobe 1.1 x 1.0 cm nodular density (series 4 image 55).\par Stable right adnexal cystic lesion measuring 4.4 x 3.8 cm.\par \par Tubular/cystic appearing left adnexal lesion could represent a cyst or hydrosalpinx, not well seen on the 2/9/2021 CT. Recommend follow-up pelvic ultrasound.\par \par \par  I ordered a US pelvis hat showed   IMPRESSION:\par 1.  Likely benign 4 cm right adnexal/ovarian cyst. A follow-up pelvic ultrasound can be obtained in 6 months to assess for stability.\par 2.  Tubular cystic left adnexal lesion, likely hydrosalpinx.\par \par She was just in ER for pain and was noted to have a kidney stone  that moved and cauased hydroneprhosis. IMPRESSION: 10/6/2022\par \par 1. Moderate left hydroureteronephrosis with a 0.5 x 0.7 x 0.7 cm left \par midureter left mid ureter calculus.\par 2. Additional multiple nonobstructing bilateral renal calculi.\par \par She has follow up pending with Dr. Davis \par \par CBC from 10/6/2022 in ED showed hgb of 16.6 with normal MCV\par \par \par \par She is pending  surgery with Dr. Guaman for DJD\par \par 3/13/2023\par She is here for follow.  She saw Dr. Morales and he checked a PET/CT that was done on 3/7/2023:  IMPRESSION:\par 1.  Since July 18, 2019, substantially increased FDG avid diffuse soft tissue nodularity throughout the left pleural surface, compatible with biologic tumor activity (max SUV 19.5; reflecting 275% increase).\par 2.  New minimal FDG avid 5 mm right upper lobe solid pulmonary nodule ( positive on NAC images).\par 3.  New FDG avid mediastinal lymph nodes, suspicious for elizabeth metastases (max SUV 10.8 in a subcarinal lymph node).\par 4.  Focal FDG uptake in the right thyroid lobe (max SUV 7). Thyroid ultrasound can be obtained for further assessment.\par \par \par CBC today is normal. She is here with daughter. Lost a few pounds due to diverticular disease feels better now.\par \par I showed the images to the patient and reviewed it together she saw the pleural-based uptake as well as the mediastinal nodes\par \par 4/24/2023\par She is here for follow up. She had biopsy by IR of  left pleural mass and only Fragments of fibroadipose tissue and skeletal muscle . I presented her at thoracic tumor board and Dr. Julian Nicholas can perfomr a mediastinoscopy to confirm the etiology of the findings on the PET scan. She was here with family.  We went over the most recent PET CT scan together and she saw the increase of FDG uptake in the pleural space as well as similar nodes I explained that clinically I suspect this is probably her lymphoma but nevertheless other etiologies are possible.  If she is hesitant about doing the biopsy I can try Rituxan for 4 doses we will repeat imaging which has reasonable side effects but of course immunosuppression is always of a concern, and this is more than acceptable treatment for indolent lymphomas but I explained that we will be treating the lung entity and thus after discussion she agreed to proceed with mediastinoscopy\par \par \par 5/26/23\par She returns for follow-up today she underwent a mediastinoscopy\par With pathology demonstrating that her mediastinal lymph nodes are positive for metastatic adenocarcinoma consistent with lung primary\par \par This was from 5/12/2023\par \par Final Diagnosis\par \par 1. Level 7 lymph node, excision:\par - Positive for metastatic adenocarcinoma.\par - See comment\par \par 2. Level 7 lymph node, excision:\par - Positive for metastatic adenocarcinoma.\par - See comment\par \par 3. Right level 4 lymph node, excision:\par - Positive for metastatic adenocarcinoma.\par - See comment\par \par 4. Right level 2 lymph node, excision:\par - Positive for metastatic adenocarcinoma.\par - See comment\par \par 5. Right level 2 lymph node, excision:\par - Positive for metastatic adenocarcinoma.\par - See comment\par \par She has recovered well and has no complaints today and here to talk about next\par \par

## 2023-06-07 NOTE — PHYSICAL EXAM
[Restricted in physically strenuous activity but ambulatory and able to carry out work of a light or sedentary nature] : Status 1- Restricted in physically strenuous activity but ambulatory and able to carry out work of a light or sedentary nature, e.g., light house work, office work [Normal] : affect appropriate [de-identified] : wears glasses [de-identified] : bilateral mild edmea

## 2023-06-07 NOTE — REVIEW OF SYSTEMS
[Fatigue] : fatigue [Recent Change In Weight] : ~T recent weight change [Anxiety] : anxiety [Negative] : Allergic/Immunologic [Fever] : no fever [Night Sweats] : no night sweats [Chest Pain] : no chest pain [Lower Ext Edema] : no lower extremity edema [Shortness Of Breath] : no shortness of breath [Easy Bleeding] : no tendency for easy bleeding [FreeTextEntry7] : occasional nausea [FreeTextEntry8] : urinary leakage sees Dr. Cobb [de-identified] : sciatica pain on lyrica

## 2023-06-07 NOTE — ASSESSMENT
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 Left para-aortic lymph\par node". On observation since she is asymptomatic. \par Recent diverticulitis imaging done at that time showed no lymphadenopathy as per pt \par - No B symptoms\par -progression  given PET.CT vs unrelated\par \par #Anemia, mild, microcytic  resolved \par - S/p 5 doses of venofer in April. Ferritin improved 112, Iron sat 10%\par - recommended to f/u with GI she did but not a candidate for colonoscopy due to previous peroration and will monbitor  \par \par # Pleural Nodules on scans, these are not new, at least since 2018 But PET/CT now showed increase activity and new mediastinal node due to  sarcoid?, progression of lymphoma? RCC?\par -CT guided FNA biopsy was non daignostic of the pleural nodule\par -as discussed above she decided to proceed with mediastinoscopy which showed lung cancer \par \par \par # History of Thyroid cancer she states it was a partial thyroidectomy \par - This explains the presence of positive thyroglobulin 21 tumor marker \par -related to the PET.CT findings?\par \par # History of Lung Cancer and Plural Based Nodules \par -plan as above\par -there is also a new right 5 mm nodule but to small to biopsy will monitor \par -pleural biopsy was non diagnostic and mediastinal biopsy was adenocarcinoma of the lung\par \par \par # Adnexal cyst : PET/CT negative\par \par Plan\par -I had a extensive talk with her today her daughter was on the phone with us essentially all the mediastinal lymph nodes are positive for adenocarcinoma of the lung I am not sure if this is related to her pleural-based plaques which she had for years and the biopsy was nondiagnostic, at this point pursue another biopsy we discussed was not an option for us, it is possible that these pleural-based nodules were indeed slow-growing adenocarcinoma this will make her  stage IV\par -We therefore decided to treat this like stage IV and to repeat imaging and if there is for example response in the mediastinal nodes but and pleural-based areas then I think at that point in time we will rebiopsy the pleural-based area\par -We discussed treatment options in detail and I recommended we obtain NGS results and PD-L1 results which were requested before making a determination for treatment, I also requested MRI of the brain and we will regroup in a few weeks once these are available to make a final treatment plan\par \par \par

## 2023-06-16 ENCOUNTER — OUTPATIENT (OUTPATIENT)
Dept: OUTPATIENT SERVICES | Facility: HOSPITAL | Age: 83
LOS: 1 days | End: 2023-06-16
Payer: MEDICARE

## 2023-06-16 ENCOUNTER — LABORATORY RESULT (OUTPATIENT)
Age: 83
End: 2023-06-16

## 2023-06-16 ENCOUNTER — APPOINTMENT (OUTPATIENT)
Dept: HEMATOLOGY ONCOLOGY | Facility: CLINIC | Age: 83
End: 2023-06-16
Payer: MEDICARE

## 2023-06-16 VITALS
HEIGHT: 63 IN | BODY MASS INDEX: 27.82 KG/M2 | WEIGHT: 157 LBS | HEART RATE: 58 BPM | RESPIRATION RATE: 16 BRPM | TEMPERATURE: 97.1 F | DIASTOLIC BLOOD PRESSURE: 78 MMHG | SYSTOLIC BLOOD PRESSURE: 183 MMHG

## 2023-06-16 DIAGNOSIS — J44.9 CHRONIC OBSTRUCTIVE PULMONARY DISEASE, UNSPECIFIED: ICD-10-CM

## 2023-06-16 DIAGNOSIS — C85.90 NON-HODGKIN LYMPHOMA, UNSPECIFIED, UNSPECIFIED SITE: ICD-10-CM

## 2023-06-16 DIAGNOSIS — Z98.890 OTHER SPECIFIED POSTPROCEDURAL STATES: Chronic | ICD-10-CM

## 2023-06-16 DIAGNOSIS — C64.9 MALIGNANT NEOPLASM OF UNSPECIFIED KIDNEY, EXCEPT RENAL PELVIS: ICD-10-CM

## 2023-06-16 DIAGNOSIS — E89.0 POSTPROCEDURAL HYPOTHYROIDISM: Chronic | ICD-10-CM

## 2023-06-16 DIAGNOSIS — C34.90 MALIGNANT NEOPLASM OF UNSPECIFIED PART OF UNSPECIFIED BRONCHUS OR LUNG: ICD-10-CM

## 2023-06-16 DIAGNOSIS — K59.00 CONSTIPATION, UNSPECIFIED: ICD-10-CM

## 2023-06-16 DIAGNOSIS — R59.0 LOCALIZED ENLARGED LYMPH NODES: ICD-10-CM

## 2023-06-16 DIAGNOSIS — R22.2 LOCALIZED SWELLING, MASS AND LUMP, TRUNK: ICD-10-CM

## 2023-06-16 DIAGNOSIS — R91.1 SOLITARY PULMONARY NODULE: ICD-10-CM

## 2023-06-16 LAB
HCT VFR BLD CALC: 43.6 %
HGB BLD-MCNC: 14.4 G/DL
IRON SATN MFR SERPL: 21 %
IRON SERPL-MCNC: 68 UG/DL
MCHC RBC-ENTMCNC: 29.4 PG
MCHC RBC-ENTMCNC: 33 G/DL
MCV RBC AUTO: 89 FL
PLATELET # BLD AUTO: 146 K/UL
PMV BLD: 11.1 FL
RBC # BLD: 4.9 M/UL
RBC # FLD: 13 %
TIBC SERPL-MCNC: 327 UG/DL
UIBC SERPL-MCNC: 259 UG/DL
WBC # FLD AUTO: 4.67 K/UL

## 2023-06-16 PROCEDURE — 83540 ASSAY OF IRON: CPT

## 2023-06-16 PROCEDURE — 85027 COMPLETE CBC AUTOMATED: CPT

## 2023-06-16 PROCEDURE — 83550 IRON BINDING TEST: CPT

## 2023-06-16 PROCEDURE — 99214 OFFICE O/P EST MOD 30 MIN: CPT

## 2023-06-16 PROCEDURE — 82728 ASSAY OF FERRITIN: CPT

## 2023-06-16 NOTE — ASSESSMENT
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 All the mediastinal lymph nodes are positive for adenocarcinoma of the lung I am not sure if this is related to her pleural-based plaques which she had for years and the biopsy was nondiagnostic, at this point pursue another biopsy we discussed was not an option for us, it is possible that these pleural-based nodules were indeed slow-growing adenocarcinoma this will make her  stage IV\par -We therefore decided to treat this like stage IV and to repeat imaging and if there is for example response in the mediastinal nodes but and pleural-based areas then I think at that point in time we will rebiopsy the pleural-based area\par - MR brain negative\par - Started Tagrisso 6/2023\par \par # Pleural Nodules on scans, these are not new, at least since 2018 But PET/CT now showed increase activity and new mediastinal node due to  sarcoid?, progression of lymphoma? 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 Left para-aortic lymph\par node". On observation since she is asymptomatic. \par Recent diverticulitis imaging done at that time showed no lymphadenopathy as per pt \par - No B symptoms\par -progression  given PET.CT vs unrelated\par \par #Anemia, mild, microcytic  resolved \par - S/p 5 doses of venofer in April. Ferritin improved 112, Iron sat 10%\par - recommended to f/u with GI she did but not a candidate for colonoscopy due to previous peroration and will monitor  \par \par # History of Thyroid cancer she states it was a partial thyroidectomy \par - This explains the presence of positive thyroglobulin 21 tumor marker \par -related to the PET.CT findings?\par \par # Adnexal cyst : PET/CT negative\par \par Plan\par - Discussed side effects of Tagrisso including but not limited to rash, diarrhea, pneumonitis, keratitis, transaminitis and cytopenias. \par - Continue with Tagrisso 80mg daily\par - PET scan 9/2023\par - If mediastinal lymph nodes are responding and pleural based nodule is unchanged or growing, may need to biopsy the pleura again, if both respond then will assume it is from the lung adenocarcinoma\par - Lactulose sent for constipation\par \par RTC in 1 month\par \par \par

## 2023-06-16 NOTE — REVIEW OF SYSTEMS
[Fatigue] : fatigue [Recent Change In Weight] : ~T recent weight change [Anxiety] : anxiety [Negative] : Allergic/Immunologic [SOB on Exertion] : shortness of breath during exertion [Constipation] : constipation [Diarrhea: Grade 1 - Increase of <4 stools per day over baseline; mild increase in ostomy output compared to baseline] : Diarrhea: Grade 1 - Increase of <4 stools per day over baseline; mild increase in ostomy output compared to baseline [Fever] : no fever [Night Sweats] : no night sweats [Chest Pain] : no chest pain [Lower Ext Edema] : no lower extremity edema [Shortness Of Breath] : no shortness of breath [Easy Bleeding] : no tendency for easy bleeding [FreeTextEntry8] : urinary leakage sees Dr. Cobb [de-identified] : sciatica pain on lyrica

## 2023-06-16 NOTE — END OF VISIT
[Time Spent: ___ minutes] : I have spent [unfilled] minutes of time on the encounter. [] : Fellow [FreeTextEntry3] : She is here for follow-up of her NGS showed EGFR mutation and she was started on Tagrisso she is doing well has been on it for about a week we are assuming this is stage IV lung cancer given pleural nodules but those have been there for some time and previous biopsy was nondiagnostic but we will see.  Plan to repeat a PET scan in about 3 months if doing well and proceed from there.  She will see me back in a month we did blood work today as well

## 2023-06-16 NOTE — ADDENDUM
[FreeTextEntry1] : MRI brain was negative, NGS showed  EGFR p.([id number]) ordered tagrisso we spoke about side effects it was [facility] to [facility]\par

## 2023-06-16 NOTE — HISTORY OF PRESENT ILLNESS
[de-identified] : Ms. 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 Cody Calixto.  She is here with her daughter Brandy.  \par \par She had a history of Kidney Cancer s/p partial nephrectomy and distant history of Thyroid Cancer s/p partial thyroidectomy.  She also had a Left Lower Lobectomy for Stage IA lung cancer. For dates see bellow.\par \par She had CT scan and subsequent PET/CT that showed multiple left pleural based nodules in addition to a para-aortic lymph node.  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 The differential diagnosis includes low grade follicular lymphoma and CD10+ marginal zone lymphoma. She denies any B-symptoms at this time other than recent weight loss of 10lbs over last month, however she reports she had not been eating due to a recent GI bug.\par \par She is here for further recommendations. \par Radiological Work-up:\par PET/CT (2/21/19) IMPRESSION: Since August 20, 2010: 1. Multiple sites of left pleural-based FDG avid nodularity, catalogued above, with max SUV 5.2, suspicious for biological tumor activity. Metastatic or primary pleural neoplasm are considerations. 2. Intra-abdominal FDG avid 1.7 x 1.2 cm left para-aortic lymph node, max SUV 5.4, suspicious for lymph node metastasis. \par 1/30/2019: [facility] [place]:  comparison was made to Ct from 7/2018 Multiple Plural nodules along the posterior paramediastinal surface, some are mildly larger there are new nodules as well. largest nodule 10 mm\par CT Head (9/15/18) IMPRESSION: 1. Mild periventricular and subcortical white matter chronic small vessel ischemic changes. 2. No acute fractures, mass effect, midline shift or hemorrhage. \par CT cervical spine (9/15/18) IMPRESSION: 1. Degenerative changes of the cervical spine C2-3 through C6-7 worse at C5-6 as described above. 2. Straightening of normal cervical lordosis with mild anterolisthesis of C3 anterior on C4, C6 on C7, C7 on T1 and T1 on T2. 3. No acute fractures or dislocations. \par 1/22/18: MR L spine: advance lymbar spondylosis, disck osteophytes, severe neuroforamin narrowing with  ipingment of L4 nerve,  3.5 cm AAA, 8 mm adrenal nodule.\par Pathology:\par (3/14/19) Final Diagnosis Left parotid lymph node, needle biopsy: Low grade B-cell lymphoma. \par Immunostains performed with adequate controls on sections from block 1A show the infiltrating cells are CD20+ CD79a+ B-cells that coexpress CD10, CD23(dim variable), and BCL2. They are negative for CD5, CD43, cyclin D1, and MUM1. BCL6 is difficult to determine due to the high background staining. Ki67 labels 5-10% of these cells. CD21 highlights scattered follicular dendritic cell meshworks.  Per report ([id number]), flow cytometry studies performed at [facility] show monotypic B-cells (10% of cells), positive for kappa, CD19, CD20, CD10; negative CD5. Heterogeneous population of T-cells (with normal CD4 to CD8 ratio), and polytypic B-cells are also present. The findings are diagnostic of low grade B-cell lymphoma. The differential diagnosis includes low grade follicular lymphoma and CD10+ marginal zone lymphoma.\par \par Health Maintenance:\par Colonoscopy \par Mammogram \par \par Recent blood work is in [facility]IE. Was normal except for a high sugar and calcium of 10.2 [de-identified] : 7/10/19\par She was supposed to go for biopsy of pleural based nodularity that resolved prior to procedure.  She will see PULM in a few weeks.  She has been purposely losing weight, using Trajenta.  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  Denies any fevers, chills, unintentional weight loss, or night sweats.\par CT Chest (4/11/19) IMPRESSION:Since February 21, 2019;1. Interval resolution of previously described left-sided FDG avid pleural-based nodularity. No biopsy performed.2. Multiple stable nodules, as above.3. Unchanged left periaortic 1.4 cm lymph node, previously noted to be FDG avid.4. Stable right hydronephrosis and right-sided ureteral calculi.5. Stable infrarenal abdominal aortic aneurysm measuring up to 3.7 cm.\par \par 1/8/20\par She is here for follow up. Since last visit she had a CT C/A/P in 7/18/2019 that showed  Stable 1.1 cm left para-aortic lymph node on series 2 image 62. No new lymph nodes in the abdomen and pelvis. \par Interval development of moderate to severe right hydroureteronephrosis leading to 2 proximal ureteral calculi \par (superior calculus measuring 5 x 4 x 5 mm, 1000 Hounsfield units and the inferior adjacent calculus measuring 5 \par x 7 x 6 mm, 573 Hounsfield units). \par Bilateral intrarenal calculi. \par Stable 3.7 x 4.3 cm simple appearing right adnexal cyst. Recommend one-year follow-up pelvic ultrasound.\par Impression: \par Since April 11, 2019. \par No significant interval change in multiple left-sided pleural-based nodules, largest approximately 1.6 cm \par (remeasured on earlier study), left lung base (series 5/149). \par Post left lower lobectomy.\par \par She was seen by Urology and had lithotripsy. \par \par She feels well. \par \par 7/8/20\par She is here for follow up. She feels well. No B symptoms. She is pening a CT chest ordered by Dr. Morales\par \par 1/11/2021: The patient is here for follow up. She has no B symptoms , no cough , no chest pain , no new palpable lymph nodes. She is complaing of sciatica, going for an injection next week. She also had LEs cellulitis , completed once course of antibiotics. \par \par \par 5/19/21\par We had a telephone visit today. She feels well.  She had CT chest on 2/21 that showed Since July 14, 2020,\par \par  interval development of wedge-shaped consolidation posterior left upper lobe. (2/31).\par \par Minimal interval growth of multiple pleural-based nodules as described above\par \par Numerous stable left-sided pleural based nodules and adjacent parenchymal nodules..\par \par Reviewed her CT in tumor board. Chest. We were diveded on if we should rescan in 3 months with CT or check PET/cT now. I spoke to her and we decided to check a CT Chest in 3 months. \par \par She feels well. \par \par 9/15/21\par Patient is here for a follow-up visit for hx of lung cancer, NHL and pulmonary nodules.  She is feeling well with no new complaints.  Reviewed most recent CBC, which shows mild microcytic anemia.  Patient denies fever, chills, nausea, vomiting, dyspnea or bleeding.  She has not had recent colonoscopy, but has done Cologuard at home which was reportedly benign in recent years.  \par CT Chest (6.2.2021)IMPRESSION:Unchanged left circumferential subpleural nodularity largest measuring 1.6 cm.Resolved left lower lobe consolidation.\par \par 3/21/22\par Pt returns for f/u today . She reports that she was admitted at [facility] for episode for acute diverticulitis in November 2021, she was treated with IV abx , pt reports that she had imaging done at that time and it was only significant for acute diverticulitis ( we do not have the imaging results available .Since that time she reports  that she has been having episodes of constipation and diarrhea , she is on laxatives . She stopped taking po iron as it was making her GI symptoms worse . Her gastroenterologist did not suggest doing a colonoscopy. Pt denies any blood in stools. Denies any fever , chills , weight loss or loss of appetite. She was also seen by Dr Sweet. \par \par 6/20/22\par Pt returns for f/u today. Denies any fever , chills , weight loss or loss of appetite. In March ferritin was 17, Iron 38, iron sat 9%, Hgb 11.9, MCV 77.1. She had 5 doses of IV venofer tolerated well, she reports cold intolerance improved. Labs reviewed 6/13/22 hgb 13.3, MCV 84.9, Ferritin 112, Iron sat 13%, Iron 37. She had Kidney US on 6/8 which showed Indeterminate hypoechoic area involving the lower pole of the left kidney. Further evaluation with outpatient renal mass protocol CT is recommended. Focal dilatation of the right upper pole calyx. Bilateral renal calculi. Incidentally noted is an abdominal aortic aneurysm, similar in size to prior CT scan, measuring 4.2 cm. \par \par 10/12/2022\par She is here for follow up.   she had CT C/A/P in July 2022 \par Lungs, Pleura, and Airways:Trachea and central airways are patent. Post left lower lobectomy with expected postsurgical changes. Extensive left subpleural nodularity demonstrating slight interval increase since prior examinations including medial left upper lobe 2.7 x 1.0 cm nodular density (series 4 image 27) and anterior left upper lobe 1.1 x 1.0 cm nodular density (series 4 image 55).\par Stable right adnexal cystic lesion measuring 4.4 x 3.8 cm.\par \par Tubular/cystic appearing left adnexal lesion could represent a cyst or hydrosalpinx, not well seen on the 2/9/2021 CT. Recommend follow-up pelvic ultrasound.\par \par \par  I ordered a US pelvis hat showed   IMPRESSION:\par 1.  Likely benign 4 cm right adnexal/ovarian cyst. A follow-up pelvic ultrasound can be obtained in 6 months to assess for stability.\par 2.  Tubular cystic left adnexal lesion, likely hydrosalpinx.\par \par She was just in ER for pain and was noted to have a kidney stone  that moved and cauased hydroneprhosis. IMPRESSION: 10/6/2022\par \par 1. Moderate left hydroureteronephrosis with a 0.5 x 0.7 x 0.7 cm left \par midureter left mid ureter calculus.\par 2. Additional multiple nonobstructing bilateral renal calculi.\par \par She has follow up pending with Dr. Davis \par \par CBC from 10/6/2022 in ED showed hgb of 16.6 with normal MCV\par \par \par \par She is pending  surgery with Dr. Guaman for DJD\par \par 3/13/2023\par She is here for follow.  She saw Dr. Morales and he checked a PET/CT that was done on 3/7/2023:  IMPRESSION:\par 1.  Since July 18, 2019, substantially increased FDG avid diffuse soft tissue nodularity throughout the left pleural surface, compatible with biologic tumor activity (max SUV 19.5; reflecting 275% increase).\par 2.  New minimal FDG avid 5 mm right upper lobe solid pulmonary nodule ( positive on NAC images).\par 3.  New FDG avid mediastinal lymph nodes, suspicious for elizabeth metastases (max SUV 10.8 in a subcarinal lymph node).\par 4.  Focal FDG uptake in the right thyroid lobe (max SUV 7). Thyroid ultrasound can be obtained for further assessment.\par \par \par CBC today is normal. She is here with daughter. Lost a few pounds due to diverticular disease feels better now.\par \par I showed the images to the patient and reviewed it together she saw the pleural-based uptake as well as the mediastinal nodes\par \par 4/24/2023\par She is here for follow up. She had biopsy by IR of  left pleural mass and only Fragments of fibroadipose tissue and skeletal muscle . I presented her at thoracic tumor board and Dr. Jluian Nicholas can perfomr a mediastinoscopy to confirm the etiology of the findings on the PET scan. She was here with family.  We went over the most recent PET CT scan together and she saw the increase of FDG uptake in the pleural space as well as similar nodes I explained that clinically I suspect this is probably her lymphoma but nevertheless other etiologies are possible.  If she is hesitant about doing the biopsy I can try Rituxan for 4 doses we will repeat imaging which has reasonable side effects but of course immunosuppression is always of a concern, and this is more than acceptable treatment for indolent lymphomas but I explained that we will be treating the lung entity and thus after discussion she agreed to proceed with mediastinoscopy\par \par \par 5/26/23\par She returns for follow-up today she underwent a mediastinoscopy\par With pathology demonstrating that her mediastinal lymph nodes are positive for metastatic adenocarcinoma consistent with lung primary\par \par This was from 5/12/2023\par \par Final Diagnosis\par \par 1. Level 7 lymph node, excision:\par - Positive for metastatic adenocarcinoma.\par - See comment\par \par 2. Level 7 lymph node, excision:\par - Positive for metastatic adenocarcinoma.\par - See comment\par \par 3. Right level 4 lymph node, excision:\par - Positive for metastatic adenocarcinoma.\par - See comment\par \par 4. Right level 2 lymph node, excision:\par - Positive for metastatic adenocarcinoma.\par - See comment\par \par 5. Right level 2 lymph node, excision:\par - Positive for metastatic adenocarcinoma.\par - See comment\par \par She has recovered well and has no complaints today and here to talk about next\par \par 6/16/2023\par She returns for followup. NGS showed  EGFR p.([id number]). MR brain was negative. She was started on Tagrisso 1 week ago is tolerating well. Her AMAYA is stable to improved with her inhalers. She has chronic constipation alternating with diarrhea which she has had for years with no significant change. She remains active and is doing well overall. She is drinking Boosts for additional calories.\par \par

## 2023-06-16 NOTE — CONSULT LETTER
[Dear  ___] : Dear  [unfilled], [Consult Letter:] : I had the pleasure of evaluating your patient, [unfilled]. [Please see my note below.] : Please see my note below. [Consult Closing:] : Thank you very much for allowing me to participate in the care of this patient.  If you have any questions, please do not hesitate to contact me. [Sincerely,] : Sincerely, [DrJoaquín  ___] : Dr. RANGEL [DrJaoquín ___] : Dr. RANGEL [FreeTextEntry3] : Dr. Zaheer Guerrero

## 2023-06-16 NOTE — PHYSICAL EXAM
[Restricted in physically strenuous activity but ambulatory and able to carry out work of a light or sedentary nature] : Status 1- Restricted in physically strenuous activity but ambulatory and able to carry out work of a light or sedentary nature, e.g., light house work, office work [Normal] : affect appropriate [de-identified] : wears glasses [de-identified] : bilateral mild edmea

## 2023-06-19 LAB — FERRITIN SERPL-MCNC: 86 NG/ML

## 2023-06-27 DIAGNOSIS — R91.1 SOLITARY PULMONARY NODULE: ICD-10-CM

## 2023-06-27 DIAGNOSIS — K59.00 CONSTIPATION, UNSPECIFIED: ICD-10-CM

## 2023-06-27 DIAGNOSIS — Z51.11 ENCOUNTER FOR ANTINEOPLASTIC CHEMOTHERAPY: ICD-10-CM

## 2023-07-14 ENCOUNTER — APPOINTMENT (OUTPATIENT)
Dept: HEMATOLOGY ONCOLOGY | Facility: CLINIC | Age: 83
End: 2023-07-14
Payer: MEDICARE

## 2023-07-14 ENCOUNTER — OUTPATIENT (OUTPATIENT)
Dept: OUTPATIENT SERVICES | Facility: HOSPITAL | Age: 83
LOS: 1 days | End: 2023-07-14
Payer: MEDICARE

## 2023-07-14 ENCOUNTER — LABORATORY RESULT (OUTPATIENT)
Age: 83
End: 2023-07-14

## 2023-07-14 VITALS
WEIGHT: 156 LBS | RESPIRATION RATE: 16 BRPM | TEMPERATURE: 98.2 F | HEIGHT: 63 IN | DIASTOLIC BLOOD PRESSURE: 76 MMHG | BODY MASS INDEX: 27.64 KG/M2 | HEART RATE: 75 BPM | SYSTOLIC BLOOD PRESSURE: 159 MMHG

## 2023-07-14 DIAGNOSIS — Z98.890 OTHER SPECIFIED POSTPROCEDURAL STATES: Chronic | ICD-10-CM

## 2023-07-14 DIAGNOSIS — E89.0 POSTPROCEDURAL HYPOTHYROIDISM: Chronic | ICD-10-CM

## 2023-07-14 DIAGNOSIS — C64.9 MALIGNANT NEOPLASM OF UNSPECIFIED KIDNEY, EXCEPT RENAL PELVIS: ICD-10-CM

## 2023-07-14 PROCEDURE — 99214 OFFICE O/P EST MOD 30 MIN: CPT

## 2023-07-14 PROCEDURE — 83540 ASSAY OF IRON: CPT

## 2023-07-14 PROCEDURE — 80053 COMPREHEN METABOLIC PANEL: CPT

## 2023-07-14 PROCEDURE — 83550 IRON BINDING TEST: CPT

## 2023-07-14 PROCEDURE — 85027 COMPLETE CBC AUTOMATED: CPT

## 2023-07-14 PROCEDURE — 82728 ASSAY OF FERRITIN: CPT

## 2023-07-14 RX ORDER — HYDROCORTISONE 25 MG/G
2.5 CREAM TOPICAL 3 TIMES DAILY
Qty: 2 | Refills: 5 | Status: COMPLETED | COMMUNITY
Start: 2023-07-14 | End: 2023-07-14

## 2023-07-14 RX ORDER — OXYCODONE AND ACETAMINOPHEN 5; 325 MG/1; MG/1
5-325 TABLET ORAL
Qty: 42 | Refills: 0 | Status: DISCONTINUED | COMMUNITY
Start: 2022-10-27 | End: 2023-07-14

## 2023-07-14 RX ORDER — BETAMETHASONE VALERATE 1 MG/ML
0.1 LOTION CUTANEOUS 3 TIMES DAILY
Qty: 3 | Refills: 5 | Status: DISCONTINUED | COMMUNITY
Start: 2023-07-10 | End: 2023-07-14

## 2023-07-15 DIAGNOSIS — C64.9 MALIGNANT NEOPLASM OF UNSPECIFIED KIDNEY, EXCEPT RENAL PELVIS: ICD-10-CM

## 2023-07-16 NOTE — ADDENDUM
[FreeTextEntry1] : MRI brain was negative, NGS showed  EGFR p.([id number]) ordered tagrisso we spoke about side effects it was sent to [facility]\par

## 2023-07-16 NOTE — ASSESSMENT
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 All the mediastinal lymph nodes are positive for adenocarcinoma of the lung I am not sure if this is related to her pleural-based plaques which she had for years and the biopsy was nondiagnostic, at this point pursue another biopsy we discussed was not an option for us, it is possible that these pleural-based nodules were indeed slow-growing adenocarcinoma this will make her  stage IV\par -We therefore decided to treat this like stage IV and to repeat imaging and if there is for example response in the mediastinal nodes but and pleural-based areas then I think at that point in time we will rebiopsy the pleural-based area\par - MR brain negative.\par - 06/2023 Started Tagrisso. Discussed side effects of Tagrisso including but not limited to rash, diarrhea, pneumonitis, keratitis, transaminitis and cytopenias. \par - 07/12/2023 Tagrisso is on hold due to development of rash.\par \par # Pleural Nodules on scans, these are not new, at least since 2018 But PET/CT now showed increase activity and new mediastinal node due to  sarcoid?, progression of lymphoma? 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 On observation since she is asymptomatic. \par - Recent diverticulitis imaging done at that time showed no lymphadenopathy as per pt.\par - No B symptoms.\par - progression given PET.CT vs unrelated.\par \par # Anemia, mild, microcytic - resolved.\par - S/p 5 doses of Venofer in April. Ferritin improved 112, Iron sat 10%.\par - recommended to f/u with GI she did but not a candidate for colonoscopy due to previous peroration and will monitor.\par \par # History of Thyroid cancer she states it was a partial thyroidectomy \par - This explains the presence of positive thyroglobulin 21 tumor marker \par - related to the PET.CT findings?\par \par # Adnexal cyst.\par - PET/CT negative.\par \par 07/14/2023 new onset of rash at her low abdomen area that could due to fungal and SE of Tagrisso.\par \par PLAN:\par \par - HOLD Tagrisso 80mg PO QD for 1 week.\par \par - start Keflex 500 mg PO QID.\par \par - Proceed with antifungal and steroid cream to the affected areas.\par \par - repeat PET scan - 09/2023.\par \par - If mediastinal lymph nodes are responding and pleural based nodule is unchanged or growing, may need to biopsy the pleura again, if both respond then will assume it is from the lung adenocarcinoma.\par \par - continue Lactulose PRN sent for constipation.\par \par RTC in 2 weeks with CBC CMP

## 2023-07-16 NOTE — PHYSICAL EXAM
[Restricted in physically strenuous activity but ambulatory and able to carry out work of a light or sedentary nature] : Status 1- Restricted in physically strenuous activity but ambulatory and able to carry out work of a light or sedentary nature, e.g., light house work, office work [Normal] : affect appropriate [de-identified] : wears glasses [de-identified] : bilateral mild edema  [de-identified] : acne-like rash with inflammatory component at her lower abdomen started at skin fold and spreading peripherally from there.

## 2023-07-16 NOTE — REVIEW OF SYSTEMS
[Fatigue] : fatigue [Recent Change In Weight] : ~T recent weight change [SOB on Exertion] : shortness of breath during exertion [Constipation] : constipation [Diarrhea: Grade 1 - Increase of <4 stools per day over baseline; mild increase in ostomy output compared to baseline] : Diarrhea: Grade 1 - Increase of <4 stools per day over baseline; mild increase in ostomy output compared to baseline [Anxiety] : anxiety [Negative] : Allergic/Immunologic [Skin Rash] : skin rash [Fever] : no fever [Night Sweats] : no night sweats [Chest Pain] : no chest pain [Lower Ext Edema] : no lower extremity edema [Shortness Of Breath] : no shortness of breath [Easy Bleeding] : no tendency for easy bleeding [FreeTextEntry8] : urinary leakage sees Dr. Cobb [de-identified] : sciatica pain on Lyrica

## 2023-07-16 NOTE — HISTORY OF PRESENT ILLNESS
[de-identified] : Ms. 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 Cody Calixto.  She is here with her daughter Brandy.  \par \par She had a history of Kidney Cancer s/p partial nephrectomy and distant history of Thyroid Cancer s/p partial thyroidectomy.  She also had a Left Lower Lobectomy for Stage IA lung cancer. For dates see bellow.\par \par She had CT scan and subsequent PET/CT that showed multiple left pleural based nodules in addition to a para-aortic lymph node.  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 The differential diagnosis includes low grade follicular lymphoma and CD10+ marginal zone lymphoma. She denies any B-symptoms at this time other than recent weight loss of 10lbs over last month, however she reports she had not been eating due to a recent GI bug.\par \par She is here for further recommendations. \par Radiological Work-up:\par PET/CT (2/21/19) IMPRESSION: Since August 20, 2010: 1. Multiple sites of left pleural-based FDG avid nodularity, catalogued above, with max SUV 5.2, suspicious for biological tumor activity. Metastatic or primary pleural neoplasm are considerations. 2. Intra-abdominal FDG avid 1.7 x 1.2 cm left para-aortic lymph node, max SUV 5.4, suspicious for lymph node metastasis. \par 1/30/2019: [facility] [place]:  comparison was made to Ct from 7/2018 Multiple Plural nodules along the posterior paramediastinal surface, some are mildly larger there are new nodules as well. largest nodule 10 mm\par CT Head (9/15/18) IMPRESSION: 1. Mild periventricular and subcortical white matter chronic small vessel ischemic changes. 2. No acute fractures, mass effect, midline shift or hemorrhage. \par CT cervical spine (9/15/18) IMPRESSION: 1. Degenerative changes of the cervical spine C2-3 through C6-7 worse at C5-6 as described above. 2. Straightening of normal cervical lordosis with mild anterolisthesis of C3 anterior on C4, C6 on C7, C7 on T1 and T1 on T2. 3. No acute fractures or dislocations. \par 1/22/18: MR L spine: advance lymbar spondylosis, disck osteophytes, severe neuroforamin narrowing with  ipingment of L4 nerve,  3.5 cm AAA, 8 mm adrenal nodule.\par Pathology:\par (3/14/19) Final Diagnosis Left parotid lymph node, needle biopsy: Low grade B-cell lymphoma. \par Immunostains performed with adequate controls on sections from block 1A show the infiltrating cells are CD20+ CD79a+ B-cells that coexpress CD10, CD23(dim variable), and BCL2. They are negative for CD5, CD43, cyclin D1, and MUM1. BCL6 is difficult to determine due to the high background staining. Ki67 labels 5-10% of these cells. CD21 highlights scattered follicular dendritic cell meshworks.  Per report ([id number]), flow cytometry studies performed at [facility] show monotypic B-cells (10% of cells), positive for kappa, CD19, CD20, CD10; negative CD5. Heterogeneous population of T-cells (with normal CD4 to CD8 ratio), and polytypic B-cells are also present. The findings are diagnostic of low grade B-cell lymphoma. The differential diagnosis includes low grade follicular lymphoma and CD10+ marginal zone lymphoma.\par \par Health Maintenance:\par Colonoscopy \par Mammogram \par \par Recent blood work is in [facility]IE. Was normal except for a high sugar and calcium of 10.2 [de-identified] : 7/10/19\par She was supposed to go for biopsy of pleural based nodularity that resolved prior to procedure. She will see PULM in a few weeks.  She has been purposely losing weight, using Trajenta. 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 Denies any fevers, chills, unintentional weight loss, or night sweats.\par CT Chest (4/11/19) IMPRESSION: Since February 21, 2019;1. Interval resolution of previously described left-sided FDG avid pleural-based nodularity. No biopsy performed. 2. Multiple stable nodules, as above. 3. Unchanged left periaortic 1.4 cm lymph node, previously noted to be FDG avid. 4. Stable right hydronephrosis and right-sided ureteral calculi.5. Stable infrarenal abdominal aortic aneurysm measuring up to 3.7 cm.\par \par 1/8/20\par She is here for follow up. Since last visit she had a CT C/A/P in 7/18/2019 that showed Stable 1.1 cm left para-aortic lymph node on series 2 image 62. No new lymph nodes in the abdomen and pelvis. \par Interval development of moderate to severe right hydroureteronephrosis leading to 2 proximal ureteral calculi (superior calculus measuring 5 x 4 x 5 mm, 1000 Hounsfield units and the inferior adjacent calculus measuring 5 x 7 x 6 mm, 573 Hounsfield units). Bilateral intrarenal calculi. Stable 3.7 x 4.3 cm simple appearing right adnexal cyst. Recommend one-year follow-up pelvic ultrasound.\par Impression: Since April 11, 2019. No significant interval change in multiple left-sided pleural-based nodules, largest approximately 1.6 cm (remeasured on earlier study), left lung base (series 5/149). Post left lower lobectomy.\par She was seen by Urology and had lithotripsy. \par She feels well. \par \par 7/8/20\par She is here for follow up. She feels well. No B symptoms. She is pending a CT chest ordered by Dr. Morales.\par \par 1/11/2021\par The patient is here for follow up. She has no B symptoms, no cough, no chest pain, no new palpable lymph nodes. She is complaining of sciatica, going for an injection next week. She also had LEs cellulitis, completed once course of antibiotics. \par \par 5/19/21\par We had a telephone visit today. She feels well.  She had CT chest on 2/21 that showed Since July 14, 2020, interval development of wedge-shaped consolidation posterior left upper lobe. (2/31). Minimal interval growth of multiple pleural-based nodules as described above. Numerous stable left-sided pleural based nodules and adjacent parenchymal nodules.\par Reviewed her CT in tumor board. Chest. We were divided on if we should rescan in 3 months with CT or check PET/cT now. I spoke to her and we decided to check a CT Chest in 3 months. \par She feels well. \par \par 9/15/21\par Patient is here for a follow-up visit for hx of lung cancer, NHL and pulmonary nodules. She is feeling well with no new complaints. Reviewed most recent CBC, which shows mild microcytic anemia. Patient denies fever, chills, nausea, vomiting, dyspnea or bleeding. She has not had recent colonoscopy, but has done Cologuard at home which was reportedly benign in recent years.  \par CT Chest (6.2.2021) IMPRESSION: Unchanged left circumferential subpleural nodularity largest measuring 1.6 cm.Resolved left lower lobe consolidation.\par \par 3/21/22\par Pt returns for f/u today. She reports that she was admitted at [facility] for episode for acute diverticulitis in November 2021, she was treated with IV abx, pt reports that she had imaging done at that time and it was only significant for acute diverticulitis (we do not have the imaging results available. Since that time she reports that she has been having episodes of constipation and diarrhea, she is on laxatives. She stopped taking po iron as it was making her GI symptoms worse. Her gastroenterologist did not suggest doing a colonoscopy. Pt denies any blood in stools. Denies any fever, chills, weight loss or loss of appetite. She was also seen by Dr Sweet. \par \par 6/20/22\par Pt returns for f/u today. Denies any fever, chills, weight loss or loss of appetite. In March ferritin was 17, Iron 38, iron sat 9%, Hgb 11.9, MCV 77.1. She had 5 doses of IV Venofer tolerated well, she reports cold intolerance improved. Labs reviewed 6/13/22 hgb 13.3, MCV 84.9, Ferritin 112, Iron sat 13%, Iron 37. She had Kidney US on 6/8 which showed Indeterminate hypoechoic area involving the lower pole of the left kidney. Further evaluation with outpatient renal mass protocol CT is recommended. Focal dilatation of the right upper pole calyx. Bilateral renal calculi. Incidentally noted is an abdominal aortic aneurysm, similar in size to prior CT scan, measuring 4.2 cm. \par \par 10/12/2022\par She is here for follow up. she had CT C/A/P in July 2022: Lungs, Pleura, and Airways: Trachea and central airways are patent. Post left lower lobectomy with expected postsurgical changes. Extensive left subpleural nodularity demonstrating slight interval increase since prior examinations including medial left upper lobe 2.7 x 1.0 cm nodular density (series 4 image 27) and anterior left upper lobe 1.1 x 1.0 cm nodular density (series 4 image 55). Stable right adnexal cystic lesion measuring 4.4 x 3.8 cm. Tubular/cystic appearing left adnexal lesion could represent a cyst or hydrosalpinx, not well seen on the 2/9/2021 CT. Recommend follow-up pelvic ultrasound. \par  I ordered a US pelvis hat showed   IMPRESSION: 1.  Likely benign 4 cm right adnexal/ovarian cyst. A follow-up pelvic ultrasound can be obtained in 6 months to assess for stability. 2.  Tubular cystic left adnexal lesion, likely hydrosalpinx. \par She was just in ER for pain and was noted to have a kidney stone  that moved and caused hydronephrosis. IMPRESSION: 10/6/2022 1. Moderate left hydroureteronephrosis with a 0.5 x 0.7 x 0.7 cm left midureter left mid ureter calculus. 2. Additional multiple nonobstructing bilateral renal calculi.\par She has follow up pending with Dr. Davis \par CBC from 10/6/2022 in ED showed hgb of 16.6 with normal MCV\par She is pending  surgery with Dr. Guaman for DJD\par \par 3/13/2023\par She is here for follow.  She saw Dr. Morales and he checked a PET/CT that was done on 3/7/2023:  IMPRESSION: 1.  Since July 18, 2019, substantially increased FDG avid diffuse soft tissue nodularity throughout the left pleural surface, compatible with biologic tumor activity (max SUV 19.5; reflecting 275% increase). 2.  New minimal FDG avid 5 mm right upper lobe solid pulmonary nodule ( positive on NAC images). 3.  New FDG avid mediastinal lymph nodes, suspicious for elizabeth metastases (max SUV 10.8 in a subcarinal lymph node). 4.  Focal FDG uptake in the right thyroid lobe (max SUV 7). Thyroid ultrasound can be obtained for further assessment.\par CBC today is normal. She is here with daughter. Lost a few pounds due to diverticular disease feels better now.\par I showed the images to the patient and reviewed it together she saw the pleural-based uptake as well as the mediastinal nodes\par \par 4/24/2023\par She is here for follow up. She had biopsy by IR of left pleural mass and only Fragments of fibroadipose tissue and skeletal muscle. I presented her at thoracic tumor board and Dr. Julian Nicholas can perform a mediastinoscopy to confirm the etiology of the findings on the PET scan. She was here with family. We went over the most recent PET CT scan together and she saw the increase of FDG uptake in the pleural space as well as similar nodes I explained that clinically I suspect this is probably her lymphoma but nevertheless other etiologies are possible. If she is hesitant about doing the biopsy I can try Rituxan for 4 doses we will repeat imaging which has reasonable side effects but of course immunosuppression is always of a concern, and this is more than acceptable treatment for indolent lymphomas but I explained that we will be treating the lung entity and thus after discussion she agreed to proceed with mediastinoscopy.\par \par 5/26/23\par She returns for follow-up today she underwent a mediastinoscopy with pathology demonstrating that her mediastinal lymph nodes are positive for metastatic adenocarcinoma consistent with lung primary. This was from 5/12/2023 Final Diagnosis\par 1. Level 7 lymph node, excision:\par - Positive for metastatic adenocarcinoma.\par - See comment\par 2. Level 7 lymph node, excision:\par - Positive for metastatic adenocarcinoma.\par - See comment\par 3. Right level 4 lymph node, excision:\par - Positive for metastatic adenocarcinoma.\par - See comment\par 4. Right level 2 lymph node, excision:\par - Positive for metastatic adenocarcinoma.\par - See comment\par 5. Right level 2 lymph node, excision:\par - Positive for metastatic adenocarcinoma.\par - See comment\par She has recovered well and has no complaints today and here to talk about next\par \par 6/16/2023\par She returns for followup. NGS showed EGFR p.([id number]). MR brain was negative. She was started on Tagrisso 1 week ago is tolerating well. Her AMAYA is stable to improved with her inhalers. She has chronic constipation alternating with diarrhea which she has had for years with no significant change. She remains active and is doing well overall. She is drinking Boosts for additional calories.\par \par 07/14/2023\par accompanied by her daughter. She called earlier this week to report rash that was due to fungal infection and the developed what she described as acne-like with inflammatory component at her lower abdomen started at skin fold and spreading peripherally from there. We advised her to start steroid cream and HOLD Tagrisso (since Wednesday 07/12/2023). She changed her soap and it seems to improve pruritus.\par

## 2023-07-16 NOTE — END OF VISIT
[] : Fellow [Time Spent: ___ minutes] : I have spent [unfilled] minutes of time on the encounter. [FreeTextEntry3] : I was physically present for the key portions of the evaluation and management service provided.  I agree with the history and physical, and plan which I have reviewed and edited where appropriate.\par \par She returns for follow-up today for EGFR positive lung cancer granted the pleural-based lesions etiology still not clear.\par \par She has been on Tagrisso for just over a month but developed a new rash it seems to be a combination of fungal and possibly Tagrisso related although I have not seen this characteristic rash from Tagrisso granted not impossible.\par \par She has a open blister draining some pus is also some redness which is one of the first lesions in her left forearm I therefore started Keflex\par \par Daily areas she had a good response with a combination of the Nplate fungal and a steroid cream I therefore reordered this since there appears to be 2 different etiologies she will continue to hold Tagrisso for another week if the rash resolves she could go back on it of course if it comes back we will need to dose reduce to 40 mg I will also requested a dermatology evaluation.  She will see me back in 2 weeks rest of the plan as above. tagrisso rash is  less than 10% of BSA

## 2023-07-17 DIAGNOSIS — C85.90 NON-HODGKIN LYMPHOMA, UNSPECIFIED, UNSPECIFIED SITE: ICD-10-CM

## 2023-07-17 DIAGNOSIS — Z51.11 ENCOUNTER FOR ANTINEOPLASTIC CHEMOTHERAPY: ICD-10-CM

## 2023-07-17 DIAGNOSIS — C34.90 MALIGNANT NEOPLASM OF UNSPECIFIED PART OF UNSPECIFIED BRONCHUS OR LUNG: ICD-10-CM

## 2023-07-17 DIAGNOSIS — R21 RASH AND OTHER NONSPECIFIC SKIN ERUPTION: ICD-10-CM

## 2023-07-17 DIAGNOSIS — D50.9 IRON DEFICIENCY ANEMIA, UNSPECIFIED: ICD-10-CM

## 2023-07-17 LAB
ALBUMIN SERPL ELPH-MCNC: 4.2 G/DL
ALP BLD-CCNC: 104 U/L
ALT SERPL-CCNC: 14 U/L
ANION GAP SERPL CALC-SCNC: 11 MMOL/L
AST SERPL-CCNC: 15 U/L
BILIRUB SERPL-MCNC: 0.3 MG/DL
BUN SERPL-MCNC: 24 MG/DL
CALCIUM SERPL-MCNC: 10.2 MG/DL
CHLORIDE SERPL-SCNC: 104 MMOL/L
CO2 SERPL-SCNC: 24 MMOL/L
CREAT SERPL-MCNC: 0.9 MG/DL
EGFR: 64 ML/MIN/1.73M2
FERRITIN SERPL-MCNC: 72 NG/ML
GLUCOSE SERPL-MCNC: 189 MG/DL
HCT VFR BLD CALC: 41 %
HGB BLD-MCNC: 13.5 G/DL
IRON SATN MFR SERPL: 25 %
IRON SERPL-MCNC: 71 UG/DL
MCHC RBC-ENTMCNC: 29.5 PG
MCHC RBC-ENTMCNC: 32.9 G/DL
MCV RBC AUTO: 89.7 FL
PLATELET # BLD AUTO: 141 K/UL
PMV BLD: 10.8 FL
POTASSIUM SERPL-SCNC: 4.9 MMOL/L
PROT SERPL-MCNC: 6.4 G/DL
RBC # BLD: 4.57 M/UL
RBC # FLD: 13 %
SODIUM SERPL-SCNC: 139 MMOL/L
TIBC SERPL-MCNC: 287 UG/DL
UIBC SERPL-MCNC: 216 UG/DL
WBC # FLD AUTO: 7.44 K/UL

## 2023-07-28 ENCOUNTER — OUTPATIENT (OUTPATIENT)
Dept: OUTPATIENT SERVICES | Facility: HOSPITAL | Age: 83
LOS: 1 days | End: 2023-07-28
Payer: MEDICARE

## 2023-07-28 ENCOUNTER — APPOINTMENT (OUTPATIENT)
Dept: HEMATOLOGY ONCOLOGY | Facility: CLINIC | Age: 83
End: 2023-07-28
Payer: MEDICARE

## 2023-07-28 VITALS
HEIGHT: 63 IN | DIASTOLIC BLOOD PRESSURE: 80 MMHG | HEART RATE: 70 BPM | BODY MASS INDEX: 28.35 KG/M2 | SYSTOLIC BLOOD PRESSURE: 162 MMHG | TEMPERATURE: 98.6 F | WEIGHT: 160 LBS

## 2023-07-28 DIAGNOSIS — Z98.890 OTHER SPECIFIED POSTPROCEDURAL STATES: Chronic | ICD-10-CM

## 2023-07-28 DIAGNOSIS — C64.9 MALIGNANT NEOPLASM OF UNSPECIFIED KIDNEY, EXCEPT RENAL PELVIS: ICD-10-CM

## 2023-07-28 DIAGNOSIS — E89.0 POSTPROCEDURAL HYPOTHYROIDISM: Chronic | ICD-10-CM

## 2023-07-28 PROCEDURE — 99214 OFFICE O/P EST MOD 30 MIN: CPT

## 2023-07-29 DIAGNOSIS — C64.9 MALIGNANT NEOPLASM OF UNSPECIFIED KIDNEY, EXCEPT RENAL PELVIS: ICD-10-CM

## 2023-08-07 NOTE — PHYSICAL EXAM
[Restricted in physically strenuous activity but ambulatory and able to carry out work of a light or sedentary nature] : Status 1- Restricted in physically strenuous activity but ambulatory and able to carry out work of a light or sedentary nature, e.g., light house work, office work [Normal] : affect appropriate [de-identified] : wears glasses [de-identified] : bilateral mild edema - unchanged [de-identified] : acne-like rash with inflammatory component at her lower abdomen started at skin fold and spreading peripherally from there - improving on current management.

## 2023-08-07 NOTE — ASSESSMENT
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 All the mediastinal lymph nodes are positive for adenocarcinoma of the lung I am not sure if this is related to her pleural-based plaques which she had for years and the biopsy was nondiagnostic, at this point pursue another biopsy we discussed was not an option for us, it is possible that these pleural-based nodules were indeed slow-growing adenocarcinoma this will make her stage IV. - We therefore decided to treat this like stage IV and to repeat imaging and if there is for example response in the mediastinal nodes but and pleural-based areas then I think at that point in time we will re-biopsy the pleural-based area - MR brain negative. - 06/2023 Started Tagrisso. Discussed side effects of Tagrisso including but not limited to rash, diarrhea, pneumonitis, keratitis, transaminitis and cytopenias.  - 07/12/2023 Tagrisso is on hold due to development of rash - slowly improving on with holding Tagrisso for a few weeks and antibiotics, steroid and antifungal creams. 07/24/2023 restarted Tagrisso.  # Pleural Nodules on scans. - these are not new, at least since 2018 But PET/CT now showed increase activity and new mediastinal node due to sarcoid?, progression of lymphoma? RCC? - CT guided FNA biopsy was non diagnostic of the pleural nodule.  # History of Lung Cancer 13 years ago. - Early-stage NSCLC s/p resection alone ~ 13 years ago.  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 - Left para-aortic lymph node. On observation since she is asymptomatic.  - Recent diverticulitis imaging done at that time showed no lymphadenopathy as per pt. - No B symptoms. - progression given PET.CT vs unrelated.  # Anemia, mild, microcytic - resolved. - S/p 5 doses of Venofer in April. Ferritin improved 112, Iron sat 10%. - recommended to f/u with GI she did but not a candidate for colonoscopy due to previous peroration and will monitor.  # History of Thyroid cancer she states it was a partial thyroidectomy  - This explains the presence of positive thyroglobulin 21 tumor marker  - related to the PET.CT findings?  # Adnexal cyst. - PET/CT negative.  0728/2023 she was seen by dermatology who changed her steroid creams, and she is here for follow-up and to clarify which cream needs to be pulled at which area.  Since her rash is improved, she will be started to [place] and does not report any significant issues.  PLAN:  - Continue Tagrisso 80mg PO QD. - Continue with antifungal and steroid cream to the affected areas. - Continue Lactulose PRN for constipation. - repeat PET scan - 09/2023. - If mediastinal lymph nodes are responding and pleural based nodule is unchanged or growing, may need to biopsy the pleura again, if both respond then will assume it is from the lung adenocarcinoma.  RTC in 4 weeks with CBC CMP

## 2023-08-07 NOTE — REVIEW OF SYSTEMS
[Fatigue] : fatigue [SOB on Exertion] : shortness of breath during exertion [Shortness Of Breath] : no shortness of breath [Constipation] : constipation [Diarrhea: Grade 1 - Increase of <4 stools per day over baseline; mild increase in ostomy output compared to baseline] : Diarrhea: Grade 1 - Increase of <4 stools per day over baseline; mild increase in ostomy output compared to baseline [Incontinence] : incontinence [Anxiety] : anxiety [Skin Rash] : skin rash [Negative] : Allergic/Immunologic [FreeTextEntry8] : sees Dr. Cobb [de-identified] : sciatica pain on Lyrica

## 2023-08-07 NOTE — HISTORY OF PRESENT ILLNESS
[de-identified] : Ms. 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 Cody Calixto.  She is here with her daughter Brandy.  \par  \par  She had a history of Kidney Cancer s/p partial nephrectomy and distant history of Thyroid Cancer s/p partial thyroidectomy.  She also had a Left Lower Lobectomy for Stage IA lung cancer. For dates see bellow.\par  \par  She had CT scan and subsequent PET/CT that showed multiple left pleural based nodules in addition to a para-aortic lymph node.  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 The differential diagnosis includes low grade follicular lymphoma and CD10+ marginal zone lymphoma. She denies any B-symptoms at this time other than recent weight loss of 10lbs over last month, however she reports she had not been eating due to a recent GI bug.\par  \par  She is here for further recommendations. \par  Radiological Work-up:\par  PET/CT (2/21/19) IMPRESSION: Since August 20, 2010: 1. Multiple sites of left pleural-based FDG avid nodularity, catalogued above, with max SUV 5.2, suspicious for biological tumor activity. Metastatic or primary pleural neoplasm are considerations. 2. Intra-abdominal FDG avid 1.7 x 1.2 cm left para-aortic lymph node, max SUV 5.4, suspicious for lymph node metastasis. \par  1/30/2019: [facility] [place]:  comparison was made to Ct from 7/2018 Multiple Plural nodules along the posterior paramediastinal surface, some are mildly larger there are new nodules as well. largest nodule 10 mm\par  CT Head (9/15/18) IMPRESSION: 1. Mild periventricular and subcortical white matter chronic small vessel ischemic changes. 2. No acute fractures, mass effect, midline shift or hemorrhage. \par  CT cervical spine (9/15/18) IMPRESSION: 1. Degenerative changes of the cervical spine C2-3 through C6-7 worse at C5-6 as described above. 2. Straightening of normal cervical lordosis with mild anterolisthesis of C3 anterior on C4, C6 on C7, C7 on T1 and T1 on T2. 3. No acute fractures or dislocations. \par  1/22/18: MR L spine: advance lymbar spondylosis, disck osteophytes, severe neuroforamin narrowing with  ipingment of L4 nerve,  3.5 cm AAA, 8 mm adrenal nodule.\par  Pathology:\par  (3/14/19) Final Diagnosis Left parotid lymph node, needle biopsy: Low grade B-cell lymphoma. \par  Immunostains performed with adequate controls on sections from block 1A show the infiltrating cells are CD20+ CD79a+ B-cells that coexpress CD10, CD23(dim variable), and BCL2. They are negative for CD5, CD43, cyclin D1, and MUM1. BCL6 is difficult to determine due to the high background staining. Ki67 labels 5-10% of these cells. CD21 highlights scattered follicular dendritic cell meshworks.  Per report ([id number]), flow cytometry studies performed at [facility] show monotypic B-cells (10% of cells), positive for kappa, CD19, CD20, CD10; negative CD5. Heterogeneous population of T-cells (with normal CD4 to CD8 ratio), and polytypic B-cells are also present. The findings are diagnostic of low grade B-cell lymphoma. The differential diagnosis includes low grade follicular lymphoma and CD10+ marginal zone lymphoma.\par  \par  Health Maintenance:\par  Colonoscopy \par  Mammogram \par  \par  Recent blood work is in [facility]. Was normal except for a high sugar and calcium of 10.2 [de-identified] : 7/10/19 She was supposed to go for biopsy of pleural based nodularity that resolved prior to procedure. She will see PULM in a few weeks.  She has been purposely losing weight, using Trajenta. 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 Denies any fevers, chills, unintentional weight loss, or night sweats. CT Chest (4/11/19) IMPRESSION: Since February 21, 2019;1. Interval resolution of previously described left-sided FDG avid pleural-based nodularity. No biopsy performed. 2. Multiple stable nodules, as above. 3. Unchanged left periaortic 1.4 cm lymph node, previously noted to be FDG avid. 4. Stable right hydronephrosis and right-sided ureteral calculi.5. Stable infrarenal abdominal aortic aneurysm measuring up to 3.7 cm.  1/8/20 She is here for follow up. Since last visit she had a CT C/A/P in 7/18/2019 that showed Stable 1.1 cm left para-aortic lymph node on series 2 image 62. No new lymph nodes in the abdomen and pelvis.  Interval development of moderate to severe right hydroureteronephrosis leading to 2 proximal ureteral calculi (superior calculus measuring 5 x 4 x 5 mm, 1000 Hounsfield units and the inferior adjacent calculus measuring 5 x 7 x 6 mm, 573 Hounsfield units). Bilateral intrarenal calculi. Stable 3.7 x 4.3 cm simple appearing right adnexal cyst. Recommend one-year follow-up pelvic ultrasound. Impression: Since April 11, 2019. No significant interval change in multiple left-sided pleural-based nodules, largest approximately 1.6 cm (remeasured on earlier study), left lung base (series 5/149). Post left lower lobectomy. She was seen by Urology and had lithotripsy.  She feels well.   7/8/20 She is here for follow up. She feels well. No B symptoms. She is pending a CT chest ordered by Dr. Morales.  1/11/2021 The patient is here for follow up. She has no B symptoms, no cough, no chest pain, no new palpable lymph nodes. She is complaining of sciatica, going for an injection next week. She also had LEs cellulitis, completed once course of antibiotics.   5/19/21 We had a telephone visit today. She feels well.  She had CT chest on 2/21 that showed Since July 14, 2020, interval development of wedge-shaped consolidation posterior left upper lobe. (2/31). Minimal interval growth of multiple pleural-based nodules as described above. Numerous stable left-sided pleural based nodules and adjacent parenchymal nodules. Reviewed her CT in tumor board. Chest. We were divided on if we should rescan in 3 months with CT or check PET/cT now. I spoke to her and we decided to check a CT Chest in 3 months.  She feels well.   9/15/21 Patient is here for a follow-up visit for hx of lung cancer, NHL and pulmonary nodules. She is feeling well with no new complaints. Reviewed most recent CBC, which shows mild microcytic anemia. Patient denies fever, chills, nausea, vomiting, dyspnea or bleeding. She has not had recent colonoscopy, but has done Cologuard at home which was reportedly benign in recent years.   CT Chest (6.2.2021) IMPRESSION: Unchanged left circumferential subpleural nodularity largest measuring 1.6 cm.Resolved left lower lobe consolidation.  3/21/22 Pt returns for f/u today. She reports that she was admitted at [facility] for episode for acute diverticulitis in November 2021, she was treated with IV abx, pt reports that she had imaging done at that time and it was only significant for acute diverticulitis (we do not have the imaging results available. Since that time she reports that she has been having episodes of constipation and diarrhea, she is on laxatives. She stopped taking po iron as it was making her GI symptoms worse. Her gastroenterologist did not suggest doing a colonoscopy. Pt denies any blood in stools. Denies any fever, chills, weight loss or loss of appetite. She was also seen by Dr Sweet.   6/20/22 Pt returns for f/u today. Denies any fever, chills, weight loss or loss of appetite. In March ferritin was 17, Iron 38, iron sat 9%, Hgb 11.9, MCV 77.1. She had 5 doses of IV Venofer tolerated well, she reports cold intolerance improved. Labs reviewed 6/13/22 hgb 13.3, MCV 84.9, Ferritin 112, Iron sat 13%, Iron 37. She had Kidney US on 6/8 which showed Indeterminate hypoechoic area involving the lower pole of the left kidney. Further evaluation with outpatient renal mass protocol CT is recommended. Focal dilatation of the right upper pole calyx. Bilateral renal calculi. Incidentally noted is an abdominal aortic aneurysm, similar in size to prior CT scan, measuring 4.2 cm.   10/12/2022 She is here for follow up. she had CT C/A/P in July 2022: Lungs, Pleura, and Airways: Trachea and central airways are patent. Post left lower lobectomy with expected postsurgical changes. Extensive left subpleural nodularity demonstrating slight interval increase since prior examinations including medial left upper lobe 2.7 x 1.0 cm nodular density (series 4 image 27) and anterior left upper lobe 1.1 x 1.0 cm nodular density (series 4 image 55). Stable right adnexal cystic lesion measuring 4.4 x 3.8 cm. Tubular/cystic appearing left adnexal lesion could represent a cyst or hydrosalpinx, not well seen on the 2/9/2021 CT. Recommend follow-up pelvic ultrasound.   I ordered a US pelvis hat showed   IMPRESSION: 1.  Likely benign 4 cm right adnexal/ovarian cyst. A follow-up pelvic ultrasound can be obtained in 6 months to assess for stability. 2.  Tubular cystic left adnexal lesion, likely hydrosalpinx.  She was just in ER for pain and was noted to have a kidney stone  that moved and caused hydronephrosis. IMPRESSION: 10/6/2022 1. Moderate left hydroureteronephrosis with a 0.5 x 0.7 x 0.7 cm left midureter left mid ureter calculus. 2. Additional multiple nonobstructing bilateral renal calculi. She has follow up pending with Dr. Davis  CBC from 10/6/2022 in ED showed hgb of 16.6 with normal MCV She is pending  surgery with Dr. Guaman for DJD  3/13/2023 She is here for follow.  She saw Dr. Morales and he checked a PET/CT that was done on 3/7/2023:  IMPRESSION: 1.  Since July 18, 2019, substantially increased FDG avid diffuse soft tissue nodularity throughout the left pleural surface, compatible with biologic tumor activity (max SUV 19.5; reflecting 275% increase). 2.  New minimal FDG avid 5 mm right upper lobe solid pulmonary nodule ( positive on NAC images). 3.  New FDG avid mediastinal lymph nodes, suspicious for elizabeth metastases (max SUV 10.8 in a subcarinal lymph node). 4.  Focal FDG uptake in the right thyroid lobe (max SUV 7). Thyroid ultrasound can be obtained for further assessment. CBC today is normal. She is here with daughter. Lost a few pounds due to diverticular disease feels better now. I showed the images to the patient and reviewed it together she saw the pleural-based uptake as well as the mediastinal nodes  4/24/2023 She is here for follow up. She had biopsy by IR of left pleural mass and only Fragments of fibroadipose tissue and skeletal muscle. I presented her at thoracic tumor board and Dr. Julian Nicholas can perform a mediastinoscopy to confirm the etiology of the findings on the PET scan. She was here with family. We went over the most recent PET CT scan together and she saw the increase of FDG uptake in the pleural space as well as similar nodes I explained that clinically I suspect this is probably her lymphoma but nevertheless other etiologies are possible. If she is hesitant about doing the biopsy I can try Rituxan for 4 doses we will repeat imaging which has reasonable side effects but of course immunosuppression is always of a concern, and this is more than acceptable treatment for indolent lymphomas but I explained that we will be treating the lung entity and thus after discussion she agreed to proceed with mediastinoscopy.  5/26/23 She returns for follow-up today she underwent a mediastinoscopy with pathology demonstrating that her mediastinal lymph nodes are positive for metastatic adenocarcinoma consistent with lung primary. This was from 5/12/2023 Final Diagnosis 1. Level 7 lymph node, excision: - Positive for metastatic adenocarcinoma. - See comment 2. Level 7 lymph node, excision: - Positive for metastatic adenocarcinoma. - See comment 3. Right level 4 lymph node, excision: - Positive for metastatic adenocarcinoma. - See comment 4. Right level 2 lymph node, excision: - Positive for metastatic adenocarcinoma. - See comment 5. Right level 2 lymph node, excision: - Positive for metastatic adenocarcinoma. - See comment She has recovered well and has no complaints today and here to talk about next  6/16/2023 She returns for followup. NGS showed EGFR p.([id number]). MR brain was negative. She was started on Tagrisso 1 week ago is tolerating well. Her AMAYA is stable to improved with her inhalers. She has chronic constipation alternating with diarrhea which she has had for years with no significant change. She remains active and is doing well overall. She is drinking Boosts for additional calories.  07/14/2023 accompanied by her daughter. She called earlier this week to report rash that was due to fungal infection and the developed what she described as acne-like with inflammatory component at her lower abdomen started at skin fold and spreading peripherally from there. We advised her to start steroid cream and HOLD Tagrisso (since Wednesday 07/12/2023). She changed her soap, and it seems to improve pruritus.  07/28/2023 She is here today for follow-up and reports that her rashes are getting better on steroid cream and antibiotics.  She restarted to Grieser this past Monday and it seems she is tolerating it pretty well.

## 2023-08-07 NOTE — CONSULT LETTER
[Dear  ___] : Dear  [unfilled], [Consult Letter:] : I had the pleasure of evaluating your patient, [unfilled]. [Please see my note below.] : Please see my note below. [Consult Closing:] : Thank you very much for allowing me to participate in the care of this patient.  If you have any questions, please do not hesitate to contact me. [Sincerely,] : Sincerely, [FreeTextEntry3] : Kassandra [DrJoaquín  ___] : Dr. RANGEL [DrJoaquín ___] : Dr. RANGEL

## 2023-08-09 DIAGNOSIS — Z51.11 ENCOUNTER FOR ANTINEOPLASTIC CHEMOTHERAPY: ICD-10-CM

## 2023-08-09 DIAGNOSIS — R21 RASH AND OTHER NONSPECIFIC SKIN ERUPTION: ICD-10-CM

## 2023-08-09 DIAGNOSIS — C34.90 MALIGNANT NEOPLASM OF UNSPECIFIED PART OF UNSPECIFIED BRONCHUS OR LUNG: ICD-10-CM

## 2023-08-09 DIAGNOSIS — D50.9 IRON DEFICIENCY ANEMIA, UNSPECIFIED: ICD-10-CM

## 2023-08-28 ENCOUNTER — APPOINTMENT (OUTPATIENT)
Dept: HEMATOLOGY ONCOLOGY | Facility: CLINIC | Age: 83
End: 2023-08-28
Payer: MEDICARE

## 2023-08-28 ENCOUNTER — LABORATORY RESULT (OUTPATIENT)
Age: 83
End: 2023-08-28

## 2023-08-28 ENCOUNTER — OUTPATIENT (OUTPATIENT)
Dept: OUTPATIENT SERVICES | Facility: HOSPITAL | Age: 83
LOS: 1 days | End: 2023-08-28
Payer: MEDICARE

## 2023-08-28 VITALS
DIASTOLIC BLOOD PRESSURE: 74 MMHG | BODY MASS INDEX: 27.64 KG/M2 | HEART RATE: 56 BPM | WEIGHT: 156 LBS | SYSTOLIC BLOOD PRESSURE: 153 MMHG | HEIGHT: 63 IN | RESPIRATION RATE: 16 BRPM | TEMPERATURE: 98.7 F

## 2023-08-28 DIAGNOSIS — Z98.890 OTHER SPECIFIED POSTPROCEDURAL STATES: Chronic | ICD-10-CM

## 2023-08-28 DIAGNOSIS — E89.0 POSTPROCEDURAL HYPOTHYROIDISM: Chronic | ICD-10-CM

## 2023-08-28 DIAGNOSIS — C34.90 MALIGNANT NEOPLASM OF UNSPECIFIED PART OF UNSPECIFIED BRONCHUS OR LUNG: ICD-10-CM

## 2023-08-28 DIAGNOSIS — R21 RASH AND OTHER NONSPECIFIC SKIN ERUPTION: ICD-10-CM

## 2023-08-28 DIAGNOSIS — D50.9 IRON DEFICIENCY ANEMIA, UNSPECIFIED: ICD-10-CM

## 2023-08-28 DIAGNOSIS — Z51.11 ENCOUNTER FOR ANTINEOPLASTIC CHEMOTHERAPY: ICD-10-CM

## 2023-08-28 DIAGNOSIS — C85.90 NON-HODGKIN LYMPHOMA, UNSPECIFIED, UNSPECIFIED SITE: ICD-10-CM

## 2023-08-28 PROCEDURE — 36415 COLL VENOUS BLD VENIPUNCTURE: CPT

## 2023-08-28 PROCEDURE — 80053 COMPREHEN METABOLIC PANEL: CPT

## 2023-08-28 PROCEDURE — 85027 COMPLETE CBC AUTOMATED: CPT

## 2023-08-28 PROCEDURE — 99214 OFFICE O/P EST MOD 30 MIN: CPT

## 2023-08-28 RX ORDER — CLOTRIMAZOLE AND BETAMETHASONE DIPROPIONATE 10; .5 MG/G; MG/G
1-0.05 CREAM TOPICAL TWICE DAILY
Qty: 2 | Refills: 3 | Status: DISCONTINUED | COMMUNITY
Start: 2023-07-14 | End: 2023-08-28

## 2023-08-29 LAB
ALBUMIN SERPL ELPH-MCNC: 4.1 G/DL
ALP BLD-CCNC: 84 U/L
ALT SERPL-CCNC: 13 U/L
ANION GAP SERPL CALC-SCNC: 11 MMOL/L
AST SERPL-CCNC: 15 U/L
BILIRUB SERPL-MCNC: 0.3 MG/DL
BUN SERPL-MCNC: 28 MG/DL
CALCIUM SERPL-MCNC: 10.2 MG/DL
CHLORIDE SERPL-SCNC: 106 MMOL/L
CO2 SERPL-SCNC: 25 MMOL/L
CREAT SERPL-MCNC: 0.8 MG/DL
EGFR: 74 ML/MIN/1.73M2
GLUCOSE SERPL-MCNC: 84 MG/DL
HCT VFR BLD CALC: 41.6 %
HGB BLD-MCNC: 13.4 G/DL
MCHC RBC-ENTMCNC: 29.1 PG
MCHC RBC-ENTMCNC: 32.2 G/DL
MCV RBC AUTO: 90.4 FL
PLATELET # BLD AUTO: 137 K/UL
PMV BLD: 11 FL
POTASSIUM SERPL-SCNC: 4.8 MMOL/L
PROT SERPL-MCNC: 6.2 G/DL
RBC # BLD: 4.6 M/UL
RBC # FLD: 13.2 %
SODIUM SERPL-SCNC: 142 MMOL/L
WBC # FLD AUTO: 5.35 K/UL

## 2023-08-30 NOTE — END OF VISIT
[] : Fellow [FreeTextEntry3] : She is here for follow up. She is on Tagrisso. Rash is better, she saw derm. PET>CT ordered. Blood work done. RTC Post PET.CT in 10/2023

## 2023-08-30 NOTE — ASSESSMENT
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 All the mediastinal lymph nodes are positive for adenocarcinoma of the lung I am not sure if this is related to her pleural-based plaques which she had for years and the biopsy was nondiagnostic, at this point pursue another biopsy we discussed was not an option for us, it is possible that these pleural-based nodules were indeed slow-growing adenocarcinoma this will make her stage IV. - We therefore decided to treat this like stage IV and to repeat imaging and if there is for example response in the mediastinal nodes but and pleural-based areas then I think at that point in time we will re-biopsy the pleural-based area - MR brain negative. - 06/2023 Started Tagrisso. Discussed side effects of Tagrisso including but not limited to rash, diarrhea, pneumonitis, keratitis, transaminitis and cytopenias.  - 07/12/2023 Tagrisso is on hold due to development of rash - slowly improving on with holding Tagrisso for a few weeks and antibiotics, steroid and antifungal creams. 07/24/2023 restarted Tagrisso.  # Pleural Nodules on scans. - these are not new, at least since 2018 But PET/CT now showed increase activity and new mediastinal node due to sarcoid?, progression of lymphoma? RCC? - CT guided FNA biopsy was non diagnostic of the pleural nodule.  # History of Lung Cancer 13 years ago. - Early-stage NSCLC s/p resection alone ~ 13 years ago.  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 - Left para-aortic lymph node. On observation since she is asymptomatic.  - Recent diverticulitis imaging done at that time showed no lymphadenopathy as per pt. - No B symptoms. - progression given PET.CT vs unrelated.  # Anemia, mild, microcytic - resolved. - S/p 5 doses of Venofer in April. Ferritin improved 112, Iron sat 10%. - recommended to f/u with GI she did but not a candidate for colonoscopy due to previous peroration and will monitor.  # History of Thyroid cancer she states it was a partial thyroidectomy  - This explains the presence of positive thyroglobulin 21 tumor marker  - related to the PET.CT findings?  # Adnexal cyst. - PET/CT negative.  PLAN: - CBC and CMP reviewed. - Continue Tagrisso 80mg PO QD. - Continue with antibacterial cream regimen per dermatology for the affected areas. - Continue Lactulose PRN for constipation. - repeat PET scan ordered for 09/2023.  RTC in October   with CBC CMP and to go over PET scan result.

## 2023-08-30 NOTE — HISTORY OF PRESENT ILLNESS
[de-identified] : Ms. 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 Cody Calixto.  She is here with her daughter Brandy.  \par  \par  She had a history of Kidney Cancer s/p partial nephrectomy and distant history of Thyroid Cancer s/p partial thyroidectomy.  She also had a Left Lower Lobectomy for Stage IA lung cancer. For dates see bellow.\par  \par  She had CT scan and subsequent PET/CT that showed multiple left pleural based nodules in addition to a para-aortic lymph node.  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 The differential diagnosis includes low grade follicular lymphoma and CD10+ marginal zone lymphoma. She denies any B-symptoms at this time other than recent weight loss of 10lbs over last month, however she reports she had not been eating due to a recent GI bug.\par  \par  She is here for further recommendations. \par  Radiological Work-up:\par  PET/CT (2/21/19) IMPRESSION: Since August 20, 2010: 1. Multiple sites of left pleural-based FDG avid nodularity, catalogued above, with max SUV 5.2, suspicious for biological tumor activity. Metastatic or primary pleural neoplasm are considerations. 2. Intra-abdominal FDG avid 1.7 x 1.2 cm left para-aortic lymph node, max SUV 5.4, suspicious for lymph node metastasis. \par  1/30/2019: [facility] [place]:  comparison was made to Ct from 7/2018 Multiple Plural nodules along the posterior paramediastinal surface, some are mildly larger there are new nodules as well. largest nodule 10 mm\par  CT Head (9/15/18) IMPRESSION: 1. Mild periventricular and subcortical white matter chronic small vessel ischemic changes. 2. No acute fractures, mass effect, midline shift or hemorrhage. \par  CT cervical spine (9/15/18) IMPRESSION: 1. Degenerative changes of the cervical spine C2-3 through C6-7 worse at C5-6 as described above. 2. Straightening of normal cervical lordosis with mild anterolisthesis of C3 anterior on C4, C6 on C7, C7 on T1 and T1 on T2. 3. No acute fractures or dislocations. \par  1/22/18: MR L spine: advance lymbar spondylosis, disck osteophytes, severe neuroforamin narrowing with  ipingment of L4 nerve,  3.5 cm AAA, 8 mm adrenal nodule.\par  Pathology:\par  (3/14/19) Final Diagnosis Left parotid lymph node, needle biopsy: Low grade B-cell lymphoma. \par  Immunostains performed with adequate controls on sections from block 1A show the infiltrating cells are CD20+ CD79a+ B-cells that coexpress CD10, CD23(dim variable), and BCL2. They are negative for CD5, CD43, cyclin D1, and MUM1. BCL6 is difficult to determine due to the high background staining. Ki67 labels 5-10% of these cells. CD21 highlights scattered follicular dendritic cell meshworks.  Per report ([id number]), flow cytometry studies performed at [facility] show monotypic B-cells (10% of cells), positive for kappa, CD19, CD20, CD10; negative CD5. Heterogeneous population of T-cells (with normal CD4 to CD8 ratio), and polytypic B-cells are also present. The findings are diagnostic of low grade B-cell lymphoma. The differential diagnosis includes low grade follicular lymphoma and CD10+ marginal zone lymphoma.\par  \par  Health Maintenance:\par  Colonoscopy \par  Mammogram \par  \par  Recent blood work is in [facility]. Was normal except for a high sugar and calcium of 10.2 [de-identified] : 7/10/19 She was supposed to go for biopsy of pleural based nodularity that resolved prior to procedure. She will see PULM in a few weeks.  She has been purposely losing weight, using Trajenta. 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 Denies any fevers, chills, unintentional weight loss, or night sweats. CT Chest (4/11/19) IMPRESSION: Since February 21, 2019;1. Interval resolution of previously described left-sided FDG avid pleural-based nodularity. No biopsy performed. 2. Multiple stable nodules, as above. 3. Unchanged left periaortic 1.4 cm lymph node, previously noted to be FDG avid. 4. Stable right hydronephrosis and right-sided ureteral calculi.5. Stable infrarenal abdominal aortic aneurysm measuring up to 3.7 cm.  1/8/20 She is here for follow up. Since last visit she had a CT C/A/P in 7/18/2019 that showed Stable 1.1 cm left para-aortic lymph node on series 2 image 62. No new lymph nodes in the abdomen and pelvis.  Interval development of moderate to severe right hydroureteronephrosis leading to 2 proximal ureteral calculi (superior calculus measuring 5 x 4 x 5 mm, 1000 Hounsfield units and the inferior adjacent calculus measuring 5 x 7 x 6 mm, 573 Hounsfield units). Bilateral intrarenal calculi. Stable 3.7 x 4.3 cm simple appearing right adnexal cyst. Recommend one-year follow-up pelvic ultrasound. Impression: Since April 11, 2019. No significant interval change in multiple left-sided pleural-based nodules, largest approximately 1.6 cm (remeasured on earlier study), left lung base (series 5/149). Post left lower lobectomy. She was seen by Urology and had lithotripsy.  She feels well.   7/8/20 She is here for follow up. She feels well. No B symptoms. She is pending a CT chest ordered by Dr. Morales.  1/11/2021 The patient is here for follow up. She has no B symptoms, no cough, no chest pain, no new palpable lymph nodes. She is complaining of sciatica, going for an injection next week. She also had LEs cellulitis, completed once course of antibiotics.   5/19/21 We had a telephone visit today. She feels well.  She had CT chest on 2/21 that showed Since July 14, 2020, interval development of wedge-shaped consolidation posterior left upper lobe. (2/31). Minimal interval growth of multiple pleural-based nodules as described above. Numerous stable left-sided pleural based nodules and adjacent parenchymal nodules. Reviewed her CT in tumor board. Chest. We were divided on if we should rescan in 3 months with CT or check PET/cT now. I spoke to her and we decided to check a CT Chest in 3 months.  She feels well.   9/15/21 Patient is here for a follow-up visit for hx of lung cancer, NHL and pulmonary nodules. She is feeling well with no new complaints. Reviewed most recent CBC, which shows mild microcytic anemia. Patient denies fever, chills, nausea, vomiting, dyspnea or bleeding. She has not had recent colonoscopy, but has done Cologuard at home which was reportedly benign in recent years.   CT Chest (6.2.2021) IMPRESSION: Unchanged left circumferential subpleural nodularity largest measuring 1.6 cm.Resolved left lower lobe consolidation.  3/21/22 Pt returns for f/u today. She reports that she was admitted at [facility] for episode for acute diverticulitis in November 2021, she was treated with IV abx, pt reports that she had imaging done at that time and it was only significant for acute diverticulitis (we do not have the imaging results available. Since that time she reports that she has been having episodes of constipation and diarrhea, she is on laxatives. She stopped taking po iron as it was making her GI symptoms worse. Her gastroenterologist did not suggest doing a colonoscopy. Pt denies any blood in stools. Denies any fever, chills, weight loss or loss of appetite. She was also seen by Dr Sweet.   6/20/22 Pt returns for f/u today. Denies any fever, chills, weight loss or loss of appetite. In March ferritin was 17, Iron 38, iron sat 9%, Hgb 11.9, MCV 77.1. She had 5 doses of IV Venofer tolerated well, she reports cold intolerance improved. Labs reviewed 6/13/22 hgb 13.3, MCV 84.9, Ferritin 112, Iron sat 13%, Iron 37. She had Kidney US on 6/8 which showed Indeterminate hypoechoic area involving the lower pole of the left kidney. Further evaluation with outpatient renal mass protocol CT is recommended. Focal dilatation of the right upper pole calyx. Bilateral renal calculi. Incidentally noted is an abdominal aortic aneurysm, similar in size to prior CT scan, measuring 4.2 cm.   10/12/2022 She is here for follow up. she had CT C/A/P in July 2022: Lungs, Pleura, and Airways: Trachea and central airways are patent. Post left lower lobectomy with expected postsurgical changes. Extensive left subpleural nodularity demonstrating slight interval increase since prior examinations including medial left upper lobe 2.7 x 1.0 cm nodular density (series 4 image 27) and anterior left upper lobe 1.1 x 1.0 cm nodular density (series 4 image 55). Stable right adnexal cystic lesion measuring 4.4 x 3.8 cm. Tubular/cystic appearing left adnexal lesion could represent a cyst or hydrosalpinx, not well seen on the 2/9/2021 CT. Recommend follow-up pelvic ultrasound.   I ordered a US pelvis hat showed   IMPRESSION: 1.  Likely benign 4 cm right adnexal/ovarian cyst. A follow-up pelvic ultrasound can be obtained in 6 months to assess for stability. 2.  Tubular cystic left adnexal lesion, likely hydrosalpinx.  She was just in ER for pain and was noted to have a kidney stone  that moved and caused hydronephrosis. IMPRESSION: 10/6/2022 1. Moderate left hydroureteronephrosis with a 0.5 x 0.7 x 0.7 cm left midureter left mid ureter calculus. 2. Additional multiple nonobstructing bilateral renal calculi. She has follow up pending with Dr. Davis  CBC from 10/6/2022 in ED showed hgb of 16.6 with normal MCV She is pending  surgery with Dr. Guaman for DJD  3/13/2023 She is here for follow.  She saw Dr. Morales and he checked a PET/CT that was done on 3/7/2023:  IMPRESSION: 1.  Since July 18, 2019, substantially increased FDG avid diffuse soft tissue nodularity throughout the left pleural surface, compatible with biologic tumor activity (max SUV 19.5; reflecting 275% increase). 2.  New minimal FDG avid 5 mm right upper lobe solid pulmonary nodule ( positive on NAC images). 3.  New FDG avid mediastinal lymph nodes, suspicious for elizabeth metastases (max SUV 10.8 in a subcarinal lymph node). 4.  Focal FDG uptake in the right thyroid lobe (max SUV 7). Thyroid ultrasound can be obtained for further assessment. CBC today is normal. She is here with daughter. Lost a few pounds due to diverticular disease feels better now. I showed the images to the patient and reviewed it together she saw the pleural-based uptake as well as the mediastinal nodes  4/24/2023 She is here for follow up. She had biopsy by IR of left pleural mass and only Fragments of fibroadipose tissue and skeletal muscle. I presented her at thoracic tumor board and Dr. Julian Nicholas can perform a mediastinoscopy to confirm the etiology of the findings on the PET scan. She was here with family. We went over the most recent PET CT scan together and she saw the increase of FDG uptake in the pleural space as well as similar nodes I explained that clinically I suspect this is probably her lymphoma but nevertheless other etiologies are possible. If she is hesitant about doing the biopsy I can try Rituxan for 4 doses we will repeat imaging which has reasonable side effects but of course immunosuppression is always of a concern, and this is more than acceptable treatment for indolent lymphomas but I explained that we will be treating the lung entity and thus after discussion she agreed to proceed with mediastinoscopy.  5/26/23 She returns for follow-up today she underwent a mediastinoscopy with pathology demonstrating that her mediastinal lymph nodes are positive for metastatic adenocarcinoma consistent with lung primary. This was from 5/12/2023 Final Diagnosis 1. Level 7 lymph node, excision: - Positive for metastatic adenocarcinoma. - See comment 2. Level 7 lymph node, excision: - Positive for metastatic adenocarcinoma. - See comment 3. Right level 4 lymph node, excision: - Positive for metastatic adenocarcinoma. - See comment 4. Right level 2 lymph node, excision: - Positive for metastatic adenocarcinoma. - See comment 5. Right level 2 lymph node, excision: - Positive for metastatic adenocarcinoma. - See comment She has recovered well and has no complaints today and here to talk about next  6/16/2023 She returns for followup. NGS showed EGFR p.([id number]). MR brain was negative. She was started on Tagrisso 1 week ago is tolerating well. Her AMAYA is stable to improved with her inhalers. She has chronic constipation alternating with diarrhea which she has had for years with no significant change. She remains active and is doing well overall. She is drinking Boosts for additional calories.  07/14/2023 accompanied by her daughter. She called earlier this week to report rash that was due to fungal infection and the developed what she described as acne-like with inflammatory component at her lower abdomen started at skin fold and spreading peripherally from there. We advised her to start steroid cream and HOLD Tagrisso (since Wednesday 07/12/2023). She changed her soap, and it seems to improve pruritus.  07/28/2023 She is here today for follow-up and reports that her rashes are getting better on steroid cream and antibiotics.  She restarted to Grieser this past Monday and it seems she is tolerating it pretty well.  08/28/2023 She is here today for a follow-up and reports that her rashes improved. She reported she saw dermatology and was prescribed clindamycin cream, mupirocin ointment, and gentamicin ointment. She continued to take Tagrisso without any new adverse reactions. She is otherwise doing well and has no new complaints.  t

## 2023-08-30 NOTE — CONSULT LETTER
[Dear  ___] : Dear  [unfilled], [Consult Letter:] : I had the pleasure of evaluating your patient, [unfilled]. [Please see my note below.] : Please see my note below. [Consult Closing:] : Thank you very much for allowing me to participate in the care of this patient.  If you have any questions, please do not hesitate to contact me. [Sincerely,] : Sincerely, [DrJoaquín  ___] : Dr. RANGEL [DrJoaquín ___] : Dr. RANGEL [FreeTextEntry3] : Kassandra

## 2023-08-30 NOTE — PHYSICAL EXAM
[Restricted in physically strenuous activity but ambulatory and able to carry out work of a light or sedentary nature] : Status 1- Restricted in physically strenuous activity but ambulatory and able to carry out work of a light or sedentary nature, e.g., light house work, office work [Normal] : affect appropriate [de-identified] : wears glasses [de-identified] : bilateral mild edema - unchanged [de-identified] : acne-like rash with inflammatory component at her lower abdomen started at skin fold and spreading peripherally from there - improved and now mainly in pelvic area.

## 2023-08-30 NOTE — REVIEW OF SYSTEMS
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 Mural thrombus evaluation was negative Echocardiogram 8/28 with mild dehiscence of prosthetic aortic valve. CABGx1 rSVG to dRCA, Prosthetic AVR and MVR on 09/03/2019.     Hx of substance use disorder.    I/A: VSS on RA, BID. SR. Pacemaker off. Denies pain and SOB. L PICC double lumen, sl. Regular diet. Not saving urine. Showered. Up independently.     P: Continue to monitor and notify CVTS with changes/concerns. Plan for discharge to chem dep when stable and bed available.    [Fatigue] : fatigue [SOB on Exertion] : shortness of breath during exertion [Constipation] : constipation [Diarrhea: Grade 1 - Increase of <4 stools per day over baseline; mild increase in ostomy output compared to baseline] : Diarrhea: Grade 1 - Increase of <4 stools per day over baseline; mild increase in ostomy output compared to baseline [Incontinence] : incontinence [Skin Rash] : skin rash [Anxiety] : anxiety [Negative] : Allergic/Immunologic [Shortness Of Breath] : no shortness of breath [FreeTextEntry8] : sees Dr. Cobb [de-identified] : sciatica pain on Lyrica

## 2023-10-02 ENCOUNTER — RESULT REVIEW (OUTPATIENT)
Age: 83
End: 2023-10-02

## 2023-10-02 ENCOUNTER — OUTPATIENT (OUTPATIENT)
Dept: OUTPATIENT SERVICES | Facility: HOSPITAL | Age: 83
LOS: 1 days | End: 2023-10-02
Payer: MEDICARE

## 2023-10-02 DIAGNOSIS — Z98.890 OTHER SPECIFIED POSTPROCEDURAL STATES: Chronic | ICD-10-CM

## 2023-10-02 DIAGNOSIS — E89.0 POSTPROCEDURAL HYPOTHYROIDISM: Chronic | ICD-10-CM

## 2023-10-02 DIAGNOSIS — C34.90 MALIGNANT NEOPLASM OF UNSPECIFIED PART OF UNSPECIFIED BRONCHUS OR LUNG: ICD-10-CM

## 2023-10-02 LAB — GLUCOSE BLDC GLUCOMTR-MCNC: 81 MG/DL — SIGNIFICANT CHANGE UP (ref 70–99)

## 2023-10-02 PROCEDURE — 78815 PET IMAGE W/CT SKULL-THIGH: CPT | Mod: 26,PS,MH

## 2023-10-02 PROCEDURE — 82962 GLUCOSE BLOOD TEST: CPT

## 2023-10-02 PROCEDURE — A9552: CPT

## 2023-10-02 PROCEDURE — 78815 PET IMAGE W/CT SKULL-THIGH: CPT | Mod: PS

## 2023-10-03 DIAGNOSIS — C34.90 MALIGNANT NEOPLASM OF UNSPECIFIED PART OF UNSPECIFIED BRONCHUS OR LUNG: ICD-10-CM

## 2023-10-07 ENCOUNTER — OUTPATIENT (OUTPATIENT)
Dept: OUTPATIENT SERVICES | Facility: HOSPITAL | Age: 83
LOS: 1 days | End: 2023-10-07
Payer: MEDICARE

## 2023-10-07 DIAGNOSIS — Z98.890 OTHER SPECIFIED POSTPROCEDURAL STATES: Chronic | ICD-10-CM

## 2023-10-07 DIAGNOSIS — C64.9 MALIGNANT NEOPLASM OF UNSPECIFIED KIDNEY, EXCEPT RENAL PELVIS: ICD-10-CM

## 2023-10-07 DIAGNOSIS — R31.9 HEMATURIA, UNSPECIFIED: ICD-10-CM

## 2023-10-07 DIAGNOSIS — Z00.8 ENCOUNTER FOR OTHER GENERAL EXAMINATION: ICD-10-CM

## 2023-10-07 DIAGNOSIS — E89.0 POSTPROCEDURAL HYPOTHYROIDISM: Chronic | ICD-10-CM

## 2023-10-07 PROCEDURE — 74177 CT ABD & PELVIS W/CONTRAST: CPT | Mod: 26,MH

## 2023-10-07 PROCEDURE — 74177 CT ABD & PELVIS W/CONTRAST: CPT

## 2023-10-08 DIAGNOSIS — C64.9 MALIGNANT NEOPLASM OF UNSPECIFIED KIDNEY, EXCEPT RENAL PELVIS: ICD-10-CM

## 2023-10-08 DIAGNOSIS — R31.9 HEMATURIA, UNSPECIFIED: ICD-10-CM

## 2023-10-12 DIAGNOSIS — R11.2 NAUSEA WITH VOMITING, UNSPECIFIED: ICD-10-CM

## 2023-10-12 DIAGNOSIS — T45.1X5A NAUSEA WITH VOMITING, UNSPECIFIED: ICD-10-CM

## 2023-10-16 ENCOUNTER — OUTPATIENT (OUTPATIENT)
Dept: OUTPATIENT SERVICES | Facility: HOSPITAL | Age: 83
LOS: 1 days | End: 2023-10-16
Payer: MEDICARE

## 2023-10-16 DIAGNOSIS — Z98.890 OTHER SPECIFIED POSTPROCEDURAL STATES: Chronic | ICD-10-CM

## 2023-10-16 DIAGNOSIS — E89.0 POSTPROCEDURAL HYPOTHYROIDISM: Chronic | ICD-10-CM

## 2023-10-16 DIAGNOSIS — Z00.8 ENCOUNTER FOR OTHER GENERAL EXAMINATION: ICD-10-CM

## 2023-10-16 DIAGNOSIS — M79.605 PAIN IN LEFT LEG: ICD-10-CM

## 2023-10-16 PROCEDURE — 93970 EXTREMITY STUDY: CPT | Mod: 26

## 2023-10-16 PROCEDURE — 93970 EXTREMITY STUDY: CPT

## 2023-10-17 DIAGNOSIS — M79.605 PAIN IN LEFT LEG: ICD-10-CM

## 2023-10-24 RX ORDER — ONDANSETRON 8 MG/1
8 TABLET, ORALLY DISINTEGRATING ORAL
Qty: 30 | Refills: 1 | Status: DISCONTINUED | COMMUNITY
Start: 2023-10-05 | End: 2023-10-12

## 2023-10-30 ENCOUNTER — APPOINTMENT (OUTPATIENT)
Dept: HEMATOLOGY ONCOLOGY | Facility: CLINIC | Age: 83
End: 2023-10-30
Payer: MEDICARE

## 2023-10-30 ENCOUNTER — LABORATORY RESULT (OUTPATIENT)
Age: 83
End: 2023-10-30

## 2023-10-30 ENCOUNTER — OUTPATIENT (OUTPATIENT)
Dept: OUTPATIENT SERVICES | Facility: HOSPITAL | Age: 83
LOS: 1 days | End: 2023-10-30
Payer: MEDICARE

## 2023-10-30 VITALS
HEIGHT: 63 IN | BODY MASS INDEX: 26.93 KG/M2 | SYSTOLIC BLOOD PRESSURE: 148 MMHG | WEIGHT: 152 LBS | RESPIRATION RATE: 16 BRPM | HEART RATE: 65 BPM | TEMPERATURE: 98.2 F | DIASTOLIC BLOOD PRESSURE: 65 MMHG

## 2023-10-30 DIAGNOSIS — R91.1 SOLITARY PULMONARY NODULE: ICD-10-CM

## 2023-10-30 DIAGNOSIS — E89.0 POSTPROCEDURAL HYPOTHYROIDISM: Chronic | ICD-10-CM

## 2023-10-30 DIAGNOSIS — Z98.890 OTHER SPECIFIED POSTPROCEDURAL STATES: Chronic | ICD-10-CM

## 2023-10-30 LAB
HCT VFR BLD CALC: 35.4 %
HGB BLD-MCNC: 11.9 G/DL
IRON SATN MFR SERPL: 15 %
IRON SERPL-MCNC: 40 UG/DL
MCHC RBC-ENTMCNC: 29.8 PG
MCHC RBC-ENTMCNC: 33.6 G/DL
MCV RBC AUTO: 88.5 FL
PLATELET # BLD AUTO: 157 K/UL
PMV BLD: 11.1 FL
RBC # BLD: 4 M/UL
RBC # FLD: 12.2 %
TIBC SERPL-MCNC: 261 UG/DL
UIBC SERPL-MCNC: 221 UG/DL
WBC # FLD AUTO: 7.55 K/UL

## 2023-10-30 PROCEDURE — 99214 OFFICE O/P EST MOD 30 MIN: CPT

## 2023-10-30 PROCEDURE — 83550 IRON BINDING TEST: CPT

## 2023-10-30 PROCEDURE — 85027 COMPLETE CBC AUTOMATED: CPT

## 2023-10-30 PROCEDURE — 36415 COLL VENOUS BLD VENIPUNCTURE: CPT

## 2023-10-30 PROCEDURE — 82728 ASSAY OF FERRITIN: CPT

## 2023-10-30 PROCEDURE — 80053 COMPREHEN METABOLIC PANEL: CPT

## 2023-10-30 PROCEDURE — 83540 ASSAY OF IRON: CPT

## 2023-10-31 DIAGNOSIS — R91.1 SOLITARY PULMONARY NODULE: ICD-10-CM

## 2023-10-31 LAB
ALBUMIN SERPL ELPH-MCNC: 3.9 G/DL
ALP BLD-CCNC: 121 U/L
ALT SERPL-CCNC: 32 U/L
ANION GAP SERPL CALC-SCNC: 15 MMOL/L
AST SERPL-CCNC: 16 U/L
BILIRUB SERPL-MCNC: 0.8 MG/DL
BUN SERPL-MCNC: 30 MG/DL
CALCIUM SERPL-MCNC: 9.9 MG/DL
CHLORIDE SERPL-SCNC: 94 MMOL/L
CO2 SERPL-SCNC: 24 MMOL/L
CREAT SERPL-MCNC: 1.1 MG/DL
EGFR: 50 ML/MIN/1.73M2
FERRITIN SERPL-MCNC: 235 NG/ML
GLUCOSE SERPL-MCNC: 97 MG/DL
POTASSIUM SERPL-SCNC: 4.3 MMOL/L
PROT SERPL-MCNC: 6 G/DL
SODIUM SERPL-SCNC: 133 MMOL/L

## 2023-11-01 RX ORDER — AMLODIPINE BESYLATE 5 MG/1
5 TABLET ORAL DAILY
Refills: 0 | Status: DISCONTINUED | COMMUNITY
End: 2023-11-01

## 2023-11-13 ENCOUNTER — OUTPATIENT (OUTPATIENT)
Dept: OUTPATIENT SERVICES | Facility: HOSPITAL | Age: 83
LOS: 1 days | End: 2023-11-13
Payer: MEDICARE

## 2023-11-13 ENCOUNTER — LABORATORY RESULT (OUTPATIENT)
Age: 83
End: 2023-11-13

## 2023-11-13 ENCOUNTER — APPOINTMENT (OUTPATIENT)
Dept: HEMATOLOGY ONCOLOGY | Facility: CLINIC | Age: 83
End: 2023-11-13
Payer: MEDICARE

## 2023-11-13 DIAGNOSIS — Z98.890 OTHER SPECIFIED POSTPROCEDURAL STATES: Chronic | ICD-10-CM

## 2023-11-13 DIAGNOSIS — E89.0 POSTPROCEDURAL HYPOTHYROIDISM: Chronic | ICD-10-CM

## 2023-11-13 DIAGNOSIS — L70.0 ACNE VULGARIS: ICD-10-CM

## 2023-11-13 DIAGNOSIS — I10 ESSENTIAL (PRIMARY) HYPERTENSION: ICD-10-CM

## 2023-11-13 DIAGNOSIS — R91.1 SOLITARY PULMONARY NODULE: ICD-10-CM

## 2023-11-13 PROCEDURE — 82728 ASSAY OF FERRITIN: CPT

## 2023-11-13 PROCEDURE — 83540 ASSAY OF IRON: CPT

## 2023-11-13 PROCEDURE — 99214 OFFICE O/P EST MOD 30 MIN: CPT

## 2023-11-13 PROCEDURE — 85027 COMPLETE CBC AUTOMATED: CPT

## 2023-11-13 PROCEDURE — 83550 IRON BINDING TEST: CPT

## 2023-11-14 LAB
FERRITIN SERPL-MCNC: 149 NG/ML
HCT VFR BLD CALC: 36.1 %
HGB BLD-MCNC: 11.8 G/DL
IRON SATN MFR SERPL: 24 %
IRON SERPL-MCNC: 72 UG/DL
MCHC RBC-ENTMCNC: 28.8 PG
MCHC RBC-ENTMCNC: 32.7 G/DL
MCV RBC AUTO: 88 FL
PLATELET # BLD AUTO: 161 K/UL
PMV BLD: 10.1 FL
RBC # BLD: 4.1 M/UL
RBC # FLD: 12.5 %
TIBC SERPL-MCNC: 306 UG/DL
UIBC SERPL-MCNC: 234 UG/DL
WBC # FLD AUTO: 4.65 K/UL

## 2023-12-15 ENCOUNTER — APPOINTMENT (OUTPATIENT)
Dept: HEMATOLOGY ONCOLOGY | Facility: CLINIC | Age: 83
End: 2023-12-15
Payer: MEDICARE

## 2023-12-15 ENCOUNTER — OUTPATIENT (OUTPATIENT)
Dept: OUTPATIENT SERVICES | Facility: HOSPITAL | Age: 83
LOS: 1 days | End: 2023-12-15
Payer: MEDICARE

## 2023-12-15 DIAGNOSIS — Z98.890 OTHER SPECIFIED POSTPROCEDURAL STATES: Chronic | ICD-10-CM

## 2023-12-15 DIAGNOSIS — R91.1 SOLITARY PULMONARY NODULE: ICD-10-CM

## 2023-12-15 DIAGNOSIS — E89.0 POSTPROCEDURAL HYPOTHYROIDISM: Chronic | ICD-10-CM

## 2023-12-15 LAB
ALBUMIN SERPL ELPH-MCNC: 4.3 G/DL
ALP BLD-CCNC: 87 U/L
ALT SERPL-CCNC: 19 U/L
ANION GAP SERPL CALC-SCNC: 13 MMOL/L
AST SERPL-CCNC: 22 U/L
BILIRUB SERPL-MCNC: 0.4 MG/DL
BUN SERPL-MCNC: 28 MG/DL
CALCIUM SERPL-MCNC: 10.1 MG/DL
CHLORIDE SERPL-SCNC: 101 MMOL/L
CO2 SERPL-SCNC: 22 MMOL/L
CREAT SERPL-MCNC: 1.1 MG/DL
EGFR: 50 ML/MIN/1.73M2
GLUCOSE SERPL-MCNC: 153 MG/DL
IRON SATN MFR SERPL: 19 %
IRON SERPL-MCNC: 57 UG/DL
POTASSIUM SERPL-SCNC: 4.3 MMOL/L
PROT SERPL-MCNC: 6.4 G/DL
SODIUM SERPL-SCNC: 136 MMOL/L
TIBC SERPL-MCNC: 299 UG/DL
UIBC SERPL-MCNC: 242 UG/DL

## 2023-12-15 PROCEDURE — 99214 OFFICE O/P EST MOD 30 MIN: CPT

## 2023-12-15 PROCEDURE — 36415 COLL VENOUS BLD VENIPUNCTURE: CPT

## 2023-12-15 PROCEDURE — 82728 ASSAY OF FERRITIN: CPT

## 2023-12-15 PROCEDURE — 83540 ASSAY OF IRON: CPT

## 2023-12-15 PROCEDURE — 80053 COMPREHEN METABOLIC PANEL: CPT

## 2023-12-15 PROCEDURE — 83550 IRON BINDING TEST: CPT

## 2023-12-15 NOTE — END OF VISIT
[FreeTextEntry3] : I was physically present for the key portions of the evaluation and management service provided.  I agree with the history and physical, and plan which I have reviewed and edited where appropriate.  She returns for follow-up she is doing well on Tagrisso for her advanced lung cancer.  Will continue with Tagrisso we ordered a PET CT scan for monitoring that she will have done in January and then will see us afterwards

## 2023-12-15 NOTE — HISTORY OF PRESENT ILLNESS
[de-identified] : Ms. 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 Cody Calixto.  She is here with her daughter Brandy.  \par  \par  She had a history of Kidney Cancer s/p partial nephrectomy and distant history of Thyroid Cancer s/p partial thyroidectomy.  She also had a Left Lower Lobectomy for Stage IA lung cancer. For dates see bellow.\par  \par  She had CT scan and subsequent PET/CT that showed multiple left pleural based nodules in addition to a para-aortic lymph node.  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 The differential diagnosis includes low grade follicular lymphoma and CD10+ marginal zone lymphoma. She denies any B-symptoms at this time other than recent weight loss of 10lbs over last month, however she reports she had not been eating due to a recent GI bug.\par  \par  She is here for further recommendations. \par  Radiological Work-up:\par  PET/CT (2/21/19) IMPRESSION: Since August 20, 2010: 1. Multiple sites of left pleural-based FDG avid nodularity, catalogued above, with max SUV 5.2, suspicious for biological tumor activity. Metastatic or primary pleural neoplasm are considerations. 2. Intra-abdominal FDG avid 1.7 x 1.2 cm left para-aortic lymph node, max SUV 5.4, suspicious for lymph node metastasis. \par  1/30/2019: [facility] [place]:  comparison was made to Ct from 7/2018 Multiple Plural nodules along the posterior paramediastinal surface, some are mildly larger there are new nodules as well. largest nodule 10 mm\par  CT Head (9/15/18) IMPRESSION: 1. Mild periventricular and subcortical white matter chronic small vessel ischemic changes. 2. No acute fractures, mass effect, midline shift or hemorrhage. \par  CT cervical spine (9/15/18) IMPRESSION: 1. Degenerative changes of the cervical spine C2-3 through C6-7 worse at C5-6 as described above. 2. Straightening of normal cervical lordosis with mild anterolisthesis of C3 anterior on C4, C6 on C7, C7 on T1 and T1 on T2. 3. No acute fractures or dislocations. \par  1/22/18: MR L spine: advance lymbar spondylosis, disck osteophytes, severe neuroforamin narrowing with  ipingment of L4 nerve,  3.5 cm AAA, 8 mm adrenal nodule.\par  Pathology:\par  (3/14/19) Final Diagnosis Left parotid lymph node, needle biopsy: Low grade B-cell lymphoma. \par  Immunostains performed with adequate controls on sections from block 1A show the infiltrating cells are CD20+ CD79a+ B-cells that coexpress CD10, CD23(dim variable), and BCL2. They are negative for CD5, CD43, cyclin D1, and MUM1. BCL6 is difficult to determine due to the high background staining. Ki67 labels 5-10% of these cells. CD21 highlights scattered follicular dendritic cell meshworks.  Per report ([id number]), flow cytometry studies performed at [facility] show monotypic B-cells (10% of cells), positive for kappa, CD19, CD20, CD10; negative CD5. Heterogeneous population of T-cells (with normal CD4 to CD8 ratio), and polytypic B-cells are also present. The findings are diagnostic of low grade B-cell lymphoma. The differential diagnosis includes low grade follicular lymphoma and CD10+ marginal zone lymphoma.\par  \par  Health Maintenance:\par  Colonoscopy \par  Mammogram \par  \par  Recent blood work is in [facility]IE. Was normal except for a high sugar and calcium of 10.2 [de-identified] : 7/10/19 She was supposed to go for biopsy of pleural based nodularity that resolved prior to procedure. She will see PULM in a few weeks.  She has been purposely losing weight, using Trajenta. 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 Denies any fevers, chills, unintentional weight loss, or night sweats. CT Chest (4/11/19) IMPRESSION: Since February 21, 2019;1. Interval resolution of previously described left-sided FDG avid pleural-based nodularity. No biopsy performed. 2. Multiple stable nodules, as above. 3. Unchanged left periaortic 1.4 cm lymph node, previously noted to be FDG avid. 4. Stable right hydronephrosis and right-sided ureteral calculi.5. Stable infrarenal abdominal aortic aneurysm measuring up to 3.7 cm.  1/8/20 She is here for follow up. Since last visit she had a CT C/A/P in 7/18/2019 that showed Stable 1.1 cm left para-aortic lymph node on series 2 image 62. No new lymph nodes in the abdomen and pelvis.  Interval development of moderate to severe right hydroureteronephrosis leading to 2 proximal ureteral calculi (superior calculus measuring 5 x 4 x 5 mm, 1000 Hounsfield units and the inferior adjacent calculus measuring 5 x 7 x 6 mm, 573 Hounsfield units). Bilateral intrarenal calculi. Stable 3.7 x 4.3 cm simple appearing right adnexal cyst. Recommend one-year follow-up pelvic ultrasound. Impression: Since April 11, 2019. No significant interval change in multiple left-sided pleural-based nodules, largest approximately 1.6 cm (remeasured on earlier study), left lung base (series 5/149). Post left lower lobectomy. She was seen by Urology and had lithotripsy.  She feels well.   7/8/20 She is here for follow up. She feels well. No B symptoms. She is pending a CT chest ordered by Dr. Morales.  1/11/2021 The patient is here for follow up. She has no B symptoms, no cough, no chest pain, no new palpable lymph nodes. She is complaining of sciatica, going for an injection next week. She also had LEs cellulitis, completed once course of antibiotics.   5/19/21 We had a telephone visit today. She feels well.  She had CT chest on 2/21 that showed Since July 14, 2020, interval development of wedge-shaped consolidation posterior left upper lobe. (2/31). Minimal interval growth of multiple pleural-based nodules as described above. Numerous stable left-sided pleural based nodules and adjacent parenchymal nodules. Reviewed her CT in tumor board. Chest. We were divided on if we should rescan in 3 months with CT or check PET/cT now. I spoke to her and we decided to check a CT Chest in 3 months.  She feels well.   9/15/21 Patient is here for a follow-up visit for hx of lung cancer, NHL and pulmonary nodules. She is feeling well with no new complaints. Reviewed most recent CBC, which shows mild microcytic anemia. Patient denies fever, chills, nausea, vomiting, dyspnea or bleeding. She has not had recent colonoscopy, but has done Cologuard at home which was reportedly benign in recent years.   CT Chest (6.2.2021) IMPRESSION: Unchanged left circumferential subpleural nodularity largest measuring 1.6 cm.Resolved left lower lobe consolidation.  3/21/22 Pt returns for f/u today. She reports that she was admitted at [facility] for episode for acute diverticulitis in November 2021, she was treated with IV abx, pt reports that she had imaging done at that time and it was only significant for acute diverticulitis (we do not have the imaging results available. Since that time she reports that she has been having episodes of constipation and diarrhea, she is on laxatives. She stopped taking po iron as it was making her GI symptoms worse. Her gastroenterologist did not suggest doing a colonoscopy. Pt denies any blood in stools. Denies any fever, chills, weight loss or loss of appetite. She was also seen by Dr Sweet.   6/20/22 Pt returns for f/u today. Denies any fever, chills, weight loss or loss of appetite. In March ferritin was 17, Iron 38, iron sat 9%, Hgb 11.9, MCV 77.1. She had 5 doses of IV Venofer tolerated well, she reports cold intolerance improved. Labs reviewed 6/13/22 hgb 13.3, MCV 84.9, Ferritin 112, Iron sat 13%, Iron 37. She had Kidney US on 6/8 which showed Indeterminate hypoechoic area involving the lower pole of the left kidney. Further evaluation with outpatient renal mass protocol CT is recommended. Focal dilatation of the right upper pole calyx. Bilateral renal calculi. Incidentally noted is an abdominal aortic aneurysm, similar in size to prior CT scan, measuring 4.2 cm.   10/12/2022 She is here for follow up. she had CT C/A/P in July 2022: Lungs, Pleura, and Airways: Trachea and central airways are patent. Post left lower lobectomy with expected postsurgical changes. Extensive left subpleural nodularity demonstrating slight interval increase since prior examinations including medial left upper lobe 2.7 x 1.0 cm nodular density (series 4 image 27) and anterior left upper lobe 1.1 x 1.0 cm nodular density (series 4 image 55). Stable right adnexal cystic lesion measuring 4.4 x 3.8 cm. Tubular/cystic appearing left adnexal lesion could represent a cyst or hydrosalpinx, not well seen on the 2/9/2021 CT. Recommend follow-up pelvic ultrasound.   I ordered a US pelvis hat showed   IMPRESSION: 1.  Likely benign 4 cm right adnexal/ovarian cyst. A follow-up pelvic ultrasound can be obtained in 6 months to assess for stability. 2.  Tubular cystic left adnexal lesion, likely hydrosalpinx.  She was just in ER for pain and was noted to have a kidney stone  that moved and caused hydronephrosis. IMPRESSION: 10/6/2022 1. Moderate left hydroureteronephrosis with a 0.5 x 0.7 x 0.7 cm left midureter left mid ureter calculus. 2. Additional multiple nonobstructing bilateral renal calculi. She has follow up pending with Dr. Davis  CBC from 10/6/2022 in ED showed hgb of 16.6 with normal MCV She is pending  surgery with Dr. Guaman for DJD  3/13/2023 She is here for follow.  She saw Dr. Morales and he checked a PET/CT that was done on 3/7/2023:  IMPRESSION: 1.  Since July 18, 2019, substantially increased FDG avid diffuse soft tissue nodularity throughout the left pleural surface, compatible with biologic tumor activity (max SUV 19.5; reflecting 275% increase). 2.  New minimal FDG avid 5 mm right upper lobe solid pulmonary nodule ( positive on NAC images). 3.  New FDG avid mediastinal lymph nodes, suspicious for elizabeth metastases (max SUV 10.8 in a subcarinal lymph node). 4.  Focal FDG uptake in the right thyroid lobe (max SUV 7). Thyroid ultrasound can be obtained for further assessment. CBC today is normal. She is here with daughter. Lost a few pounds due to diverticular disease feels better now. I showed the images to the patient and reviewed it together she saw the pleural-based uptake as well as the mediastinal nodes  4/24/2023 She is here for follow up. She had biopsy by IR of left pleural mass and only Fragments of fibroadipose tissue and skeletal muscle. I presented her at thoracic tumor board and Dr. Julian Nicholas can perform a mediastinoscopy to confirm the etiology of the findings on the PET scan. She was here with family. We went over the most recent PET CT scan together and she saw the increase of FDG uptake in the pleural space as well as similar nodes I explained that clinically I suspect this is probably her lymphoma but nevertheless other etiologies are possible. If she is hesitant about doing the biopsy I can try Rituxan for 4 doses we will repeat imaging which has reasonable side effects but of course immunosuppression is always of a concern, and this is more than acceptable treatment for indolent lymphomas but I explained that we will be treating the lung entity and thus after discussion she agreed to proceed with mediastinoscopy.  5/26/23 She returns for follow-up today she underwent a mediastinoscopy with pathology demonstrating that her mediastinal lymph nodes are positive for metastatic adenocarcinoma consistent with lung primary. This was from 5/12/2023 Final Diagnosis 1. Level 7 lymph node, excision: - Positive for metastatic adenocarcinoma. - See comment 2. Level 7 lymph node, excision: - Positive for metastatic adenocarcinoma. - See comment 3. Right level 4 lymph node, excision: - Positive for metastatic adenocarcinoma. - See comment 4. Right level 2 lymph node, excision: - Positive for metastatic adenocarcinoma. - See comment 5. Right level 2 lymph node, excision: - Positive for metastatic adenocarcinoma. - See comment She has recovered well and has no complaints today and here to talk about next  6/16/2023 She returns for follow-up. NGS showed EGFR p.([id number]). MR brain was negative. She was started on Tagrisso 1 week ago is tolerating well. Her AMAYA is stable to improved with her inhalers. She has chronic constipation alternating with diarrhea which she has had for years with no significant change. She remains active and is doing well overall. She is drinking Boosts for additional calories.  07/14/2023 accompanied by her daughter. She called earlier this week to report rash that was due to fungal infection and the developed what she described as acne-like with inflammatory component at her lower abdomen started at skin fold and spreading peripherally from there. We advised her to start steroid cream and HOLD Tagrisso (since Wednesday 07/12/2023). She changed her soap, and it seems to improve pruritus.  07/28/2023 She is here today for follow-up and reports that her rashes are getting better on steroid cream and antibiotics.  She restarted to Grieser this past Monday and it seems she is tolerating it pretty well.  08/28/2023 She is here today for a follow-up and reports that her rashes improved. She reported she saw dermatology and was prescribed clindamycin cream, mupirocin ointment, and gentamicin ointment. She continued to take Tagrisso without any new adverse reactions. She is otherwise doing well and has no new complaints.    10/30/2023 Pt is here for follow up. She continued to take Tagrisso 80 mg QD without any new adverse reactions. She states she has a R upper back ulcer since 4 weeks ago. She is unable to reach that area to put ointment on. Otherwise, she has no need complaint to offer.  She had a PET/CT on 10/2/23 that showed [place]: IMPRESSION: New left posterior occipital scalp-based FDG avid lesion, SUV max 17.9, measuring approximately 1.5 cm. (New since prior PET and brain MRI). Recommend further workup. Significant decrease in FDG uptake in the mediastinum and left pleura compared to prior exam, now only avid on nonattenuation corrected images. Anatomically, pleural nodularity in the left hemithorax is also decreased. Previously described 5 mm right upper lobe pulmonary nodule is faintly visualized, not FDG avid.  11/13/2023 Pt is here for follow up. She continues to take Tagrisso 80 mg QD without any new adverse reactions. Her R upper back open comedone has improved and pain improved. Currently, her major complaint is dizziness which usually occurs when her blood pressure is high. She states that Dr. Gamble recently adjusted her BP meds by reducing losartan 100 mg QD to 50 mg QD and discontinuing amlodipine.  12/15/2023 Reports increasing generalized pains likely due to arthritis and some compensatory muscle spasm.

## 2023-12-15 NOTE — CONSULT LETTER
[Dear  ___] : Dear  [unfilled], [Consult Letter:] : I had the pleasure of evaluating your patient, [unfilled]. [Please see my note below.] : Please see my note below. [Consult Closing:] : Thank you very much for allowing me to participate in the care of this patient.  If you have any questions, please do not hesitate to contact me. [Sincerely,] : Sincerely, [DrJoaquín  ___] : Dr. RANGEL [DrJoaquín ___] : Dr. RANGEL [___] : [unfilled] [FreeTextEntry3] : Zaheer

## 2023-12-15 NOTE — REVIEW OF SYSTEMS
[Fatigue] : fatigue [SOB on Exertion] : shortness of breath during exertion [Constipation] : constipation [Incontinence] : incontinence [Skin Rash] : skin rash [Anxiety] : anxiety [Negative] : Allergic/Immunologic [FreeTextEntry8] : sees Dr. Cobb [de-identified] : sciatica pain on Lyrica

## 2023-12-15 NOTE — ASSESSMENT
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 All the mediastinal lymph nodes are positive for adenocarcinoma of the lung I am not sure if this is related to her pleural-based plaques which she had for years and the biopsy was nondiagnostic, at this point pursue another biopsy we discussed was not an option for us, it is possible that these pleural-based nodules were indeed slow-growing adenocarcinoma this will make her stage IV. - We therefore decided to treat this like stage IV and to repeat imaging and if there is for example response in the mediastinal nodes but and pleural-based areas then I think at that point in time we will re-biopsy the pleural-based area - MR brain negative. - 06/2023 Started Tagrisso. Discussed side effects of Tagrisso including but not limited to rash, diarrhea, pneumonitis, keratitis, transaminitis and cytopenias. - 07/12/2023 Tagrisso is on hold due to development of rash - slowly improving on with holding Tagrisso for a few weeks and antibiotics, steroid and antifungal creams. - 07/24/2023 restarted Tagrisso. - 10/02/2023 PET/CT showed NM: Significant decrease in FDG uptake in the mediastinum and left pleura compared to prior exam, now only avid on nonattenuation corrected images. Anatomically, pleural nodularity in the left hemithorax is also decreased.  # Right upper back large open comedo measuring approximately 1 x 1 x 1 cm, possibly related to Tagrisso - RESOLVED.  # Uncontrolled HTN  - on losartan 100 mg and if needed will add amlodipine  # left posterior occipital scalp-based FDG avid lesion noted on PET/CT, pt had a laceration there leading to PET/CT finding.  # Pleural Nodules on scans. - these are not new, at least since 2018 But PET/CT now showed increase activity and new mediastinal node due to sarcoid?, progression of lymphoma? RCC? - CT guided FNA biopsy was non diagnostic of the pleural nodule.  # History of Lung Cancer 13 years ago. - Early-stage NSCLC s/p resection alone ~ 13 years ago.  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 - Left para-aortic lymph node. On observation since she is asymptomatic. - Recent diverticulitis imaging done at that time showed no lymphadenopathy as per pt. - No B symptoms. - 10/02/2023 PET/CT showed ALIYAH.  # Anemia, mild, microcytic - resolved. - S/p 5 doses of Venofer in April. Ferritin improved 112, Iron sat 10%. - recommended to f/u with GI she did but not a candidate for colonoscopy due to previous peroration and will monitor.  # History of Thyroid cancer she states it was a partial thyroidectomy - This explains the presence of positive thyroglobulin 21 tumor marker  # Adnexal cyst. - PET/CT negative.  12/15/2023 Labs reviewed and results discussed with the patient; exacerbation of arthritis; recurrent mild rash at lower abdomen - remains clinically stable to continue current management. All questions were answered to satisfaction. MOLST is given for review.  PLAN: - continue Tagrisso 80 mg PO QD. - repeat imaging in 3 months (around 01/2024) - ordered. - continue losartan 100 mg QD. - continue bowel regiment for constipation. - restart steroid cream for low abdomen heat rash. - need PT for her arthritic and back pains. - Labs today: CBC, CMP. RTC in 4 weeks with CBC CMP. [AdvancecareDate] : 12/15/2023

## 2023-12-15 NOTE — PHYSICAL EXAM
[Restricted in physically strenuous activity but ambulatory and able to carry out work of a light or sedentary nature] : Status 1- Restricted in physically strenuous activity but ambulatory and able to carry out work of a light or sedentary nature, e.g., light house work, office work [Normal] : affect appropriate [de-identified] : wears glasses [de-identified] : bilateral mild edema - unchanged [de-identified] : acne-like rash with inflammatory component at her lower abdomen started at skin fold

## 2023-12-16 DIAGNOSIS — R91.1 SOLITARY PULMONARY NODULE: ICD-10-CM

## 2023-12-16 LAB — FERRITIN SERPL-MCNC: 125 NG/ML

## 2023-12-17 ENCOUNTER — RX RENEWAL (OUTPATIENT)
Age: 83
End: 2023-12-17

## 2023-12-28 ENCOUNTER — RESULT REVIEW (OUTPATIENT)
Age: 83
End: 2023-12-28

## 2023-12-28 ENCOUNTER — OUTPATIENT (OUTPATIENT)
Dept: OUTPATIENT SERVICES | Facility: HOSPITAL | Age: 83
LOS: 1 days | End: 2023-12-28
Payer: MEDICARE

## 2023-12-28 ENCOUNTER — APPOINTMENT (OUTPATIENT)
Dept: NEUROSURGERY | Facility: CLINIC | Age: 83
End: 2023-12-28
Payer: MEDICARE

## 2023-12-28 VITALS — HEIGHT: 63 IN | BODY MASS INDEX: 26.93 KG/M2 | WEIGHT: 152 LBS

## 2023-12-28 DIAGNOSIS — Z98.890 OTHER SPECIFIED POSTPROCEDURAL STATES: Chronic | ICD-10-CM

## 2023-12-28 DIAGNOSIS — E89.0 POSTPROCEDURAL HYPOTHYROIDISM: Chronic | ICD-10-CM

## 2023-12-28 DIAGNOSIS — M47.812 SPONDYLOSIS WITHOUT MYELOPATHY OR RADICULOPATHY, CERVICAL REGION: ICD-10-CM

## 2023-12-28 PROCEDURE — 99213 OFFICE O/P EST LOW 20 MIN: CPT

## 2023-12-28 PROCEDURE — 72052 X-RAY EXAM NECK SPINE 6/>VWS: CPT

## 2023-12-28 PROCEDURE — 72052 X-RAY EXAM NECK SPINE 6/>VWS: CPT | Mod: 26

## 2023-12-28 NOTE — REASON FOR VISIT
[Follow-Up: _____] : a [unfilled] follow-up visit [FreeTextEntry1] : Ms. Holly had a laminoforaminotomy over 1 year ago and did well.  She has no radicular symptoms.  She reports for the past 2.5 weeks that she has been having severe pain in her neck radiating to her right trapezius muscle.  No radicular symptoms.  Chronic bilateral hand numbness.  No weakness.  She has extreme stiffness in her neck and has trouble keeping her head up.  She has tried heat, no PT or injections,  No imaging

## 2023-12-28 NOTE — PHYSICAL EXAM
[FreeTextEntry1] : Physical exam:   Vital Signs - stable  General - well appearing in no acute distress  HEENT-normocephalic atraumatic  Skin-intact  CV-no edema,   Lungs-no wheezing or cyanosis  Musculoskeletal- significant decreased ROM; no tenderness to palpation; negative Spurlings; negative Lhermittes; negative straight leg raise; negative FABERs; negative Tinels  Neuro- awake, alert, and oriented; CN II-XII intact; 5/5 strength; sensation intact; reflexes normal; no Hoffmans or clonus, gait intact

## 2023-12-28 NOTE — ASSESSMENT
[FreeTextEntry1] : xrays with flexion and extension start PT follow-up in 6 weeks if ineffective then will order MRI

## 2023-12-29 DIAGNOSIS — M47.812 SPONDYLOSIS WITHOUT MYELOPATHY OR RADICULOPATHY, CERVICAL REGION: ICD-10-CM

## 2024-01-03 ENCOUNTER — APPOINTMENT (OUTPATIENT)
Dept: NEUROSURGERY | Facility: CLINIC | Age: 84
End: 2024-01-03
Payer: MEDICARE

## 2024-01-03 DIAGNOSIS — M47.812 SPONDYLOSIS W/OUT MYELOPATHY OR RADICULOPATHY, CERVICAL REGION: ICD-10-CM

## 2024-01-03 PROCEDURE — 99441: CPT | Mod: 93

## 2024-01-04 PROBLEM — M47.812 CERVICAL SPONDYLOSIS: Status: ACTIVE | Noted: 2023-12-28

## 2024-01-04 NOTE — REASON FOR VISIT
[FreeTextEntry1] : cervical xray reviewed no instability she may proceed with PT follow-up as needed

## 2024-01-11 ENCOUNTER — OUTPATIENT (OUTPATIENT)
Dept: OUTPATIENT SERVICES | Facility: HOSPITAL | Age: 84
LOS: 1 days | End: 2024-01-11
Payer: MEDICARE

## 2024-01-11 ENCOUNTER — RESULT REVIEW (OUTPATIENT)
Age: 84
End: 2024-01-11

## 2024-01-11 DIAGNOSIS — Z98.890 OTHER SPECIFIED POSTPROCEDURAL STATES: Chronic | ICD-10-CM

## 2024-01-11 DIAGNOSIS — E89.0 POSTPROCEDURAL HYPOTHYROIDISM: Chronic | ICD-10-CM

## 2024-01-11 DIAGNOSIS — C34.90 MALIGNANT NEOPLASM OF UNSPECIFIED PART OF UNSPECIFIED BRONCHUS OR LUNG: ICD-10-CM

## 2024-01-11 LAB
GLUCOSE BLDC GLUCOMTR-MCNC: 68 MG/DL — LOW (ref 70–99)
GLUCOSE BLDC GLUCOMTR-MCNC: 68 MG/DL — LOW (ref 70–99)

## 2024-01-11 PROCEDURE — A9552: CPT

## 2024-01-11 PROCEDURE — 78815 PET IMAGE W/CT SKULL-THIGH: CPT | Mod: 26,PS,MH

## 2024-01-11 PROCEDURE — 82962 GLUCOSE BLOOD TEST: CPT

## 2024-01-11 PROCEDURE — 78815 PET IMAGE W/CT SKULL-THIGH: CPT | Mod: MH,PS

## 2024-01-12 DIAGNOSIS — C34.90 MALIGNANT NEOPLASM OF UNSPECIFIED PART OF UNSPECIFIED BRONCHUS OR LUNG: ICD-10-CM

## 2024-01-15 ENCOUNTER — OUTPATIENT (OUTPATIENT)
Dept: OUTPATIENT SERVICES | Facility: HOSPITAL | Age: 84
LOS: 1 days | End: 2024-01-15
Payer: MEDICARE

## 2024-01-15 ENCOUNTER — LABORATORY RESULT (OUTPATIENT)
Age: 84
End: 2024-01-15

## 2024-01-15 ENCOUNTER — APPOINTMENT (OUTPATIENT)
Dept: HEMATOLOGY ONCOLOGY | Facility: CLINIC | Age: 84
End: 2024-01-15
Payer: MEDICARE

## 2024-01-15 VITALS
DIASTOLIC BLOOD PRESSURE: 74 MMHG | SYSTOLIC BLOOD PRESSURE: 132 MMHG | TEMPERATURE: 97.6 F | HEIGHT: 63 IN | RESPIRATION RATE: 16 BRPM | HEART RATE: 80 BPM

## 2024-01-15 DIAGNOSIS — Z98.890 OTHER SPECIFIED POSTPROCEDURAL STATES: Chronic | ICD-10-CM

## 2024-01-15 DIAGNOSIS — C64.9 MALIGNANT NEOPLASM OF UNSPECIFIED KIDNEY, EXCEPT RENAL PELVIS: ICD-10-CM

## 2024-01-15 DIAGNOSIS — E89.0 POSTPROCEDURAL HYPOTHYROIDISM: Chronic | ICD-10-CM

## 2024-01-15 LAB
ALBUMIN SERPL ELPH-MCNC: 4.1 G/DL
ALP BLD-CCNC: 85 U/L
ALT SERPL-CCNC: 10 U/L
ANION GAP SERPL CALC-SCNC: 10 MMOL/L
AST SERPL-CCNC: 13 U/L
BILIRUB SERPL-MCNC: 0.4 MG/DL
BUN SERPL-MCNC: 28 MG/DL
CALCIUM SERPL-MCNC: 9.8 MG/DL
CHLORIDE SERPL-SCNC: 107 MMOL/L
CO2 SERPL-SCNC: 24 MMOL/L
CREAT SERPL-MCNC: 0.9 MG/DL
EGFR: 64 ML/MIN/1.73M2
GLUCOSE SERPL-MCNC: 89 MG/DL
HCT VFR BLD CALC: 39.3 %
HGB BLD-MCNC: 13 G/DL
MCHC RBC-ENTMCNC: 29.3 PG
MCHC RBC-ENTMCNC: 33.1 G/DL
MCV RBC AUTO: 88.5 FL
PLATELET # BLD AUTO: 115 K/UL
PMV BLD: 11.4 FL
POTASSIUM SERPL-SCNC: 4.5 MMOL/L
PROT SERPL-MCNC: 6.3 G/DL
RBC # BLD: 4.44 M/UL
RBC # FLD: 13.7 %
SODIUM SERPL-SCNC: 141 MMOL/L
WBC # FLD AUTO: 5.17 K/UL

## 2024-01-15 PROCEDURE — G2211 COMPLEX E/M VISIT ADD ON: CPT

## 2024-01-15 PROCEDURE — 99214 OFFICE O/P EST MOD 30 MIN: CPT

## 2024-01-15 PROCEDURE — 80053 COMPREHEN METABOLIC PANEL: CPT

## 2024-01-15 PROCEDURE — 85027 COMPLETE CBC AUTOMATED: CPT

## 2024-01-16 DIAGNOSIS — C64.9 MALIGNANT NEOPLASM OF UNSPECIFIED KIDNEY, EXCEPT RENAL PELVIS: ICD-10-CM

## 2024-01-17 NOTE — HISTORY OF PRESENT ILLNESS
[de-identified] : Ms. 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 Cody Calixto.  She is here with her daughter Brandy.  \par  \par  She had a history of Kidney Cancer s/p partial nephrectomy and distant history of Thyroid Cancer s/p partial thyroidectomy.  She also had a Left Lower Lobectomy for Stage IA lung cancer. For dates see bellow.\par  \par  She had CT scan and subsequent PET/CT that showed multiple left pleural based nodules in addition to a para-aortic lymph node.  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 The differential diagnosis includes low grade follicular lymphoma and CD10+ marginal zone lymphoma. She denies any B-symptoms at this time other than recent weight loss of 10lbs over last month, however she reports she had not been eating due to a recent GI bug.\par  \par  She is here for further recommendations. \par  Radiological Work-up:\par  PET/CT (2/21/19) IMPRESSION: Since August 20, 2010: 1. Multiple sites of left pleural-based FDG avid nodularity, catalogued above, with max SUV 5.2, suspicious for biological tumor activity. Metastatic or primary pleural neoplasm are considerations. 2. Intra-abdominal FDG avid 1.7 x 1.2 cm left para-aortic lymph node, max SUV 5.4, suspicious for lymph node metastasis. \par  1/30/2019: [facility] [place]:  comparison was made to Ct from 7/2018 Multiple Plural nodules along the posterior paramediastinal surface, some are mildly larger there are new nodules as well. largest nodule 10 mm\par  CT Head (9/15/18) IMPRESSION: 1. Mild periventricular and subcortical white matter chronic small vessel ischemic changes. 2. No acute fractures, mass effect, midline shift or hemorrhage. \par  CT cervical spine (9/15/18) IMPRESSION: 1. Degenerative changes of the cervical spine C2-3 through C6-7 worse at C5-6 as described above. 2. Straightening of normal cervical lordosis with mild anterolisthesis of C3 anterior on C4, C6 on C7, C7 on T1 and T1 on T2. 3. No acute fractures or dislocations. \par  1/22/18: MR L spine: advance lymbar spondylosis, disck osteophytes, severe neuroforamin narrowing with  ipingment of L4 nerve,  3.5 cm AAA, 8 mm adrenal nodule.\par  Pathology:\par  (3/14/19) Final Diagnosis Left parotid lymph node, needle biopsy: Low grade B-cell lymphoma. \par  Immunostains performed with adequate controls on sections from block 1A show the infiltrating cells are CD20+ CD79a+ B-cells that coexpress CD10, CD23(dim variable), and BCL2. They are negative for CD5, CD43, cyclin D1, and MUM1. BCL6 is difficult to determine due to the high background staining. Ki67 labels 5-10% of these cells. CD21 highlights scattered follicular dendritic cell meshworks.  Per report ([id number]), flow cytometry studies performed at [facility] [employer] show monotypic B-cells (10% of cells), positive for kappa, CD19, CD20, CD10; negative CD5. Heterogeneous population of T-cells (with normal CD4 to CD8 ratio), and polytypic B-cells are also present. The findings are diagnostic of low grade B-cell lymphoma. The differential diagnosis includes low grade follicular lymphoma and CD10+ marginal zone lymphoma.\par  \par  Health Maintenance:\par  Colonoscopy \par  Mammogram \par  \par  Recent blood work is in [facility]IE. Was normal except for a high sugar and calcium of 10.2 [de-identified] : 7/10/19 She was supposed to go for biopsy of pleural based nodularity that resolved prior to procedure. She will see PULM in a few weeks.  She has been purposely losing weight, using Trajenta. 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 Denies any fevers, chills, unintentional weight loss, or night sweats. CT Chest (4/11/19) IMPRESSION: Since February 21, 2019;1. Interval resolution of previously described left-sided FDG avid pleural-based nodularity. No biopsy performed. 2. Multiple stable nodules, as above. 3. Unchanged left periaortic 1.4 cm lymph node, previously noted to be FDG avid. 4. Stable right hydronephrosis and right-sided ureteral calculi.5. Stable infrarenal abdominal aortic aneurysm measuring up to 3.7 cm.  1/8/20 She is here for follow up. Since last visit she had a CT C/A/P in 7/18/2019 that showed Stable 1.1 cm left para-aortic lymph node on series 2 image 62. No new lymph nodes in the abdomen and pelvis.  Interval development of moderate to severe right hydroureteronephrosis leading to 2 proximal ureteral calculi (superior calculus measuring 5 x 4 x 5 mm, 1000 Hounsfield units and the inferior adjacent calculus measuring 5 x 7 x 6 mm, 573 Hounsfield units). Bilateral intrarenal calculi. Stable 3.7 x 4.3 cm simple appearing right adnexal cyst. Recommend one-year follow-up pelvic ultrasound. Impression: Since April 11, 2019. No significant interval change in multiple left-sided pleural-based nodules, largest approximately 1.6 cm (remeasured on earlier study), left lung base (series 5/149). Post left lower lobectomy. She was seen by Urology and had lithotripsy.  She feels well.   7/8/20 She is here for follow up. She feels well. No B symptoms. She is pending a CT chest ordered by Dr. Morales.  1/11/2021 The patient is here for follow up. She has no B symptoms, no cough, no chest pain, no new palpable lymph nodes. She is complaining of sciatica, going for an injection next week. She also had LEs cellulitis, completed once course of antibiotics.   5/19/21 We had a telephone visit today. She feels well.  She had CT chest on 2/21 that showed Since July 14, 2020, interval development of wedge-shaped consolidation posterior left upper lobe. (2/31). Minimal interval growth of multiple pleural-based nodules as described above. Numerous stable left-sided pleural based nodules and adjacent parenchymal nodules. Reviewed her CT in tumor board. Chest. We were divided on if we should rescan in 3 months with CT or check PET/cT now. I spoke to her and we decided to check a CT Chest in 3 months.  She feels well.   9/15/21 Patient is here for a follow-up visit for hx of lung cancer, NHL and pulmonary nodules. She is feeling well with no new complaints. Reviewed most recent CBC, which shows mild microcytic anemia. Patient denies fever, chills, nausea, vomiting, dyspnea or bleeding. She has not had recent colonoscopy, but has done Cologuard at home which was reportedly benign in recent years.   CT Chest (6.2.2021) IMPRESSION: Unchanged left circumferential subpleural nodularity largest measuring 1.6 cm.Resolved left lower lobe consolidation.  3/21/22 Pt returns for f/u today. She reports that she was admitted at [facility] for episode for acute diverticulitis in November 2021, she was treated with IV abx, pt reports that she had imaging done at that time and it was only significant for acute diverticulitis (we do not have the imaging results available. Since that time she reports that she has been having episodes of constipation and diarrhea, she is on laxatives. She stopped taking po iron as it was making her GI symptoms worse. Her gastroenterologist did not suggest doing a colonoscopy. Pt denies any blood in stools. Denies any fever, chills, weight loss or loss of appetite. She was also seen by Dr Sweet.   6/20/22 Pt returns for f/u today. Denies any fever, chills, weight loss or loss of appetite. In March ferritin was 17, Iron 38, iron sat 9%, Hgb 11.9, MCV 77.1. She had 5 doses of IV Venofer tolerated well, she reports cold intolerance improved. Labs reviewed 6/13/22 hgb 13.3, MCV 84.9, Ferritin 112, Iron sat 13%, Iron 37. She had Kidney US on 6/8 which showed Indeterminate hypoechoic area involving the lower pole of the left kidney. Further evaluation with outpatient renal mass protocol CT is recommended. Focal dilatation of the right upper pole calyx. Bilateral renal calculi. Incidentally noted is an abdominal aortic aneurysm, similar in size to prior CT scan, measuring 4.2 cm.   10/12/2022 She is here for follow up. she had CT C/A/P in July 2022: Lungs, Pleura, and Airways: Trachea and central airways are patent. Post left lower lobectomy with expected postsurgical changes. Extensive left subpleural nodularity demonstrating slight interval increase since prior examinations including medial left upper lobe 2.7 x 1.0 cm nodular density (series 4 image 27) and anterior left upper lobe 1.1 x 1.0 cm nodular density (series 4 image 55). Stable right adnexal cystic lesion measuring 4.4 x 3.8 cm. Tubular/cystic appearing left adnexal lesion could represent a cyst or hydrosalpinx, not well seen on the 2/9/2021 CT. Recommend follow-up pelvic ultrasound.   I ordered a US pelvis hat showed   IMPRESSION: 1.  Likely benign 4 cm right adnexal/ovarian cyst. A follow-up pelvic ultrasound can be obtained in 6 months to assess for stability. 2.  Tubular cystic left adnexal lesion, likely hydrosalpinx.  She was just in ER for pain and was noted to have a kidney stone  that moved and caused hydronephrosis. IMPRESSION: 10/6/2022 1. Moderate left hydroureteronephrosis with a 0.5 x 0.7 x 0.7 cm left midureter left mid ureter calculus. 2. Additional multiple nonobstructing bilateral renal calculi. She has follow up pending with Dr. Davis  CBC from 10/6/2022 in ED showed hgb of 16.6 with normal MCV She is pending  surgery with Dr. Guaman for DJD  3/13/2023 She is here for follow.  She saw Dr. Morales and he checked a PET/CT that was done on 3/7/2023:  IMPRESSION: 1.  Since July 18, 2019, substantially increased FDG avid diffuse soft tissue nodularity throughout the left pleural surface, compatible with biologic tumor activity (max SUV 19.5; reflecting 275% increase). 2.  New minimal FDG avid 5 mm right upper lobe solid pulmonary nodule ( positive on NAC images). 3.  New FDG avid mediastinal lymph nodes, suspicious for elizabeth metastases (max SUV 10.8 in a subcarinal lymph node). 4.  Focal FDG uptake in the right thyroid lobe (max SUV 7). Thyroid ultrasound can be obtained for further assessment. CBC today is normal. She is here with daughter. Lost a few pounds due to diverticular disease feels better now. I showed the images to the patient and reviewed it together she saw the pleural-based uptake as well as the mediastinal nodes  4/24/2023 She is here for follow up. She had biopsy by IR of left pleural mass and only Fragments of fibroadipose tissue and skeletal muscle. I presented her at thoracic tumor board and Dr. Julian Nicholas can perform a mediastinoscopy to confirm the etiology of the findings on the PET scan. She was here with family. We went over the most recent PET CT scan together and she saw the increase of FDG uptake in the pleural space as well as similar nodes I explained that clinically I suspect this is probably her lymphoma but nevertheless other etiologies are possible. If she is hesitant about doing the biopsy I can try Rituxan for 4 doses we will repeat imaging which has reasonable side effects but of course immunosuppression is always of a concern, and this is more than acceptable treatment for indolent lymphomas but I explained that we will be treating the lung entity and thus after discussion she agreed to proceed with mediastinoscopy.  5/26/23 She returns for follow-up today she underwent a mediastinoscopy with pathology demonstrating that her mediastinal lymph nodes are positive for metastatic adenocarcinoma consistent with lung primary. This was from 5/12/2023 Final Diagnosis 1. Level 7 lymph node, excision: - Positive for metastatic adenocarcinoma. - See comment 2. Level 7 lymph node, excision: - Positive for metastatic adenocarcinoma. - See comment 3. Right level 4 lymph node, excision: - Positive for metastatic adenocarcinoma. - See comment 4. Right level 2 lymph node, excision: - Positive for metastatic adenocarcinoma. - See comment 5. Right level 2 lymph node, excision: - Positive for metastatic adenocarcinoma. - See comment She has recovered well and has no complaints today and here to talk about next  6/16/2023 She returns for follow-up. NGS showed EGFR p.([id number]). MR brain was negative. She was started on Tagrisso 1 week ago is tolerating well. Her AMAYA is stable to improved with her inhalers. She has chronic constipation alternating with diarrhea which she has had for years with no significant change. She remains active and is doing well overall. She is drinking Boosts for additional calories.  07/14/2023 accompanied by her daughter. She called earlier this week to report rash that was due to fungal infection and the developed what she described as acne-like with inflammatory component at her lower abdomen started at skin fold and spreading peripherally from there. We advised her to start steroid cream and HOLD Tagrisso (since Wednesday 07/12/2023). She changed her soap, and it seems to improve pruritus.  07/28/2023 She is here today for follow-up and reports that her rashes are getting better on steroid cream and antibiotics.  She restarted to Grieser this past Monday and it seems she is tolerating it pretty well.  08/28/2023 She is here today for a follow-up and reports that her rashes improved. She reported she saw dermatology and was prescribed clindamycin cream, mupirocin ointment, and gentamicin ointment. She continued to take Tagrisso without any new adverse reactions. She is otherwise doing well and has no new complaints.    10/30/2023 Pt is here for follow up. She continued to take Tagrisso 80 mg QD without any new adverse reactions. She states she has a R upper back ulcer since 4 weeks ago. She is unable to reach that area to put ointment on. Otherwise, she has no need complaint to offer.  She had a PET/CT on 10/2/23 that showed AL: IMPRESSION: New left posterior occipital scalp-based FDG avid lesion, SUV max 17.9, measuring approximately 1.5 cm. (New since prior PET and brain MRI). Recommend further workup. Significant decrease in FDG uptake in the mediastinum and left pleura compared to prior exam, now only avid on nonattenuation corrected images. Anatomically, pleural nodularity in the left hemithorax is also decreased. Previously described 5 mm right upper lobe pulmonary nodule is faintly visualized, not FDG avid.  11/13/2023 Pt is here for follow up. She continues to take Tagrisso 80 mg QD without any new adverse reactions. Her R upper back open comedone has improved and pain improved. Currently, her major complaint is dizziness which usually occurs when her blood pressure is high. She states that Dr. Gamble recently adjusted her BP meds by reducing losartan 100 mg QD to 50 mg QD and discontinuing amlodipine.  12/15/2023 Reports increasing generalized pains likely due to arthritis and some compensatory muscle spasm.  1/15/24: Pt returns for follow up today. She reports new rash in her groin region. She has been applying bacitracin to ti without any relief. She reports breathing is improved with Symbicort. She denied any other complaints.

## 2024-01-17 NOTE — REVIEW OF SYSTEMS
[Fatigue] : fatigue [SOB on Exertion] : shortness of breath during exertion [Incontinence] : incontinence [Skin Rash] : skin rash [Anxiety] : anxiety [Negative] : Allergic/Immunologic [FreeTextEntry8] : sees Dr. Cobb [de-identified] : rash in the groin [de-identified] : sciatica pain on Lyrica

## 2024-01-17 NOTE — ASSESSMENT
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 All the mediastinal lymph nodes are positive for adenocarcinoma of the lung I am not sure if this is related to her pleural-based plaques which she had for years and the biopsy was nondiagnostic, at this point pursue another biopsy we discussed was not an option for us, it is possible that these pleural-based nodules were indeed slow-growing adenocarcinoma this will make her stage IV. - We therefore decided to treat this like stage IV and to repeat imaging and if there is for example response in the mediastinal nodes but and pleural-based areas then I think at that point in time we will re-biopsy the pleural-based area - MR brain negative. - 06/2023 Started Tagrisso. Discussed side effects of Tagrisso including but not limited to rash, diarrhea, pneumonitis, keratitis, transaminitis and cytopenias. - 07/12/2023 Tagrisso is on hold due to development of rash - slowly improving on with holding Tagrisso for a few weeks and antibiotics, steroid and antifungal creams. - 07/24/2023 restarted Tagrisso. - 10/02/2023 PET/CT showed ND: Significant decrease in FDG uptake in the mediastinum and left pleura compared to prior exam, now only avid on nonattenuation corrected images. Anatomically, pleural nodularity in the left hemithorax is also decreased. - 1/15/24 PET/CT: ALIYAH  # Right upper back large open comedo measuring approximately 1 x 1 x 1 cm, possibly related to Tagrisso - RESOLVED.  # Uncontrolled HTN  - on losartan 100 mg and if needed will add amlodipine  # left posterior occipital scalp-based FDG avid lesion noted on PET/CT, pt had a laceration there leading to PET/CT finding.  # Pleural Nodules on scans. - these are not new, at least since 2018 But PET/CT now showed increase activity and new mediastinal node due to sarcoid?, progression of lymphoma? RCC? - CT guided FNA biopsy was non diagnostic of the pleural nodule.  # History of Lung Cancer 13 years ago. - Early-stage NSCLC s/p resection alone ~ 13 years ago.  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 - Left para-aortic lymph node. On observation since she is asymptomatic. - Recent diverticulitis imaging done at that time showed no lymphadenopathy as per pt. - No B symptoms. - 10/02/2023, 1/15/24 PET/CT showed ALIYAH.  # Anemia, mild, microcytic - resolved. - S/p 5 doses of Venofer in April. Ferritin improved 112, Iron sat 10%. - recommended to f/u with GI she did but not a candidate for colonoscopy due to previous peroration and will monitor.  # History of Thyroid cancer she states it was a partial thyroidectomy - This explains the presence of positive thyroglobulin 21 tumor marker  # Adnexal cyst. - PET/CT negative.  PLAN: - continue Tagrisso 80 mg PO QD. - Will repeat imaging in 3-4 months.   - continue losartan 100 mg QD. - continue bowel regiment for constipation. - Restart Lotrisone for groin rash: ordered today.  - need PT for her arthritic and back pains. - Labs today: CBC, CMP.  RTC in 4 weeks with CBC CMP. [AdvancecareDate] : 12/15/2023 [FreeTextEntry2] : ROBERTA given for review on 12/15/23

## 2024-01-17 NOTE — PHYSICAL EXAM
[Restricted in physically strenuous activity but ambulatory and able to carry out work of a light or sedentary nature] : Status 1- Restricted in physically strenuous activity but ambulatory and able to carry out work of a light or sedentary nature, e.g., light house work, office work [Normal] : affect appropriate [de-identified] : wears glasses [de-identified] : bilateral mild edema - unchanged [de-identified] : acne-like rash with inflammatory component at her lower abdomen started at skin fold, and groin, refused further examination.

## 2024-01-17 NOTE — END OF VISIT
[] : Fellow [FreeTextEntry3] : She is here for follow-up she just had a PET CT scan which showed an excellent response essentially no further FDG avid lesions.  She will continue with Tagrisso for her stage IV lung cancer we Lotrisone  for her rash in her groin system not sure of the cause.  She will see me back in 1 month.  Will repeat imaging around April possibly with just regular CAT scan

## 2024-01-21 ENCOUNTER — EMERGENCY (EMERGENCY)
Facility: HOSPITAL | Age: 84
LOS: 0 days | Discharge: HOME | End: 2024-01-21
Attending: EMERGENCY MEDICINE
Payer: MEDICARE

## 2024-01-21 VITALS
RESPIRATION RATE: 18 BRPM | SYSTOLIC BLOOD PRESSURE: 182 MMHG | HEART RATE: 70 BPM | DIASTOLIC BLOOD PRESSURE: 88 MMHG | OXYGEN SATURATION: 97 %

## 2024-01-21 VITALS
OXYGEN SATURATION: 96 % | SYSTOLIC BLOOD PRESSURE: 199 MMHG | DIASTOLIC BLOOD PRESSURE: 94 MMHG | TEMPERATURE: 97 F | HEART RATE: 71 BPM | HEIGHT: 60 IN | WEIGHT: 145.06 LBS | RESPIRATION RATE: 18 BRPM

## 2024-01-21 DIAGNOSIS — Z87.891 PERSONAL HISTORY OF NICOTINE DEPENDENCE: ICD-10-CM

## 2024-01-21 DIAGNOSIS — Z98.890 OTHER SPECIFIED POSTPROCEDURAL STATES: Chronic | ICD-10-CM

## 2024-01-21 DIAGNOSIS — Z85.528 PERSONAL HISTORY OF OTHER MALIGNANT NEOPLASM OF KIDNEY: ICD-10-CM

## 2024-01-21 DIAGNOSIS — I10 ESSENTIAL (PRIMARY) HYPERTENSION: ICD-10-CM

## 2024-01-21 DIAGNOSIS — J44.9 CHRONIC OBSTRUCTIVE PULMONARY DISEASE, UNSPECIFIED: ICD-10-CM

## 2024-01-21 DIAGNOSIS — M79.674 PAIN IN RIGHT TOE(S): ICD-10-CM

## 2024-01-21 DIAGNOSIS — Z85.850 PERSONAL HISTORY OF MALIGNANT NEOPLASM OF THYROID: ICD-10-CM

## 2024-01-21 DIAGNOSIS — L03.031 CELLULITIS OF RIGHT TOE: ICD-10-CM

## 2024-01-21 DIAGNOSIS — Z85.72 PERSONAL HISTORY OF NON-HODGKIN LYMPHOMAS: ICD-10-CM

## 2024-01-21 DIAGNOSIS — E89.0 POSTPROCEDURAL HYPOTHYROIDISM: Chronic | ICD-10-CM

## 2024-01-21 DIAGNOSIS — Z85.118 PERSONAL HISTORY OF OTHER MALIGNANT NEOPLASM OF BRONCHUS AND LUNG: ICD-10-CM

## 2024-01-21 DIAGNOSIS — E78.5 HYPERLIPIDEMIA, UNSPECIFIED: ICD-10-CM

## 2024-01-21 PROCEDURE — 99283 EMERGENCY DEPT VISIT LOW MDM: CPT | Mod: 25

## 2024-01-21 PROCEDURE — 10060 I&D ABSCESS SIMPLE/SINGLE: CPT

## 2024-01-21 PROCEDURE — 99283 EMERGENCY DEPT VISIT LOW MDM: CPT | Mod: FS

## 2024-01-21 RX ORDER — CEPHALEXIN 500 MG
1 CAPSULE ORAL
Qty: 28 | Refills: 0
Start: 2024-01-21 | End: 2024-01-27

## 2024-01-21 NOTE — ED PROVIDER NOTE - DISPOSITION TYPE
Addended by: ANGELITA AMADOR on: 8/20/2019 07:49 AM     Modules accepted: Level of Service     DISCHARGE

## 2024-01-21 NOTE — ED ADULT NURSE NOTE - NSFALLUNIVINTERV_ED_ALL_ED
Bed/Stretcher in lowest position, wheels locked, appropriate side rails in place/Call bell, personal items and telephone in reach/Instruct patient to call for assistance before getting out of bed/chair/stretcher/Non-slip footwear applied when patient is off stretcher/[place] to call system/Physically safe environment - no spills, clutter or unnecessary equipment/Purposeful proactive rounding/Room/bathroom lighting operational, light cord in reach

## 2024-01-21 NOTE — ED PROVIDER NOTE - PHYSICAL EXAMINATION
Physical Exam    Vital Signs: I have reviewed the initial vital signs.  Constitutional: appears stated age, no acute distress  Eyes: Conjunctiva pink, Sclera clear  Integumentary: Right fourth toe with paronychia and local cellulitis.  No lymphangitis  Neurologic: awake, alert, nvi

## 2024-01-21 NOTE — ED PROVIDER NOTE - CARE PLAN
1 Principal Discharge DX:	Acute paronychia of toe of right foot   Principal Discharge DX:	Acute paronychia of toe of right foot  Secondary Diagnosis:	Cellulitis of right toe

## 2024-01-21 NOTE — ED PROVIDER NOTE - CLINICAL SUMMARY MEDICAL DECISION MAKING FREE TEXT BOX
83yF p/w R toe pain, found to have cellulitis and paronychia.  Paronychia drained at bedside but given concurrent cellulitis and hx DM, immunocompromised 2/2 cancer will prescribe oral abx as well.  Ok to dc with supportive care, including wound care, PO abx, close glucose monitoring, o/p PCP f/u for wound check as needed, return precautions.

## 2024-01-21 NOTE — ED PROVIDER NOTE - PATIENT PORTAL LINK FT
You can access the FollowMyHealth Patient Portal offered by [facility] by registering at the following website: [email or web address]. By joining [facility]’s FollowMyHealth portal, you will also be able to view your health information using other applications (apps) compatible with our system.

## 2024-01-21 NOTE — ED ADULT NURSE NOTE - NSICDXPASTMEDICALHX_GEN_ALL_CORE_FT
PAST MEDICAL HISTORY:  Cancer of kidney     Cancer of thyroid     COPD (chronic obstructive pulmonary disease)     Diverticulitis     Former smoker     GERD (gastroesophageal reflux disease)     History of abdominal aortic aneurysm (AAA)     [place] (hard of hearing)     Hyperlipidemia, unspecified hyperlipidemia type     Hypertension, unspecified type     Kidney stones     Lung cancer     NHL (non-Hodgkin's lymphoma) "low grade" per heme/ onc note    Obesity     ARTIE (obstructive sleep apnea) NO CPAP MACHINE PER PT.    SOB (shortness of breath)     Type 2 diabetes mellitus with complication, without long-term current use of insulin

## 2024-01-21 NOTE — ED PROVIDER NOTE - OBJECTIVE STATEMENT
83-year-old female, past medical history hypertension, hyperlipidemia, non-Hodgkin's lymphoma, renal cell carcinoma, thyroid cancer, COPD, former smoker, history of AAA, lung CA currently taking Tagrisso, presents emergency department for right fourth digit pain.  Patient states red and swollen for several days.  No trauma. Denies fever, numbness, tingling, lacerations or cuts.

## 2024-01-21 NOTE — ED PROVIDER NOTE - ATTENDING APP SHARED VISIT CONTRIBUTION OF CARE
83yF HTN DLD DM hx NHL renal cancer now lung cancer in remission on tagrisso p/w R 4th toe pain x days.  She noticed this morning that it was swollen w/ discolored skin near the nail.  No fever.

## 2024-01-21 NOTE — ED PROVIDER NOTE - NSICDXPASTMEDICALHX_GEN_ALL_CORE_FT
PAST MEDICAL HISTORY:  Cancer of kidney     Cancer of thyroid     COPD (chronic obstructive pulmonary disease)     Diverticulitis     Former smoker     GERD (gastroesophageal reflux disease)     History of abdominal aortic aneurysm (AAA)     [place] (hard of hearing)     Hyperlipidemia, unspecified hyperlipidemia type     Hypertension, unspecified type     Kidney stones     Lung cancer     NHL (non-Hodgkin's lymphoma) "low grade" per heme/ onc note    Obesity     ARTIE (obstructive sleep apnea) NO CPAP MACHINE PER PT.    SOB (shortness of breath)     Type 2 diabetes mellitus with complication, without long-term current use of insulin

## 2024-02-26 ENCOUNTER — APPOINTMENT (OUTPATIENT)
Dept: HEMATOLOGY ONCOLOGY | Facility: CLINIC | Age: 84
End: 2024-02-26
Payer: MEDICARE

## 2024-02-26 ENCOUNTER — OUTPATIENT (OUTPATIENT)
Dept: OUTPATIENT SERVICES | Facility: HOSPITAL | Age: 84
LOS: 1 days | End: 2024-02-26
Payer: MEDICARE

## 2024-02-26 VITALS
HEIGHT: 63 IN | WEIGHT: 141 LBS | HEART RATE: 60 BPM | DIASTOLIC BLOOD PRESSURE: 81 MMHG | TEMPERATURE: 98.5 F | SYSTOLIC BLOOD PRESSURE: 169 MMHG | RESPIRATION RATE: 16 BRPM | BODY MASS INDEX: 24.98 KG/M2

## 2024-02-26 DIAGNOSIS — Z98.890 OTHER SPECIFIED POSTPROCEDURAL STATES: Chronic | ICD-10-CM

## 2024-02-26 DIAGNOSIS — C64.9 MALIGNANT NEOPLASM OF UNSPECIFIED KIDNEY, EXCEPT RENAL PELVIS: ICD-10-CM

## 2024-02-26 DIAGNOSIS — E89.0 POSTPROCEDURAL HYPOTHYROIDISM: Chronic | ICD-10-CM

## 2024-02-26 DIAGNOSIS — Z51.81 ENCOUNTER FOR THERAPEUTIC DRUG LVL MONITORING: ICD-10-CM

## 2024-02-26 LAB
ALBUMIN SERPL ELPH-MCNC: 4 G/DL
ALP BLD-CCNC: 87 U/L
ALT SERPL-CCNC: 11 U/L
ANION GAP SERPL CALC-SCNC: 10 MMOL/L
AST SERPL-CCNC: 13 U/L
BASOPHILS # BLD AUTO: 0.03 K/UL
BASOPHILS NFR BLD AUTO: 0.5 %
BILIRUB SERPL-MCNC: 0.3 MG/DL
BUN SERPL-MCNC: 31 MG/DL
CALCIUM SERPL-MCNC: 10.1 MG/DL
CHLORIDE SERPL-SCNC: 104 MMOL/L
CO2 SERPL-SCNC: 24 MMOL/L
CREAT SERPL-MCNC: 1 MG/DL
EGFR: 56 ML/MIN/1.73M2
EOSINOPHIL # BLD AUTO: 0.18 K/UL
EOSINOPHIL NFR BLD AUTO: 3.2 %
GLUCOSE SERPL-MCNC: 113 MG/DL
HCT VFR BLD CALC: 39.4 %
HGB BLD-MCNC: 12.6 G/DL
IMM GRANULOCYTES NFR BLD AUTO: 0.2 %
LYMPHOCYTES # BLD AUTO: 1.21 K/UL
LYMPHOCYTES NFR BLD AUTO: 21.2 %
MAN DIFF?: NORMAL
MCHC RBC-ENTMCNC: 29 PG
MCHC RBC-ENTMCNC: 32 G/DL
MCV RBC AUTO: 90.8 FL
MONOCYTES # BLD AUTO: 0.49 K/UL
MONOCYTES NFR BLD AUTO: 8.6 %
NEUTROPHILS # BLD AUTO: 3.78 K/UL
NEUTROPHILS NFR BLD AUTO: 66.3 %
PLATELET # BLD AUTO: 114 K/UL
PMV BLD AUTO: 0 /100 WBCS
POTASSIUM SERPL-SCNC: 4.5 MMOL/L
PROT SERPL-MCNC: 6.1 G/DL
RBC # BLD: 4.34 M/UL
RBC # FLD: 13.7 %
SODIUM SERPL-SCNC: 138 MMOL/L
WBC # FLD AUTO: 5.7 K/UL

## 2024-02-26 PROCEDURE — 80053 COMPREHEN METABOLIC PANEL: CPT

## 2024-02-26 PROCEDURE — 85027 COMPLETE CBC AUTOMATED: CPT

## 2024-02-26 PROCEDURE — 99214 OFFICE O/P EST MOD 30 MIN: CPT

## 2024-02-26 PROCEDURE — G2211 COMPLEX E/M VISIT ADD ON: CPT

## 2024-02-27 DIAGNOSIS — C64.9 MALIGNANT NEOPLASM OF UNSPECIFIED KIDNEY, EXCEPT RENAL PELVIS: ICD-10-CM

## 2024-02-28 PROBLEM — Z51.81 ENCOUNTER FOR MEDICATION MONITORING: Status: ACTIVE | Noted: 2023-10-30

## 2024-02-29 NOTE — END OF VISIT
[] : Fellow [FreeTextEntry3] : She returns for follow-up for metastatic lung cancer.  She is on Tagrisso.  No side effects to report she is due for scans around March or April we will likely order next visit blood work reviewed.  Will monitor her anemia given history of iron deficiency as well.  CBC remains normal if there is any interval changes we will repeat iron studies she can see us back in a month

## 2024-02-29 NOTE — PHYSICAL EXAM
[Restricted in physically strenuous activity but ambulatory and able to carry out work of a light or sedentary nature] : Status 1- Restricted in physically strenuous activity but ambulatory and able to carry out work of a light or sedentary nature, e.g., light house work, office work [Normal] : affect appropriate [de-identified] : acne-like rash with inflammatory component at her lower abdomen started at skin fold, and groin, refused further examination.  [de-identified] : bilateral mild edema - unchanged [de-identified] : wears glasses

## 2024-02-29 NOTE — REVIEW OF SYSTEMS
[Fatigue] : fatigue [SOB on Exertion] : shortness of breath during exertion [Incontinence] : incontinence [Anxiety] : anxiety [Skin Rash] : skin rash [Negative] : Allergic/Immunologic [de-identified] : rash in the groin- resolved [FreeTextEntry8] : sees Dr. Cobb [de-identified] : sciatica pain on Lyrica

## 2024-02-29 NOTE — HISTORY OF PRESENT ILLNESS
[de-identified] : Ms. 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 Cody Calixto.  She is here with her daughter Brandy.  \par  \par  She had a history of Kidney Cancer s/p partial nephrectomy and distant history of Thyroid Cancer s/p partial thyroidectomy.  She also had a Left Lower Lobectomy for Stage IA lung cancer. For dates see bellow.\par  \par  She had CT scan and subsequent PET/CT that showed multiple left pleural based nodules in addition to a para-aortic lymph node.  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 The differential diagnosis includes low grade follicular lymphoma and CD10+ marginal zone lymphoma. She denies any B-symptoms at this time other than recent weight loss of 10lbs over last month, however she reports she had not been eating due to a recent GI bug.\par  \par  She is here for further recommendations. \par  Radiological Work-up:\par  PET/CT (2/21/19) IMPRESSION: Since August 20, 2010: 1. Multiple sites of left pleural-based FDG avid nodularity, catalogued above, with max SUV 5.2, suspicious for biological tumor activity. Metastatic or primary pleural neoplasm are considerations. 2. Intra-abdominal FDG avid 1.7 x 1.2 cm left para-aortic lymph node, max SUV 5.4, suspicious for lymph node metastasis. \par  1/30/2019: [facility] [place]:  comparison was made to Ct from 7/2018 Multiple Plural nodules along the posterior paramediastinal surface, some are mildly larger there are new nodules as well. largest nodule 10 mm\par  CT Head (9/15/18) IMPRESSION: 1. Mild periventricular and subcortical white matter chronic small vessel ischemic changes. 2. No acute fractures, mass effect, midline shift or hemorrhage. \par  CT cervical spine (9/15/18) IMPRESSION: 1. Degenerative changes of the cervical spine C2-3 through C6-7 worse at C5-6 as described above. 2. Straightening of normal cervical lordosis with mild anterolisthesis of C3 anterior on C4, C6 on C7, C7 on T1 and T1 on T2. 3. No acute fractures or dislocations. \par  1/22/18: MR L spine: advance lymbar spondylosis, disck osteophytes, severe neuroforamin narrowing with  ipingment of L4 nerve,  3.5 cm AAA, 8 mm adrenal nodule.\par  Pathology:\par  (3/14/19) Final Diagnosis Left parotid lymph node, needle biopsy: Low grade B-cell lymphoma. \par  Immunostains performed with adequate controls on sections from block 1A show the infiltrating cells are CD20+ CD79a+ B-cells that coexpress CD10, CD23(dim variable), and BCL2. They are negative for CD5, CD43, cyclin D1, and MUM1. BCL6 is difficult to determine due to the high background staining. Ki67 labels 5-10% of these cells. CD21 highlights scattered follicular dendritic cell meshworks.  Per report ([id number]), flow cytometry studies performed at [facility] [place] show monotypic B-cells (10% of cells), positive for kappa, CD19, CD20, CD10; negative CD5. Heterogeneous population of T-cells (with normal CD4 to CD8 ratio), and polytypic B-cells are also present. The findings are diagnostic of low grade B-cell lymphoma. The differential diagnosis includes low grade follicular lymphoma and CD10+ marginal zone lymphoma.\par  \par  Health Maintenance:\par  Colonoscopy \par  Mammogram \par  \par  Recent blood work is in [facility]. Was normal except for a high sugar and calcium of 10.2 [de-identified] : 7/10/19 She was supposed to go for biopsy of pleural based nodularity that resolved prior to procedure. She will see PULM in a few weeks.  She has been purposely losing weight, using Trajenta. 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 Denies any fevers, chills, unintentional weight loss, or night sweats. CT Chest (4/11/19) IMPRESSION: Since February 21, 2019;1. Interval resolution of previously described left-sided FDG avid pleural-based nodularity. No biopsy performed. 2. Multiple stable nodules, as above. 3. Unchanged left periaortic 1.4 cm lymph node, previously noted to be FDG avid. 4. Stable right hydronephrosis and right-sided ureteral calculi.5. Stable infrarenal abdominal aortic aneurysm measuring up to 3.7 cm.  1/8/20 She is here for follow up. Since last visit she had a CT C/A/P in 7/18/2019 that showed Stable 1.1 cm left para-aortic lymph node on series 2 image 62. No new lymph nodes in the abdomen and pelvis.  Interval development of moderate to severe right hydroureteronephrosis leading to 2 proximal ureteral calculi (superior calculus measuring 5 x 4 x 5 mm, 1000 Hounsfield units and the inferior adjacent calculus measuring 5 x 7 x 6 mm, 573 Hounsfield units). Bilateral intrarenal calculi. Stable 3.7 x 4.3 cm simple appearing right adnexal cyst. Recommend one-year follow-up pelvic ultrasound. Impression: Since April 11, 2019. No significant interval change in multiple left-sided pleural-based nodules, largest approximately 1.6 cm (remeasured on earlier study), left lung base (series 5/149). Post left lower lobectomy. She was seen by Urology and had lithotripsy.  She feels well.   7/8/20 She is here for follow up. She feels well. No B symptoms. She is pending a CT chest ordered by Dr. Morales.  1/11/2021 The patient is here for follow up. She has no B symptoms, no cough, no chest pain, no new palpable lymph nodes. She is complaining of sciatica, going for an injection next week. She also had LEs cellulitis, completed once course of antibiotics.   5/19/21 We had a telephone visit today. She feels well.  She had CT chest on 2/21 that showed Since July 14, 2020, interval development of wedge-shaped consolidation posterior left upper lobe. (2/31). Minimal interval growth of multiple pleural-based nodules as described above. Numerous stable left-sided pleural based nodules and adjacent parenchymal nodules. Reviewed her CT in tumor board. Chest. We were divided on if we should rescan in 3 months with CT or check PET/cT now. I spoke to her and we decided to check a CT Chest in 3 months.  She feels well.   9/15/21 Patient is here for a follow-up visit for hx of lung cancer, NHL and pulmonary nodules. She is feeling well with no new complaints. Reviewed most recent CBC, which shows mild microcytic anemia. Patient denies fever, chills, nausea, vomiting, dyspnea or bleeding. She has not had recent colonoscopy, but has done Cologuard at home which was reportedly benign in recent years.   CT Chest (6.2.2021) IMPRESSION: Unchanged left circumferential subpleural nodularity largest measuring 1.6 cm.Resolved left lower lobe consolidation.  3/21/22 Pt returns for f/u today. She reports that she was admitted at [facility] for episode for acute diverticulitis in November 2021, she was treated with IV abx, pt reports that she had imaging done at that time and it was only significant for acute diverticulitis (we do not have the imaging results available. Since that time she reports that she has been having episodes of constipation and diarrhea, she is on laxatives. She stopped taking po iron as it was making her GI symptoms worse. Her gastroenterologist did not suggest doing a colonoscopy. Pt denies any blood in stools. Denies any fever, chills, weight loss or loss of appetite. She was also seen by Dr Sweet.   6/20/22 Pt returns for f/u today. Denies any fever, chills, weight loss or loss of appetite. In March ferritin was 17, Iron 38, iron sat 9%, Hgb 11.9, MCV 77.1. She had 5 doses of IV Venofer tolerated well, she reports cold intolerance improved. Labs reviewed 6/13/22 hgb 13.3, MCV 84.9, Ferritin 112, Iron sat 13%, Iron 37. She had Kidney US on 6/8 which showed Indeterminate hypoechoic area involving the lower pole of the left kidney. Further evaluation with outpatient renal mass protocol CT is recommended. Focal dilatation of the right upper pole calyx. Bilateral renal calculi. Incidentally noted is an abdominal aortic aneurysm, similar in size to prior CT scan, measuring 4.2 cm.   10/12/2022 She is here for follow up. she had CT C/A/P in July 2022: Lungs, Pleura, and Airways: Trachea and central airways are patent. Post left lower lobectomy with expected postsurgical changes. Extensive left subpleural nodularity demonstrating slight interval increase since prior examinations including medial left upper lobe 2.7 x 1.0 cm nodular density (series 4 image 27) and anterior left upper lobe 1.1 x 1.0 cm nodular density (series 4 image 55). Stable right adnexal cystic lesion measuring 4.4 x 3.8 cm. Tubular/cystic appearing left adnexal lesion could represent a cyst or hydrosalpinx, not well seen on the 2/9/2021 CT. Recommend follow-up pelvic ultrasound.   I ordered a US pelvis hat showed   IMPRESSION: 1.  Likely benign 4 cm right adnexal/ovarian cyst. A follow-up pelvic ultrasound can be obtained in 6 months to assess for stability. 2.  Tubular cystic left adnexal lesion, likely hydrosalpinx.  She was just in ER for pain and was noted to have a kidney stone  that moved and caused hydronephrosis. IMPRESSION: 10/6/2022 1. Moderate left hydroureteronephrosis with a 0.5 x 0.7 x 0.7 cm left midureter left mid ureter calculus. 2. Additional multiple nonobstructing bilateral renal calculi. She has follow up pending with Dr. Davis  CBC from 10/6/2022 in ED showed hgb of 16.6 with normal MCV She is pending  surgery with Dr. Guaman for DJD  3/13/2023 She is here for follow.  She saw Dr. Morales and he checked a PET/CT that was done on 3/7/2023:  IMPRESSION: 1.  Since July 18, 2019, substantially increased FDG avid diffuse soft tissue nodularity throughout the left pleural surface, compatible with biologic tumor activity (max SUV 19.5; reflecting 275% increase). 2.  New minimal FDG avid 5 mm right upper lobe solid pulmonary nodule ( positive on NAC images). 3.  New FDG avid mediastinal lymph nodes, suspicious for elizabeth metastases (max SUV 10.8 in a subcarinal lymph node). 4.  Focal FDG uptake in the right thyroid lobe (max SUV 7). Thyroid ultrasound can be obtained for further assessment. CBC today is normal. She is here with daughter. Lost a few pounds due to diverticular disease feels better now. I showed the images to the patient and reviewed it together she saw the pleural-based uptake as well as the mediastinal nodes  4/24/2023 She is here for follow up. She had biopsy by IR of left pleural mass and only Fragments of fibroadipose tissue and skeletal muscle. I presented her at thoracic tumor board and Dr. Julian Nicholas can perform a mediastinoscopy to confirm the etiology of the findings on the PET scan. She was here with family. We went over the most recent PET CT scan together and she saw the increase of FDG uptake in the pleural space as well as similar nodes I explained that clinically I suspect this is probably her lymphoma but nevertheless other etiologies are possible. If she is hesitant about doing the biopsy I can try Rituxan for 4 doses we will repeat imaging which has reasonable side effects but of course immunosuppression is always of a concern, and this is more than acceptable treatment for indolent lymphomas but I explained that we will be treating the lung entity and thus after discussion she agreed to proceed with mediastinoscopy.  5/26/23 She returns for follow-up today she underwent a mediastinoscopy with pathology demonstrating that her mediastinal lymph nodes are positive for metastatic adenocarcinoma consistent with lung primary. This was from 5/12/2023 Final Diagnosis 1. Level 7 lymph node, excision: - Positive for metastatic adenocarcinoma. - See comment 2. Level 7 lymph node, excision: - Positive for metastatic adenocarcinoma. - See comment 3. Right level 4 lymph node, excision: - Positive for metastatic adenocarcinoma. - See comment 4. Right level 2 lymph node, excision: - Positive for metastatic adenocarcinoma. - See comment 5. Right level 2 lymph node, excision: - Positive for metastatic adenocarcinoma. - See comment She has recovered well and has no complaints today and here to talk about next  6/16/2023 She returns for follow-up. NGS showed EGFR p.([id number]). MR brain was negative. She was started on Tagrisso 1 week ago is tolerating well. Her AMAYA is stable to improved with her inhalers. She has chronic constipation alternating with diarrhea which she has had for years with no significant change. She remains active and is doing well overall. She is drinking Boosts for additional calories.  07/14/2023 accompanied by her daughter. She called earlier this week to report rash that was due to fungal infection and the developed what she described as acne-like with inflammatory component at her lower abdomen started at skin fold and spreading peripherally from there. We advised her to start steroid cream and HOLD Tagrisso (since Wednesday 07/12/2023). She changed her soap, and it seems to improve pruritus.  07/28/2023 She is here today for follow-up and reports that her rashes are getting better on steroid cream and antibiotics.  She restarted to Grieser this past Monday and it seems she is tolerating it pretty well.  08/28/2023 She is here today for a follow-up and reports that her rashes improved. She reported she saw dermatology and was prescribed clindamycin cream, mupirocin ointment, and gentamicin ointment. She continued to take Tagrisso without any new adverse reactions. She is otherwise doing well and has no new complaints.    10/30/2023 Pt is here for follow up. She continued to take Tagrisso 80 mg QD without any new adverse reactions. She states she has a R upper back ulcer since 4 weeks ago. She is unable to reach that area to put ointment on. Otherwise, she has no need complaint to offer.  She had a PET/CT on 10/2/23 that showed ID: IMPRESSION: New left posterior occipital scalp-based FDG avid lesion, SUV max 17.9, measuring approximately 1.5 cm. (New since prior PET and brain MRI). Recommend further workup. Significant decrease in FDG uptake in the mediastinum and left pleura compared to prior exam, now only avid on nonattenuation corrected images. Anatomically, pleural nodularity in the left hemithorax is also decreased. Previously described 5 mm right upper lobe pulmonary nodule is faintly visualized, not FDG avid.  11/13/2023 Pt is here for follow up. She continues to take Tagrisso 80 mg QD without any new adverse reactions. Her R upper back open comedone has improved and pain improved. Currently, her major complaint is dizziness which usually occurs when her blood pressure is high. She states that Dr. Gamble recently adjusted her BP meds by reducing losartan 100 mg QD to 50 mg QD and discontinuing amlodipine.  12/15/2023 Reports increasing generalized pains likely due to arthritis and some compensatory muscle spasm.  1/15/24: Pt returns for follow up today. She reports new rash in her groin region. She has been applying bacitracin to ti without any relief. She reports breathing is improved with Symbicort. She denied any other complaints.   2/26/24: Pt returns for follow up. She feels well today. No concerns reported with Tagriso. Groin rash is treated. She reports other medical issues stressing her including constipation, high BP, and change in diabetes medication. She is following up with appropriate physicians for the same.

## 2024-03-20 RX ORDER — BUDESONIDE AND FORMOTEROL FUMARATE DIHYDRATE 160; 4.5 UG/1; UG/1
160-4.5 AEROSOL RESPIRATORY (INHALATION) TWICE DAILY
Qty: 1 | Refills: 3 | Status: ACTIVE | COMMUNITY
Start: 2023-02-27 | End: 1900-01-01

## 2024-03-21 RX ORDER — OMEPRAZOLE 40 MG/1
40 CAPSULE, DELAYED RELEASE ORAL
Qty: 30 | Refills: 2 | Status: ACTIVE | COMMUNITY

## 2024-03-21 RX ORDER — AMLODIPINE BESYLATE 2.5 MG/1
2.5 TABLET ORAL DAILY
Refills: 0 | Status: ACTIVE | COMMUNITY
Start: 2024-03-21

## 2024-03-21 RX ORDER — METOPROLOL TARTRATE 50 MG/1
50 TABLET, FILM COATED ORAL DAILY
Refills: 0 | Status: ACTIVE | COMMUNITY

## 2024-03-21 RX ORDER — FUROSEMIDE 20 MG/1
20 TABLET ORAL
Refills: 0 | Status: ACTIVE | COMMUNITY
Start: 2024-03-21

## 2024-03-25 ENCOUNTER — APPOINTMENT (OUTPATIENT)
Age: 84
End: 2024-03-25

## 2024-03-25 ENCOUNTER — LABORATORY RESULT (OUTPATIENT)
Age: 84
End: 2024-03-25

## 2024-03-25 ENCOUNTER — OUTPATIENT (OUTPATIENT)
Dept: OUTPATIENT SERVICES | Facility: HOSPITAL | Age: 84
LOS: 1 days | End: 2024-03-25
Payer: MEDICARE

## 2024-03-25 ENCOUNTER — APPOINTMENT (OUTPATIENT)
Age: 84
End: 2024-03-25
Payer: MEDICARE

## 2024-03-25 VITALS
SYSTOLIC BLOOD PRESSURE: 148 MMHG | RESPIRATION RATE: 16 BRPM | WEIGHT: 134.48 LBS | HEIGHT: 63 IN | TEMPERATURE: 97.2 F | HEART RATE: 91 BPM | DIASTOLIC BLOOD PRESSURE: 80 MMHG | BODY MASS INDEX: 23.83 KG/M2

## 2024-03-25 DIAGNOSIS — Z98.890 OTHER SPECIFIED POSTPROCEDURAL STATES: Chronic | ICD-10-CM

## 2024-03-25 DIAGNOSIS — R21 RASH AND OTHER NONSPECIFIC SKIN ERUPTION: ICD-10-CM

## 2024-03-25 DIAGNOSIS — C64.9 MALIGNANT NEOPLASM OF UNSPECIFIED KIDNEY, EXCEPT RENAL PELVIS: ICD-10-CM

## 2024-03-25 DIAGNOSIS — E89.0 POSTPROCEDURAL HYPOTHYROIDISM: Chronic | ICD-10-CM

## 2024-03-25 LAB
HCT VFR BLD CALC: 41.7 %
HGB BLD-MCNC: 13.7 G/DL
MCHC RBC-ENTMCNC: 29.1 PG
MCHC RBC-ENTMCNC: 32.9 G/DL
MCV RBC AUTO: 88.5 FL
PLATELET # BLD AUTO: 118 K/UL
PMV BLD: 11.8 FL
RBC # BLD: 4.71 M/UL
RBC # FLD: 13 %
WBC # FLD AUTO: 4.98 K/UL

## 2024-03-25 PROCEDURE — 99214 OFFICE O/P EST MOD 30 MIN: CPT

## 2024-03-25 PROCEDURE — 85027 COMPLETE CBC AUTOMATED: CPT

## 2024-03-25 PROCEDURE — G2211 COMPLEX E/M VISIT ADD ON: CPT

## 2024-03-25 NOTE — HISTORY OF PRESENT ILLNESS
[de-identified] : Ms. 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 Cody Calixto.  She is here with her daughter Brandy.  \par  \par  She had a history of Kidney Cancer s/p partial nephrectomy and distant history of Thyroid Cancer s/p partial thyroidectomy.  She also had a Left Lower Lobectomy for Stage IA lung cancer. For dates see bellow.\par  \par  She had CT scan and subsequent PET/CT that showed multiple left pleural based nodules in addition to a para-aortic lymph node.  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 The differential diagnosis includes low grade follicular lymphoma and CD10+ marginal zone lymphoma. She denies any B-symptoms at this time other than recent weight loss of 10lbs over last month, however she reports she had not been eating due to a recent GI bug.\par  \par  She is here for further recommendations. \par  Radiological Work-up:\par  PET/CT (2/21/19) IMPRESSION: Since August 20, 2010: 1. Multiple sites of left pleural-based FDG avid nodularity, catalogued above, with max SUV 5.2, suspicious for biological tumor activity. Metastatic or primary pleural neoplasm are considerations. 2. Intra-abdominal FDG avid 1.7 x 1.2 cm left para-aortic lymph node, max SUV 5.4, suspicious for lymph node metastasis. \par  1/30/2019: [facility] [place]:  comparison was made to Ct from 7/2018 Multiple Plural nodules along the posterior paramediastinal surface, some are mildly larger there are new nodules as well. largest nodule 10 mm\par  CT Head (9/15/18) IMPRESSION: 1. Mild periventricular and subcortical white matter chronic small vessel ischemic changes. 2. No acute fractures, mass effect, midline shift or hemorrhage. \par  CT cervical spine (9/15/18) IMPRESSION: 1. Degenerative changes of the cervical spine C2-3 through C6-7 worse at C5-6 as described above. 2. Straightening of normal cervical lordosis with mild anterolisthesis of C3 anterior on C4, C6 on C7, C7 on T1 and T1 on T2. 3. No acute fractures or dislocations. \par  1/22/18: MR L spine: advance lymbar spondylosis, disck osteophytes, severe neuroforamin narrowing with  ipingment of L4 nerve,  3.5 cm AAA, 8 mm adrenal nodule.\par  Pathology:\par  (3/14/19) Final Diagnosis Left parotid lymph node, needle biopsy: Low grade B-cell lymphoma. \par  Immunostains performed with adequate controls on sections from block 1A show the infiltrating cells are CD20+ CD79a+ B-cells that coexpress CD10, CD23(dim variable), and BCL2. They are negative for CD5, CD43, cyclin D1, and MUM1. BCL6 is difficult to determine due to the high background staining. Ki67 labels 5-10% of these cells. CD21 highlights scattered follicular dendritic cell meshworks.  Per report ([id number]), flow cytometry studies performed at [facility] "[employer] show monotypic B-cells (10% of cells), positive for kappa, CD19, CD20, CD10; negative CD5. Heterogeneous population of T-cells (with normal CD4 to CD8 ratio), and polytypic B-cells are also present. The findings are diagnostic of low grade B-cell lymphoma. The differential diagnosis includes low grade follicular lymphoma and CD10+ marginal zone lymphoma.\par  \par  Health Maintenance:\par  Colonoscopy \par  Mammogram \par  \par  Recent blood work is in [facility]. Was normal except for a high sugar and calcium of 10.2 [de-identified] : 7/10/19 She was supposed to go for biopsy of pleural based nodularity that resolved prior to procedure. She will see PULM in a few weeks.  She has been purposely losing weight, using Trajenta. 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 Denies any fevers, chills, unintentional weight loss, or night sweats. CT Chest (4/11/19) IMPRESSION: Since February 21, 2019;1. Interval resolution of previously described left-sided FDG avid pleural-based nodularity. No biopsy performed. 2. Multiple stable nodules, as above. 3. Unchanged left periaortic 1.4 cm lymph node, previously noted to be FDG avid. 4. Stable right hydronephrosis and right-sided ureteral calculi.5. Stable infrarenal abdominal aortic aneurysm measuring up to 3.7 cm.  1/8/20 She is here for follow up. Since last visit she had a CT C/A/P in 7/18/2019 that showed Stable 1.1 cm left para-aortic lymph node on series 2 image 62. No new lymph nodes in the abdomen and pelvis.  Interval development of moderate to severe right hydroureteronephrosis leading to 2 proximal ureteral calculi (superior calculus measuring 5 x 4 x 5 mm, 1000 Hounsfield units and the inferior adjacent calculus measuring 5 x 7 x 6 mm, 573 Hounsfield units). Bilateral intrarenal calculi. Stable 3.7 x 4.3 cm simple appearing right adnexal cyst. Recommend one-year follow-up pelvic ultrasound. Impression: Since April 11, 2019. No significant interval change in multiple left-sided pleural-based nodules, largest approximately 1.6 cm (remeasured on earlier study), left lung base (series 5/149). Post left lower lobectomy. She was seen by Urology and had lithotripsy.  She feels well.   7/8/20 She is here for follow up. She feels well. No B symptoms. She is pending a CT chest ordered by Dr. Morales.  1/11/2021 The patient is here for follow up. She has no B symptoms, no cough, no chest pain, no new palpable lymph nodes. She is complaining of sciatica, going for an injection next week. She also had LEs cellulitis, completed once course of antibiotics.   5/19/21 We had a telephone visit today. She feels well.  She had CT chest on 2/21 that showed Since July 14, 2020, interval development of wedge-shaped consolidation posterior left upper lobe. (2/31). Minimal interval growth of multiple pleural-based nodules as described above. Numerous stable left-sided pleural based nodules and adjacent parenchymal nodules. Reviewed her CT in tumor board. Chest. We were divided on if we should rescan in 3 months with CT or check PET/cT now. I spoke to her and we decided to check a CT Chest in 3 months.  She feels well.   9/15/21 Patient is here for a follow-up visit for hx of lung cancer, NHL and pulmonary nodules. She is feeling well with no new complaints. Reviewed most recent CBC, which shows mild microcytic anemia. Patient denies fever, chills, nausea, vomiting, dyspnea or bleeding. She has not had recent colonoscopy, but has done Cologuard at home which was reportedly benign in recent years.   CT Chest (6.2.2021) IMPRESSION: Unchanged left circumferential subpleural nodularity largest measuring 1.6 cm.Resolved left lower lobe consolidation.  3/21/22 Pt returns for f/u today. She reports that she was admitted at [facility] for episode for acute diverticulitis in November 2021, she was treated with IV abx, pt reports that she had imaging done at that time and it was only significant for acute diverticulitis (we do not have the imaging results available. Since that time she reports that she has been having episodes of constipation and diarrhea, she is on laxatives. She stopped taking po iron as it was making her GI symptoms worse. Her gastroenterologist did not suggest doing a colonoscopy. Pt denies any blood in stools. Denies any fever, chills, weight loss or loss of appetite. She was also seen by Dr Sweet.   6/20/22 Pt returns for f/u today. Denies any fever, chills, weight loss or loss of appetite. In March ferritin was 17, Iron 38, iron sat 9%, Hgb 11.9, MCV 77.1. She had 5 doses of IV Venofer tolerated well, she reports cold intolerance improved. Labs reviewed 6/13/22 hgb 13.3, MCV 84.9, Ferritin 112, Iron sat 13%, Iron 37. She had Kidney US on 6/8 which showed Indeterminate hypoechoic area involving the lower pole of the left kidney. Further evaluation with outpatient renal mass protocol CT is recommended. Focal dilatation of the right upper pole calyx. Bilateral renal calculi. Incidentally noted is an abdominal aortic aneurysm, similar in size to prior CT scan, measuring 4.2 cm.   10/12/2022 She is here for follow up. she had CT C/A/P in July 2022: Lungs, Pleura, and Airways: Trachea and central airways are patent. Post left lower lobectomy with expected postsurgical changes. Extensive left subpleural nodularity demonstrating slight interval increase since prior examinations including medial left upper lobe 2.7 x 1.0 cm nodular density (series 4 image 27) and anterior left upper lobe 1.1 x 1.0 cm nodular density (series 4 image 55). Stable right adnexal cystic lesion measuring 4.4 x 3.8 cm. Tubular/cystic appearing left adnexal lesion could represent a cyst or hydrosalpinx, not well seen on the 2/9/2021 CT. Recommend follow-up pelvic ultrasound.   I ordered a US pelvis hat showed   IMPRESSION: 1.  Likely benign 4 cm right adnexal/ovarian cyst. A follow-up pelvic ultrasound can be obtained in 6 months to assess for stability. 2.  Tubular cystic left adnexal lesion, likely hydrosalpinx.  She was just in ER for pain and was noted to have a kidney stone  that moved and caused hydronephrosis. IMPRESSION: 10/6/2022 1. Moderate left hydroureteronephrosis with a 0.5 x 0.7 x 0.7 cm left midureter left mid ureter calculus. 2. Additional multiple nonobstructing bilateral renal calculi. She has follow up pending with Dr. Davis  CBC from 10/6/2022 in ED showed hgb of 16.6 with normal MCV She is pending  surgery with Dr. Guaman for DJD  3/13/2023 She is here for follow.  She saw Dr. Morales and he checked a PET/CT that was done on 3/7/2023:  IMPRESSION: 1.  Since July 18, 2019, substantially increased FDG avid diffuse soft tissue nodularity throughout the left pleural surface, compatible with biologic tumor activity (max SUV 19.5; reflecting 275% increase). 2.  New minimal FDG avid 5 mm right upper lobe solid pulmonary nodule ( positive on NAC images). 3.  New FDG avid mediastinal lymph nodes, suspicious for elizabeth metastases (max SUV 10.8 in a subcarinal lymph node). 4.  Focal FDG uptake in the right thyroid lobe (max SUV 7). Thyroid ultrasound can be obtained for further assessment. CBC today is normal. She is here with daughter. Lost a few pounds due to diverticular disease feels better now. I showed the images to the patient and reviewed it together she saw the pleural-based uptake as well as the mediastinal nodes  4/24/2023 She is here for follow up. She had biopsy by IR of left pleural mass and only Fragments of fibroadipose tissue and skeletal muscle. I presented her at thoracic tumor board and Dr. Julian Nicholas can perform a mediastinoscopy to confirm the etiology of the findings on the PET scan. She was here with family. We went over the most recent PET CT scan together and she saw the increase of FDG uptake in the pleural space as well as similar nodes I explained that clinically I suspect this is probably her lymphoma but nevertheless other etiologies are possible. If she is hesitant about doing the biopsy I can try Rituxan for 4 doses we will repeat imaging which has reasonable side effects but of course immunosuppression is always of a concern, and this is more than acceptable treatment for indolent lymphomas but I explained that we will be treating the lung entity and thus after discussion she agreed to proceed with mediastinoscopy.  5/26/23 She returns for follow-up today she underwent a mediastinoscopy with pathology demonstrating that her mediastinal lymph nodes are positive for metastatic adenocarcinoma consistent with lung primary. This was from 5/12/2023 Final Diagnosis 1. Level 7 lymph node, excision: - Positive for metastatic adenocarcinoma. - See comment 2. Level 7 lymph node, excision: - Positive for metastatic adenocarcinoma. - See comment 3. Right level 4 lymph node, excision: - Positive for metastatic adenocarcinoma. - See comment 4. Right level 2 lymph node, excision: - Positive for metastatic adenocarcinoma. - See comment 5. Right level 2 lymph node, excision: - Positive for metastatic adenocarcinoma. - See comment She has recovered well and has no complaints today and here to talk about next  6/16/2023 She returns for follow-up. NGS showed EGFR p.([id number]). MR brain was negative. She was started on Tagrisso 1 week ago is tolerating well. Her AMAYA is stable to improved with her inhalers. She has chronic constipation alternating with diarrhea which she has had for years with no significant change. She remains active and is doing well overall. She is drinking Boosts for additional calories.  07/14/2023 accompanied by her daughter. She called earlier this week to report rash that was due to fungal infection and the developed what she described as acne-like with inflammatory component at her lower abdomen started at skin fold and spreading peripherally from there. We advised her to start steroid cream and HOLD Tagrisso (since Wednesday 07/12/2023). She changed her soap, and it seems to improve pruritus.  07/28/2023 She is here today for follow-up and reports that her rashes are getting better on steroid cream and antibiotics.  She restarted to Grieser this past Monday and it seems she is tolerating it pretty well.  08/28/2023 She is here today for a follow-up and reports that her rashes improved. She reported she saw dermatology and was prescribed clindamycin cream, mupirocin ointment, and gentamicin ointment. She continued to take Tagrisso without any new adverse reactions. She is otherwise doing well and has no new complaints.    10/30/2023 Pt is here for follow up. She continued to take Tagrisso 80 mg QD without any new adverse reactions. She states she has a R upper back ulcer since 4 weeks ago. She is unable to reach that area to put ointment on. Otherwise, she has no need complaint to offer.  She had a PET/CT on 10/2/23 that showed HI: IMPRESSION: New left posterior occipital scalp-based FDG avid lesion, SUV max 17.9, measuring approximately 1.5 cm. (New since prior PET and brain MRI). Recommend further workup. Significant decrease in FDG uptake in the mediastinum and left pleura compared to prior exam, now only avid on nonattenuation corrected images. Anatomically, pleural nodularity in the left hemithorax is also decreased. Previously described 5 mm right upper lobe pulmonary nodule is faintly visualized, not FDG avid.  11/13/2023 Pt is here for follow up. She continues to take Tagrisso 80 mg QD without any new adverse reactions. Her R upper back open comedone has improved and pain improved. Currently, her major complaint is dizziness which usually occurs when her blood pressure is high. She states that Dr. Gamble recently adjusted her BP meds by reducing losartan 100 mg QD to 50 mg QD and discontinuing amlodipine.  12/15/2023 Reports increasing generalized pains likely due to arthritis and some compensatory muscle spasm.  1/15/24: Pt returns for follow up today. She reports new rash in her groin region. She has been applying bacitracin to ti without any relief. She reports breathing is improved with Symbicort. She denied any other complaints.   2/26/24: Pt returns for follow up. She feels well today. No concerns reported with Tagriso. Groin rash is treated. She reports other medical issues stressing her including constipation, high BP, and change in diabetes medication. She is following up with appropriate physicians for the same.   3/25/24: Pt is here for follow up. She reports having rash on her face, acneiform, which is resolving now with topical Clindamycin. She also reported nail changes and dry skin. No other symptoms reported. She reports compliance with Tagrisso.

## 2024-03-25 NOTE — PHYSICAL EXAM
[Restricted in physically strenuous activity but ambulatory and able to carry out work of a light or sedentary nature] : Status 1- Restricted in physically strenuous activity but ambulatory and able to carry out work of a light or sedentary nature, e.g., light house work, office work [Normal] : affect appropriate [de-identified] : bilateral mild edema - unchanged [de-identified] : wears glasses [de-identified] : facial rash -resolving

## 2024-03-25 NOTE — REASON FOR VISIT
[Follow-Up Visit] : a follow-up [Family Member] : family member [FreeTextEntry2] : Low grade Lymphoma and Lung Cancer

## 2024-03-25 NOTE — REVIEW OF SYSTEMS
[Fatigue] : fatigue [SOB on Exertion] : shortness of breath during exertion [Incontinence] : incontinence [Skin Rash] : skin rash [Anxiety] : anxiety [Negative] : Allergic/Immunologic [FreeTextEntry8] : sees Dr. Cobb [de-identified] : facial rash- resolving [de-identified] : sciatica pain on Lyrica

## 2024-03-25 NOTE — END OF VISIT
[] : Fellow [FreeTextEntry3] : She remains on tagrisso uses calinda for acne. Will check PET.CT next months. Blood work today. RTC in one month

## 2024-03-25 NOTE — ASSESSMENT
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 All the mediastinal lymph nodes are positive for adenocarcinoma of the lung I am not sure if this is related to her pleural-based plaques which she had for years and the biopsy was nondiagnostic, at this point pursue another biopsy we discussed was not an option for us, it is possible that these pleural-based nodules were indeed slow-growing adenocarcinoma this will make her stage IV. - We therefore decided to treat this like stage IV and to repeat imaging and if there is for example response in the mediastinal nodes but and pleural-based areas then I think at that point in time we will re-biopsy the pleural-based area - MR brain negative. - 06/2023 Started Tagrisso. Discussed side effects of Tagrisso including but not limited to rash, diarrhea, pneumonitis, keratitis, transaminitis and cytopenias. - 07/12/2023 Tagrisso is on hold due to development of rash - slowly improving on with holding Tagrisso for a few weeks and antibiotics, steroid and antifungal creams. - 07/24/2023 restarted Tagrisso. - 10/02/2023 PET/CT showed GA: Significant decrease in FDG uptake in the mediastinum and left pleura compared to prior exam, now only avid on nonattenuation corrected images. Anatomically, pleural nodularity in the left hemithorax is also decreased. - 1/15/24 PET/CT: ALIYAH  # Right upper back large open comedo measuring approximately 1 x 1 x 1 cm, possibly related to Tagrisso - RESOLVED.  # Uncontrolled HTN  - on losartan 100 mg and if needed will add amlodipine  # Left posterior occipital scalp-based FDG avid lesion noted on PET/CT, pt had a laceration there leading to PET/CT finding.  # Pleural Nodules on scans. - these are not new, at least since 2018 But PET/CT now showed increase activity and new mediastinal node due to sarcoid?, progression of lymphoma? RCC? - CT guided FNA biopsy was non diagnostic of the pleural nodule.  # History of Lung Cancer 13 years ago. - Early-stage NSCLC s/p resection alone ~ 13 years ago.  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 - Left para-aortic lymph node. On observation since she is asymptomatic. - Recent diverticulitis imaging done at that time showed no lymphadenopathy as per pt. - No B symptoms. - 10/02/2023, 1/15/24 PET/CT showed ALIYAH.  # Anemia, mild, microcytic - resolved. - S/p 5 doses of Venofer in April. Ferritin improved 112, Iron sat 10%. - recommended to f/u with GI she did but not a candidate for colonoscopy due to previous peroration and will monitor.  # History of Thyroid cancer she states it was a partial thyroidectomy - This explains the presence of positive thyroglobulin 21 tumor marker  # Adnexal cyst. - PET/CT negative.  PLAN: - continue Tagrisso 80 mg PO QD. - Will repeat imaging in 4/2024: Ordered PET/CT today - continue losartan 100 mg QD. - continue bowel regiment for constipation. - Continue Lotrisone/clindamycin for rash as needed.  - need PT for her arthritic and back pains. - Labs today: CBC  RTC in 4 weeks with CBC CMP. [FreeTextEntry2] : ROBERTA given for review on 12/15/23 [AdvancecareDate] : 12/15/2023

## 2024-03-30 DIAGNOSIS — C64.9 MALIGNANT NEOPLASM OF UNSPECIFIED KIDNEY, EXCEPT RENAL PELVIS: ICD-10-CM

## 2024-05-06 ENCOUNTER — RESULT REVIEW (OUTPATIENT)
Age: 84
End: 2024-05-06

## 2024-05-06 ENCOUNTER — OUTPATIENT (OUTPATIENT)
Dept: OUTPATIENT SERVICES | Facility: HOSPITAL | Age: 84
LOS: 1 days | End: 2024-05-06
Payer: MEDICARE

## 2024-05-06 DIAGNOSIS — Z98.890 OTHER SPECIFIED POSTPROCEDURAL STATES: Chronic | ICD-10-CM

## 2024-05-06 DIAGNOSIS — C34.90 MALIGNANT NEOPLASM OF UNSPECIFIED PART OF UNSPECIFIED BRONCHUS OR LUNG: ICD-10-CM

## 2024-05-06 DIAGNOSIS — E89.0 POSTPROCEDURAL HYPOTHYROIDISM: Chronic | ICD-10-CM

## 2024-05-06 DIAGNOSIS — Z00.8 ENCOUNTER FOR OTHER GENERAL EXAMINATION: ICD-10-CM

## 2024-05-06 LAB — GLUCOSE BLDC GLUCOMTR-MCNC: 96 MG/DL — SIGNIFICANT CHANGE UP (ref 70–99)

## 2024-05-06 PROCEDURE — 82962 GLUCOSE BLOOD TEST: CPT

## 2024-05-06 PROCEDURE — A9552: CPT

## 2024-05-06 PROCEDURE — 78815 PET IMAGE W/CT SKULL-THIGH: CPT | Mod: 26,PS,MH

## 2024-05-06 PROCEDURE — 78815 PET IMAGE W/CT SKULL-THIGH: CPT | Mod: PS

## 2024-05-07 DIAGNOSIS — C34.90 MALIGNANT NEOPLASM OF UNSPECIFIED PART OF UNSPECIFIED BRONCHUS OR LUNG: ICD-10-CM

## 2024-05-09 ENCOUNTER — LABORATORY RESULT (OUTPATIENT)
Age: 84
End: 2024-05-09

## 2024-05-09 ENCOUNTER — APPOINTMENT (OUTPATIENT)
Age: 84
End: 2024-05-09
Payer: MEDICARE

## 2024-05-09 VITALS
HEIGHT: 60.5 IN | SYSTOLIC BLOOD PRESSURE: 167 MMHG | WEIGHT: 134.5 LBS | DIASTOLIC BLOOD PRESSURE: 92 MMHG | BODY MASS INDEX: 25.73 KG/M2 | HEART RATE: 71 BPM | TEMPERATURE: 97.5 F

## 2024-05-09 PROCEDURE — G2211 COMPLEX E/M VISIT ADD ON: CPT

## 2024-05-09 PROCEDURE — 99214 OFFICE O/P EST MOD 30 MIN: CPT

## 2024-05-09 RX ORDER — ASCORBIC ACID 500 MG
500 TABLET ORAL DAILY
Refills: 0 | Status: DISCONTINUED | COMMUNITY
Start: 2023-06-07 | End: 2024-05-09

## 2024-05-09 RX ORDER — ADHESIVE TAPE 3"X 2.3 YD
50 MCG TAPE, NON-MEDICATED TOPICAL DAILY
Refills: 0 | Status: DISCONTINUED | COMMUNITY
Start: 2023-06-07 | End: 2024-05-09

## 2024-05-09 RX ORDER — LACTULOSE 10 G/15ML
20 SOLUTION ORAL
Qty: 1 | Refills: 2 | Status: DISCONTINUED | COMMUNITY
Start: 2023-06-16 | End: 2024-05-09

## 2024-05-09 RX ORDER — CLINDAMYCIN PHOSPHATE 1 G/10ML
1 GEL TOPICAL TWICE DAILY
Qty: 5 | Refills: 5 | Status: ACTIVE | COMMUNITY
Start: 2023-07-14 | End: 1900-01-01

## 2024-05-09 RX ORDER — GENTAMICIN SULFATE 1 MG/G
0.1 CREAM TOPICAL TWICE DAILY
Qty: 1 | Refills: 0 | Status: DISCONTINUED | COMMUNITY
Start: 2023-11-13 | End: 2024-05-09

## 2024-05-09 RX ORDER — ONDANSETRON 8 MG/1
8 TABLET ORAL EVERY 8 HOURS
Qty: 90 | Refills: 5 | Status: DISCONTINUED | COMMUNITY
Start: 2023-10-12 | End: 2024-05-09

## 2024-05-09 RX ORDER — CEPHALEXIN 500 MG/1
500 TABLET ORAL 4 TIMES DAILY
Qty: 28 | Refills: 0 | Status: DISCONTINUED | COMMUNITY
Start: 2023-07-14 | End: 2024-05-09

## 2024-05-09 RX ORDER — CLOTRIMAZOLE AND BETAMETHASONE DIPROPIONATE 10; .5 MG/G; MG/G
1-0.05 CREAM TOPICAL TWICE DAILY
Qty: 2 | Refills: 0 | Status: DISCONTINUED | COMMUNITY
Start: 2024-01-15 | End: 2024-05-09

## 2024-05-09 RX ORDER — ALBUTEROL SULFATE 90 UG/1
108 (90 BASE) AEROSOL, METERED RESPIRATORY (INHALATION)
Qty: 1 | Refills: 5 | Status: DISCONTINUED | COMMUNITY
Start: 2021-03-25 | End: 2024-05-09

## 2024-05-09 RX ORDER — MUPIROCIN 20 MG/G
2 OINTMENT TOPICAL
Qty: 60 | Refills: 3 | Status: DISCONTINUED | COMMUNITY
Start: 2023-08-28 | End: 2024-05-09

## 2024-05-10 LAB
ALBUMIN SERPL ELPH-MCNC: 4 G/DL
ALP BLD-CCNC: 90 U/L
ALT SERPL-CCNC: 13 U/L
ANION GAP SERPL CALC-SCNC: 11 MMOL/L
AST SERPL-CCNC: 15 U/L
BILIRUB SERPL-MCNC: 0.3 MG/DL
BUN SERPL-MCNC: 28 MG/DL
CALCIUM SERPL-MCNC: 9.9 MG/DL
CHLORIDE SERPL-SCNC: 104 MMOL/L
CO2 SERPL-SCNC: 23 MMOL/L
CREAT SERPL-MCNC: 1.1 MG/DL
EGFR: 50 ML/MIN/1.73M2
GLUCOSE SERPL-MCNC: 96 MG/DL
HCT VFR BLD CALC: 38.1 %
HGB BLD-MCNC: 12.8 G/DL
MCHC RBC-ENTMCNC: 29.4 PG
MCHC RBC-ENTMCNC: 33.6 G/DL
MCV RBC AUTO: 87.4 FL
PLATELET # BLD AUTO: 123 K/UL
PMV BLD: 11.2 FL
POTASSIUM SERPL-SCNC: 4.4 MMOL/L
PROT SERPL-MCNC: 6.1 G/DL
RBC # BLD: 4.36 M/UL
RBC # FLD: 13.1 %
SODIUM SERPL-SCNC: 138 MMOL/L
WBC # FLD AUTO: 4.29 K/UL

## 2024-05-17 NOTE — PHYSICAL EXAM
[Restricted in physically strenuous activity but ambulatory and able to carry out work of a light or sedentary nature] : Status 1- Restricted in physically strenuous activity but ambulatory and able to carry out work of a light or sedentary nature, e.g., light house work, office work [Normal] : affect appropriate [de-identified] : wears glasses [de-identified] : bilateral mild edema - unchanged [de-identified] : facial rash -resolving

## 2024-05-17 NOTE — END OF VISIT
[FreeTextEntry3] : I was physically present for the key portions of the evaluation and management service provided.  I agree with the history and physical, and plan which I have reviewed and edited where appropriate.  She is doing well on Tagrisso, recent PET.CT ALIYAH. next iamgin in August. RTC in one month

## 2024-05-17 NOTE — REVIEW OF SYSTEMS
[Fatigue] : fatigue [SOB on Exertion] : shortness of breath during exertion [Incontinence] : incontinence [Skin Rash] : skin rash [Anxiety] : anxiety [Negative] : Allergic/Immunologic [FreeTextEntry8] : sees Dr. Cobb [de-identified] : facial rash - well controlled on Clindamycin gel. [de-identified] : sciatica pain on Lyrica

## 2024-05-17 NOTE — ASSESSMENT
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 All the mediastinal lymph nodes are positive for adenocarcinoma of the lung I am not sure if this is related to her pleural-based plaques which she had for years and the biopsy was nondiagnostic, at this point pursue another biopsy we discussed was not an option for us, it is possible that these pleural-based nodules were indeed slow-growing adenocarcinoma this will make her stage IV. - We therefore decided to treat this like stage IV and to repeat imaging and if there is for example response in the mediastinal nodes but and pleural-based areas then I think at that point in time we will re-biopsy the pleural-based area - MR brain negative. - 06/2023 Started Tagrisso. Discussed side effects of Tagrisso including but not limited to rash, diarrhea, pneumonitis, keratitis, transaminitis and cytopenias. - 07/12/2023 Tagrisso is on hold due to development of rash - slowly improving on with holding Tagrisso for a few weeks and antibiotics, steroid and antifungal creams. - 07/24/2023 restarted Tagrisso. - 10/02/2023 PET/CT showed TN: Significant decrease in FDG uptake in the mediastinum and left pleura compared to prior exam, now only avid on nonattenuation corrected images. Anatomically, pleural nodularity in the left hemithorax is also decreased. - 01/15/24 PET/CT - ALIYAH. - 05/06/2024 PET/CT - Compared to PET/CT 1/11/2024, No definite sites of abnormal FDG uptake to suggest biologic activity.  # Right upper back large open comedo measuring approximately 1 x 1 x 1 cm, possibly related to Tagrisso - RESOLVED.  # Uncontrolled HTN  - on losartan 100 mg and if needed will add amlodipine.  # Left posterior occipital scalp-based FDG avid lesion noted on PET/CT, pt had a laceration there leading to PET/CT finding.  # Pleural Nodules on scans. - these are not new, at least since 2018 But PET/CT now showed increase activity and new mediastinal node due to sarcoid?, progression of lymphoma? RCC? - CT guided FNA biopsy was non diagnostic of the pleural nodule.  # History of Lung Cancer 13 years ago. - Early-stage NSCLC s/p resection alone ~ 13 years ago.  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 - Left para-aortic lymph node. On observation since she is asymptomatic. - Recent diverticulitis imaging done at that time showed no lymphadenopathy as per pt. - No B symptoms. - 10/02/2023, 1/15/24 PET/CT showed ALIYAH.  # Anemia, mild, microcytic - resolved. - S/p 5 doses of Venofer in April. Ferritin improved 112, Iron sat 10%. - recommended to f/u with GI she did but not a candidate for colonoscopy due to previous peroration and will monitor.  # History of Thyroid cancer she states it was a partial thyroidectomy - This explains the presence of positive thyroglobulin 21 tumor marker.  # Adnexal cyst. - PET/CT negative.  05/09/2024 Labs reviewed and results discussed with the patient - remains clinically stable to continue current management. All questions were answered to satisfaction.  PLAN: - continue Tagrisso 80 mg PO QD. - repeat PET/CT, but CT C/A/P is OK Q3M - 08/2024. - continue losartan 100 mg QD. - continue bowel regiment for constipation. - Continue Clindamycin for rash as needed. - Labs today: CBC CMP RTC in 4 weeks with CBC CMP. [AdvancecareDate] : 12/15/2023 [FreeTextEntry2] : ROBERTA given for review on 12/15/23

## 2024-05-17 NOTE — HISTORY OF PRESENT ILLNESS
[de-identified] : Ms. 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 Cody Calixto.  She is here with her daughter Brandy.  \par  \par  She had a history of Kidney Cancer s/p partial nephrectomy and distant history of Thyroid Cancer s/p partial thyroidectomy.  She also had a Left Lower Lobectomy for Stage IA lung cancer. For dates see bellow.\par  \par  She had CT scan and subsequent PET/CT that showed multiple left pleural based nodules in addition to a para-aortic lymph node.  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 The differential diagnosis includes low grade follicular lymphoma and CD10+ marginal zone lymphoma. She denies any B-symptoms at this time other than recent weight loss of 10lbs over last month, however she reports she had not been eating due to a recent GI bug.\par  \par  She is here for further recommendations. \par  Radiological Work-up:\par  PET/CT (2/21/19) IMPRESSION: Since August 20, 2010: 1. Multiple sites of left pleural-based FDG avid nodularity, catalogued above, with max SUV 5.2, suspicious for biological tumor activity. Metastatic or primary pleural neoplasm are considerations. 2. Intra-abdominal FDG avid 1.7 x 1.2 cm left para-aortic lymph node, max SUV 5.4, suspicious for lymph node metastasis. \par  1/30/2019: [facility] [place]:  comparison was made to Ct from 7/2018 Multiple Plural nodules along the posterior paramediastinal surface, some are mildly larger there are new nodules as well. largest nodule 10 mm\par  CT Head (9/15/18) IMPRESSION: 1. Mild periventricular and subcortical white matter chronic small vessel ischemic changes. 2. No acute fractures, mass effect, midline shift or hemorrhage. \par  CT cervical spine (9/15/18) IMPRESSION: 1. Degenerative changes of the cervical spine C2-3 through C6-7 worse at C5-6 as described above. 2. Straightening of normal cervical lordosis with mild anterolisthesis of C3 anterior on C4, C6 on C7, C7 on T1 and T1 on T2. 3. No acute fractures or dislocations. \par  1/22/18: MR L spine: advance lymbar spondylosis, disck osteophytes, severe neuroforamin narrowing with  ipingment of L4 nerve,  3.5 cm AAA, 8 mm adrenal nodule.\par  Pathology:\par  (3/14/19) Final Diagnosis Left parotid lymph node, needle biopsy: Low grade B-cell lymphoma. \par  Immunostains performed with adequate controls on sections from block 1A show the infiltrating cells are CD20+ CD79a+ B-cells that coexpress CD10, CD23(dim variable), and BCL2. They are negative for CD5, CD43, cyclin D1, and MUM1. BCL6 is difficult to determine due to the high background staining. Ki67 labels 5-10% of these cells. CD21 highlights scattered follicular dendritic cell meshworks.  Per report ([id number]), flow cytometry studies performed at [facility] [employer] show monotypic B-cells (10% of cells), positive for kappa, CD19, CD20, CD10; negative CD5. Heterogeneous population of T-cells (with normal CD4 to CD8 ratio), and polytypic B-cells are also present. The findings are diagnostic of low grade B-cell lymphoma. The differential diagnosis includes low grade follicular lymphoma and CD10+ marginal zone lymphoma.\par  \par  Health Maintenance:\par  Colonoscopy \par  Mammogram \par  \par  Recent blood work is in [facility]IE. Was normal except for a high sugar and calcium of 10.2 [de-identified] : 7/10/19 She was supposed to go for biopsy of pleural based nodularity that resolved prior to procedure. She will see PULM in a few weeks.  She has been purposely losing weight, using Trajenta. 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 Denies any fevers, chills, unintentional weight loss, or night sweats. CT Chest (4/11/19) IMPRESSION: Since February 21, 2019;1. Interval resolution of previously described left-sided FDG avid pleural-based nodularity. No biopsy performed. 2. Multiple stable nodules, as above. 3. Unchanged left periaortic 1.4 cm lymph node, previously noted to be FDG avid. 4. Stable right hydronephrosis and right-sided ureteral calculi.5. Stable infrarenal abdominal aortic aneurysm measuring up to 3.7 cm.  1/8/20 She is here for follow up. Since last visit she had a CT C/A/P in 7/18/2019 that showed Stable 1.1 cm left para-aortic lymph node on series 2 image 62. No new lymph nodes in the abdomen and pelvis.  Interval development of moderate to severe right hydroureteronephrosis leading to 2 proximal ureteral calculi (superior calculus measuring 5 x 4 x 5 mm, 1000 Hounsfield units and the inferior adjacent calculus measuring 5 x 7 x 6 mm, 573 Hounsfield units). Bilateral intrarenal calculi. Stable 3.7 x 4.3 cm simple appearing right adnexal cyst. Recommend one-year follow-up pelvic ultrasound. Impression: Since April 11, 2019. No significant interval change in multiple left-sided pleural-based nodules, largest approximately 1.6 cm (remeasured on earlier study), left lung base (series 5/149). Post left lower lobectomy. She was seen by Urology and had lithotripsy.  She feels well.   7/8/20 She is here for follow up. She feels well. No B symptoms. She is pending a CT chest ordered by Dr. Morales.  1/11/2021 The patient is here for follow up. She has no B symptoms, no cough, no chest pain, no new palpable lymph nodes. She is complaining of sciatica, going for an injection next week. She also had LEs cellulitis, completed once course of antibiotics.   5/19/21 We had a telephone visit today. She feels well.  She had CT chest on 2/21 that showed Since July 14, 2020, interval development of wedge-shaped consolidation posterior left upper lobe. (2/31). Minimal interval growth of multiple pleural-based nodules as described above. Numerous stable left-sided pleural based nodules and adjacent parenchymal nodules. Reviewed her CT in tumor board. Chest. We were divided on if we should rescan in 3 months with CT or check PET/cT now. I spoke to her and we decided to check a CT Chest in 3 months.  She feels well.   9/15/21 Patient is here for a follow-up visit for hx of lung cancer, NHL and pulmonary nodules. She is feeling well with no new complaints. Reviewed most recent CBC, which shows mild microcytic anemia. Patient denies fever, chills, nausea, vomiting, dyspnea or bleeding. She has not had recent colonoscopy, but has done Cologuard at home which was reportedly benign in recent years.   CT Chest (6.2.2021) IMPRESSION: Unchanged left circumferential subpleural nodularity largest measuring 1.6 cm.Resolved left lower lobe consolidation.  3/21/22 Pt returns for f/u today. She reports that she was admitted at [facility] for episode for acute diverticulitis in November 2021, she was treated with IV abx, pt reports that she had imaging done at that time and it was only significant for acute diverticulitis (we do not have the imaging results available. Since that time she reports that she has been having episodes of constipation and diarrhea, she is on laxatives. She stopped taking po iron as it was making her GI symptoms worse. Her gastroenterologist did not suggest doing a colonoscopy. Pt denies any blood in stools. Denies any fever, chills, weight loss or loss of appetite. She was also seen by Dr Sweet.   6/20/22 Pt returns for f/u today. Denies any fever, chills, weight loss or loss of appetite. In March ferritin was 17, Iron 38, iron sat 9%, Hgb 11.9, MCV 77.1. She had 5 doses of IV Venofer tolerated well, she reports cold intolerance improved. Labs reviewed 6/13/22 hgb 13.3, MCV 84.9, Ferritin 112, Iron sat 13%, Iron 37. She had Kidney US on 6/8 which showed Indeterminate hypoechoic area involving the lower pole of the left kidney. Further evaluation with outpatient renal mass protocol CT is recommended. Focal dilatation of the right upper pole calyx. Bilateral renal calculi. Incidentally noted is an abdominal aortic aneurysm, similar in size to prior CT scan, measuring 4.2 cm.   10/12/2022 She is here for follow up. she had CT C/A/P in July 2022: Lungs, Pleura, and Airways: Trachea and central airways are patent. Post left lower lobectomy with expected postsurgical changes. Extensive left subpleural nodularity demonstrating slight interval increase since prior examinations including medial left upper lobe 2.7 x 1.0 cm nodular density (series 4 image 27) and anterior left upper lobe 1.1 x 1.0 cm nodular density (series 4 image 55). Stable right adnexal cystic lesion measuring 4.4 x 3.8 cm. Tubular/cystic appearing left adnexal lesion could represent a cyst or hydrosalpinx, not well seen on the 2/9/2021 CT. Recommend follow-up pelvic ultrasound.   I ordered a US pelvis hat showed   IMPRESSION: 1.  Likely benign 4 cm right adnexal/ovarian cyst. A follow-up pelvic ultrasound can be obtained in 6 months to assess for stability. 2.  Tubular cystic left adnexal lesion, likely hydrosalpinx.  She was just in ER for pain and was noted to have a kidney stone  that moved and caused hydronephrosis. IMPRESSION: 10/6/2022 1. Moderate left hydroureteronephrosis with a 0.5 x 0.7 x 0.7 cm left midureter left mid ureter calculus. 2. Additional multiple nonobstructing bilateral renal calculi. She has follow up pending with Dr. Davis  CBC from 10/6/2022 in ED showed hgb of 16.6 with normal MCV She is pending  surgery with Dr. Guaman for DJD  3/13/2023 She is here for follow.  She saw Dr. Morales and he checked a PET/CT that was done on 3/7/2023:  IMPRESSION: 1.  Since July 18, 2019, substantially increased FDG avid diffuse soft tissue nodularity throughout the left pleural surface, compatible with biologic tumor activity (max SUV 19.5; reflecting 275% increase). 2.  New minimal FDG avid 5 mm right upper lobe solid pulmonary nodule ( positive on NAC images). 3.  New FDG avid mediastinal lymph nodes, suspicious for elizabeth metastases (max SUV 10.8 in a subcarinal lymph node). 4.  Focal FDG uptake in the right thyroid lobe (max SUV 7). Thyroid ultrasound can be obtained for further assessment. CBC today is normal. She is here with daughter. Lost a few pounds due to diverticular disease feels better now. I showed the images to the patient and reviewed it together she saw the pleural-based uptake as well as the mediastinal nodes  4/24/2023 She is here for follow up. She had biopsy by IR of left pleural mass and only Fragments of fibroadipose tissue and skeletal muscle. I presented her at thoracic tumor board and Dr. Julian Nicholas can perform a mediastinoscopy to confirm the etiology of the findings on the PET scan. She was here with family. We went over the most recent PET CT scan together and she saw the increase of FDG uptake in the pleural space as well as similar nodes I explained that clinically I suspect this is probably her lymphoma but nevertheless other etiologies are possible. If she is hesitant about doing the biopsy I can try Rituxan for 4 doses we will repeat imaging which has reasonable side effects but of course immunosuppression is always of a concern, and this is more than acceptable treatment for indolent lymphomas but I explained that we will be treating the lung entity and thus after discussion she agreed to proceed with mediastinoscopy.  5/26/23 She returns for follow-up today she underwent a mediastinoscopy with pathology demonstrating that her mediastinal lymph nodes are positive for metastatic adenocarcinoma consistent with lung primary. This was from 5/12/2023 Final Diagnosis 1. Level 7 lymph node, excision: - Positive for metastatic adenocarcinoma. - See comment 2. Level 7 lymph node, excision: - Positive for metastatic adenocarcinoma. - See comment 3. Right level 4 lymph node, excision: - Positive for metastatic adenocarcinoma. - See comment 4. Right level 2 lymph node, excision: - Positive for metastatic adenocarcinoma. - See comment 5. Right level 2 lymph node, excision: - Positive for metastatic adenocarcinoma. - See comment She has recovered well and has no complaints today and here to talk about next  6/16/2023 She returns for follow-up. NGS showed EGFR p.([id number]). MR brain was negative. She was started on Tagrisso 1 week ago is tolerating well. Her AMAYA is stable to improved with her inhalers. She has chronic constipation alternating with diarrhea which she has had for years with no significant change. She remains active and is doing well overall. She is drinking Boosts for additional calories.  07/14/2023 accompanied by her daughter. She called earlier this week to report rash that was due to fungal infection and the developed what she described as acne-like with inflammatory component at her lower abdomen started at skin fold and spreading peripherally from there. We advised her to start steroid cream and HOLD Tagrisso (since Wednesday 07/12/2023). She changed her soap, and it seems to improve pruritus.  07/28/2023 She is here today for follow-up and reports that her rashes are getting better on steroid cream and antibiotics.  She restarted to Grieser this past Monday and it seems she is tolerating it pretty well.  08/28/2023 She is here today for a follow-up and reports that her rashes improved. She reported she saw dermatology and was prescribed clindamycin cream, mupirocin ointment, and gentamicin ointment. She continued to take Tagrisso without any new adverse reactions. She is otherwise doing well and has no new complaints.    10/30/2023 Pt is here for follow up. She continued to take Tagrisso 80 mg QD without any new adverse reactions. She states she has a R upper back ulcer since 4 weeks ago. She is unable to reach that area to put ointment on. Otherwise, she has no need complaint to offer.  She had a PET/CT on 10/2/23 that showed OK: IMPRESSION: New left posterior occipital scalp-based FDG avid lesion, SUV max 17.9, measuring approximately 1.5 cm. (New since prior PET and brain MRI). Recommend further workup. Significant decrease in FDG uptake in the mediastinum and left pleura compared to prior exam, now only avid on nonattenuation corrected images. Anatomically, pleural nodularity in the left hemithorax is also decreased. Previously described 5 mm right upper lobe pulmonary nodule is faintly visualized, not FDG avid.  11/13/2023 Pt is here for follow up. She continues to take Tagrisso 80 mg QD without any new adverse reactions. Her R upper back open comedone has improved and pain improved. Currently, her major complaint is dizziness which usually occurs when her blood pressure is high. She states that Dr. Gamble recently adjusted her BP meds by reducing losartan 100 mg QD to 50 mg QD and discontinuing amlodipine.  12/15/2023 Reports increasing generalized pains likely due to arthritis and some compensatory muscle spasm.  1/15/24: Pt returns for follow up today. She reports new rash in her groin region. She has been applying bacitracin to ti without any relief. She reports breathing is improved with Symbicort. She denied any other complaints.   2/26/24: Pt returns for follow up. She feels well today. No concerns reported with Tagriso. Groin rash is treated. She reports other medical issues stressing her including constipation, high BP, and change in diabetes medication. She is following up with appropriate physicians for the same.   3/25/24: Pt is here for follow up. She reports having rash on her face, acneiform, which is resolving now with topical Clindamycin. She also reported nail changes and dry skin. No other symptoms reported. She reports compliance with Tagrisso.   05/09/2024 Reports feeling well at the baseline of her health status, no changes in chronic conditions or new complaints since the last visit.

## 2024-06-05 ENCOUNTER — LABORATORY RESULT (OUTPATIENT)
Age: 84
End: 2024-06-05

## 2024-06-05 ENCOUNTER — APPOINTMENT (OUTPATIENT)
Age: 84
End: 2024-06-05
Payer: MEDICARE

## 2024-06-05 ENCOUNTER — OUTPATIENT (OUTPATIENT)
Dept: OUTPATIENT SERVICES | Facility: HOSPITAL | Age: 84
LOS: 1 days | End: 2024-06-05
Payer: MEDICARE

## 2024-06-05 DIAGNOSIS — Z98.890 OTHER SPECIFIED POSTPROCEDURAL STATES: Chronic | ICD-10-CM

## 2024-06-05 DIAGNOSIS — C34.90 MALIGNANT NEOPLASM OF UNSPECIFIED PART OF UNSPECIFIED BRONCHUS OR LUNG: ICD-10-CM

## 2024-06-05 DIAGNOSIS — D50.9 IRON DEFICIENCY ANEMIA, UNSPECIFIED: ICD-10-CM

## 2024-06-05 DIAGNOSIS — E89.0 POSTPROCEDURAL HYPOTHYROIDISM: Chronic | ICD-10-CM

## 2024-06-05 DIAGNOSIS — Z51.11 ENCOUNTER FOR ANTINEOPLASTIC CHEMOTHERAPY: ICD-10-CM

## 2024-06-05 DIAGNOSIS — C85.90 NON-HODGKIN LYMPHOMA, UNSPECIFIED, UNSPECIFIED SITE: ICD-10-CM

## 2024-06-05 DIAGNOSIS — R91.1 SOLITARY PULMONARY NODULE: ICD-10-CM

## 2024-06-05 LAB
ALBUMIN SERPL ELPH-MCNC: 4.3 G/DL
ALP BLD-CCNC: 86 U/L
ALT SERPL-CCNC: 10 U/L
ANION GAP SERPL CALC-SCNC: 8 MMOL/L
AST SERPL-CCNC: 14 U/L
BILIRUB SERPL-MCNC: 0.3 MG/DL
BUN SERPL-MCNC: 28 MG/DL
CALCIUM SERPL-MCNC: 9.9 MG/DL
CHLORIDE SERPL-SCNC: 101 MMOL/L
CO2 SERPL-SCNC: 24 MMOL/L
CREAT SERPL-MCNC: 1.1 MG/DL
EGFR: 50 ML/MIN/1.73M2
GLUCOSE SERPL-MCNC: 103 MG/DL
HCT VFR BLD CALC: 36.9 %
HGB BLD-MCNC: 12.3 G/DL
MCHC RBC-ENTMCNC: 29.5 PG
MCHC RBC-ENTMCNC: 33.3 G/DL
MCV RBC AUTO: 88.5 FL
PLATELET # BLD AUTO: 109 K/UL
PMV BLD: 11.2 FL
POTASSIUM SERPL-SCNC: 3.9 MMOL/L
PROT SERPL-MCNC: 6.2 G/DL
RBC # BLD: 4.17 M/UL
RBC # FLD: 13.5 %
SODIUM SERPL-SCNC: 133 MMOL/L
WBC # FLD AUTO: 4.18 K/UL

## 2024-06-05 PROCEDURE — 85027 COMPLETE CBC AUTOMATED: CPT

## 2024-06-05 PROCEDURE — 99214 OFFICE O/P EST MOD 30 MIN: CPT

## 2024-06-05 PROCEDURE — 80053 COMPREHEN METABOLIC PANEL: CPT

## 2024-06-05 PROCEDURE — G2211 COMPLEX E/M VISIT ADD ON: CPT

## 2024-06-06 ENCOUNTER — APPOINTMENT (OUTPATIENT)
Dept: OBGYN | Facility: CLINIC | Age: 84
End: 2024-06-06
Payer: MEDICARE

## 2024-06-06 VITALS
BODY MASS INDEX: 22.49 KG/M2 | WEIGHT: 135 LBS | SYSTOLIC BLOOD PRESSURE: 182 MMHG | DIASTOLIC BLOOD PRESSURE: 91 MMHG | HEART RATE: 61 BPM | HEIGHT: 65 IN

## 2024-06-06 DIAGNOSIS — Z01.419 ENCOUNTER FOR GYNECOLOGICAL EXAMINATION (GENERAL) (ROUTINE) W/OUT ABNORMAL FINDINGS: ICD-10-CM

## 2024-06-06 DIAGNOSIS — R91.1 SOLITARY PULMONARY NODULE: ICD-10-CM

## 2024-06-06 PROCEDURE — G0101: CPT

## 2024-06-06 NOTE — HISTORY OF PRESENT ILLNESS
[FreeTextEntry1] : Patient in office for annual exam.. Patient lost over 100 lbs on diabetic med no complaints

## 2024-06-13 NOTE — ASSESSMENT
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 All the mediastinal lymph nodes are positive for adenocarcinoma of the lung I am not sure if this is related to her pleural-based plaques which she had for years and the biopsy was nondiagnostic, at this point pursue another biopsy we discussed was not an option for us, it is possible that these pleural-based nodules were indeed slow-growing adenocarcinoma this will make her stage IV. - We therefore decided to treat this like stage IV and to repeat imaging and if there is for example response in the mediastinal nodes but and pleural-based areas then I think at that point in time we will re-biopsy the pleural-based area - MR brain negative. - 06/2023 Started Tagrisso. Discussed side effects of Tagrisso including but not limited to rash, diarrhea, pneumonitis, keratitis, transaminitis and cytopenias. - 07/12/2023 Tagrisso is on hold due to development of rash - slowly improving on with holding Tagrisso for a few weeks and antibiotics, steroid and antifungal creams. - 07/24/2023 restarted Tagrisso. - 10/02/2023 PET/CT showed ME: Significant decrease in FDG uptake in the mediastinum and left pleura compared to prior exam, now only avid on nonattenuation corrected images. Anatomically, pleural nodularity in the left hemithorax is also decreased. - 01/15/24 PET/CT - ALIYAH. - 05/06/2024 PET/CT - Compared to PET/CT 1/11/2024, No definite sites of abnormal FDG uptake to suggest biologic activity.  # Right upper back large open comedo measuring approximately 1 x 1 x 1 cm, possibly related to Tagrisso - RESOLVED.  # Uncontrolled HTN  - on losartan 100 mg and if needed will add amlodipine.  # Left posterior occipital scalp-based FDG avid lesion noted on PET/CT, pt had a laceration there leading to PET/CT finding.  # Pleural Nodules on scans. - these are not new, at least since 2018 But PET/CT now showed increase activity and new mediastinal node due to sarcoid?, progression of lymphoma? RCC? - CT guided FNA biopsy was non diagnostic of the pleural nodule.  # History of Lung Cancer 13 years ago. - Early-stage NSCLC s/p resection alone ~ 13 years ago.  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 - Left para-aortic lymph node. On observation since she is asymptomatic. - Recent diverticulitis imaging done at that time showed no lymphadenopathy as per pt. - No B symptoms. - 10/02/2023, 01/15/2024 PET/CT showed ALIYAH.  # Anemia, mild, microcytic - resolved. - S/p 5 doses of Venofer in April. Ferritin improved 112, Iron sat 10%. - recommended to f/u with GI she did but not a candidate for colonoscopy due to previous peroration and will monitor.  # History of Thyroid cancer she states it was a partial thyroidectomy - This explains the presence of positive thyroglobulin 21 tumor marker.  # Adnexal cyst. - PET/CT negative.  06/05/2024 Labs reviewed and results discussed with the patient - remains clinically stable to continue current management. All questions were answered to satisfaction.  PLAN: - continue Tagrisso 80 mg PO QD. - repeat PET/CT, but CT C/A/P is OK Q3M - 08/2024. - continue losartan 100 mg QD. - continue bowel regiment for constipation. - Continue Clindamycin for rash as needed. - Labs today: CBC CMP RTC in 4 weeks with CBC CMP. [AdvancecareDate] : 12/15/2023 [FreeTextEntry2] : ROBERTA given for review on 12/15/23

## 2024-06-13 NOTE — PHYSICAL EXAM
[Restricted in physically strenuous activity but ambulatory and able to carry out work of a light or sedentary nature] : Status 1- Restricted in physically strenuous activity but ambulatory and able to carry out work of a light or sedentary nature, e.g., light house work, office work [Normal] : affect appropriate [de-identified] : wears glasses [de-identified] : bilateral mild edema - unchanged [de-identified] : facial rash - resolved

## 2024-06-13 NOTE — REVIEW OF SYSTEMS
[Fatigue] : fatigue [SOB on Exertion] : shortness of breath during exertion [Incontinence] : incontinence [Skin Rash] : skin rash [Anxiety] : anxiety [Negative] : Allergic/Immunologic [FreeTextEntry8] : sees Dr. Cobb [de-identified] : facial rash - well controlled on Clindamycin gel. [de-identified] : sciatica pain on Lyrica

## 2024-06-13 NOTE — HISTORY OF PRESENT ILLNESS
[de-identified] : Ms. 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 Cody Calixto.  She is here with her daughter Brandy.  \par  \par  She had a history of Kidney Cancer s/p partial nephrectomy and distant history of Thyroid Cancer s/p partial thyroidectomy.  She also had a Left Lower Lobectomy for Stage IA lung cancer. For dates see bellow.\par  \par  She had CT scan and subsequent PET/CT that showed multiple left pleural based nodules in addition to a para-aortic lymph node.  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 The differential diagnosis includes low grade follicular lymphoma and CD10+ marginal zone lymphoma. She denies any B-symptoms at this time other than recent weight loss of 10lbs over last month, however she reports she had not been eating due to a recent GI bug.\par  \par  She is here for further recommendations. \par  Radiological Work-up:\par  PET/CT (2/21/19) IMPRESSION: Since August 20, 2010: 1. Multiple sites of left pleural-based FDG avid nodularity, catalogued above, with max SUV 5.2, suspicious for biological tumor activity. Metastatic or primary pleural neoplasm are considerations. 2. Intra-abdominal FDG avid 1.7 x 1.2 cm left para-aortic lymph node, max SUV 5.4, suspicious for lymph node metastasis. \par  1/30/2019: [facility] [place]:  comparison was made to Ct from 7/2018 Multiple Plural nodules along the posterior paramediastinal surface, some are mildly larger there are new nodules as well. largest nodule 10 mm\par  CT Head (9/15/18) IMPRESSION: 1. Mild periventricular and subcortical white matter chronic small vessel ischemic changes. 2. No acute fractures, mass effect, midline shift or hemorrhage. \par  CT cervical spine (9/15/18) IMPRESSION: 1. Degenerative changes of the cervical spine C2-3 through C6-7 worse at C5-6 as described above. 2. Straightening of normal cervical lordosis with mild anterolisthesis of C3 anterior on C4, C6 on C7, C7 on T1 and T1 on T2. 3. No acute fractures or dislocations. \par  1/22/18: MR L spine: advance lymbar spondylosis, disck osteophytes, severe neuroforamin narrowing with  ipingment of L4 nerve,  3.5 cm AAA, 8 mm adrenal nodule.\par  Pathology:\par  (3/14/19) Final Diagnosis Left parotid lymph node, needle biopsy: Low grade B-cell lymphoma. \par  Immunostains performed with adequate controls on sections from block 1A show the infiltrating cells are CD20+ CD79a+ B-cells that coexpress CD10, CD23(dim variable), and BCL2. They are negative for CD5, CD43, cyclin D1, and MUM1. BCL6 is difficult to determine due to the high background staining. Ki67 labels 5-10% of these cells. CD21 highlights scattered follicular dendritic cell meshworks.  Per report ([id number]), flow cytometry studies performed at [facility] [employer] show monotypic B-cells (10% of cells), positive for kappa, CD19, CD20, CD10; negative CD5. Heterogeneous population of T-cells (with normal CD4 to CD8 ratio), and polytypic B-cells are also present. The findings are diagnostic of low grade B-cell lymphoma. The differential diagnosis includes low grade follicular lymphoma and CD10+ marginal zone lymphoma.\par  \par  Health Maintenance:\par  Colonoscopy \par  Mammogram \par  \par  Recent blood work is in [facility]. Was normal except for a high sugar and calcium of 10.2 [de-identified] : 7/10/19 She was supposed to go for biopsy of pleural based nodularity that resolved prior to procedure. She will see PULM in a few weeks.  She has been purposely losing weight, using Trajenta. 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 Denies any fevers, chills, unintentional weight loss, or night sweats. CT Chest (4/11/19) IMPRESSION: Since February 21, 2019;1. Interval resolution of previously described left-sided FDG avid pleural-based nodularity. No biopsy performed. 2. Multiple stable nodules, as above. 3. Unchanged left periaortic 1.4 cm lymph node, previously noted to be FDG avid. 4. Stable right hydronephrosis and right-sided ureteral calculi.5. Stable infrarenal abdominal aortic aneurysm measuring up to 3.7 cm.  1/8/20 She is here for follow up. Since last visit she had a CT C/A/P in 7/18/2019 that showed Stable 1.1 cm left para-aortic lymph node on series 2 image 62. No new lymph nodes in the abdomen and pelvis.  Interval development of moderate to severe right hydroureteronephrosis leading to 2 proximal ureteral calculi (superior calculus measuring 5 x 4 x 5 mm, 1000 Hounsfield units and the inferior adjacent calculus measuring 5 x 7 x 6 mm, 573 Hounsfield units). Bilateral intrarenal calculi. Stable 3.7 x 4.3 cm simple appearing right adnexal cyst. Recommend one-year follow-up pelvic ultrasound. Impression: Since April 11, 2019. No significant interval change in multiple left-sided pleural-based nodules, largest approximately 1.6 cm (remeasured on earlier study), left lung base (series 5/149). Post left lower lobectomy. She was seen by Urology and had lithotripsy.  She feels well.   7/8/20 She is here for follow up. She feels well. No B symptoms. She is pending a CT chest ordered by Dr. Morales.  1/11/2021 The patient is here for follow up. She has no B symptoms, no cough, no chest pain, no new palpable lymph nodes. She is complaining of sciatica, going for an injection next week. She also had LEs cellulitis, completed once course of antibiotics.   5/19/21 We had a telephone visit today. She feels well.  She had CT chest on 2/21 that showed Since July 14, 2020, interval development of wedge-shaped consolidation posterior left upper lobe. (2/31). Minimal interval growth of multiple pleural-based nodules as described above. Numerous stable left-sided pleural based nodules and adjacent parenchymal nodules. Reviewed her CT in tumor board. Chest. We were divided on if we should rescan in 3 months with CT or check PET/cT now. I spoke to her and we decided to check a CT Chest in 3 months.  She feels well.   9/15/21 Patient is here for a follow-up visit for hx of lung cancer, NHL and pulmonary nodules. She is feeling well with no new complaints. Reviewed most recent CBC, which shows mild microcytic anemia. Patient denies fever, chills, nausea, vomiting, dyspnea or bleeding. She has not had recent colonoscopy, but has done Cologuard at home which was reportedly benign in recent years.   CT Chest (6.2.2021) IMPRESSION: Unchanged left circumferential subpleural nodularity largest measuring 1.6 cm.Resolved left lower lobe consolidation.  3/21/22 Pt returns for f/u today. She reports that she was admitted at [facility] for episode for acute diverticulitis in November 2021, she was treated with IV abx, pt reports that she had imaging done at that time and it was only significant for acute diverticulitis (we do not have the imaging results available. Since that time she reports that she has been having episodes of constipation and diarrhea, she is on laxatives. She stopped taking po iron as it was making her GI symptoms worse. Her gastroenterologist did not suggest doing a colonoscopy. Pt denies any blood in stools. Denies any fever, chills, weight loss or loss of appetite. She was also seen by Dr Sweet.   6/20/22 Pt returns for f/u today. Denies any fever, chills, weight loss or loss of appetite. In March ferritin was 17, Iron 38, iron sat 9%, Hgb 11.9, MCV 77.1. She had 5 doses of IV Venofer tolerated well, she reports cold intolerance improved. Labs reviewed 6/13/22 hgb 13.3, MCV 84.9, Ferritin 112, Iron sat 13%, Iron 37. She had Kidney US on 6/8 which showed Indeterminate hypoechoic area involving the lower pole of the left kidney. Further evaluation with outpatient renal mass protocol CT is recommended. Focal dilatation of the right upper pole calyx. Bilateral renal calculi. Incidentally noted is an abdominal aortic aneurysm, similar in size to prior CT scan, measuring 4.2 cm.   10/12/2022 She is here for follow up. she had CT C/A/P in July 2022: Lungs, Pleura, and Airways: Trachea and central airways are patent. Post left lower lobectomy with expected postsurgical changes. Extensive left subpleural nodularity demonstrating slight interval increase since prior examinations including medial left upper lobe 2.7 x 1.0 cm nodular density (series 4 image 27) and anterior left upper lobe 1.1 x 1.0 cm nodular density (series 4 image 55). Stable right adnexal cystic lesion measuring 4.4 x 3.8 cm. Tubular/cystic appearing left adnexal lesion could represent a cyst or hydrosalpinx, not well seen on the 2/9/2021 CT. Recommend follow-up pelvic ultrasound.   I ordered a US pelvis hat showed   IMPRESSION: 1.  Likely benign 4 cm right adnexal/ovarian cyst. A follow-up pelvic ultrasound can be obtained in 6 months to assess for stability. 2.  Tubular cystic left adnexal lesion, likely hydrosalpinx.  She was just in ER for pain and was noted to have a kidney stone  that moved and caused hydronephrosis. IMPRESSION: 10/6/2022 1. Moderate left hydroureteronephrosis with a 0.5 x 0.7 x 0.7 cm left midureter left mid ureter calculus. 2. Additional multiple nonobstructing bilateral renal calculi. She has follow up pending with Dr. Davis  CBC from 10/6/2022 in ED showed hgb of 16.6 with normal MCV She is pending  surgery with Dr. Guaman for DJD  3/13/2023 She is here for follow.  She saw Dr. Morales and he checked a PET/CT that was done on 3/7/2023:  IMPRESSION: 1.  Since July 18, 2019, substantially increased FDG avid diffuse soft tissue nodularity throughout the left pleural surface, compatible with biologic tumor activity (max SUV 19.5; reflecting 275% increase). 2.  New minimal FDG avid 5 mm right upper lobe solid pulmonary nodule ( positive on NAC images). 3.  New FDG avid mediastinal lymph nodes, suspicious for elizabeth metastases (max SUV 10.8 in a subcarinal lymph node). 4.  Focal FDG uptake in the right thyroid lobe (max SUV 7). Thyroid ultrasound can be obtained for further assessment. CBC today is normal. She is here with daughter. Lost a few pounds due to diverticular disease feels better now. I showed the images to the patient and reviewed it together she saw the pleural-based uptake as well as the mediastinal nodes  4/24/2023 She is here for follow up. She had biopsy by IR of left pleural mass and only Fragments of fibroadipose tissue and skeletal muscle. I presented her at thoracic tumor board and Dr. Julian Nicholas can perform a mediastinoscopy to confirm the etiology of the findings on the PET scan. She was here with family. We went over the most recent PET CT scan together and she saw the increase of FDG uptake in the pleural space as well as similar nodes I explained that clinically I suspect this is probably her lymphoma but nevertheless other etiologies are possible. If she is hesitant about doing the biopsy I can try Rituxan for 4 doses we will repeat imaging which has reasonable side effects but of course immunosuppression is always of a concern, and this is more than acceptable treatment for indolent lymphomas but I explained that we will be treating the lung entity and thus after discussion she agreed to proceed with mediastinoscopy.  5/26/23 She returns for follow-up today she underwent a mediastinoscopy with pathology demonstrating that her mediastinal lymph nodes are positive for metastatic adenocarcinoma consistent with lung primary. This was from 5/12/2023 Final Diagnosis 1. Level 7 lymph node, excision: - Positive for metastatic adenocarcinoma. - See comment 2. Level 7 lymph node, excision: - Positive for metastatic adenocarcinoma. - See comment 3. Right level 4 lymph node, excision: - Positive for metastatic adenocarcinoma. - See comment 4. Right level 2 lymph node, excision: - Positive for metastatic adenocarcinoma. - See comment 5. Right level 2 lymph node, excision: - Positive for metastatic adenocarcinoma. - See comment She has recovered well and has no complaints today and here to talk about next  6/16/2023 She returns for follow-up. NGS showed EGFR p.([id number]). MR brain was negative. She was started on Tagrisso 1 week ago is tolerating well. Her AMAYA is stable to improved with her inhalers. She has chronic constipation alternating with diarrhea which she has had for years with no significant change. She remains active and is doing well overall. She is drinking Boosts for additional calories.  07/14/2023 accompanied by her daughter. She called earlier this week to report rash that was due to fungal infection and the developed what she described as acne-like with inflammatory component at her lower abdomen started at skin fold and spreading peripherally from there. We advised her to start steroid cream and HOLD Tagrisso (since Wednesday 07/12/2023). She changed her soap, and it seems to improve pruritus.  07/28/2023 She is here today for follow-up and reports that her rashes are getting better on steroid cream and antibiotics.  She restarted to Grieser this past Monday and it seems she is tolerating it pretty well.  08/28/2023 She is here today for a follow-up and reports that her rashes improved. She reported she saw dermatology and was prescribed clindamycin cream, mupirocin ointment, and gentamicin ointment. She continued to take Tagrisso without any new adverse reactions. She is otherwise doing well and has no new complaints.    10/30/2023 Pt is here for follow up. She continued to take Tagrisso 80 mg QD without any new adverse reactions. She states she has a R upper back ulcer since 4 weeks ago. She is unable to reach that area to put ointment on. Otherwise, she has no need complaint to offer.  She had a PET/CT on 10/2/23 that showed OK: IMPRESSION: New left posterior occipital scalp-based FDG avid lesion, SUV max 17.9, measuring approximately 1.5 cm. (New since prior PET and brain MRI). Recommend further workup. Significant decrease in FDG uptake in the mediastinum and left pleura compared to prior exam, now only avid on nonattenuation corrected images. Anatomically, pleural nodularity in the left hemithorax is also decreased. Previously described 5 mm right upper lobe pulmonary nodule is faintly visualized, not FDG avid.  11/13/2023 Pt is here for follow up. She continues to take Tagrisso 80 mg QD without any new adverse reactions. Her R upper back open comedone has improved and pain improved. Currently, her major complaint is dizziness which usually occurs when her blood pressure is high. She states that Dr. Gamble recently adjusted her BP meds by reducing losartan 100 mg QD to 50 mg QD and discontinuing amlodipine.  12/15/2023 Reports increasing generalized pains likely due to arthritis and some compensatory muscle spasm.  1/15/24: Pt returns for follow up today. She reports new rash in her groin region. She has been applying bacitracin to ti without any relief. She reports breathing is improved with Symbicort. She denied any other complaints.   2/26/24: Pt returns for follow up. She feels well today. No concerns reported with Tagriso. Groin rash is treated. She reports other medical issues stressing her including constipation, high BP, and change in diabetes medication. She is following up with appropriate physicians for the same.   3/25/24: Pt is here for follow up. She reports having rash on her face, acneiform, which is resolving now with topical Clindamycin. She also reported nail changes and dry skin. No other symptoms reported. She reports compliance with Tagrisso.   05/09/2024 Reports feeling well at the baseline of her health status, no changes in chronic conditions or new complaints since the last visit.  06/05/2024 Reports feeling well at the baseline of her health status, no changes in chronic conditions or new complaints since the last visit. She is with her chronic diverticulitis flair.

## 2024-06-13 NOTE — END OF VISIT
[FreeTextEntry3] : I was physically present for the key portions of the evaluation and management service provided.  I agree with the history and physical, and plan which I have reviewed and edited where appropriate.  She is here for follow-up she is doing well on Tagrisso she has repeat imaging pending in August blood work was done rash has improved she will see us back in a month

## 2024-06-26 RX ORDER — OSIMERTINIB 80 1/1
80 TABLET, FILM COATED ORAL DAILY
Qty: 30 | Refills: 2 | Status: ACTIVE | COMMUNITY
Start: 2023-06-07 | End: 1900-01-01

## 2024-07-09 ENCOUNTER — LABORATORY RESULT (OUTPATIENT)
Age: 84
End: 2024-07-09

## 2024-07-09 ENCOUNTER — APPOINTMENT (OUTPATIENT)
Age: 84
End: 2024-07-09
Payer: MEDICARE

## 2024-07-09 ENCOUNTER — OUTPATIENT (OUTPATIENT)
Dept: OUTPATIENT SERVICES | Facility: HOSPITAL | Age: 84
LOS: 1 days | End: 2024-07-09
Payer: MEDICARE

## 2024-07-09 VITALS
SYSTOLIC BLOOD PRESSURE: 170 MMHG | WEIGHT: 140 LBS | RESPIRATION RATE: 16 BRPM | HEART RATE: 65 BPM | TEMPERATURE: 98.4 F | HEIGHT: 65 IN | DIASTOLIC BLOOD PRESSURE: 89 MMHG | BODY MASS INDEX: 23.32 KG/M2

## 2024-07-09 DIAGNOSIS — Z98.890 OTHER SPECIFIED POSTPROCEDURAL STATES: Chronic | ICD-10-CM

## 2024-07-09 DIAGNOSIS — C34.90 MALIGNANT NEOPLASM OF UNSPECIFIED PART OF UNSPECIFIED BRONCHUS OR LUNG: ICD-10-CM

## 2024-07-09 DIAGNOSIS — Z51.11 ENCOUNTER FOR ANTINEOPLASTIC CHEMOTHERAPY: ICD-10-CM

## 2024-07-09 DIAGNOSIS — E89.0 POSTPROCEDURAL HYPOTHYROIDISM: Chronic | ICD-10-CM

## 2024-07-09 DIAGNOSIS — R91.1 SOLITARY PULMONARY NODULE: ICD-10-CM

## 2024-07-09 LAB
HCT VFR BLD CALC: 36.5 %
HGB BLD-MCNC: 12.1 G/DL
MCHC RBC-ENTMCNC: 29.4 PG
MCHC RBC-ENTMCNC: 33.2 G/DL
MCV RBC AUTO: 88.6 FL
PLATELET # BLD AUTO: 105 K/UL
PMV BLD: 11.1 FL
RBC # BLD: 4.12 M/UL
RBC # FLD: 13.5 %
WBC # FLD AUTO: 3.86 K/UL

## 2024-07-09 PROCEDURE — G2211 COMPLEX E/M VISIT ADD ON: CPT

## 2024-07-09 PROCEDURE — 80053 COMPREHEN METABOLIC PANEL: CPT

## 2024-07-09 PROCEDURE — 99214 OFFICE O/P EST MOD 30 MIN: CPT

## 2024-07-09 PROCEDURE — 85027 COMPLETE CBC AUTOMATED: CPT

## 2024-07-09 PROCEDURE — 84443 ASSAY THYROID STIM HORMONE: CPT

## 2024-07-09 RX ORDER — LIDOCAINE 5 G/100G
5 OINTMENT TOPICAL
Qty: 1 | Refills: 3 | Status: ACTIVE | COMMUNITY
Start: 2024-07-09 | End: 1900-01-01

## 2024-07-10 DIAGNOSIS — R91.1 SOLITARY PULMONARY NODULE: ICD-10-CM

## 2024-07-10 LAB
ALBUMIN SERPL ELPH-MCNC: 4.1 G/DL
ALP BLD-CCNC: 74 U/L
ALT SERPL-CCNC: 9 U/L
AST SERPL-CCNC: 15 U/L
BILIRUB SERPL-MCNC: 0.5 MG/DL
BUN SERPL-MCNC: 25 MG/DL
CALCIUM SERPL-MCNC: 9.8 MG/DL
CHLORIDE SERPL-SCNC: 103 MMOL/L
CREAT SERPL-MCNC: 1 MG/DL
EGFR: 56 ML/MIN/1.73M2
PROT SERPL-MCNC: 6.3 G/DL
TSH SERPL-ACNC: 1.32 UIU/ML

## 2024-08-07 ENCOUNTER — OUTPATIENT (OUTPATIENT)
Dept: OUTPATIENT SERVICES | Facility: HOSPITAL | Age: 84
LOS: 1 days | End: 2024-08-07
Payer: MEDICARE

## 2024-08-07 ENCOUNTER — RESULT REVIEW (OUTPATIENT)
Age: 84
End: 2024-08-07

## 2024-08-07 DIAGNOSIS — Z98.890 OTHER SPECIFIED POSTPROCEDURAL STATES: Chronic | ICD-10-CM

## 2024-08-07 DIAGNOSIS — C85.90 NON-HODGKIN LYMPHOMA, UNSPECIFIED, UNSPECIFIED SITE: ICD-10-CM

## 2024-08-07 DIAGNOSIS — C34.90 MALIGNANT NEOPLASM OF UNSPECIFIED PART OF UNSPECIFIED BRONCHUS OR LUNG: ICD-10-CM

## 2024-08-07 DIAGNOSIS — E89.0 POSTPROCEDURAL HYPOTHYROIDISM: Chronic | ICD-10-CM

## 2024-08-07 LAB — GLUCOSE BLDC GLUCOMTR-MCNC: 85 MG/DL — SIGNIFICANT CHANGE UP (ref 70–99)

## 2024-08-07 PROCEDURE — 82962 GLUCOSE BLOOD TEST: CPT

## 2024-08-07 PROCEDURE — A9552: CPT

## 2024-08-07 PROCEDURE — 78815 PET IMAGE W/CT SKULL-THIGH: CPT | Mod: MH,PS

## 2024-08-07 PROCEDURE — 78815 PET IMAGE W/CT SKULL-THIGH: CPT | Mod: 26,PS,MH

## 2024-08-08 DIAGNOSIS — C34.90 MALIGNANT NEOPLASM OF UNSPECIFIED PART OF UNSPECIFIED BRONCHUS OR LUNG: ICD-10-CM

## 2024-08-08 DIAGNOSIS — C85.90 NON-HODGKIN LYMPHOMA, UNSPECIFIED, UNSPECIFIED SITE: ICD-10-CM

## 2024-08-20 ENCOUNTER — APPOINTMENT (OUTPATIENT)
Age: 84
End: 2024-08-20
Payer: MEDICARE

## 2024-08-20 ENCOUNTER — LABORATORY RESULT (OUTPATIENT)
Age: 84
End: 2024-08-20

## 2024-08-20 ENCOUNTER — OUTPATIENT (OUTPATIENT)
Dept: OUTPATIENT SERVICES | Facility: HOSPITAL | Age: 84
LOS: 1 days | End: 2024-08-20
Payer: MEDICARE

## 2024-08-20 VITALS
OXYGEN SATURATION: 96 % | DIASTOLIC BLOOD PRESSURE: 83 MMHG | SYSTOLIC BLOOD PRESSURE: 149 MMHG | TEMPERATURE: 97.2 F | BODY MASS INDEX: 22.33 KG/M2 | RESPIRATION RATE: 16 BRPM | WEIGHT: 134 LBS | HEART RATE: 61 BPM | HEIGHT: 65 IN

## 2024-08-20 DIAGNOSIS — E89.0 POSTPROCEDURAL HYPOTHYROIDISM: Chronic | ICD-10-CM

## 2024-08-20 DIAGNOSIS — Z98.890 OTHER SPECIFIED POSTPROCEDURAL STATES: Chronic | ICD-10-CM

## 2024-08-20 DIAGNOSIS — Z51.11 ENCOUNTER FOR ANTINEOPLASTIC CHEMOTHERAPY: ICD-10-CM

## 2024-08-20 DIAGNOSIS — C64.9 MALIGNANT NEOPLASM OF UNSPECIFIED KIDNEY, EXCEPT RENAL PELVIS: ICD-10-CM

## 2024-08-20 DIAGNOSIS — C34.90 MALIGNANT NEOPLASM OF UNSPECIFIED PART OF UNSPECIFIED BRONCHUS OR LUNG: ICD-10-CM

## 2024-08-20 LAB
ALBUMIN SERPL ELPH-MCNC: 4 G/DL
ALP BLD-CCNC: 76 U/L
ALT SERPL-CCNC: 10 U/L
ANION GAP SERPL CALC-SCNC: 10 MMOL/L
AST SERPL-CCNC: 11 U/L
BILIRUB SERPL-MCNC: 0.3 MG/DL
BUN SERPL-MCNC: 33 MG/DL
CALCIUM SERPL-MCNC: 9.6 MG/DL
CHLORIDE SERPL-SCNC: 102 MMOL/L
CO2 SERPL-SCNC: 26 MMOL/L
CREAT SERPL-MCNC: 1.1 MG/DL
EGFR: 50 ML/MIN/1.73M2
GLUCOSE SERPL-MCNC: 100 MG/DL
HCT VFR BLD CALC: 35.8 %
HGB BLD-MCNC: 11.9 G/DL
MCHC RBC-ENTMCNC: 29.4 PG
MCHC RBC-ENTMCNC: 33.2 G/DL
MCV RBC AUTO: 88.4 FL
PLATELET # BLD AUTO: 109 K/UL
PMV BLD: 11 FL
POTASSIUM SERPL-SCNC: 4.1 MMOL/L
PROT SERPL-MCNC: 5.7 G/DL
RBC # BLD: 4.05 M/UL
RBC # FLD: 13.4 %
SODIUM SERPL-SCNC: 138 MMOL/L
WBC # FLD AUTO: 4.31 K/UL

## 2024-08-20 PROCEDURE — 99214 OFFICE O/P EST MOD 30 MIN: CPT

## 2024-08-20 PROCEDURE — 80053 COMPREHEN METABOLIC PANEL: CPT

## 2024-08-20 PROCEDURE — 85027 COMPLETE CBC AUTOMATED: CPT

## 2024-08-20 PROCEDURE — G2211 COMPLEX E/M VISIT ADD ON: CPT

## 2024-08-20 NOTE — ASSESSMENT
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 All the mediastinal lymph nodes are positive for adenocarcinoma of the lung I am not sure if this is related to her pleural-based plaques which she had for years and the biopsy was nondiagnostic, at this point pursue another biopsy we discussed was not an option for us, it is possible that these pleural-based nodules were indeed slow-growing adenocarcinoma this will make her stage IV. - We therefore decided to treat this like stage IV and to repeat imaging and if there is for example response in the mediastinal nodes but and pleural-based areas then I think at that point in time we will re-biopsy the pleural-based area - MR brain negative. - 06/2023 Started Tagrisso. Discussed side effects of Tagrisso including but not limited to rash, diarrhea, pneumonitis, keratitis, transaminitis and cytopenias. - 07/12/2023 Tagrisso is on hold due to development of rash - slowly improving on with holding Tagrisso for a few weeks and antibiotics, steroid and antifungal creams. - 07/24/2023 restarted Tagrisso. - 10/02/2023 PET/CT showed AR: Significant decrease in FDG uptake in the mediastinum and left pleura compared to prior exam, now only avid on nonattenuation corrected images. Anatomically, pleural nodularity in the left hemithorax is also decreased. - 01/15/24 PET/CT - ALIYAH. - 05/06/2024 PET/CT - Compared to PET/CT 1/11/2024, No definite sites of abnormal FDG uptake to suggest biologic activity.  # Right upper back large open comedo measuring approximately 1 x 1 x 1 cm, possibly related to Tagrisso - RESOLVED.  # Uncontrolled HTN  - on losartan 100 mg and if needed will add amlodipine.  # Left posterior occipital scalp-based FDG avid lesion noted on PET/CT, pt had a laceration there leading to PET/CT finding.  # Pleural Nodules on scans. - these are not new, at least since 2018 But PET/CT now showed increase activity and new mediastinal node due to sarcoid?, progression of lymphoma? RCC? - CT guided FNA biopsy was non diagnostic of the pleural nodule.  # History of Lung Cancer 13 years ago. - Early-stage NSCLC s/p resection alone ~ 13 years ago.  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 - Left para-aortic lymph node. On observation since she is asymptomatic. - Recent diverticulitis imaging done at that time showed no lymphadenopathy as per pt. - No B symptoms. - 10/02/2023, 01/15/2024 PET/CT showed ALIYAH.,  PET.CT done on 8/7/2024 no evidence of biologic tumor activity.  # Anemia, mild, microcytic -  - S/p 5 doses of Venofer in April. Ferritin improved 112, Iron sat 10%. - recommended to f/u with GI she did but not a candidate for colonoscopy due to previous peroration and will monitor.  # History of Thyroid cancer she states it was a partial thyroidectomy - This explains the presence of positive thyroglobulin 21 tumor marker.  # Adnexal cyst. - PET/CT negative.    PLAN: - continue Tagrisso 80 mg PO QD. - repeat PET/CT or CT chest abdomen and pelvis,  again in 11/2024 if doing well - continue losartan 100 mg QD. - continue bowel regiment for constipation. - Continue Clindamycin for rash as needed. - Labs today: CBC CMP RTC in 4 weeks with CBC CMP  [AdvancecareDate] : 12/15/2023 [FreeTextEntry2] : ROBERTA given for review on 12/15/23

## 2024-08-20 NOTE — REVIEW OF SYSTEMS
[Fatigue] : fatigue [SOB on Exertion] : shortness of breath during exertion [Incontinence] : incontinence [Skin Rash] : skin rash [Anxiety] : anxiety [Negative] : Allergic/Immunologic [FreeTextEntry8] : sees Dr. Cobb [de-identified] : facial rash - well controlled on Clindamycin gel. [de-identified] : sciatica pain on Lyrica

## 2024-08-20 NOTE — HISTORY OF PRESENT ILLNESS
[de-identified] : Ms. 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 Cody Calixto.  She is here with her daughter Brandy.  \par  \par  She had a history of Kidney Cancer s/p partial nephrectomy and distant history of Thyroid Cancer s/p partial thyroidectomy.  She also had a Left Lower Lobectomy for Stage IA lung cancer. For dates see bellow.\par  \par  She had CT scan and subsequent PET/CT that showed multiple left pleural based nodules in addition to a para-aortic lymph node.  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 The differential diagnosis includes low grade follicular lymphoma and CD10+ marginal zone lymphoma. She denies any B-symptoms at this time other than recent weight loss of 10lbs over last month, however she reports she had not been eating due to a recent GI bug.\par  \par  She is here for further recommendations. \par  Radiological Work-up:\par  PET/CT (2/21/19) IMPRESSION: Since August 20, 2010: 1. Multiple sites of left pleural-based FDG avid nodularity, catalogued above, with max SUV 5.2, suspicious for biological tumor activity. Metastatic or primary pleural neoplasm are considerations. 2. Intra-abdominal FDG avid 1.7 x 1.2 cm left para-aortic lymph node, max SUV 5.4, suspicious for lymph node metastasis. \par  1/30/2019: [facility] [place]:  comparison was made to Ct from 7/2018 Multiple Plural nodules along the posterior paramediastinal surface, some are mildly larger there are new nodules as well. largest nodule 10 mm\par  CT Head (9/15/18) IMPRESSION: 1. Mild periventricular and subcortical white matter chronic small vessel ischemic changes. 2. No acute fractures, mass effect, midline shift or hemorrhage. \par  CT cervical spine (9/15/18) IMPRESSION: 1. Degenerative changes of the cervical spine C2-3 through C6-7 worse at C5-6 as described above. 2. Straightening of normal cervical lordosis with mild anterolisthesis of C3 anterior on C4, C6 on C7, C7 on T1 and T1 on T2. 3. No acute fractures or dislocations. \par  1/22/18: MR L spine: advance lymbar spondylosis, disck osteophytes, severe neuroforamin narrowing with  ipingment of L4 nerve,  3.5 cm AAA, 8 mm adrenal nodule.\par  Pathology:\par  (3/14/19) Final Diagnosis Left parotid lymph node, needle biopsy: Low grade B-cell lymphoma. \par  Immunostains performed with adequate controls on sections from block 1A show the infiltrating cells are CD20+ CD79a+ B-cells that coexpress CD10, CD23(dim variable), and BCL2. They are negative for CD5, CD43, cyclin D1, and MUM1. BCL6 is difficult to determine due to the high background staining. Ki67 labels 5-10% of these cells. CD21 highlights scattered follicular dendritic cell meshworks.  Per report ([id number]), flow cytometry studies performed at [facility] show monotypic B-cells (10% of cells), positive for kappa, CD19, CD20, CD10; negative CD5. Heterogeneous population of T-cells (with normal CD4 to CD8 ratio), and polytypic B-cells are also present. The findings are diagnostic of low grade B-cell lymphoma. The differential diagnosis includes low grade follicular lymphoma and CD10+ marginal zone lymphoma.\par  \par  Health Maintenance:\par  Colonoscopy \par  Mammogram \par  \par  Recent blood work is in [facility]. Was normal except for a high sugar and calcium of 10.2 [de-identified] : 7/10/19 She was supposed to go for biopsy of pleural based nodularity that resolved prior to procedure. She will see PULM in a few weeks.  She has been purposely losing weight, using Trajenta. 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 Denies any fevers, chills, unintentional weight loss, or night sweats. CT Chest (4/11/19) IMPRESSION: Since February 21, 2019;1. Interval resolution of previously described left-sided FDG avid pleural-based nodularity. No biopsy performed. 2. Multiple stable nodules, as above. 3. Unchanged left periaortic 1.4 cm lymph node, previously noted to be FDG avid. 4. Stable right hydronephrosis and right-sided ureteral calculi.5. Stable infrarenal abdominal aortic aneurysm measuring up to 3.7 cm.  1/8/20 She is here for follow up. Since last visit she had a CT C/A/P in 7/18/2019 that showed Stable 1.1 cm left para-aortic lymph node on series 2 image 62. No new lymph nodes in the abdomen and pelvis.  Interval development of moderate to severe right hydroureteronephrosis leading to 2 proximal ureteral calculi (superior calculus measuring 5 x 4 x 5 mm, 1000 Hounsfield units and the inferior adjacent calculus measuring 5 x 7 x 6 mm, 573 Hounsfield units). Bilateral intrarenal calculi. Stable 3.7 x 4.3 cm simple appearing right adnexal cyst. Recommend one-year follow-up pelvic ultrasound. Impression: Since April 11, 2019. No significant interval change in multiple left-sided pleural-based nodules, largest approximately 1.6 cm (remeasured on earlier study), left lung base (series 5/149). Post left lower lobectomy. She was seen by Urology and had lithotripsy.  She feels well.   7/8/20 She is here for follow up. She feels well. No B symptoms. She is pending a CT chest ordered by Dr. Morales.  1/11/2021 The patient is here for follow up. She has no B symptoms, no cough, no chest pain, no new palpable lymph nodes. She is complaining of sciatica, going for an injection next week. She also had LEs cellulitis, completed once course of antibiotics.   5/19/21 We had a telephone visit today. She feels well.  She had CT chest on 2/21 that showed Since July 14, 2020, interval development of wedge-shaped consolidation posterior left upper lobe. (2/31). Minimal interval growth of multiple pleural-based nodules as described above. Numerous stable left-sided pleural based nodules and adjacent parenchymal nodules. Reviewed her CT in tumor board. Chest. We were divided on if we should rescan in 3 months with CT or check PET/cT now. I spoke to her and we decided to check a CT Chest in 3 months.  She feels well.   9/15/21 Patient is here for a follow-up visit for hx of lung cancer, NHL and pulmonary nodules. She is feeling well with no new complaints. Reviewed most recent CBC, which shows mild microcytic anemia. Patient denies fever, chills, nausea, vomiting, dyspnea or bleeding. She has not had recent colonoscopy, but has done Cologuard at home which was reportedly benign in recent years.   CT Chest (6.2.2021) IMPRESSION: Unchanged left circumferential subpleural nodularity largest measuring 1.6 cm.Resolved left lower lobe consolidation.  3/21/22 Pt returns for f/u today. She reports that she was admitted at [facility] for episode for acute diverticulitis in November 2021, she was treated with IV abx, pt reports that she had imaging done at that time and it was only significant for acute diverticulitis (we do not have the imaging results available. Since that time she reports that she has been having episodes of constipation and diarrhea, she is on laxatives. She stopped taking po iron as it was making her GI symptoms worse. Her gastroenterologist did not suggest doing a colonoscopy. Pt denies any blood in stools. Denies any fever, chills, weight loss or loss of appetite. She was also seen by Dr Sweet.   6/20/22 Pt returns for f/u today. Denies any fever, chills, weight loss or loss of appetite. In March ferritin was 17, Iron 38, iron sat 9%, Hgb 11.9, MCV 77.1. She had 5 doses of IV Venofer tolerated well, she reports cold intolerance improved. Labs reviewed 6/13/22 hgb 13.3, MCV 84.9, Ferritin 112, Iron sat 13%, Iron 37. She had Kidney US on 6/8 which showed Indeterminate hypoechoic area involving the lower pole of the left kidney. Further evaluation with outpatient renal mass protocol CT is recommended. Focal dilatation of the right upper pole calyx. Bilateral renal calculi. Incidentally noted is an abdominal aortic aneurysm, similar in size to prior CT scan, measuring 4.2 cm.   10/12/2022 She is here for follow up. she had CT C/A/P in July 2022: Lungs, Pleura, and Airways: Trachea and central airways are patent. Post left lower lobectomy with expected postsurgical changes. Extensive left subpleural nodularity demonstrating slight interval increase since prior examinations including medial left upper lobe 2.7 x 1.0 cm nodular density (series 4 image 27) and anterior left upper lobe 1.1 x 1.0 cm nodular density (series 4 image 55). Stable right adnexal cystic lesion measuring 4.4 x 3.8 cm. Tubular/cystic appearing left adnexal lesion could represent a cyst or hydrosalpinx, not well seen on the 2/9/2021 CT. Recommend follow-up pelvic ultrasound.   I ordered a US pelvis hat showed   IMPRESSION: 1.  Likely benign 4 cm right adnexal/ovarian cyst. A follow-up pelvic ultrasound can be obtained in 6 months to assess for stability. 2.  Tubular cystic left adnexal lesion, likely hydrosalpinx.  She was just in ER for pain and was noted to have a kidney stone  that moved and caused hydronephrosis. IMPRESSION: 10/6/2022 1. Moderate left hydroureteronephrosis with a 0.5 x 0.7 x 0.7 cm left midureter left mid ureter calculus. 2. Additional multiple nonobstructing bilateral renal calculi. She has follow up pending with Dr. Davis  CBC from 10/6/2022 in ED showed hgb of 16.6 with normal MCV She is pending  surgery with Dr. Guaman for DJD  3/13/2023 She is here for follow.  She saw Dr. Morales and he checked a PET/CT that was done on 3/7/2023:  IMPRESSION: 1.  Since July 18, 2019, substantially increased FDG avid diffuse soft tissue nodularity throughout the left pleural surface, compatible with biologic tumor activity (max SUV 19.5; reflecting 275% increase). 2.  New minimal FDG avid 5 mm right upper lobe solid pulmonary nodule ( positive on NAC images). 3.  New FDG avid mediastinal lymph nodes, suspicious for elizabeth metastases (max SUV 10.8 in a subcarinal lymph node). 4.  Focal FDG uptake in the right thyroid lobe (max SUV 7). Thyroid ultrasound can be obtained for further assessment. CBC today is normal. She is here with daughter. Lost a few pounds due to diverticular disease feels better now. I showed the images to the patient and reviewed it together she saw the pleural-based uptake as well as the mediastinal nodes  4/24/2023 She is here for follow up. She had biopsy by IR of left pleural mass and only Fragments of fibroadipose tissue and skeletal muscle. I presented her at thoracic tumor board and Dr. Julian Nicholas can perform a mediastinoscopy to confirm the etiology of the findings on the PET scan. She was here with family. We went over the most recent PET CT scan together and she saw the increase of FDG uptake in the pleural space as well as similar nodes I explained that clinically I suspect this is probably her lymphoma but nevertheless other etiologies are possible. If she is hesitant about doing the biopsy I can try Rituxan for 4 doses we will repeat imaging which has reasonable side effects but of course immunosuppression is always of a concern, and this is more than acceptable treatment for indolent lymphomas but I explained that we will be treating the lung entity and thus after discussion she agreed to proceed with mediastinoscopy.  5/26/23 She returns for follow-up today she underwent a mediastinoscopy with pathology demonstrating that her mediastinal lymph nodes are positive for metastatic adenocarcinoma consistent with lung primary. This was from 5/12/2023 Final Diagnosis 1. Level 7 lymph node, excision: - Positive for metastatic adenocarcinoma. - See comment 2. Level 7 lymph node, excision: - Positive for metastatic adenocarcinoma. - See comment 3. Right level 4 lymph node, excision: - Positive for metastatic adenocarcinoma. - See comment 4. Right level 2 lymph node, excision: - Positive for metastatic adenocarcinoma. - See comment 5. Right level 2 lymph node, excision: - Positive for metastatic adenocarcinoma. - See comment She has recovered well and has no complaints today and here to talk about next  6/16/2023 She returns for follow-up. NGS showed EGFR p.([id number]). MR brain was negative. She was started on Tagrisso 1 week ago is tolerating well. Her AMAYA is stable to improved with her inhalers. She has chronic constipation alternating with diarrhea which she has had for years with no significant change. She remains active and is doing well overall. She is drinking Boosts for additional calories.  07/14/2023 accompanied by her daughter. She called earlier this week to report rash that was due to fungal infection and the developed what she described as acne-like with inflammatory component at her lower abdomen started at skin fold and spreading peripherally from there. We advised her to start steroid cream and HOLD Tagrisso (since Wednesday 07/12/2023). She changed her soap, and it seems to improve pruritus.  07/28/2023 She is here today for follow-up and reports that her rashes are getting better on steroid cream and antibiotics.  She restarted to Grieser this past Monday and it seems she is tolerating it pretty well.  08/28/2023 She is here today for a follow-up and reports that her rashes improved. She reported she saw dermatology and was prescribed clindamycin cream, mupirocin ointment, and gentamicin ointment. She continued to take Tagrisso without any new adverse reactions. She is otherwise doing well and has no new complaints.    10/30/2023 Pt is here for follow up. She continued to take Tagrisso 80 mg QD without any new adverse reactions. She states she has a R upper back ulcer since 4 weeks ago. She is unable to reach that area to put ointment on. Otherwise, she has no need complaint to offer.  She had a PET/CT on 10/2/23 that showed OH: IMPRESSION: New left posterior occipital scalp-based FDG avid lesion, SUV max 17.9, measuring approximately 1.5 cm. (New since prior PET and brain MRI). Recommend further workup. Significant decrease in FDG uptake in the mediastinum and left pleura compared to prior exam, now only avid on nonattenuation corrected images. Anatomically, pleural nodularity in the left hemithorax is also decreased. Previously described 5 mm right upper lobe pulmonary nodule is faintly visualized, not FDG avid.  11/13/2023 Pt is here for follow up. She continues to take Tagrisso 80 mg QD without any new adverse reactions. Her R upper back open comedone has improved and pain improved. Currently, her major complaint is dizziness which usually occurs when her blood pressure is high. She states that Dr. Gamble recently adjusted her BP meds by reducing losartan 100 mg QD to 50 mg QD and discontinuing amlodipine.  12/15/2023 Reports increasing generalized pains likely due to arthritis and some compensatory muscle spasm.  1/15/24: Pt returns for follow up today. She reports new rash in her groin region. She has been applying bacitracin to ti without any relief. She reports breathing is improved with Symbicort. She denied any other complaints.   2/26/24: Pt returns for follow up. She feels well today. No concerns reported with Tagriso. Groin rash is treated. She reports other medical issues stressing her including constipation, high BP, and change in diabetes medication. She is following up with appropriate physicians for the same.   3/25/24: Pt is here for follow up. She reports having rash on her face, acneiform, which is resolving now with topical Clindamycin. She also reported nail changes and dry skin. No other symptoms reported. She reports compliance with Tagrisso.   05/09/2024 Reports feeling well at the baseline of her health status, no changes in chronic conditions or new complaints since the last visit.  06/05/2024 Reports feeling well at the baseline of her health status, no changes in chronic conditions or new complaints since the last visit. She is with her chronic diverticulitis flair.  7/9/24 She is here for eb pressley. She feels well. Has nail issues from Tagrisso.   8/20/24 She is here for follow up.  She had PET.CT done on 8/7/2024 no evidence of biologic tumor activity.  She is doing well she has nail issues that continues from Tagrisso but we decided not to change anything

## 2024-08-20 NOTE — PHYSICAL EXAM
[Restricted in physically strenuous activity but ambulatory and able to carry out work of a light or sedentary nature] : Status 1- Restricted in physically strenuous activity but ambulatory and able to carry out work of a light or sedentary nature, e.g., light house work, office work [Normal] : affect appropriate [de-identified] : wears glasses [de-identified] : bilateral mild edema - unchanged [de-identified] : facial rash - resolved

## 2024-08-21 DIAGNOSIS — C64.9 MALIGNANT NEOPLASM OF UNSPECIFIED KIDNEY, EXCEPT RENAL PELVIS: ICD-10-CM

## 2024-09-09 ENCOUNTER — APPOINTMENT (OUTPATIENT)
Dept: NEUROSURGERY | Facility: CLINIC | Age: 84
End: 2024-09-09
Payer: MEDICARE

## 2024-09-09 DIAGNOSIS — M47.812 SPONDYLOSIS W/OUT MYELOPATHY OR RADICULOPATHY, CERVICAL REGION: ICD-10-CM

## 2024-09-09 PROCEDURE — 99211 OFF/OP EST MAY X REQ PHY/QHP: CPT

## 2024-09-10 NOTE — REASON FOR VISIT
[Follow-Up: _____] : a [unfilled] follow-up visit [FreeTextEntry1] : reports neck pain fatigueing head, dropped head multilevel DDD cervical spine no neurologic deficit surgery not recommended I recommend soft collar for the end of the day PT

## 2024-10-01 ENCOUNTER — OUTPATIENT (OUTPATIENT)
Dept: OUTPATIENT SERVICES | Facility: HOSPITAL | Age: 84
LOS: 1 days | End: 2024-10-01
Payer: MEDICARE

## 2024-10-01 DIAGNOSIS — E89.0 POSTPROCEDURAL HYPOTHYROIDISM: Chronic | ICD-10-CM

## 2024-10-01 DIAGNOSIS — Z00.8 ENCOUNTER FOR OTHER GENERAL EXAMINATION: ICD-10-CM

## 2024-10-01 DIAGNOSIS — Z98.890 OTHER SPECIFIED POSTPROCEDURAL STATES: Chronic | ICD-10-CM

## 2024-10-01 DIAGNOSIS — C64.9 MALIGNANT NEOPLASM OF UNSPECIFIED KIDNEY, EXCEPT RENAL PELVIS: ICD-10-CM

## 2024-10-01 PROCEDURE — 76775 US EXAM ABDO BACK WALL LIM: CPT

## 2024-10-01 PROCEDURE — 76775 US EXAM ABDO BACK WALL LIM: CPT | Mod: 26

## 2024-10-02 DIAGNOSIS — C64.9 MALIGNANT NEOPLASM OF UNSPECIFIED KIDNEY, EXCEPT RENAL PELVIS: ICD-10-CM

## 2024-10-03 ENCOUNTER — OUTPATIENT (OUTPATIENT)
Dept: OUTPATIENT SERVICES | Facility: HOSPITAL | Age: 84
LOS: 1 days | End: 2024-10-03
Payer: MEDICARE

## 2024-10-03 ENCOUNTER — LABORATORY RESULT (OUTPATIENT)
Age: 84
End: 2024-10-03

## 2024-10-03 ENCOUNTER — APPOINTMENT (OUTPATIENT)
Age: 84
End: 2024-10-03

## 2024-10-03 VITALS
HEART RATE: 88 BPM | RESPIRATION RATE: 16 BRPM | TEMPERATURE: 97.9 F | WEIGHT: 135 LBS | HEIGHT: 65 IN | DIASTOLIC BLOOD PRESSURE: 78 MMHG | SYSTOLIC BLOOD PRESSURE: 139 MMHG | OXYGEN SATURATION: 99 % | BODY MASS INDEX: 22.49 KG/M2

## 2024-10-03 DIAGNOSIS — Z98.890 OTHER SPECIFIED POSTPROCEDURAL STATES: Chronic | ICD-10-CM

## 2024-10-03 DIAGNOSIS — D50.9 IRON DEFICIENCY ANEMIA, UNSPECIFIED: ICD-10-CM

## 2024-10-03 DIAGNOSIS — C34.90 MALIGNANT NEOPLASM OF UNSPECIFIED PART OF UNSPECIFIED BRONCHUS OR LUNG: ICD-10-CM

## 2024-10-03 DIAGNOSIS — Z51.11 ENCOUNTER FOR ANTINEOPLASTIC CHEMOTHERAPY: ICD-10-CM

## 2024-10-03 DIAGNOSIS — C64.9 MALIGNANT NEOPLASM OF UNSPECIFIED KIDNEY, EXCEPT RENAL PELVIS: ICD-10-CM

## 2024-10-03 DIAGNOSIS — E89.0 POSTPROCEDURAL HYPOTHYROIDISM: Chronic | ICD-10-CM

## 2024-10-03 PROCEDURE — 85027 COMPLETE CBC AUTOMATED: CPT

## 2024-10-03 PROCEDURE — 84443 ASSAY THYROID STIM HORMONE: CPT

## 2024-10-03 PROCEDURE — 80053 COMPREHEN METABOLIC PANEL: CPT

## 2024-10-03 PROCEDURE — 99214 OFFICE O/P EST MOD 30 MIN: CPT

## 2024-10-04 DIAGNOSIS — C64.9 MALIGNANT NEOPLASM OF UNSPECIFIED KIDNEY, EXCEPT RENAL PELVIS: ICD-10-CM

## 2024-10-10 LAB
ALBUMIN SERPL ELPH-MCNC: 4.2 G/DL
ALP BLD-CCNC: 86 U/L
ALT SERPL-CCNC: 11 U/L
ANION GAP SERPL CALC-SCNC: 12 MMOL/L
AST SERPL-CCNC: 13 U/L
BILIRUB SERPL-MCNC: 0.5 MG/DL
BUN SERPL-MCNC: 24 MG/DL
CALCIUM SERPL-MCNC: 10.2 MG/DL
CHLORIDE SERPL-SCNC: 101 MMOL/L
CO2 SERPL-SCNC: 23 MMOL/L
CREAT SERPL-MCNC: 1 MG/DL
EGFR: 56 ML/MIN/1.73M2
GLUCOSE SERPL-MCNC: 83 MG/DL
HCT VFR BLD CALC: 40.1 %
HGB BLD-MCNC: 13.1 G/DL
MCHC RBC-ENTMCNC: 28.7 PG
MCHC RBC-ENTMCNC: 32.7 G/DL
MCV RBC AUTO: 87.9 FL
PLATELET # BLD AUTO: 112 K/UL
PMV BLD: 10.4 FL
POTASSIUM SERPL-SCNC: 4.6 MMOL/L
PROT SERPL-MCNC: 6.3 G/DL
RBC # BLD: 4.56 M/UL
RBC # FLD: 13.4 %
SODIUM SERPL-SCNC: 136 MMOL/L
TSH SERPL-ACNC: 1.46 UIU/ML
WBC # FLD AUTO: 3.68 K/UL

## 2024-10-10 NOTE — PHYSICAL EXAM
[Restricted in physically strenuous activity but ambulatory and able to carry out work of a light or sedentary nature] : Status 1- Restricted in physically strenuous activity but ambulatory and able to carry out work of a light or sedentary nature, e.g., light house work, office work [Normal] : normal spine exam without palpable tenderness, no kyphosis or scoliosis [de-identified] : wears glasses [de-identified] : bilateral mild edema - unchanged [de-identified] : facial rash - resolved

## 2024-10-10 NOTE — HISTORY OF PRESENT ILLNESS
[de-identified] : Ms. 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 Cody Calixto.  She is here with her daughter Brandy.  \par  \par  She had a history of Kidney Cancer s/p partial nephrectomy and distant history of Thyroid Cancer s/p partial thyroidectomy.  She also had a Left Lower Lobectomy for Stage IA lung cancer. For dates see bellow.\par  \par  She had CT scan and subsequent PET/CT that showed multiple left pleural based nodules in addition to a para-aortic lymph node.  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 The differential diagnosis includes low grade follicular lymphoma and CD10+ marginal zone lymphoma. She denies any B-symptoms at this time other than recent weight loss of 10lbs over last month, however she reports she had not been eating due to a recent GI bug.\par  \par  She is here for further recommendations. \par  Radiological Work-up:\par  PET/CT (2/21/19) IMPRESSION: Since August 20, 2010: 1. Multiple sites of left pleural-based FDG avid nodularity, catalogued above, with max SUV 5.2, suspicious for biological tumor activity. Metastatic or primary pleural neoplasm are considerations. 2. Intra-abdominal FDG avid 1.7 x 1.2 cm left para-aortic lymph node, max SUV 5.4, suspicious for lymph node metastasis. \par  1/30/2019: [facility] [place]:  comparison was made to Ct from 7/2018 Multiple Plural nodules along the posterior paramediastinal surface, some are mildly larger there are new nodules as well. largest nodule 10 mm\par  CT Head (9/15/18) IMPRESSION: 1. Mild periventricular and subcortical white matter chronic small vessel ischemic changes. 2. No acute fractures, mass effect, midline shift or hemorrhage. \par  CT cervical spine (9/15/18) IMPRESSION: 1. Degenerative changes of the cervical spine C2-3 through C6-7 worse at C5-6 as described above. 2. Straightening of normal cervical lordosis with mild anterolisthesis of C3 anterior on C4, C6 on C7, C7 on T1 and T1 on T2. 3. No acute fractures or dislocations. \par  1/22/18: MR L spine: advance lymbar spondylosis, disck osteophytes, severe neuroforamin narrowing with  ipingment of L4 nerve,  3.5 cm AAA, 8 mm adrenal nodule.\par  Pathology:\par  (3/14/19) Final Diagnosis Left parotid lymph node, needle biopsy: Low grade B-cell lymphoma. \par  Immunostains performed with adequate controls on sections from block 1A show the infiltrating cells are CD20+ CD79a+ B-cells that coexpress CD10, CD23(dim variable), and BCL2. They are negative for CD5, CD43, cyclin D1, and MUM1. BCL6 is difficult to determine due to the high background staining. Ki67 labels 5-10% of these cells. CD21 highlights scattered follicular dendritic cell meshworks.  Per report ([id number]), flow cytometry studies performed at [facility] show monotypic B-cells (10% of cells), positive for kappa, CD19, CD20, CD10; negative CD5. Heterogeneous population of T-cells (with normal CD4 to CD8 ratio), and polytypic B-cells are also present. The findings are diagnostic of low grade B-cell lymphoma. The differential diagnosis includes low grade follicular lymphoma and CD10+ marginal zone lymphoma.\par  \par  Health Maintenance:\par  Colonoscopy \par  Mammogram \par  \par  Recent blood work is in [facility]IE. Was normal except for a high sugar and calcium of 10.2 [de-identified] : 7/10/19 She was supposed to go for biopsy of pleural based nodularity that resolved prior to procedure. She will see PULM in a few weeks.  She has been purposely losing weight, using Trajenta. 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 Denies any fevers, chills, unintentional weight loss, or night sweats. CT Chest (4/11/19) IMPRESSION: Since February 21, 2019;1. Interval resolution of previously described left-sided FDG avid pleural-based nodularity. No biopsy performed. 2. Multiple stable nodules, as above. 3. Unchanged left periaortic 1.4 cm lymph node, previously noted to be FDG avid. 4. Stable right hydronephrosis and right-sided ureteral calculi.5. Stable infrarenal abdominal aortic aneurysm measuring up to 3.7 cm.  1/8/20 She is here for follow up. Since last visit she had a CT C/A/P in 7/18/2019 that showed Stable 1.1 cm left para-aortic lymph node on series 2 image 62. No new lymph nodes in the abdomen and pelvis.  Interval development of moderate to severe right hydroureteronephrosis leading to 2 proximal ureteral calculi (superior calculus measuring 5 x 4 x 5 mm, 1000 Hounsfield units and the inferior adjacent calculus measuring 5 x 7 x 6 mm, 573 Hounsfield units). Bilateral intrarenal calculi. Stable 3.7 x 4.3 cm simple appearing right adnexal cyst. Recommend one-year follow-up pelvic ultrasound. Impression: Since April 11, 2019. No significant interval change in multiple left-sided pleural-based nodules, largest approximately 1.6 cm (remeasured on earlier study), left lung base (series 5/149). Post left lower lobectomy. She was seen by Urology and had lithotripsy.  She feels well.   7/8/20 She is here for follow up. She feels well. No B symptoms. She is pending a CT chest ordered by Dr. Morales.  1/11/2021 The patient is here for follow up. She has no B symptoms, no cough, no chest pain, no new palpable lymph nodes. She is complaining of sciatica, going for an injection next week. She also had LEs cellulitis, completed once course of antibiotics.   5/19/21 We had a telephone visit today. She feels well.  She had CT chest on 2/21 that showed Since July 14, 2020, interval development of wedge-shaped consolidation posterior left upper lobe. (2/31). Minimal interval growth of multiple pleural-based nodules as described above. Numerous stable left-sided pleural based nodules and adjacent parenchymal nodules. Reviewed her CT in tumor board. Chest. We were divided on if we should rescan in 3 months with CT or check PET/cT now. I spoke to her and we decided to check a CT Chest in 3 months.  She feels well.   9/15/21 Patient is here for a follow-up visit for hx of lung cancer, NHL and pulmonary nodules. She is feeling well with no new complaints. Reviewed most recent CBC, which shows mild microcytic anemia. Patient denies fever, chills, nausea, vomiting, dyspnea or bleeding. She has not had recent colonoscopy, but has done Cologuard at home which was reportedly benign in recent years.   CT Chest (6.2.2021) IMPRESSION: Unchanged left circumferential subpleural nodularity largest measuring 1.6 cm.Resolved left lower lobe consolidation.  3/21/22 Pt returns for f/u today. She reports that she was admitted at [facility] for episode for acute diverticulitis in November 2021, she was treated with IV abx, pt reports that she had imaging done at that time and it was only significant for acute diverticulitis (we do not have the imaging results available. Since that time she reports that she has been having episodes of constipation and diarrhea, she is on laxatives. She stopped taking po iron as it was making her GI symptoms worse. Her gastroenterologist did not suggest doing a colonoscopy. Pt denies any blood in stools. Denies any fever, chills, weight loss or loss of appetite. She was also seen by Dr Sweet.   6/20/22 Pt returns for f/u today. Denies any fever, chills, weight loss or loss of appetite. In March ferritin was 17, Iron 38, iron sat 9%, Hgb 11.9, MCV 77.1. She had 5 doses of IV Venofer tolerated well, she reports cold intolerance improved. Labs reviewed 6/13/22 hgb 13.3, MCV 84.9, Ferritin 112, Iron sat 13%, Iron 37. She had Kidney US on 6/8 which showed Indeterminate hypoechoic area involving the lower pole of the left kidney. Further evaluation with outpatient renal mass protocol CT is recommended. Focal dilatation of the right upper pole calyx. Bilateral renal calculi. Incidentally noted is an abdominal aortic aneurysm, similar in size to prior CT scan, measuring 4.2 cm.   10/12/2022 She is here for follow up. she had CT C/A/P in July 2022: Lungs, Pleura, and Airways: Trachea and central airways are patent. Post left lower lobectomy with expected postsurgical changes. Extensive left subpleural nodularity demonstrating slight interval increase since prior examinations including medial left upper lobe 2.7 x 1.0 cm nodular density (series 4 image 27) and anterior left upper lobe 1.1 x 1.0 cm nodular density (series 4 image 55). Stable right adnexal cystic lesion measuring 4.4 x 3.8 cm. Tubular/cystic appearing left adnexal lesion could represent a cyst or hydrosalpinx, not well seen on the 2/9/2021 CT. Recommend follow-up pelvic ultrasound.   I ordered a US pelvis hat showed   IMPRESSION: 1.  Likely benign 4 cm right adnexal/ovarian cyst. A follow-up pelvic ultrasound can be obtained in 6 months to assess for stability. 2.  Tubular cystic left adnexal lesion, likely hydrosalpinx.  She was just in ER for pain and was noted to have a kidney stone  that moved and caused hydronephrosis. IMPRESSION: 10/6/2022 1. Moderate left hydroureteronephrosis with a 0.5 x 0.7 x 0.7 cm left midureter left mid ureter calculus. 2. Additional multiple nonobstructing bilateral renal calculi. She has follow up pending with Dr. Davis  CBC from 10/6/2022 in ED showed hgb of 16.6 with normal MCV She is pending  surgery with Dr. Guaman for DJD  3/13/2023 She is here for follow.  She saw Dr. Morales and he checked a PET/CT that was done on 3/7/2023:  IMPRESSION: 1.  Since July 18, 2019, substantially increased FDG avid diffuse soft tissue nodularity throughout the left pleural surface, compatible with biologic tumor activity (max SUV 19.5; reflecting 275% increase). 2.  New minimal FDG avid 5 mm right upper lobe solid pulmonary nodule ( positive on NAC images). 3.  New FDG avid mediastinal lymph nodes, suspicious for elizabeth metastases (max SUV 10.8 in a subcarinal lymph node). 4.  Focal FDG uptake in the right thyroid lobe (max SUV 7). Thyroid ultrasound can be obtained for further assessment. CBC today is normal. She is here with daughter. Lost a few pounds due to diverticular disease feels better now. I showed the images to the patient and reviewed it together she saw the pleural-based uptake as well as the mediastinal nodes  4/24/2023 She is here for follow up. She had biopsy by IR of left pleural mass and only Fragments of fibroadipose tissue and skeletal muscle. I presented her at thoracic tumor board and Dr. Julian Nicholas can perform a mediastinoscopy to confirm the etiology of the findings on the PET scan. She was here with family. We went over the most recent PET CT scan together and she saw the increase of FDG uptake in the pleural space as well as similar nodes I explained that clinically I suspect this is probably her lymphoma but nevertheless other etiologies are possible. If she is hesitant about doing the biopsy I can try Rituxan for 4 doses we will repeat imaging which has reasonable side effects but of course immunosuppression is always of a concern, and this is more than acceptable treatment for indolent lymphomas but I explained that we will be treating the lung entity and thus after discussion she agreed to proceed with mediastinoscopy.  5/26/23 She returns for follow-up today she underwent a mediastinoscopy with pathology demonstrating that her mediastinal lymph nodes are positive for metastatic adenocarcinoma consistent with lung primary. This was from 5/12/2023 Final Diagnosis 1. Level 7 lymph node, excision: - Positive for metastatic adenocarcinoma. - See comment 2. Level 7 lymph node, excision: - Positive for metastatic adenocarcinoma. - See comment 3. Right level 4 lymph node, excision: - Positive for metastatic adenocarcinoma. - See comment 4. Right level 2 lymph node, excision: - Positive for metastatic adenocarcinoma. - See comment 5. Right level 2 lymph node, excision: - Positive for metastatic adenocarcinoma. - See comment She has recovered well and has no complaints today and here to talk about next  6/16/2023 She returns for follow-up. NGS showed EGFR p.([id number]). MR brain was negative. She was started on Tagrisso 1 week ago is tolerating well. Her AMAYA is stable to improved with her inhalers. She has chronic constipation alternating with diarrhea which she has had for years with no significant change. She remains active and is doing well overall. She is drinking Boosts for additional calories.  07/14/2023 accompanied by her daughter. She called earlier this week to report rash that was due to fungal infection and the developed what she described as acne-like with inflammatory component at her lower abdomen started at skin fold and spreading peripherally from there. We advised her to start steroid cream and HOLD Tagrisso (since Wednesday 07/12/2023). She changed her soap, and it seems to improve pruritus.  07/28/2023 She is here today for follow-up and reports that her rashes are getting better on steroid cream and antibiotics.  She restarted to Grieser this past Monday and it seems she is tolerating it pretty well.  08/28/2023 She is here today for a follow-up and reports that her rashes improved. She reported she saw dermatology and was prescribed clindamycin cream, mupirocin ointment, and gentamicin ointment. She continued to take Tagrisso without any new adverse reactions. She is otherwise doing well and has no new complaints.    10/30/2023 Pt is here for follow up. She continued to take Tagrisso 80 mg QD without any new adverse reactions. She states she has a R upper back ulcer since 4 weeks ago. She is unable to reach that area to put ointment on. Otherwise, she has no need complaint to offer.  She had a PET/CT on 10/2/23 that showed HI: IMPRESSION: New left posterior occipital scalp-based FDG avid lesion, SUV max 17.9, measuring approximately 1.5 cm. (New since prior PET and brain MRI). Recommend further workup. Significant decrease in FDG uptake in the mediastinum and left pleura compared to prior exam, now only avid on nonattenuation corrected images. Anatomically, pleural nodularity in the left hemithorax is also decreased. Previously described 5 mm right upper lobe pulmonary nodule is faintly visualized, not FDG avid.  11/13/2023 Pt is here for follow up. She continues to take Tagrisso 80 mg QD without any new adverse reactions. Her R upper back open comedone has improved and pain improved. Currently, her major complaint is dizziness which usually occurs when her blood pressure is high. She states that Dr. Gamble recently adjusted her BP meds by reducing losartan 100 mg QD to 50 mg QD and discontinuing amlodipine.  12/15/2023 Reports increasing generalized pains likely due to arthritis and some compensatory muscle spasm.  1/15/24: Pt returns for follow up today. She reports new rash in her groin region. She has been applying bacitracin to ti without any relief. She reports breathing is improved with Symbicort. She denied any other complaints.   2/26/24: Pt returns for follow up. She feels well today. No concerns reported with Tagriso. Groin rash is treated. She reports other medical issues stressing her including constipation, high BP, and change in diabetes medication. She is following up with appropriate physicians for the same.   3/25/24: Pt is here for follow up. She reports having rash on her face, acneiform, which is resolving now with topical Clindamycin. She also reported nail changes and dry skin. No other symptoms reported. She reports compliance with Tagrisso.   05/09/2024 Reports feeling well at the baseline of her health status, no changes in chronic conditions or new complaints since the last visit.  06/05/2024 Reports feeling well at the baseline of her health status, no changes in chronic conditions or new complaints since the last visit. She is with her chronic diverticulitis flair.  7/9/24 She is here for eb pressley. She feels well. Has nail issues from Tagrisso.   8/20/24 She is here for follow up. She had PET.CT done on 8/7/2024 no evidence of biologic tumor activity. She is doing well she has nail issues that continues from Tagrisso, but we decided not to change anything.  10/03/2024 Reports feeling well at the baseline of her health status, no changes in chronic conditions or new complaints since the last visit.

## 2024-10-10 NOTE — ASSESSMENT
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 All the mediastinal lymph nodes are positive for adenocarcinoma of the lung I am not sure if this is related to her pleural-based plaques which she had for years and the biopsy was nondiagnostic, at this point pursue another biopsy we discussed was not an option for us, it is possible that these pleural-based nodules were indeed slow-growing adenocarcinoma this will make her stage IV. - We therefore decided to treat this like stage IV and to repeat imaging and if there is for example response in the mediastinal nodes but and pleural-based areas then I think at that point in time we will re-biopsy the pleural-based area - MR brain negative. - 06/2023 Started Tagrisso. Discussed side effects of Tagrisso including but not limited to rash, diarrhea, pneumonitis, keratitis, transaminitis and cytopenias. - 07/12/2023 Tagrisso is on hold due to development of rash - slowly improving on with holding Tagrisso for a few weeks and antibiotics, steroid and antifungal creams. - 07/24/2023 restarted Tagrisso. - 10/02/2023 PET/CT showed TX: Significant decrease in FDG uptake in the mediastinum and left pleura compared to prior exam, now only avid on nonattenuation corrected images. Anatomically, pleural nodularity in the left hemithorax is also decreased. - 01/15/24 PET/CT - ALIYAH. - 05/06/2024 PET/CT - Compared to PET/CT 1/11/2024, No definite sites of abnormal FDG uptake to suggest biologic activity. - 08/07/2024 PET - No evidence of biologic tumor activity.  # Right upper back large open comedo measuring approximately 1 x 1 x 1 cm, possibly related to Tagrisso - RESOLVED.  # Uncontrolled HTN  - on losartan 100 mg and if needed will add amlodipine.  # Left posterior occipital scalp-based FDG avid lesion noted on PET/CT, pt had a laceration there leading to PET/CT finding.  # Pleural Nodules on scans. - these are not new, at least since 2018 But PET/CT now showed increase activity and new mediastinal node due to sarcoid?, progression of lymphoma? RCC? - CT guided FNA biopsy was non diagnostic of the pleural nodule.  # History of Lung Cancer 13 years ago. - Early-stage NSCLC s/p resection alone ~ 13 years ago.  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 - Left para-aortic lymph node. On observation since she is asymptomatic. - Recent diverticulitis imaging done at that time showed no lymphadenopathy as per pt. - No B symptoms. - 10/02/2023, 01/15/2024 PET/CT showed ALIYAH.,  PET.CT done on 8/7/2024 no evidence of biologic tumor activity.  # Anemia, mild, microcytic -  - S/p 5 doses of Venofer in April. Ferritin improved 112, Iron sat 10%. - recommended to f/u with GI she did but not a candidate for colonoscopy due to previous peroration and will monitor.  # History of Thyroid cancer she states it was a partial thyroidectomy - This explains the presence of positive thyroglobulin 21 tumor marker.  # Adnexal cyst. - PET/CT negative.  10/03/2024 Labs reviewed and results discussed with the patient - remains clinically stable to continue current management. All questions were answered to satisfaction.  PLAN: - continue Tagrisso 80 mg PO QD. - repeat PET/CT or CT chest abdomen and pelvis in 11/2024 if doing well. - continue losartan 100 mg QD. - continue bowel regiment for constipation. - Continue Clindamycin for rash as needed. - Labs today: CBC CMP RTC in 4 weeks with CBC CMP  [AdvancecareDate] : 12/15/2023 [FreeTextEntry2] : ROBERTA given for review on 12/15/23

## 2024-10-10 NOTE — PHYSICAL EXAM
[Restricted in physically strenuous activity but ambulatory and able to carry out work of a light or sedentary nature] : Status 1- Restricted in physically strenuous activity but ambulatory and able to carry out work of a light or sedentary nature, e.g., light house work, office work [Normal] : normal spine exam without palpable tenderness, no kyphosis or scoliosis [de-identified] : wears glasses [de-identified] : bilateral mild edema - unchanged [de-identified] : facial rash - resolved

## 2024-10-10 NOTE — REVIEW OF SYSTEMS
[Fatigue] : fatigue [SOB on Exertion] : shortness of breath during exertion [Incontinence] : incontinence [Skin Rash] : skin rash [Anxiety] : anxiety [Negative] : Allergic/Immunologic [FreeTextEntry7] : reports no issues today [FreeTextEntry8] : sees Dr. Cobb [de-identified] : facial rash - well controlled on Clindamycin gel. [de-identified] : sciatica pain on Lyrica

## 2024-10-10 NOTE — CONSULT LETTER
[Dear  ___] : Dear  [unfilled], [Consult Letter:] : I had the pleasure of evaluating your patient, [unfilled]. [Please see my note below.] : Please see my note below. [Consult Closing:] : Thank you very much for allowing me to participate in the care of this patient.  If you have any questions, please do not hesitate to contact me. [Sincerely,] : Sincerely, [DrJoaquín  ___] : Dr. RANGEL [DrJoaquín ___] : Dr. RANGEL [___] : [unfilled] [FreeTextEntry3] : Kassandra

## 2024-10-10 NOTE — ASSESSMENT
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 All the mediastinal lymph nodes are positive for adenocarcinoma of the lung I am not sure if this is related to her pleural-based plaques which she had for years and the biopsy was nondiagnostic, at this point pursue another biopsy we discussed was not an option for us, it is possible that these pleural-based nodules were indeed slow-growing adenocarcinoma this will make her stage IV. - We therefore decided to treat this like stage IV and to repeat imaging and if there is for example response in the mediastinal nodes but and pleural-based areas then I think at that point in time we will re-biopsy the pleural-based area - MR brain negative. - 06/2023 Started Tagrisso. Discussed side effects of Tagrisso including but not limited to rash, diarrhea, pneumonitis, keratitis, transaminitis and cytopenias. - 07/12/2023 Tagrisso is on hold due to development of rash - slowly improving on with holding Tagrisso for a few weeks and antibiotics, steroid and antifungal creams. - 07/24/2023 restarted Tagrisso. - 10/02/2023 PET/CT showed NH: Significant decrease in FDG uptake in the mediastinum and left pleura compared to prior exam, now only avid on nonattenuation corrected images. Anatomically, pleural nodularity in the left hemithorax is also decreased. - 01/15/24 PET/CT - ALIYAH. - 05/06/2024 PET/CT - Compared to PET/CT 1/11/2024, No definite sites of abnormal FDG uptake to suggest biologic activity. - 08/07/2024 PET - No evidence of biologic tumor activity.  # Right upper back large open comedo measuring approximately 1 x 1 x 1 cm, possibly related to Tagrisso - RESOLVED.  # Uncontrolled HTN  - on losartan 100 mg and if needed will add amlodipine.  # Left posterior occipital scalp-based FDG avid lesion noted on PET/CT, pt had a laceration there leading to PET/CT finding.  # Pleural Nodules on scans. - these are not new, at least since 2018 But PET/CT now showed increase activity and new mediastinal node due to sarcoid?, progression of lymphoma? RCC? - CT guided FNA biopsy was non diagnostic of the pleural nodule.  # History of Lung Cancer 13 years ago. - Early-stage NSCLC s/p resection alone ~ 13 years ago.  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 - Left para-aortic lymph node. On observation since she is asymptomatic. - Recent diverticulitis imaging done at that time showed no lymphadenopathy as per pt. - No B symptoms. - 10/02/2023, 01/15/2024 PET/CT showed ALIYAH.,  PET.CT done on 8/7/2024 no evidence of biologic tumor activity.  # Anemia, mild, microcytic -  - S/p 5 doses of Venofer in April. Ferritin improved 112, Iron sat 10%. - recommended to f/u with GI she did but not a candidate for colonoscopy due to previous peroration and will monitor.  # History of Thyroid cancer she states it was a partial thyroidectomy - This explains the presence of positive thyroglobulin 21 tumor marker.  # Adnexal cyst. - PET/CT negative.  10/03/2024 Labs reviewed and results discussed with the patient - remains clinically stable to continue current management. All questions were answered to satisfaction.  PLAN: - continue Tagrisso 80 mg PO QD. - repeat PET/CT or CT chest abdomen and pelvis in 11/2024 if doing well. - continue losartan 100 mg QD. - continue bowel regiment for constipation. - Continue Clindamycin for rash as needed. - Labs today: CBC CMP RTC in 4 weeks with CBC CMP  [AdvancecareDate] : 12/15/2023 [FreeTextEntry2] : ROBERTA given for review on 12/15/23

## 2024-10-10 NOTE — HISTORY OF PRESENT ILLNESS
[de-identified] : Ms. 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 Cody Calixto.  She is here with her daughter Brandy.  \par  \par  She had a history of Kidney Cancer s/p partial nephrectomy and distant history of Thyroid Cancer s/p partial thyroidectomy.  She also had a Left Lower Lobectomy for Stage IA lung cancer. For dates see bellow.\par  \par  She had CT scan and subsequent PET/CT that showed multiple left pleural based nodules in addition to a para-aortic lymph node.  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 The differential diagnosis includes low grade follicular lymphoma and CD10+ marginal zone lymphoma. She denies any B-symptoms at this time other than recent weight loss of 10lbs over last month, however she reports she had not been eating due to a recent GI bug.\par  \par  She is here for further recommendations. \par  Radiological Work-up:\par  PET/CT (2/21/19) IMPRESSION: Since August 20, 2010: 1. Multiple sites of left pleural-based FDG avid nodularity, catalogued above, with max SUV 5.2, suspicious for biological tumor activity. Metastatic or primary pleural neoplasm are considerations. 2. Intra-abdominal FDG avid 1.7 x 1.2 cm left para-aortic lymph node, max SUV 5.4, suspicious for lymph node metastasis. \par  1/30/2019: [facility] [place]:  comparison was made to Ct from 7/2018 Multiple Plural nodules along the posterior paramediastinal surface, some are mildly larger there are new nodules as well. largest nodule 10 mm\par  CT Head (9/15/18) IMPRESSION: 1. Mild periventricular and subcortical white matter chronic small vessel ischemic changes. 2. No acute fractures, mass effect, midline shift or hemorrhage. \par  CT cervical spine (9/15/18) IMPRESSION: 1. Degenerative changes of the cervical spine C2-3 through C6-7 worse at C5-6 as described above. 2. Straightening of normal cervical lordosis with mild anterolisthesis of C3 anterior on C4, C6 on C7, C7 on T1 and T1 on T2. 3. No acute fractures or dislocations. \par  1/22/18: MR L spine: advance lymbar spondylosis, disck osteophytes, severe neuroforamin narrowing with  ipingment of L4 nerve,  3.5 cm AAA, 8 mm adrenal nodule.\par  Pathology:\par  (3/14/19) Final Diagnosis Left parotid lymph node, needle biopsy: Low grade B-cell lymphoma. \par  Immunostains performed with adequate controls on sections from block 1A show the infiltrating cells are CD20+ CD79a+ B-cells that coexpress CD10, CD23(dim variable), and BCL2. They are negative for CD5, CD43, cyclin D1, and MUM1. BCL6 is difficult to determine due to the high background staining. Ki67 labels 5-10% of these cells. CD21 highlights scattered follicular dendritic cell meshworks.  Per report ([id number]), flow cytometry studies performed at [facility] ([email or web address]) show monotypic B-cells (10% of cells), positive for kappa, CD19, CD20, CD10; negative CD5. Heterogeneous population of T-cells (with normal CD4 to CD8 ratio), and polytypic B-cells are also present. The findings are diagnostic of low grade B-cell lymphoma. The differential diagnosis includes low grade follicular lymphoma and CD10+ marginal zone lymphoma.\par  \par  Health Maintenance:\par  Colonoscopy \par  Mammogram \par  \par  Recent blood work is in HIE. Was normal except for a high sugar and calcium of 10.2 [de-identified] : 7/10/19 She was supposed to go for biopsy of pleural based nodularity that resolved prior to procedure. She will see PULM in a few weeks.  She has been purposely losing weight, using Trajenta. 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 Denies any fevers, chills, unintentional weight loss, or night sweats. CT Chest (4/11/19) IMPRESSION: Since February 21, 2019;1. Interval resolution of previously described left-sided FDG avid pleural-based nodularity. No biopsy performed. 2. Multiple stable nodules, as above. 3. Unchanged left periaortic 1.4 cm lymph node, previously noted to be FDG avid. 4. Stable right hydronephrosis and right-sided ureteral calculi.5. Stable infrarenal abdominal aortic aneurysm measuring up to 3.7 cm.  1/8/20 She is here for follow up. Since last visit she had a CT C/A/P in 7/18/2019 that showed Stable 1.1 cm left para-aortic lymph node on series 2 image 62. No new lymph nodes in the abdomen and pelvis.  Interval development of moderate to severe right hydroureteronephrosis leading to 2 proximal ureteral calculi (superior calculus measuring 5 x 4 x 5 mm, 1000 Hounsfield units and the inferior adjacent calculus measuring 5 x 7 x 6 mm, 573 Hounsfield units). Bilateral intrarenal calculi. Stable 3.7 x 4.3 cm simple appearing right adnexal cyst. Recommend one-year follow-up pelvic ultrasound. Impression: Since April 11, 2019. No significant interval change in multiple left-sided pleural-based nodules, largest approximately 1.6 cm (remeasured on earlier study), left lung base (series 5/149). Post left lower lobectomy. She was seen by Urology and had lithotripsy.  She feels well.   7/8/20 She is here for follow up. She feels well. No B symptoms. She is pending a CT chest ordered by Dr. Morales.  1/11/2021 The patient is here for follow up. She has no B symptoms, no cough, no chest pain, no new palpable lymph nodes. She is complaining of sciatica, going for an injection next week. She also had LEs cellulitis, completed once course of antibiotics.   5/19/21 We had a telephone visit today. She feels well.  She had CT chest on 2/21 that showed Since July 14, 2020, interval development of wedge-shaped consolidation posterior left upper lobe. (2/31). Minimal interval growth of multiple pleural-based nodules as described above. Numerous stable left-sided pleural based nodules and adjacent parenchymal nodules. Reviewed her CT in tumor board. Chest. We were divided on if we should rescan in 3 months with CT or check PET/cT now. I spoke to her and we decided to check a CT Chest in 3 months.  She feels well.   9/15/21 Patient is here for a follow-up visit for hx of lung cancer, NHL and pulmonary nodules. She is feeling well with no new complaints. Reviewed most recent CBC, which shows mild microcytic anemia. Patient denies fever, chills, nausea, vomiting, dyspnea or bleeding. She has not had recent colonoscopy, but has done Cologuard at home which was reportedly benign in recent years.   CT Chest (6.2.2021) IMPRESSION: Unchanged left circumferential subpleural nodularity largest measuring 1.6 cm.Resolved left lower lobe consolidation.  3/21/22 Pt returns for f/u today. She reports that she was admitted at [facility] for episode for acute diverticulitis in November 2021, she was treated with IV abx, pt reports that she had imaging done at that time and it was only significant for acute diverticulitis (we do not have the imaging results available. Since that time she reports that she has been having episodes of constipation and diarrhea, she is on laxatives. She stopped taking po iron as it was making her GI symptoms worse. Her gastroenterologist did not suggest doing a colonoscopy. Pt denies any blood in stools. Denies any fever, chills, weight loss or loss of appetite. She was also seen by Dr Sewet.   6/20/22 Pt returns for f/u today. Denies any fever, chills, weight loss or loss of appetite. In March ferritin was 17, Iron 38, iron sat 9%, Hgb 11.9, MCV 77.1. She had 5 doses of IV Venofer tolerated well, she reports cold intolerance improved. Labs reviewed 6/13/22 hgb 13.3, MCV 84.9, Ferritin 112, Iron sat 13%, Iron 37. She had Kidney US on 6/8 which showed Indeterminate hypoechoic area involving the lower pole of the left kidney. Further evaluation with outpatient renal mass protocol CT is recommended. Focal dilatation of the right upper pole calyx. Bilateral renal calculi. Incidentally noted is an abdominal aortic aneurysm, similar in size to prior CT scan, measuring 4.2 cm.   10/12/2022 She is here for follow up. she had CT C/A/P in July 2022: Lungs, Pleura, and Airways: Trachea and central airways are patent. Post left lower lobectomy with expected postsurgical changes. Extensive left subpleural nodularity demonstrating slight interval increase since prior examinations including medial left upper lobe 2.7 x 1.0 cm nodular density (series 4 image 27) and anterior left upper lobe 1.1 x 1.0 cm nodular density (series 4 image 55). Stable right adnexal cystic lesion measuring 4.4 x 3.8 cm. Tubular/cystic appearing left adnexal lesion could represent a cyst or hydrosalpinx, not well seen on the 2/9/2021 CT. Recommend follow-up pelvic ultrasound.   I ordered a US pelvis hat showed   IMPRESSION: 1.  Likely benign 4 cm right adnexal/ovarian cyst. A follow-up pelvic ultrasound can be obtained in 6 months to assess for stability. 2.  Tubular cystic left adnexal lesion, likely hydrosalpinx.  She was just in ER for pain and was noted to have a kidney stone  that moved and caused hydronephrosis. IMPRESSION: 10/6/2022 1. Moderate left hydroureteronephrosis with a 0.5 x 0.7 x 0.7 cm left midureter left mid ureter calculus. 2. Additional multiple nonobstructing bilateral renal calculi. She has follow up pending with Dr. Davis  CBC from 10/6/2022 in ED showed hgb of 16.6 with normal MCV She is pending  surgery with Dr. Guaman for DJD  3/13/2023 She is here for follow.  She saw Dr. Morales and he checked a PET/CT that was done on 3/7/2023:  IMPRESSION: 1.  Since July 18, 2019, substantially increased FDG avid diffuse soft tissue nodularity throughout the left pleural surface, compatible with biologic tumor activity (max SUV 19.5; reflecting 275% increase). 2.  New minimal FDG avid 5 mm right upper lobe solid pulmonary nodule ( positive on NAC images). 3.  New FDG avid mediastinal lymph nodes, suspicious for elizabeth metastases (max SUV 10.8 in a subcarinal lymph node). 4.  Focal FDG uptake in the right thyroid lobe (max SUV 7). Thyroid ultrasound can be obtained for further assessment. CBC today is normal. She is here with daughter. Lost a few pounds due to diverticular disease feels better now. I showed the images to the patient and reviewed it together she saw the pleural-based uptake as well as the mediastinal nodes  4/24/2023 She is here for follow up. She had biopsy by IR of left pleural mass and only Fragments of fibroadipose tissue and skeletal muscle. I presented her at thoracic tumor board and Dr. Julian Nicholas can perform a mediastinoscopy to confirm the etiology of the findings on the PET scan. She was here with family. We went over the most recent PET CT scan together and she saw the increase of FDG uptake in the pleural space as well as similar nodes I explained that clinically I suspect this is probably her lymphoma but nevertheless other etiologies are possible. If she is hesitant about doing the biopsy I can try Rituxan for 4 doses we will repeat imaging which has reasonable side effects but of course immunosuppression is always of a concern, and this is more than acceptable treatment for indolent lymphomas but I explained that we will be treating the lung entity and thus after discussion she agreed to proceed with mediastinoscopy.  5/26/23 She returns for follow-up today she underwent a mediastinoscopy with pathology demonstrating that her mediastinal lymph nodes are positive for metastatic adenocarcinoma consistent with lung primary. This was from 5/12/2023 Final Diagnosis 1. Level 7 lymph node, excision: - Positive for metastatic adenocarcinoma. - See comment 2. Level 7 lymph node, excision: - Positive for metastatic adenocarcinoma. - See comment 3. Right level 4 lymph node, excision: - Positive for metastatic adenocarcinoma. - See comment 4. Right level 2 lymph node, excision: - Positive for metastatic adenocarcinoma. - See comment 5. Right level 2 lymph node, excision: - Positive for metastatic adenocarcinoma. - See comment She has recovered well and has no complaints today and here to talk about next  6/16/2023 She returns for follow-up. NGS showed EGFR p.([id number]). MR brain was negative. She was started on Tagrisso 1 week ago is tolerating well. Her AMAYA is stable to improved with her inhalers. She has chronic constipation alternating with diarrhea which she has had for years with no significant change. She remains active and is doing well overall. She is drinking Boosts for additional calories.  07/14/2023 accompanied by her daughter. She called earlier this week to report rash that was due to fungal infection and the developed what she described as acne-like with inflammatory component at her lower abdomen started at skin fold and spreading peripherally from there. We advised her to start steroid cream and HOLD Tagrisso (since Wednesday 07/12/2023). She changed her soap, and it seems to improve pruritus.  07/28/2023 She is here today for follow-up and reports that her rashes are getting better on steroid cream and antibiotics.  She restarted to Grieser this past Monday and it seems she is tolerating it pretty well.  08/28/2023 She is here today for a follow-up and reports that her rashes improved. She reported she saw dermatology and was prescribed clindamycin cream, mupirocin ointment, and gentamicin ointment. She continued to take Tagrisso without any new adverse reactions. She is otherwise doing well and has no new complaints.    10/30/2023 Pt is here for follow up. She continued to take Tagrisso 80 mg QD without any new adverse reactions. She states she has a R upper back ulcer since 4 weeks ago. She is unable to reach that area to put ointment on. Otherwise, she has no need complaint to offer.  She had a PET/CT on 10/2/23 that showed ND: IMPRESSION: New left posterior occipital scalp-based FDG avid lesion, SUV max 17.9, measuring approximately 1.5 cm. (New since prior PET and brain MRI). Recommend further workup. Significant decrease in FDG uptake in the mediastinum and left pleura compared to prior exam, now only avid on nonattenuation corrected images. Anatomically, pleural nodularity in the left hemithorax is also decreased. Previously described 5 mm right upper lobe pulmonary nodule is faintly visualized, not FDG avid.  11/13/2023 Pt is here for follow up. She continues to take Tagrisso 80 mg QD without any new adverse reactions. Her R upper back open comedone has improved and pain improved. Currently, her major complaint is dizziness which usually occurs when her blood pressure is high. She states that Dr. Gamble recently adjusted her BP meds by reducing losartan 100 mg QD to 50 mg QD and discontinuing amlodipine.  12/15/2023 Reports increasing generalized pains likely due to arthritis and some compensatory muscle spasm.  1/15/24: Pt returns for follow up today. She reports new rash in her groin region. She has been applying bacitracin to ti without any relief. She reports breathing is improved with Symbicort. She denied any other complaints.   2/26/24: Pt returns for follow up. She feels well today. No concerns reported with Tagriso. Groin rash is treated. She reports other medical issues stressing her including constipation, high BP, and change in diabetes medication. She is following up with appropriate physicians for the same.   3/25/24: Pt is here for follow up. She reports having rash on her face, acneiform, which is resolving now with topical Clindamycin. She also reported nail changes and dry skin. No other symptoms reported. She reports compliance with Tagrisso.   05/09/2024 Reports feeling well at the baseline of her health status, no changes in chronic conditions or new complaints since the last visit.  06/05/2024 Reports feeling well at the baseline of her health status, no changes in chronic conditions or new complaints since the last visit. She is with her chronic diverticulitis flair.  7/9/24 She is here for eb pressley. She feels well. Has nail issues from Tagrisso.   8/20/24 She is here for follow up. She had PET.CT done on 8/7/2024 no evidence of biologic tumor activity. She is doing well she has nail issues that continues from Tagrisso, but we decided not to change anything.  10/03/2024 Reports feeling well at the baseline of her health status, no changes in chronic conditions or new complaints since the last visit.

## 2024-10-10 NOTE — REVIEW OF SYSTEMS
[Fatigue] : fatigue [SOB on Exertion] : shortness of breath during exertion [Incontinence] : incontinence [Skin Rash] : skin rash [Anxiety] : anxiety [Negative] : Allergic/Immunologic [FreeTextEntry7] : reports no issues today [FreeTextEntry8] : sees Dr. Cobb [de-identified] : facial rash - well controlled on Clindamycin gel. [de-identified] : sciatica pain on Lyrica

## 2024-10-12 ENCOUNTER — INPATIENT (INPATIENT)
Facility: HOSPITAL | Age: 84
LOS: 8 days | Discharge: HOME HEALTH | DRG: 641 | End: 2024-10-21
Attending: INTERNAL MEDICINE | Admitting: STUDENT IN AN ORGANIZED HEALTH CARE EDUCATION/TRAINING PROGRAM
Payer: MEDICARE

## 2024-10-12 VITALS
OXYGEN SATURATION: 98 % | SYSTOLIC BLOOD PRESSURE: 122 MMHG | HEART RATE: 63 BPM | TEMPERATURE: 98 F | WEIGHT: 132.94 LBS | DIASTOLIC BLOOD PRESSURE: 74 MMHG | RESPIRATION RATE: 18 BRPM

## 2024-10-12 DIAGNOSIS — Z98.890 OTHER SPECIFIED POSTPROCEDURAL STATES: Chronic | ICD-10-CM

## 2024-10-12 DIAGNOSIS — I48.92 UNSPECIFIED ATRIAL FLUTTER: ICD-10-CM

## 2024-10-12 DIAGNOSIS — Z96.653 PRESENCE OF ARTIFICIAL KNEE JOINT, BILATERAL: ICD-10-CM

## 2024-10-12 DIAGNOSIS — E86.0 DEHYDRATION: ICD-10-CM

## 2024-10-12 DIAGNOSIS — R53.81 OTHER MALAISE: ICD-10-CM

## 2024-10-12 DIAGNOSIS — N39.490 OVERFLOW INCONTINENCE: ICD-10-CM

## 2024-10-12 DIAGNOSIS — C85.90 NON-HODGKIN LYMPHOMA, UNSPECIFIED, UNSPECIFIED SITE: ICD-10-CM

## 2024-10-12 DIAGNOSIS — C34.92 MALIGNANT NEOPLASM OF UNSPECIFIED PART OF LEFT BRONCHUS OR LUNG: ICD-10-CM

## 2024-10-12 DIAGNOSIS — E83.42 HYPOMAGNESEMIA: ICD-10-CM

## 2024-10-12 DIAGNOSIS — Z87.891 PERSONAL HISTORY OF NICOTINE DEPENDENCE: ICD-10-CM

## 2024-10-12 DIAGNOSIS — Z85.850 PERSONAL HISTORY OF MALIGNANT NEOPLASM OF THYROID: ICD-10-CM

## 2024-10-12 DIAGNOSIS — Z85.528 PERSONAL HISTORY OF OTHER MALIGNANT NEOPLASM OF KIDNEY: ICD-10-CM

## 2024-10-12 DIAGNOSIS — E11.9 TYPE 2 DIABETES MELLITUS WITHOUT COMPLICATIONS: ICD-10-CM

## 2024-10-12 DIAGNOSIS — E78.5 HYPERLIPIDEMIA, UNSPECIFIED: ICD-10-CM

## 2024-10-12 DIAGNOSIS — I10 ESSENTIAL (PRIMARY) HYPERTENSION: ICD-10-CM

## 2024-10-12 DIAGNOSIS — E89.0 POSTPROCEDURAL HYPOTHYROIDISM: Chronic | ICD-10-CM

## 2024-10-12 DIAGNOSIS — I48.0 PAROXYSMAL ATRIAL FIBRILLATION: ICD-10-CM

## 2024-10-12 DIAGNOSIS — E22.2 SYNDROME OF INAPPROPRIATE SECRETION OF ANTIDIURETIC HORMONE: ICD-10-CM

## 2024-10-12 DIAGNOSIS — I71.40 ABDOMINAL AORTIC ANEURYSM, WITHOUT RUPTURE, UNSPECIFIED: ICD-10-CM

## 2024-10-12 DIAGNOSIS — H91.93 UNSPECIFIED HEARING LOSS, BILATERAL: ICD-10-CM

## 2024-10-12 DIAGNOSIS — K21.9 GASTRO-ESOPHAGEAL REFLUX DISEASE WITHOUT ESOPHAGITIS: ICD-10-CM

## 2024-10-12 DIAGNOSIS — G47.33 OBSTRUCTIVE SLEEP APNEA (ADULT) (PEDIATRIC): ICD-10-CM

## 2024-10-12 DIAGNOSIS — N39.0 URINARY TRACT INFECTION, SITE NOT SPECIFIED: ICD-10-CM

## 2024-10-12 DIAGNOSIS — R33.9 RETENTION OF URINE, UNSPECIFIED: ICD-10-CM

## 2024-10-12 LAB
APPEARANCE UR: CLEAR — SIGNIFICANT CHANGE UP
BASOPHILS # BLD AUTO: 0.03 K/UL — SIGNIFICANT CHANGE UP (ref 0–0.2)
BASOPHILS NFR BLD AUTO: 0.6 % — SIGNIFICANT CHANGE UP (ref 0–1)
BILIRUB UR-MCNC: NEGATIVE — SIGNIFICANT CHANGE UP
COLOR SPEC: YELLOW — SIGNIFICANT CHANGE UP
DIFF PNL FLD: ABNORMAL
EOSINOPHIL # BLD AUTO: 0.03 K/UL — SIGNIFICANT CHANGE UP (ref 0–0.7)
EOSINOPHIL NFR BLD AUTO: 0.6 % — SIGNIFICANT CHANGE UP (ref 0–8)
GLUCOSE UR QL: NEGATIVE MG/DL — SIGNIFICANT CHANGE UP
HCT VFR BLD CALC: 37.9 % — SIGNIFICANT CHANGE UP (ref 37–47)
HGB BLD-MCNC: 13 G/DL — SIGNIFICANT CHANGE UP (ref 12–16)
IMM GRANULOCYTES NFR BLD AUTO: 0.4 % — HIGH (ref 0.1–0.3)
KETONES UR-MCNC: ABNORMAL MG/DL
LEUKOCYTE ESTERASE UR-ACNC: ABNORMAL
LIDOCAIN IGE QN: 15 U/L — SIGNIFICANT CHANGE UP (ref 7–60)
LYMPHOCYTES # BLD AUTO: 0.76 K/UL — LOW (ref 1.2–3.4)
LYMPHOCYTES # BLD AUTO: 14.9 % — LOW (ref 20.5–51.1)
MAGNESIUM SERPL-MCNC: 1.5 MG/DL — LOW (ref 1.8–2.4)
MCHC RBC-ENTMCNC: 29.4 PG — SIGNIFICANT CHANGE UP (ref 27–31)
MCHC RBC-ENTMCNC: 34.3 G/DL — SIGNIFICANT CHANGE UP (ref 32–37)
MCV RBC AUTO: 85.7 FL — SIGNIFICANT CHANGE UP (ref 81–99)
MONOCYTES # BLD AUTO: 0.52 K/UL — SIGNIFICANT CHANGE UP (ref 0.1–0.6)
MONOCYTES NFR BLD AUTO: 10.2 % — HIGH (ref 1.7–9.3)
NEUTROPHILS # BLD AUTO: 3.74 K/UL — SIGNIFICANT CHANGE UP (ref 1.4–6.5)
NEUTROPHILS NFR BLD AUTO: 73.3 % — SIGNIFICANT CHANGE UP (ref 42.2–75.2)
NITRITE UR-MCNC: NEGATIVE — SIGNIFICANT CHANGE UP
NRBC # BLD: 0 /100 WBCS — SIGNIFICANT CHANGE UP (ref 0–0)
PH UR: 6.5 — SIGNIFICANT CHANGE UP (ref 5–8)
PLATELET # BLD AUTO: 116 K/UL — LOW (ref 130–400)
PMV BLD: 12.1 FL — HIGH (ref 7.4–10.4)
PROT UR-MCNC: SIGNIFICANT CHANGE UP MG/DL
RBC # BLD: 4.42 M/UL — SIGNIFICANT CHANGE UP (ref 4.2–5.4)
RBC # FLD: 13 % — SIGNIFICANT CHANGE UP (ref 11.5–14.5)
SP GR SPEC: 1.01 — SIGNIFICANT CHANGE UP (ref 1–1.03)
UROBILINOGEN FLD QL: 1 MG/DL — SIGNIFICANT CHANGE UP (ref 0.2–1)
WBC # BLD: 5.1 K/UL — SIGNIFICANT CHANGE UP (ref 4.8–10.8)
WBC # FLD AUTO: 5.1 K/UL — SIGNIFICANT CHANGE UP (ref 4.8–10.8)

## 2024-10-12 PROCEDURE — 93010 ELECTROCARDIOGRAM REPORT: CPT

## 2024-10-12 PROCEDURE — 70450 CT HEAD/BRAIN W/O DYE: CPT | Mod: 26,MC

## 2024-10-12 PROCEDURE — 70496 CT ANGIOGRAPHY HEAD: CPT | Mod: 26,MC

## 2024-10-12 PROCEDURE — 70498 CT ANGIOGRAPHY NECK: CPT | Mod: 26,MC

## 2024-10-12 PROCEDURE — 74177 CT ABD & PELVIS W/CONTRAST: CPT | Mod: 26,MC

## 2024-10-12 PROCEDURE — 99285 EMERGENCY DEPT VISIT HI MDM: CPT | Mod: FS

## 2024-10-12 PROCEDURE — 71045 X-RAY EXAM CHEST 1 VIEW: CPT | Mod: 26

## 2024-10-12 RX ORDER — MECLIZINE HCL 25 MG
25 TABLET ORAL ONCE
Refills: 0 | Status: COMPLETED | OUTPATIENT
Start: 2024-10-12 | End: 2024-10-12

## 2024-10-12 RX ORDER — ONDANSETRON HYDROCHLORIDE 2 MG/ML
4 INJECTION, SOLUTION INTRAMUSCULAR; INTRAVENOUS ONCE
Refills: 0 | Status: COMPLETED | OUTPATIENT
Start: 2024-10-12 | End: 2024-10-12

## 2024-10-12 RX ADMIN — ONDANSETRON HYDROCHLORIDE 4 MILLIGRAM(S): 2 INJECTION, SOLUTION INTRAMUSCULAR; INTRAVENOUS at 23:04

## 2024-10-12 RX ADMIN — Medication 25 MILLIGRAM(S): at 22:31

## 2024-10-12 RX ADMIN — Medication 500 MILLILITER(S): at 22:53

## 2024-10-12 NOTE — ED ADULT TRIAGE NOTE - CHIEF COMPLAINT QUOTE
pt sts she hasn't been feeling well since yesterday morning, complaining of feeling dizzy and nauseas since yesterday

## 2024-10-12 NOTE — ED ADULT NURSE NOTE - NSFALLUNIVINTERV_ED_ALL_ED
Bed/Stretcher in lowest position, wheels locked, appropriate side rails in place/Call bell, personal items and telephone in reach/Instruct patient to call for assistance before getting out of bed/chair/stretcher/Non-slip footwear applied when patient is off stretcher/[place] to call system/Physically safe environment - no spills, clutter or unnecessary equipment/Purposeful proactive rounding/Room/bathroom lighting operational, light cord in reach

## 2024-10-12 NOTE — ED ADULT NURSE NOTE - NSICDXPASTMEDICALHX_GEN_ALL_CORE_FT
PAST MEDICAL HISTORY:  Cancer of kidney     Cancer of thyroid     COPD (chronic obstructive pulmonary disease)     Diverticulitis     Former smoker     GERD (gastroesophageal reflux disease)     History of abdominal aortic aneurysm (AAA)     [place] (hard of hearing)     Hyperlipidemia, unspecified hyperlipidemia type     Hypertension, unspecified type     Kidney stones     Lung cancer     NHL (non-Hodgkin's lymphoma) "low grade" per heme/ onc note    Obesity     ARTIE (obstructive sleep apnea) NO CPAP MACHINE PER PT.    SOB (shortness of breath)     Type 2 diabetes mellitus with complication, without long-term current use of insulin

## 2024-10-13 DIAGNOSIS — E87.1 HYPO-OSMOLALITY AND HYPONATREMIA: ICD-10-CM

## 2024-10-13 LAB
ALBUMIN SERPL ELPH-MCNC: 3.8 G/DL — SIGNIFICANT CHANGE UP (ref 3.5–5.2)
ALBUMIN SERPL ELPH-MCNC: 4 G/DL — SIGNIFICANT CHANGE UP (ref 3.5–5.2)
ALP SERPL-CCNC: 161 U/L — HIGH (ref 30–115)
ALP SERPL-CCNC: 165 U/L — HIGH (ref 30–115)
ALT FLD-CCNC: 39 U/L — SIGNIFICANT CHANGE UP (ref 0–41)
ALT FLD-CCNC: 43 U/L — HIGH (ref 0–41)
ANION GAP SERPL CALC-SCNC: 11 MMOL/L — SIGNIFICANT CHANGE UP (ref 7–14)
ANION GAP SERPL CALC-SCNC: 11 MMOL/L — SIGNIFICANT CHANGE UP (ref 7–14)
ANION GAP SERPL CALC-SCNC: 16 MMOL/L — HIGH (ref 7–14)
ANION GAP SERPL CALC-SCNC: 16 MMOL/L — HIGH (ref 7–14)
APPEARANCE UR: CLEAR — SIGNIFICANT CHANGE UP
AST SERPL-CCNC: 25 U/L — SIGNIFICANT CHANGE UP (ref 0–41)
AST SERPL-CCNC: 35 U/L — SIGNIFICANT CHANGE UP (ref 0–41)
BASOPHILS # BLD AUTO: 0.03 K/UL — SIGNIFICANT CHANGE UP (ref 0–0.2)
BASOPHILS NFR BLD AUTO: 0.5 % — SIGNIFICANT CHANGE UP (ref 0–1)
BILIRUB SERPL-MCNC: 0.8 MG/DL — SIGNIFICANT CHANGE UP (ref 0.2–1.2)
BILIRUB SERPL-MCNC: 0.9 MG/DL — SIGNIFICANT CHANGE UP (ref 0.2–1.2)
BILIRUB UR-MCNC: NEGATIVE — SIGNIFICANT CHANGE UP
BUN SERPL-MCNC: 15 MG/DL — SIGNIFICANT CHANGE UP (ref 10–20)
BUN SERPL-MCNC: 16 MG/DL — SIGNIFICANT CHANGE UP (ref 10–20)
BUN SERPL-MCNC: 17 MG/DL — SIGNIFICANT CHANGE UP (ref 10–20)
BUN SERPL-MCNC: 21 MG/DL — HIGH (ref 10–20)
CALCIUM SERPL-MCNC: 9.3 MG/DL — SIGNIFICANT CHANGE UP (ref 8.4–10.4)
CALCIUM SERPL-MCNC: 9.6 MG/DL — SIGNIFICANT CHANGE UP (ref 8.4–10.4)
CALCIUM SERPL-MCNC: 9.8 MG/DL — SIGNIFICANT CHANGE UP (ref 8.4–10.5)
CALCIUM SERPL-MCNC: 9.8 MG/DL — SIGNIFICANT CHANGE UP (ref 8.4–10.5)
CHLORIDE SERPL-SCNC: 83 MMOL/L — LOW (ref 98–110)
CHLORIDE SERPL-SCNC: 84 MMOL/L — LOW (ref 98–110)
CHLORIDE SERPL-SCNC: 88 MMOL/L — LOW (ref 98–110)
CHLORIDE SERPL-SCNC: 89 MMOL/L — LOW (ref 98–110)
CHOLEST SERPL-MCNC: 190 MG/DL — SIGNIFICANT CHANGE UP
CO2 SERPL-SCNC: 19 MMOL/L — SIGNIFICANT CHANGE UP (ref 17–32)
CO2 SERPL-SCNC: 21 MMOL/L — SIGNIFICANT CHANGE UP (ref 17–32)
CO2 SERPL-SCNC: 22 MMOL/L — SIGNIFICANT CHANGE UP (ref 17–32)
CO2 SERPL-SCNC: 23 MMOL/L — SIGNIFICANT CHANGE UP (ref 17–32)
COLOR SPEC: YELLOW — SIGNIFICANT CHANGE UP
CREAT ?TM UR-MCNC: 28 MG/DL — SIGNIFICANT CHANGE UP
CREAT SERPL-MCNC: 0.7 MG/DL — SIGNIFICANT CHANGE UP (ref 0.7–1.5)
CREAT SERPL-MCNC: 0.7 MG/DL — SIGNIFICANT CHANGE UP (ref 0.7–1.5)
CREAT SERPL-MCNC: 0.8 MG/DL — SIGNIFICANT CHANGE UP (ref 0.7–1.5)
CREAT SERPL-MCNC: 0.8 MG/DL — SIGNIFICANT CHANGE UP (ref 0.7–1.5)
DIFF PNL FLD: ABNORMAL
EGFR: 73 ML/MIN/1.73M2 — SIGNIFICANT CHANGE UP
EGFR: 73 ML/MIN/1.73M2 — SIGNIFICANT CHANGE UP
EGFR: 86 ML/MIN/1.73M2 — SIGNIFICANT CHANGE UP
EGFR: 86 ML/MIN/1.73M2 — SIGNIFICANT CHANGE UP
EOSINOPHIL # BLD AUTO: 0.06 K/UL — SIGNIFICANT CHANGE UP (ref 0–0.7)
EOSINOPHIL NFR BLD AUTO: 1.1 % — SIGNIFICANT CHANGE UP (ref 0–8)
GLUCOSE BLDC GLUCOMTR-MCNC: 112 MG/DL — HIGH (ref 70–99)
GLUCOSE BLDC GLUCOMTR-MCNC: 128 MG/DL — HIGH (ref 70–99)
GLUCOSE SERPL-MCNC: 100 MG/DL — HIGH (ref 70–99)
GLUCOSE SERPL-MCNC: 85 MG/DL — SIGNIFICANT CHANGE UP (ref 70–99)
GLUCOSE SERPL-MCNC: 87 MG/DL — SIGNIFICANT CHANGE UP (ref 70–99)
GLUCOSE SERPL-MCNC: 94 MG/DL — SIGNIFICANT CHANGE UP (ref 70–99)
GLUCOSE UR QL: NEGATIVE MG/DL — SIGNIFICANT CHANGE UP
HCT VFR BLD CALC: 39 % — SIGNIFICANT CHANGE UP (ref 37–47)
HDLC SERPL-MCNC: 67 MG/DL — SIGNIFICANT CHANGE UP
HGB BLD-MCNC: 13.2 G/DL — SIGNIFICANT CHANGE UP (ref 12–16)
IMM GRANULOCYTES NFR BLD AUTO: 0.4 % — HIGH (ref 0.1–0.3)
INR BLD: 1.07 RATIO — SIGNIFICANT CHANGE UP (ref 0.65–1.3)
KETONES UR-MCNC: 15 MG/DL
LEUKOCYTE ESTERASE UR-ACNC: NEGATIVE — SIGNIFICANT CHANGE UP
LIPID PNL WITH DIRECT LDL SERPL: 109 MG/DL — HIGH
LYMPHOCYTES # BLD AUTO: 0.92 K/UL — LOW (ref 1.2–3.4)
LYMPHOCYTES # BLD AUTO: 16.7 % — LOW (ref 20.5–51.1)
MAGNESIUM SERPL-MCNC: 2 MG/DL — SIGNIFICANT CHANGE UP (ref 1.8–2.4)
MCHC RBC-ENTMCNC: 28.7 PG — SIGNIFICANT CHANGE UP (ref 27–31)
MCHC RBC-ENTMCNC: 33.8 G/DL — SIGNIFICANT CHANGE UP (ref 32–37)
MCV RBC AUTO: 84.8 FL — SIGNIFICANT CHANGE UP (ref 81–99)
MONOCYTES # BLD AUTO: 0.68 K/UL — HIGH (ref 0.1–0.6)
MONOCYTES NFR BLD AUTO: 12.3 % — HIGH (ref 1.7–9.3)
NEUTROPHILS # BLD AUTO: 3.81 K/UL — SIGNIFICANT CHANGE UP (ref 1.4–6.5)
NEUTROPHILS NFR BLD AUTO: 69 % — SIGNIFICANT CHANGE UP (ref 42.2–75.2)
NITRITE UR-MCNC: NEGATIVE — SIGNIFICANT CHANGE UP
NON HDL CHOLESTEROL: 123 MG/DL — SIGNIFICANT CHANGE UP
NRBC # BLD: 0 /100 WBCS — SIGNIFICANT CHANGE UP (ref 0–0)
NT-PROBNP SERPL-SCNC: 1399 PG/ML — HIGH (ref 0–300)
OSMOLALITY SERPL: 259 MOS/KG — LOW (ref 280–301)
OSMOLALITY UR: 442 MOS/KG — SIGNIFICANT CHANGE UP (ref 50–1200)
PH UR: 7 — SIGNIFICANT CHANGE UP (ref 5–8)
PLATELET # BLD AUTO: 118 K/UL — LOW (ref 130–400)
PMV BLD: 12.5 FL — HIGH (ref 7.4–10.4)
POTASSIUM SERPL-MCNC: 4.1 MMOL/L — SIGNIFICANT CHANGE UP (ref 3.5–5)
POTASSIUM SERPL-MCNC: 4.2 MMOL/L — SIGNIFICANT CHANGE UP (ref 3.5–5)
POTASSIUM SERPL-MCNC: 4.5 MMOL/L — SIGNIFICANT CHANGE UP (ref 3.5–5)
POTASSIUM SERPL-MCNC: 4.8 MMOL/L — SIGNIFICANT CHANGE UP (ref 3.5–5)
POTASSIUM SERPL-SCNC: 4.1 MMOL/L — SIGNIFICANT CHANGE UP (ref 3.5–5)
POTASSIUM SERPL-SCNC: 4.2 MMOL/L — SIGNIFICANT CHANGE UP (ref 3.5–5)
POTASSIUM SERPL-SCNC: 4.5 MMOL/L — SIGNIFICANT CHANGE UP (ref 3.5–5)
POTASSIUM SERPL-SCNC: 4.8 MMOL/L — SIGNIFICANT CHANGE UP (ref 3.5–5)
POTASSIUM UR-SCNC: 26 MMOL/L — SIGNIFICANT CHANGE UP
PROT ?TM UR-MCNC: 8 MG/DLG/24H — SIGNIFICANT CHANGE UP
PROT SERPL-MCNC: 5.7 G/DL — LOW (ref 6–8)
PROT SERPL-MCNC: 6 G/DL — SIGNIFICANT CHANGE UP (ref 6–8)
PROT UR-MCNC: NEGATIVE MG/DL — SIGNIFICANT CHANGE UP
PROT/CREAT UR-RTO: 0.3 RATIO — HIGH (ref 0–0.2)
PROTHROM AB SERPL-ACNC: 12.2 SEC — SIGNIFICANT CHANGE UP (ref 9.95–12.87)
RBC # BLD: 4.6 M/UL — SIGNIFICANT CHANGE UP (ref 4.2–5.4)
RBC # FLD: 13 % — SIGNIFICANT CHANGE UP (ref 11.5–14.5)
SODIUM SERPL-SCNC: 118 MMOL/L — CRITICAL LOW (ref 135–146)
SODIUM SERPL-SCNC: 120 MMOL/L — LOW (ref 135–146)
SODIUM SERPL-SCNC: 122 MMOL/L — LOW (ref 135–146)
SODIUM SERPL-SCNC: 123 MMOL/L — LOW (ref 135–146)
SODIUM UR-SCNC: 123 MMOL/L — SIGNIFICANT CHANGE UP
SP GR SPEC: 1.02 — SIGNIFICANT CHANGE UP (ref 1–1.03)
TRIGL SERPL-MCNC: 66 MG/DL — SIGNIFICANT CHANGE UP
TROPONIN T, HIGH SENSITIVITY RESULT: 23 NG/L — HIGH (ref 6–13)
TROPONIN T, HIGH SENSITIVITY RESULT: 27 NG/L — HIGH (ref 6–13)
UROBILINOGEN FLD QL: 1 MG/DL — SIGNIFICANT CHANGE UP (ref 0.2–1)
UUN UR-MCNC: 287 MG/DL — SIGNIFICANT CHANGE UP
WBC # BLD: 5.52 K/UL — SIGNIFICANT CHANGE UP (ref 4.8–10.8)
WBC # FLD AUTO: 5.52 K/UL — SIGNIFICANT CHANGE UP (ref 4.8–10.8)

## 2024-10-13 PROCEDURE — 87040 BLOOD CULTURE FOR BACTERIA: CPT

## 2024-10-13 PROCEDURE — 83935 ASSAY OF URINE OSMOLALITY: CPT

## 2024-10-13 PROCEDURE — 82533 TOTAL CORTISOL: CPT

## 2024-10-13 PROCEDURE — 85610 PROTHROMBIN TIME: CPT

## 2024-10-13 PROCEDURE — 99222 1ST HOSP IP/OBS MODERATE 55: CPT

## 2024-10-13 PROCEDURE — 84133 ASSAY OF URINE POTASSIUM: CPT

## 2024-10-13 PROCEDURE — 94640 AIRWAY INHALATION TREATMENT: CPT

## 2024-10-13 PROCEDURE — 85730 THROMBOPLASTIN TIME PARTIAL: CPT

## 2024-10-13 PROCEDURE — 84145 PROCALCITONIN (PCT): CPT

## 2024-10-13 PROCEDURE — 83036 HEMOGLOBIN GLYCOSYLATED A1C: CPT

## 2024-10-13 PROCEDURE — 71045 X-RAY EXAM CHEST 1 VIEW: CPT

## 2024-10-13 PROCEDURE — 82570 ASSAY OF URINE CREATININE: CPT

## 2024-10-13 PROCEDURE — 87186 SC STD MICRODIL/AGAR DIL: CPT

## 2024-10-13 PROCEDURE — 87086 URINE CULTURE/COLONY COUNT: CPT

## 2024-10-13 PROCEDURE — 85025 COMPLETE CBC W/AUTO DIFF WBC: CPT

## 2024-10-13 PROCEDURE — 82962 GLUCOSE BLOOD TEST: CPT

## 2024-10-13 PROCEDURE — 83880 ASSAY OF NATRIURETIC PEPTIDE: CPT

## 2024-10-13 PROCEDURE — 83930 ASSAY OF BLOOD OSMOLALITY: CPT

## 2024-10-13 PROCEDURE — 84156 ASSAY OF PROTEIN URINE: CPT

## 2024-10-13 PROCEDURE — 81001 URINALYSIS AUTO W/SCOPE: CPT

## 2024-10-13 PROCEDURE — 84443 ASSAY THYROID STIM HORMONE: CPT

## 2024-10-13 PROCEDURE — 97116 GAIT TRAINING THERAPY: CPT | Mod: GP

## 2024-10-13 PROCEDURE — 84540 ASSAY OF URINE/UREA-N: CPT

## 2024-10-13 PROCEDURE — 87077 CULTURE AEROBIC IDENTIFY: CPT

## 2024-10-13 PROCEDURE — 84300 ASSAY OF URINE SODIUM: CPT

## 2024-10-13 PROCEDURE — 93005 ELECTROCARDIOGRAM TRACING: CPT

## 2024-10-13 PROCEDURE — 36415 COLL VENOUS BLD VENIPUNCTURE: CPT

## 2024-10-13 PROCEDURE — 84100 ASSAY OF PHOSPHORUS: CPT

## 2024-10-13 PROCEDURE — 93307 TTE W/O DOPPLER COMPLETE: CPT

## 2024-10-13 PROCEDURE — 80053 COMPREHEN METABOLIC PANEL: CPT

## 2024-10-13 PROCEDURE — 93306 TTE W/DOPPLER COMPLETE: CPT | Mod: 26

## 2024-10-13 PROCEDURE — 97110 THERAPEUTIC EXERCISES: CPT | Mod: GP

## 2024-10-13 PROCEDURE — 97162 PT EVAL MOD COMPLEX 30 MIN: CPT | Mod: GP

## 2024-10-13 PROCEDURE — 83735 ASSAY OF MAGNESIUM: CPT

## 2024-10-13 PROCEDURE — 71275 CT ANGIOGRAPHY CHEST: CPT | Mod: MC

## 2024-10-13 PROCEDURE — 80048 BASIC METABOLIC PNL TOTAL CA: CPT

## 2024-10-13 PROCEDURE — 84484 ASSAY OF TROPONIN QUANT: CPT

## 2024-10-13 PROCEDURE — 80061 LIPID PANEL: CPT

## 2024-10-13 RX ORDER — OSIMERTINIB 80 1/1
80 TABLET, FILM COATED ORAL DAILY
Refills: 0 | Status: DISCONTINUED | OUTPATIENT
Start: 2024-10-13 | End: 2024-10-21

## 2024-10-13 RX ORDER — INSULIN LISPRO 100/ML
VIAL (ML) SUBCUTANEOUS
Refills: 0 | Status: DISCONTINUED | OUTPATIENT
Start: 2024-10-13 | End: 2024-10-21

## 2024-10-13 RX ORDER — GLUCAGON INJECTION, SOLUTION 1 MG/.2ML
1 INJECTION, SOLUTION SUBCUTANEOUS ONCE
Refills: 0 | Status: DISCONTINUED | OUTPATIENT
Start: 2024-10-13 | End: 2024-10-21

## 2024-10-13 RX ORDER — CEFTRIAXONE SODIUM 10 G
VIAL (EA) INJECTION
Refills: 0 | Status: COMPLETED | OUTPATIENT
Start: 2024-10-13 | End: 2024-10-17

## 2024-10-13 RX ORDER — OSIMERTINIB 80 1/1
80 TABLET, FILM COATED ORAL
Refills: 0 | DISCHARGE

## 2024-10-13 RX ORDER — METOPROLOL TARTRATE 50 MG
25 TABLET ORAL
Refills: 0 | Status: DISCONTINUED | OUTPATIENT
Start: 2024-10-13 | End: 2024-10-16

## 2024-10-13 RX ORDER — CEFTRIAXONE SODIUM 10 G
1000 VIAL (EA) INJECTION EVERY 24 HOURS
Refills: 0 | Status: COMPLETED | OUTPATIENT
Start: 2024-10-14 | End: 2024-10-16

## 2024-10-13 RX ORDER — BUDESONIDE AND FORMOTEROL FUMARATE DIHYDRATE 80; 4.5 UG/1; UG/1
2 AEROSOL RESPIRATORY (INHALATION)
Refills: 0 | Status: DISCONTINUED | OUTPATIENT
Start: 2024-10-13 | End: 2024-10-21

## 2024-10-13 RX ORDER — ENOXAPARIN SODIUM 40MG/0.4ML
60 SYRINGE (ML) SUBCUTANEOUS EVERY 12 HOURS
Refills: 0 | Status: DISCONTINUED | OUTPATIENT
Start: 2024-10-13 | End: 2024-10-21

## 2024-10-13 RX ORDER — LOSARTAN POTASSIUM 25 MG/1
100 TABLET ORAL DAILY
Refills: 0 | Status: DISCONTINUED | OUTPATIENT
Start: 2024-10-13 | End: 2024-10-13

## 2024-10-13 RX ORDER — ONDANSETRON HYDROCHLORIDE 2 MG/ML
2 INJECTION, SOLUTION INTRAMUSCULAR; INTRAVENOUS ONCE
Refills: 0 | Status: COMPLETED | OUTPATIENT
Start: 2024-10-13 | End: 2024-10-13

## 2024-10-13 RX ORDER — CEFTRIAXONE SODIUM 10 G
1000 VIAL (EA) INJECTION ONCE
Refills: 0 | Status: COMPLETED | OUTPATIENT
Start: 2024-10-13 | End: 2024-10-13

## 2024-10-13 RX ORDER — PANTOPRAZOLE SODIUM 40 MG/1
40 TABLET, DELAYED RELEASE ORAL
Refills: 0 | Status: DISCONTINUED | OUTPATIENT
Start: 2024-10-13 | End: 2024-10-21

## 2024-10-13 RX ORDER — MECLIZINE HCL 25 MG
25 TABLET ORAL DAILY
Refills: 0 | Status: DISCONTINUED | OUTPATIENT
Start: 2024-10-13 | End: 2024-10-21

## 2024-10-13 RX ORDER — ALBUTEROL 90 MCG
2 AEROSOL (GRAM) INHALATION EVERY 6 HOURS
Refills: 0 | Status: DISCONTINUED | OUTPATIENT
Start: 2024-10-13 | End: 2024-10-21

## 2024-10-13 RX ORDER — FLUTICASONE PROPIONATE AND SALMETEROL XINAFOATE 230; 21 UG/1; UG/1
1 AEROSOL, METERED RESPIRATORY (INHALATION)
Refills: 0 | Status: DISCONTINUED | OUTPATIENT
Start: 2024-10-13 | End: 2024-10-13

## 2024-10-13 RX ORDER — FLUTICASONE PROPIONATE AND SALMETEROL XINAFOATE 230; 21 UG/1; UG/1
1 AEROSOL, METERED RESPIRATORY (INHALATION)
Refills: 0 | Status: DISCONTINUED | OUTPATIENT
Start: 2024-10-13 | End: 2024-10-15

## 2024-10-13 RX ORDER — MAGNESIUM SULFATE IN 0.9% NACL 2 G/50 ML
2 INTRAVENOUS SOLUTION, PIGGYBACK (ML) INTRAVENOUS ONCE
Refills: 0 | Status: COMPLETED | OUTPATIENT
Start: 2024-10-13 | End: 2024-10-13

## 2024-10-13 RX ORDER — MELATONIN 5 MG
3 TABLET ORAL AT BEDTIME
Refills: 0 | Status: DISCONTINUED | OUTPATIENT
Start: 2024-10-13 | End: 2024-10-21

## 2024-10-13 RX ADMIN — Medication 150 GRAM(S): at 01:58

## 2024-10-13 RX ADMIN — Medication 25 MILLIGRAM(S): at 14:03

## 2024-10-13 RX ADMIN — OSIMERTINIB 80 MILLIGRAM(S): 80 TABLET, FILM COATED ORAL at 20:48

## 2024-10-13 RX ADMIN — Medication 60 MILLIGRAM(S): at 17:28

## 2024-10-13 RX ADMIN — ONDANSETRON HYDROCHLORIDE 2 MILLIGRAM(S): 2 INJECTION, SOLUTION INTRAMUSCULAR; INTRAVENOUS at 20:36

## 2024-10-13 RX ADMIN — BUDESONIDE AND FORMOTEROL FUMARATE DIHYDRATE 2 PUFF(S): 80; 4.5 AEROSOL RESPIRATORY (INHALATION) at 20:48

## 2024-10-13 RX ADMIN — Medication 25 MILLIGRAM(S): at 17:27

## 2024-10-13 RX ADMIN — Medication 100 MILLIGRAM(S): at 05:02

## 2024-10-13 NOTE — PATIENT PROFILE ADULT - FALL HARM RISK - HARM RISK INTERVENTIONS
Assistance with ambulation/Assistance OOB with selected safe patient handling equipment/Communicate Risk of Fall with Harm to all staff/Discuss with provider need for PT consult/Monitor gait and stability/Provide patient with walking aids - walker, cane, crutches/Reinforce activity limits and safety measures with patient and family/Sit up slowly, dangle for a short time, stand at bedside before walking/Tailored Fall Risk Interventions/Visual Cue: Yellow wristband and red socks/Bed in lowest position, wheels locked, appropriate side rails in place/Call bell, personal items and telephone in reach/Instruct patient to call for assistance before getting out of bed or chair/Non-slip footwear when patient is out of bed/[place] to call system/Physically safe environment - no spills, clutter or unnecessary equipment/Purposeful Proactive Rounding/Room/bathroom lighting operational, light cord in reach

## 2024-10-13 NOTE — CONSULT NOTE ADULT - SUBJECTIVE AND OBJECTIVE BOX
Neurology Consult  84-year-old female past medical history small cell lung cancer, lymphoma, thyroid and kidney cancer (in remission), COPD not on home O2, HTN, HLD, diabetes, kidney stones, AAA, presents with lightheadedness for 2-3 days. Pt and daughter reported she has a hx of lightheadness and takes Meclizine 25mg PRN, not sure the etiology. However, since yesterday she is feeling more lightheaded, gets worse when sitting or standing or walking. She is therefore having difficulty ambulating. She also had an episode of vomiting today. Took meclizine 25mg PRN yesterday but didnt help her much. She is also having decreased oral intake with decreased urination as per daughter. She denies any room spinning sensation. She has chronic tinnitus and mild hearing loss in both ears. Denies any focal weakness, HA, trauma.  Labs noted to have significant electrolyte abnormalities with Na 118, Mg 1.5. CTH neg for acute pathology and CTA didnt show any LVO or significant stenosis.        PAST MEDICAL & SURGICAL HISTORY:  Hypertension, unspecified type      Type 2 diabetes mellitus with complication, without long-term current use of insulin      Hyperlipidemia, unspecified hyperlipidemia type      Diverticulitis      Obesity      COPD (chronic obstructive pulmonary disease)      SOB (shortness of breath)      GERD (gastroesophageal reflux disease)      Lung cancer      ARTIE (obstructive sleep apnea)  NO CPAP MACHINE PER PT.      Cancer of kidney      Cancer of thyroid      Former smoker      NHL (non-Hodgkin's lymphoma)  "low grade" per heme/ onc note      History of abdominal aortic aneurysm (AAA)      Kidney stones      [place] (hard of hearing)      History of surgery  LEFT LOWER LOBECTOMY      History of surgery  BILATERAL KNEE REPLACEMENT      History of surgery  CATARACT RIGHT EYE      History of surgery  TUBAL LIGATION      History of surgery  CYST REMOVED  RIGHT BREAST      History of surgery  CHOLECYSTECOMY      History of surgery  RIGHT PARTIAL NEPHRECTOMY      History of surgery  BILATERAL CATARACT SURGERY      History of back surgery      H/O lithotripsy      H/O partial thyroidectomy          FAMILY HISTORY:  Family history of dementia (Mother)    Family history of cancer (Father, Grandparent)        Social History: (-) x 3    Allergies    No Known Allergies    Intolerances        MEDICATIONS  (STANDING):  magnesium sulfate  IVPB 2 Gram(s) IV Intermittent Once    MEDICATIONS  (PRN):      Review of systems:    Constitutional: as per HPI  Eyes: No eye pain or discharge  ENMT:  No difficulty hearing; No sinus or throat pain  Neck: No pain or stiffness  Respiratory: No cough, wheezing, chills or hemoptysis  Cardiovascular: No chest pain, palpitations, shortness of breath, dyspnea on exertion  Gastrointestinal: No abdominal pain, nausea, vomiting or hematemesis; No diarrhea or constipation.   Genitourinary: No dysuria, frequency, hematuria or incontinence  Neurological: As per HPI  Skin: No rashes or lesions   Endocrine: No heat or cold intolerance; No hair loss  Musculoskeletal: No joint pain or swelling  Psychiatric: No depression, anxiety, mood swings  Heme/Lymph: No easy bruising or bleeding gums    Vital Signs Last 24 Hrs  T(C): 36.9 (12 Oct 2024 21:07), Max: 36.9 (12 Oct 2024 21:07)  T(F): 98.5 (12 Oct 2024 21:07), Max: 98.5 (12 Oct 2024 21:07)  HR: 63 (12 Oct 2024 21:07) (63 - 63)  BP: 122/74 (12 Oct 2024 21:07) (122/74 - 122/74)  BP(mean): --  RR: 18 (12 Oct 2024 21:07) (18 - 18)  SpO2: 98% (12 Oct 2024 21:07) (98% - 98%)    Parameters below as of 12 Oct 2024 21:07  Patient On (Oxygen Delivery Method): room air        Examination:  General:  Appearance is consistent with chronologic age.  No abnormal facies.  Gross skin survey within normal limits.    Cognitive/Language:  The patient is oriented to person, place, time and date.  Recent and remote memory intact.  Fund of knowledge is intact and normal.  Language with normal repetition, comprehension and naming.  Nondysarthric.    Eyes: intact VFF.  EOMI w/o nystagmus, skew or reported double vision.  PERRL.  mild R ptosis  Face:  Facial sensation normal V1 - 3, no facial asymmetry.    Ears/Nose/Throat:  mild Hearing loss b/l.   Tongue and uvula midline. Likely Glossitis  Motor examination:   Normal tone, bulk and range of motion.  No tenderness, twitching, tremors or involuntary movements.  Formal Muscle Strength Testing: (MRC grade R/L) 5/5 UE; 5/5 LE.  No observable drift.  Reflexes:   2+ b/l  biceps, triceps, brachioradialis, patella and 1+ Achilles  Sensory examination:   Intact to light touch and pinprick, and vibration in all extremities.  Cerebellum:   FTN/HKS intact with normal DONNELL in all limbs.  No dysmetria or dysdiadokinesia.    Gait: unsteady, worse lightheadedness when stood up, romberg positive, able to take few steps but with assistance  HINTS: Negative      Labs:   CBC Full  -  ( 12 Oct 2024 23:30 )  WBC Count : 5.10 K/uL  RBC Count : 4.42 M/uL  Hemoglobin : 13.0 g/dL  Hematocrit : 37.9 %  Platelet Count - Automated : 116 K/uL  Mean Cell Volume : 85.7 fL  Mean Cell Hemoglobin : 29.4 pg  Mean Cell Hemoglobin Concentration : 34.3 g/dL  Auto Neutrophil # : 3.74 K/uL  Auto Lymphocyte # : 0.76 K/uL  Auto Monocyte # : 0.52 K/uL  Auto Eosinophil # : 0.03 K/uL  Auto Basophil # : 0.03 K/uL  Auto Neutrophil % : 73.3 %  Auto Lymphocyte % : 14.9 %  Auto Monocyte % : 10.2 %  Auto Eosinophil % : 0.6 %  Auto Basophil % : 0.6 %    10-12    118[LL]  |  84[L]  |  21[H]  ----------------------------<  100[H]  4.5   |  23  |  0.8    Ca    9.8      12 Oct 2024 23:30  Mg     1.5     10-12    TPro  6.0  /  Alb  4.0  /  TBili  0.9  /  DBili  x   /  AST  35  /  ALT  43[H]  /  AlkPhos  165[H]  10-12    LIVER FUNCTIONS - ( 12 Oct 2024 23:30 )  Alb: 4.0 g/dL / Pro: 6.0 g/dL / ALK PHOS: 165 U/L / ALT: 43 U/L / AST: 35 U/L / GGT: x             Urinalysis Basic - ( 12 Oct 2024 23:30 )    Color: Yellow / Appearance: Clear / S.012 / pH: x  Gluc: 100 mg/dL / Ketone: Trace mg/dL  / Bili: Negative / Urobili: 1.0 mg/dL   Blood: x / Protein: Trace mg/dL / Nitrite: Negative   Leuk Esterase: Trace / RBC: 20 /HPF / WBC 2 /HPF   Sq Epi: x / Non Sq Epi: 1 /HPF / Bacteria: Occasional /HPF          Neuroimaging:  NCHCT: CT Head No Cont:   ******PRELIMINARY REPORT******      ******PRELIMINARY REPORT******         ACC: [id number] EXAM:  CT BRAIN   ORDERED BY: MARTHA KELSEY     PROCEDURE DATE:  10/12/2024    ******PRELIMINARY REPORT******      ******PRELIMINARY REPORT******           INTERPRETATION:  CLINICAL INDICATION: Dizziness    TECHNIQUE: CT of the head was performed without the administration of   intravenous contrast.    COMPARISON: CT head 9/15/2018    FINDINGS:    Sulci and ventricles are appropriate for age.    There is noevidence of acute territorial infarct or intracranial   hemorrhage. There is no space-occupying lesion or midline shift.    There is no evidence of hydrocephalus. There are no extra-axial fluid   collections.    Bilateral cataract surgery. Mucosal thickening of the right frontal,   maxillary, and ethmoid sinuses. The mastoid air cells are aerated. The   visualized soft tissues and osseous structures appear normal.      IMPRESSION:    No acute intracranial pathology.        ******PRELIMINARY REPORT******      ******PRELIMINARY REPORT******       ZOLTAN HICKS MD; Resident Radiologist  This document is a PRELIMINARY interpretation and is pending final   attending approval. Oct 13 2024 12:51AM (10-12-24 @ 23:51)      10-13-24 @ 01:48

## 2024-10-13 NOTE — H&P ADULT - NSHPPHYSICALEXAM_GEN_ALL_CORE
CONSTITUTIONAL:   EYES: PERRLA and symmetric, EOMI,  ENMT: Oral mucosa with moist membranes.   NECK: Supple,  RESP:   CV: RRR, +S1S2,   GI: Soft,  MSK: Grossly Intact  SKIN: No rashes or ulcers noted; CONSTITUTIONAL: NAD ; laying in bed   EYES: PERRLA and symmetric, EOMI,  ENMT: Oral mucosa with moist membranes.   NECK: Supple,  RESP: Bilateral equal air entry; basilar crackles present.   CV: RRR, +S1S2,   GI: Soft, lower abdominal pain ; BS present.   MSK: Grossly Intact  SKIN: No rashes or ulcers noted;

## 2024-10-13 NOTE — ED PROVIDER NOTE - DIFFERENTIAL DIAGNOSIS
Pancreatitis, hepatic failure, obstructive uropathy, diverticulitis, colitis, abscess, bowel obstruction, bowel perforation. Electrolyte abnormalities, ADRIA, and GI bleeding.  r/o ICH. Differential Diagnosis

## 2024-10-13 NOTE — H&P ADULT - NSHPLABSRESULTS_GEN_ALL_CORE
Labs:                        13.0   5.10  )-----------( 116      ( 12 Oct 2024 23:30 )             37.9     10-12    118[LL]  |  84[L]  |  21[H]  ----------------------------<  100[H]  4.5   |  23  |  0.8    Ca    9.8      12 Oct 2024 23:30  Mg     1.5     10-12    TPro  6.0  /  Alb  4.0  /  TBili  0.9  /  DBili  x   /  AST  35  /  ALT  43[H]  /  AlkPhos  165[H]  10-12      Culture - Urine (collected 11-13-22 @ 09:00)  Source: Clean Catch Clean Catch (Midstream)  Final Report (11-14-22 @ 16:34):    No growth    Culture - Blood (collected 11-11-22 @ 18:35)  Source: .Blood Blood  Final Report (11-17-22 @ 03:00):    No Growth Final    Culture - Blood (collected 11-11-22 @ 18:35)  Source: .Blood Blood  Final Report (11-17-22 @ 03:00):    No Growth Final    Culture - Urine (collected 10-17-22 @ 16:42)  Source: Clean Catch Clean Catch (Midstream)  Final Report (10-18-22 @ 21:56):    No growth      Creatinine: 0.8 mg/dL (10-12-24 @ 23:30)    WBC Count: 5.10 K/uL (10-12-24 @ 23:30)    Alkaline Phosphatase: 165 U/L (10-12-24 @ 23:30)  Alanine Aminotransferase (ALT/SGPT): 43 U/L (10-12-24 @ 23:30)  Aspartate Aminotransferase (AST/SGOT): 35 U/L (10-12-24 @ 23:30)  Bilirubin Total: 0.9 mg/dL (10-12-24 @ 23:30)

## 2024-10-13 NOTE — H&P ADULT - ASSESSMENT
84-year-old female past medical history small cell lung cancer, lymphoma, thyroid and kidney cancer (in remission), COPD not on home O2, HTN, HLD, diabetes, kidney stones, AAA, presents with lightheadedness for 2-3 days. Pt and daughter reported she has a hx of lightheadness and takes Meclizine 25mg PRN, not sure the etiology. However, since yesterday she is feeling more lightheaded, gets worse when sitting or standing or walking. She is therefore having difficulty ambulating. She also had an episode of vomiting today. Took meclizine 25mg PRN yesterday but didnt help her much. She is also having decreased oral intake with decreased urination as per daughter. She denies any room spinning sensation. She has chronic tinnitus and mild hearing loss in both ears.       #Lightheadness:   # Severe hyponatremia ( ?? Cause) :   # Decreased PO intake:   - came to the ED for lightheadness and Decreased PO intake   - Vitals stable in the ed  - Labs : na 118   - s/p 500 cc LR in the Ed.   - FLuid restriciton   - Urine studies .       #Small cell lung cancer  # lymphoma  # thyroid   # kidney cancer (in remission),      # COPD not on home O2    # HTN  # HLD  # Diabetes mellitus type 2:   - Continue home medications    # Hx of Kidney stones  # AAA   - Outpatient work up         # MISC:   - DVT :   - GI : PPI   - Activity : Ambulate as tolerated   - Diet: Fluid restriction .  84-year-old female past medical history small cell lung cancer, lymphoma, thyroid and kidney cancer (in remission), COPD not on home O2, HTN, HLD, diabetes, kidney stones, AAA, presents with lightheadedness for 2-3 days. Pt and daughter reported she has a hx of lightheadness and takes Meclizine 25mg PRN, not sure the etiology. However, since yesterday she is feeling more lightheaded, gets worse when sitting or standing or walking. She is therefore having difficulty ambulating. She also had an episode of vomiting today. Took meclizine 25mg PRN yesterday but didnt help her much. She is also having decreased oral intake with decreased urination as per daughter. She denies any room spinning sensation. She has chronic tinnitus and mild hearing loss in both ears.       #Acute on chronic dizziness vs Lightheadedness ( Electrolytes  vs Cardiac cause)   # Severe hyponatremia ( Dietary vs SIADH 2/2 Lung cancer)   # Decreased PO intake:   - came to the ED for lightheadness and Decreased PO intake   - Vitals stable in the ed  - Labs : na 118   - s/p 500 cc LR in the Ed.   - FLuid restriciton   - Urine studies sent  - Serum osmolality   - TSH ; AM cortisol   - BMP q 6 hours   - If no improvement , Can consult nephro.     # NEwly Diagnosed A fib:   - CHADVASC 5   - HASBLED 2   - Rate control : Metoprolol tartrate 25 mg BID   - Anticoagulation : Discussed with the patient and daughter about bleeding risk ; Agreeable to AC   - Lovenox 60 mg BID.   - Monitor on Telemetry ; might have heart block leading to lightheadedness, dizziness.       #Small cell lung cancer  # lymphoma  # thyroid   # kidney cancer (in remission),  - Follows Dr. Soares as outpatinet   - On Tagrisso OD. Continue       # COPD not on home O2  - not wheezing   - On symbicort and albuterol   - Follows Dr. Brian Lake     # HTN  # HLD  # Diabetes mellitus type 2:   - Continue home medications    # Hx of Kidney stones  # AAA   - Outpatient work up         # MISC:   - DVT : Loevnox AC  - GI : PPI   - Activity : Ambulate as tolerated   - Diet: Fluid restriction .  84-year-old female past medical history small cell lung cancer, lymphoma, thyroid and kidney cancer (in remission), COPD not on home O2, HTN, HLD, diabetes, kidney stones, AAA, presents with lightheadedness for 2-3 days. Pt and daughter reported she has a hx of lightheadness and takes Meclizine 25mg PRN, not sure the etiology. However, since yesterday she is feeling more lightheaded, gets worse when sitting or standing or walking. She is therefore having difficulty ambulating. She also had an episode of vomiting today. Took meclizine 25mg PRN yesterday but didnt help her much. She is also having decreased oral intake with decreased urination as per daughter. She denies any room spinning sensation. She has chronic tinnitus and mild hearing loss in both ears.       #Acute on chronic dizziness vs Lightheadedness ( Electrolytes  vs Cardiac cause)   # Severe hyponatremia ( Dietary vs SIADH 2/2 Lung cancer)   # Decreased PO intake:   - came to the ED for lightheadness and Decreased PO intake   - Vitals stable in the ed  - Labs : na 118   - s/p 500 cc LR in the Ed.   - FLuid restriciton   - Urine studies sent  - Serum osmolality   - TSH ; AM cortisol   - BMP q 6 hours   - If no improvement , Can consult nephro.     # NEwly Diagnosed A fib:   - CHADVASC 5   - HASBLED 2   - Rate control : Metoprolol tartrate 25 mg BID   - Anticoagulation : Discussed with the patient and daughter about bleeding risk ; Agreeable to AC   - Lovenox 60 mg BID.   - Monitor on Telemetry ; might have heart block leading to lightheadedness, dizziness.       #Small cell lung cancer  # lymphoma  # thyroid   # kidney cancer (in remission),  - Follows Dr. Soares as outpatinet   - On Tagrisso OD. Continue       # COPD not on home O2  - not wheezing   - On symbicort and albuterol   - Follows Dr. Brian Lake     # HTN  # HLD  # Diabetes mellitus type 2:   - Hold antihypertensive for now   - Monitor blood glucose   - INsulin sliding scale.     # Hx of Kidney stones  # AAA   - Outpatient work up         # MISC:   - DVT : Loevnox AC  - GI : PPI   - Activity : Ambulate as tolerated   - Diet: Fluid restriction .  84-year-old female past medical history small cell lung cancer, lymphoma, thyroid and kidney cancer (in remission), COPD not on home O2, HTN, HLD, diabetes, kidney stones, AAA, presents with lightheadedness for 2-3 days. Pt and daughter reported she has a hx of lightheadness and takes Meclizine 25mg PRN, not sure the etiology. However, since yesterday she is feeling more lightheaded, gets worse when sitting or standing or walking. She is therefore having difficulty ambulating. She also had an episode of vomiting today. Took meclizine 25mg PRN yesterday but didnt help her much. She is also having decreased oral intake with decreased urination as per daughter. She denies any room spinning sensation. She has chronic tinnitus and mild hearing loss in both ears.       #Acute on chronic dizziness vs Lightheadedness ( Electrolytes  vs Cardiac cause)   # Severe hyponatremia ( Dietary vs SIADH 2/2 Lung cancer)   # Decreased PO intake:   - came to the ED for lightheadness and Decreased PO intake   - Vitals stable in the ed  - Labs : na 118   - s/p 500 cc LR in the Ed.   - ORthostatic check   - FLuid restriciton   - Urine studies sent  - Serum osmolality   - TSH ; AM cortisol   - BMP q 6 hours   - If no improvement , Can consult nephro.     # NEwly Diagnosed A fib:   - CHADVASC 5   - HASBLED 2   - Rate control : Metoprolol tartrate 25 mg BID   - Anticoagulation : Discussed with the patient and daughter about bleeding risk ; Agreeable to AC   - Lovenox 60 mg BID.   - Monitor on Telemetry ; might have heart block leading to lightheadedness, dizziness.     # LOwer abdominal pain likely UTI   - Lower abdominal pain with burning sensation ; ( symptomatic )   - UA weakly positive   - Send Urine cultures  - Rocephin 1 gm OD for 3 days.       #Small cell lung cancer  # lymphoma  # thyroid   # kidney cancer (in remission),  - Follows Dr. Soares as outpatinet   - On Tagrisso OD. Continue       # COPD not on home O2  - not wheezing   - On symbicort and albuterol   - Follows Dr. Brian Lake     # HTN  # HLD  # Diabetes mellitus type 2:   - Hold antihypertensive for now   - Monitor blood glucose   - INsulin sliding scale.     # Hx of Kidney stones  # AAA   - Outpatient work up         # MISC:   - DVT : Loevnox AC  - GI : PPI   - Activity : Ambulate as tolerated   - Diet: Fluid restriction .

## 2024-10-13 NOTE — ED PROVIDER NOTE - NSICDXPASTMEDICALHX_GEN_ALL_CORE_FT
PAST MEDICAL HISTORY:  Cancer of kidney     Cancer of thyroid     COPD (chronic obstructive pulmonary disease)     Diverticulitis     Former smoker     GERD (gastroesophageal reflux disease)     History of abdominal aortic aneurysm (AAA)     [place] (hard of hearing)     Hyperlipidemia, unspecified hyperlipidemia type     Hypertension, unspecified type     Kidney stones     Lung cancer     NHL (non-Hodgkin's lymphoma) "low grade" per heme/ onc note    Obesity     ARTIE (obstructive sleep apnea) NO CPAP MACHINE PER PT.    SOB (shortness of breath)     Type 2 diabetes mellitus with complication, without long-term current use of insulin

## 2024-10-13 NOTE — ED PROVIDER NOTE - CARE PLAN
Principal Discharge DX:	Hyponatremia  Secondary Diagnosis:	Elevated troponin  Secondary Diagnosis:	Atrial flutter  Secondary Diagnosis:	AAA (abdominal aortic aneurysm)  Secondary Diagnosis:	Dizziness   1

## 2024-10-13 NOTE — H&P ADULT - HISTORY OF PRESENT ILLNESS
84-year-old female past medical history small cell lung cancer, lymphoma, thyroid and kidney cancer (in remission), COPD not on home O2, HTN, HLD, diabetes, kidney stones, AAA, presents with lightheadedness for 2-3 days. Pt and daughter reported she has a hx of lightheadness and takes Meclizine 25mg PRN, not sure the etiology. However, since yesterday she is feeling more lightheaded, gets worse when sitting or standing or walking. She is therefore having difficulty ambulating. She also had an episode of vomiting today. Took meclizine 25mg PRN yesterday but didnt help her much. She is also having decreased oral intake with decreased urination as per daughter. She denies any room spinning sensation. She has chronic tinnitus and mild hearing loss in both ears. Denies any focal weakness, HA, trauma.  Labs noted to have significant electrolyte abnormalities with Na 118, Mg 1.5. CTH neg for acute pathology and CTA didnt show any LVO or significant stenosis.    In the ED; Vitals : /74 mmHg; HR 63 ; RR 18 ; Afebrile ; maintaining saturation on RA.     Labs:   WBC 5.10 Hb 13.0 MCV 85.7 Platelets 116 k   Na 118 K 4.5  BUN/Creatinine 21/0.8   ALT 43  Magnesium 1.5   Troponin 27    UA Weakly positive     Imaging:   CT Angio Head:No large vessel occlusion, aneurysm, or vascular malformation.  CT head : No acute intracranial pathology.  CT Abdomen and Pelvis : No evidence of acute abdominal pathology.Stable infrarenal abdominal aortic aneurysm measuring up to 5.4 cm.    s/p  x1 ; meclizine ; magnesium 2 gm x1.     Admitted to medicine  84-year-old female past medical history small cell lung cancer, lymphoma, thyroid and kidney cancer (in remission), COPD not on home O2, HTN, HLD, diabetes, kidney stones, AAA, presents with lightheadedness for 2-3 days. Pt and daughter reported she has a hx of lightheadness and takes Meclizine 25mg PRN, not sure the etiology. However, since yesterday she is feeling more lightheaded, gets worse when sitting or standing or walking. She is therefore having difficulty ambulating. She also had an episode of vomiting today. Took meclizine 25mg PRN yesterday but didnt help her much. She is also having decreased oral intake with decreased urination as per daughter. She denies any room spinning sensation. She has chronic tinnitus and mild hearing loss in both ears.     Denies any focal weakness, HA, trauma.   In the ED; Vitals : /74 mmHg; HR 63 ; RR 18 ; Afebrile ; maintaining saturation on RA.     Labs:   WBC 5.10 Hb 13.0 MCV 85.7 Platelets 116 k   Na 118 K 4.5  BUN/Creatinine 21/0.8   ALT 43  Magnesium 1.5   Troponin 27    UA Weakly positive     Imaging:   CT Angio Head:No large vessel occlusion, aneurysm, or vascular malformation.  CT head : No acute intracranial pathology.  CT Abdomen and Pelvis : No evidence of acute abdominal pathology.Stable infrarenal abdominal aortic aneurysm measuring up to 5.4 cm.    s/p  x1 ; meclizine ; magnesium 2 gm x1.     Admitted to medicine

## 2024-10-13 NOTE — ED PROVIDER NOTE - OBJECTIVE STATEMENT
84-year-old female past medical history small cell lung cancer, COPD not on home O2, HTN, HLD, diabetes, kidney stones, AAA, presents with dizziness, lightheadedness, nausea since yesterday.  Daughter also reports decreased p.o. intake during this time, with decreased urination.  Patient denies fever, chest pain, shortness of breath, abdominal pain, dysuria.  Patient daughter states patient follows with Dr. Cobb as outpatient urologist, found to have left hydronephrosis on ultrasound and scheduled to have outpatient CT scan done.  Patient states dizziness and lightheadedness is worse with movement such as standing and walking.  Denies syncope.

## 2024-10-13 NOTE — PATIENT PROFILE ADULT - PATIENT REPRESENTATIVE: ( YOU CAN CHOOSE ANY PERSON THAT CAN ASSIST YOU WITH YOUR HEALTH CARE PREFERENCES, DOES NOT HAVE TO BE A SPOUSE, IMMEDIATE FAMILY OR SIGNIFICANT OTHER/PARTNER)
declines Birth Control Pills Counseling: Birth Control Pill Counseling: I discussed with the patient the potential side effects of OCPs including but not limited to increased risk of stroke, heart attack, thrombophlebitis, deep venous thrombosis, hepatic adenomas, breast changes, GI upset, headaches, and depression.  The patient verbalized understanding of the proper use and possible adverse effects of OCPs. All of the patient's questions and concerns were addressed.

## 2024-10-13 NOTE — CONSULT NOTE ADULT - SUBJECTIVE AND OBJECTIVE BOX
Outpt cardiologist:    HISTORY OF PRESENT ILLNESS:  84-year-old female past medical history small cell lung cancer, lymphoma, thyroid and kidney cancer (in remission), COPD not on home O2, HTN, HLD, diabetes, kidney stones, AAA, presents with lightheadedness for 2-3 days. Pt and daughter reported she has a hx of lightheadness and takes Meclizine 25mg PRN, not sure the etiology. However, since yesterday she is feeling more lightheaded, gets worse when sitting or standing or walking. She is therefore having difficulty ambulating. She also had an episode of vomiting today. Took meclizine 25mg PRN yesterday but didnt help her much. She is also having decreased oral intake with decreased urination as per daughter. She denies any room spinning sensation. She has chronic tinnitus and mild hearing loss in both ears.     Denies any focal weakness, HA, trauma.   In the ED; Vitals : /74 mmHg; HR 63 ; RR 18 ; Afebrile ; maintaining saturation on RA.     Labs:   WBC 5.10 Hb 13.0 MCV 85.7 Platelets 116 k   Na 118 K 4.5  BUN/Creatinine 21/0.8   ALT 43  Magnesium 1.5   Troponin 27    UA Weakly positive     Imaging:   CT Angio Head:No large vessel occlusion, aneurysm, or vascular malformation.  CT head : No acute intracranial pathology.  CT Abdomen and Pelvis : No evidence of acute abdominal pathology.Stable infrarenal abdominal aortic aneurysm measuring up to 5.4 cm.    s/p  x1 ; meclizine ; magnesium 2 gm x1.     Admitted to medicine  (13 Oct 2024 02:59)      Cardiology Fellow Notes          PAST MEDICAL & SURGICAL HISTORY  Hypertension, unspecified type    Type 2 diabetes mellitus with complication, without long-term current use of insulin    Hyperlipidemia, unspecified hyperlipidemia type    Diverticulitis    Obesity    COPD (chronic obstructive pulmonary disease)    SOB (shortness of breath)    GERD (gastroesophageal reflux disease)    Lung cancer    ARTIE (obstructive sleep apnea)  NO CPAP MACHINE PER PT.    Cancer of kidney    Cancer of thyroid    Former smoker    NHL (non-Hodgkin's lymphoma)  "low grade" per heme/ onc note    History of abdominal aortic aneurysm (AAA)    Kidney stones    [place] (hard of hearing)    History of surgery  LEFT LOWER LOBECTOMY    History of surgery  BILATERAL KNEE REPLACEMENT    History of surgery  CATARACT RIGHT EYE    History of surgery  TUBAL LIGATION    History of surgery  CYST REMOVED  RIGHT BREAST    History of surgery  CHOLECYSTECOMY    History of surgery  RIGHT PARTIAL NEPHRECTOMY    History of surgery  BILATERAL CATARACT SURGERY    History of back surgery    H/O lithotripsy    H/O partial thyroidectomy        FAMILY HISTORY:  FAMILY HISTORY:  Family history of dementia (Mother)    Family history of cancer (Father, Grandparent)        SOCIAL HISTORY:  Social History:      ALLERGIES:  No Known Allergies      MEDICATIONS:  budesonide 160 MICROgram(s)/formoterol 4.5 MICROgram(s) Inhaler 2 Puff(s) Inhalation two times a day  cefTRIAXone   IVPB      dextrose 5%. 1000 milliLiter(s) (100 mL/Hr) IV Continuous <Continuous>  dextrose 5%. 1000 milliLiter(s) (50 mL/Hr) IV Continuous <Continuous>  dextrose 50% Injectable 25 Gram(s) IV Push once  dextrose 50% Injectable 25 Gram(s) IV Push once  dextrose 50% Injectable 12.5 Gram(s) IV Push once  enoxaparin Injectable 60 milliGRAM(s) SubCutaneous every 12 hours  fluticasone propionate/ salmeterol 250-50 MICROgram(s) Diskus 1 Dose(s) Inhalation two times a day  glucagon  Injectable 1 milliGRAM(s) IntraMuscular once  insulin lispro (ADMELOG) corrective regimen sliding scale   SubCutaneous three times a day before meals  metoprolol tartrate 25 milliGRAM(s) Oral two times a day  osimertinib 80 milliGRAM(s) Oral daily  pantoprazole    Tablet 40 milliGRAM(s) Oral before breakfast    PRN:  albuterol    90 MICROgram(s) HFA Inhaler 2 Puff(s) Inhalation every 6 hours PRN  dextrose Oral Gel 15 Gram(s) Oral once PRN  meclizine 25 milliGRAM(s) Oral daily PRN  melatonin 3 milliGRAM(s) Oral at bedtime PRN      HOME MEDICATIONS:  Home Medications:  amLODIPine 2.5 mg oral tablet: 1 tab(s) orally once a day (in the evening) (13 Oct 2024 03:42)  losartan 100 mg oral tablet: 1 tab(s) orally once a day (13 Oct 2024 03:48)  meclizine 25 mg oral tablet: 1 tab(s) orally once a day as needed for  dizziness (13 Oct 2024 03:46)  metoprolol tartrate 50 mg oral tablet: 1 tab(s) orally once a day (13 Oct 2024 03:48)  omeprazole 40 mg oral delayed release capsule: 1 cap(s) orally once a day (13 Oct 2024 03:48)  oxybutynin 10 mg/24 hr oral tablet, extended release: 1 tab(s) orally once a day (13 Oct 2024 03:48)  Symbicort 160 mcg-4.5 mcg/inh inhalation aerosol: 2 puff(s) inhaled 2 times a day (13 Oct 2024 03:48)  Tagrisso 80 mg oral tablet: 80 milligram(s) orally once a day (13 Oct 2024 03:46)  torsemide 20 mg oral tablet: 1 tab(s) orally 3 times a week (13 Oct 2024 03:47)  Trulicity Pen 1.5 mg/0.5 mL subcutaneous solution: 1.5 milligram(s) subcutaneous once a week (13 Oct 2024 03:48)  Ventolin HFA 90 mcg/inh inhalation aerosol: 2 puff(s) inhaled every 6 hours (13 Oct 2024 03:48)  Vitamin D3 25 mcg (1000 intl units) oral tablet: 1 tab(s) orally once a day (13 Oct 2024 03:48)      VITALS:   T(F): 97.6 (10-13 @ 08:12), Max: 98.5 (10-12 @ 21:07)  HR: 75 (10-13 @ 15:06) (60 - 76)  BP: 143/82 (10-13 @ 15:06) (122/74 - 165/87)  BP(mean): 102 (10-13 @ 15:06) (102 - 117)  RR: 18 (10-13 @ 08:12) (18 - 18)  SpO2: 97% (10-13 @ 08:12) (96% - 98%)    I&O's Summary    13 Oct 2024 07:01  -  13 Oct 2024 17:09  --------------------------------------------------------  IN: 0 mL / OUT: 300 mL / NET: -300 mL        REVIEW OF SYSTEMS:  CONSTITUTIONAL: No weakness, fevers or chills  HEENT: No visual changes, neck/ear pain  RESPIRATORY: No cough, sob  CARDIOVASCULAR: See HPI    PHYSICAL EXAM:  *General: Not in distress.  Non-toxic appearing.   *HEENT: EOMI  *Cardio: regular, S1, S2, no murmur  *Pulm: B/L BS.  No wheezing / crackles / rales  *Abdomen: Soft, non-tender, non-distended. Normoactive bowel sounds  *Extremities: No edema b/l le. Warm. Knee caps warm. Pulses + bilaterally. Normal capillary refill.   *Neuro: A&O x3. No focal deficits    LABS:                        13.2   5.52  )-----------( 118      ( 13 Oct 2024 06:34 )             39.0     10-13    123[L]  |  88[L]  |  16  ----------------------------<  87  4.8   |  19  |  0.8    Ca    9.6      13 Oct 2024 14:08  Mg     2.0     10-13    TPro  5.7[L]  /  Alb  3.8  /  TBili  0.8  /  DBili  x   /  AST  25  /  ALT  39  /  AlkPhos  161[H]  10-13    PT/INR - ( 13 Oct 2024 06:34 )   PT: 12.20 sec;   INR: 1.07 ratio                   Troponin trend:  27-23    10-13 Chol 190 LDL -- HDL 67 Trig 66      IMAGING:  - CXR:  Left lung opacity  - TTE:  EF 75%, basal septal hypertrophy 1.8 cm, Severe LAD and RAD, Mod MR, Mod severe TR, PSAP 42, thickening of AV vs vegitation      ECG:  A flutter with variable block      TELEMETRY EVENTS:

## 2024-10-13 NOTE — ED PROVIDER NOTE - ATTENDING APP SHARED VISIT CONTRIBUTION OF CARE
I have personally performed a history and physical exam on this patient. I have personally directed the management of the patient.  Patient is c/o nausea/vomiting/lightheadedness, associated with decreased PO intake and decreased urine output. No trauma.   Lungs: B/L decreased air entry,   Abd: +BS, NT, ND, soft,   A/P: Lightheadedness/nausea/vomiting,   Decreased PO intake,   Labs, imaging,   reevaluation.

## 2024-10-13 NOTE — ED PROVIDER NOTE - PHYSICAL EXAMINATION
Vital Signs: I have reviewed the initial vital signs.  CONSTITUTIONAL: Pt in no acute distress  SKIN: Skin exam is warm and dry, no acute rash.  HEAD: Normocephalic; atraumatic.  EYES: +fatigable horizontal nystagmus. PERRL, EOM intact; conjunctiva and sclera clear.  ENT: No nasal discharge; airway clear. Oropharynx normal.  NECK: Supple; FROM  CARD: S1, S2 normal; no murmurs, gallops, or rubs. Regular rate and rhythm.  RESP: +b/l coarse breath sounds and faint crackles.  ABD: soft; non-distended; non-tender; no hepatosplenomegaly.  MSK: Normal ROM. No clubbing, cyanosis or edema.  NEURO: Alert, oriented. Grossly unremarkable. No focal deficits. Pronator negative. Romberg negative. Finger to nose intact. Pt symptomatic of dizziness during exam.

## 2024-10-13 NOTE — CONSULT NOTE ADULT - ASSESSMENT
84-year-old female past medical history small cell lung cancer, lymphoma, thyroid and kidney cancer (in remission), COPD not on home O2, HTN, HLD, diabetes, kidney stones, AAA, presents with lightheadedness for 2-3 days.

## 2024-10-13 NOTE — CONSULT NOTE ADULT - ASSESSMENT
84-year-old female past medical history small cell lung cancer, lymphoma, thyroid and kidney cancer (in remission), COPD not on home O2, HTN, HLD, diabetes, kidney stones, AAA, presents with lightheadedness for 2-3 days which is worse when sitting, standing. Pt also has decreased PO intake. Her neuro exam is non focal without any dysmetria or other cerebellar signs. She has a baseline lightheadedness with tinnitus and hearing loss, so suspect inner ear etiology likely [place]. The recent worsening of lightheadedness is likely due to Hyponatremia (118) vs. orthostatics. Less likely etiology include brain mets vs. other central etiologies.    Recommendations:  - correct electrolytes slowly  - obtain orthostatics  - give Meclizine 25mg TID standing for 2-3 days  - obtain MRI brain w/wo  - Neurology will follow    Case WILL BE discussed with attending Dr. Mercedez Jerome MD  PGY3, Neurology 84-year-old female past medical history small cell lung cancer, lymphoma, thyroid and kidney cancer (in remission), COPD not on home O2, HTN, HLD, diabetes, kidney stones, AAA, presents with lightheadedness for 2-3 days which is worse when sitting, standing. Pt also has decreased PO intake. Her neuro exam is non focal without any dysmetria, nystagmus or other cerebellar signs. She has a baseline lightheadedness with tinnitus and hearing loss, so suspect inner ear etiology likely [place]. The recent worsening of lightheadedness is likely due to Hyponatremia (118) vs. orthostatics. Less likely etiology include brain mets vs. other central etiologies.    Recommendations:  - correct electrolytes slowly  - obtain orthostatics  - give Meclizine 25mg TID standing for 2-3 days  - obtain MRI brain w/wo  - Neurology will follow    Case WILL BE discussed with attending Dr. Mercedez Jerome MD  PGY3, Neurology 84-year-old female past medical history small cell lung cancer, lymphoma, thyroid and kidney cancer (in remission), COPD not on home O2, HTN, HLD, diabetes, kidney stones, AAA, presents with lightheadedness for 2-3 days which is worse when sitting, standing. Pt also has decreased PO intake. Her neuro exam is non focal without any dysmetria, nystagmus or other cerebellar signs. She has a baseline lightheadedness with tinnitus and hearing loss, so suspect inner ear etiology likely [place]. The recent worsening of lightheadedness is likely due to Hyponatremia (118) vs. orthostatics. Less likely etiology include brain mets vs. other central etiologies.    Recommendations:  - correct electrolytes slowly  - obtain orthostatics  - give Meclizine 25mg TID standing for 2-3 days  - can consider obtaining MRI brain if symptoms doesn't resolve with after metabolic correction and orthostatics  - Neurology will follow    Case WILL BE discussed with attending Dr. Mercedez Jerome MD  PGY3, Neurology

## 2024-10-13 NOTE — H&P ADULT - NSICDXPASTMEDICALHX_GEN_ALL_CORE_FT
PAST MEDICAL HISTORY:  Cancer of kidney     Cancer of thyroid     COPD (chronic obstructive pulmonary disease)     Diverticulitis     Former smoker     GERD (gastroesophageal reflux disease)     History of abdominal aortic aneurysm (AAA)     [place] (hard of hearing)     Hyperlipidemia, unspecified hyperlipidemia type     Hypertension, unspecified type     Kidney stones     Lung cancer     NHL (non-Hodgkin's lymphoma) "low grade" per heme/ onc note    Obesity     ARTIE (obstructive sleep apnea) NO CPAP MACHINE PER PT.    SOB (shortness of breath)     Type 2 diabetes mellitus with complication, without long-term current use of insulin

## 2024-10-13 NOTE — H&P ADULT - ATTENDING COMMENTS
84-year-old female past medical history small cell lung cancer, lymphoma, thyroid and kidney cancer (in remission), COPD not on home O2, HTN, HLD, diabetes, kidney stones, AAA, presents with lightheadedness for 2-3 days.    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 If stable, can start gentle hydration at 50cc/hr  - fu serum osmolality and urine osmolality  - PT/OT    #Aflutter, new onset  ECG oct 12 showing Aflutter with variable AV block  - Cont metoprolol tartrate 25 BID, rate controlled  - Cont lovenox 60 BID, family agreeable to AC    #Suspected UTI  Pyuria noted  - Cont IV rocephin  - fu UCx    #Small cell lung cancer  #Hx lymphoma  #Thyroid and kidney cancer (in remission),  - Follows Dr. Soares as outpatinet   - On Tagrisso OD. Continue     # COPD not on home O2  - On symbicort and albuterol   - Follows Dr. Brian Lake     # HTN/HLD  - Hold antihypertensive for now     #DM2  - Monitor blood glucose   - ISS     # Hx of Kidney stones  # AAA   - Outpatient work up     DVT PPX, LVX    #Progress Note Handoff  Pending (specify): Monitor BMP, correct sodium, PT/OT  Family discussion: cheo pt regarding plan of care  Disposition: Tele, from home 84-year-old female past medical history small cell lung cancer, lymphoma, thyroid and kidney cancer (in remission), COPD not on home O2, HTN, HLD, diabetes, kidney stones, AAA, presents with lightheadedness for 2-3 days.    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 If stable, can start gentle hydration at 50cc/hr  - fu serum osmolality and urine osmolality  - PT/OT    #Aflutter, new onset  #Mobile echodensity on AV  ECG oct 12 showing Aflutter with variable AV block  Echo 10/13 >75% EF; moderate-severe TR; mobile echodensity on aortic valve  - Cont metoprolol tartrate 25 BID, rate controlled  - Cont lovenox 60 BID, family agreeable to AC  - Cardio eval, NPO @ midnight in case they can do RAH/DCCV tomorrow    #Suspected UTI  Pyuria noted  - Cont IV rocephin  - fu UCx    #Small cell lung cancer  #Hx lymphoma  #Thyroid and kidney cancer (in remission),  - Follows Dr. Soares as outpatinet   - On Tagrisso OD. Continue     # COPD not on home O2  - On symbicort and albuterol   - Follows Dr. Brian Lake     # HTN/HLD  - Hold antihypertensive for now     #DM2  - Monitor blood glucose   - ISS     # Hx of Kidney stones  # AAA   - Outpatient work up     DVT PPX, LVX    #Progress Note Handoff  Pending (specify): Monitor BMP, correct sodium, PT/OT  Family discussion: cheo pt regarding plan of care  Disposition: Tele, from home

## 2024-10-13 NOTE — PATIENT PROFILE ADULT - FUNCTIONAL ASSESSMENT - BASIC MOBILITY 6.
3-calculated by average/Not able to assess (calculate score using [facility] averaging method)

## 2024-10-14 LAB
A1C WITH ESTIMATED AVERAGE GLUCOSE RESULT: 5.6 % — SIGNIFICANT CHANGE UP (ref 4–5.6)
ALBUMIN SERPL ELPH-MCNC: 3.8 G/DL — SIGNIFICANT CHANGE UP (ref 3.5–5.2)
ALP SERPL-CCNC: 152 U/L — HIGH (ref 30–115)
ALT FLD-CCNC: 26 U/L — SIGNIFICANT CHANGE UP (ref 0–41)
ANION GAP SERPL CALC-SCNC: 11 MMOL/L — SIGNIFICANT CHANGE UP (ref 7–14)
ANION GAP SERPL CALC-SCNC: 14 MMOL/L — SIGNIFICANT CHANGE UP (ref 7–14)
AST SERPL-CCNC: 16 U/L — SIGNIFICANT CHANGE UP (ref 0–41)
BASOPHILS # BLD AUTO: 0.02 K/UL — SIGNIFICANT CHANGE UP (ref 0–0.2)
BASOPHILS NFR BLD AUTO: 0.3 % — SIGNIFICANT CHANGE UP (ref 0–1)
BILIRUB SERPL-MCNC: 0.7 MG/DL — SIGNIFICANT CHANGE UP (ref 0.2–1.2)
BUN SERPL-MCNC: 15 MG/DL — SIGNIFICANT CHANGE UP (ref 10–20)
BUN SERPL-MCNC: 19 MG/DL — SIGNIFICANT CHANGE UP (ref 10–20)
CALCIUM SERPL-MCNC: 9.8 MG/DL — SIGNIFICANT CHANGE UP (ref 8.4–10.5)
CALCIUM SERPL-MCNC: 9.8 MG/DL — SIGNIFICANT CHANGE UP (ref 8.4–10.5)
CHLORIDE SERPL-SCNC: 86 MMOL/L — LOW (ref 98–110)
CHLORIDE SERPL-SCNC: 87 MMOL/L — LOW (ref 98–110)
CO2 SERPL-SCNC: 21 MMOL/L — SIGNIFICANT CHANGE UP (ref 17–32)
CO2 SERPL-SCNC: 25 MMOL/L — SIGNIFICANT CHANGE UP (ref 17–32)
CORTIS AM PEAK SERPL-MCNC: 15.3 UG/DL — SIGNIFICANT CHANGE UP (ref 6–18.4)
CREAT SERPL-MCNC: 0.7 MG/DL — SIGNIFICANT CHANGE UP (ref 0.7–1.5)
CREAT SERPL-MCNC: 0.8 MG/DL — SIGNIFICANT CHANGE UP (ref 0.7–1.5)
EGFR: 73 ML/MIN/1.73M2 — SIGNIFICANT CHANGE UP
EGFR: 86 ML/MIN/1.73M2 — SIGNIFICANT CHANGE UP
EOSINOPHIL # BLD AUTO: 0.06 K/UL — SIGNIFICANT CHANGE UP (ref 0–0.7)
EOSINOPHIL NFR BLD AUTO: 0.8 % — SIGNIFICANT CHANGE UP (ref 0–8)
ESTIMATED AVERAGE GLUCOSE: 114 MG/DL — SIGNIFICANT CHANGE UP (ref 68–114)
GLUCOSE BLDC GLUCOMTR-MCNC: 101 MG/DL — HIGH (ref 70–99)
GLUCOSE BLDC GLUCOMTR-MCNC: 91 MG/DL — SIGNIFICANT CHANGE UP (ref 70–99)
GLUCOSE BLDC GLUCOMTR-MCNC: 94 MG/DL — SIGNIFICANT CHANGE UP (ref 70–99)
GLUCOSE SERPL-MCNC: 91 MG/DL — SIGNIFICANT CHANGE UP (ref 70–99)
GLUCOSE SERPL-MCNC: 92 MG/DL — SIGNIFICANT CHANGE UP (ref 70–99)
HCT VFR BLD CALC: 42.5 % — SIGNIFICANT CHANGE UP (ref 37–47)
HGB BLD-MCNC: 14.4 G/DL — SIGNIFICANT CHANGE UP (ref 12–16)
IMM GRANULOCYTES NFR BLD AUTO: 0.1 % — SIGNIFICANT CHANGE UP (ref 0.1–0.3)
INR BLD: 1.11 RATIO — SIGNIFICANT CHANGE UP (ref 0.65–1.3)
LYMPHOCYTES # BLD AUTO: 0.89 K/UL — LOW (ref 1.2–3.4)
LYMPHOCYTES # BLD AUTO: 12.6 % — LOW (ref 20.5–51.1)
MAGNESIUM SERPL-MCNC: 1.8 MG/DL — SIGNIFICANT CHANGE UP (ref 1.8–2.4)
MCHC RBC-ENTMCNC: 29 PG — SIGNIFICANT CHANGE UP (ref 27–31)
MCHC RBC-ENTMCNC: 33.9 G/DL — SIGNIFICANT CHANGE UP (ref 32–37)
MCV RBC AUTO: 85.5 FL — SIGNIFICANT CHANGE UP (ref 81–99)
MONOCYTES # BLD AUTO: 0.83 K/UL — HIGH (ref 0.1–0.6)
MONOCYTES NFR BLD AUTO: 11.8 % — HIGH (ref 1.7–9.3)
NEUTROPHILS # BLD AUTO: 5.25 K/UL — SIGNIFICANT CHANGE UP (ref 1.4–6.5)
NEUTROPHILS NFR BLD AUTO: 74.4 % — SIGNIFICANT CHANGE UP (ref 42.2–75.2)
NRBC # BLD: 0 /100 WBCS — SIGNIFICANT CHANGE UP (ref 0–0)
PLATELET # BLD AUTO: 122 K/UL — LOW (ref 130–400)
PMV BLD: 11.7 FL — HIGH (ref 7.4–10.4)
POTASSIUM SERPL-MCNC: 4 MMOL/L — SIGNIFICANT CHANGE UP (ref 3.5–5)
POTASSIUM SERPL-MCNC: 4.1 MMOL/L — SIGNIFICANT CHANGE UP (ref 3.5–5)
POTASSIUM SERPL-SCNC: 4 MMOL/L — SIGNIFICANT CHANGE UP (ref 3.5–5)
POTASSIUM SERPL-SCNC: 4.1 MMOL/L — SIGNIFICANT CHANGE UP (ref 3.5–5)
PROT SERPL-MCNC: 5.7 G/DL — LOW (ref 6–8)
PROTHROM AB SERPL-ACNC: 12.7 SEC — SIGNIFICANT CHANGE UP (ref 9.95–12.87)
RBC # BLD: 4.97 M/UL — SIGNIFICANT CHANGE UP (ref 4.2–5.4)
RBC # FLD: 13.3 % — SIGNIFICANT CHANGE UP (ref 11.5–14.5)
SODIUM SERPL-SCNC: 122 MMOL/L — LOW (ref 135–146)
SODIUM SERPL-SCNC: 122 MMOL/L — LOW (ref 135–146)
TSH SERPL-MCNC: 1.72 UIU/ML — SIGNIFICANT CHANGE UP (ref 0.27–4.2)
WBC # BLD: 7.06 K/UL — SIGNIFICANT CHANGE UP (ref 4.8–10.8)
WBC # FLD AUTO: 7.06 K/UL — SIGNIFICANT CHANGE UP (ref 4.8–10.8)

## 2024-10-14 PROCEDURE — 99233 SBSQ HOSP IP/OBS HIGH 50: CPT

## 2024-10-14 PROCEDURE — 93010 ELECTROCARDIOGRAM REPORT: CPT

## 2024-10-14 RX ORDER — METOCLOPRAMIDE HCL 10 MG
10 TABLET ORAL
Refills: 0 | Status: DISCONTINUED | OUTPATIENT
Start: 2024-10-14 | End: 2024-10-14

## 2024-10-14 RX ORDER — ONDANSETRON HYDROCHLORIDE 2 MG/ML
4 INJECTION, SOLUTION INTRAMUSCULAR; INTRAVENOUS ONCE
Refills: 0 | Status: COMPLETED | OUTPATIENT
Start: 2024-10-14 | End: 2024-10-14

## 2024-10-14 RX ORDER — ONDANSETRON HYDROCHLORIDE 2 MG/ML
2 INJECTION, SOLUTION INTRAMUSCULAR; INTRAVENOUS ONCE
Refills: 0 | Status: COMPLETED | OUTPATIENT
Start: 2024-10-14 | End: 2024-10-14

## 2024-10-14 RX ORDER — TRIMETHOBENZAMIDE HCL 250 MG
200 CAPSULE ORAL EVERY 8 HOURS
Refills: 0 | Status: DISCONTINUED | OUTPATIENT
Start: 2024-10-14 | End: 2024-10-21

## 2024-10-14 RX ADMIN — Medication 60 MILLIGRAM(S): at 06:20

## 2024-10-14 RX ADMIN — Medication 75 MILLILITER(S): at 11:31

## 2024-10-14 RX ADMIN — ONDANSETRON HYDROCHLORIDE 4 MILLIGRAM(S): 2 INJECTION, SOLUTION INTRAMUSCULAR; INTRAVENOUS at 10:37

## 2024-10-14 RX ADMIN — Medication 25 MILLIGRAM(S): at 17:24

## 2024-10-14 RX ADMIN — ONDANSETRON HYDROCHLORIDE 2 MILLIGRAM(S): 2 INJECTION, SOLUTION INTRAMUSCULAR; INTRAVENOUS at 00:19

## 2024-10-14 RX ADMIN — Medication 60 MILLIGRAM(S): at 17:24

## 2024-10-14 RX ADMIN — OSIMERTINIB 80 MILLIGRAM(S): 80 TABLET, FILM COATED ORAL at 21:29

## 2024-10-14 RX ADMIN — Medication 100 MILLIGRAM(S): at 06:20

## 2024-10-14 RX ADMIN — Medication 75 MILLILITER(S): at 19:46

## 2024-10-14 RX ADMIN — PANTOPRAZOLE SODIUM 40 MILLIGRAM(S): 40 TABLET, DELAYED RELEASE ORAL at 06:20

## 2024-10-14 RX ADMIN — BUDESONIDE AND FORMOTEROL FUMARATE DIHYDRATE 2 PUFF(S): 80; 4.5 AEROSOL RESPIRATORY (INHALATION) at 19:45

## 2024-10-14 RX ADMIN — BUDESONIDE AND FORMOTEROL FUMARATE DIHYDRATE 2 PUFF(S): 80; 4.5 AEROSOL RESPIRATORY (INHALATION) at 07:37

## 2024-10-14 NOTE — PROGRESS NOTE ADULT - SUBJECTIVE AND OBJECTIVE BOX
FERMIN DIAZ 84y Female  MRN#: [id number]     Hospital Day: 1d    Pt is currently admitted with the primary diagnosis of syncope    Overnight events   -No major overnight events                                          ----------------------------------------------------------  OBJECTIVE  PAST MEDICAL & SURGICAL HISTORY  Hypertension, unspecified type    Type 2 diabetes mellitus with complication, without long-term current use of insulin    Hyperlipidemia, unspecified hyperlipidemia type    Diverticulitis    Obesity    COPD (chronic obstructive pulmonary disease)    SOB (shortness of breath)    GERD (gastroesophageal reflux disease)    Lung cancer    ARTIE (obstructive sleep apnea)  NO CPAP MACHINE PER PT.    Cancer of kidney    Cancer of thyroid    Former smoker    NHL (non-Hodgkin's lymphoma)  "low grade" per heme/ onc note    History of abdominal aortic aneurysm (AAA)    Kidney stones    [place] (hard of hearing)    History of surgery  LEFT LOWER LOBECTOMY    History of surgery  BILATERAL KNEE REPLACEMENT    History of surgery  CATARACT RIGHT EYE    History of surgery  TUBAL LIGATION    History of surgery  CYST REMOVED  RIGHT BREAST    History of surgery  CHOLECYSTECOMY    History of surgery  RIGHT PARTIAL NEPHRECTOMY    History of surgery  BILATERAL CATARACT SURGERY    History of back surgery    H/O lithotripsy    H/O partial thyroidectomy                                              -----------------------------------------------------------  MEDICATIONS:  STANDING MEDICATIONS  budesonide 160 MICROgram(s)/formoterol 4.5 MICROgram(s) Inhaler 2 Puff(s) Inhalation two times a day  cefTRIAXone   IVPB      cefTRIAXone   IVPB 1000 milliGRAM(s) IV Intermittent every 24 hours  dextrose 5%. 1000 milliLiter(s) IV Continuous <Continuous>  dextrose 5%. 1000 milliLiter(s) IV Continuous <Continuous>  dextrose 50% Injectable 25 Gram(s) IV Push once  dextrose 50% Injectable 12.5 Gram(s) IV Push once  dextrose 50% Injectable 25 Gram(s) IV Push once  enoxaparin Injectable 60 milliGRAM(s) SubCutaneous every 12 hours  fluticasone propionate/ salmeterol 250-50 MICROgram(s) Diskus 1 Dose(s) Inhalation two times a day  glucagon  Injectable 1 milliGRAM(s) IntraMuscular once  insulin lispro (ADMELOG) corrective regimen sliding scale   SubCutaneous three times a day before meals  metoprolol tartrate 25 milliGRAM(s) Oral two times a day  osimertinib 80 milliGRAM(s) Oral daily  pantoprazole    Tablet 40 milliGRAM(s) Oral before breakfast    PRN MEDICATIONS  albuterol    90 MICROgram(s) HFA Inhaler 2 Puff(s) Inhalation every 6 hours PRN  dextrose Oral Gel 15 Gram(s) Oral once PRN  meclizine 25 milliGRAM(s) Oral daily PRN  melatonin 3 milliGRAM(s) Oral at bedtime PRN                                            ------------------------------------------------------------  VITAL SIGNS: Last 24 Hours  T(C): 36.2 (14 Oct 2024 07:52), Max: 36.7 (13 Oct 2024 17:48)  T(F): 97.2 (14 Oct 2024 07:52), Max: 98 (13 Oct 2024 17:48)  HR: 70 (14 Oct 2024 07:52) (54 - 80)  BP: 128/87 (14 Oct 2024 07:52) (128/87 - 154/90)  BP(mean): 102 (13 Oct 2024 15:06) (102 - 102)  RR: 18 (14 Oct 2024 07:52) (18 - 18)  SpO2: 94% (14 Oct 2024 07:52) (94% - 96%)      10-13-24 @ 07:01  -  10-14-24 @ 07:00  --------------------------------------------------------  IN: 0 mL / OUT: 1200 mL / NET: -1200 mL                                             --------------------------------------------------------------  LABS:                        13.2   5.52  )-----------( 118      ( 13 Oct 2024 06:34 )             39.0     10-13    120[L]  |  83[L]  |  15  ----------------------------<  94  4.1   |  21  |  0.7    Ca    9.3      13 Oct 2024 20:15  Mg     2.0     10-13    TPro  5.7[L]  /  Alb  3.8  /  TBili  0.8  /  DBili  x   /  AST  25  /  ALT  39  /  AlkPhos  161[H]  10-13    PT/INR - ( 14 Oct 2024 07:48 )   PT: 12.70 sec;   INR: 1.11 ratio           Urinalysis Basic - ( 13 Oct 2024 20:15 )    Color: x / Appearance: x / SG: x / pH: x  Gluc: 94 mg/dL / Ketone: x  / Bili: x / Urobili: x   Blood: x / Protein: x / Nitrite: x   Leuk Esterase: x / RBC: x / WBC x   Sq Epi: x / Non Sq Epi: x / Bacteria: x                                              --------------------------------------------------------------  PHYSICAL EXAM:  GENERAL: NAD  HEENT: No FNDs, atraumatic, normocephalic  LUNGS: Clear to auscultation bilaterally  HEART: S1/S2. No heaves or thrills  ABD: Soft, non-tender, non-distended.  EXT/NEURO: Strength, sensation and ROM grossly intact.  SKIN: No edema    PLAN:  84-year-old female past medical history small cell lung cancer, lymphoma, thyroid and kidney cancer (in remission), COPD not on home O2, HTN, HLD, diabetes, kidney stones, AAA, presents with lightheadedness for 2-3 days. Pt and daughter reported she has a hx of lightheadness and takes Meclizine 25mg PRN, not sure the etiology. However, since yesterday she is feeling more lightheaded, gets worse when sitting or standing or walking. She is therefore having difficulty ambulating. She also had an episode of vomiting today. Took meclizine 25mg PRN yesterday but didnt help her much. She is also having decreased oral intake with decreased urination as per daughter. She denies any room spinning sensation. She has chronic tinnitus and mild hearing loss in both ears.     #Acute on chronic dizziness vs Lightheadedness ( Electrolytes  vs Cardiac cause)   # Severe hyponatremia ( Dietary vs SIADH 2/2 Lung cancer)   # Decreased PO intake:   - came to the ED for lightheadness and Decreased PO intake   - VVS  - Na 118 on admission -> 122 s/p 500 cc LR ED; Frida 123  - IVF: 50cc/hr LR   - Orthostatics check 10/13: pos for DPB drop 16  - Fluid restriction  - Urine studies: Frida 123  - SOsm - 256 - low solute 2/2 SIADH vs low solute syndrome?  - fu TSH ; AM cortisol   - BMP q 6 hours; f/u 4PM    - nephro consulted - appreciate recs    # NEwly Diagnosed A fib:   - CHADVASC 5   - HASBLED 2   - Rate control : Metoprolol tartrate 25 mg BID   - Anticoagulation : Discussed with the patient and daughter about bleeding risk ; Agreeable to AC   - Lovenox 60 mg BID.   - Monitor on Telemetry ; might have heart block leading to lightheadedness, dizziness.   - RAH 10/13: Hyperdynamic global left ventricular systolic function. Left ventricular ejection fraction, by visual estimation, is >75%. Asymmetric basal septal hypertrophy. Severely enlarged left atrium + right atrium. Mild mitral stenosis. Moderate mitral valve regurgitation. Moderate-severe tricuspid regurgitation. mobile echodensity on the aortic valve - rec RAH  Mild pulmonic valve regurgitation. mild pulmonary hypertension.  - BNP 1399    # Lower abdominal pain likely UTI   - Lower abdominal pain with burning sensation ; ( symptomatic )   - UA weakly positive 10/12 -> repeat 10/13 neg  - f/u UCx  - Rocephin 1 gm OD for 3 days. 10/13->10/15    #Small cell lung cancer  # lymphoma  # thyroid   # kidney cancer (in remission),  - Follows Dr. Soares as outpatinet   - On Tagrisso OD. Continue     # COPD not on home O2  - not wheezing   - On symbicort and albuterol   - Follows Dr. Brian Lake     # HTN  # HLD  # Diabetes mellitus type 2:   - Hold antihypertensive for now   - Monitor blood glucose   - Insulin sliding scale.     # Hx of Kidney stones  # AAA   - Outpatient work up     # MISC:   - DVT : Loevnox AC  - GI : PPI   - Activity : Ambulate as tolerated   - Diet: Fluid restriction .  FERMIN DIAZ 84y Female  MRN#: [id number]     Hospital Day: 1d    Pt is currently admitted with the primary diagnosis of syncope    Overnight events   -No major overnight events                                          ----------------------------------------------------------  OBJECTIVE  PAST MEDICAL & SURGICAL HISTORY  Hypertension, unspecified type    Type 2 diabetes mellitus with complication, without long-term current use of insulin    Hyperlipidemia, unspecified hyperlipidemia type    Diverticulitis    Obesity    COPD (chronic obstructive pulmonary disease)    SOB (shortness of breath)    GERD (gastroesophageal reflux disease)    Lung cancer    ARTIE (obstructive sleep apnea)  NO CPAP MACHINE PER PT.    Cancer of kidney    Cancer of thyroid    Former smoker    NHL (non-Hodgkin's lymphoma)  "low grade" per heme/ onc note    History of abdominal aortic aneurysm (AAA)    Kidney stones    [place] (hard of hearing)    History of surgery  LEFT LOWER LOBECTOMY    History of surgery  BILATERAL KNEE REPLACEMENT    History of surgery  CATARACT RIGHT EYE    History of surgery  TUBAL LIGATION    History of surgery  CYST REMOVED  RIGHT BREAST    History of surgery  CHOLECYSTECOMY    History of surgery  RIGHT PARTIAL NEPHRECTOMY    History of surgery  BILATERAL CATARACT SURGERY    History of back surgery    H/O lithotripsy    H/O partial thyroidectomy                                              -----------------------------------------------------------  MEDICATIONS:  STANDING MEDICATIONS  budesonide 160 MICROgram(s)/formoterol 4.5 MICROgram(s) Inhaler 2 Puff(s) Inhalation two times a day  cefTRIAXone   IVPB      cefTRIAXone   IVPB 1000 milliGRAM(s) IV Intermittent every 24 hours  dextrose 5%. 1000 milliLiter(s) IV Continuous <Continuous>  dextrose 5%. 1000 milliLiter(s) IV Continuous <Continuous>  dextrose 50% Injectable 25 Gram(s) IV Push once  dextrose 50% Injectable 12.5 Gram(s) IV Push once  dextrose 50% Injectable 25 Gram(s) IV Push once  enoxaparin Injectable 60 milliGRAM(s) SubCutaneous every 12 hours  fluticasone propionate/ salmeterol 250-50 MICROgram(s) Diskus 1 Dose(s) Inhalation two times a day  glucagon  Injectable 1 milliGRAM(s) IntraMuscular once  insulin lispro (ADMELOG) corrective regimen sliding scale   SubCutaneous three times a day before meals  metoprolol tartrate 25 milliGRAM(s) Oral two times a day  osimertinib 80 milliGRAM(s) Oral daily  pantoprazole    Tablet 40 milliGRAM(s) Oral before breakfast    PRN MEDICATIONS  albuterol    90 MICROgram(s) HFA Inhaler 2 Puff(s) Inhalation every 6 hours PRN  dextrose Oral Gel 15 Gram(s) Oral once PRN  meclizine 25 milliGRAM(s) Oral daily PRN  melatonin 3 milliGRAM(s) Oral at bedtime PRN                                            ------------------------------------------------------------  VITAL SIGNS: Last 24 Hours  T(C): 36.2 (14 Oct 2024 07:52), Max: 36.7 (13 Oct 2024 17:48)  T(F): 97.2 (14 Oct 2024 07:52), Max: 98 (13 Oct 2024 17:48)  HR: 70 (14 Oct 2024 07:52) (54 - 80)  BP: 128/87 (14 Oct 2024 07:52) (128/87 - 154/90)  BP(mean): 102 (13 Oct 2024 15:06) (102 - 102)  RR: 18 (14 Oct 2024 07:52) (18 - 18)  SpO2: 94% (14 Oct 2024 07:52) (94% - 96%)      10-13-24 @ 07:01  -  10-14-24 @ 07:00  --------------------------------------------------------  IN: 0 mL / OUT: 1200 mL / NET: -1200 mL                                             --------------------------------------------------------------  LABS:                        13.2   5.52  )-----------( 118      ( 13 Oct 2024 06:34 )             39.0     10-13    120[L]  |  83[L]  |  15  ----------------------------<  94  4.1   |  21  |  0.7    Ca    9.3      13 Oct 2024 20:15  Mg     2.0     10-13    TPro  5.7[L]  /  Alb  3.8  /  TBili  0.8  /  DBili  x   /  AST  25  /  ALT  39  /  AlkPhos  161[H]  10-13    PT/INR - ( 14 Oct 2024 07:48 )   PT: 12.70 sec;   INR: 1.11 ratio           Urinalysis Basic - ( 13 Oct 2024 20:15 )    Color: x / Appearance: x / SG: x / pH: x  Gluc: 94 mg/dL / Ketone: x  / Bili: x / Urobili: x   Blood: x / Protein: x / Nitrite: x   Leuk Esterase: x / RBC: x / WBC x   Sq Epi: x / Non Sq Epi: x / Bacteria: x                                              --------------------------------------------------------------  PHYSICAL EXAM:  GENERAL: NAD  HEENT: No FNDs, atraumatic, normocephalic  LUNGS: Clear to auscultation bilaterally  HEART: S1/S2. No heaves or thrills  ABD: Soft, non-tender, non-distended.  EXT/NEURO: Strength, sensation and ROM grossly intact.  SKIN: No edema    PLAN:  84-year-old female past medical history small cell lung cancer, lymphoma, thyroid and kidney cancer (in remission), COPD not on home O2, HTN, HLD, diabetes, kidney stones, AAA, presents with lightheadedness for 2-3 days. Pt and daughter reported she has a hx of lightheadness and takes Meclizine 25mg PRN, not sure the etiology. However, since yesterday she is feeling more lightheaded, gets worse when sitting or standing or walking. She is therefore having difficulty ambulating. She also had an episode of vomiting today. Took meclizine 25mg PRN yesterday but didnt help her much. She is also having decreased oral intake with decreased urination as per daughter. She denies any room spinning sensation. She has chronic tinnitus and mild hearing loss in both ears.     #Acute on chronic dizziness vs Lightheadedness ( Electrolytes  vs Cardiac cause)   # Severe hyponatremia ( Dietary vs SIADH 2/2 Lung cancer)   # Decreased PO intake:   - came to the ED for lightheadness and Decreased PO intake   - VVS  - Na 118 on admission -> 122 s/p 500 cc LR ED; Frida 123  - IVF: 75cc/hr LR 1L  - Orthostatics check 10/13: pos for DPB drop 16  - Fluid restriction  - Urine studies: Frida 123  - SOsm - 256 - low solute 2/2 SIADH vs low solute syndrome?  - fu TSH ; AM cortisol   - BMP q 6 hours; f/u 4PM    - nephro consulted - appreciate recs    # NEwly Diagnosed A fib:   - CHADVASC 5   - HASBLED 2   - Rate control : Metoprolol tartrate 25 mg BID   - Anticoagulation : Discussed with the patient and daughter about bleeding risk ; Agreeable to AC   - Lovenox 60 mg BID.   - Monitor on Telemetry ; might have heart block leading to lightheadedness, dizziness.   - RAH 10/13: Hyperdynamic global left ventricular systolic function. Left ventricular ejection fraction, by visual estimation, is >75%. Asymmetric basal septal hypertrophy. Severely enlarged left atrium + right atrium. Mild mitral stenosis. Moderate mitral valve regurgitation. Moderate-severe tricuspid regurgitation. mobile echodensity on the aortic valve - rec RAH  Mild pulmonic valve regurgitation. mild pulmonary hypertension.  - Follows Dr Gamble OP - will be seen by his office today/tomorrow  - BNP 1399    # Lower abdominal pain likely UTI   - Lower abdominal pain with burning sensation ; ( symptomatic )   - UA weakly positive 10/12 -> repeat 10/13 neg  - f/u UCx  - Rocephin 1 gm OD for 3 days. 10/13->10/15    #Small cell lung cancer  # lymphoma  # thyroid   # kidney cancer (in remission),  - Follows Dr. Soaers as outpatinet   - On Tagrisso OD. Continue     # COPD not on home O2  - not wheezing   - On symbicort and albuterol   - Follows Dr. Brian Lake     # HTN  # HLD  # Diabetes mellitus type 2:   - Hold antihypertensive for now   - Monitor blood glucose   - Insulin sliding scale.     # Hx of Kidney stones  # AAA   - Outpatient work up     # MISC:   - DVT : Loevnox AC  - GI : PPI   - Activity : Ambulate as tolerated   - Diet: Fluid restriction .  FERMIN DIAZ 84y Female  MRN#: [id number]     Hospital Day: 1d    Pt is currently admitted with the primary diagnosis of syncope    Overnight events   -No major overnight events                                          ----------------------------------------------------------  OBJECTIVE  PAST MEDICAL & SURGICAL HISTORY  Hypertension, unspecified type    Type 2 diabetes mellitus with complication, without long-term current use of insulin    Hyperlipidemia, unspecified hyperlipidemia type    Diverticulitis    Obesity    COPD (chronic obstructive pulmonary disease)    SOB (shortness of breath)    GERD (gastroesophageal reflux disease)    Lung cancer    ARTIE (obstructive sleep apnea)  NO CPAP MACHINE PER PT.    Cancer of kidney    Cancer of thyroid    Former smoker    NHL (non-Hodgkin's lymphoma)  "low grade" per heme/ onc note    History of abdominal aortic aneurysm (AAA)    Kidney stones    [place] (hard of hearing)    History of surgery  LEFT LOWER LOBECTOMY    History of surgery  BILATERAL KNEE REPLACEMENT    History of surgery  CATARACT RIGHT EYE    History of surgery  TUBAL LIGATION    History of surgery  CYST REMOVED  RIGHT BREAST    History of surgery  CHOLECYSTECOMY    History of surgery  RIGHT PARTIAL NEPHRECTOMY    History of surgery  BILATERAL CATARACT SURGERY    History of back surgery    H/O lithotripsy    H/O partial thyroidectomy                                              -----------------------------------------------------------  MEDICATIONS:  STANDING MEDICATIONS  budesonide 160 MICROgram(s)/formoterol 4.5 MICROgram(s) Inhaler 2 Puff(s) Inhalation two times a day  cefTRIAXone   IVPB      cefTRIAXone   IVPB 1000 milliGRAM(s) IV Intermittent every 24 hours  dextrose 5%. 1000 milliLiter(s) IV Continuous <Continuous>  dextrose 5%. 1000 milliLiter(s) IV Continuous <Continuous>  dextrose 50% Injectable 25 Gram(s) IV Push once  dextrose 50% Injectable 12.5 Gram(s) IV Push once  dextrose 50% Injectable 25 Gram(s) IV Push once  enoxaparin Injectable 60 milliGRAM(s) SubCutaneous every 12 hours  fluticasone propionate/ salmeterol 250-50 MICROgram(s) Diskus 1 Dose(s) Inhalation two times a day  glucagon  Injectable 1 milliGRAM(s) IntraMuscular once  insulin lispro (ADMELOG) corrective regimen sliding scale   SubCutaneous three times a day before meals  metoprolol tartrate 25 milliGRAM(s) Oral two times a day  osimertinib 80 milliGRAM(s) Oral daily  pantoprazole    Tablet 40 milliGRAM(s) Oral before breakfast    PRN MEDICATIONS  albuterol    90 MICROgram(s) HFA Inhaler 2 Puff(s) Inhalation every 6 hours PRN  dextrose Oral Gel 15 Gram(s) Oral once PRN  meclizine 25 milliGRAM(s) Oral daily PRN  melatonin 3 milliGRAM(s) Oral at bedtime PRN                                            ------------------------------------------------------------  VITAL SIGNS: Last 24 Hours  T(C): 36.2 (14 Oct 2024 07:52), Max: 36.7 (13 Oct 2024 17:48)  T(F): 97.2 (14 Oct 2024 07:52), Max: 98 (13 Oct 2024 17:48)  HR: 70 (14 Oct 2024 07:52) (54 - 80)  BP: 128/87 (14 Oct 2024 07:52) (128/87 - 154/90)  BP(mean): 102 (13 Oct 2024 15:06) (102 - 102)  RR: 18 (14 Oct 2024 07:52) (18 - 18)  SpO2: 94% (14 Oct 2024 07:52) (94% - 96%)      10-13-24 @ 07:01  -  10-14-24 @ 07:00  --------------------------------------------------------  IN: 0 mL / OUT: 1200 mL / NET: -1200 mL                                             --------------------------------------------------------------  LABS:                        13.2   5.52  )-----------( 118      ( 13 Oct 2024 06:34 )             39.0     10-13    120[L]  |  83[L]  |  15  ----------------------------<  94  4.1   |  21  |  0.7    Ca    9.3      13 Oct 2024 20:15  Mg     2.0     10-13    TPro  5.7[L]  /  Alb  3.8  /  TBili  0.8  /  DBili  x   /  AST  25  /  ALT  39  /  AlkPhos  161[H]  10-13    PT/INR - ( 14 Oct 2024 07:48 )   PT: 12.70 sec;   INR: 1.11 ratio           Urinalysis Basic - ( 13 Oct 2024 20:15 )    Color: x / Appearance: x / SG: x / pH: x  Gluc: 94 mg/dL / Ketone: x  / Bili: x / Urobili: x   Blood: x / Protein: x / Nitrite: x   Leuk Esterase: x / RBC: x / WBC x   Sq Epi: x / Non Sq Epi: x / Bacteria: x                                              --------------------------------------------------------------  PHYSICAL EXAM:  GENERAL: NAD  HEENT: No FNDs, atraumatic, normocephalic  LUNGS: Clear to auscultation bilaterally  HEART: S1/S2. No heaves or thrills  ABD: Soft, non-tender, non-distended.  EXT/NEURO: Strength, sensation and ROM grossly intact.  SKIN: No edema    PLAN:  84-year-old female past medical history small cell lung cancer, lymphoma, thyroid and kidney cancer (in remission), COPD not on home O2, HTN, HLD, diabetes, kidney stones, AAA, presents with lightheadedness for 2-3 days. Pt and daughter reported she has a hx of lightheadness and takes Meclizine 25mg PRN, not sure the etiology. However, since yesterday she is feeling more lightheaded, gets worse when sitting or standing or walking. She is therefore having difficulty ambulating. She also had an episode of vomiting today. Took meclizine 25mg PRN yesterday but didnt help her much. She is also having decreased oral intake with decreased urination as per daughter. She denies any room spinning sensation. She has chronic tinnitus and mild hearing loss in both ears.     #Acute on chronic dizziness vs Lightheadedness ( Electrolytes  vs Cardiac cause)   # Severe hyponatremia ( Dietary vs SIADH 2/2 Lung cancer)   # Decreased PO intake:   - came to the ED for lightheadness and Decreased PO intake   - VVS  - Na 118 on admission -> 122 s/p 500 cc LR ED; Frida 123  - IVF: 75cc/hr LR 1L  - Orthostatics check 10/13: pos for DPB drop 16  - Fluid restriction  - Urine studies: Frida 123  - SOsm - 256 - low solute 2/2 SIADH vs low solute syndrome?  - fu TSH ; AM cortisol   - BMP q 6 hours; f/u 4PM    - nephro consulted - appreciate recs    # NEwly Diagnosed Paroxysmal A fib:   - CHADVASC 5   - HASBLED 2   - Rate control : Metoprolol tartrate 25 mg BID   - Anticoagulation : Discussed with the patient and daughter about bleeding risk ; Agreeable to AC   - Lovenox 60 mg BID.   - Monitor on Telemetry ; might have heart block leading to lightheadedness, dizziness.   - RAH 10/13: Hyperdynamic global left ventricular systolic function. Left ventricular ejection fraction, by visual estimation, is >75%. Asymmetric basal septal hypertrophy. Severely enlarged left atrium + right atrium. Mild mitral stenosis. Moderate mitral valve regurgitation. Moderate-severe tricuspid regurgitation. mobile echodensity on the aortic valve - rec RAH  Mild pulmonic valve regurgitation. mild pulmonary hypertension.  - Follows Dr Gamble OP - will be seen by his office today/tomorrow  - BNP 1399    # Lower abdominal pain likely UTI   - Lower abdominal pain with burning sensation ; ( symptomatic )   - UA weakly positive 10/12 -> repeat 10/13 neg  - f/u UCx  - Rocephin 1 gm OD for 3 days. 10/13->10/15    #Small cell lung cancer  # lymphoma  # thyroid   # kidney cancer (in remission),  - Follows Dr. Soares as outpatinet   - On Tagrisso OD. Continue     # COPD not on home O2  - not wheezing   - On symbicort and albuterol   - Follows Dr. Brian Lake     # HTN  # HLD  # Diabetes mellitus type 2:   - Hold antihypertensive for now   - Monitor blood glucose   - Insulin sliding scale.     # Hx of Kidney stones  # AAA   - Outpatient work up     # MISC:   - DVT : Loevnox AC  - GI : PPI   - Activity : Ambulate as tolerated   - Diet: Fluid restriction .

## 2024-10-14 NOTE — PROGRESS NOTE ADULT - ATTENDING COMMENTS
extensive time spent discussing case with residents over the course of the day  chart reviewed  discussed at length with daughter at bedside  complex background and co-morbidities  renal consult reviewed as well    Feels nauseous, unable to tolerate PO but in good spirits    Vitals stable  a  x o x 3    labs reviewed    a/p    Dizziness  Nausea, intractable  Hyponatremia  COPD  -plan outlined in detail above  -f/u lytes in am

## 2024-10-14 NOTE — CONSULT NOTE ADULT - ASSESSMENT
Patient is a 84-year-old female past medical history small cell lung cancer, lymphoma, thyroid and kidney cancer (in remission), COPD not on home O2, HTN, HLD, diabetes, kidney stones, AAA, presents with lightheadedness for 2-3 days.     Nephrology was consulted for Hyponatremia with serum sodium of 118 on admission.     #Hypo-osmolar hypovolemic hyponatremia likely from low solute intake.     Plan:  Avoid using dextrose containing fluid mixture in antibiotics.   Continue with gentle hydration with LR until serum sodium improves.   Once serum sodium >130, can discontinue fluid and encourage solute intake.   Check TSH and cortisol.   Monitor strict intake and output.   Monitor BMP qshift.   Nephrology will continue to follow.        Patient is a 84-year-old female past medical history small cell lung cancer, lymphoma, thyroid and kidney cancer (in remission), COPD not on home O2, HTN, HLD, diabetes, kidney stones, AAA, presents with lightheadedness for 2-3 days.     Nephrology was consulted for Hyponatremia with serum sodium of 118 on admission.     #Hypo-osmolar hypovolemic hyponatremia likely from low solute intake.     Plan:  Avoid using d5w with antibiotics.   Continue with gentle hydration with LR until serum sodium improves.   Once serum sodium >130, can discontinue fluid  d/c iv fluids if Na level stable or down-trending  encourage solute intake.   Check TSH and cortisol.   Monitor strict intake and output.   Monitor BMP qshift.   Nephrology will continue to follow.

## 2024-10-14 NOTE — CONSULT NOTE ADULT - SUBJECTIVE AND OBJECTIVE BOX
NEPHROLOGY CONSULTATION NOTE    Patient is a 84-year-old female past medical history small cell lung cancer, lymphoma, thyroid and kidney cancer (in remission), COPD not on home O2, HTN, HLD, diabetes, kidney stones, AAA, presents with lightheadedness for 2-3 days. Pt and daughter reported she has a hx of lightheadness and takes Meclizine 25mg PRN, not sure the etiology. However, since yesterday she is feeling more lightheaded, gets worse when sitting or standing or walking. She is therefore having difficulty ambulating. She also had an episode of vomiting today. Took meclizine 25mg PRN yesterday but didnt help her much. She is also having decreased oral intake with decreased urination as per daughter. She denies any room spinning sensation. She has chronic tinnitus and mild hearing loss in both ears.   -------------------  Above information obtained from admission H&P.  Nephrology was consulted for hyponatremia.   Patient reported having URI, productive cough symptoms for >week, for which she was prescribed oral antibiotics and expectorant by her PCP.   -She reported that she was very nauseas and dizzy ever since started on the medications prescribed by PCP.   -She reported having vomiting and poor PO intake since the URI symptoms.  Patient is resting comfortably, not in distress.   -Mucus membranes are dry.     PAST MEDICAL & SURGICAL HISTORY:  Hypertension, unspecified type  Type 2 diabetes mellitus with complication, without long-term current use of insulin  Hyperlipidemia, unspecified hyperlipidemia type  Diverticulitis  Obesity  COPD (chronic obstructive pulmonary disease)  SOB (shortness of breath)  GERD (gastroesophageal reflux disease)  Lung cancer  ARTIE (obstructive sleep apnea)  NO CPAP MACHINE PER PT.  Cancer of kidney  Cancer of thyroid  Former smoker  NHL (non-Hodgkin's lymphoma)  "low grade" per heme/ onc note  History of abdominal aortic aneurysm (AAA)  Kidney stones  [place] (hard of hearing)  History of surgery  LEFT LOWER LOBECTOMY  History of surgery  BILATERAL KNEE REPLACEMENT  History of surgery  CATARACT RIGHT EYE  History of surgery  TUBAL LIGATION  History of surgery  CYST REMOVED  RIGHT BREAST  History of surgery  CHOLECYSTECOMY  History of surgery  RIGHT PARTIAL NEPHRECTOMY  History of surgery  BILATERAL CATARACT SURGERY  History of back surgery  H/O lithotripsy  H/O partial thyroidectomy    Allergies:  No Known Allergies    Home Medications:  amLODIPine 2.5 mg oral tablet: 1 tab(s) orally once a day (in the evening) (13 Oct 2024 03:42)  losartan 100 mg oral tablet: 1 tab(s) orally once a day (13 Oct 2024 03:48)  meclizine 25 mg oral tablet: 1 tab(s) orally once a day as needed for  dizziness (13 Oct 2024 03:46)  metoprolol tartrate 50 mg oral tablet: 1 tab(s) orally once a day (13 Oct 2024 03:48)  omeprazole 40 mg oral delayed release capsule: 1 cap(s) orally once a day (13 Oct 2024 03:48)  oxybutynin 10 mg/24 hr oral tablet, extended release: 1 tab(s) orally once a day (13 Oct 2024 03:48)  Symbicort 160 mcg-4.5 mcg/inh inhalation aerosol: 2 puff(s) inhaled 2 times a day (13 Oct 2024 03:48)  Tagrisso 80 mg oral tablet: 80 milligram(s) orally once a day (13 Oct 2024 03:46)  torsemide 20 mg oral tablet: 1 tab(s) orally 3 times a week (13 Oct 2024 03:47)  Trulicity Pen 1.5 mg/0.5 mL subcutaneous solution: 1.5 milligram(s) subcutaneous once a week (13 Oct 2024 03:48)  Ventolin HFA 90 mcg/inh inhalation aerosol: 2 puff(s) inhaled every 6 hours (13 Oct 2024 03:48)  Vitamin D3 25 mcg (1000 intl units) oral tablet: 1 tab(s) orally once a day (13 Oct 2024 03:48)    Hospital Medications:   MEDICATIONS  (STANDING):  budesonide 160 MICROgram(s)/formoterol 4.5 MICROgram(s) Inhaler 2 Puff(s) Inhalation two times a day  cefTRIAXone   IVPB      cefTRIAXone   IVPB 1000 milliGRAM(s) IV Intermittent every 24 hours  dextrose 5%. 1000 milliLiter(s) (100 mL/Hr) IV Continuous <Continuous>  dextrose 5%. 1000 milliLiter(s) (50 mL/Hr) IV Continuous <Continuous>  dextrose 50% Injectable 25 Gram(s) IV Push once  dextrose 50% Injectable 12.5 Gram(s) IV Push once  dextrose 50% Injectable 25 Gram(s) IV Push once  enoxaparin Injectable 60 milliGRAM(s) SubCutaneous every 12 hours  fluticasone propionate/ salmeterol 250-50 MICROgram(s) Diskus 1 Dose(s) Inhalation two times a day  glucagon  Injectable 1 milliGRAM(s) IntraMuscular once  insulin lispro (ADMELOG) corrective regimen sliding scale   SubCutaneous three times a day before meals  lactated ringers. 1000 milliLiter(s) (75 mL/Hr) IV Continuous <Continuous>  metoprolol tartrate 25 milliGRAM(s) Oral two times a day  osimertinib 80 milliGRAM(s) Oral daily  pantoprazole    Tablet 40 milliGRAM(s) Oral before breakfast    SOCIAL HISTORY:  Denies ETOH,Smoking,   FAMILY HISTORY:  Family history of dementia (Mother)    Family history of cancer (Father, Grandparent)    REVIEW OF SYSTEMS:  CONSTITUTIONAL: No weakness, fevers or chills  RESPIRATORY: No cough, wheezing, hemoptysis; No shortness of breath  CARDIOVASCULAR: No chest pain or palpitations.  GASTROINTESTINAL: No abdominal or epigastric pain. No nausea, vomiting, or hematemesis;   GENITOURINARY: No dysuria, frequency, foamy urine, urinary urgency, incontinence or hematuria  NEUROLOGICAL: No numbness or weakness  SKIN: No itching, burning, rashes, or lesions   VASCULAR: No bilateral lower extremity edema.   All other review of systems is negative unless indicated above.    VITALS:  Vital Signs Last 24 Hrs  T(C): 36.2 (14 Oct 2024 07:52), Max: 36.7 (13 Oct 2024 17:48)  T(F): 97.2 (14 Oct 2024 07:52), Max: 98 (13 Oct 2024 17:48)  HR: 70 (14 Oct 2024 07:52) (54 - 80)  BP: 128/87 (14 Oct 2024 07:52) (128/87 - 154/90)  BP(mean): 102 (13 Oct 2024 15:06) (102 - 102)  RR: 18 (14 Oct 2024 07:52) (18 - 18)  SpO2: 94% (14 Oct 2024 07:52) (94% - 96%)    Parameters below as of 14 Oct 2024 07:52  Patient On (Oxygen Delivery Method): room air    10-13 @ 07:01  -  10-14 @ 07:00  --------------------------------------------------------  IN: 0 mL / OUT: 1200 mL / NET: -1200 mL    PHYSICAL EXAM:  Constitutional: NAD  Respiratory: CTAB, no wheezes, rales or rhonchi  Cardiovascular: S1, S2, RRR  Gastrointestinal: BS+, soft, NT/ND  Extremities: No cyanosis or clubbing. No peripheral edema  Neurological: A/O x 3, no focal deficits  Psychiatric: Normal mood, normal affect  : No CVA tenderness. No theodore.   Skin: No rashes  Vascular Access:    LABS:  10-14    122[L]  |  86[L]  |  15  ----------------------------<  91  4.0   |  25  |  0.7    Ca    9.8      14 Oct 2024 07:48  Mg     1.8     10-14    TPro  5.7[L]  /  Alb  3.8  /  TBili  0.7  /  DBili      /  AST  16  /  ALT  26  /  AlkPhos  152[H]  10-14    Creatinine Trend: 0.7 <--, 0.7 <--, 0.8 <--, 0.7 <--, 0.8 <--                        14.4   7.06  )-----------( 122      ( 14 Oct 2024 07:48 )             42.5     Urine Studies:  Urinalysis Basic - ( 14 Oct 2024 07:48 )    Color:  / Appearance:  / SG:  / pH:   Gluc: 91 mg/dL / Ketone:   / Bili:  / Urobili:    Blood:  / Protein:  / Nitrite:    Leuk Esterase:  / RBC:  / WBC    Sq Epi:  / Non Sq Epi:  / Bacteria:       Sodium, Random Urine: 123.0 mmoL/L (10-13 @ 15:44)  Creatinine, Random Urine: 28 mg/dL (10-13 @ 15:44)  Protein/Creatinine Ratio Calculation: 0.3 Ratio (10-13 @ 15:44)  Osmolality, Random Urine: 442 mos/kg (10-13 @ 15:44)  Potassium, Random Urine: 26 mmol/L (10-13 @ 15:44)    RADIOLOGY & ADDITIONAL STUDIES:                 NEPHROLOGY CONSULTATION NOTE    Patient is a 84-year-old female past medical history small cell lung cancer, lymphoma, thyroid and kidney cancer (in remission), COPD not on home O2, HTN, HLD, diabetes, kidney stones, AAA, presents with lightheadedness for 2-3 days. Pt and daughter reported she has a hx of lightheadness and takes Meclizine 25mg PRN, not sure the etiology. However, since yesterday she is feeling more lightheaded, gets worse when sitting or standing or walking. She is therefore having difficulty ambulating. She also had an episode of vomiting today. Took meclizine 25mg PRN yesterday but didnt help her much. She is also having decreased oral intake with decreased urination as per daughter. She denies any room spinning sensation. She has chronic tinnitus and mild hearing loss in both ears.   -------------------  Above information obtained from admission H&P.  Nephrology was consulted for hyponatremia.   Patient reported having URI, productive cough symptoms for >week, for which she was prescribed oral antibiotics and expectorant by her PCP.   -She reported that she was very nauseas and dizzy ever since started on the medications prescribed by PCP.   -She reported having vomiting and poor PO intake since the URI symptoms.  Patient is resting comfortably, not in distress.   -Mucus membranes are dry.     PAST MEDICAL & SURGICAL HISTORY:  Hypertension, unspecified type  Type 2 diabetes mellitus with complication, without long-term current use of insulin  Hyperlipidemia, unspecified hyperlipidemia type  Diverticulitis  Obesity  COPD (chronic obstructive pulmonary disease)  SOB (shortness of breath)  GERD (gastroesophageal reflux disease)  Lung cancer  ARTIE (obstructive sleep apnea)  NO CPAP MACHINE PER PT.  Cancer of kidney  Cancer of thyroid  Former smoker  NHL (non-Hodgkin's lymphoma)  "low grade" per heme/ onc note  History of abdominal aortic aneurysm (AAA)  Kidney stones  [place] (hard of hearing)  History of surgery  LEFT LOWER LOBECTOMY  History of surgery  BILATERAL KNEE REPLACEMENT  History of surgery  CATARACT RIGHT EYE  History of surgery  TUBAL LIGATION  History of surgery  CYST REMOVED  RIGHT BREAST  History of surgery  CHOLECYSTECOMY  History of surgery  RIGHT PARTIAL NEPHRECTOMY  History of surgery  BILATERAL CATARACT SURGERY  History of back surgery  H/O lithotripsy  H/O partial thyroidectomy    Allergies:  No Known Allergies    Home Medications:  amLODIPine 2.5 mg oral tablet: 1 tab(s) orally once a day (in the evening) (13 Oct 2024 03:42)  losartan 100 mg oral tablet: 1 tab(s) orally once a day (13 Oct 2024 03:48)  meclizine 25 mg oral tablet: 1 tab(s) orally once a day as needed for  dizziness (13 Oct 2024 03:46)  metoprolol tartrate 50 mg oral tablet: 1 tab(s) orally once a day (13 Oct 2024 03:48)  omeprazole 40 mg oral delayed release capsule: 1 cap(s) orally once a day (13 Oct 2024 03:48)  oxybutynin 10 mg/24 hr oral tablet, extended release: 1 tab(s) orally once a day (13 Oct 2024 03:48)  Symbicort 160 mcg-4.5 mcg/inh inhalation aerosol: 2 puff(s) inhaled 2 times a day (13 Oct 2024 03:48)  Tagrisso 80 mg oral tablet: 80 milligram(s) orally once a day (13 Oct 2024 03:46)  torsemide 20 mg oral tablet: 1 tab(s) orally 3 times a week (13 Oct 2024 03:47)  Trulicity Pen 1.5 mg/0.5 mL subcutaneous solution: 1.5 milligram(s) subcutaneous once a week (13 Oct 2024 03:48)  Ventolin HFA 90 mcg/inh inhalation aerosol: 2 puff(s) inhaled every 6 hours (13 Oct 2024 03:48)  Vitamin D3 25 mcg (1000 intl units) oral tablet: 1 tab(s) orally once a day (13 Oct 2024 03:48)    Hospital Medications:   MEDICATIONS  (STANDING):  budesonide 160 MICROgram(s)/formoterol 4.5 MICROgram(s) Inhaler 2 Puff(s) Inhalation two times a day  cefTRIAXone   IVPB      cefTRIAXone   IVPB 1000 milliGRAM(s) IV Intermittent every 24 hours  dextrose 5%. 1000 milliLiter(s) (100 mL/Hr) IV Continuous <Continuous>  dextrose 5%. 1000 milliLiter(s) (50 mL/Hr) IV Continuous <Continuous>  dextrose 50% Injectable 25 Gram(s) IV Push once  dextrose 50% Injectable 12.5 Gram(s) IV Push once  dextrose 50% Injectable 25 Gram(s) IV Push once  enoxaparin Injectable 60 milliGRAM(s) SubCutaneous every 12 hours  fluticasone propionate/ salmeterol 250-50 MICROgram(s) Diskus 1 Dose(s) Inhalation two times a day  glucagon  Injectable 1 milliGRAM(s) IntraMuscular once  insulin lispro (ADMELOG) corrective regimen sliding scale   SubCutaneous three times a day before meals  lactated ringers. 1000 milliLiter(s) (75 mL/Hr) IV Continuous <Continuous>  metoprolol tartrate 25 milliGRAM(s) Oral two times a day  osimertinib 80 milliGRAM(s) Oral daily  pantoprazole    Tablet 40 milliGRAM(s) Oral before breakfast    SOCIAL HISTORY:  Denies ETOH,Smoking,   FAMILY HISTORY:  Family history of dementia (Mother)    Family history of cancer (Father, Grandparent)    REVIEW OF SYSTEMS:  CONSTITUTIONAL: No weakness, fevers or chills  RESPIRATORY: No cough, wheezing, hemoptysis; No shortness of breath  CARDIOVASCULAR: No chest pain or palpitations.  GASTROINTESTINAL: No abdominal or epigastric pain. No nausea, vomiting, or hematemesis;   GENITOURINARY: No dysuria, frequency, foamy urine, urinary urgency, incontinence or hematuria  NEUROLOGICAL: No numbness or weakness  SKIN: No itching, burning, rashes, or lesions   VASCULAR: No bilateral lower extremity edema.   All other review of systems is negative unless indicated above.    VITALS:  Vital Signs Last 24 Hrs  T(C): 36.2 (14 Oct 2024 07:52), Max: 36.7 (13 Oct 2024 17:48)  T(F): 97.2 (14 Oct 2024 07:52), Max: 98 (13 Oct 2024 17:48)  HR: 70 (14 Oct 2024 07:52) (54 - 80)  BP: 128/87 (14 Oct 2024 07:52) (128/87 - 154/90)  BP(mean): 102 (13 Oct 2024 15:06) (102 - 102)  RR: 18 (14 Oct 2024 07:52) (18 - 18)  SpO2: 94% (14 Oct 2024 07:52) (94% - 96%)    Parameters below as of 14 Oct 2024 07:52  Patient On (Oxygen Delivery Method): room air    10-13 @ 07:01  -  10-14 @ 07:00  --------------------------------------------------------  IN: 0 mL / OUT: 1200 mL / NET: -1200 mL    PHYSICAL EXAM:  Constitutional: NAD  Respiratory: CTAB, no wheezes, rales or rhonchi  Cardiovascular: S1, S2, RRR  Gastrointestinal: BS+, soft, NT/ND  Extremities: No cyanosis or clubbing. No peripheral edema  Neurological: A/O x 3, no focal deficits  Psychiatric: Normal mood, normal affect  : No CVA tenderness. No theodore.   Skin: No rashes  Vascular Access:    LABS:  10-14    122[L]  |  86[L]  |  15  ----------------------------<  91  4.0   |  25  |  0.7    Ca    9.8      14 Oct 2024 07:48  Mg     1.8     10-14    TPro  5.7[L]  /  Alb  3.8  /  TBili  0.7  /  DBili      /  AST  16  /  ALT  26  /  AlkPhos  152[H]  10-14                        14.4   7.06  )-----------( 122      ( 14 Oct 2024 07:48 )             42.5     Urine Studies:  Urinalysis Basic - ( 14 Oct 2024 07:48 )    Color:  / Appearance:  / SG:  / pH:   Gluc: 91 mg/dL / Ketone:   / Bili:  / Urobili:    Blood:  / Protein:  / Nitrite:    Leuk Esterase:  / RBC:  / WBC    Sq Epi:  / Non Sq Epi:  / Bacteria:       Sodium, Random Urine: 123.0 mmoL/L (10-13 @ 15:44)  Creatinine, Random Urine: 28 mg/dL (10-13 @ 15:44)  Protein/Creatinine Ratio Calculation: 0.3 Ratio (10-13 @ 15:44)  Osmolality, Random Urine: 442 mos/kg (10-13 @ 15:44)  Potassium, Random Urine: 26 mmol/L (10-13 @ 15:44)        Sodium: 122 mmol/L (10-14-24 @ 07:48)  Sodium: 120 mmol/L (10-13-24 @ 20:15)  Sodium: 123 mmol/L (10-13-24 @ 14:08)  Sodium: 122 mmol/L (10-13-24 @ 06:34)  Sodium: 118 mmol/L (10-12-24 @ 23:30)    Osmolality, Random Urine: 442 mos/kg [50 - 1200] (10-13-24 @ 15:44)    Sodium, Random Urine: 123.0 mmoL/L (10-13-24 @ 15:44)  Osmolality, Serum: 259 mos/kg [280 - 301] (10-13-24 @ 14:08)    Thyroid Stimulating Hormone, Serum: 1.72 uIU/mL [0.27 - 4.20] (10-13-24 @ 14:08)      RADIOLOGY & ADDITIONAL STUDIES:

## 2024-10-15 LAB
ALBUMIN SERPL ELPH-MCNC: 3.6 G/DL — SIGNIFICANT CHANGE UP (ref 3.5–5.2)
ALP SERPL-CCNC: 119 U/L — HIGH (ref 30–115)
ALT FLD-CCNC: 18 U/L — SIGNIFICANT CHANGE UP (ref 0–41)
ANION GAP SERPL CALC-SCNC: 8 MMOL/L — SIGNIFICANT CHANGE UP (ref 7–14)
ANION GAP SERPL CALC-SCNC: 9 MMOL/L — SIGNIFICANT CHANGE UP (ref 7–14)
AST SERPL-CCNC: 12 U/L — SIGNIFICANT CHANGE UP (ref 0–41)
BASOPHILS # BLD AUTO: 0.05 K/UL — SIGNIFICANT CHANGE UP (ref 0–0.2)
BASOPHILS NFR BLD AUTO: 1.1 % — HIGH (ref 0–1)
BILIRUB SERPL-MCNC: 0.6 MG/DL — SIGNIFICANT CHANGE UP (ref 0.2–1.2)
BUN SERPL-MCNC: 21 MG/DL — HIGH (ref 10–20)
BUN SERPL-MCNC: 21 MG/DL — HIGH (ref 10–20)
CALCIUM SERPL-MCNC: 9.3 MG/DL — SIGNIFICANT CHANGE UP (ref 8.4–10.5)
CALCIUM SERPL-MCNC: 9.7 MG/DL — SIGNIFICANT CHANGE UP (ref 8.4–10.5)
CHLORIDE SERPL-SCNC: 90 MMOL/L — LOW (ref 98–110)
CHLORIDE SERPL-SCNC: 90 MMOL/L — LOW (ref 98–110)
CO2 SERPL-SCNC: 26 MMOL/L — SIGNIFICANT CHANGE UP (ref 17–32)
CO2 SERPL-SCNC: 27 MMOL/L — SIGNIFICANT CHANGE UP (ref 17–32)
CORTIS AM PEAK SERPL-MCNC: 11.8 UG/DL — SIGNIFICANT CHANGE UP (ref 6–18.4)
CREAT SERPL-MCNC: 0.9 MG/DL — SIGNIFICANT CHANGE UP (ref 0.7–1.5)
CREAT SERPL-MCNC: 1 MG/DL — SIGNIFICANT CHANGE UP (ref 0.7–1.5)
EGFR: 56 ML/MIN/1.73M2 — LOW
EGFR: 63 ML/MIN/1.73M2 — SIGNIFICANT CHANGE UP
EOSINOPHIL # BLD AUTO: 0.19 K/UL — SIGNIFICANT CHANGE UP (ref 0–0.7)
EOSINOPHIL NFR BLD AUTO: 4.1 % — SIGNIFICANT CHANGE UP (ref 0–8)
GLUCOSE BLDC GLUCOMTR-MCNC: 116 MG/DL — HIGH (ref 70–99)
GLUCOSE BLDC GLUCOMTR-MCNC: 143 MG/DL — HIGH (ref 70–99)
GLUCOSE BLDC GLUCOMTR-MCNC: 78 MG/DL — SIGNIFICANT CHANGE UP (ref 70–99)
GLUCOSE BLDC GLUCOMTR-MCNC: 85 MG/DL — SIGNIFICANT CHANGE UP (ref 70–99)
GLUCOSE BLDC GLUCOMTR-MCNC: 86 MG/DL — SIGNIFICANT CHANGE UP (ref 70–99)
GLUCOSE SERPL-MCNC: 72 MG/DL — SIGNIFICANT CHANGE UP (ref 70–99)
GLUCOSE SERPL-MCNC: 79 MG/DL — SIGNIFICANT CHANGE UP (ref 70–99)
HCT VFR BLD CALC: 38.3 % — SIGNIFICANT CHANGE UP (ref 37–47)
HGB BLD-MCNC: 13.1 G/DL — SIGNIFICANT CHANGE UP (ref 12–16)
IMM GRANULOCYTES NFR BLD AUTO: 0.2 % — SIGNIFICANT CHANGE UP (ref 0.1–0.3)
INR BLD: 1.14 RATIO — SIGNIFICANT CHANGE UP (ref 0.65–1.3)
LYMPHOCYTES # BLD AUTO: 0.84 K/UL — LOW (ref 1.2–3.4)
LYMPHOCYTES # BLD AUTO: 18 % — LOW (ref 20.5–51.1)
MAGNESIUM SERPL-MCNC: 1.7 MG/DL — LOW (ref 1.8–2.4)
MCHC RBC-ENTMCNC: 29.6 PG — SIGNIFICANT CHANGE UP (ref 27–31)
MCHC RBC-ENTMCNC: 34.2 G/DL — SIGNIFICANT CHANGE UP (ref 32–37)
MCV RBC AUTO: 86.5 FL — SIGNIFICANT CHANGE UP (ref 81–99)
MONOCYTES # BLD AUTO: 0.64 K/UL — HIGH (ref 0.1–0.6)
MONOCYTES NFR BLD AUTO: 13.7 % — HIGH (ref 1.7–9.3)
NEUTROPHILS # BLD AUTO: 2.94 K/UL — SIGNIFICANT CHANGE UP (ref 1.4–6.5)
NEUTROPHILS NFR BLD AUTO: 62.9 % — SIGNIFICANT CHANGE UP (ref 42.2–75.2)
NRBC # BLD: 0 /100 WBCS — SIGNIFICANT CHANGE UP (ref 0–0)
PLATELET # BLD AUTO: 105 K/UL — LOW (ref 130–400)
PMV BLD: 12.2 FL — HIGH (ref 7.4–10.4)
POTASSIUM SERPL-MCNC: 3.8 MMOL/L — SIGNIFICANT CHANGE UP (ref 3.5–5)
POTASSIUM SERPL-MCNC: 4.1 MMOL/L — SIGNIFICANT CHANGE UP (ref 3.5–5)
POTASSIUM SERPL-SCNC: 3.8 MMOL/L — SIGNIFICANT CHANGE UP (ref 3.5–5)
POTASSIUM SERPL-SCNC: 4.1 MMOL/L — SIGNIFICANT CHANGE UP (ref 3.5–5)
PROT SERPL-MCNC: 5 G/DL — LOW (ref 6–8)
PROTHROM AB SERPL-ACNC: 13 SEC — HIGH (ref 9.95–12.87)
RBC # BLD: 4.43 M/UL — SIGNIFICANT CHANGE UP (ref 4.2–5.4)
RBC # FLD: 13.3 % — SIGNIFICANT CHANGE UP (ref 11.5–14.5)
SODIUM SERPL-SCNC: 125 MMOL/L — LOW (ref 135–146)
SODIUM SERPL-SCNC: 125 MMOL/L — LOW (ref 135–146)
TSH SERPL-MCNC: 1.42 UIU/ML — SIGNIFICANT CHANGE UP (ref 0.27–4.2)
WBC # BLD: 4.67 K/UL — LOW (ref 4.8–10.8)
WBC # FLD AUTO: 4.67 K/UL — LOW (ref 4.8–10.8)

## 2024-10-15 PROCEDURE — 99232 SBSQ HOSP IP/OBS MODERATE 35: CPT

## 2024-10-15 RX ORDER — MAGNESIUM SULFATE IN 0.9% NACL 2 G/50 ML
2 INTRAVENOUS SOLUTION, PIGGYBACK (ML) INTRAVENOUS ONCE
Refills: 0 | Status: COMPLETED | OUTPATIENT
Start: 2024-10-15 | End: 2024-10-15

## 2024-10-15 RX ADMIN — Medication 25 MILLIGRAM(S): at 05:45

## 2024-10-15 RX ADMIN — PANTOPRAZOLE SODIUM 40 MILLIGRAM(S): 40 TABLET, DELAYED RELEASE ORAL at 05:45

## 2024-10-15 RX ADMIN — Medication 25 GRAM(S): at 09:42

## 2024-10-15 RX ADMIN — Medication 25 MILLIGRAM(S): at 17:35

## 2024-10-15 RX ADMIN — Medication 75 MILLILITER(S): at 17:36

## 2024-10-15 RX ADMIN — BUDESONIDE AND FORMOTEROL FUMARATE DIHYDRATE 2 PUFF(S): 80; 4.5 AEROSOL RESPIRATORY (INHALATION) at 09:25

## 2024-10-15 RX ADMIN — OSIMERTINIB 80 MILLIGRAM(S): 80 TABLET, FILM COATED ORAL at 21:21

## 2024-10-15 RX ADMIN — Medication 60 MILLIGRAM(S): at 05:46

## 2024-10-15 RX ADMIN — Medication 60 MILLIGRAM(S): at 17:36

## 2024-10-15 RX ADMIN — Medication 100 MILLIGRAM(S): at 04:43

## 2024-10-15 NOTE — CONSULT NOTE ADULT - SUBJECTIVE AND OBJECTIVE BOX
Date of Admission: 10/13/24    CHIEF COMPLAINT: lightheadness    HISTORY OF PRESENT ILLNESS: 84y Female with PMH below presented to the hospital for above CC, found to have hyponatremia with Na of 116, echo reveals severely dilated LA/ RA, biventricular hypertrophy and possible vegeation on aortic valve/.     PAST MEDICAL & SURGICAL HISTORY:  Hypertension, unspecified type      Type 2 diabetes mellitus with complication, without long-term current use of insulin      Hyperlipidemia, unspecified hyperlipidemia type      Diverticulitis      Obesity      COPD (chronic obstructive pulmonary disease)      SOB (shortness of breath)      GERD (gastroesophageal reflux disease)      Lung cancer      ARTIE (obstructive sleep apnea)  NO CPAP MACHINE PER PT.      Cancer of kidney      Cancer of thyroid      Former smoker      NHL (non-Hodgkin's lymphoma)  "low grade" per heme/ onc note      History of abdominal aortic aneurysm (AAA)      Kidney stones      [place] (hard of hearing)      History of surgery  LEFT LOWER LOBECTOMY      History of surgery  BILATERAL KNEE REPLACEMENT      History of surgery  CATARACT RIGHT EYE      History of surgery  TUBAL LIGATION      History of surgery  CYST REMOVED  RIGHT BREAST      History of surgery  CHOLECYSTECOMY      History of surgery  RIGHT PARTIAL NEPHRECTOMY      History of surgery  BILATERAL CATARACT SURGERY      History of back surgery      H/O lithotripsy      H/O partial thyroidectomy          FAMILY HISTORY:  [ ] no pertinent family history of premature cardiovascular disease in first degree relatives.  Mother:   Father:   Siblings:     SOCIAL HISTORY:    [ ] Non-smoker  [ ] Smoker  [ ] Alcohol    Allergies    No Known Allergies    Intolerances    	    REVIEW OF SYSTEMS:  unable to obtain    PHYSICAL EXAM:  T(C): 36.4 (10-15-24 @ 15:31), Max: 36.4 (10-15-24 @ 00:42)  HR: 55 (10-15-24 @ 15:31) (53 - 65)  BP: 115/72 (10-15-24 @ 15:31) (115/72 - 132/76)  RR: 18 (10-15-24 @ 15:31) (18 - 18)  SpO2: 96% (10-15-24 @ 15:31) (96% - 99%)  Wt(kg): --  I&O's Summary    14 Oct 2024 07:01  -  15 Oct 2024 07:00  --------------------------------------------------------  IN: 0 mL / OUT: 575 mL / NET: -575 mL        General Appearance: elderly and frail. 	  Neck: normal JVP, no bruit.   Eyes: No xanthomalasia, Extra Ocular muscles intact.   Cardiovascular: irregular rate and rhythm S1 S2, No JVD, No murmurs, No edema  Respiratory: Lungs clear to auscultation	  Psychiatry: Alert and oriented x 3, Mood & affect appropriate  Gastrointestinal:  Soft, Non-tender  Skin/Integumen: No rashes, No ecchymoses, No cyanosis	  Neurologic: Non-focal  Musculoskeletal/ extremities: Normal range of motion, No clubbing, cyanosis or edema  Vascular: Peripheral pulses palpable 2+ bilaterally    LABS:	 	                          13.1   4.67  )-----------( 105      ( 15 Oct 2024 07:45 )             38.3     10-15    125[L]  |  90[L]  |  21[H]  ----------------------------<  72  3.8   |  26  |  0.9    Ca    9.7      15 Oct 2024 07:45  Mg     1.7     10-15    TPro  5.0[L]  /  Alb  3.6  /  TBili  0.6  /  DBili  x   /  AST  12  /  ALT  18  /  AlkPhos  119[H]  10-15        PT/INR - ( 15 Oct 2024 07:45 )   PT: 13.00 sec;   INR: 1.14 ratio             CARDIAC MARKERS:            TELEMETRY EVENTS: 	    ECG:  Diagnosis Line Atrial fibrillation  Right bundle branch block  Lateral infarct , age undetermined  Abnormal ECG  	  RADIOLOGY:  OTHER: 	    PREVIOUS DIAGNOSTIC TESTING:    [x ] Echocardiogram: < from: TTE Echo Complete w/ Contrast w/o Doppler (10.13.24 @ 12:06) >   1. Normal left ventricular internal cavity size.   2. Hyperdynamic global left ventricular systolic function.   3. Left ventricular ejection fraction, by visual estimation, is >75%.   4. Asymmetric basal septal hypertrophy, 1.8 cm.   5. Normal right ventricular size and function.   6. Severely enlarged left atrium.   7. Severely enlarged right atrium.   8. Mild mitral stenosis. Moderate mitral valve regurgitation.   9. Moderate-severe tricuspid regurgitation.  10. There is a mobile echodensity on the aortic valve. Differential   includes leaflet thickening, however, cannot exclude vegetation. Would   recommend a transesophageal echocardiogram for further evaluation if   clinically indicated.  11. Mild pulmonic valve regurgitation.  12. Estimated pulmonary artery systolic pressure is 42.1 mmHg assuming a   right atrial pressure of 8 mmHg, which is consistent with mild pulmonary   hypertension.  13. Dilatation of the ascending aorta.    < end of copied text >    [ ]  Catheterization:  [ ] Stress Test:  	  	    Home Medications:  amLODIPine 2.5 mg oral tablet: 1 tab(s) orally once a day (in the evening) (13 Oct 2024 03:42)  losartan 100 mg oral tablet: 1 tab(s) orally once a day (13 Oct 2024 03:48)  meclizine 25 mg oral tablet: 1 tab(s) orally once a day as needed for  dizziness (13 Oct 2024 03:46)  metoprolol tartrate 50 mg oral tablet: 1 tab(s) orally once a day (13 Oct 2024 03:48)  omeprazole 40 mg oral delayed release capsule: 1 cap(s) orally once a day (13 Oct 2024 03:48)  oxybutynin 10 mg/24 hr oral tablet, extended release: 1 tab(s) orally once a day (13 Oct 2024 03:48)  Symbicort 160 mcg-4.5 mcg/inh inhalation aerosol: 2 puff(s) inhaled 2 times a day (13 Oct 2024 03:48)  Tagrisso 80 mg oral tablet: 80 milligram(s) orally once a day (13 Oct 2024 03:46)  torsemide 20 mg oral tablet: 1 tab(s) orally 3 times a week (13 Oct 2024 03:47)  Trulicity Pen 1.5 mg/0.5 mL subcutaneous solution: 1.5 milligram(s) subcutaneous once a week (13 Oct 2024 03:48)  Ventolin HFA 90 mcg/inh inhalation aerosol: 2 puff(s) inhaled every 6 hours (13 Oct 2024 03:48)  Vitamin D3 25 mcg (1000 intl units) oral tablet: 1 tab(s) orally once a day (13 Oct 2024 03:48)    MEDICATIONS  (STANDING):  budesonide 160 MICROgram(s)/formoterol 4.5 MICROgram(s) Inhaler 2 Puff(s) Inhalation two times a day  cefTRIAXone   IVPB      cefTRIAXone   IVPB 1000 milliGRAM(s) IV Intermittent every 24 hours  dextrose 5%. 1000 milliLiter(s) (50 mL/Hr) IV Continuous <Continuous>  dextrose 5%. 1000 milliLiter(s) (100 mL/Hr) IV Continuous <Continuous>  dextrose 50% Injectable 25 Gram(s) IV Push once  dextrose 50% Injectable 12.5 Gram(s) IV Push once  dextrose 50% Injectable 25 Gram(s) IV Push once  enoxaparin Injectable 60 milliGRAM(s) SubCutaneous every 12 hours  glucagon  Injectable 1 milliGRAM(s) IntraMuscular once  insulin lispro (ADMELOG) corrective regimen sliding scale   SubCutaneous three times a day before meals  lactated ringers. 1000 milliLiter(s) (75 mL/Hr) IV Continuous <Continuous>  metoprolol tartrate 25 milliGRAM(s) Oral two times a day  osimertinib 80 milliGRAM(s) Oral daily  pantoprazole    Tablet 40 milliGRAM(s) Oral before breakfast    MEDICATIONS  (PRN):  albuterol    90 MICROgram(s) HFA Inhaler 2 Puff(s) Inhalation every 6 hours PRN Bronchospasm  dextrose Oral Gel 15 Gram(s) Oral once PRN Blood Glucose LESS THAN 70 milliGRAM(s)/deciliter  meclizine 25 milliGRAM(s) Oral daily PRN for dizziness  melatonin 3 milliGRAM(s) Oral at bedtime PRN Insomnia  trimethobenzamide Injectable 200 milliGRAM(s) IntraMuscular every 8 hours PRN Nausea and/or Vomiting

## 2024-10-15 NOTE — PROGRESS NOTE ADULT - SUBJECTIVE AND OBJECTIVE BOX
FERMIN DIAZ 84y Female  MRN#: [id number]     Hospital Day: 2d    Pt is currently admitted with the primary diagnosis of     Overnight events   -No major overnight events                                          ----------------------------------------------------------  OBJECTIVE  PAST MEDICAL & SURGICAL HISTORY  Hypertension, unspecified type    Type 2 diabetes mellitus with complication, without long-term current use of insulin    Hyperlipidemia, unspecified hyperlipidemia type    Diverticulitis    Obesity    COPD (chronic obstructive pulmonary disease)    SOB (shortness of breath)    GERD (gastroesophageal reflux disease)    Lung cancer    ARTIE (obstructive sleep apnea)  NO CPAP MACHINE PER PT.    Cancer of kidney    Cancer of thyroid    Former smoker    NHL (non-Hodgkin's lymphoma)  "low grade" per heme/ onc note    History of abdominal aortic aneurysm (AAA)    Kidney stones    [place] (hard of hearing)    History of surgery  LEFT LOWER LOBECTOMY    History of surgery  BILATERAL KNEE REPLACEMENT    History of surgery  CATARACT RIGHT EYE    History of surgery  TUBAL LIGATION    History of surgery  CYST REMOVED  RIGHT BREAST    History of surgery  CHOLECYSTECOMY    History of surgery  RIGHT PARTIAL NEPHRECTOMY    History of surgery  BILATERAL CATARACT SURGERY    History of back surgery    H/O lithotripsy    H/O partial thyroidectomy                                              -----------------------------------------------------------  MEDICATIONS:  STANDING MEDICATIONS  budesonide 160 MICROgram(s)/formoterol 4.5 MICROgram(s) Inhaler 2 Puff(s) Inhalation two times a day  cefTRIAXone   IVPB      cefTRIAXone   IVPB 1000 milliGRAM(s) IV Intermittent every 24 hours  dextrose 5%. 1000 milliLiter(s) IV Continuous <Continuous>  dextrose 5%. 1000 milliLiter(s) IV Continuous <Continuous>  dextrose 50% Injectable 25 Gram(s) IV Push once  dextrose 50% Injectable 12.5 Gram(s) IV Push once  dextrose 50% Injectable 25 Gram(s) IV Push once  enoxaparin Injectable 60 milliGRAM(s) SubCutaneous every 12 hours  glucagon  Injectable 1 milliGRAM(s) IntraMuscular once  insulin lispro (ADMELOG) corrective regimen sliding scale   SubCutaneous three times a day before meals  lactated ringers. 1000 milliLiter(s) IV Continuous <Continuous>  metoprolol tartrate 25 milliGRAM(s) Oral two times a day  osimertinib 80 milliGRAM(s) Oral daily  pantoprazole    Tablet 40 milliGRAM(s) Oral before breakfast    PRN MEDICATIONS  albuterol    90 MICROgram(s) HFA Inhaler 2 Puff(s) Inhalation every 6 hours PRN  dextrose Oral Gel 15 Gram(s) Oral once PRN  meclizine 25 milliGRAM(s) Oral daily PRN  melatonin 3 milliGRAM(s) Oral at bedtime PRN  trimethobenzamide Injectable 200 milliGRAM(s) IntraMuscular every 8 hours PRN                                            ------------------------------------------------------------  VITAL SIGNS: Last 24 Hours  T(C): 36.3 (15 Oct 2024 07:54), Max: 37.2 (14 Oct 2024 15:23)  T(F): 97.4 (15 Oct 2024 07:54), Max: 98.9 (14 Oct 2024 15:23)  HR: 53 (15 Oct 2024 07:54) (53 - 65)  BP: 132/76 (15 Oct 2024 07:54) (123/70 - 132/76)  BP(mean): 95 (15 Oct 2024 07:54) (88 - 95)  RR: 18 (15 Oct 2024 07:54) (18 - 18)  SpO2: 99% (15 Oct 2024 07:54) (96% - 99%)      10-14-24 @ 07:01  -  10-15-24 @ 07:00  --------------------------------------------------------  IN: 0 mL / OUT: 575 mL / NET: -575 mL                                             --------------------------------------------------------------  LABS:                        13.1   4.67  )-----------( 105      ( 15 Oct 2024 07:45 )             38.3     10-15    125[L]  |  90[L]  |  21[H]  ----------------------------<  72  3.8   |  26  |  0.9    Ca    9.7      15 Oct 2024 07:45  Mg     1.7     10-15    TPro  5.0[L]  /  Alb  3.6  /  TBili  0.6  /  DBili  x   /  AST  12  /  ALT  18  /  AlkPhos  119[H]  10-15    PT/INR - ( 15 Oct 2024 07:45 )   PT: 13.00 sec;   INR: 1.14 ratio           Urinalysis Basic - ( 15 Oct 2024 07:45 )    Color: x / Appearance: x / SG: x / pH: x  Gluc: 72 mg/dL / Ketone: x  / Bili: x / Urobili: x   Blood: x / Protein: x / Nitrite: x   Leuk Esterase: x / RBC: x / WBC x   Sq Epi: x / Non Sq Epi: x / Bacteria: x              Culture - Urine (collected 13 Oct 2024 15:44)  Source: Clean Catch Clean Catch (Midstream)  Preliminary Report (14 Oct 2024 23:58):    10,000 - 49,000 CFU/mL Enterococcus species    Culture - Urine (collected 12 Oct 2024 23:30)  Source: Clean Catch Clean Catch (Midstream)  Preliminary Report (14 Oct 2024 16:55):    50,000 - 99,000 CFU/mL Gram Positive Cocci in Pairs and Chains                                                    --------------------------------------------------------------  PHYSICAL EXAM:  GENERAL: Awake, alert, oriented to person, place, time, situation. Well-nourished, laying in bed appearing in no acute distress  HEENT: No FNDs, atraumatic, normocephalic  LUNGS: Clear to auscultation bilaterally  HEART: S1/S2. No heaves or thrills  ABD: Soft, non-tender, non-distended.  EXT/NEURO: Strength, sensation and ROM grossly intact.  SKIN: No edema    PLAN:    # DVT prophylaxis     # GI prophylaxis     # Diet     # Activity Score (AM-PAC)    #Progress Note Handoff  Pending (specify):  Family discussion:   Disposition:        FERMIN DIAZ 84y Female  MRN#: [id number]     Hospital Day: 2d    Pt is currently admitted with the primary diagnosis of syncope    Overnight events   -No major overnight events                                            ----------------------------------------------------------  OBJECTIVE  PAST MEDICAL & SURGICAL HISTORY  Hypertension, unspecified type    Type 2 diabetes mellitus with complication, without long-term current use of insulin    Hyperlipidemia, unspecified hyperlipidemia type    Diverticulitis    Obesity    COPD (chronic obstructive pulmonary disease)    SOB (shortness of breath)    GERD (gastroesophageal reflux disease)    Lung cancer    ARTIE (obstructive sleep apnea)  NO CPAP MACHINE PER PT.    Cancer of kidney    Cancer of thyroid    Former smoker    NHL (non-Hodgkin's lymphoma)  "low grade" per heme/ onc note    History of abdominal aortic aneurysm (AAA)    Kidney stones    [place] (hard of hearing)    History of surgery  LEFT LOWER LOBECTOMY    History of surgery  BILATERAL KNEE REPLACEMENT    History of surgery  CATARACT RIGHT EYE    History of surgery  TUBAL LIGATION    History of surgery  CYST REMOVED  RIGHT BREAST    History of surgery  CHOLECYSTECOMY    History of surgery  RIGHT PARTIAL NEPHRECTOMY    History of surgery  BILATERAL CATARACT SURGERY    History of back surgery    H/O lithotripsy    H/O partial thyroidectomy                                              -----------------------------------------------------------  MEDICATIONS:  STANDING MEDICATIONS  budesonide 160 MICROgram(s)/formoterol 4.5 MICROgram(s) Inhaler 2 Puff(s) Inhalation two times a day  cefTRIAXone   IVPB      cefTRIAXone   IVPB 1000 milliGRAM(s) IV Intermittent every 24 hours  dextrose 5%. 1000 milliLiter(s) IV Continuous <Continuous>  dextrose 5%. 1000 milliLiter(s) IV Continuous <Continuous>  dextrose 50% Injectable 25 Gram(s) IV Push once  dextrose 50% Injectable 12.5 Gram(s) IV Push once  dextrose 50% Injectable 25 Gram(s) IV Push once  enoxaparin Injectable 60 milliGRAM(s) SubCutaneous every 12 hours  glucagon  Injectable 1 milliGRAM(s) IntraMuscular once  insulin lispro (ADMELOG) corrective regimen sliding scale   SubCutaneous three times a day before meals  lactated ringers. 1000 milliLiter(s) IV Continuous <Continuous>  metoprolol tartrate 25 milliGRAM(s) Oral two times a day  osimertinib 80 milliGRAM(s) Oral daily  pantoprazole    Tablet 40 milliGRAM(s) Oral before breakfast    PRN MEDICATIONS  albuterol    90 MICROgram(s) HFA Inhaler 2 Puff(s) Inhalation every 6 hours PRN  dextrose Oral Gel 15 Gram(s) Oral once PRN  meclizine 25 milliGRAM(s) Oral daily PRN  melatonin 3 milliGRAM(s) Oral at bedtime PRN  trimethobenzamide Injectable 200 milliGRAM(s) IntraMuscular every 8 hours PRN                                            ------------------------------------------------------------  VITAL SIGNS: Last 24 Hours  T(C): 36.3 (15 Oct 2024 07:54), Max: 37.2 (14 Oct 2024 15:23)  T(F): 97.4 (15 Oct 2024 07:54), Max: 98.9 (14 Oct 2024 15:23)  HR: 53 (15 Oct 2024 07:54) (53 - 65)  BP: 132/76 (15 Oct 2024 07:54) (123/70 - 132/76)  BP(mean): 95 (15 Oct 2024 07:54) (88 - 95)  RR: 18 (15 Oct 2024 07:54) (18 - 18)  SpO2: 99% (15 Oct 2024 07:54) (96% - 99%)      10-14-24 @ 07:01  -  10-15-24 @ 07:00  --------------------------------------------------------  IN: 0 mL / OUT: 575 mL / NET: -575 mL                                             --------------------------------------------------------------  LABS:                        13.1   4.67  )-----------( 105      ( 15 Oct 2024 07:45 )             38.3     10-15    125[L]  |  90[L]  |  21[H]  ----------------------------<  72  3.8   |  26  |  0.9    Ca    9.7      15 Oct 2024 07:45  Mg     1.7     10-15    TPro  5.0[L]  /  Alb  3.6  /  TBili  0.6  /  DBili  x   /  AST  12  /  ALT  18  /  AlkPhos  119[H]  10-15    PT/INR - ( 15 Oct 2024 07:45 )   PT: 13.00 sec;   INR: 1.14 ratio           Urinalysis Basic - ( 15 Oct 2024 07:45 )    Color: x / Appearance: x / SG: x / pH: x  Gluc: 72 mg/dL / Ketone: x  / Bili: x / Urobili: x   Blood: x / Protein: x / Nitrite: x   Leuk Esterase: x / RBC: x / WBC x   Sq Epi: x / Non Sq Epi: x / Bacteria: x              Culture - Urine (collected 13 Oct 2024 15:44)  Source: Clean Catch Clean Catch (Midstream)  Preliminary Report (14 Oct 2024 23:58):    10,000 - 49,000 CFU/mL Enterococcus species    Culture - Urine (collected 12 Oct 2024 23:30)  Source: Clean Catch Clean Catch (Midstream)  Preliminary Report (14 Oct 2024 16:55):    50,000 - 99,000 CFU/mL Gram Positive Cocci in Pairs and Chains                                            --------------------------------------------------------------  PHYSICAL EXAM:  GENERAL: NAD  HEENT: No FNDs, atraumatic, normocephalic  LUNGS: Clear to auscultation bilaterally  HEART: S1/S2. No heaves or thrills  ABD: Soft, non-tender, non-distended.  EXT/NEURO: Strength, sensation and ROM grossly intact.  SKIN: No edema    PLAN:  84-year-old female past medical history small cell lung cancer, lymphoma, thyroid and kidney cancer (in remission), COPD not on home O2, HTN, HLD, diabetes, kidney stones, AAA, presents with lightheadedness for 2-3 days. Pt and daughter reported she has a hx of lightheadness and takes Meclizine 25mg PRN, not sure the etiology. However, since yesterday she is feeling more lightheaded, gets worse when sitting or standing or walking. She is therefore having difficulty ambulating. She also had an episode of vomiting today. Took meclizine 25mg PRN yesterday but didnt help her much. She is also having decreased oral intake with decreased urination as per daughter. She denies any room spinning sensation. She has chronic tinnitus and mild hearing loss in both ears.     #Acute on chronic dizziness vs Lightheadedness ( Electrolytes  vs Cardiac cause)   # Severe hyponatremia ( Dietary vs SIADH 2/2 Lung cancer)   # Decreased PO intake:   - came to the ED for lightheadness and Decreased PO intake   - VVS  - Na 118 on admission -> now 125   - 10/14 Frida 123  - IVF: 75cc/hr LR 1L  - Orthostatics check 10/13: pos for DPB drop 16  - Fluid restriction  - Urine studies: Frida 123  - SOsm - 256 - low solute 2/2 SIADH vs low solute syndrome  - TSH 1.72 + AM cortisol 15.3 wnl   - BMP q 6 hours; replete  - nephro consulted - dc IVF at NA >130    # NEwly Diagnosed Paroxysmal A fib:   - CHADVASC 5   - HASBLED 2   - Rate control : Metoprolol tartrate 25 mg BID   - Anticoagulation : Discussed with the patient and daughter about bleeding risk ; Agreeable to AC   - Lovenox 60 mg BID.   - Monitor on Telemetry ; might have heart block leading to lightheadedness, dizziness.   - RAH 10/13: Hyperdynamic global left ventricular systolic function. Left ventricular ejection fraction, by visual estimation, is >75%. Asymmetric basal septal hypertrophy. Severely enlarged left atrium + right atrium. Mild mitral stenosis. Moderate mitral valve regurgitation. Moderate-severe tricuspid regurgitation. mobile echodensity on the aortic valve - rec RAH  Mild pulmonic valve regurgitation. mild pulmonary hypertension.  - Follows Dr Gamble OP - BCx x3  - BNP 1399    # Lower abdominal pain likely UTI   - Lower abdominal pain with burning sensation ; ( symptomatic )   - UA weakly positive 10/12 -> 10/13 neg  - UCx 10/12, 10/13 - pos for enterococcus + GP cocci and pairs  - Rocephin 1 gm OD for 3 days. 10/13->10/15    #Small cell lung cancer  # lymphoma  # thyroid   # kidney cancer (in remission),  - Follows Dr. Soares as outpatinet   - On Tagrisso OD. Continue     # COPD not on home O2  - not wheezing   - On symbicort and albuterol   - Follows Dr. Brian Lake     # HTN  # HLD  # Diabetes mellitus type 2:   - Hold antihypertensive for now   - Monitor blood glucose   - Insulin sliding scale.     # Hx of Kidney stones  # AAA   - Outpatient work up     # MISC:   - DVT : Loevnox AC  - GI : PPI   - Activity : Ambulate as tolerated   - Diet: Fluid restriction FERMIN DIAZ 84y Female  MRN#: [id number]     Hospital Day: 2d    Pt is currently admitted with the primary diagnosis of syncope    Overnight events   -No major overnight events                                            ----------------------------------------------------------  OBJECTIVE  PAST MEDICAL & SURGICAL HISTORY  Hypertension, unspecified type    Type 2 diabetes mellitus with complication, without long-term current use of insulin    Hyperlipidemia, unspecified hyperlipidemia type    Diverticulitis    Obesity    COPD (chronic obstructive pulmonary disease)    SOB (shortness of breath)    GERD (gastroesophageal reflux disease)    Lung cancer    ARTIE (obstructive sleep apnea)  NO CPAP MACHINE PER PT.    Cancer of kidney    Cancer of thyroid    Former smoker    NHL (non-Hodgkin's lymphoma)  "low grade" per heme/ onc note    History of abdominal aortic aneurysm (AAA)    Kidney stones    [place] (hard of hearing)    History of surgery  LEFT LOWER LOBECTOMY    History of surgery  BILATERAL KNEE REPLACEMENT    History of surgery  CATARACT RIGHT EYE    History of surgery  TUBAL LIGATION    History of surgery  CYST REMOVED  RIGHT BREAST    History of surgery  CHOLECYSTECOMY    History of surgery  RIGHT PARTIAL NEPHRECTOMY    History of surgery  BILATERAL CATARACT SURGERY    History of back surgery    H/O lithotripsy    H/O partial thyroidectomy                                              -----------------------------------------------------------  MEDICATIONS:  STANDING MEDICATIONS  budesonide 160 MICROgram(s)/formoterol 4.5 MICROgram(s) Inhaler 2 Puff(s) Inhalation two times a day  cefTRIAXone   IVPB      cefTRIAXone   IVPB 1000 milliGRAM(s) IV Intermittent every 24 hours  dextrose 5%. 1000 milliLiter(s) IV Continuous <Continuous>  dextrose 5%. 1000 milliLiter(s) IV Continuous <Continuous>  dextrose 50% Injectable 25 Gram(s) IV Push once  dextrose 50% Injectable 12.5 Gram(s) IV Push once  dextrose 50% Injectable 25 Gram(s) IV Push once  enoxaparin Injectable 60 milliGRAM(s) SubCutaneous every 12 hours  glucagon  Injectable 1 milliGRAM(s) IntraMuscular once  insulin lispro (ADMELOG) corrective regimen sliding scale   SubCutaneous three times a day before meals  lactated ringers. 1000 milliLiter(s) IV Continuous <Continuous>  metoprolol tartrate 25 milliGRAM(s) Oral two times a day  osimertinib 80 milliGRAM(s) Oral daily  pantoprazole    Tablet 40 milliGRAM(s) Oral before breakfast    PRN MEDICATIONS  albuterol    90 MICROgram(s) HFA Inhaler 2 Puff(s) Inhalation every 6 hours PRN  dextrose Oral Gel 15 Gram(s) Oral once PRN  meclizine 25 milliGRAM(s) Oral daily PRN  melatonin 3 milliGRAM(s) Oral at bedtime PRN  trimethobenzamide Injectable 200 milliGRAM(s) IntraMuscular every 8 hours PRN                                            ------------------------------------------------------------  VITAL SIGNS: Last 24 Hours  T(C): 36.3 (15 Oct 2024 07:54), Max: 37.2 (14 Oct 2024 15:23)  T(F): 97.4 (15 Oct 2024 07:54), Max: 98.9 (14 Oct 2024 15:23)  HR: 53 (15 Oct 2024 07:54) (53 - 65)  BP: 132/76 (15 Oct 2024 07:54) (123/70 - 132/76)  BP(mean): 95 (15 Oct 2024 07:54) (88 - 95)  RR: 18 (15 Oct 2024 07:54) (18 - 18)  SpO2: 99% (15 Oct 2024 07:54) (96% - 99%)      10-14-24 @ 07:01  -  10-15-24 @ 07:00  --------------------------------------------------------  IN: 0 mL / OUT: 575 mL / NET: -575 mL                                             --------------------------------------------------------------  LABS:                        13.1   4.67  )-----------( 105      ( 15 Oct 2024 07:45 )             38.3     10-15    125[L]  |  90[L]  |  21[H]  ----------------------------<  72  3.8   |  26  |  0.9    Ca    9.7      15 Oct 2024 07:45  Mg     1.7     10-15    TPro  5.0[L]  /  Alb  3.6  /  TBili  0.6  /  DBili  x   /  AST  12  /  ALT  18  /  AlkPhos  119[H]  10-15    PT/INR - ( 15 Oct 2024 07:45 )   PT: 13.00 sec;   INR: 1.14 ratio        Urinalysis Basic - ( 15 Oct 2024 07:45 )    Color: x / Appearance: x / SG: x / pH: x  Gluc: 72 mg/dL / Ketone: x  / Bili: x / Urobili: x   Blood: x / Protein: x / Nitrite: x   Leuk Esterase: x / RBC: x / WBC x   Sq Epi: x / Non Sq Epi: x / Bacteria: x    Culture - Urine (collected 13 Oct 2024 15:44)  Source: Clean Catch Clean Catch (Midstream)  Preliminary Report (14 Oct 2024 23:58):    10,000 - 49,000 CFU/mL Enterococcus species    Culture - Urine (collected 12 Oct 2024 23:30)  Source: Clean Catch Clean Catch (Midstream)  Preliminary Report (14 Oct 2024 16:55):    50,000 - 99,000 CFU/mL Gram Positive Cocci in Pairs and Chains                                            --------------------------------------------------------------  PHYSICAL EXAM:  GENERAL: NAD  HEENT: No FNDs, atraumatic, normocephalic  LUNGS: Clear to auscultation bilaterally  HEART: S1/S2. No heaves or thrills  ABD: Soft, non-tender, non-distended.  EXT/NEURO: Strength, sensation and ROM grossly intact.  SKIN: No edema    PLAN:  84-year-old female past medical history small cell lung cancer, lymphoma, thyroid and kidney cancer (in remission), COPD not on home O2, HTN, HLD, diabetes, kidney stones, AAA, presents with lightheadedness for 2-3 days. Pt and daughter reported she has a hx of lightheadness and takes Meclizine 25mg PRN, not sure the etiology. However, since yesterday she is feeling more lightheaded, gets worse when sitting or standing or walking. She is therefore having difficulty ambulating. She also had an episode of vomiting today. Took meclizine 25mg PRN yesterday but didnt help her much. She is also having decreased oral intake with decreased urination as per daughter. She denies any room spinning sensation. She has chronic tinnitus and mild hearing loss in both ears.     #Acute on chronic dizziness vs Lightheadedness ( Electrolytes  vs Cardiac cause)   # Severe hyponatremia ( Dietary vs SIADH 2/2 Lung cancer)   # Decreased PO intake:   - came to the ED for lightheadness and Decreased PO intake   - VVS  - Na 118 on admission -> now 125   - 10/14 Frida 123  - IVF: 75cc/hr LR 1L  - Orthostatics check 10/13: pos for DPB drop 16  - Fluid restriction  - Urine studies: Frida 123  - SOsm - 256 - low solute 2/2 SIADH vs low solute syndrome  - TSH 1.72 + AM cortisol 15.3 wnl   - BMP q 6 hours; replete  - nephro consulted - dc IVF at NA >130  - PT eval    # NEwly Diagnosed Paroxysmal A fib:   - CHADVASC 5   - HASBLED 2   - Rate control : Metoprolol tartrate 25 mg BID   - Anticoagulation : Discussed with the patient and daughter about bleeding risk ; Agreeable to AC   - Lovenox 60 mg BID.   - Monitor on Telemetry ; might have heart block leading to lightheadedness, dizziness.   - RAH 10/13: Hyperdynamic global left ventricular systolic function. Left ventricular ejection fraction, by visual estimation, is >75%. Asymmetric basal septal hypertrophy. Severely enlarged left atrium + right atrium. Mild mitral stenosis. Moderate mitral valve regurgitation. Moderate-severe tricuspid regurgitation. mobile echodensity on the aortic valve - rec RAH  Mild pulmonic valve regurgitation. mild pulmonary hypertension.  - Follows Dr Gamble OP - BCx x3  - BNP 1399    # Lower abdominal pain likely UTI   - Lower abdominal pain with burning sensation ; ( symptomatic )   - UA weakly positive 10/12 -> 10/13 neg  - UCx 10/12, 10/13 - pos for enterococcus + GP cocci and pairs  - Rocephin 1 gm OD for 3 days. 10/13->10/15    #Small cell lung cancer  # lymphoma  # thyroid   # kidney cancer (in remission),  - Follows Dr. Soares as outpatinet   - On Tagrisso OD. Continue     # COPD not on home O2  - not wheezing   - On symbicort and albuterol   - Follows Dr. Brian Lake     # HTN  # HLD  # Diabetes mellitus type 2:   - Hold antihypertensive for now   - Monitor blood glucose   - Insulin sliding scale.     # Hx of Kidney stones  # AAA   - Outpatient work up     # MISC:   - DVT : Loevnox AC  - GI : PPI   - Activity : Ambulate as tolerated   - Diet: Fluid restriction

## 2024-10-15 NOTE — CONSULT NOTE ADULT - ASSESSMENT
84-year-old female past medical history small cell lung cancer, lymphoma, thyroid and kidney cancer (in remission), COPD not on home O2, HTN, HLD, diabetes, kidney stones, AAA, presents with lightheadedness for 2-3 days.  pt had echo with possible vegetation on the aortic valve with nl fxn av, no fever and nl wbc.    blood culture x3 sets and pending result would decide on need for skyler.    correct na  cont other meds

## 2024-10-15 NOTE — PROGRESS NOTE ADULT - ASSESSMENT
84-year-old female past medical history small cell lung cancer, lymphoma, thyroid and kidney cancer (in remission), COPD not on home O2, HTN, HLD, diabetes, kidney stones, AAA, presents with lightheadedness for 2-3 days. Pt and daughter reported she has a hx of lightheadness and takes Meclizine 25mg PRN, not sure the etiology. However, since yesterday she is feeling more lightheaded, gets worse when sitting or standing or walking. She is therefore having difficulty ambulating. She also had an episode of vomiting today. Took meclizine 25mg PRN yesterday but didnt help her much. She is also having decreased oral intake with decreased urination as per daughter. She denies any room spinning sensation. She has chronic tinnitus and mild hearing loss in both ears.     #Acute on chronic dizziness vs Lightheadedness ( Electrolytes  vs Cardiac cause)   # Severe hyponatremia ( Dietary vs SIADH 2/2 Lung cancer)   # Decreased PO intake:   - Na 118 on admission -> now 125   - 10/14 Frida 123  - IVF: 75cc/hr LR 1L  - Orthostatics check 10/13: pos for DPB drop 16  - Fluid restriction  - Urine studies: Frida 123  - SOsm - 256 - low solute 2/2 SIADH vs low solute syndrome  - TSH 1.72 + AM cortisol 15.3 wnl   - BMP q 6 hours; replete  - nephro consulted - dc IVF at NA >130  - PT eval    # NEwly Diagnosed Paroxysmal A fib:   # Rule out endocarditis - mobile echodensity on aortic valve   - CHADVASC 5   - HASBLED 2   - Rate control : Metoprolol tartrate 25 mg BID   - Anticoagulation : Discussed with the patient and daughter about bleeding risk ; Agreeable to AC   - Lovenox 60 mg BID.   - Monitor on Telemetry ; might have heart block leading to lightheadedness, dizziness.   - RAH 10/13: Hyperdynamic global left ventricular systolic function. Left ventricular ejection fraction, by visual estimation, is >75%. Asymmetric basal septal hypertrophy. Severely enlarged left atrium + right atrium. Mild mitral stenosis. Moderate mitral valve regurgitation. Moderate-severe tricuspid regurgitation. mobile echodensity on the aortic valve - rec RAH  Mild pulmonic valve regurgitation. mild pulmonary hypertension.  - Follows Dr Gamble OP - 10/15 seen by Dr. Elias - recommended getting BCx x3  - BNP 1399    # Lower abdominal pain likely UTI   - Lower abdominal pain with burning sensation ; ( symptomatic )   - UA weakly positive 10/12 -> 10/13 neg  - UCx 10/12, 10/13 - pos for enterococcus + GP cocci and pairs  - Rocephin 1 gm OD for 3 days. 10/13->10/15    #Small cell lung cancer  # lymphoma  # thyroid   # kidney cancer (in remission),  - Follows Dr. Soares as outpatinet   - On Tagrisso OD. Continue     # COPD not on home O2  - not wheezing   - On symbicort and albuterol   - Follows Dr. Brian Lake     # HTN  # HLD  # Diabetes mellitus type 2:   - Hold antihypertensive for now   - Monitor blood glucose   - Insulin sliding scale.     # Hx of Kidney stones  # AAA   - Outpatient work up     # MISC:   - DVT : Loevnox AC  - GI : PPI   - Activity : Ambulate as tolerated   - Diet: Fluid restriction  - Pending: Na level, needs 3 blood cultures

## 2024-10-15 NOTE — CONSULT NOTE ADULT - SUBJECTIVE AND OBJECTIVE BOX
Patient is a 84y old  Female who presents with a chief complaint of weakness, fatigue (15 Oct 2024 17:52)      HPI:  84-year-old female past medical history small cell lung cancer, lymphoma, thyroid and kidney cancer (in remission), COPD not on home O2, HTN, HLD, diabetes, kidney stones, AAA, presents with lightheadedness for 2-3 days. Pt and daughter reported she has a hx of lightheadness and takes Meclizine 25mg PRN, not sure the etiology. However, she is feeling more lightheaded, gets worse when sitting or standing or walking. She is therefore having difficulty ambulating. She also had an episode of vomiting today. Took meclizine 25mg PRN yesterday but didnt help her much. She is also having decreased oral intake with decreased urination as per daughter. She denies any room spinning sensation. She has chronic tinnitus and mild hearing loss in both ears. pt had an echo with possible endocarditis     Denies any focal weakness, HA, trauma.   In the ED; Vitals : /74 mmHg; HR 63 ; RR 18 ; Afebrile ; maintaining saturation on RA.     Labs:   WBC 5.10 Hb 13.0 MCV 85.7 Platelets 116 k   Na 118 K 4.5  BUN/Creatinine 21/0.8   ALT 43  Magnesium 1.5   Troponin 27    UA Weakly positive     Imaging:   CT Angio Head:No large vessel occlusion, aneurysm, or vascular malformation.  CT head : No acute intracranial pathology.  CT Abdomen and Pelvis : No evidence of acute abdominal pathology.Stable infrarenal abdominal aortic aneurysm measuring up to 5.4 cm.    s/p  x1 ; meclizine ; magnesium 2 gm x1.     Admitted to medicine  (13 Oct 2024 02:59)      PAST MEDICAL & SURGICAL HISTORY:  Hypertension, unspecified type      Type 2 diabetes mellitus with complication, without long-term current use of insulin      Hyperlipidemia, unspecified hyperlipidemia type      Diverticulitis      Obesity      COPD (chronic obstructive pulmonary disease)      SOB (shortness of breath)      GERD (gastroesophageal reflux disease)      Lung cancer      ARTIE (obstructive sleep apnea)  NO CPAP MACHINE PER PT.      Cancer of kidney      Cancer of thyroid      Former smoker      NHL (non-Hodgkin's lymphoma)  "low grade" per heme/ onc note      History of abdominal aortic aneurysm (AAA)      Kidney stones      Circle (hard of hearing)      History of surgery  LEFT LOWER LOBECTOMY      History of surgery  BILATERAL KNEE REPLACEMENT      History of surgery  CATARACT RIGHT EYE      History of surgery  TUBAL LIGATION      History of surgery  CYST REMOVED  RIGHT BREAST      History of surgery  CHOLECYSTECOMY      History of surgery  RIGHT PARTIAL NEPHRECTOMY      History of surgery  BILATERAL CATARACT SURGERY      History of back surgery      H/O lithotripsy      H/O partial thyroidectomy          PREVIOUS DIAGNOSTIC TESTING:      ECHO  FINDINGS:Summary:   1. Normal left ventricular internal cavity size.   2. Hyperdynamic global left ventricular systolic function.   3. Left ventricular ejection fraction, by visual estimation, is >75%.   4. Asymmetric basal septal hypertrophy, 1.8 cm.   5. Normal right ventricular size and function.   6. Severely enlarged left atrium.   7. Severely enlarged right atrium.   8. Mild mitral stenosis. Moderate mitral valve regurgitation.   9. Moderate-severe tricuspid regurgitation.  10. There is a mobile echodensity on the aortic valve. Differential   includes leaflet thickening, however, cannot exclude vegetation. Would   recommend a transesophageal echocardiogram for further evaluation if   clinically indicated.  11. Mild pulmonic valve regurgitation.  12. Estimated pulmonary artery systolic pressure is 42.1 mmHg assuming a   right atrial pressure of 8 mmHg, which is consistent with mild pulmonary   hypertension.  13. Dilatation of the ascending aorta.      STRESS TEST  FINDINGS:    CATHETERIZATION  FINDINGS:    MEDICATIONS  (STANDING):  budesonide 160 MICROgram(s)/formoterol 4.5 MICROgram(s) Inhaler 2 Puff(s) Inhalation two times a day  cefTRIAXone   IVPB      cefTRIAXone   IVPB 1000 milliGRAM(s) IV Intermittent every 24 hours  dextrose 5%. 1000 milliLiter(s) (50 mL/Hr) IV Continuous <Continuous>  dextrose 5%. 1000 milliLiter(s) (100 mL/Hr) IV Continuous <Continuous>  dextrose 50% Injectable 25 Gram(s) IV Push once  dextrose 50% Injectable 12.5 Gram(s) IV Push once  dextrose 50% Injectable 25 Gram(s) IV Push once  enoxaparin Injectable 60 milliGRAM(s) SubCutaneous every 12 hours  glucagon  Injectable 1 milliGRAM(s) IntraMuscular once  insulin lispro (ADMELOG) corrective regimen sliding scale   SubCutaneous three times a day before meals  lactated ringers. 1000 milliLiter(s) (75 mL/Hr) IV Continuous <Continuous>  metoprolol tartrate 25 milliGRAM(s) Oral two times a day  osimertinib 80 milliGRAM(s) Oral daily  pantoprazole    Tablet 40 milliGRAM(s) Oral before breakfast    MEDICATIONS  (PRN):  albuterol    90 MICROgram(s) HFA Inhaler 2 Puff(s) Inhalation every 6 hours PRN Bronchospasm  dextrose Oral Gel 15 Gram(s) Oral once PRN Blood Glucose LESS THAN 70 milliGRAM(s)/deciliter  meclizine 25 milliGRAM(s) Oral daily PRN for dizziness  melatonin 3 milliGRAM(s) Oral at bedtime PRN Insomnia  trimethobenzamide Injectable 200 milliGRAM(s) IntraMuscular every 8 hours PRN Nausea and/or Vomiting      FAMILY HISTORY:  Family history of dementia (Mother)    Family history of cancer (Father, Grandparent)        SOCIAL HISTORY:  CIGARETTES: former  ALCOHOL:none  DRUGS:none                      REVIEW OF SYSTEMS:  CONSTITUTIONAL: No distress, Looks stable, fatigue  NECK: No pain or stiffnes  RESPIRATORY: No cough, wheezing, shortness of breath  CARDIOVASCULAR: No chest pain, SOB, palpitations, leg swelling  GASTROINTESTINAL: No abdominal or epigastric pain. No nausea, vomiting, or hematemesis;  No melena.  NEUROLOGICAL: No dizziness, headaches, memory loss, loss of strength  SKIN: No itching, burning, rashes, or lesions   ENDOCRINE: No heat or cold intolerance  MUSCULOSKELETAL: No joint pain, No  swelling; No muscle pain  PSYCHIATRIC: No depression, anxiety, mood swings, or difficulty sleeping  ALLERGY: No hives, itching, rash          Vital Signs Last 24 Hrs  T(C): 36.5 (15 Oct 2024 21:21), Max: 36.5 (15 Oct 2024 21:21)  T(F): 97.7 (15 Oct 2024 21:21), Max: 97.7 (15 Oct 2024 21:21)  HR: 88 (15 Oct 2024 21:21) (53 - 97)  BP: 146/82 (15 Oct 2024 21:21) (115/72 - 146/82)  BP(mean): 86 (15 Oct 2024 15:31) (86 - 95)  RR: 18 (15 Oct 2024 21:21) (18 - 18)  SpO2: 98% (15 Oct 2024 21:21) (96% - 99%)    Parameters below as of 15 Oct 2024 21:21  Patient On (Oxygen Delivery Method): room air                          PHYSICAL EXAM:  GENERAL: No distress, well developed  HEAD:  Atraumatic, Normocephalic  NECK: Supple, No JVD, No Bruit of either carotid arteries  NERVOUS SYSTEM:  Alert, Awake, Oriented to time, place, person; Normal memory and speech; Normal motor Strength 5/5 B/L upper and lower extremities  CHEST/LUNG: Normal air entry to lung base bilaterally; No wheeze, crackle, rales, rhonchi  HEART: Regular heart beat, S1, A2, P2, No S3, No S4, No gallop, No murmur  ABDOMEN: Soft, Non tender, Non distended; Bowel sounds present  EXTREMITIES:  2+ Peripheral Pulses, No clubbing, No edema  SKIN: No rashes or lesions    TELEMETRY:  afib mod v respone    ECG:Diagnosis Line Atrial fibrillation  Right bundle branch block  Lateral infarct , age undetermined  Abnormal ECG      I&O's Detail    14 Oct 2024 07:01  -  15 Oct 2024 07:00  --------------------------------------------------------  IN:  Total IN: 0 mL    OUT:    Voided (mL): 575 mL  Total OUT: 575 mL    Total NET: -575 mL      15 Oct 2024 07:01  -  15 Oct 2024 21:45  --------------------------------------------------------  IN:  Total IN: 0 mL    OUT:    Intermittent Catheterization - Urethral (mL): 600 mL  Total OUT: 600 mL    Total NET: -600 mL          LABS:                        13.1   4.67  )-----------( 105      ( 15 Oct 2024 07:45 )             38.3     10-15    125[L]  |  90[L]  |  21[H]  ----------------------------<  72  3.8   |  26  |  0.9    Ca    9.7      15 Oct 2024 07:45  Mg     1.7     10-15    TPro  5.0[L]  /  Alb  3.6  /  TBili  0.6  /  DBili  x   /  AST  12  /  ALT  18  /  AlkPhos  119[H]  10-15        PT/INR - ( 15 Oct 2024 07:45 )   PT: 13.00 sec;   INR: 1.14 ratio           Urinalysis Basic - ( 15 Oct 2024 07:45 )    Color: x / Appearance: x / SG: x / pH: x  Gluc: 72 mg/dL / Ketone: x  / Bili: x / Urobili: x   Blood: x / Protein: x / Nitrite: x   Leuk Esterase: x / RBC: x / WBC x   Sq Epi: x / Non Sq Epi: x / Bacteria: x      I&O's Summary    14 Oct 2024 07:01  -  15 Oct 2024 07:00  --------------------------------------------------------  IN: 0 mL / OUT: 575 mL / NET: -575 mL    15 Oct 2024 07:01  -  15 Oct 2024 21:45  --------------------------------------------------------  IN: 0 mL / OUT: 600 mL / NET: -600 mL        RADIOLOGY & ADDITIONAL STUDIES:

## 2024-10-15 NOTE — PROGRESS NOTE ADULT - SUBJECTIVE AND OBJECTIVE BOX
Patient is a 84y old  Female who presents with a chief complaint of syncope (15 Oct 2024 11:34)      Patient seen and examined at bedside.  Patient denies chest pain reports some shortness of breath   ALLERGIES:  No Known Allergies    MEDICATIONS:  albuterol    90 MICROgram(s) HFA Inhaler 2 Puff(s) Inhalation every 6 hours PRN  budesonide 160 MICROgram(s)/formoterol 4.5 MICROgram(s) Inhaler 2 Puff(s) Inhalation two times a day  cefTRIAXone   IVPB      cefTRIAXone   IVPB 1000 milliGRAM(s) IV Intermittent every 24 hours  dextrose 5%. 1000 milliLiter(s) IV Continuous <Continuous>  dextrose 5%. 1000 milliLiter(s) IV Continuous <Continuous>  dextrose 50% Injectable 25 Gram(s) IV Push once  dextrose 50% Injectable 12.5 Gram(s) IV Push once  dextrose 50% Injectable 25 Gram(s) IV Push once  dextrose Oral Gel 15 Gram(s) Oral once PRN  enoxaparin Injectable 60 milliGRAM(s) SubCutaneous every 12 hours  glucagon  Injectable 1 milliGRAM(s) IntraMuscular once  insulin lispro (ADMELOG) corrective regimen sliding scale   SubCutaneous three times a day before meals  lactated ringers. 1000 milliLiter(s) IV Continuous <Continuous>  meclizine 25 milliGRAM(s) Oral daily PRN  melatonin 3 milliGRAM(s) Oral at bedtime PRN  metoprolol tartrate 25 milliGRAM(s) Oral two times a day  osimertinib 80 milliGRAM(s) Oral daily  pantoprazole    Tablet 40 milliGRAM(s) Oral before breakfast  trimethobenzamide Injectable 200 milliGRAM(s) IntraMuscular every 8 hours PRN    Vital Signs Last 24 Hrs  T(F): 97.6 (15 Oct 2024 15:31), Max: 97.6 (15 Oct 2024 15:31)  HR: 55 (15 Oct 2024 15:31) (53 - 65)  BP: 115/72 (15 Oct 2024 15:31) (115/72 - 132/76)  RR: 18 (15 Oct 2024 15:31) (18 - 18)  SpO2: 96% (15 Oct 2024 15:31) (96% - 99%)  I&O's Summary    14 Oct 2024 07:01  -  15 Oct 2024 07:00  --------------------------------------------------------  IN: 0 mL / OUT: 575 mL / NET: -575 mL        PHYSICAL EXAM:  General: NAD, A/O x 3  ENT: MMM  Neck: Supple, No JVD  Lungs: left lower crackles, good air entry   Cardio: RRR, S1/S2,2/6 systolic murmur   Abdomen: Soft, Nontender, Nondistended; Bowel sounds present  Extremities: No cyanosis, No edema    LABS:                        13.1   4.67  )-----------( 105      ( 15 Oct 2024 07:45 )             38.3     10-15    125  |  90  |  21  ----------------------------<  72  3.8   |  26  |  0.9    Ca    9.7      15 Oct 2024 07:45  Mg     1.7     10-15    TPro  5.0  /  Alb  3.6  /  TBili  0.6  /  DBili  x   /  AST  12  /  ALT  18  /  AlkPhos  119  10-15      PT/INR - ( 15 Oct 2024 07:45 )   PT: 13.00 sec;   INR: 1.14 ratio                 10-13 Chol 190 mg/dL LDL -- HDL 67 mg/dL Trig 66 mg/dL  TSH 1.42   TSH with FT4 reflex --  Total T3 --              POCT Blood Glucose.: 143 mg/dL (15 Oct 2024 17:13)  POCT Blood Glucose.: 116 mg/dL (15 Oct 2024 15:13)  POCT Blood Glucose.: 85 mg/dL (15 Oct 2024 11:35)  POCT Blood Glucose.: 78 mg/dL (15 Oct 2024 08:06)      Urinalysis Basic - ( 15 Oct 2024 07:45 )    Color: x / Appearance: x / SG: x / pH: x  Gluc: 72 mg/dL / Ketone: x  / Bili: x / Urobili: x   Blood: x / Protein: x / Nitrite: x   Leuk Esterase: x / RBC: x / WBC x   Sq Epi: x / Non Sq Epi: x / Bacteria: x        Culture - Urine (collected 13 Oct 2024 15:44)  Source: Clean Catch Clean Catch (Midstream)  Preliminary Report (14 Oct 2024 23:58):    10,000 - 49,000 CFU/mL Enterococcus species    Culture - Urine (collected 12 Oct 2024 23:30)  Source: Clean Catch Clean Catch (Midstream)  Preliminary Report (14 Oct 2024 16:55):    50,000 - 99,000 CFU/mL Gram Positive Cocci in Pairs and Chains          RADIOLOGY & ADDITIONAL TESTS:    Care Discussed with Consultants/Other Providers:

## 2024-10-15 NOTE — PROGRESS NOTE ADULT - ASSESSMENT
84-year-old female past medical history small cell lung cancer, lymphoma, thyroid and kidney cancer (in remission), COPD not on home O2, HTN, HLD, diabetes, kidney stones, AAA, presents with lightheadedness for 2-3 days.     Nephrology was consulted for Hyponatremia with serum sodium of 118 on admission.     #Hypo-osmolar hypovolemic hyponatremia likely from low solute intake.     Plan:  Avoid using d5w with antibiotics.   sodium corrected appropriately at first 118 -> 125 in ~ 24 hours (7 meq) now stagnant resume LR   Once serum sodium >130, can discontinue fluid  d/c iv fluids if Na level stable or down-trending  encourage solute intake.   Monitor strict intake and output.   Monitor BMP qshift.   .

## 2024-10-15 NOTE — PHYSICAL THERAPY INITIAL EVALUATION ADULT - ADDITIONAL COMMENTS
Pt lives alone in house with 2 steps + 1 flight of stairs to enter, independent in ambulation, using SC as needed, attending OP PT weekly. No hx of falls. Reports worsening forward flexed posture with neck pain over the last year.

## 2024-10-15 NOTE — PROGRESS NOTE ADULT - SUBJECTIVE AND OBJECTIVE BOX
Nephrology progress note    Patient was seen and examined, events over the last 24 h noted .  Na improving     Allergies:  No Known Allergies    Hospital Medications:   MEDICATIONS  (STANDING):  budesonide 160 MICROgram(s)/formoterol 4.5 MICROgram(s) Inhaler 2 Puff(s) Inhalation two times a day  cefTRIAXone   IVPB      cefTRIAXone   IVPB 1000 milliGRAM(s) IV Intermittent every 24 hours  dextrose 5%. 1000 milliLiter(s) (50 mL/Hr) IV Continuous <Continuous>  dextrose 5%. 1000 milliLiter(s) (100 mL/Hr) IV Continuous <Continuous>  dextrose 50% Injectable 25 Gram(s) IV Push once  dextrose 50% Injectable 12.5 Gram(s) IV Push once  dextrose 50% Injectable 25 Gram(s) IV Push once  enoxaparin Injectable 60 milliGRAM(s) SubCutaneous every 12 hours  fluticasone propionate/ salmeterol 250-50 MICROgram(s) Diskus 1 Dose(s) Inhalation two times a day  glucagon  Injectable 1 milliGRAM(s) IntraMuscular once  insulin lispro (ADMELOG) corrective regimen sliding scale   SubCutaneous three times a day before meals  lactated ringers. 1000 milliLiter(s) (75 mL/Hr) IV Continuous <Continuous>  magnesium sulfate  IVPB 2 Gram(s) IV Intermittent once  metoprolol tartrate 25 milliGRAM(s) Oral two times a day  osimertinib 80 milliGRAM(s) Oral daily  pantoprazole    Tablet 40 milliGRAM(s) Oral before breakfast        VITALS:  T(F): 97.4 (10-15-24 @ 07:54), Max: 98.9 (10-14-24 @ 15:23)  HR: 53 (10-15-24 @ 07:54)  BP: 132/76 (10-15-24 @ 07:54)  RR: 18 (10-15-24 @ 07:54)  SpO2: 99% (10-15-24 @ 07:54)  Wt(kg): --    10-13 @ 07:01  -  10-14 @ 07:00  --------------------------------------------------------  IN: 0 mL / OUT: 1200 mL / NET: -1200 mL    10-14 @ 07:01  -  10-15 @ 07:00  --------------------------------------------------------  IN: 0 mL / OUT: 575 mL / NET: -575 mL          PHYSICAL EXAM:  Constitutional: NAD  HEENT: anicteric sclera, oropharynx clear, MMM  Neck: No JVD  Respiratory: CTAB, no wheezes, rales or rhonchi  Cardiovascular: S1, S2, RRR  Gastrointestinal: BS+, soft, NT/ND  Extremities: No cyanosis or clubbing. No peripheral edema  :  No theodore.   Skin: No rashes    LABS:  10-15    125[L]  |  90[L]  |  21[H]  ----------------------------<  72  3.8   |  26  |  0.9    Ca    9.7      15 Oct 2024 07:45  Mg     1.7     10-15    TPro  5.0[L]  /  Alb  3.6  /  TBili  0.6  /  DBili      /  AST  12  /  ALT  18  /  AlkPhos  119[H]  10-15                          13.1   4.67  )-----------( 105      ( 15 Oct 2024 07:45 )             38.3       Urine Studies:  Urinalysis Basic - ( 15 Oct 2024 07:45 )    Color:  / Appearance:  / SG:  / pH:   Gluc: 72 mg/dL / Ketone:   / Bili:  / Urobili:    Blood:  / Protein:  / Nitrite:    Leuk Esterase:  / RBC:  / WBC    Sq Epi:  / Non Sq Epi:  / Bacteria:       Sodium, Random Urine: 123.0 mmoL/L (10-13 @ 15:44)  Creatinine, Random Urine: 28 mg/dL (10-13 @ 15:44)  Protein/Creatinine Ratio Calculation: 0.3 Ratio (10-13 @ 15:44)  Osmolality, Random Urine: 442 mos/kg (10-13 @ 15:44)  Potassium, Random Urine: 26 mmol/L (10-13 @ 15:44)    RADIOLOGY & ADDITIONAL STUDIES:

## 2024-10-15 NOTE — CONSULT NOTE ADULT - ASSESSMENT
[facility] Progress Note    Date: 2022    Name: Uche Pratt    MRN: [id number]         : 1961      Therapeutic Phlebotomy/POC LABS    Mr. Roman Almaraz arrived to [facility] at [address] [place] [address] for labs to determine if he will require therapeutic phlebotomy. Pt denies any reactions or complications from last weeks therapeutic phlebotomy. Mr. Roman Almaraz was assessed and education was provided. Mr. Thor Villegas vitals were reviewed. Visit Vitals  /81 (BP 1 Location: Left upper arm, BP Patient Position: Sitting)   Pulse 76   Temp 98.7 °F (37.1 °C)   Resp 16   SpO2 95%       Blood drawn for labs via right hand condition patent and no redness venipuncture x1 attempt using a 23g collection needle, brisk blood return, applied Band-Aid to site. Lab results were obtained and reviewed. Recent Results (from the past 12 hour(s))   CBC WITH 3 PART DIFF    Collection Time: 10/07/22  8:15 AM   Result Value Ref Range    WBC 9.0 4.5 - 13.0 K/uL    RBC 5.50 (H) 4.10 - 5.10 M/uL    HGB 14.3 12.0 - 16.0 g/dL    HCT 47.7 36 - 48 %    MCV 86.7 78 - 102 FL    MCH 26.0 25.0 - 35.0 PG    MCHC 30.0 (L) 31 - 37 g/dL    RDW 14.7 (H) 11.5 - 14.5 %    PLATELET 192 683 - 698 K/uL    NEUTROPHILS 66 40 - 70 %    Mixed cells 8 0.1 - 17 %    LYMPHOCYTES 26 14 - 44 %    ABS. NEUTROPHILS 6.0 1.8 - 9.5 K/UL    ABS. MIXED CELLS 0.7 0.0 - 2.3 K/uL    ABS. LYMPHOCYTES 2.3 1.1 - 5.9 K/UL    DF AUTOMATED         Hgb 14.3 and Hct 47.7 (normal for diagnosis, this is why patient is being treated in clinic). Therapeutic phlebotomy held per orders. Mr. Roman Almaraz tolerated well without complaints. Discharge instructions given, patient gave verbal understanding. Mr. Roman Almaraz was discharged from [facility] [address] at [address] [place] [address]. He is to return on 22 at 0800 for his next appointment for labs and potential therapeutic phlebotomy.     Pradeep Gordon RN  2022 Afib  hyponatremia-- ?SIADH  vegetation on aortic valve  HTN  DLD    - obtain blood cultures to see if endocarditis present, check ESR/ CRP  - if cultures positive, can proceed with RAH but bacteremia is not present at this time.   - clinically does nto have endocarditis and no major valvular regurgitation  - will follow

## 2024-10-16 LAB
-  AMPICILLIN: SIGNIFICANT CHANGE UP
-  AMPICILLIN: SIGNIFICANT CHANGE UP
-  CIPROFLOXACIN: SIGNIFICANT CHANGE UP
-  CIPROFLOXACIN: SIGNIFICANT CHANGE UP
-  LEVOFLOXACIN: SIGNIFICANT CHANGE UP
-  LEVOFLOXACIN: SIGNIFICANT CHANGE UP
-  NITROFURANTOIN: SIGNIFICANT CHANGE UP
-  NITROFURANTOIN: SIGNIFICANT CHANGE UP
-  TETRACYCLINE: SIGNIFICANT CHANGE UP
-  TETRACYCLINE: SIGNIFICANT CHANGE UP
-  VANCOMYCIN: SIGNIFICANT CHANGE UP
-  VANCOMYCIN: SIGNIFICANT CHANGE UP
ALBUMIN SERPL ELPH-MCNC: 3.2 G/DL — LOW (ref 3.5–5.2)
ALP SERPL-CCNC: 100 U/L — SIGNIFICANT CHANGE UP (ref 30–115)
ALT FLD-CCNC: 13 U/L — SIGNIFICANT CHANGE UP (ref 0–41)
ANION GAP SERPL CALC-SCNC: 13 MMOL/L — SIGNIFICANT CHANGE UP (ref 7–14)
AST SERPL-CCNC: 10 U/L — SIGNIFICANT CHANGE UP (ref 0–41)
BASOPHILS # BLD AUTO: 0.02 K/UL — SIGNIFICANT CHANGE UP (ref 0–0.2)
BASOPHILS NFR BLD AUTO: 0.4 % — SIGNIFICANT CHANGE UP (ref 0–1)
BILIRUB SERPL-MCNC: 0.3 MG/DL — SIGNIFICANT CHANGE UP (ref 0.2–1.2)
BUN SERPL-MCNC: 18 MG/DL — SIGNIFICANT CHANGE UP (ref 10–20)
CALCIUM SERPL-MCNC: 9 MG/DL — SIGNIFICANT CHANGE UP (ref 8.4–10.5)
CHLORIDE SERPL-SCNC: 97 MMOL/L — LOW (ref 98–110)
CO2 SERPL-SCNC: 22 MMOL/L — SIGNIFICANT CHANGE UP (ref 17–32)
CREAT SERPL-MCNC: 0.8 MG/DL — SIGNIFICANT CHANGE UP (ref 0.7–1.5)
CULTURE RESULTS: ABNORMAL
CULTURE RESULTS: ABNORMAL
EGFR: 73 ML/MIN/1.73M2 — SIGNIFICANT CHANGE UP
EOSINOPHIL # BLD AUTO: 0.11 K/UL — SIGNIFICANT CHANGE UP (ref 0–0.7)
EOSINOPHIL NFR BLD AUTO: 2.5 % — SIGNIFICANT CHANGE UP (ref 0–8)
GLUCOSE BLDC GLUCOMTR-MCNC: 102 MG/DL — HIGH (ref 70–99)
GLUCOSE BLDC GLUCOMTR-MCNC: 121 MG/DL — HIGH (ref 70–99)
GLUCOSE BLDC GLUCOMTR-MCNC: 122 MG/DL — HIGH (ref 70–99)
GLUCOSE BLDC GLUCOMTR-MCNC: 88 MG/DL — SIGNIFICANT CHANGE UP (ref 70–99)
GLUCOSE SERPL-MCNC: 100 MG/DL — HIGH (ref 70–99)
HCT VFR BLD CALC: 36.7 % — LOW (ref 37–47)
HGB BLD-MCNC: 12.4 G/DL — SIGNIFICANT CHANGE UP (ref 12–16)
IMM GRANULOCYTES NFR BLD AUTO: 0.2 % — SIGNIFICANT CHANGE UP (ref 0.1–0.3)
LYMPHOCYTES # BLD AUTO: 0.85 K/UL — LOW (ref 1.2–3.4)
LYMPHOCYTES # BLD AUTO: 19 % — LOW (ref 20.5–51.1)
MAGNESIUM SERPL-MCNC: 1.8 MG/DL — SIGNIFICANT CHANGE UP (ref 1.8–2.4)
MCHC RBC-ENTMCNC: 29 PG — SIGNIFICANT CHANGE UP (ref 27–31)
MCHC RBC-ENTMCNC: 33.8 G/DL — SIGNIFICANT CHANGE UP (ref 32–37)
MCV RBC AUTO: 85.9 FL — SIGNIFICANT CHANGE UP (ref 81–99)
METHOD TYPE: SIGNIFICANT CHANGE UP
METHOD TYPE: SIGNIFICANT CHANGE UP
MONOCYTES # BLD AUTO: 0.55 K/UL — SIGNIFICANT CHANGE UP (ref 0.1–0.6)
MONOCYTES NFR BLD AUTO: 12.3 % — HIGH (ref 1.7–9.3)
NEUTROPHILS # BLD AUTO: 2.94 K/UL — SIGNIFICANT CHANGE UP (ref 1.4–6.5)
NEUTROPHILS NFR BLD AUTO: 65.6 % — SIGNIFICANT CHANGE UP (ref 42.2–75.2)
NRBC # BLD: 0 /100 WBCS — SIGNIFICANT CHANGE UP (ref 0–0)
ORGANISM # SPEC MICROSCOPIC CNT: ABNORMAL
ORGANISM # SPEC MICROSCOPIC CNT: ABNORMAL
ORGANISM # SPEC MICROSCOPIC CNT: SIGNIFICANT CHANGE UP
ORGANISM # SPEC MICROSCOPIC CNT: SIGNIFICANT CHANGE UP
PLATELET # BLD AUTO: 105 K/UL — LOW (ref 130–400)
PMV BLD: 12.1 FL — HIGH (ref 7.4–10.4)
POTASSIUM SERPL-MCNC: 3.6 MMOL/L — SIGNIFICANT CHANGE UP (ref 3.5–5)
POTASSIUM SERPL-SCNC: 3.6 MMOL/L — SIGNIFICANT CHANGE UP (ref 3.5–5)
PROT SERPL-MCNC: 4.7 G/DL — LOW (ref 6–8)
RBC # BLD: 4.27 M/UL — SIGNIFICANT CHANGE UP (ref 4.2–5.4)
RBC # FLD: 13.6 % — SIGNIFICANT CHANGE UP (ref 11.5–14.5)
SODIUM SERPL-SCNC: 132 MMOL/L — LOW (ref 135–146)
SPECIMEN SOURCE: SIGNIFICANT CHANGE UP
SPECIMEN SOURCE: SIGNIFICANT CHANGE UP
WBC # BLD: 4.48 K/UL — LOW (ref 4.8–10.8)
WBC # FLD AUTO: 4.48 K/UL — LOW (ref 4.8–10.8)

## 2024-10-16 PROCEDURE — 99233 SBSQ HOSP IP/OBS HIGH 50: CPT

## 2024-10-16 RX ORDER — METOPROLOL TARTRATE 50 MG
12.5 TABLET ORAL EVERY 12 HOURS
Refills: 0 | Status: DISCONTINUED | OUTPATIENT
Start: 2024-10-16 | End: 2024-10-21

## 2024-10-16 RX ORDER — CHLORHEXIDINE GLUCONATE 40 MG/ML
1 SOLUTION TOPICAL
Refills: 0 | Status: DISCONTINUED | OUTPATIENT
Start: 2024-10-16 | End: 2024-10-21

## 2024-10-16 RX ADMIN — Medication 25 MILLIGRAM(S): at 15:31

## 2024-10-16 RX ADMIN — OSIMERTINIB 80 MILLIGRAM(S): 80 TABLET, FILM COATED ORAL at 21:29

## 2024-10-16 RX ADMIN — Medication 12.5 MILLIGRAM(S): at 20:00

## 2024-10-16 RX ADMIN — Medication 100 MILLIGRAM(S): at 05:05

## 2024-10-16 RX ADMIN — Medication 60 MILLIGRAM(S): at 05:26

## 2024-10-16 RX ADMIN — PANTOPRAZOLE SODIUM 40 MILLIGRAM(S): 40 TABLET, DELAYED RELEASE ORAL at 05:26

## 2024-10-16 RX ADMIN — Medication 60 MILLIGRAM(S): at 17:24

## 2024-10-16 NOTE — PROGRESS NOTE ADULT - ASSESSMENT
84-year-old female past medical history small cell lung cancer, lymphoma, thyroid and kidney cancer (in remission), COPD not on home O2, HTN, HLD, diabetes, kidney stones, AAA, presents with difficulty in urinating. Pt states that she was nauseous for the last three days and did not take anything. Gives h/o lightheadedness and takes Meclizine for that.     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   H/O Lymphoma  H/O Thyroid and kidney cancer (In remission)  DM-2 / HTN / DL  COPD               PLAN:    ·	Tele reviewed by me. Was bradycardiac at night  ·	Decrease Metoprolol to 12.5 mg po q 12h  ·	EKG on admission: Atrial flutter with variable block 69/min. RBBB (Interpreted by me)  ·	Having urinary retention. S/P Reynoso catheter. More than 600 cc urine came out  ·	Urology eval  ·	Na is 132 today. S/P IVF. Hyponatremia has resolved. Can d/c IVF  ·	ECHO reviewed. EF is >75%. Mod to severe TR. There is a mobile echodensity on the aortic valve. Differential includes leaflet thickening, however, cannot exclude vegetation  ·	Cardiology f/u  ·	Check blood cxs  ·	Urine grew E. Fecalis. ID eval  ·	Monitor FS. On Lispro corrective regimen.     Progress Note Handoff    Pending (specify):  Consults_Cardiology, Urology________, Tests________, Test Results_______, Other__Urinary retention_______  Family discussion:  Disposition: Home___/SNF___/Other________/Unknown at this time________    Gary Ayala MD  Spectra: [phone]

## 2024-10-16 NOTE — PROGRESS NOTE ADULT - SUBJECTIVE AND OBJECTIVE BOX
FERMIN DIAZ  84y Female    CHIEF COMPLAINT:    Patient is a 84y old  Female who presents with a chief complaint of hyponatremia (16 Oct 2024 11:00)      INTERVAL HPI/OVERNIGHT EVENTS:    Patient seen and examined. C/O difficulty in urinating and having urinary retention. No fever. Also c/o nausea. No sob    ROS: All other systems are negative.    Vital Signs:    T(F): 98 (10-16-24 @ 05:09), Max: 98.3 (10-15-24 @ 21:45)  HR: 72 (10-16-24 @ 05:09) (55 - 97)  BP: 160/81 (10-16-24 @ 05:09) (115/72 - 160/81)  RR: 18 (10-16-24 @ 05:09) (18 - 18)  SpO2: 98% (10-15-24 @ 21:21) (96% - 98%)  I&O's Summary    15 Oct 2024 07:  -  16 Oct 2024 07:00  --------------------------------------------------------  IN: 625 mL / OUT: 1350 mL / NET: -725 mL    16 Oct 2024 07:01  -  16 Oct 2024 11:32  --------------------------------------------------------  IN: 236 mL / OUT: 625 mL / NET: -389 mL      Daily Height in cm: 180.34 (15 Oct 2024 21:45)    Daily Weight in k.5 (16 Oct 2024 06:00)  CAPILLARY BLOOD GLUCOSE      POCT Blood Glucose.: 102 mg/dL (16 Oct 2024 11:21)  POCT Blood Glucose.: 88 mg/dL (16 Oct 2024 07:46)  POCT Blood Glucose.: 86 mg/dL (15 Oct 2024 21:51)  POCT Blood Glucose.: 143 mg/dL (15 Oct 2024 17:13)  POCT Blood Glucose.: 116 mg/dL (15 Oct 2024 15:13)  POCT Blood Glucose.: 85 mg/dL (15 Oct 2024 11:35)      PHYSICAL EXAM:    GENERAL:  NAD  SKIN: No rashes or lesions  HENT: Atraumatic. Normocephalic. PERRL. Moist membranes.  NECK: Supple, No JVD. No lymphadenopathy.  PULMONARY: CTA B/L. No wheezing. No rales  CVS: Normal S1, S2. Rate and Rhythm are regular. No murmurs.  ABDOMEN/GI: Soft, Nontender, Nondistended; BS present  EXTREMITIES: Peripheral pulses intact. No edema B/L LE.  NEUROLOGIC:  No motor or sensory deficit.  PSYCH: Alert & oriented x 3    Consultant(s) Notes Reviewed:  [x ] YES  [ ] NO  Care Discussed with Consultants/Other Providers [ x] YES  [ ] NO    EKG reviewed  Telemetry reviewed    LABS:                        12.4   4.48  )-----------( 105      ( 16 Oct 2024 06:34 )             36.7     10-16    132[L]  |  97[L]  |  18  ----------------------------<  100[H]  3.6   |  22  |  0.8    Ca    9.0      16 Oct 2024 06:34  Mg     1.8     10-16    TPro  4.7[L]  /  Alb  3.2[L]  /  TBili  0.3  /  DBili  x   /  AST  10  /  ALT  13  /  AlkPhos  100  10-16    PT/INR - ( 15 Oct 2024 07:45 )   PT: 13.00 sec;   INR: 1.14 ratio                 Culture - Urine (collected 13 Oct 2024 15:44)  Source: Clean Catch Clean Catch (Midstream)  Preliminary Report (15 Oct 2024 22:22):    10,000 - 49,000 CFU/mL Enterococcus faecalis        RADIOLOGY & ADDITIONAL TESTS:    < from: TTE Echo Complete w/ Contrast w/o Doppler (10.13.24 @ 12:06) >    Summary:   1. Normal left ventricular internal cavity size.   2. Hyperdynamic global left ventricular systolic function.   3. Left ventricular ejection fraction, by visual estimation, is >75%.   4. Asymmetric basal septal hypertrophy, 1.8 cm.   5. Normal right ventricular size and function.   6. Severely enlarged left atrium.   7. Severely enlarged right atrium.   8. Mild mitral stenosis. Moderate mitral valve regurgitation.   9. Moderate-severe tricuspid regurgitation.  10. There is a mobile echodensity on the aortic valve. Differential   includes leaflet thickening, however, cannot exclude vegetation. Would   recommend a transesophageal echocardiogram for further evaluation if   clinically indicated.  11. Mild pulmonic valve regurgitation.  12. Estimated pulmonary artery systolic pressure is 42.1 mmHg assuming a   right atrial pressure of 8 mmHg, which is consistent with mild pulmonary   hypertension.  13. Dilatation of the ascending aorta.      < end of copied text >    Imaging or report Personally Reviewed:  [x ] YES  [ ] NO    Medications:  Standing  budesonide 160 MICROgram(s)/formoterol 4.5 MICROgram(s) Inhaler 2 Puff(s) Inhalation two times a day  chlorhexidine 2% Cloths 1 Application(s) Topical <User Schedule>  dextrose 5%. 1000 milliLiter(s) IV Continuous <Continuous>  dextrose 5%. 1000 milliLiter(s) IV Continuous <Continuous>  dextrose 50% Injectable 25 Gram(s) IV Push once  dextrose 50% Injectable 12.5 Gram(s) IV Push once  dextrose 50% Injectable 25 Gram(s) IV Push once  enoxaparin Injectable 60 milliGRAM(s) SubCutaneous every 12 hours  glucagon  Injectable 1 milliGRAM(s) IntraMuscular once  insulin lispro (ADMELOG) corrective regimen sliding scale   SubCutaneous three times a day before meals  metoprolol tartrate 12.5 milliGRAM(s) Oral every 12 hours  osimertinib 80 milliGRAM(s) Oral daily  pantoprazole    Tablet 40 milliGRAM(s) Oral before breakfast    PRN Meds  albuterol    90 MICROgram(s) HFA Inhaler 2 Puff(s) Inhalation every 6 hours PRN  dextrose Oral Gel 15 Gram(s) Oral once PRN  meclizine 25 milliGRAM(s) Oral daily PRN  melatonin 3 milliGRAM(s) Oral at bedtime PRN  trimethobenzamide Injectable 200 milliGRAM(s) IntraMuscular every 8 hours PRN      Case discussed with resident    Care discussed with pt/family

## 2024-10-16 NOTE — PROGRESS NOTE ADULT - SUBJECTIVE AND OBJECTIVE BOX
seen and examined  24 h events noted  no distress         PAST HISTORY  --------------------------------------------------------------------------------  No significant changes to PMH, PSH, FHx, SHx, unless otherwise noted    ALLERGIES & MEDICATIONS  --------------------------------------------------------------------------------  Allergies    No Known Allergies    Intolerances      Standing Inpatient Medications  budesonide 160 MICROgram(s)/formoterol 4.5 MICROgram(s) Inhaler 2 Puff(s) Inhalation two times a day  dextrose 5%. 1000 milliLiter(s) IV Continuous <Continuous>  dextrose 5%. 1000 milliLiter(s) IV Continuous <Continuous>  dextrose 50% Injectable 25 Gram(s) IV Push once  dextrose 50% Injectable 25 Gram(s) IV Push once  dextrose 50% Injectable 12.5 Gram(s) IV Push once  enoxaparin Injectable 60 milliGRAM(s) SubCutaneous every 12 hours  glucagon  Injectable 1 milliGRAM(s) IntraMuscular once  insulin lispro (ADMELOG) corrective regimen sliding scale   SubCutaneous three times a day before meals  lactated ringers. 1000 milliLiter(s) IV Continuous <Continuous>  metoprolol tartrate 25 milliGRAM(s) Oral two times a day  osimertinib 80 milliGRAM(s) Oral daily  pantoprazole    Tablet 40 milliGRAM(s) Oral before breakfast    PRN Inpatient Medications  albuterol    90 MICROgram(s) HFA Inhaler 2 Puff(s) Inhalation every 6 hours PRN  dextrose Oral Gel 15 Gram(s) Oral once PRN  meclizine 25 milliGRAM(s) Oral daily PRN  melatonin 3 milliGRAM(s) Oral at bedtime PRN  trimethobenzamide Injectable 200 milliGRAM(s) IntraMuscular every 8 hours PRN        VITALS/PHYSICAL EXAM  --------------------------------------------------------------------------------  T(C): 36.7 (10-16-24 @ 05:09), Max: 36.8 (10-15-24 @ 21:45)  HR: 72 (10-16-24 @ 05:09) (55 - 97)  BP: 160/81 (10-16-24 @ 05:09) (115/72 - 160/81)  RR: 18 (10-16-24 @ 05:09) (18 - 18)  SpO2: 98% (10-15-24 @ 21:21) (96% - 98%)  Wt(kg): --  Height (cm): 180.3 (10-15-24 @ 21:45)  Weight (kg): 58.8 (10-15-24 @ 21:45)  BMI (kg/m2): 18.1 (10-15-24 @ 21:45)  BSA (m2): 1.75 (10-15-24 @ 21:45)      10-15-24 @ 07:01  -  10-16-24 @ 07:00  --------------------------------------------------------  IN: 625 mL / OUT: 1350 mL / NET: -725 mL      Physical Exam:  	Gen: NAD  	Pulm: decrease BS b /L  	CV: S1S2; no rub  	Abd: +distended      LABS/STUDIES  --------------------------------------------------------------------------------              12.4   4.48  >-----------<  105      [10-16-24 @ 06:34]              36.7     125  |  90  |  21  ----------------------------<  72      [10-15-24 @ 07:45]  3.8   |  26  |  0.9        Ca     9.7     [10-15-24 @ 07:45]      Mg     1.7     [10-15-24 @ 07:45]    TPro  5.0  /  Alb  3.6  /  TBili  0.6  /  DBili  x   /  AST  12  /  ALT  18  /  AlkPhos  119  [10-15-24 @ 07:45]    PT/INR: PT 13.00, INR 1.14       [10-15-24 @ 07:45]    Creatinine Trend:  SCr 0.9 [10-15 @ 07:45]  SCr 1.0 [10-15 @ 00:40]  SCr 0.8 [10-14 @ 16:12]  SCr 0.7 [10-14 @ 07:48]  SCr 0.7 [10-13 @ 20:15]    Urinalysis - [10-15-24 @ 07:45]      Color  / Appearance  / SG  / pH       Gluc 72 / Ketone   / Bili  / Urobili        Blood  / Protein  / Leuk Est  / Nitrite       RBC  / WBC  / Hyaline  / Gran  / Sq Epi  / Non Sq Epi  / Bacteria     Urine Creatinine 28      [10-13-24 @ 15:44]  Urine Protein 8      [10-13-24 @ 15:44]  Urine Sodium 123.0      [10-13-24 @ 15:44]  Urine Urea Nitrogen 287      [10-13-24 @ 15:44]  Urine Potassium 26      [10-13-24 @ 15:44]  Urine Osmolality 442      [10-13-24 @ 15:44]    TSH 1.42      [10-15-24 @ 07:45]  Lipid: chol 190, TG 66, HDL 67, LDL --      [10-13-24 @ 14:08]

## 2024-10-16 NOTE — PROGRESS NOTE ADULT - ASSESSMENT
84-year-old female past medical history small cell lung cancer, lymphoma, thyroid and kidney cancer (in remission), COPD not on home O2, HTN, HLD, diabetes, kidney stones, AAA, presents with lightheadedness for 2-3 days. Pt and daughter reported she has a hx of lightheadness and takes Meclizine 25mg PRN, not sure the etiology. However, since yesterday she is feeling more lightheaded, gets worse when sitting or standing or walking. She is therefore having difficulty ambulating. She also had an episode of vomiting today. Took meclizine 25mg PRN yesterday but didnt help her much. She is also having decreased oral intake with decreased urination as per daughter. She denies any room spinning sensation. She has chronic tinnitus and mild hearing loss in both ears.     #Acute on chronic dizziness vs Lightheadedness ( Electrolytes  vs Cardiac cause)   # Severe hyponatremia ( Dietary vs SIADH 2/2 Lung cancer)   # Decreased PO intake:   - came to the ED for lightheadness and Decreased PO intake   - VVS  - Na 118 on admission   - 10/14 Frida 123  - IVF: 75cc/hr LR 1L  - Orthostatics check 10/13: pos for DPB drop 16  - Fluid restriction  - Urine studies: Frida 123  - SOsm - 256 - low solute 2/2 SIADH vs low solute syndrome  - TSH 1.72 + AM cortisol 15.3 wnl   - BMP q 6 hours; replete  - nephro consulted - dc IVF at NA >130 -> Na 132 10/16, fluids dc'c  - PT     # NEwly Diagnosed Paroxysmal A fib:   - CHADVASC 5   - HASBLED 2   - Rate control : Metoprolol tartrate 25 mg BID   - Anticoagulation : Discussed with the patient and daughter about bleeding risk ; Agreeable to AC   - Lovenox 60 mg BID.   - Monitor on Telemetry ; might have heart block leading to lightheadedness, dizziness.   - RAH 10/13: Hyperdynamic global left ventricular systolic function. Left ventricular ejection fraction, by visual estimation, is >75%. Asymmetric basal septal hypertrophy. Severely enlarged left atrium + right atrium. Mild mitral stenosis. Moderate mitral valve regurgitation. Moderate-severe tricuspid regurgitation. mobile echodensity on the aortic valve - rec RAH  Mild pulmonic valve regurgitation. mild pulmonary hypertension.  - Follows Dr Gamble OP - BCx x3  - BNP 1399  - blood cultures -> following      # Lower abdominal pain likely UTI   - Lower abdominal pain with burning sensation ; ( symptomatic )   - UA weakly positive 10/12 -> 10/13 neg  - UCx 10/12, 10/13 - pos for enterococcus + GP cocci and pairs  - Rocephin 1 gm OD for 3 days. 10/13->10/15   -ID consulted for ucx findings  - theodore put in 10/16    #Small cell lung cancer  # lymphoma  # thyroid   # kidney cancer (in remission),  - Follows Dr. Soares as outpatinet   - On Tagrisso OD. Continue     # COPD not on home O2  - not wheezing   - On symbicort and albuterol   - Follows Dr. Brian Lake     # HTN  # HLD  # Diabetes mellitus type 2:   - Hold antihypertensive for now   - Monitor blood glucose   - Insulin sliding scale.     # Hx of Kidney stones  # AAA   - Outpatient work up     - DVT : Loevnox AC  - GI : PPI   - Activity : Ambulate as tolerated   - Diet: Fluid restriction

## 2024-10-16 NOTE — CONSULT NOTE ADULT - ASSESSMENT
ASSESSMENT  84-year-old female past medical history small cell lung cancer, lymphoma, thyroid and kidney cancer (in remission), COPD not on home O2, HTN, HLD, diabetes, kidney stones, AAA, presents with lightheadedness for 2-3 days.     IMPRESSION  #    10/13 UCX   10,000 - 49,000 CFU/mL Enterococcus faecalis    10/12 UCX   50,000 - 99,000 CFU/mL Enterococcus faecalis  < from: TTE Echo Complete w/ Contrast w/o Doppler (10.13.24 @ 12:06) >   There is a mobile echodensity on the aortic valve. Differential   includes leaflet thickening, however, cannot exclude vegetation. Would   recommend a transesophageal echocardiogram for further evaluation if   clinically indicated.  #Hx multiple malignancies  #Immunodeficiency secondary to Senescence  Malignancy  which could results in poor clinical outcomes  #Abx allergy: No Known Allergies    Creatinine: 0.8 (10-16-24 @ 06:34)    Height (cm): 180.3 (10-15-24 @ 21:45)  Weight (kg): 58.8 (10-15-24 @ 21:45)    RECOMMENDATIONS  This is an incomplete consult note. All final recommendations to follow after interview and examination of the patient. Please follow recommendations noted below.    If any questions, please send a message or call on [employer] Teams  Please continue to update ID with any pertinent new laboratory, radiographic findings, or change in clinical status   ASSESSMENT  84-year-old female past medical history small cell lung cancer, lymphoma, thyroid and kidney cancer (in remission), COPD not on home O2, HTN, HLD, diabetes, kidney stones, AAA, presents with lightheadedness for 2-3 days.     IMPRESSION  #Doubt acute cystitis as UA without pyuria , symptoms appear due to retention.     UA without significant pyuria     10/13 UCX   10,000 - 49,000 CFU/mL Enterococcus faecalis    10/12 UCX   50,000 - 99,000 CFU/mL Enterococcus faecalis  #Possible AV echodensity   < from: TTE Echo Complete w/ Contrast w/o Doppler (10.13.24 @ 12:06) >   There is a mobile echodensity on the aortic valve. Differential   includes leaflet thickening, however, cannot exclude vegetation. Would   recommend a transesophageal echocardiogram for further evaluation if   clinically indicated.  #Hx multiple malignancies  #Immunodeficiency secondary to Senescence  Malignancy  which could results in poor clinical outcomes  #Abx allergy: No Known Allergies    Creatinine: 0.8 (10-16-24 @ 06:34)    Height (cm): 180.3 (10-15-24 @ 21:45)  Weight (kg): 58.8 (10-15-24 @ 21:45)    RECOMMENDATIONS  - Doubt e. faecalis in urine represents a UTI; in the setting of bacteriuria and a vegetation, cannot rule out endocarditis  - Send BCX x 3   - Recommend RAH  - ESR CRP  - Monitor off antimicrobials     If any questions, please send a message or call on [employer] Teams  Please continue to update ID with any pertinent new laboratory, radiographic findings, or change in clinical status

## 2024-10-16 NOTE — CONSULT NOTE ADULT - NS ATTEST RISK PROBLEM GEN_ALL_CORE FT
- I independently interpreted the most recent microbiological data; I analyzed the culture report & interpreted the MICs; I used my expertise in Infectious Diseases to tailor the antimicrobials  - I discussed my recommendations with the primary team housestaff / Attending

## 2024-10-16 NOTE — PROGRESS NOTE ADULT - ASSESSMENT
84-year-old female past medical history small cell lung cancer, lymphoma, thyroid and kidney cancer (in remission), COPD not on home O2, HTN, HLD, diabetes, kidney stones, AAA, presents with lightheadedness for 2-3 days.   Nephrology was consulted for Hyponatremia with serum sodium of 118 on admission.   #Hypo-osmolar hypovolemic hyponatremia likely from low solute intake.   Avoid using d5w   sodium corrected appropriately at first 118 -> 125 in ~ 24 hours (7 meq) now stagnant / check repeat today   Once serum sodium >130, can discontinue fluid  encourage solute intake.   Monitor strict intake and output.   cardiology notes appreciated / follow BC   will follow

## 2024-10-16 NOTE — CONSULT NOTE ADULT - SUBJECTIVE AND OBJECTIVE BOX
FERMIN DIAZ  84y, Female  Allergy: No Known Allergies      CHIEF COMPLAINT:   hyponatremia (16 Oct 2024 11:00)      LOS  3d    HPI  HPI:  84-year-old female past medical history small cell lung cancer, lymphoma, thyroid and kidney cancer (in remission), COPD not on home O2, HTN, HLD, diabetes, kidney stones, AAA, presents with lightheadedness for 2-3 days. Pt and daughter reported she has a hx of lightheadness and takes Meclizine 25mg PRN, not sure the etiology. However, since yesterday she is feeling more lightheaded, gets worse when sitting or standing or walking. She is therefore having difficulty ambulating. She also had an episode of vomiting today. Took meclizine 25mg PRN yesterday but didnt help her much. She is also having decreased oral intake with decreased urination as per daughter. She denies any room spinning sensation. She has chronic tinnitus and mild hearing loss in both ears.     Denies any focal weakness, HA, trauma.   In the ED; Vitals : /74 mmHg; HR 63 ; RR 18 ; Afebrile ; maintaining saturation on RA.     Labs:   WBC 5.10 Hb 13.0 MCV 85.7 Platelets 116 k   Na 118 K 4.5  BUN/Creatinine 21/0.8   ALT 43  Magnesium 1.5   Troponin 27    UA Weakly positive     Imaging:   CT Angio Head:No large vessel occlusion, aneurysm, or vascular malformation.  CT head : No acute intracranial pathology.  CT Abdomen and Pelvis : No evidence of acute abdominal pathology.Stable infrarenal abdominal aortic aneurysm measuring up to 5.4 cm.    s/p  x1 ; meclizine ; magnesium 2 gm x1.     Admitted to medicine  (13 Oct 2024 02:59)      INFECTIOUS DISEASE HISTORY:  ID consulted for +UCX e. faecalis & TTE concerning for vegetation  < from: TTE Echo Complete w/ Contrast w/o Doppler (10.13.24 @ 12:06) >   There is a mobile echodensity on the aortic valve. Differential   includes leaflet thickening, however, cannot exclude vegetation. Would   recommend a transesophageal echocardiogram for further evaluation if   clinically indicated.        Currently ordered for:      [facility]  PAST MEDICAL & SURGICAL HISTORY:  Hypertension, unspecified type      Type 2 diabetes mellitus with complication, without long-term current use of insulin      Hyperlipidemia, unspecified hyperlipidemia type      Diverticulitis      Obesity      COPD (chronic obstructive pulmonary disease)      SOB (shortness of breath)      GERD (gastroesophageal reflux disease)      Lung cancer      ARTIE (obstructive sleep apnea)  NO CPAP MACHINE PER PT.      Cancer of kidney      Cancer of thyroid      Former smoker      NHL (non-Hodgkin's lymphoma)  "low grade" per heme/ onc note      History of abdominal aortic aneurysm (AAA)      Kidney stones      [place] (hard of hearing)      History of surgery  LEFT LOWER LOBECTOMY      History of surgery  BILATERAL KNEE REPLACEMENT      History of surgery  CATARACT RIGHT EYE      History of surgery  TUBAL LIGATION      History of surgery  CYST REMOVED  RIGHT BREAST      History of surgery  CHOLECYSTECOMY      History of surgery  RIGHT PARTIAL NEPHRECTOMY      History of surgery  BILATERAL CATARACT SURGERY      History of back surgery      H/O lithotripsy      H/O partial thyroidectomy          FAMILY HISTORY  Family history of dementia (Mother)    Family history of cancer (Father, Grandparent)        SOCIAL HISTORY  Social History:  former smoker, no alcohol use (11 Nov 2022 19:26)        ROS  ***    VITALS:  T(F): 98, Max: 98.3 (10-15-24 @ 21:45)  HR: 72  BP: 160/81  RR: 18Vital Signs Last 24 Hrs  T(C): 36.7 (16 Oct 2024 05:09), Max: 36.8 (15 Oct 2024 21:45)  T(F): 98 (16 Oct 2024 05:09), Max: 98.3 (15 Oct 2024 21:45)  HR: 72 (16 Oct 2024 05:09) (55 - 97)  BP: 160/81 (16 Oct 2024 05:09) (115/72 - 160/81)  BP(mean): 86 (15 Oct 2024 15:31) (86 - 86)  RR: 18 (16 Oct 2024 05:09) (18 - 18)  SpO2: 98% (15 Oct 2024 21:21) (96% - 98%)    Parameters below as of 15 Oct 2024 21:21  Patient On (Oxygen Delivery Method): room air        PHYSICAL EXAM:  ***    TESTS & MEASUREMENTS:                        12.4   4.48  )-----------( 105      ( 16 Oct 2024 06:34 )             36.7     10-16    132[L]  |  97[L]  |  18  ----------------------------<  100[H]  3.6   |  22  |  0.8    Ca    9.0      16 Oct 2024 06:34  Mg     1.8     10-16    TPro  4.7[L]  /  Alb  3.2[L]  /  TBili  0.3  /  DBili  x   /  AST  10  /  ALT  13  /  AlkPhos  100  10-16      LIVER FUNCTIONS - ( 16 Oct 2024 06:34 )  Alb: 3.2 g/dL / Pro: 4.7 g/dL / ALK PHOS: 100 U/L / ALT: 13 U/L / AST: 10 U/L / GGT: x           Urinalysis Basic - ( 16 Oct 2024 06:34 )    Color: x / Appearance: x / SG: x / pH: x  Gluc: 100 mg/dL / Ketone: x  / Bili: x / Urobili: x   Blood: x / Protein: x / Nitrite: x   Leuk Esterase: x / RBC: x / WBC x   Sq Epi: x / Non Sq Epi: x / Bacteria: x        Culture - Urine (collected 10-13-24 @ 15:44)  Source: Clean Catch Clean Catch (Midstream)  Preliminary Report (10-15-24 @ 22:22):    10,000 - 49,000 CFU/mL Enterococcus faecalis    Culture - Urine (collected 10-12-24 @ 23:30)  Source: Clean Catch Clean Catch (Midstream)  Preliminary Report (10-16-24 @ 12:37):    50,000 - 99,000 CFU/mL Enterococcus faecalis  Organism: Enterococcus faecalis (10-16-24 @ 08:19)  Organism: Enterococcus faecalis (10-16-24 @ 08:19)      -  Levofloxacin: S 2      -  Nitrofurantoin: S <=32 Should not be used to treat pyelonephritis.      -  Vancomycin: S 2      -  Ciprofloxacin: I 2      -  Ampicillin: S <=2 Predicts results to ampicillin/sulbactam, amoxacillin-clavulanate and  piperacillin-tazobactam.      -  Tetracycline: R >8      Method Type: KYLAH            INFECTIOUS DISEASES TESTING      INFLAMMATORY MARKERS      RADIOLOGY & ADDITIONAL TESTS:  I have personally reviewed the last Chest xray  CXR      CT      CARDIOLOGY TESTING  12 Lead ECG:   Ventricular Rate 68 BPM    Atrial Rate 326 BPM    QRS Duration 172 m <TRUNCATED> (10-14-24 @ 11:22)       MEDICATIONS  budesonide 160 MICROgram(s)/formoterol 4.5 MICROgram(s) Inhaler 2 Inhalation two times a day  chlorhexidine 2% Cloths 1 Topical <User Schedule>  dextrose 5%. 1000 IV Continuous <Continuous>  dextrose 5%. 1000 IV Continuous <Continuous>  dextrose 50% Injectable 25 IV Push once  dextrose 50% Injectable 12.5 IV Push once  dextrose 50% Injectable 25 IV Push once  enoxaparin Injectable 60 SubCutaneous every 12 hours  glucagon  Injectable 1 IntraMuscular once  insulin lispro (ADMELOG) corrective regimen sliding scale  SubCutaneous three times a day before meals  metoprolol tartrate 12.5 Oral every 12 hours  osimertinib 80 Oral daily  pantoprazole    Tablet 40 Oral before breakfast      ANTIBIOTICS:      ALLERGIES:  No Known Allergies         FERMIN DIAZ  84y, Female  Allergy: No Known Allergies      CHIEF COMPLAINT:   hyponatremia (16 Oct 2024 11:00)      LOS  3d    HPI  HPI:  84-year-old female past medical history small cell lung cancer, lymphoma, thyroid and kidney cancer (in remission), COPD not on home O2, HTN, HLD, diabetes, kidney stones, AAA, presents with lightheadedness for 2-3 days. Pt and daughter reported she has a hx of lightheadness and takes Meclizine 25mg PRN, not sure the etiology. However, since yesterday she is feeling more lightheaded, gets worse when sitting or standing or walking. She is therefore having difficulty ambulating. She also had an episode of vomiting today. Took meclizine 25mg PRN yesterday but didnt help her much. She is also having decreased oral intake with decreased urination as per daughter. She denies any room spinning sensation. She has chronic tinnitus and mild hearing loss in both ears.     Denies any focal weakness, HA, trauma.   In the ED; Vitals : /74 mmHg; HR 63 ; RR 18 ; Afebrile ; maintaining saturation on RA.     Labs:   WBC 5.10 Hb 13.0 MCV 85.7 Platelets 116 k   Na 118 K 4.5  BUN/Creatinine 21/0.8   ALT 43  Magnesium 1.5   Troponin 27    UA Weakly positive     Imaging:   CT Angio Head:No large vessel occlusion, aneurysm, or vascular malformation.  CT head : No acute intracranial pathology.  CT Abdomen and Pelvis : No evidence of acute abdominal pathology.Stable infrarenal abdominal aortic aneurysm measuring up to 5.4 cm.    s/p  x1 ; meclizine ; magnesium 2 gm x1.     Admitted to medicine  (13 Oct 2024 02:59)      INFECTIOUS DISEASE HISTORY:  ID consulted for +UCX e. faecalis & TTE concerning for vegetation  < from: TTE Echo Complete w/ Contrast w/o Doppler (10.13.24 @ 12:06) >   There is a mobile echodensity on the aortic valve. Differential   includes leaflet thickening, however, cannot exclude vegetation. Would   recommend a transesophageal echocardiogram for further evaluation if   clinically indicated.    Pt denies dysuria but reports that at home she could not urinate. No flank pain. No fevers. Occasional chills.   Denies prostheses.       s/p cefTRIAXone   IVPB 10/13-10/16            Currently ordered for: no antibiotics       PMH  PAST MEDICAL & SURGICAL HISTORY:  Hypertension, unspecified type      Type 2 diabetes mellitus with complication, without long-term current use of insulin      Hyperlipidemia, unspecified hyperlipidemia type      Diverticulitis      Obesity      COPD (chronic obstructive pulmonary disease)      SOB (shortness of breath)      GERD (gastroesophageal reflux disease)      Lung cancer      ARTIE (obstructive sleep apnea)  NO CPAP MACHINE PER PT.      Cancer of kidney      Cancer of thyroid      Former smoker      NHL (non-Hodgkin's lymphoma)  "low grade" per heme/ onc note      History of abdominal aortic aneurysm (AAA)      Kidney stones      [place] (hard of hearing)      History of surgery  LEFT LOWER LOBECTOMY      History of surgery  BILATERAL KNEE REPLACEMENT      History of surgery  CATARACT RIGHT EYE      History of surgery  TUBAL LIGATION      History of surgery  CYST REMOVED  RIGHT BREAST      History of surgery  CHOLECYSTECOMY      History of surgery  RIGHT PARTIAL NEPHRECTOMY      History of surgery  BILATERAL CATARACT SURGERY      History of back surgery      H/O lithotripsy      H/O partial thyroidectomy          FAMILY HISTORY  Family history of dementia (Mother)    Family history of cancer (Father, Grandparent)        SOCIAL HISTORY  Social History:  former smoker, no alcohol use (11 Nov 2022 19:26)        ROS  General: Denies rigors, nightsweats  HEENT: Denies headache, rhinorrhea, sore throat, eye pain  CV: Denies CP, palpitations  PULM: Denies wheezing, hemoptysis  GI: Denies hematemesis, hematochezia, melena  : as noted above   MSK: Denies arthralgias, myalgias  SKIN: Denies rash, lesions  NEURO: Denies paresthesias, weakness  PSYCH: Denies depression, anxiety     VITALS:  T(F): 98, Max: 98.3 (10-15-24 @ 21:45)  HR: 72  BP: 160/81  RR: 18Vital Signs Last 24 Hrs  T(C): 36.7 (16 Oct 2024 05:09), Max: 36.8 (15 Oct 2024 21:45)  T(F): 98 (16 Oct 2024 05:09), Max: 98.3 (15 Oct 2024 21:45)  HR: 72 (16 Oct 2024 05:09) (55 - 97)  BP: 160/81 (16 Oct 2024 05:09) (115/72 - 160/81)  BP(mean): 86 (15 Oct 2024 15:31) (86 - 86)  RR: 18 (16 Oct 2024 05:09) (18 - 18)  SpO2: 98% (15 Oct 2024 21:21) (96% - 98%)    Parameters below as of 15 Oct 2024 21:21  Patient On (Oxygen Delivery Method): room air        PHYSICAL EXAM:  Gen: NAD, resting in bed  HEENT: Normocephalic, atraumatic  Neck: supple, no lymphadenopathy  CV: Regular rate & regular rhythm  Lungs: decreased BS at bases, no fremitus  Abdomen: Soft, BS present no suprapubic tenderness, no CVAT   Ext: Warm, well perfused  Neuro: non focal, awake  Skin: no rash, no erythema  Lines: no phlebitis     TESTS & MEASUREMENTS:                        12.4   4.48  )-----------( 105      ( 16 Oct 2024 06:34 )             36.7     10-16    132[L]  |  97[L]  |  18  ----------------------------<  100[H]  3.6   |  22  |  0.8    Ca    9.0      16 Oct 2024 06:34  Mg     1.8     10-16    TPro  4.7[L]  /  Alb  3.2[L]  /  TBili  0.3  /  DBili  x   /  AST  10  /  ALT  13  /  AlkPhos  100  10-16      LIVER FUNCTIONS - ( 16 Oct 2024 06:34 )  Alb: 3.2 g/dL / Pro: 4.7 g/dL / ALK PHOS: 100 U/L / ALT: 13 U/L / AST: 10 U/L / GGT: x           Urinalysis Basic - ( 16 Oct 2024 06:34 )    Color: x / Appearance: x / SG: x / pH: x  Gluc: 100 mg/dL / Ketone: x  / Bili: x / Urobili: x   Blood: x / Protein: x / Nitrite: x   Leuk Esterase: x / RBC: x / WBC x   Sq Epi: x / Non Sq Epi: x / Bacteria: x        Culture - Urine (collected 10-13-24 @ 15:44)  Source: Clean Catch Clean Catch (Midstream)  Preliminary Report (10-15-24 @ 22:22):    10,000 - 49,000 CFU/mL Enterococcus faecalis    Culture - Urine (collected 10-12-24 @ 23:30)  Source: Clean Catch Clean Catch (Midstream)  Preliminary Report (10-16-24 @ 12:37):    50,000 - 99,000 CFU/mL Enterococcus faecalis  Organism: Enterococcus faecalis (10-16-24 @ 08:19)  Organism: Enterococcus faecalis (10-16-24 @ 08:19)      -  Levofloxacin: S 2      -  Nitrofurantoin: S <=32 Should not be used to treat pyelonephritis.      -  Vancomycin: S 2      -  Ciprofloxacin: I 2      -  Ampicillin: S <=2 Predicts results to ampicillin/sulbactam, amoxacillin-clavulanate and  piperacillin-tazobactam.      -  Tetracycline: R >8      Method Type: KYLAH            INFECTIOUS DISEASES TESTING      INFLAMMATORY MARKERS      RADIOLOGY & ADDITIONAL TESTS:  I have personally reviewed the last Chest xray  CXR      CT      CARDIOLOGY TESTING  12 Lead ECG:   Ventricular Rate 68 BPM    Atrial Rate 326 BPM    QRS Duration 172 m <TRUNCATED> (10-14-24 @ 11:22)       MEDICATIONS  budesonide 160 MICROgram(s)/formoterol 4.5 MICROgram(s) Inhaler 2 Inhalation two times a day  chlorhexidine 2% Cloths 1 Topical <User Schedule>  dextrose 5%. 1000 IV Continuous <Continuous>  dextrose 5%. 1000 IV Continuous <Continuous>  dextrose 50% Injectable 25 IV Push once  dextrose 50% Injectable 12.5 IV Push once  dextrose 50% Injectable 25 IV Push once  enoxaparin Injectable 60 SubCutaneous every 12 hours  glucagon  Injectable 1 IntraMuscular once  insulin lispro (ADMELOG) corrective regimen sliding scale  SubCutaneous three times a day before meals  metoprolol tartrate 12.5 Oral every 12 hours  osimertinib 80 Oral daily  pantoprazole    Tablet 40 Oral before breakfast      ANTIBIOTICS:      ALLERGIES:  No Known Allergies

## 2024-10-16 NOTE — PROGRESS NOTE ADULT - SUBJECTIVE AND OBJECTIVE BOX
SUBJECTIVE/OVERNIGHT EVENTS  Today is hospital day 3d. This morning patient was seen and examined at bedside, resting comfortably in bed. No acute or major events overnight.        MEDICATIONS  STANDING MEDICATIONS  budesonide 160 MICROgram(s)/formoterol 4.5 MICROgram(s) Inhaler 2 Puff(s) Inhalation two times a day  dextrose 5%. 1000 milliLiter(s) IV Continuous <Continuous>  dextrose 5%. 1000 milliLiter(s) IV Continuous <Continuous>  dextrose 50% Injectable 25 Gram(s) IV Push once  dextrose 50% Injectable 12.5 Gram(s) IV Push once  dextrose 50% Injectable 25 Gram(s) IV Push once  enoxaparin Injectable 60 milliGRAM(s) SubCutaneous every 12 hours  glucagon  Injectable 1 milliGRAM(s) IntraMuscular once  insulin lispro (ADMELOG) corrective regimen sliding scale   SubCutaneous three times a day before meals  metoprolol tartrate 12.5 milliGRAM(s) Oral every 12 hours  osimertinib 80 milliGRAM(s) Oral daily  pantoprazole    Tablet 40 milliGRAM(s) Oral before breakfast    PRN MEDICATIONS  albuterol    90 MICROgram(s) HFA Inhaler 2 Puff(s) Inhalation every 6 hours PRN  dextrose Oral Gel 15 Gram(s) Oral once PRN  meclizine 25 milliGRAM(s) Oral daily PRN  melatonin 3 milliGRAM(s) Oral at bedtime PRN  trimethobenzamide Injectable 200 milliGRAM(s) IntraMuscular every 8 hours PRN    VITALS  T(F): 98 (10-16-24 @ 05:09), Max: 98.3 (10-15-24 @ 21:45)  HR: 72 (10-16-24 @ 05:09) (55 - 97)  BP: 160/81 (10-16-24 @ 05:09) (115/72 - 160/81)  RR: 18 (10-16-24 @ 05:09) (18 - 18)  SpO2: 98% (10-15-24 @ 21:21) (96% - 98%)  POCT Blood Glucose.: 88 mg/dL (10-16-24 @ 07:46)  POCT Blood Glucose.: 86 mg/dL (10-15-24 @ 21:51)  POCT Blood Glucose.: 143 mg/dL (10-15-24 @ 17:13)  POCT Blood Glucose.: 116 mg/dL (10-15-24 @ 15:13)  POCT Blood Glucose.: 85 mg/dL (10-15-24 @ 11:35)    PHYSICAL EXAM  GENERAL  (x  ) NAD, lying in bed comfortably     (  ) obtunded     (  ) lethargic     (  ) somnolent    HEAD  (  ) Atraumatic     (  ) hematoma     (  ) laceration (specify location:       )     NECK  (  ) Supple     (  ) neck stiffness     (  ) nuchal rigidity     (  )  no JVD     (  ) JVD present ( -- cm)    HEART  Rate -->  ( x ) normal rate    (  ) bradycardic    (  ) tachycardic  Rhythm -->  (  ) regular    ( x ) regularly irregular    (  ) irregularly irregular  Murmurs -->  (  ) normal s1/s2    (  ) systolic murmur    (  ) diastolic murmur    (  ) continuous murmur     (  ) S3 present    (  ) S4 present    LUNGS  ( x )Unlabored respirations     (  ) tachypnea  ( x ) B/L air entry     (  ) decreased breath sounds in:  (location     )    (  ) no adventitious sound     (  ) crackles     (  ) wheezing      (  ) rhonchi      (specify location:       )  (  ) chest wall tenderness (specify location:       )    ABDOMEN  ( x ) Soft     (  ) tense   |   (  ) nondistended     (  ) distended   |   (  ) +BS     (  ) hypoactive bowel sounds     (  ) hyperactive bowel sounds  (x  ) nontender     (  ) RUQ tenderness     (  ) RLQ tenderness     (  ) LLQ tenderness     (  ) epigastric tenderness     (  ) diffuse tenderness  (  ) Splenomegaly      (  ) Hepatomegaly      (  ) Jaundice     (  ) ecchymosis     EXTREMITIES  (  ) Normal     (  ) Rash     (  ) ecchymosis     (  ) varicose veins      (  ) pitting edema     (  ) non-pitting edema   (  ) ulceration     (  ) gangrene:     (location:     )  -mild pitting edema    NERVOUS SYSTEM  (  ) A&Ox3     (  ) confused     (  ) lethargic  CN II-XII:     (  ) Intact     (  ) focal deficits  (Specify:     )   Upper extremities:     (  ) strength X/5     (  ) focal deficit (specify:    )  Lower extremities:     (  ) strength  X/5    (  ) focal deficit (specify:    )    SKIN  (  ) No rashes or lesions     (  ) maculopapular rash     (  ) pustules     (  ) vesicles     (  ) ulcer     (  ) ecchymosis     (specify location:     )    (  ) Indwelling Reynoso Catheter   Date insterted:    Reason (  ) Critical illness     (  ) urinary retention    (  ) Accurate Ins/Outs Monitoring     (  ) CMO patient    (  ) Central Line  Date inserted:  Location: (  ) Right IJ   (  ) Left IJ   (  ) Right Fem   (  ) Left Fem    (  ) SPC  (  ) pigtail  (  ) PEG tube  (  ) colostomy  (  ) jejunostomy  (  ) U-Dall    LABS             12.4   4.48  )-----------( 105      ( 10-16-24 @ 06:34 )             36.7     132  |  97  |  18  -------------------------<  100   10-16-24 @ 06:34  3.6  |  22  |  0.8    Ca      9.0     10-16-24 @ 06:34  Mg     1.8     10-16-24 @ 06:34    TPro  4.7  /  Alb  3.2  /  TBili  0.3  /  DBili  x   /  AST  10  /  ALT  13  /  AlkPhos  100  /  GGT  x     10-16-24 @ 06:34    PT/INR - ( 10-15-24 @ 07:45 )   PT: 13.00 sec[H];   INR: 1.14 ratio      Urinalysis Basic - ( 16 Oct 2024 06:34 )    Color: x / Appearance: x / SG: x / pH: x  Gluc: 100 mg/dL / Ketone: x  / Bili: x / Urobili: x   Blood: x / Protein: x / Nitrite: x   Leuk Esterase: x / RBC: x / WBC x   Sq Epi: x / Non Sq Epi: x / Bacteria: x          Culture - Urine (collected 13 Oct 2024 15:44)  Source: Clean Catch Clean Catch (Midstream)  Preliminary Report (15 Oct 2024 22:22):    10,000 - 49,000 CFU/mL Enterococcus faecalis      IMAGING

## 2024-10-17 LAB
ALBUMIN SERPL ELPH-MCNC: 3.7 G/DL — SIGNIFICANT CHANGE UP (ref 3.5–5.2)
ALP SERPL-CCNC: 107 U/L — SIGNIFICANT CHANGE UP (ref 30–115)
ALT FLD-CCNC: 19 U/L — SIGNIFICANT CHANGE UP (ref 0–41)
ANION GAP SERPL CALC-SCNC: 12 MMOL/L — SIGNIFICANT CHANGE UP (ref 7–14)
AST SERPL-CCNC: 17 U/L — SIGNIFICANT CHANGE UP (ref 0–41)
BASOPHILS # BLD AUTO: 0.04 K/UL — SIGNIFICANT CHANGE UP (ref 0–0.2)
BASOPHILS NFR BLD AUTO: 0.8 % — SIGNIFICANT CHANGE UP (ref 0–1)
BILIRUB SERPL-MCNC: 0.3 MG/DL — SIGNIFICANT CHANGE UP (ref 0.2–1.2)
BUN SERPL-MCNC: 16 MG/DL — SIGNIFICANT CHANGE UP (ref 10–20)
CALCIUM SERPL-MCNC: 9.4 MG/DL — SIGNIFICANT CHANGE UP (ref 8.4–10.5)
CHLORIDE SERPL-SCNC: 97 MMOL/L — LOW (ref 98–110)
CO2 SERPL-SCNC: 24 MMOL/L — SIGNIFICANT CHANGE UP (ref 17–32)
CREAT SERPL-MCNC: 0.8 MG/DL — SIGNIFICANT CHANGE UP (ref 0.7–1.5)
EGFR: 73 ML/MIN/1.73M2 — SIGNIFICANT CHANGE UP
EOSINOPHIL # BLD AUTO: 0.15 K/UL — SIGNIFICANT CHANGE UP (ref 0–0.7)
EOSINOPHIL NFR BLD AUTO: 2.8 % — SIGNIFICANT CHANGE UP (ref 0–8)
GLUCOSE BLDC GLUCOMTR-MCNC: 119 MG/DL — HIGH (ref 70–99)
GLUCOSE BLDC GLUCOMTR-MCNC: 128 MG/DL — HIGH (ref 70–99)
GLUCOSE BLDC GLUCOMTR-MCNC: 91 MG/DL — SIGNIFICANT CHANGE UP (ref 70–99)
GLUCOSE BLDC GLUCOMTR-MCNC: 93 MG/DL — SIGNIFICANT CHANGE UP (ref 70–99)
GLUCOSE SERPL-MCNC: 88 MG/DL — SIGNIFICANT CHANGE UP (ref 70–99)
HCT VFR BLD CALC: 39.7 % — SIGNIFICANT CHANGE UP (ref 37–47)
HGB BLD-MCNC: 13.1 G/DL — SIGNIFICANT CHANGE UP (ref 12–16)
IMM GRANULOCYTES NFR BLD AUTO: 0.2 % — SIGNIFICANT CHANGE UP (ref 0.1–0.3)
LYMPHOCYTES # BLD AUTO: 1.07 K/UL — LOW (ref 1.2–3.4)
LYMPHOCYTES # BLD AUTO: 20.1 % — LOW (ref 20.5–51.1)
MAGNESIUM SERPL-MCNC: 1.8 MG/DL — SIGNIFICANT CHANGE UP (ref 1.8–2.4)
MCHC RBC-ENTMCNC: 28.7 PG — SIGNIFICANT CHANGE UP (ref 27–31)
MCHC RBC-ENTMCNC: 33 G/DL — SIGNIFICANT CHANGE UP (ref 32–37)
MCV RBC AUTO: 86.9 FL — SIGNIFICANT CHANGE UP (ref 81–99)
MONOCYTES # BLD AUTO: 0.64 K/UL — HIGH (ref 0.1–0.6)
MONOCYTES NFR BLD AUTO: 12 % — HIGH (ref 1.7–9.3)
NEUTROPHILS # BLD AUTO: 3.41 K/UL — SIGNIFICANT CHANGE UP (ref 1.4–6.5)
NEUTROPHILS NFR BLD AUTO: 64.1 % — SIGNIFICANT CHANGE UP (ref 42.2–75.2)
NRBC # BLD: 0 /100 WBCS — SIGNIFICANT CHANGE UP (ref 0–0)
PHOSPHATE SERPL-MCNC: 2.9 MG/DL — SIGNIFICANT CHANGE UP (ref 2.1–4.9)
PLATELET # BLD AUTO: 127 K/UL — LOW (ref 130–400)
PMV BLD: 11.7 FL — HIGH (ref 7.4–10.4)
POTASSIUM SERPL-MCNC: 4.1 MMOL/L — SIGNIFICANT CHANGE UP (ref 3.5–5)
POTASSIUM SERPL-SCNC: 4.1 MMOL/L — SIGNIFICANT CHANGE UP (ref 3.5–5)
PROT SERPL-MCNC: 5.2 G/DL — LOW (ref 6–8)
RBC # BLD: 4.57 M/UL — SIGNIFICANT CHANGE UP (ref 4.2–5.4)
RBC # FLD: 13.9 % — SIGNIFICANT CHANGE UP (ref 11.5–14.5)
SODIUM SERPL-SCNC: 133 MMOL/L — LOW (ref 135–146)
WBC # BLD: 5.32 K/UL — SIGNIFICANT CHANGE UP (ref 4.8–10.8)
WBC # FLD AUTO: 5.32 K/UL — SIGNIFICANT CHANGE UP (ref 4.8–10.8)

## 2024-10-17 PROCEDURE — 99223 1ST HOSP IP/OBS HIGH 75: CPT

## 2024-10-17 PROCEDURE — 99497 ADVNCD CARE PLAN 30 MIN: CPT | Mod: 25

## 2024-10-17 PROCEDURE — 99232 SBSQ HOSP IP/OBS MODERATE 35: CPT

## 2024-10-17 RX ORDER — TAMSULOSIN HCL 0.4 MG
0.4 CAPSULE ORAL AT BEDTIME
Refills: 0 | Status: DISCONTINUED | OUTPATIENT
Start: 2024-10-17 | End: 2024-10-21

## 2024-10-17 RX ORDER — TORSEMIDE 100 MG/1
1 TABLET ORAL
Refills: 0 | DISCHARGE

## 2024-10-17 RX ORDER — MECLIZINE HCL 25 MG
1 TABLET ORAL
Refills: 0 | DISCHARGE

## 2024-10-17 RX ADMIN — Medication 60 MILLIGRAM(S): at 05:38

## 2024-10-17 RX ADMIN — OSIMERTINIB 80 MILLIGRAM(S): 80 TABLET, FILM COATED ORAL at 22:00

## 2024-10-17 RX ADMIN — Medication 0.4 MILLIGRAM(S): at 21:36

## 2024-10-17 RX ADMIN — PANTOPRAZOLE SODIUM 40 MILLIGRAM(S): 40 TABLET, DELAYED RELEASE ORAL at 05:37

## 2024-10-17 RX ADMIN — CHLORHEXIDINE GLUCONATE 1 APPLICATION(S): 40 SOLUTION TOPICAL at 05:37

## 2024-10-17 RX ADMIN — BUDESONIDE AND FORMOTEROL FUMARATE DIHYDRATE 2 PUFF(S): 80; 4.5 AEROSOL RESPIRATORY (INHALATION) at 22:46

## 2024-10-17 RX ADMIN — Medication 60 MILLIGRAM(S): at 17:58

## 2024-10-17 RX ADMIN — Medication 12.5 MILLIGRAM(S): at 18:23

## 2024-10-17 RX ADMIN — Medication 12.5 MILLIGRAM(S): at 08:07

## 2024-10-17 NOTE — DIETITIAN INITIAL EVALUATION ADULT - ADD RECOMMEND
1. Liberalize diet to encourage PO intake.   2. Add Magic Cup to Diet three times a day order to provide additional 870  kcal and 27 grams protein.   3. Consider appetite stimulant if PO intake does not increase in 3-5 days.      1. Liberalize diet to encourage PO intake.   2. Add Magic Cup to Diet three times a day order to provide additional 870  kcal and 27 grams protein.   3. Consider appetite stimulant if PO intake does not increase in 3-5 days.   4. Add Ensure Plus High Protein twice/day to provide (350 kcal + 20 Grams Protein per carton)

## 2024-10-17 NOTE — CONSULT NOTE ADULT - PROBLEM SELECTOR RECOMMENDATION 4
.  Complex medical decision making / symptom management in the setting of small cell lung cancer and protein calorie malnutrition .    Will continue to follow for ongoing GOC discussion / titration of palliative regimen. Emotional support provided, questions answered.  Active Psychosocial Referrals:  [x]Social Work/Case management []PT/OT []Chaplaincy []Hospice  []Patient/Family Support []Holistic RN []Massage Therapy []Music Therapy []Ethics  Coping: [] well [] with difficulty [] poor coping [] unable to assess  Support system: [] strong [] adequate [] inadequate    For new or uncontrolled symptoms, please call Palliative Care at [phone]). The service is available 24/7 (including nights & weekends) to provide symptom management recommendations over the phone as appropriate

## 2024-10-17 NOTE — CONSULT NOTE ADULT - CONVERSATION DETAILS
Code status discussed with patient.  Looking to stay full code at this time, and to continue that discussion with her PMD and Oncologist.  HCP assigned. Queenie Parham.  Looking to continue [place]. Not a hospice candidate.

## 2024-10-17 NOTE — PROGRESS NOTE ADULT - SUBJECTIVE AND OBJECTIVE BOX
FERMIN DIAZ  84y Female    CHIEF COMPLAINT:    Patient is a 84y old  Female who presents with a chief complaint of hyponatremia (17 Oct 2024 12:00)      INTERVAL HPI/OVERNIGHT EVENTS:    Patient seen and examined. Having difficulty in urinating and poor appetite.     ROS: All other systems are negative.    Vital Signs:    T(F): 99 (10-17-24 @ 05:09), Max: 99 (10-17-24 @ 05:09)  HR: 80 (10-17-24 @ 05:09) (80 - 81)  BP: 163/91 (10-17-24 @ 05:09) (136/78 - 163/91)  RR: 18 (10-17-24 @ 05:09) (17 - 18)  SpO2: 97% (10-16-24 @ 13:40) (97% - 97%)  I&O's Summary    16 Oct 2024 07:01  -  17 Oct 2024 07:00  --------------------------------------------------------  IN: 784 mL / OUT: 1675 mL / NET: -891 mL      Daily     Daily Weight in k.6 (17 Oct 2024 05:09)  CAPILLARY BLOOD GLUCOSE      POCT Blood Glucose.: 119 mg/dL (17 Oct 2024 11:14)  POCT Blood Glucose.: 91 mg/dL (17 Oct 2024 07:37)  POCT Blood Glucose.: 122 mg/dL (16 Oct 2024 21:10)  POCT Blood Glucose.: 121 mg/dL (16 Oct 2024 16:39)      PHYSICAL EXAM:    GENERAL:  NAD  SKIN: No rashes or lesions  HENT: Atraumatic. Normocephalic. PERRL. Moist membranes.  NECK: Supple, No JVD. No lymphadenopathy.  PULMONARY: +ve crackles in L middle chest post. No wheezing.   CVS: Normal S1, S2. Rate and Rhythm are regular. No murmurs.  ABDOMEN/GI: Soft, Nontender, Nondistended; BS present  EXTREMITIES: Peripheral pulses intact. No edema B/L LE.  NEUROLOGIC:  No motor or sensory deficit.  PSYCH: Alert & oriented x 3    Consultant(s) Notes Reviewed:  [x ] YES  [ ] NO  Care Discussed with Consultants/Other Providers [ x] YES  [ ] NO    EKG reviewed  Telemetry reviewed    LABS:                        13.1   5.32  )-----------( 127      ( 17 Oct 2024 05:50 )             39.7     10-    133[L]  |  97[L]  |  16  ----------------------------<  88  4.1   |  24  |  0.8    Ca    9.4      17 Oct 2024 05:50  Phos  2.9     10-  Mg     1.8     10-17    TPro  5.2[L]  /  Alb  3.7  /  TBili  0.3  /  DBili  x   /  AST  17  /  ALT  19  /  AlkPhos  107  10-17            Culture - Blood (collected 15 Oct 2024 12:02)  Source: .Blood BLOOD  Preliminary Report (16 Oct 2024 22:01):    No growth at 24 hours        RADIOLOGY & ADDITIONAL TESTS:      Imaging or report Personally Reviewed:  [ ] YES  [ ] NO    Medications:  Standing  budesonide 160 MICROgram(s)/formoterol 4.5 MICROgram(s) Inhaler 2 Puff(s) Inhalation two times a day  chlorhexidine 2% Cloths 1 Application(s) Topical <User Schedule>  dextrose 5%. 1000 milliLiter(s) IV Continuous <Continuous>  dextrose 5%. 1000 milliLiter(s) IV Continuous <Continuous>  dextrose 50% Injectable 25 Gram(s) IV Push once  dextrose 50% Injectable 12.5 Gram(s) IV Push once  dextrose 50% Injectable 25 Gram(s) IV Push once  enoxaparin Injectable 60 milliGRAM(s) SubCutaneous every 12 hours  glucagon  Injectable 1 milliGRAM(s) IntraMuscular once  insulin lispro (ADMELOG) corrective regimen sliding scale   SubCutaneous three times a day before meals  metoprolol tartrate 12.5 milliGRAM(s) Oral every 12 hours  osimertinib 80 milliGRAM(s) Oral daily  pantoprazole    Tablet 40 milliGRAM(s) Oral before breakfast    PRN Meds  albuterol    90 MICROgram(s) HFA Inhaler 2 Puff(s) Inhalation every 6 hours PRN  dextrose Oral Gel 15 Gram(s) Oral once PRN  meclizine 25 milliGRAM(s) Oral daily PRN  melatonin 3 milliGRAM(s) Oral at bedtime PRN  trimethobenzamide Injectable 200 milliGRAM(s) IntraMuscular every 8 hours PRN      Case discussed with resident    Care discussed with pt/family

## 2024-10-17 NOTE — PHYSICAL THERAPY INITIAL EVALUATION ADULT - GENERAL OBSERVATIONS, REHAB EVAL
[id number] Pt received semifowlers in bed, NAD, pt deferring OOB mobility at this time c/o dizziness that has not improved since admission. Pt able to provide prior level of function and PSH, see below. Pt educated on importance of OOB mobility, POC and role of PT, continued to defer. PT to f/u at next available session.,
8:43-9:16 33 min pt received in bed in NAD, pt agreeable to PT, +theodore, pt had no c/o at rest

## 2024-10-17 NOTE — CONSULT NOTE ADULT - PROBLEM SELECTOR RECOMMENDATION 2
- Severe protein calorie malnutrition.  - BMI of 18.  - Temporal wasting.   - Albumin of 3.2.  - Supportive care.  - Poor prognostic sign.

## 2024-10-17 NOTE — DIETITIAN INITIAL EVALUATION ADULT - PERTINENT MEDS FT
MEDICATIONS  (STANDING):  budesonide 160 MICROgram(s)/formoterol 4.5 MICROgram(s) Inhaler 2 Puff(s) Inhalation two times a day  chlorhexidine 2% Cloths 1 Application(s) Topical <User Schedule>  dextrose 5%. 1000 milliLiter(s) (50 mL/Hr) IV Continuous <Continuous>  dextrose 5%. 1000 milliLiter(s) (100 mL/Hr) IV Continuous <Continuous>  dextrose 50% Injectable 25 Gram(s) IV Push once  dextrose 50% Injectable 12.5 Gram(s) IV Push once  dextrose 50% Injectable 25 Gram(s) IV Push once  enoxaparin Injectable 60 milliGRAM(s) SubCutaneous every 12 hours  glucagon  Injectable 1 milliGRAM(s) IntraMuscular once  insulin lispro (ADMELOG) corrective regimen sliding scale   SubCutaneous three times a day before meals  metoprolol tartrate 12.5 milliGRAM(s) Oral every 12 hours  osimertinib 80 milliGRAM(s) Oral daily  pantoprazole    Tablet 40 milliGRAM(s) Oral before breakfast    MEDICATIONS  (PRN):  albuterol    90 MICROgram(s) HFA Inhaler 2 Puff(s) Inhalation every 6 hours PRN Bronchospasm  dextrose Oral Gel 15 Gram(s) Oral once PRN Blood Glucose LESS THAN 70 milliGRAM(s)/deciliter  meclizine 25 milliGRAM(s) Oral daily PRN for dizziness  melatonin 3 milliGRAM(s) Oral at bedtime PRN Insomnia  trimethobenzamide Injectable 200 milliGRAM(s) IntraMuscular every 8 hours PRN Nausea and/or Vomiting   MEDICATIONS  (STANDING):  glucagon  Injectable 1 milliGRAM(s) IntraMuscular once  insulin lispro (ADMELOG) corrective regimen sliding scale   SubCutaneous three times a day before meals  pantoprazole    Tablet 40 milliGRAM(s) Oral before breakfast

## 2024-10-17 NOTE — CONSULT NOTE ADULT - ASSESSMENT
83 y/o Female with acute urinary retention.    A) Acute urinary retention  Overflow incontinence  mixed incontinence  UTI   hx chromophobe RCC s/p right partial nephrectomy  hx thyroid ca  non Hodgekin's lymphoma  lung ca x2    P) Keep Reynoso catheter  continue abx  consider Flomax 0.4 mg daily if not contra indicated  d/c Oxybutynin  OP f/u with Dr. Cobb for UDT's/TOV  will d/w attending    83 y/o Female with acute urinary retention.    A) Acute urinary retention  Overflow incontinence  mixed incontinence  UTI   hx chromophobe RCC s/p right partial nephrectomy  hx thyroid ca  non Hodgekin's lymphoma  lung ca x2    P) Keep Reynoso catheter  continue abx  consider Flomax 0.4 mg daily if not contra indicated  d/c Oxybutynin  OP f/u with Dr. Cobb for UDT's/TOV  no acute urinary retention  will d/w attending

## 2024-10-17 NOTE — PROGRESS NOTE ADULT - SUBJECTIVE AND OBJECTIVE BOX
SUBJECTIVE/OVERNIGHT EVENTS  Today is hospital day 4d. This morning patient was seen and examined at bedside, resting comfortably in bed. No acute or major events overnight.      MEDICATIONS  STANDING MEDICATIONS  budesonide 160 MICROgram(s)/formoterol 4.5 MICROgram(s) Inhaler 2 Puff(s) Inhalation two times a day  chlorhexidine 2% Cloths 1 Application(s) Topical <User Schedule>  dextrose 5%. 1000 milliLiter(s) IV Continuous <Continuous>  dextrose 5%. 1000 milliLiter(s) IV Continuous <Continuous>  dextrose 50% Injectable 25 Gram(s) IV Push once  dextrose 50% Injectable 25 Gram(s) IV Push once  dextrose 50% Injectable 12.5 Gram(s) IV Push once  enoxaparin Injectable 60 milliGRAM(s) SubCutaneous every 12 hours  glucagon  Injectable 1 milliGRAM(s) IntraMuscular once  insulin lispro (ADMELOG) corrective regimen sliding scale   SubCutaneous three times a day before meals  metoprolol tartrate 12.5 milliGRAM(s) Oral every 12 hours  osimertinib 80 milliGRAM(s) Oral daily  pantoprazole    Tablet 40 milliGRAM(s) Oral before breakfast    PRN MEDICATIONS  albuterol    90 MICROgram(s) HFA Inhaler 2 Puff(s) Inhalation every 6 hours PRN  dextrose Oral Gel 15 Gram(s) Oral once PRN  meclizine 25 milliGRAM(s) Oral daily PRN  melatonin 3 milliGRAM(s) Oral at bedtime PRN  trimethobenzamide Injectable 200 milliGRAM(s) IntraMuscular every 8 hours PRN    VITALS  T(F): 99 (10-17-24 @ 05:09), Max: 99 (10-17-24 @ 05:09)  HR: 80 (10-17-24 @ 05:09) (80 - 81)  BP: 163/91 (10-17-24 @ 05:09) (136/78 - 163/91)  RR: 18 (10-17-24 @ 05:09) (17 - 18)  SpO2: 97% (10-16-24 @ 13:40) (97% - 97%)  POCT Blood Glucose.: 119 mg/dL (10-17-24 @ 11:14)  POCT Blood Glucose.: 91 mg/dL (10-17-24 @ 07:37)  POCT Blood Glucose.: 122 mg/dL (10-16-24 @ 21:10)  POCT Blood Glucose.: 121 mg/dL (10-16-24 @ 16:39)    PHYSICAL EXAM  GENERAL  (  x) NAD, lying in bed comfortably     (  ) obtunded     (  ) lethargic     (  ) somnolent    HEAD  (  ) Atraumatic     (  ) hematoma     (  ) laceration (specify location:       )     NECK  (  ) Supple     (  ) neck stiffness     (  ) nuchal rigidity     (  )  no JVD     (  ) JVD present ( -- cm)    HEART  Rate -->  ( x ) normal rate    (  ) bradycardic    (  ) tachycardic  Rhythm -->  ( x ) regular    (  ) regularly irregular    (  ) irregularly irregular  Murmurs -->  (  ) normal s1/s2    (  ) systolic murmur    (  ) diastolic murmur    (  ) continuous murmur     (  ) S3 present    (  ) S4 present    LUNGS  (x  )Unlabored respirations     (  ) tachypnea  (x  ) B/L air entry     (  ) decreased breath sounds in:  (location     )    (  ) no adventitious sound     (  ) crackles     (  ) wheezing      (  ) rhonchi      (specify location:       )  (  ) chest wall tenderness (specify location:       )    ABDOMEN  (  x) Soft     (  ) tense   |   (  ) nondistended     (  ) distended   |   (  ) +BS     (  ) hypoactive bowel sounds     (  ) hyperactive bowel sounds  (  x) nontender     (  ) RUQ tenderness     (  ) RLQ tenderness     (  ) LLQ tenderness     (  ) epigastric tenderness     (  ) diffuse tenderness  (  ) Splenomegaly      (  ) Hepatomegaly      (  ) Jaundice     (  ) ecchymosis     EXTREMITIES  ( x ) Normal     (  ) Rash     (  ) ecchymosis     (  ) varicose veins      (  ) pitting edema     (  ) non-pitting edema   (  ) ulceration     (  ) gangrene:     (location:     )    NERVOUS SYSTEM  ( x ) A&Ox3     (  ) confused     (  ) lethargic  CN II-XII:     (  ) Intact     (  ) focal deficits  (Specify:     )   Upper extremities:     (  ) strength X/5     (  ) focal deficit (specify:    )  Lower extremities:     (  ) strength  X/5    (  ) focal deficit (specify:    )    SKIN  (  ) No rashes or lesions     (  ) maculopapular rash     (  ) pustules     (  ) vesicles     (  ) ulcer     (  ) ecchymosis     (specify location:     )    (  ) Indwelling Reynoso Catheter   Date insterted:    Reason (  ) Critical illness     (  ) urinary retention    (  ) Accurate Ins/Outs Monitoring     (  ) CMO patient    (  ) Central Line  Date inserted:  Location: (  ) Right IJ   (  ) Left IJ   (  ) Right Fem   (  ) Left Fem    (  ) SPC  (  ) pigtail  (  ) PEG tube  (  ) colostomy  (  ) jejunostomy  (  ) U-Dall    LABS             13.1   5.32  )-----------( 127      ( 10-17-24 @ 05:50 )             39.7     133  |  97  |  16  -------------------------<  88   10-17-24 @ 05:50  4.1  |  24  |  0.8    Ca      9.4     10-17-24 @ 05:50  Phos   2.9     10-17-24 @ 05:50  Mg     1.8     10-17-24 @ 05:50    TPro  5.2  /  Alb  3.7  /  TBili  0.3  /  DBili  x   /  AST  17  /  ALT  19  /  AlkPhos  107  /  GGT  x     10-17-24 @ 05:50        Urinalysis Basic - ( 17 Oct 2024 05:50 )    Color: x / Appearance: x / SG: x / pH: x  Gluc: 88 mg/dL / Ketone: x  / Bili: x / Urobili: x   Blood: x / Protein: x / Nitrite: x   Leuk Esterase: x / RBC: x / WBC x   Sq Epi: x / Non Sq Epi: x / Bacteria: x          Culture - Blood (collected 15 Oct 2024 12:02)  Source: .Blood BLOOD  Preliminary Report (16 Oct 2024 22:01):    No growth at 24 hours      IMAGING

## 2024-10-17 NOTE — CONSULT NOTE ADULT - PROBLEM SELECTOR RECOMMENDATION 9
- Patient with a history of small cell lung cancer.  - Remains on Tagrisso.  - Goals is to continue treatment as tolerated.  - Recent PET scan with no evidence of active disease.

## 2024-10-17 NOTE — DIETITIAN INITIAL EVALUATION ADULT - ORAL INTAKE PTA/DIET HISTORY
Pt AAOx4. Pt reports good appetite with eating % of 3 meals while at home. Drinks oral nutrition supplement (boost) 0-1 x/day. NKFA, No Jehovah's witness cultural diet restrictions. Pt reports intentional wt loss ~ 100 lbs over span of 1.5 years s/p diabetes management.     Reports poor PO 3 days 2/2 persistent nausea. Pt ate most of dinner last night, and <25% of Breakfast this morning d/t nausea. Pt reports her nausea declines when she eats. Declined Oral nutrition supplement, states she has preference for the brand she drinks at home. RD encouraged PO intake with supplement.  Reports she is okay with trying Magic Cup BID.     NPFP: no over s/s of malnutrition  Pt AAOx4. Pt reports good appetite with eating % of 3 meals while at home. Drinks oral nutrition supplement (boost) 0-1 x/day. NKFA, No [place] cultural diet restrictions. Pt reports intentional wt loss ~ 100 lbs over span of 1.5 years s/p diabetes management.     Reports poor PO 3 days 2/2 persistent nausea. Pt ate most of dinner last night, and <25% of Breakfast this morning d/t nausea. Pt reports her nausea declines when she eats. Declined Oral nutrition supplement, states she has preference for the brand she drinks at home. RD encouraged PO intake with supplement.  Reports she is okay with trying Magic Cup BID.     NPFP: no overt s/s of protein calorie malnutrition

## 2024-10-17 NOTE — PROGRESS NOTE ADULT - ASSESSMENT
84-year-old female past medical history small cell lung cancer, lymphoma, thyroid and kidney cancer (in remission), COPD not on home O2, HTN, HLD, diabetes, kidney stones, AAA, presents with lightheadedness for 2-3 days. Pt and daughter reported she has a hx of lightheadness and takes Meclizine 25mg PRN, not sure the etiology. However, since yesterday she is feeling more lightheaded, gets worse when sitting or standing or walking. She is therefore having difficulty ambulating. She also had an episode of vomiting today. Took meclizine 25mg PRN yesterday but didnt help her much. She is also having decreased oral intake with decreased urination as per daughter. She denies any room spinning sensation. She has chronic tinnitus and mild hearing loss in both ears.     #Acute on chronic dizziness vs Lightheadedness ( Electrolytes  vs Cardiac cause)   # Severe hyponatremia ( Dietary vs SIADH 2/2 Lung cancer)   # Decreased PO intake:   - came to the ED for lightheadness and Decreased PO intake   - VVS  - Na 118 on admission   - 10/14 Frida 123  - IVF: 75cc/hr LR 1L  - Orthostatics check 10/13: pos for DPB drop 16  - Fluid restriction  - Urine studies: Frida 123  - SOsm - 256 - low solute 2/2 SIADH vs low solute syndrome  - TSH 1.72 + AM cortisol 15.3 wnl   - BMP q 6 hours; replete  - nephro consulted - dc IVF at NA >130 -> Na 132 10/16, fluids dc'c  - PT   - nutrition consulted 10/17    # NEwly Diagnosed Paroxysmal A fib:   - CHADVASC 5   - HASBLED 2   - Rate control : Metoprolol tartrate 25 mg BID   - Anticoagulation : Discussed with the patient and daughter about bleeding risk ; Agreeable to AC   - Lovenox 60 mg BID.   - Monitor on Telemetry ; might have heart block leading to lightheadedness, dizziness.   - RAH 10/13: Hyperdynamic global left ventricular systolic function. Left ventricular ejection fraction, by visual estimation, is >75%. Asymmetric basal septal hypertrophy. Severely enlarged left atrium + right atrium. Mild mitral stenosis. Moderate mitral valve regurgitation. Moderate-severe tricuspid regurgitation. mobile echodensity on the aortic valve - rec RAH  Mild pulmonic valve regurgitation. mild pulmonary hypertension.  - Follows Dr Tamburrino OP - BCx x3  - BNP 1399  - blood cultures -> following  -cardio will consider RAH this week once pt is more stabilized as of 10/17      # Lower abdominal pain likely UTI   - Lower abdominal pain with burning sensation ; ( symptomatic )   - UA weakly positive 10/12 -> 10/13 neg  - UCx 10/12, 10/13 - pos for enterococcus + GP cocci and pairs  - Rocephin 1 gm OD for 3 days. 10/13->10/15   -ID consulted for ucx findings  - theodore put in 10/16  - urology consulted for urinary rentention 10/17-> rec to start flomax 0.4 daily and Op urology for urodynamics  - ID rec monitor off abx, rec to obtain RAH 10/17    #Small cell lung cancer  # lymphoma  # thyroid   # kidney cancer (in remission),  - Follows Dr. Soares as outpatinet   - On Tagrisso OD. Continue     # COPD not on home O2  - not wheezing   - On symbicort and albuterol   - Follows Dr. Brian Lake     # HTN  # HLD  # Diabetes mellitus type 2:   - Hold antihypertensive for now   - Monitor blood glucose   - Insulin sliding scale.     # Hx of Kidney stones  # AAA   - Outpatient work up     - DVT : Lovenox AC  - GI : PPI   - Activity : Ambulate as tolerated   - Diet: Fluid restriction

## 2024-10-17 NOTE — CONSULT NOTE ADULT - SUBJECTIVE AND OBJECTIVE BOX
Urology Consult    Pt is a 85 y/o Female with acute urinary retention. Pt with hx of right chromophobe RCC s/p partial nephrectomy in 2009.  Pt with ALIYAH. Hx of Lung ca x 2, thyroid cancer and non hodgekin's lymphoma. Pt now admitted for evaluation of increasing dizziness.  Pt with hx of urinary incontinence for which she was on Oxybutynin. Now off the medication, she wears pads during the day.   They can be dry to wet. Pt with frequency q 1-2 hrs, nocturia x 1-2, occasional urgency with incontinence, + dysuria, she denies    hematuria, hesitancy, straining to void, she voids in small amount, has occasional PV fullness.  Pt was getting CIC initially upon admission with PVR's of 500 cc, then had Reynoso placed with 625 cc drained.    Pt follows with Dr. Prosper Cobb.    PAST MEDICAL & SURGICAL HISTORY:  Hypertension, unspecified type    Type 2 diabetes mellitus with complication, without long-term current use of insulin    Hyperlipidemia, unspecified hyperlipidemia type    Diverticulitis    Obesity    COPD (chronic obstructive pulmonary disease)    SOB (shortness of breath)    GERD (gastroesophageal reflux disease)    Lung cancer    ARTIE (obstructive sleep apnea)  NO CPAP MACHINE PER PT.    Cancer of kidney    Cancer of thyroid    Former smoker    NHL (non-Hodgkin's lymphoma)  "low grade" per heme/ onc note    History of abdominal aortic aneurysm (AAA)    Kidney stones    [place] (hard of hearing)    History of surgery  LEFT LOWER LOBECTOMY    History of surgery  BILATERAL KNEE REPLACEMENT    History of surgery  CATARACT RIGHT EYE    History of surgery  TUBAL LIGATION    History of surgery  CYST REMOVED  RIGHT BREAST    History of surgery  CHOLECYSTECOMY    History of surgery  RIGHT PARTIAL NEPHRECTOMY    History of surgery  BILATERAL CATARACT SURGERY    History of back surgery    H/O lithotripsy    H/O partial thyroidectomy    MEDICATIONS  (STANDING):  budesonide 160 MICROgram(s)/formoterol 4.5 MICROgram(s) Inhaler 2 Puff(s) Inhalation two times a day  chlorhexidine 2% Cloths 1 Application(s) Topical <User Schedule>  dextrose 5%. 1000 milliLiter(s) (50 mL/Hr) IV Continuous <Continuous>  dextrose 5%. 1000 milliLiter(s) (100 mL/Hr) IV Continuous <Continuous>  dextrose 50% Injectable 25 Gram(s) IV Push once  dextrose 50% Injectable 12.5 Gram(s) IV Push once  dextrose 50% Injectable 25 Gram(s) IV Push once  enoxaparin Injectable 60 milliGRAM(s) SubCutaneous every 12 hours  glucagon  Injectable 1 milliGRAM(s) IntraMuscular once  insulin lispro (ADMELOG) corrective regimen sliding scale   SubCutaneous three times a day before meals  metoprolol tartrate 12.5 milliGRAM(s) Oral every 12 hours  osimertinib 80 milliGRAM(s) Oral daily  pantoprazole    Tablet 40 milliGRAM(s) Oral before breakfast    MEDICATIONS  (PRN):  albuterol    90 MICROgram(s) HFA Inhaler 2 Puff(s) Inhalation every 6 hours PRN Bronchospasm  dextrose Oral Gel 15 Gram(s) Oral once PRN Blood Glucose LESS THAN 70 milliGRAM(s)/deciliter  meclizine 25 milliGRAM(s) Oral daily PRN for dizziness  melatonin 3 milliGRAM(s) Oral at bedtime PRN Insomnia  trimethobenzamide Injectable 200 milliGRAM(s) IntraMuscular every 8 hours PRN Nausea and/or Vomiting      Allergies    No Known Allergies    SOCIAL HISTORY: No illicit drug use    FAMILY HISTORY:  Family history of dementia (Mother)    Family history of cancer (Father, Grandparent)      REVIEW OF SYSTEMS   [x] A ten-point review of systems was otherwise negative except as noted.    Vital Signs Last 24 Hrs  T(C): 37.2 (17 Oct 2024 05:09), Max: 37.2 (16 Oct 2024 19:59)  T(F): 99 (17 Oct 2024 05:09), Max: 99 (17 Oct 2024 05:09)  HR: 80 (17 Oct 2024 05:09) (80 - 81)  BP: 163/91 (17 Oct 2024 05:09) (136/78 - 163/91)  RR: 18 (17 Oct 2024 05:09) (17 - 18)  SpO2: 97% (16 Oct 2024 13:40) (97% - 97%)      PHYSICAL EXAM:    GEN: NAD, awake and alert.  SKIN: Good color, non diaphoretic.  RESP: Non-labored breathing. No use of accessory muscles.  ABDO: Soft, NT/ND, no palpable bladder, no suprapubic tenderness.  BACK: No CVAT B/L  : + Indwelling Reynoso in place, draining clear yellow urine.   EXT: WKONG x 4      I&O's Summary    16 Oct 2024 07:01  -  17 Oct 2024 07:00  --------------------------------------------------------  IN: 784 mL / OUT: 1675 mL / NET: -891 mL        LABS:                        13.1   5.32  )-----------( 127      ( 17 Oct 2024 05:50 )             39.7     10-17    133[L]  |  97[L]  |  16  ----------------------------<  88  4.1   |  24  |  0.8    Ca    9.4      17 Oct 2024 05:50  Phos  2.9     10-17  Mg     1.8     10-17    TPro  5.2[L]  /  Alb  3.7  /  TBili  0.3  /  DBili  x   /  AST  17  /  ALT  19  /  AlkPhos  107  10-17      Urinalysis Basic - ( 17 Oct 2024 05:50 )    Color: x / Appearance: x / SG: x / pH: x  Gluc: 88 mg/dL / Ketone: x  / Bili: x / Urobili: x   Blood: x / Protein: x / Nitrite: x   Leuk Esterase: x / RBC: x / WBC x   Sq Epi: x / Non Sq Epi: x / Bacteria: x    Culture - Urine (10.13.24 @ 15:44)   - Ampicillin: S <=2 Predicts results to ampicillin/sulbactam, amoxacillin-clavulanate and piperacillin-tazobactam.  - Ciprofloxacin: I 2  - Levofloxacin: S 2  - Nitrofurantoin: S <=32 Should not be used to treat pyelonephritis.  - Tetracycline: R >8  - Vancomycin: S 2  Specimen Source: Clean Catch Clean Catch (Midstream)  Culture Results:   10,000 - 49,000 CFU/mL Enterococcus faecalis   <10,000 CFU/ml Normal Urogenital jolynn present  Organism Identification: Enterococcus faecalis  Organism: Enterococcus faecalis  Method Type: KYLAH      RADIOLOGY & ADDITIONAL STUDIES:    < from: CT Abdomen and Pelvis w/ IV Cont (10.12.24 @ 23:51) >    ACC: [id number] EXAM:  CT ABDOMEN AND PELVIS IC   ORDERED BY: MARTHA KELSEY     PROCEDURE DATE:  10/12/2024          INTERPRETATION:  CLINICAL STATEMENT: Abdominal pain    TECHNIQUE: Contiguous axial CT images were obtained from the lower chest   to the pubic symphysis without intravenous contrast.  Oral contrast was   not administered.  Reformatted images in the coronal and sagittal planes   were acquired.    COMPARISON: PET/CT 8/7/2024    FINDINGS:    LOWER CHEST: Stable left basilar atelectasis/scarring. Coronary artery   calcifications. Mitral annulus calcifications. Aortic valve   calcifications. Cardiomegaly.    HEPATOBILIARY: Prior cholecystectomy with associated intra and   extrahepatic biliary ductal dilatation.    SPLEEN: Unremarkable.    PANCREAS: Unremarkable.    ADRENAL GLANDS: Unremarkable.    KIDNEYS: No hydronephrosis. Bilateral renal cysts and hypodensities too   small to characterize. Symmetric pattern of renal enhancement and   excretion.    ABDOMINOPELVIC NODES: Unremarkable.    PELVIC ORGANS: Stable bilateral adnexal cysts measuring up to 5 cm on the   right.    PERITONEUM/MESENTERY/BOWEL: No bowel obstruction, pneumoperitoneum, or   ascites. Colonic diverticulosis.    BONES/SOFT TISSUES: No acute osseous abnormalities. Degenerative changes   of the spine. Anterolisthesis L4 on L5 and L5 on S1. Diffuse osteopenia.   Mild anasarca.    OTHER: Atherosclerosis abdominal aorta and its branches. Stable   infrarenal abdominal aortic aneurysm measuring up to 5 cm. Chronic left   hemidiaphragm elevation.      IMPRESSION:    No evidence of acute abdominal pelvic pathology.    Stable infrarenal abdominal aortic aneurysm measuring up to 5 cm.    --- End of Report ---          ZOLTAN HICKS MD; Resident Radiologist  This document has been electronically signed.  PAULO SCOTT MD; Attending Radiologist  This document has been electronically signed. Oct 13 2024  3:30AM    < end of copied text >      < from: US Retroperitoneal B-Scan Limited (10.01.24 @ 12:10) >    ACC: [id number] EXAM:  US RETROPERITONEAL LIMITED   ORDERED BY: HERVE JONES     *** ADDENDUM # 1 ***    HERVE JONES PA was made aware of the above findings on 10/2/2024   at 12:04 PM with read back    --- End of Report ---    *** END OF ADDENDUM # 1 ***      PROCEDURE DATE:  10/01/2024          INTERPRETATION:  CLINICAL INFORMATION: Malignant neoplasm of the kidney    COMPARISON: 3/6/2023    TECHNIQUE: Sonography of the kidneys and bladder.    FINDINGS:  Right kidney: 11.4 cm. No hydronephrosis. Right-sided renal calculi   measuring up to 0.6 cm. Right-sided renal cysts measuring up to 1.5 cm.    Left kidney: 10.2 cm. Moderate hydronephrosis. Left renal calculi   measuring up to 0.9 cm.    Urinary bladder: Bilateral ureteral jets are present. There is no debris   or calculus.    IMPRESSION:    Moderate left hydronephrosis.    Bilateral renal calculi.    No definite evidence of renal neoplasm on this examination. However, if   there remains a clinical concern, further evaluation with a renal   protocol CT or MRI is recommended.    --- End of Report ---    ***Please see the addendum at the top of this report. It may contain   additional important information or changes.****        LUKE BARROSO MD; Attending Radiologist  This document has been electronically signed. Oct  1 2024  5:03PM  1st Addendum: LUKE BARROSO MD; Attending Radiologist  The first addendum was electronically signed on: Oct  2 2024 12:05PM.    < end of copied text >

## 2024-10-17 NOTE — DIETITIAN INITIAL EVALUATION ADULT - NAME AND PHONE
Olimpia Long x [id number] or [employer] TEAMS     High risk follow up in 3-5 days or PRN. Poor PO intake    Monitoring/Evaluating: Labs, Lytes, Wts, PO intake, Diet and texture tolerance, NFPF, GI S/S, BM.

## 2024-10-17 NOTE — DIETITIAN INITIAL EVALUATION ADULT - PERTINENT LABORATORY DATA
10-17    133[L]  |  97[L]  |  16  ----------------------------<  88  4.1   |  24  |  0.8    Ca    9.4      17 Oct 2024 05:50  Phos  2.9     10-17  Mg     1.8     10-17    TPro  5.2[L]  /  Alb  3.7  /  TBili  0.3  /  DBili  x   /  AST  17  /  ALT  19  /  AlkPhos  107  10-17  POCT Blood Glucose.: 119 mg/dL (10-17-24 @ 11:14)  A1C with Estimated Average Glucose Result: 5.6 % (10-14-24 @ 07:48)

## 2024-10-17 NOTE — PROGRESS NOTE ADULT - ASSESSMENT
84-year-old female past medical history small cell lung cancer, lymphoma, thyroid and kidney cancer (in remission), COPD not on home O2, HTN, HLD, diabetes, kidney stones, AAA, presents with difficulty in urinating. Pt states that she was nauseous for the last three days and did not take anything. Gives h/o lightheadedness and takes Meclizine for that.     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   H/O Lymphoma  H/O Thyroid and kidney cancer (In remission)  DM-2 / HTN / DL  COPD               PLAN:    ·	Tele reviewed by me. Was bradycardiac at night  ·	Cont Metoprolol to 12.5 mg po q 12h  ·	EKG on admission: Atrial flutter with variable block 69/min. RBBB (Interpreted by me)  ·	Having urinary retention. S/P Reynoso catheter. More than 600 cc urine came out  ·	Care d/w the Urology. Pt is having h/o urinary retention and follow up with Dr. Cobb. Recommended to keep Reynoso in and on d/c to cont Reynoso  ·	Na is 133 today. S/P IVF. Hyponatremia has resolved.   ·	ECHO reviewed. EF is >75%. Mod to severe TR. There is a mobile echodensity on the aortic valve. Differential includes leaflet thickening, however, cannot exclude vegetation  ·	Blood cx x 1 is negative. Urine cx is not signifecant.   ·	ID recommended no Abx for now. Recommended RAH  ·	Cardiology f/u for RAH  ·	Monitor FS. On Lispro corrective regimen.   ·	Poor appetite. Dietary eval    Progress Note Handoff    Pending (specify):  Consults_Cardiology f/u________, Tests________, Test Results_______, Other________  Family discussion:  Disposition: Home___/SNF___/Other________/Unknown at this time________    Gary Ayala MD  Spectra: [phone]

## 2024-10-17 NOTE — CONSULT NOTE ADULT - ASSESSMENT
84 F with PMH of small cell lung cancer, protein calorie malnutrition, debility, encounter for palliative care.    ·	Palliative Care consulted to assist with establishing goals of care in the setting of frailty, advanced illness as well as underlying lung cancer.  ·	Although patient does have frailty, numerous comorbidities, BMI of 18, as well as low albumin, in addition to some valvular cardiac pathology, and would normally meet criteria for hospice services, this is not in line with overall goals of care.  ·	Patient remains on Tagrisso as prescribed by her oncologist, and still looking to follow up.   ·	Initiated goals of care discussions. Patient however remains full code at this time.

## 2024-10-17 NOTE — PHARMACOTHERAPY INTERVENTION NOTE - COMMENTS
Removed albuterol inhaler, meclizine, and torsemide on home med list. Torsemide was last picked up from pharmacy in March per [facility] pharmacy.  
Completed a med rec with [facility] [phone]. Patient unable to recall medication names and daughter is unable to bring medication list. Changed the formulation of metoprolol tartrate to metoprolol succinate and removed albuterol inhaler, meclizine, and torsemide on home med list. Torsemide was last picked up from pharmacy in March. Unsure which dose of amlodipine patient is taking. [facility] states patient picked up both amlodipine 2.5mg and 5mg within one week apart. Increased the dose of oxybutynin from 10mg to 15mg po QD per [facility] pharmacy.    Home Medications:  amLODIPine 2.5 mg oral tablet: 1 tab(s) orally once a day (in the evening) (13 Oct 2024 03:42)  losartan 100 mg oral tablet: 1 tab(s) orally once a day (13 Oct 2024 03:48)  metoprolol succinate 50 mg oral tablet, extended release: 1 tab(s) orally once a day (17 Oct 2024 10:50)  omeprazole 40 mg oral delayed release capsule: 1 cap(s) orally once a day (13 Oct 2024 03:48)  oxyBUTYnin 15 mg/24 hr oral tablet, extended release: 1 tab(s) orally once a day (17 Oct 2024 10:50)  Symbicort 160 mcg-4.5 mcg/inh inhalation aerosol: 2 puff(s) inhaled 2 times a day (13 Oct 2024 03:48)  Tagrisso 80 mg oral tablet: 80 milligram(s) orally once a day (13 Oct 2024 03:46)  Trulicity Pen 1.5 mg/0.5 mL subcutaneous solution: 1.5 milligram(s) subcutaneous once a week (13 Oct 2024 03:48)  Vitamin D3 25 mcg (1000 intl units) oral tablet: 1 tab(s) orally once a day (13 Oct 2024 03:48)  
Increased the dose of oxybutynin from 10mg to 15mg po QD on home med list.  
Changed the formulation of metoprolol tartrate to metoprolol succinate on home med list.

## 2024-10-17 NOTE — CONSULT NOTE ADULT - SUBJECTIVE AND OBJECTIVE BOX
[facility] Geriatrics and Palliative Care  Gian Jaramillo Palliative Care Attending  Contact Info: Call [phone] (HEAL Line) or message on Microsoft Teams    HPI:  84-year-old female past medical history small cell lung cancer, lymphoma, thyroid and kidney cancer (in remission), COPD not on home O2, HTN, HLD, diabetes, kidney stones, AAA, presents with lightheadedness for 2-3 days. Pt and daughter reported she has a hx of lightheadness and takes Meclizine 25mg PRN, not sure the etiology. However, since yesterday she is feeling more lightheaded, gets worse when sitting or standing or walking. She is therefore having difficulty ambulating. She also had an episode of vomiting today. Took meclizine 25mg PRN yesterday but didnt help her much. She is also having decreased oral intake with decreased urination as per daughter. She denies any room spinning sensation. She has chronic tinnitus and mild hearing loss in both ears.     Denies any focal weakness, HA, trauma.   In the ED; Vitals : /74 mmHg; HR 63 ; RR 18 ; Afebrile ; maintaining saturation on RA.     Labs:   WBC 5.10 Hb 13.0 MCV 85.7 Platelets 116 k   Na 118 K 4.5  BUN/Creatinine 21/0.8   ALT 43  Magnesium 1.5   Troponin 27    UA Weakly positive     Imaging:   CT Angio Head:No large vessel occlusion, aneurysm, or vascular malformation.  CT head : No acute intracranial pathology.  CT Abdomen and Pelvis : No evidence of acute abdominal pathology.Stable infrarenal abdominal aortic aneurysm measuring up to 5.4 cm.    s/p  x1 ; meclizine ; magnesium 2 gm x1.     Admitted to medicine  (13 Oct 2024 02:59)    PERTINENT PM/SXH:   Hypertension, unspecified type    Type 2 diabetes mellitus with complication, without long-term current use of insulin    Hyperlipidemia, unspecified hyperlipidemia type    Diverticulitis    Obesity    COPD (chronic obstructive pulmonary disease)    SOB (shortness of breath)    GERD (gastroesophageal reflux disease)    Lung cancer    ARTIE (obstructive sleep apnea)    Cancer of kidney    Cancer of thyroid    History of abdominal aortic aneurysm    Former smoker    NHL (non-Hodgkin's lymphoma)    History of abdominal aortic aneurysm (AAA)    Kidney stones    [place] (hard of hearing)      History of surgery    History of surgery    History of surgery    History of surgery    History of surgery    History of surgery    History of surgery    History of surgery    History of back surgery    H/O lithotripsy    H/O partial thyroidectomy      FAMILY HISTORY:  Family history of dementia (Mother)    Family history of cancer (Father, Grandparent)      ITEMS NOT CHECKED ARE NOT PRESENT    SOCIAL HISTORY:   Significant other/partner:  []  Children:  []   Substance hx:  []   Tobacco hx:  []   Alcohol hx: []   Home Opioid hx:  [] I-Stop Reference No:  - no active Rx's / see chart note  Living Situation: []Home  []Long term care  []Rehab []Other  Anglican/Spiritual practice: ; Role of organized Church [ ] important [ ] some [ ] unable to assess  Coping: [ ] well [ ] with difficulty [ ] poor coping [ ] unable to assess  Support system: [ ] strong [ ] adequate [ ] inadequate    ADVANCE DIRECTIVES:    []MOLST  []Living Will  DECISION MAKER(s):  [] Health Care Proxy(s)  [] Surrogate(s)  [] Guardian           Name(s)/Phone Number(s):     BASELINE (I)ADLs (prior to admission):  [place]: []Total  [] Moderate []Dependent    ALLERGIES:  No Known Allergies    MEDICATIONS  (STANDING):  budesonide 160 MICROgram(s)/formoterol 4.5 MICROgram(s) Inhaler 2 Puff(s) Inhalation two times a day  chlorhexidine 2% Cloths 1 Application(s) Topical <User Schedule>  dextrose 5%. 1000 milliLiter(s) (100 mL/Hr) IV Continuous <Continuous>  dextrose 5%. 1000 milliLiter(s) (50 mL/Hr) IV Continuous <Continuous>  dextrose 50% Injectable 25 Gram(s) IV Push once  dextrose 50% Injectable 12.5 Gram(s) IV Push once  dextrose 50% Injectable 25 Gram(s) IV Push once  enoxaparin Injectable 60 milliGRAM(s) SubCutaneous every 12 hours  glucagon  Injectable 1 milliGRAM(s) IntraMuscular once  insulin lispro (ADMELOG) corrective regimen sliding scale   SubCutaneous three times a day before meals  metoprolol tartrate 12.5 milliGRAM(s) Oral every 12 hours  osimertinib 80 milliGRAM(s) Oral daily  pantoprazole    Tablet 40 milliGRAM(s) Oral before breakfast    MEDICATIONS  (PRN):  albuterol    90 MICROgram(s) HFA Inhaler 2 Puff(s) Inhalation every 6 hours PRN Bronchospasm  dextrose Oral Gel 15 Gram(s) Oral once PRN Blood Glucose LESS THAN 70 milliGRAM(s)/deciliter  meclizine 25 milliGRAM(s) Oral daily PRN for dizziness  melatonin 3 milliGRAM(s) Oral at bedtime PRN Insomnia  trimethobenzamide Injectable 200 milliGRAM(s) IntraMuscular every 8 hours PRN Nausea and/or Vomiting    PRESENT SYMPTOMS: []Unable to obtain due to poor mentation/encephalopathy  Source if other than patient:  []Family   []Team     Pain: [ ] yes [ ] no  QOL impact -   Location -                    Aggravating Factors -  Quality -  Radiation -  Timing -  Severity (0-10 scale) -   Minimal Acceptable Level (0-10 scale) -    PAIN AD Score:  [email or web address] (press ctrl +  left click to view)    Dyspnea:                           [ ]Mild  [ ]Moderate [ ]Severe  Anxiety:                             [ ]Mild [ ]Moderate [ ]Severe  Fatigue:                             [ ]Mild [ ]Moderate [ ]Severe  Nausea:                             [ ]Mild [ ]Moderate [ ]Severe  Loss of Appetite:             [ ]Mild [ ]Moderate [ ]Severe  Constipation:                   [ ]Mild [ ]Moderate [ ]Severe    Other Symptoms:  [ ]All other review of systems negative     Palliative Performance Status Version 2:  %    http://npcrc.org/files/news/palliative_performance_scale_[email or web address].pdf    PHYSICAL EXAM:  GENERAL:  [ ]Alert  [ ]Oriented x   [ ]Lethargic  [ ]Cachexia  [ ]Unarousable  [ ]Verbal  [ ]Non-Verbal  Behavioral:   [ ]Anxiety  [ ]Delirium [ ]Agitation [ ]Cooperative  HEENT:  [ ]Normal   [ ]Dry mouth   [ ]ET Tube/Trach  [ ]Oral lesions  PULMONARY:   [ ]Clear []Tachypnea  []Audible excessive secretions   [ ]Rhonchi        [ ]Right [ ]Left [ ]Bilateral  [ ]Crackles        [ ]Right [ ]Left [ ]Bilateral  [ ]Wheezing     [ ]Right [ ]Left [ ]Bilateral  CARDIOVASCULAR:    [ ]Regular [ ]Irregular [ ]Tachy  [ ]Rafael [ ]Murmur [ ]Other  GASTROINTESTINAL:  [ ]Soft  [ ]Distended   [ ]+BS  [ ]Non tender [ ]Tender  [ ]PEG [ ]OGT/ NGT  Last BM:     GENITOURINARY:  [ ]Normal [ ] Incontinent   [ ]Oliguria/Anuria   [ ]Reynoso  MUSCULOSKELETAL:   [ ]Normal   [ ]Weakness  [ ]Bed/Wheelchair bound [ ]Edema  NEUROLOGIC:   [ ]No focal deficits  [ ]Cognitive impairment  [ ]Dysphagia [ ]Dysarthria [ ]Paresis [ ]Encephalopathic   SKIN:   [ ]Normal   [ ]Pressure ulcer(s)  [ ]Rash    CRITICAL CARE:  [ ]Shock Present  [ ]Septic [ ]Cardiogenic [ ]Neurologic [ ]Hypovolemic  [ ]Vasopressors [ ]Inotropes   [ ]Respiratory failure present [ ]Mechanical Ventilation [ ]Non-invasive ventilatory support [ ]High-Flow  [ ]Acute  [ ]Chronic [ ]Hypoxic  [ ]Hypercarbic  [ ]Other organ failure    Vital Signs Last 24 Hrs  T(C): 37.2 (17 Oct 2024 05:09), Max: 37.2 (16 Oct 2024 19:59)  T(F): 99 (17 Oct 2024 05:09), Max: 99 (17 Oct 2024 05:09)  HR: 80 (17 Oct 2024 05:09) (80 - 81)  BP: 163/91 (17 Oct 2024 05:09) (136/78 - 163/91)  BP(mean): --  RR: 18 (17 Oct 2024 05:09) (17 - 18)  SpO2: 97% (16 Oct 2024 13:40) (97% - 97%)     I&O's Summary    16 Oct 2024 07:01  -  17 Oct 2024 07:00  --------------------------------------------------------  IN: 784 mL / OUT: 1675 mL / NET: -891 mL        LABS:                        13.1   5.32  )-----------( 127      ( 17 Oct 2024 05:50 )             39.7   10-17    133[L]  |  97[L]  |  16  ----------------------------<  88  4.1   |  24  |  0.8    Ca    9.4      17 Oct 2024 05:50  Phos  2.9     10-17  Mg     1.8     10-17    TPro  5.2[L]  /  Alb  3.7  /  TBili  0.3  /  DBili  x   /  AST  17  /  ALT  19  /  AlkPhos  107  10-17    Urinalysis Basic - ( 17 Oct 2024 05:50 )    Color: x / Appearance: x / SG: x / pH: x  Gluc: 88 mg/dL / Ketone: x  / Bili: x / Urobili: x   Blood: x / Protein: x / Nitrite: x   Leuk Esterase: x / RBC: x / WBC x   Sq Epi: x / Non Sq Epi: x / Bacteria: x      RADIOLOGY & ADDITIONAL STUDIES:      PROTEIN CALORIE MALNUTRITION PRESENT: [ ]mild [ ]moderate [ ]severe [ ]underweight [ ]morbid obesity  [ ]PPSV2 < or = to 30% [ ]significant weight loss  [ ]poor nutritional intake [ ]catabolic state [ ]anasarca     Artificial Nutrition [ ]     REFERRALS:  [x]Social Work  [ ]Case management [ ]PT/OT [ ]Chaplaincy  [ ]Hospice  [ ]Patient/Family Support    DISCUSSION OF CASE: Family - to obtain additional history and to provide emotional support; ( ) -      [facility] Geriatrics and Palliative Care  Gian Jaramillo Palliative Care Attending  Contact Info: Call [phone] (HEAL Line) or message on Microsoft Teams    HPI:  84-year-old female past medical history small cell lung cancer, lymphoma, thyroid and kidney cancer (in remission), COPD not on home O2, HTN, HLD, diabetes, kidney stones, AAA, presents with lightheadedness for 2-3 days. Pt and daughter reported she has a hx of lightheadness and takes Meclizine 25mg PRN, not sure the etiology. However, since yesterday she is feeling more lightheaded, gets worse when sitting or standing or walking. She is therefore having difficulty ambulating. She also had an episode of vomiting today. Took meclizine 25mg PRN yesterday but didnt help her much. She is also having decreased oral intake with decreased urination as per daughter. She denies any room spinning sensation. She has chronic tinnitus and mild hearing loss in both ears.     Denies any focal weakness, HA, trauma.   In the ED; Vitals : /74 mmHg; HR 63 ; RR 18 ; Afebrile ; maintaining saturation on RA.     Labs:   WBC 5.10 Hb 13.0 MCV 85.7 Platelets 116 k   Na 118 K 4.5  BUN/Creatinine 21/0.8   ALT 43  Magnesium 1.5   Troponin 27    UA Weakly positive     Imaging:   CT Angio Head:No large vessel occlusion, aneurysm, or vascular malformation.  CT head : No acute intracranial pathology.  CT Abdomen and Pelvis : No evidence of acute abdominal pathology.Stable infrarenal abdominal aortic aneurysm measuring up to 5.4 cm.    s/p  x1 ; meclizine ; magnesium 2 gm x1.     Admitted to medicine  (13 Oct 2024 02:59)    PERTINENT PM/SXH:   Hypertension, unspecified type    Type 2 diabetes mellitus with complication, without long-term current use of insulin    Hyperlipidemia, unspecified hyperlipidemia type    Diverticulitis    Obesity    COPD (chronic obstructive pulmonary disease)    SOB (shortness of breath)    GERD (gastroesophageal reflux disease)    Lung cancer    ARTIE (obstructive sleep apnea)    Cancer of kidney    Cancer of thyroid    History of abdominal aortic aneurysm    Former smoker    NHL (non-Hodgkin's lymphoma)    History of abdominal aortic aneurysm (AAA)    Kidney stones    [place] (hard of hearing)      History of surgery    History of surgery    History of surgery    History of surgery    History of surgery    History of surgery    History of surgery    History of surgery    History of back surgery    H/O lithotripsy    H/O partial thyroidectomy      FAMILY HISTORY:  Family history of dementia (Mother)    Family history of cancer (Father, Grandparent)      ITEMS NOT CHECKED ARE NOT PRESENT    SOCIAL HISTORY:   Significant other/partner:  []  Children:  [x]   Substance hx:  []   Tobacco hx:  x[]   Alcohol hx: []   Home Opioid hx:  [] I-Stop Reference No:  - no active Rx's / see chart note  Living Situation: [x]Home  []Long term care  []Rehab []Other  Christian/Spiritual practice: ; Role of organized Denominational [ ] important [x ] some [ ] unable to assess  Coping: [ ] well [ ] with difficulty [x ] poor coping [ ] unable to assess  Support system: [ ] strong [ x] adequate [ ] inadequate    ADVANCE DIRECTIVES:    []MOLST  []Living Will  DECISION MAKER(s):  [] Health Care Proxy(s)  [x] Surrogate(s)  [] Guardian           Name(s)/Phone Number(s): Queenie Opal    BASELINE (I)ADLs (prior to admission):  [place]: []Total  [] Moderate [x]Dependent    ALLERGIES:  No Known Allergies    MEDICATIONS  (STANDING):  budesonide 160 MICROgram(s)/formoterol 4.5 MICROgram(s) Inhaler 2 Puff(s) Inhalation two times a day  chlorhexidine 2% Cloths 1 Application(s) Topical <User Schedule>  dextrose 5%. 1000 milliLiter(s) (100 mL/Hr) IV Continuous <Continuous>  dextrose 5%. 1000 milliLiter(s) (50 mL/Hr) IV Continuous <Continuous>  dextrose 50% Injectable 25 Gram(s) IV Push once  dextrose 50% Injectable 12.5 Gram(s) IV Push once  dextrose 50% Injectable 25 Gram(s) IV Push once  enoxaparin Injectable 60 milliGRAM(s) SubCutaneous every 12 hours  glucagon  Injectable 1 milliGRAM(s) IntraMuscular once  insulin lispro (ADMELOG) corrective regimen sliding scale   SubCutaneous three times a day before meals  metoprolol tartrate 12.5 milliGRAM(s) Oral every 12 hours  osimertinib 80 milliGRAM(s) Oral daily  pantoprazole    Tablet 40 milliGRAM(s) Oral before breakfast    MEDICATIONS  (PRN):  albuterol    90 MICROgram(s) HFA Inhaler 2 Puff(s) Inhalation every 6 hours PRN Bronchospasm  dextrose Oral Gel 15 Gram(s) Oral once PRN Blood Glucose LESS THAN 70 milliGRAM(s)/deciliter  meclizine 25 milliGRAM(s) Oral daily PRN for dizziness  melatonin 3 milliGRAM(s) Oral at bedtime PRN Insomnia  trimethobenzamide Injectable 200 milliGRAM(s) IntraMuscular every 8 hours PRN Nausea and/or Vomiting    PRESENT SYMPTOMS: []Unable to obtain due to poor mentation/encephalopathy  Source if other than patient:  []Family   []Team     Pain: [ ] yes [ x] no  QOL impact -   Location -                    Aggravating Factors -  Quality -  Radiation -  Timing -  Severity (0-10 scale) -   Minimal Acceptable Level (0-10 scale) -    PAIN AD Score:  [email or web address] (press ctrl +  left click to view)    Dyspnea:                           [x ]Mild  [ ]Moderate [ ]Severe  Anxiety:                             [ ]Mild [ x]Moderate [ ]Severe  Fatigue:                             [ ]Mild [ x]Moderate [ ]Severe  Nausea:                             [ ]Mild [ ]Moderate [ ]Severe  Loss of Appetite:             [ ]Mild [ ]Moderate [ ]Severe  Constipation:                   [ ]Mild [ ]Moderate [ ]Severe    Other Symptoms:  [x ]All other review of systems negative     Palliative Performance Status Version 2:40%    http://npcrc.org/files/news/palliative_performance_scale_[email or web address].pdf    PHYSICAL EXAM:  GENERAL:  [x ]Alert  [x ]Oriented x3   [ ]Lethargic  [ x]Cachexia  [ ]Unarousable  [ ]Verbal  [ ]Non-Verbal  Behavioral:   [ ]Anxiety  [ ]Delirium [ ]Agitation [x ]Cooperative  HEENT:  [ ]Normal   [x ]Dry mouth   [ ]ET Tube/Trach  [ ]Oral lesions  PULMONARY:   [ ]Clear []Tachypnea  []Audible excessive secretions   [x ]Rhonchi        [ ]Right [ ]Left [ ]Bilateral  [ ]Crackles        [ ]Right [ ]Left [ ]Bilateral  [ ]Wheezing     [ ]Right [ ]Left [ ]Bilateral  CARDIOVASCULAR:    [ x]Regular [ ]Irregular [ ]Tachy  [ ]Rafael [ ]Murmur [ ]Other  GASTROINTESTINAL:  [x]Soft  [ ]Distended   [ x]+BS  [x ]Non tender [ ]Tender  [ ]PEG [ ]OGT/ NGT  Last BM:     GENITOURINARY:  [x ]Normal [ ] Incontinent   [ ]Oliguria/Anuria   [ ]Reynoso  MUSCULOSKELETAL:   [ ]Normal   [ x]Weakness  [ ]Bed/Wheelchair bound [ ]Edema  NEUROLOGIC:   [ ]No focal deficits  [ ]Cognitive impairment  [ ]Dysphagia [ ]Dysarthria [ ]Paresis [ ]Encephalopathic   SKIN:   [ x]Normal   [ ]Pressure ulcer(s)  [ ]Rash    CRITICAL CARE:  [ ]Shock Present  [ ]Septic [ ]Cardiogenic [ ]Neurologic [ ]Hypovolemic  [ ]Vasopressors [ ]Inotropes   [ ]Respiratory failure present [ ]Mechanical Ventilation [ ]Non-invasive ventilatory support [ ]High-Flow  [ ]Acute  [ ]Chronic [ ]Hypoxic  [ ]Hypercarbic  [ ]Other organ failure    Vital Signs Last 24 Hrs  T(C): 37.2 (17 Oct 2024 05:09), Max: 37.2 (16 Oct 2024 19:59)  T(F): 99 (17 Oct 2024 05:09), Max: 99 (17 Oct 2024 05:09)  HR: 80 (17 Oct 2024 05:09) (80 - 81)  BP: 163/91 (17 Oct 2024 05:09) (136/78 - 163/91)  BP(mean): --  RR: 18 (17 Oct 2024 05:09) (17 - 18)  SpO2: 97% (16 Oct 2024 13:40) (97% - 97%)     I&O's Summary    16 Oct 2024 07:01  -  17 Oct 2024 07:00  --------------------------------------------------------  IN: 784 mL / OUT: 1675 mL / NET: -891 mL        LABS:                        13.1   5.32  )-----------( 127      ( 17 Oct 2024 05:50 )             39.7   10-17    133[L]  |  97[L]  |  16  ----------------------------<  88  4.1   |  24  |  0.8    Ca    9.4      17 Oct 2024 05:50  Phos  2.9     10-17  Mg     1.8     10-17    TPro  5.2[L]  /  Alb  3.7  /  TBili  0.3  /  DBili  x   /  AST  17  /  ALT  19  /  AlkPhos  107  10-17    Urinalysis Basic - ( 17 Oct 2024 05:50 )    Color: x / Appearance: x / SG: x / pH: x  Gluc: 88 mg/dL / Ketone: x  / Bili: x / Urobili: x   Blood: x / Protein: x / Nitrite: x   Leuk Esterase: x / RBC: x / WBC x   Sq Epi: x / Non Sq Epi: x / Bacteria: x      RADIOLOGY & ADDITIONAL STUDIES:      PROTEIN CALORIE MALNUTRITION PRESENT: [ ]mild [ ]moderate [ ]severe [ ]underweight [ ]morbid obesity  [ ]PPSV2 < or = to 30% [ ]significant weight loss  [ ]poor nutritional intake [ ]catabolic state [ ]anasarca     Artificial Nutrition [ ]     REFERRALS:  [x]Social Work  [ ]Case management [ ]PT/OT [ ]Chaplaincy  [ ]Hospice  [ ]Patient/Family Support    DISCUSSION OF CASE: Family - to obtain additional history and to provide emotional support; ( ) -

## 2024-10-17 NOTE — CONSULT NOTE ADULT - TIME BILLING
Emotional Support/Supportive Care and Clarification of Potential Disease Trajectory related to  Assessment of Symptom [place] and Palliative regimen  Education and Monitoring of Opiates including titration and management of high risk medications  Discharge Facilitation with ongoing coordination  Exploration of GOC/Advanced Directives including HCP designation, code status, and hospice eligibility    Time exclusive of ACP discussion  Time inclusive of chart review, medication ordering, discussion with primary team, clinical documentation, and communication with family/caregiver

## 2024-10-17 NOTE — DIETITIAN INITIAL EVALUATION ADULT - NSICDXPASTMEDICALHX_GEN_ALL_CORE_FT
PAST MEDICAL HISTORY:  Cancer of kidney     Cancer of thyroid     COPD (chronic obstructive pulmonary disease)     Diverticulitis     Former smoker     GERD (gastroesophageal reflux disease)     History of abdominal aortic aneurysm (AAA)     [place] (hard of hearing)     Hyperlipidemia, unspecified hyperlipidemia type     Hypertension, unspecified type     Kidney stones     Lung cancer     NHL (non-Hodgkin's lymphoma) "low grade" per heme/ onc note    Obesity     ARTIE (obstructive sleep apnea) NO CPAP MACHINE PER PT.    SOB (shortness of breath)     Type 2 diabetes mellitus with complication, without long-term current use of insulin

## 2024-10-17 NOTE — DIETITIAN INITIAL EVALUATION ADULT - NS FNS DIET ORDER
Diet, DASH/TLC:   Sodium & Cholesterol Restricted  Consistent Carbohydrate {Evening Snack} (CSTCHOSN) (10-18-24 @ 08:04)

## 2024-10-17 NOTE — PHYSICAL THERAPY INITIAL EVALUATION ADULT - ADDITIONAL COMMENTS
[place], 2 steps to enter, 1 flight to living area with R rail, laundry down on 1st floor, PTA pt amb with straight cane and was I with amb, +driving

## 2024-10-17 NOTE — DIETITIAN INITIAL EVALUATION ADULT - OTHER INFO
84-year-old female past medical history small cell lung cancer, lymphoma, thyroid and kidney cancer (in remission), COPD not on home O2, HTN, HLD, diabetes, kidney stones, AAA, presents with lightheadedness for 2-3 days. # Severe hyponatremia ( Dietary vs SIADH 2/2 Lung cancer)   # Decreased PO intake: - came to the ED for lightheadness and Decreased PO intake

## 2024-10-18 ENCOUNTER — TRANSCRIPTION ENCOUNTER (OUTPATIENT)
Age: 84
End: 2024-10-18

## 2024-10-18 ENCOUNTER — NON-APPOINTMENT (OUTPATIENT)
Age: 84
End: 2024-10-18

## 2024-10-18 LAB
ALBUMIN SERPL ELPH-MCNC: 3.4 G/DL — LOW (ref 3.5–5.2)
ALP SERPL-CCNC: 90 U/L — SIGNIFICANT CHANGE UP (ref 30–115)
ALT FLD-CCNC: 27 U/L — SIGNIFICANT CHANGE UP (ref 0–41)
ANION GAP SERPL CALC-SCNC: 10 MMOL/L — SIGNIFICANT CHANGE UP (ref 7–14)
AST SERPL-CCNC: 25 U/L — SIGNIFICANT CHANGE UP (ref 0–41)
BASOPHILS # BLD AUTO: 0.05 K/UL — SIGNIFICANT CHANGE UP (ref 0–0.2)
BASOPHILS NFR BLD AUTO: 1.3 % — HIGH (ref 0–1)
BILIRUB SERPL-MCNC: 0.3 MG/DL — SIGNIFICANT CHANGE UP (ref 0.2–1.2)
BUN SERPL-MCNC: 20 MG/DL — SIGNIFICANT CHANGE UP (ref 10–20)
CALCIUM SERPL-MCNC: 9.3 MG/DL — SIGNIFICANT CHANGE UP (ref 8.4–10.5)
CHLORIDE SERPL-SCNC: 101 MMOL/L — SIGNIFICANT CHANGE UP (ref 98–110)
CO2 SERPL-SCNC: 24 MMOL/L — SIGNIFICANT CHANGE UP (ref 17–32)
CREAT SERPL-MCNC: 0.7 MG/DL — SIGNIFICANT CHANGE UP (ref 0.7–1.5)
EGFR: 86 ML/MIN/1.73M2 — SIGNIFICANT CHANGE UP
EOSINOPHIL # BLD AUTO: 0.16 K/UL — SIGNIFICANT CHANGE UP (ref 0–0.7)
EOSINOPHIL NFR BLD AUTO: 4 % — SIGNIFICANT CHANGE UP (ref 0–8)
GLUCOSE BLDC GLUCOMTR-MCNC: 142 MG/DL — HIGH (ref 70–99)
GLUCOSE BLDC GLUCOMTR-MCNC: 146 MG/DL — HIGH (ref 70–99)
GLUCOSE BLDC GLUCOMTR-MCNC: 147 MG/DL — HIGH (ref 70–99)
GLUCOSE BLDC GLUCOMTR-MCNC: 163 MG/DL — HIGH (ref 70–99)
GLUCOSE SERPL-MCNC: 103 MG/DL — HIGH (ref 70–99)
HCT VFR BLD CALC: 37.4 % — SIGNIFICANT CHANGE UP (ref 37–47)
HGB BLD-MCNC: 12.3 G/DL — SIGNIFICANT CHANGE UP (ref 12–16)
IMM GRANULOCYTES NFR BLD AUTO: 0.3 % — SIGNIFICANT CHANGE UP (ref 0.1–0.3)
LYMPHOCYTES # BLD AUTO: 1.01 K/UL — LOW (ref 1.2–3.4)
LYMPHOCYTES # BLD AUTO: 25.4 % — SIGNIFICANT CHANGE UP (ref 20.5–51.1)
MAGNESIUM SERPL-MCNC: 1.9 MG/DL — SIGNIFICANT CHANGE UP (ref 1.8–2.4)
MCHC RBC-ENTMCNC: 29 PG — SIGNIFICANT CHANGE UP (ref 27–31)
MCHC RBC-ENTMCNC: 32.9 G/DL — SIGNIFICANT CHANGE UP (ref 32–37)
MCV RBC AUTO: 88.2 FL — SIGNIFICANT CHANGE UP (ref 81–99)
MONOCYTES # BLD AUTO: 0.53 K/UL — SIGNIFICANT CHANGE UP (ref 0.1–0.6)
MONOCYTES NFR BLD AUTO: 13.3 % — HIGH (ref 1.7–9.3)
NEUTROPHILS # BLD AUTO: 2.22 K/UL — SIGNIFICANT CHANGE UP (ref 1.4–6.5)
NEUTROPHILS NFR BLD AUTO: 55.7 % — SIGNIFICANT CHANGE UP (ref 42.2–75.2)
NRBC # BLD: 0 /100 WBCS — SIGNIFICANT CHANGE UP (ref 0–0)
PHOSPHATE SERPL-MCNC: 3.2 MG/DL — SIGNIFICANT CHANGE UP (ref 2.1–4.9)
PLATELET # BLD AUTO: 107 K/UL — LOW (ref 130–400)
PMV BLD: 11.3 FL — HIGH (ref 7.4–10.4)
POTASSIUM SERPL-MCNC: 4.3 MMOL/L — SIGNIFICANT CHANGE UP (ref 3.5–5)
POTASSIUM SERPL-SCNC: 4.3 MMOL/L — SIGNIFICANT CHANGE UP (ref 3.5–5)
PROT SERPL-MCNC: 5.1 G/DL — LOW (ref 6–8)
RBC # BLD: 4.24 M/UL — SIGNIFICANT CHANGE UP (ref 4.2–5.4)
RBC # FLD: 14.1 % — SIGNIFICANT CHANGE UP (ref 11.5–14.5)
SODIUM SERPL-SCNC: 135 MMOL/L — SIGNIFICANT CHANGE UP (ref 135–146)
WBC # BLD: 3.98 K/UL — LOW (ref 4.8–10.8)
WBC # FLD AUTO: 3.98 K/UL — LOW (ref 4.8–10.8)

## 2024-10-18 PROCEDURE — 99233 SBSQ HOSP IP/OBS HIGH 50: CPT

## 2024-10-18 PROCEDURE — 71045 X-RAY EXAM CHEST 1 VIEW: CPT | Mod: 26

## 2024-10-18 PROCEDURE — 99222 1ST HOSP IP/OBS MODERATE 55: CPT | Mod: GC

## 2024-10-18 RX ADMIN — Medication 3 MILLIGRAM(S): at 21:21

## 2024-10-18 RX ADMIN — Medication 60 MILLIGRAM(S): at 18:01

## 2024-10-18 RX ADMIN — Medication 60 MILLIGRAM(S): at 05:50

## 2024-10-18 RX ADMIN — Medication 1: at 11:59

## 2024-10-18 RX ADMIN — OSIMERTINIB 80 MILLIGRAM(S): 80 TABLET, FILM COATED ORAL at 21:56

## 2024-10-18 RX ADMIN — CHLORHEXIDINE GLUCONATE 1 APPLICATION(S): 40 SOLUTION TOPICAL at 05:51

## 2024-10-18 RX ADMIN — Medication 0.4 MILLIGRAM(S): at 21:21

## 2024-10-18 RX ADMIN — Medication 12.5 MILLIGRAM(S): at 05:52

## 2024-10-18 RX ADMIN — Medication 2 PUFF(S): at 22:02

## 2024-10-18 RX ADMIN — PANTOPRAZOLE SODIUM 40 MILLIGRAM(S): 40 TABLET, DELAYED RELEASE ORAL at 05:50

## 2024-10-18 RX ADMIN — Medication 12.5 MILLIGRAM(S): at 18:01

## 2024-10-18 RX ADMIN — Medication 25 MILLIGRAM(S): at 05:50

## 2024-10-18 NOTE — CONSULT NOTE ADULT - CONSULT REQUESTED DATE/TIME
14-Oct-2024 13:46
15-Oct-2024 09:00
18-Oct-2024 11:58
13-Oct-2024 17:09
15-Oct-2024 17:53
17-Oct-2024 12:42
17-Oct-2024 12:40
13-Oct-2024 01:47
16-Oct-2024 12:57

## 2024-10-18 NOTE — CONSULT NOTE ADULT - ATTENDING COMMENTS
84 davis with hyponatremia c/f inappropriately high ADH state d/t pulm malignancy and low solute intake  cont gentle iv hydration-- d/c if Na level stabilizes or decreases  encourage solute intake  limit free water to 1L daily  rest of plan as above
Ms. Rocha was seen and examined at bedside today, this patient with multiple malignancies in her history including prior episodes of non-small cell lung cancer presented with lightheadedness and was found to be hyponatremic.  Patient's hyponatremia has now normalized, however patient has a cough specifically with the use of her inhalers.  Recommend using nebulized LABA/ICS (while admitted to mitigate thisBrovana & Budesonide), patient can likely transition back to Symbicort at the time of discharge.  For her cough, no acute hospitalization requirements, cough suppressants as needed.  Follow-up with her usual pulmonologist Dr. Benjamin Lake after discharge.  I agree with the fellow note, with the exceptions listed in my attestation above.  The remainder of impression and plan per fellow note.
Patient seen and examined and agree with above except as noted.  Patients history, notes, labs, imaging, vitals and meds reviewed personally.  Clinically events suspicious to orthostatic hypotension  possibly related to low sodium    Plan as above

## 2024-10-18 NOTE — PROGRESS NOTE ADULT - ASSESSMENT
84-year-old female past medical history small cell lung cancer, lymphoma, thyroid and kidney cancer (in remission), COPD not on home O2, HTN, HLD, diabetes, kidney stones, AAA, presents with lightheadedness for 2-3 days. Pt and daughter reported she has a hx of lightheadness and takes Meclizine 25mg PRN, not sure the etiology. However, since yesterday she is feeling more lightheaded, gets worse when sitting or standing or walking. She is therefore having difficulty ambulating. She also had an episode of vomiting today. Took meclizine 25mg PRN yesterday but didnt help her much. She is also having decreased oral intake with decreased urination as per daughter. She denies any room spinning sensation. She has chronic tinnitus and mild hearing loss in both ears.     #Acute on chronic dizziness vs Lightheadedness ( Electrolytes  vs Cardiac cause)   #SOB  # Severe hyponatremia ( Dietary vs SIADH 2/2 Lung cancer)   # Decreased PO intake:   - came to the ED for lightheadness and Decreased PO intake   - VVS  - Na 118 on admission   - 10/14 Frida 123  - IVF: 75cc/hr LR 1L  - Orthostatics check 10/13: pos for DPB drop 16  - Fluid restriction  - Urine studies: Frida 123  - SOsm - 256 - low solute 2/2 SIADH vs low solute syndrome  - TSH 1.72 + AM cortisol 15.3 wnl   - BMP q 6 hours; replete  - nephro consulted - dc IVF at NA >130 -> Na 132 10/16, fluids dc'c  - PT   - nutrition consulted 10/17-> recs appreciated  -cxray 10/18: Left lung opacities/left lower lobe consolidation, increased. Stable cardiomegaly.  -pulm consulted 10/18    # NEwly Diagnosed Paroxysmal A fib:   - CHADVASC 5   - HASBLED 2   - Rate control : Metoprolol tartrate 25 mg BID   - Anticoagulation : Discussed with the patient and daughter about bleeding risk ; Agreeable to AC   - Lovenox 60 mg BID.   - Monitor on Telemetry ; might have heart block leading to lightheadedness, dizziness.   - RAH 10/13: Hyperdynamic global left ventricular systolic function. Left ventricular ejection fraction, by visual estimation, is >75%. Asymmetric basal septal hypertrophy. Severely enlarged left atrium + right atrium. Mild mitral stenosis. Moderate mitral valve regurgitation. Moderate-severe tricuspid regurgitation. mobile echodensity on the aortic valve - rec RAH  Mild pulmonic valve regurgitation. mild pulmonary hypertension.  - Follows Dr Gamble OP - BCx x3  - BNP 1399  - blood cultures -> following  -cardio scheduled RAH 10/21 but will follow blood cx, if negative will cancel       # Lower abdominal pain likely UTI   - Lower abdominal pain with burning sensation ; ( symptomatic )   - UA weakly positive 10/12 -> 10/13 neg  - UCx 10/12, 10/13 - pos for enterococcus + GP cocci and pairs  - Rocephin 1 gm OD for 3 days. 10/13->10/15   -ID consulted for ucx findings  - theodore put in 10/16  - urology consulted for urinary rentention 10/17-> rec to start flomax 0.4 daily and Op urology for urodynamics  - ID rec monitor off abx, rec to obtain RAH 10/17    #Small cell lung cancer  # lymphoma  # thyroid   # kidney cancer (in remission),  - Follows Dr. Soares as outpatinet   - On Tagrisso OD. Continue     # COPD not on home O2  - not wheezing   - On symbicort and albuterol   - Follows Dr. Brian Lake     # HTN  # HLD  # Diabetes mellitus type 2:   - Hold antihypertensive for now   - Monitor blood glucose   - Insulin sliding scale.     # Hx of Kidney stones  # AAA   - Outpatient work up     - DVT : Lovenox AC  - GI : PPI   - Activity : Ambulate as tolerated   - Diet: Fluid restriction

## 2024-10-18 NOTE — PROGRESS NOTE ADULT - SUBJECTIVE AND OBJECTIVE BOX
FERMIN DIAZ  84y, Female  Allergy: No Known Allergies      LOS  5d    CHIEF COMPLAINT: hyponatremia (18 Oct 2024 14:24)      INTERVAL EVENTS/HPI  - No acute events overnight  - T(F): , Max: 98.1 (10-18-24 @ 05:12)  - Denies any worsening symptoms  - Tolerating medication  - WBC Count: 3.98 (10-18-24 @ 06:58)  WBC Count: 5.32 (10-17-24 @ 05:50)     - Creatinine: 0.7 (10-18-24 @ 06:58)  Creatinine: 0.8 (10-17-24 @ 05:50)       ROS  General: Denies rigors, nightsweats  HEENT: Denies headache, rhinorrhea, sore throat, eye pain  CV: Denies CP, palpitations  PULM: Denies wheezing, hemoptysis  GI: Denies hematemesis, hematochezia, melena  : Denies discharge, hematuria  MSK: Denies arthralgias, myalgias  SKIN: Denies rash, lesions  NEURO: Denies paresthesias, weakness  PSYCH: Denies depression, anxiety    VITALS:  T(F): 97.4, Max: 98.1 (10-18-24 @ 05:12)  HR: 78  BP: 117/71  RR: 18Vital Signs Last 24 Hrs  T(C): 36.3 (18 Oct 2024 13:18), Max: 36.7 (18 Oct 2024 05:12)  T(F): 97.4 (18 Oct 2024 13:18), Max: 98.1 (18 Oct 2024 05:12)  HR: 78 (18 Oct 2024 13:18) (67 - 78)  BP: 117/71 (18 Oct 2024 13:18) (117/71 - 152/68)  BP(mean): --  RR: 18 (18 Oct 2024 13:18) (18 - 18)  SpO2: --        PHYSICAL EXAM:  Gen: NAD, resting in bed  HEENT: Normocephalic, atraumatic  Neck: supple, no lymphadenopathy  CV: Regular rate & regular rhythm  Lungs: decreased BS at bases, no fremitus  Abdomen: Soft, BS present  Ext: Warm, well perfused  Neuro: non focal, awake  Skin: no rash, no erythema  Lines: no phlebitis    FH: Non-contributory  Social Hx: Non-contributory    TESTS & MEASUREMENTS:                        12.3   3.98  )-----------( 107      ( 18 Oct 2024 06:58 )             37.4     10-18    135  |  101  |  20  ----------------------------<  103[H]  4.3   |  24  |  0.7    Ca    9.3      18 Oct 2024 06:58  Phos  3.2     10-18  Mg     1.9     10-18    TPro  5.1[L]  /  Alb  3.4[L]  /  TBili  0.3  /  DBili  x   /  AST  25  /  ALT  27  /  AlkPhos  90  10-18      LIVER FUNCTIONS - ( 18 Oct 2024 06:58 )  Alb: 3.4 g/dL / Pro: 5.1 g/dL / ALK PHOS: 90 U/L / ALT: 27 U/L / AST: 25 U/L / GGT: x           Urinalysis Basic - ( 18 Oct 2024 06:58 )    Color: x / Appearance: x / SG: x / pH: x  Gluc: 103 mg/dL / Ketone: x  / Bili: x / Urobili: x   Blood: x / Protein: x / Nitrite: x   Leuk Esterase: x / RBC: x / WBC x   Sq Epi: x / Non Sq Epi: x / Bacteria: x        Culture - Blood (collected 10-17-24 @ 05:50)  Source: .Blood BLOOD  Preliminary Report (10-18-24 @ 14:01):    No growth at 24 hours    Culture - Blood (collected 10-17-24 @ 05:50)  Source: .Blood BLOOD  Preliminary Report (10-18-24 @ 14:01):    No growth at 24 hours    Culture - Blood (collected 10-16-24 @ 06:34)  Source: .Blood BLOOD  Preliminary Report (10-18-24 @ 14:01):    No growth at 48 Hours    Culture - Blood (collected 10-15-24 @ 12:02)  Source: .Blood BLOOD  Preliminary Report (10-17-24 @ 22:01):    No growth at 48 Hours    Culture - Urine (collected 10-13-24 @ 15:44)  Source: Clean Catch Clean Catch (Midstream)  Final Report (10-16-24 @ 19:48):    10,000 - 49,000 CFU/mL Enterococcus faecalis    <10,000 CFU/ml Normal Urogenital jolynn present  Organism: Enterococcus faecalis (10-16-24 @ 19:48)  Organism: Enterococcus faecalis (10-16-24 @ 19:48)      Method Type: KYLAH      -  Ampicillin: S <=2 Predicts results to ampicillin/sulbactam, amoxacillin-clavulanate and  piperacillin-tazobactam.      -  Ciprofloxacin: I 2      -  Levofloxacin: S 2      -  Nitrofurantoin: S <=32 Should not be used to treat pyelonephritis.      -  Tetracycline: R >8      -  Vancomycin: S 2    Culture - Urine (collected 10-12-24 @ 23:30)  Source: Clean Catch Clean Catch (Midstream)  Final Report (10-16-24 @ 22:28):    50,000 - 99,000 CFU/mL Enterococcus faecalis    10,000 - 49,000 CFU/mL Coag Negative Staphylococcus "Susceptibilities not    performed"  Organism: Enterococcus faecalis (10-16-24 @ 22:28)  Organism: Enterococcus faecalis (10-16-24 @ 22:28)      Method Type: KYLAH      -  Ampicillin: S <=2 Predicts results to ampicillin/sulbactam, amoxacillin-clavulanate and  piperacillin-tazobactam.      -  Ciprofloxacin: I 2      -  Levofloxacin: S 2      -  Nitrofurantoin: S <=32 Should not be used to treat pyelonephritis.      -  Tetracycline: R >8      -  Vancomycin: S 2            INFECTIOUS DISEASES TESTING      INFLAMMATORY MARKERS      RADIOLOGY & ADDITIONAL TESTS:  I have personally reviewed the last available Chest xray  CXR  Xray Chest 1 View- PORTABLE-Urgent:   ACC: [id number] EXAM:  XR CHEST PORTABLE URGENT 1V   ORDERED BY: GARETT PEREZ     PROCEDURE DATE:  10/18/2024          INTERPRETATION:  CLINICAL HISTORY: sob.    COMPARISON: October 12, 2024.    TECHNIQUE: Portable frontal chest radiograph. Adequate positioning.    FINDINGS:    Support devices: None.    Cardiac/mediastinum/hilum: Cardiomegaly, thoracic aortic calcification.    Lung parenchyma/Pleura: Left lung opacities/left lower lobe consolidation    Skeleton/soft tissues: Stable bony structures. Elevation of the left   hemidiaphragm, distended stomach.      IMPRESSION:    Left lung opacities/left lower lobe consolidation, increased. Stable   cardiomegaly..    --- End of Report ---            THEODORE BROOKS MD; Attending Radiologist  This document has been electronically signed. Oct 18 2024 12:11PM (10-18-24 @ 10:53)      CT      CARDIOLOGY TESTING  12 Lead ECG:   Ventricular Rate 68 BPM    Atrial Rate 326 BPM    QRS Duration 172 ms    Q-T Interval 520 ms    QTC Calculation(Bazett) 552 ms    R Axis 266 degrees    T Axis -1 degrees    Diagnosis Line Atrial fibrillation  Right bundle branch block  Lateral infarct , age undetermined  Abnormal ECG    Confirmed by BALWINDER PLATT MD ([id number]) on 10/15/2024 5:58:51 PM (10-14-24 @ 11:22)  12 Lead ECG:   Ventricular Rate 69 BPM    Atrial Rate 267 BPM    QRS Duration 170 ms    Q-T Interval 470 ms    QTC Calculation(Bazett) 503 ms    R Axis 264 degrees    T Axis -11 degrees    Diagnosis Line Atrial flutter with variable A-V block  Right bundle branchblock  Septal infarct , age undetermined  Abnormal ECG    Confirmed by RHODA XIONG MD ([id number]) on 10/13/2024 9:11:14 AM (10-12-24 @ 22:39)      MEDICATIONS  budesonide 160 MICROgram(s)/formoterol 4.5 MICROgram(s) Inhaler 2 Inhalation two times a day  chlorhexidine 2% Cloths 1 Topical <User Schedule>  dextrose 5%. 1000 IV Continuous <Continuous>  dextrose 5%. 1000 IV Continuous <Continuous>  dextrose 50% Injectable 25 IV Push once  dextrose 50% Injectable 12.5 IV Push once  dextrose 50% Injectable 25 IV Push once  enoxaparin Injectable 60 SubCutaneous every 12 hours  glucagon  Injectable 1 IntraMuscular once  insulin lispro (ADMELOG) corrective regimen sliding scale  SubCutaneous three times a day before meals  metoprolol tartrate 12.5 Oral every 12 hours  osimertinib 80 Oral daily  pantoprazole    Tablet 40 Oral before breakfast  tamsulosin 0.4 Oral at bedtime      WEIGHT  Weight (kg): 58.8 (10-15-24 @ 21:45)  Creatinine: 0.7 mg/dL (10-18-24 @ 06:58)      ANTIBIOTICS:      All available historical records have been reviewed

## 2024-10-18 NOTE — CONSULT NOTE ADULT - SUBJECTIVE AND OBJECTIVE BOX
Patient is a 84y old  Female who presents with a chief complaint of Hyponatremia, hypo-osmolarity, or hypo-osmolar hyponatremia     (17 Oct 2024 13:48)      HPI:  84-year-old female past medical history NON small cell lung cancer x2, [place] lymphoma, thyroid and kidney cancer (in remission), COPD not on home O2, ARTIE, HTN, HLD, diabetes, kidney stones, AAA, presents with lightheadedness for 2-3 days.    She was found with hyponatremia  UA Weakly positive     Pulm consulted for cough and shortness of breath     Pt was seen by pulm at bedside, she complained of cough and inability to take a deep breath in due to coughing, making it difficult to take her Symbicort. She also has nasal congestion, 2 days, cough is productive of yellow sputum, no fever, chills, diarrhea, or rashes. She denies shortness of breath, she is unsure because she is not moving much due to dizziness.    Triggers:    no identifiable triggers per patient   Meds:       She has been using Symbicort  for COPD, prescribed by Dr. Sweet as rescue inhaler, she has not seen him since 2/2023, since her symptoms have been controlled and she has mostly followed with oncology due to the found recurrent NSCL ca     Smoking:   Quit about 2 years ago   Travel:         none  Sick contacts:  none                 PAST MEDICAL & SURGICAL HISTORY:  Hypertension, unspecified type      Type 2 diabetes mellitus with complication, without long-term current use of insulin      Hyperlipidemia, unspecified hyperlipidemia type      Diverticulitis      Obesity      COPD (chronic obstructive pulmonary disease)      SOB (shortness of breath)      GERD (gastroesophageal reflux disease)      Lung cancer      ARTIE (obstructive sleep apnea)  NO CPAP MACHINE PER PT.      Cancer of kidney      Cancer of thyroid      Former smoker      NHL (non-Hodgkin's lymphoma)  "low grade" per heme/ onc note      History of abdominal aortic aneurysm (AAA)      Kidney stones      [place] (hard of hearing)      History of surgery  LEFT LOWER LOBECTOMY      History of surgery  BILATERAL KNEE REPLACEMENT      History of surgery  CATARACT RIGHT EYE      History of surgery  TUBAL LIGATION      History of surgery  CYST REMOVED  RIGHT BREAST      History of surgery  CHOLECYSTECOMY      History of surgery  RIGHT PARTIAL NEPHRECTOMY      History of surgery  BILATERAL CATARACT SURGERY      History of back surgery      H/O lithotripsy      H/O partial thyroidectomy          SOCIAL HX:   Smoking                         ETOH                            Other    FAMILY HISTORY:  Family history of dementia (Mother)    Family history of cancer (Father, Grandparent)    .  No cardiovascular or pulmonary family history     REVIEW OF SYSTEMS:    All ROS are negative exept per HPI       Allergies    No Known Allergies    Intolerances          PHYSICAL EXAM  Vital Signs Last 24 Hrs  T(C): 36.7 (18 Oct 2024 05:12), Max: 36.7 (18 Oct 2024 05:12)  T(F): 98.1 (18 Oct 2024 05:12), Max: 98.1 (18 Oct 2024 05:12)  HR: 67 (18 Oct 2024 05:12) (67 - 72)  BP: 130/75 (18 Oct 2024 05:12) (122/69 - 152/68)  BP(mean): --  RR: 18 (18 Oct 2024 05:12) (18 - 18)  SpO2: --        CONSTITUTIONAL:  Well nourished.  NAD    ENT:   Airway patent,   No thrush    EYES:   Clear bilaterally,   pupils equal,   round and reactive to light.    CARDIAC:   Normal rate,   regular rhythm.    no edema      RESPIRATORY:   Wheezing in the left basal fields  biphasic  no crackles     GASTROINTESTINAL:  Abdomen soft, non-tender,   No guarding,   Positive BS        SKIN:   Skin normal color for race,   No evidence of rash.      HEME LYMPH:   No cervical  lymphadenopathy.  no inguinal lymphadenopathy          LABS:                          12.3   3.98  )-----------( 107      ( 18 Oct 2024 06:58 )             37.4                                               10-18    135  |  101  |  20  ----------------------------<  103[H]  4.3   |  24  |  0.7    Ca    9.3      18 Oct 2024 06:58  Phos  3.2     10-18  Mg     1.9     10-18    TPro  5.1[L]  /  Alb  3.4[L]  /  TBili  0.3  /  DBili  x   /  AST  25  /  ALT  27  /  AlkPhos  90  10-18                                             Urinalysis Basic - ( 18 Oct 2024 06:58 )    Color: x / Appearance: x / SG: x / pH: x  Gluc: 103 mg/dL / Ketone: x  / Bili: x / Urobili: x   Blood: x / Protein: x / Nitrite: x   Leuk Esterase: x / RBC: x / WBC x   Sq Epi: x / Non Sq Epi: x / Bacteria: x                                                  LIVER FUNCTIONS - ( 18 Oct 2024 06:58 )  Alb: 3.4 g/dL / Pro: 5.1 g/dL / ALK PHOS: 90 U/L / ALT: 27 U/L / AST: 25 U/L / GGT: x                                                  Culture - Blood (collected 16 Oct 2024 06:34)  Source: .Blood BLOOD  Preliminary Report (17 Oct 2024 14:01):    No growth at 24 hours    Culture - Blood (collected 15 Oct 2024 12:02)  Source: .Blood BLOOD  Preliminary Report (17 Oct 2024 22:01):    No growth at 48 Hours                                                    MEDICATIONS  (STANDING):  budesonide 160 MICROgram(s)/formoterol 4.5 MICROgram(s) Inhaler 2 Puff(s) Inhalation two times a day  chlorhexidine 2% Cloths 1 Application(s) Topical <User Schedule>  dextrose 5%. 1000 milliLiter(s) (50 mL/Hr) IV Continuous <Continuous>  dextrose 5%. 1000 milliLiter(s) (100 mL/Hr) IV Continuous <Continuous>  dextrose 50% Injectable 25 Gram(s) IV Push once  dextrose 50% Injectable 12.5 Gram(s) IV Push once  dextrose 50% Injectable 25 Gram(s) IV Push once  enoxaparin Injectable 60 milliGRAM(s) SubCutaneous every 12 hours  glucagon  Injectable 1 milliGRAM(s) IntraMuscular once  insulin lispro (ADMELOG) corrective regimen sliding scale   SubCutaneous three times a day before meals  metoprolol tartrate 12.5 milliGRAM(s) Oral every 12 hours  osimertinib 80 milliGRAM(s) Oral daily  pantoprazole    Tablet 40 milliGRAM(s) Oral before breakfast  tamsulosin 0.4 milliGRAM(s) Oral at bedtime    MEDICATIONS  (PRN):  albuterol    90 MICROgram(s) HFA Inhaler 2 Puff(s) Inhalation every 6 hours PRN Bronchospasm  dextrose Oral Gel 15 Gram(s) Oral once PRN Blood Glucose LESS THAN 70 milliGRAM(s)/deciliter  meclizine 25 milliGRAM(s) Oral daily PRN for dizziness  melatonin 3 milliGRAM(s) Oral at bedtime PRN Insomnia  trimethobenzamide Injectable 200 milliGRAM(s) IntraMuscular every 8 hours PRN Nausea and/or Vomiting      X-Rays reviewed:    CXR interpreted by me:

## 2024-10-18 NOTE — PROGRESS NOTE ADULT - ASSESSMENT
84-year-old female past medical history small cell lung cancer, lymphoma, thyroid and kidney cancer (in remission), COPD not on home O2, HTN, HLD, diabetes, kidney stones, AAA, presents with difficulty in urinating. Pt states that she was nauseous for the last three days and did not take anything. Gives h/o lightheadedness and takes Meclizine for that.     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   H/O Lymphoma  H/O Thyroid and kidney cancer (In remission)  DM-2 / HTN / DL  H/O chronic A-fib/flutter  COPD               PLAN:    ·	Tele reviewed by me. Was bradycardiac at night  ·	Cont Metoprolol to 12.5 mg po q 12h. HR is better controlled now  ·	EKG on admission: Atrial flutter with variable block 69/min. RBBB (Interpreted by me)  ·	Having urinary retention. S/P Reynoso catheter. More than 600 cc urine came out  ·	Care d/w the Urology. Pt is having h/o urinary retention and follow up with Dr. Cobb. Recommended to keep Reynoso in and on d/c to cont Reynoso  ·	Na is 135 today. S/P IVF. Hyponatremia has resolved.   ·	ECHO reviewed. EF is >75%. Mod to severe TR. There is a mobile echodensity on the aortic valve. Differential includes leaflet thickening, however, cannot exclude vegetation  ·	Blood cx x 1 is negative. Urine cx is not significant   ·	ID recommended no Abx for now. Recommended RAH  ·	Blood cxs x 2 are negative.   ·	Care d/w the cardiology. Recommended to repeat blood cx. If 3rd blood cx is negative and pt is afebrile and has normal WBC count will not RAH. Can be discharged home.   ·	Monitor FS. On Lispro corrective regimen.   ·	Poor appetite. Dietary eval  ·	Care d/w the daughter at length on telephone [phone], [phone] (Work). Request pulmonary eval by Dr. Sweet.     Progress Note Handoff    Pending (specify):  Consults_Pulmonary_______, Tests________, Test Results_______, Other________  Family discussion: With daughter on telephone.   Disposition: Home___/SNF___/Other________/Unknown at this time________    Gary Ayala MD  Spectra: [phone]

## 2024-10-18 NOTE — DISCHARGE NOTE NURSING/CASE MANAGEMENT/SOCIAL WORK - NSDCPEELIQUISDIET_GEN_ALL_CORE
Eat healthy foods you enjoy. Apixaban/Eliquis DOES NOT have a special diet. Limit your alcohol intake. 43.6

## 2024-10-18 NOTE — CONSULT NOTE ADULT - ASSESSMENT
Impression     Mild COPD exacerbation in the setting likely viral infection   ? 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 Benjamin Lake     ============full note follow==============         Impression     Mild COPD exacerbation in the setting likely viral infection   CXR with LLL infiltrate, could be Post resection fibrotic changes seen on CT abd,  no clinical signs of PNA  HX of mild COPD not on O2,  on Symbicort as needed:   HX of ?ARTIE   Hx of  NSCL Ca in 13 years ago resected only   Recurrent NSCL Ca now on Tagrisso since June 2023 follows with [facility]     Plan     Obtain full RVP   Start Pulmicort Neb, and Brovana Neb   No Abx or steroids needed at this time, monitor for fever or persistent wheezing   Pulm f/u for sleep studies and PFTs with Dr. Benjamin Lake     ============full note follow==============

## 2024-10-18 NOTE — PROGRESS NOTE ADULT - ASSESSMENT
ASSESSMENT  84-year-old female past medical history small cell lung cancer, lymphoma, thyroid and kidney cancer (in remission), COPD not on home O2, HTN, HLD, diabetes, kidney stones, AAA, presents with lightheadedness for 2-3 days.     IMPRESSION  #Doubt acute cystitis as UA without pyuria , symptoms appear due to retention.     UA without significant pyuria     10/13 UCX   10,000 - 49,000 CFU/mL Enterococcus faecalis    10/12 UCX   50,000 - 99,000 CFU/mL Enterococcus faecalis  #Possible AV echodensity   BCX NG x4  < from: TTE Echo Complete w/ Contrast w/o Doppler (10.13.24 @ 12:06) >   There is a mobile echodensity on the aortic valve. Differential   includes leaflet thickening, however, cannot exclude vegetation. Would   recommend a transesophageal echocardiogram for further evaluation if   clinically indicated.  #Hx multiple malignancies  #Immunodeficiency secondary to Senescence  Malignancy  which could results in poor clinical outcomes  #Abx allergy: No Known Allergies    Creatinine: 0.8 (10-16-24 @ 06:34)    Height (cm): 180.3 (10-15-24 @ 21:45)  Weight (kg): 58.8 (10-15-24 @ 21:45)    RECOMMENDATIONS  - Monitor off antimicrobials   - Doubt infective endocarditis- BCX NG x4, no fever, no other clinical symptoms   - Can obtain surveillance BCX as outpatient in 2-4 weeks. Please provide script  - f/u Cards re: RAH  - Please recall ID PRN. Please inform ID of any patient clinical change or any new pertinent laboratory or radiographic data     If any questions, please send a message or call on [employer] Teams  Please continue to update ID with any pertinent new laboratory, radiographic findings, or change in clinical status

## 2024-10-18 NOTE — PROGRESS NOTE ADULT - SUBJECTIVE AND OBJECTIVE BOX
CELIAAIDANFERMIN MCCRARY  84y Female    CHIEF COMPLAINT:    Patient is a 84y old  Female who presents with a chief complaint of Hyponatremia, hypo-osmolarity, or hypo-osmolar hyponatremia     (17 Oct 2024 13:48)      INTERVAL HPI/OVERNIGHT EVENTS:    Patient seen and examined. Sitting in a chair. Pt states that while using her Symbicort inhaler she was coughing and became short of breath. No fever    ROS: All other systems are negative.    Vital Signs:    T(F): 98.1 (10-18-24 @ 05:12), Max: 98.1 (10-18-24 @ 05:12)  HR: 67 (10-18-24 @ 05:12) (67 - 72)  BP: 130/75 (10-18-24 @ 05:12) (122/69 - 152/68)  RR: 18 (10-18-24 @ 05:12) (18 - 18)  SpO2: --  I&O's Summary    17 Oct 2024 07:01  -  18 Oct 2024 07:00  --------------------------------------------------------  IN: 340 mL / OUT: 700 mL / NET: -360 mL    18 Oct 2024 07:01  -  18 Oct 2024 11:58  --------------------------------------------------------  IN: 210 mL / OUT: 0 mL / NET: 210 mL      Daily     Daily   CAPILLARY BLOOD GLUCOSE      POCT Blood Glucose.: 163 mg/dL (18 Oct 2024 11:18)  POCT Blood Glucose.: 147 mg/dL (18 Oct 2024 07:47)  POCT Blood Glucose.: 128 mg/dL (17 Oct 2024 21:25)  POCT Blood Glucose.: 93 mg/dL (17 Oct 2024 16:59)      PHYSICAL EXAM:    GENERAL:  NAD  SKIN: No rashes or lesions  HENT: Atraumatic. Normocephalic. PERRL. Moist membranes.  NECK: Supple, No JVD. No lymphadenopathy.  PULMONARY: CTA B/L. No wheezing. No rales  CVS: Normal S1, S2. Rate and Rhythm are regular. No murmurs.  ABDOMEN/GI: Soft, Nontender, Nondistended; BS present  EXTREMITIES: Peripheral pulses intact. No edema B/L LE.  NEUROLOGIC:  No motor or sensory deficit.  PSYCH: Alert & oriented x 3    Consultant(s) Notes Reviewed:  [x ] YES  [ ] NO  Care Discussed with Consultants/Other Providers [ x] YES  [ ] NO    EKG reviewed  Telemetry reviewed    LABS:                        12.3   3.98  )-----------( 107      ( 18 Oct 2024 06:58 )             37.4     10-18    135  |  101  |  20  ----------------------------<  103[H]  4.3   |  24  |  0.7    Ca    9.3      18 Oct 2024 06:58  Phos  3.2     10-18  Mg     1.9     10-18    TPro  5.1[L]  /  Alb  3.4[L]  /  TBili  0.3  /  DBili  x   /  AST  25  /  ALT  27  /  AlkPhos  90  10-18            Culture - Blood (collected 16 Oct 2024 06:34)  Source: .Blood BLOOD  Preliminary Report (17 Oct 2024 14:01):    No growth at 24 hours    Culture - Blood (collected 15 Oct 2024 12:02)  Source: .Blood BLOOD  Preliminary Report (17 Oct 2024 22:01):    No growth at 48 Hours        RADIOLOGY & ADDITIONAL TESTS:      Imaging or report Personally Reviewed:  [ ] YES  [ ] NO    Medications:  Standing  budesonide 160 MICROgram(s)/formoterol 4.5 MICROgram(s) Inhaler 2 Puff(s) Inhalation two times a day  chlorhexidine 2% Cloths 1 Application(s) Topical <User Schedule>  dextrose 5%. 1000 milliLiter(s) IV Continuous <Continuous>  dextrose 5%. 1000 milliLiter(s) IV Continuous <Continuous>  dextrose 50% Injectable 25 Gram(s) IV Push once  dextrose 50% Injectable 12.5 Gram(s) IV Push once  dextrose 50% Injectable 25 Gram(s) IV Push once  enoxaparin Injectable 60 milliGRAM(s) SubCutaneous every 12 hours  glucagon  Injectable 1 milliGRAM(s) IntraMuscular once  insulin lispro (ADMELOG) corrective regimen sliding scale   SubCutaneous three times a day before meals  metoprolol tartrate 12.5 milliGRAM(s) Oral every 12 hours  osimertinib 80 milliGRAM(s) Oral daily  pantoprazole    Tablet 40 milliGRAM(s) Oral before breakfast  tamsulosin 0.4 milliGRAM(s) Oral at bedtime    PRN Meds  albuterol    90 MICROgram(s) HFA Inhaler 2 Puff(s) Inhalation every 6 hours PRN  dextrose Oral Gel 15 Gram(s) Oral once PRN  meclizine 25 milliGRAM(s) Oral daily PRN  melatonin 3 milliGRAM(s) Oral at bedtime PRN  trimethobenzamide Injectable 200 milliGRAM(s) IntraMuscular every 8 hours PRN      Case discussed with resident    Care discussed with pt/family

## 2024-10-18 NOTE — DISCHARGE NOTE NURSING/CASE MANAGEMENT/SOCIAL WORK - PATIENT PORTAL LINK FT
You can access the FollowMyHealth Patient Portal offered by [facility] by registering at the following website: [email or web address]. By joining [email or web address]’s FollowMyHealth portal, you will also be able to view your health information using other applications (apps) compatible with our system.

## 2024-10-18 NOTE — DISCHARGE NOTE NURSING/CASE MANAGEMENT/SOCIAL WORK - FINANCIAL ASSISTANCE
[facility] provides services at a reduced cost to those who are determined to be eligible through [facility]’s financial assistance program. Information regarding [facility]’s financial assistance program can be found by going to [email or web address] or by calling [phone].

## 2024-10-18 NOTE — DISCHARGE NOTE NURSING/CASE MANAGEMENT/SOCIAL WORK - NSDCVIVACCINE_GEN_ALL_CORE_FT
PTX
Tdap; 15-Sep-2018 18:40; Rachael Morales (RN); [employer]; [id number] (Exp. Date: 17-Jan-2020); IntraMuscular; Deltoid Left.; 0.5 milliLiter(s); VIS (VIS Published: 09-May-2013, VIS Presented: 15-Sep-2018);

## 2024-10-18 NOTE — PROGRESS NOTE ADULT - TIME BILLING
I have personally seen and examined this patient.  I have reviewed all pertinent clinical information and reviewed all relevant imaging and diagnostic studies personally.   I counseled the patient about diagnostic testing and treatment plan.   I discussed my recommendations with the primary team and  family members at bedside.
extensive communication throughout day  with residents, patient, patient's daughter at bedside, RN, , reviewing chart, documenting, physical exam, interviewing patient, coordinating plan

## 2024-10-18 NOTE — PROGRESS NOTE ADULT - SUBJECTIVE AND OBJECTIVE BOX
pt with nl wbc, no fever and negative blood cultures x 2 and ua with 2 bacteria organisms< 100,00 and an echo with a thickened av leaflet which may be normal for age and an av that fxns normally.  pt with low likelihood of bacterial endocarditis.  pt also with lung cancer and copd and would be at a higher risk of complication.  i spoke with pt and 2 daughter and they are resistant for pt to have a SKYLER.  discussed repeating a third blood culture and if pt's has fever, increased wbc or positive  blood cultures then consider skyler on Monday otherwise would observe and not pursue the dx of endocarditis.  Spoke with medical team.  pt does not meet any Dukes criteria,

## 2024-10-18 NOTE — PROGRESS NOTE ADULT - SUBJECTIVE AND OBJECTIVE BOX
SUBJECTIVE/OVERNIGHT EVENTS  Today is hospital day 5d. This morning patient was seen and examined at bedside, resting comfortably in bed. No acute or major events overnight.      MEDICATIONS  STANDING MEDICATIONS  budesonide 160 MICROgram(s)/formoterol 4.5 MICROgram(s) Inhaler 2 Puff(s) Inhalation two times a day  chlorhexidine 2% Cloths 1 Application(s) Topical <User Schedule>  dextrose 5%. 1000 milliLiter(s) IV Continuous <Continuous>  dextrose 5%. 1000 milliLiter(s) IV Continuous <Continuous>  dextrose 50% Injectable 25 Gram(s) IV Push once  dextrose 50% Injectable 12.5 Gram(s) IV Push once  dextrose 50% Injectable 25 Gram(s) IV Push once  enoxaparin Injectable 60 milliGRAM(s) SubCutaneous every 12 hours  glucagon  Injectable 1 milliGRAM(s) IntraMuscular once  insulin lispro (ADMELOG) corrective regimen sliding scale   SubCutaneous three times a day before meals  metoprolol tartrate 12.5 milliGRAM(s) Oral every 12 hours  osimertinib 80 milliGRAM(s) Oral daily  pantoprazole    Tablet 40 milliGRAM(s) Oral before breakfast  tamsulosin 0.4 milliGRAM(s) Oral at bedtime    PRN MEDICATIONS  albuterol    90 MICROgram(s) HFA Inhaler 2 Puff(s) Inhalation every 6 hours PRN  dextrose Oral Gel 15 Gram(s) Oral once PRN  meclizine 25 milliGRAM(s) Oral daily PRN  melatonin 3 milliGRAM(s) Oral at bedtime PRN  trimethobenzamide Injectable 200 milliGRAM(s) IntraMuscular every 8 hours PRN    VITALS  T(F): 97.4 (10-18-24 @ 13:18), Max: 98.1 (10-18-24 @ 05:12)  HR: 78 (10-18-24 @ 13:18) (67 - 78)  BP: 117/71 (10-18-24 @ 13:18) (117/71 - 152/68)  RR: 18 (10-18-24 @ 13:18) (18 - 18)  SpO2: --  POCT Blood Glucose.: 163 mg/dL (10-18-24 @ 11:18)  POCT Blood Glucose.: 147 mg/dL (10-18-24 @ 07:47)  POCT Blood Glucose.: 128 mg/dL (10-17-24 @ 21:25)  POCT Blood Glucose.: 93 mg/dL (10-17-24 @ 16:59)    PHYSICAL EXAM  GENERAL  ( x ) NAD, lying in bed comfortably     (  ) obtunded     (  ) lethargic     (  ) somnolent    HEAD  (  ) Atraumatic     (  ) hematoma     (  ) laceration (specify location:       )     NECK  (  ) Supple     (  ) neck stiffness     (  ) nuchal rigidity     (  )  no JVD     (  ) JVD present ( -- cm)    HEART  Rate -->  (x  ) normal rate    (  ) bradycardic    (  ) tachycardic  Rhythm -->  (x  ) regular    (  ) regularly irregular    (  ) irregularly irregular  Murmurs -->  (  ) normal s1/s2    (  ) systolic murmur    (  ) diastolic murmur    (  ) continuous murmur     (  ) S3 present    (  ) S4 present    LUNGS  (x  )Unlabored respirations     (  ) tachypnea  ( x ) B/L air entry     (  ) decreased breath sounds in:  (location     )    (  ) no adventitious sound     (  ) crackles     (  ) wheezing      (  ) rhonchi      (specify location:       )  (  ) chest wall tenderness (specify location:       )    ABDOMEN  ( x ) Soft     (  ) tense   |   (  ) nondistended     (  ) distended   |   (  ) +BS     (  ) hypoactive bowel sounds     (  ) hyperactive bowel sounds  ( x ) nontender     (  ) RUQ tenderness     (  ) RLQ tenderness     (  ) LLQ tenderness     (  ) epigastric tenderness     (  ) diffuse tenderness  (  ) Splenomegaly      (  ) Hepatomegaly      (  ) Jaundice     (  ) ecchymosis     EXTREMITIES  ( x ) Normal     (  ) Rash     (  ) ecchymosis     (  ) varicose veins      (  ) pitting edema     (  ) non-pitting edema   (  ) ulceration     (  ) gangrene:     (location:     )    NERVOUS SYSTEM  (  ) A&Ox3     (  ) confused     (  ) lethargic  CN II-XII:     (  ) Intact     (  ) focal deficits  (Specify:     )   Upper extremities:     (  ) strength X/5     (  ) focal deficit (specify:    )  Lower extremities:     (  ) strength  X/5    (  ) focal deficit (specify:    )    SKIN  (  ) No rashes or lesions     (  ) maculopapular rash     (  ) pustules     (  ) vesicles     (  ) ulcer     (  ) ecchymosis     (specify location:     )    (  ) Indwelling Reynoso Catheter   Date insterted:    Reason (  ) Critical illness     (  ) urinary retention    (  ) Accurate Ins/Outs Monitoring     (  ) CMO patient    (  ) Central Line  Date inserted:  Location: (  ) Right IJ   (  ) Left IJ   (  ) Right Fem   (  ) Left Fem    (  ) SPC  (  ) pigtail  (  ) PEG tube  (  ) colostomy  (  ) jejunostomy  (  ) U-Dall    LABS             12.3   3.98  )-----------( 107      ( 10-18-24 @ 06:58 )             37.4     135  |  101  |  20  -------------------------<  103   10-18-24 @ 06:58  4.3  |  24  |  0.7    Ca      9.3     10-18-24 @ 06:58  Phos   3.2     10-18-24 @ 06:58  Mg     1.9     10-18-24 @ 06:58    TPro  5.1  /  Alb  3.4  /  TBili  0.3  /  DBili  x   /  AST  25  /  ALT  27  /  AlkPhos  90  /  GGT  x     10-18-24 @ 06:58        Urinalysis Basic - ( 18 Oct 2024 06:58 )    Color: x / Appearance: x / SG: x / pH: x  Gluc: 103 mg/dL / Ketone: x  / Bili: x / Urobili: x   Blood: x / Protein: x / Nitrite: x   Leuk Esterase: x / RBC: x / WBC x   Sq Epi: x / Non Sq Epi: x / Bacteria: x          Culture - Blood (collected 17 Oct 2024 05:50)  Source: .Blood BLOOD  Preliminary Report (18 Oct 2024 14:01):    No growth at 24 hours    Culture - Blood (collected 17 Oct 2024 05:50)  Source: .Blood BLOOD  Preliminary Report (18 Oct 2024 14:01):    No growth at 24 hours    Culture - Blood (collected 16 Oct 2024 06:34)  Source: .Blood BLOOD  Preliminary Report (18 Oct 2024 14:01):    No growth at 48 Hours      IMAGING

## 2024-10-18 NOTE — CONSULT NOTE ADULT - CONSULT REASON
+UCX, vegetation
Hyponatremia
Lightheadness
endocarditis
r/o endocarditis
Aflutter and possible vegitation
shortness of breath and cough
urinary retention
Advanced care planning

## 2024-10-18 NOTE — CONSULT NOTE ADULT - PROVIDER SPECIALTY LIST ADULT
Cardiology
Nephrology
Neurology
Palliative Care
Cardiology
Cardiology
Infectious Disease
Pulmonology
Urology

## 2024-10-19 LAB
ALBUMIN SERPL ELPH-MCNC: 3.3 G/DL — LOW (ref 3.5–5.2)
ALP SERPL-CCNC: 84 U/L — SIGNIFICANT CHANGE UP (ref 30–115)
ALT FLD-CCNC: 27 U/L — SIGNIFICANT CHANGE UP (ref 0–41)
ANION GAP SERPL CALC-SCNC: 8 MMOL/L — SIGNIFICANT CHANGE UP (ref 7–14)
AST SERPL-CCNC: 20 U/L — SIGNIFICANT CHANGE UP (ref 0–41)
BASOPHILS # BLD AUTO: 0.04 K/UL — SIGNIFICANT CHANGE UP (ref 0–0.2)
BASOPHILS NFR BLD AUTO: 1 % — SIGNIFICANT CHANGE UP (ref 0–1)
BILIRUB SERPL-MCNC: 0.3 MG/DL — SIGNIFICANT CHANGE UP (ref 0.2–1.2)
BUN SERPL-MCNC: 24 MG/DL — HIGH (ref 10–20)
CALCIUM SERPL-MCNC: 9.4 MG/DL — SIGNIFICANT CHANGE UP (ref 8.4–10.4)
CHLORIDE SERPL-SCNC: 102 MMOL/L — SIGNIFICANT CHANGE UP (ref 98–110)
CO2 SERPL-SCNC: 25 MMOL/L — SIGNIFICANT CHANGE UP (ref 17–32)
CREAT SERPL-MCNC: 0.7 MG/DL — SIGNIFICANT CHANGE UP (ref 0.7–1.5)
EGFR: 86 ML/MIN/1.73M2 — SIGNIFICANT CHANGE UP
EOSINOPHIL # BLD AUTO: 0.16 K/UL — SIGNIFICANT CHANGE UP (ref 0–0.7)
EOSINOPHIL NFR BLD AUTO: 3.9 % — SIGNIFICANT CHANGE UP (ref 0–8)
GLUCOSE BLDC GLUCOMTR-MCNC: 116 MG/DL — HIGH (ref 70–99)
GLUCOSE SERPL-MCNC: 94 MG/DL — SIGNIFICANT CHANGE UP (ref 70–99)
HCT VFR BLD CALC: 37.6 % — SIGNIFICANT CHANGE UP (ref 37–47)
HGB BLD-MCNC: 12.3 G/DL — SIGNIFICANT CHANGE UP (ref 12–16)
IMM GRANULOCYTES NFR BLD AUTO: 0.2 % — SIGNIFICANT CHANGE UP (ref 0.1–0.3)
LYMPHOCYTES # BLD AUTO: 1.4 K/UL — SIGNIFICANT CHANGE UP (ref 1.2–3.4)
LYMPHOCYTES # BLD AUTO: 33.9 % — SIGNIFICANT CHANGE UP (ref 20.5–51.1)
MAGNESIUM SERPL-MCNC: 1.8 MG/DL — SIGNIFICANT CHANGE UP (ref 1.8–2.4)
MCHC RBC-ENTMCNC: 29.4 PG — SIGNIFICANT CHANGE UP (ref 27–31)
MCHC RBC-ENTMCNC: 32.7 G/DL — SIGNIFICANT CHANGE UP (ref 32–37)
MCV RBC AUTO: 89.7 FL — SIGNIFICANT CHANGE UP (ref 81–99)
MONOCYTES # BLD AUTO: 0.55 K/UL — SIGNIFICANT CHANGE UP (ref 0.1–0.6)
MONOCYTES NFR BLD AUTO: 13.3 % — HIGH (ref 1.7–9.3)
NEUTROPHILS # BLD AUTO: 1.97 K/UL — SIGNIFICANT CHANGE UP (ref 1.4–6.5)
NEUTROPHILS NFR BLD AUTO: 47.7 % — SIGNIFICANT CHANGE UP (ref 42.2–75.2)
NRBC # BLD: 0 /100 WBCS — SIGNIFICANT CHANGE UP (ref 0–0)
PHOSPHATE SERPL-MCNC: 3.5 MG/DL — SIGNIFICANT CHANGE UP (ref 2.1–4.9)
PLATELET # BLD AUTO: 99 K/UL — LOW (ref 130–400)
PMV BLD: 11.5 FL — HIGH (ref 7.4–10.4)
POTASSIUM SERPL-MCNC: 4.7 MMOL/L — SIGNIFICANT CHANGE UP (ref 3.5–5)
POTASSIUM SERPL-SCNC: 4.7 MMOL/L — SIGNIFICANT CHANGE UP (ref 3.5–5)
PROCALCITONIN SERPL-MCNC: 0.07 NG/ML — SIGNIFICANT CHANGE UP (ref 0.02–0.1)
PROT SERPL-MCNC: 5 G/DL — LOW (ref 6–8)
RBC # BLD: 4.19 M/UL — LOW (ref 4.2–5.4)
RBC # FLD: 14.2 % — SIGNIFICANT CHANGE UP (ref 11.5–14.5)
SODIUM SERPL-SCNC: 135 MMOL/L — SIGNIFICANT CHANGE UP (ref 135–146)
WBC # BLD: 4.13 K/UL — LOW (ref 4.8–10.8)
WBC # FLD AUTO: 4.13 K/UL — LOW (ref 4.8–10.8)

## 2024-10-19 PROCEDURE — 99232 SBSQ HOSP IP/OBS MODERATE 35: CPT

## 2024-10-19 PROCEDURE — 71275 CT ANGIOGRAPHY CHEST: CPT | Mod: 26

## 2024-10-19 RX ORDER — FLUTICASONE PROPIONATE 50 UG/1
1 SPRAY, METERED NASAL
Refills: 0 | Status: DISCONTINUED | OUTPATIENT
Start: 2024-10-19 | End: 2024-10-21

## 2024-10-19 RX ORDER — CETIRIZINE HCL 10 MG/1
10 TABLET ORAL DAILY
Refills: 0 | Status: DISCONTINUED | OUTPATIENT
Start: 2024-10-19 | End: 2024-10-21

## 2024-10-19 RX ADMIN — Medication 60 MILLIGRAM(S): at 05:31

## 2024-10-19 RX ADMIN — Medication 200 MILLIGRAM(S): at 06:34

## 2024-10-19 RX ADMIN — OSIMERTINIB 80 MILLIGRAM(S): 80 TABLET, FILM COATED ORAL at 22:22

## 2024-10-19 RX ADMIN — Medication 12.5 MILLIGRAM(S): at 06:34

## 2024-10-19 RX ADMIN — CHLORHEXIDINE GLUCONATE 1 APPLICATION(S): 40 SOLUTION TOPICAL at 05:35

## 2024-10-19 RX ADMIN — Medication 12.5 MILLIGRAM(S): at 17:06

## 2024-10-19 RX ADMIN — PANTOPRAZOLE SODIUM 40 MILLIGRAM(S): 40 TABLET, DELAYED RELEASE ORAL at 05:32

## 2024-10-19 RX ADMIN — Medication 0.4 MILLIGRAM(S): at 21:40

## 2024-10-19 RX ADMIN — FLUTICASONE PROPIONATE 1 SPRAY(S): 50 SPRAY, METERED NASAL at 17:06

## 2024-10-19 RX ADMIN — Medication 2 PUFF(S): at 06:40

## 2024-10-19 RX ADMIN — CETIRIZINE HCL 10 MILLIGRAM(S): 10 TABLET ORAL at 12:38

## 2024-10-19 RX ADMIN — Medication 60 MILLIGRAM(S): at 17:07

## 2024-10-19 RX ADMIN — Medication 25 MILLIGRAM(S): at 06:51

## 2024-10-19 NOTE — PROGRESS NOTE ADULT - SUBJECTIVE AND OBJECTIVE BOX
SUBJECTIVE/OVERNIGHT EVENTS  Today is hospital day 6d. This morning patient was seen and examined at bedside, resting comfortably in bed. No acute or major events overnight.    MEDICATIONS  STANDING MEDICATIONS  budesonide 160 MICROgram(s)/formoterol 4.5 MICROgram(s) Inhaler 2 Puff(s) Inhalation two times a day  cetirizine 10 milliGRAM(s) Oral daily  chlorhexidine 2% Cloths 1 Application(s) Topical <User Schedule>  dextrose 5%. 1000 milliLiter(s) IV Continuous <Continuous>  dextrose 5%. 1000 milliLiter(s) IV Continuous <Continuous>  dextrose 50% Injectable 25 Gram(s) IV Push once  dextrose 50% Injectable 12.5 Gram(s) IV Push once  dextrose 50% Injectable 25 Gram(s) IV Push once  enoxaparin Injectable 60 milliGRAM(s) SubCutaneous every 12 hours  fluticasone propionate 50 MICROgram(s)/spray Nasal Spray 1 Spray(s) Both Nostrils two times a day  glucagon  Injectable 1 milliGRAM(s) IntraMuscular once  insulin lispro (ADMELOG) corrective regimen sliding scale   SubCutaneous three times a day before meals  metoprolol tartrate 12.5 milliGRAM(s) Oral every 12 hours  osimertinib 80 milliGRAM(s) Oral daily  pantoprazole    Tablet 40 milliGRAM(s) Oral before breakfast  tamsulosin 0.4 milliGRAM(s) Oral at bedtime    PRN MEDICATIONS  albuterol    90 MICROgram(s) HFA Inhaler 2 Puff(s) Inhalation every 6 hours PRN  dextrose Oral Gel 15 Gram(s) Oral once PRN  meclizine 25 milliGRAM(s) Oral daily PRN  melatonin 3 milliGRAM(s) Oral at bedtime PRN  trimethobenzamide Injectable 200 milliGRAM(s) IntraMuscular every 8 hours PRN    VITALS  T(F): 98.1 (10-19-24 @ 04:50), Max: 98.4 (10-18-24 @ 20:20)  HR: 78 (10-19-24 @ 04:50) (78 - 87)  BP: 130/81 (10-19-24 @ 04:50) (109/67 - 130/81)  RR: 18 (10-19-24 @ 04:50) (18 - 18)  SpO2: 96% (10-18-24 @ 13:18) (96% - 96%)  POCT Blood Glucose.: 127 mg/dL (10-19-24 @ 11:44)  POCT Blood Glucose.: 116 mg/dL (10-19-24 @ 07:39)  POCT Blood Glucose.: 142 mg/dL (10-18-24 @ 21:28)  POCT Blood Glucose.: 146 mg/dL (10-18-24 @ 16:43)    PHYSICAL EXAM  GENERAL  (  x) NAD, lying in bed comfortably     (  ) obtunded     (  ) lethargic     (  ) somnolent    HEAD  (  ) Atraumatic     (  ) hematoma     (  ) laceration (specify location:       )     NECK  (  ) Supple     (  ) neck stiffness     (  ) nuchal rigidity     (  )  no JVD     (  ) JVD present ( -- cm)    HEART  Rate -->  ( x ) normal rate    (  ) bradycardic    (  ) tachycardic  Rhythm -->  ( x ) regular    (  ) regularly irregular    (  ) irregularly irregular  Murmurs -->  (  ) normal s1/s2    (  ) systolic murmur    (  ) diastolic murmur    (  ) continuous murmur     (  ) S3 present    (  ) S4 present    LUNGS  ( x )Unlabored respirations     (  ) tachypnea  (  ) B/L air entry     (  ) decreased breath sounds in:  (location     )    (  ) no adventitious sound     (  ) crackles     (  ) wheezing      (  ) rhonchi      (specify location:       )  (  ) chest wall tenderness (specify location:       )  -crackles in L lung    ABDOMEN  (x  ) Soft     (  ) tense   |   (  ) nondistended     (  ) distended   |   (  ) +BS     (  ) hypoactive bowel sounds     (  ) hyperactive bowel sounds  ( x ) nontender     (  ) RUQ tenderness     (  ) RLQ tenderness     (  ) LLQ tenderness     (  ) epigastric tenderness     (  ) diffuse tenderness  (  ) Splenomegaly      (  ) Hepatomegaly      (  ) Jaundice     (  ) ecchymosis     EXTREMITIES  (x  ) Normal     (  ) Rash     (  ) ecchymosis     (  ) varicose veins      (  ) pitting edema     (  ) non-pitting edema   (  ) ulceration     (  ) gangrene:     (location:     )    NERVOUS SYSTEM  ( x) A&Ox3     (  ) confused     (  ) lethargic  CN II-XII:     (  ) Intact     (  ) focal deficits  (Specify:     )   Upper extremities:     (  ) strength X/5     (  ) focal deficit (specify:    )  Lower extremities:     (  ) strength  X/5    (  ) focal deficit (specify:    )

## 2024-10-19 NOTE — PROGRESS NOTE ADULT - ATTENDING COMMENTS
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   H/O Lymphoma  H/O Thyroid and kidney cancer (In remission)  DM-2 / HTN / DL  H/O chronic A-fib/flutter  COPD    Vitals: HD stable, O2 stable  Gen: appropriate affect, no acute distress  Neuro: no focal defects, no sensory deficits  HEENT: EOMI, no JVD, normocephalic, atraumatic, no scleral icterus  Cardio: RRR, S1 S2 present, no murmurs, rubs, or gallops  Resp: crackles on the left side, chest wall intact  Abd: soft, nondistended, nontender  MSK: no gross joint abnormalities, no obvious swelling  Skin: no rashes, no wounds  heme: no ecchymosis or petechiae     I agree with the residents notes with my edits  It is unclear why the pt has pleurisy but we will obtain a CTA PE study today to look at lung architecture and with hx of cancer to make sure there is no PE.     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   H/O Lymphoma  H/O Thyroid and kidney cancer (In remission)  DM-2 / HTN / DL  H/O chronic A-fib/flutter  COPD    Vitals: HD stable, O2 stable  Gen: appropriate affect, no acute distress  Neuro: no focal defects, no sensory deficits  HEENT: EOMI, no JVD, normocephalic, atraumatic, no scleral icterus  Cardio: RRR, S1 S2 present, no murmurs, rubs, or gallops  Resp: crackles on the left side, chest wall intact  Abd: soft, nondistended, nontender  MSK: no gross joint abnormalities, no obvious swelling  Skin: no rashes, no wounds  heme: no ecchymosis or petechiae     I agree with the residents notes with my edits  It is unclear why the pt has pleurisy but we will obtain a CTA PE study today to look at lung architecture and with hx of cancer to make sure there is no PE.     In terms of the RAH because of concerns with mobile echodensity on the aortic valve, we are awaiting for repeat blood culture - pt is scheduled tentatively for Monday but if the blood culture is negative (third culture), we will not go ahead with the RAH scan    dc planning: if the 3rd blood culture is negative, and CTPE with no concerning findings, pt can be DC, pending CTA PE study      I personally reviewed labs and imaging and ordered necessary testing/medications  I discussed care of the patient with licensed providers  I personally reviewed chart and consultant recommendations  Pt has complex medical issues that require extensive diagnosis and intervention   I personally spent 35 minutes in care of patient.

## 2024-10-19 NOTE — PROGRESS NOTE ADULT - ASSESSMENT
84-year-old female past medical history small cell lung cancer, lymphoma, thyroid and kidney cancer (in remission), COPD not on home O2, HTN, HLD, diabetes, kidney stones, AAA, presents with lightheadedness for 2-3 days. Pt and daughter reported she has a hx of lightheadness and takes Meclizine 25mg PRN, not sure the etiology. However, since yesterday she is feeling more lightheaded, gets worse when sitting or standing or walking. She is therefore having difficulty ambulating. She also had an episode of vomiting today. Took meclizine 25mg PRN yesterday but didnt help her much. She is also having decreased oral intake with decreased urination as per daughter. She denies any room spinning sensation. She has chronic tinnitus and mild hearing loss in both ears.     #Acute on chronic dizziness vs Lightheadedness ( Electrolytes  vs Cardiac cause)   #SOB  # Severe hyponatremia ( Dietary vs SIADH 2/2 Lung cancer)   # Decreased PO intake:   - came to the ED for lightheadness and Decreased PO intake   - VVS  - Na 118 on admission   - 10/14 Frida 123  - IVF: 75cc/hr LR 1L  - Orthostatics check 10/13: pos for DPB drop 16  - Fluid restriction  - Urine studies: Frida 123  - SOsm - 256 - low solute 2/2 SIADH vs low solute syndrome  - TSH 1.72 + AM cortisol 15.3 wnl   - BMP q 6 hours; replete  - nephro consulted - dc IVF at NA >130 -> Na 132 10/16, fluids dc'c  - PT   - nutrition consulted 10/17-> recs appreciated  -cxray 10/18: Left lung opacities/left lower lobe consolidation, increased. Stable cardiomegaly.  -pulm consulted  Obtain full RVP, Start Pulmicort Neb, and Brovana Neb  - RVP ordered 10/19   - CTA PE ordered 10/19      # NEwly Diagnosed Paroxysmal A fib:   - CHADVASC 5   - HASBLED 2   - Rate control : Metoprolol tartrate 25 mg BID   - Anticoagulation : Discussed with the patient and daughter about bleeding risk ; Agreeable to AC   - Lovenox 60 mg BID.   - Monitor on Telemetry ; might have heart block leading to lightheadedness, dizziness.   - RAH 10/13: Hyperdynamic global left ventricular systolic function. Left ventricular ejection fraction, by visual estimation, is >75%. Asymmetric basal septal hypertrophy. Severely enlarged left atrium + right atrium. Mild mitral stenosis. Moderate mitral valve regurgitation. Moderate-severe tricuspid regurgitation. mobile echodensity on the aortic valve - rec RAH  Mild pulmonic valve regurgitation. mild pulmonary hypertension.  - Follows Dr Gamble OP - BCx x3  - BNP 1399  - blood cultures -> following  -cardio scheduled RAH 10/21 but will follow blood cx, if negative will cancel       # Lower abdominal pain likely UTI   - Lower abdominal pain with burning sensation ; ( symptomatic )   - UA weakly positive 10/12 -> 10/13 neg  - UCx 10/12, 10/13 - pos for enterococcus + GP cocci and pairs  - Rocephin 1 gm OD for 3 days. 10/13->10/15   -ID consulted for ucx findings  - theodore put in 10/16  - urology consulted for urinary rentention 10/17-> rec to start flomax 0.4 daily and Op urology for urodynamics  - ID rec monitor off abx, rec to obtain RAH 10/17    #Small cell lung cancer  # lymphoma  # thyroid   # kidney cancer (in remission),  - Follows Dr. Soares as outpatinet   - On Tagrisso OD. Continue     # COPD not on home O2  - not wheezing   - On symbicort and albuterol   - Follows Dr. Brian Lake   - fluticasone nasal spray and cetrizine added 10/19    # HTN  # HLD  # Diabetes mellitus type 2:   - Hold antihypertensive for now   - Monitor blood glucose   - Insulin sliding scale.     # Hx of Kidney stones  # AAA   - Outpatient work up     - DVT : Lovenox AC  - GI : PPI   - Activity : Ambulate as tolerated   - Diet: Fluid restriction     84-year-old female past medical history small cell lung cancer, lymphoma, thyroid and kidney cancer (in remission), COPD not on home O2, HTN, HLD, diabetes, kidney stones, AAA, presents with lightheadedness for 2-3 days. Pt and daughter reported she has a hx of lightheadness and takes Meclizine 25mg PRN, not sure the etiology. However, since yesterday she is feeling more lightheaded, gets worse when sitting or standing or walking. She is therefore having difficulty ambulating. She also had an episode of vomiting today. Took meclizine 25mg PRN yesterday but didnt help her much. She is also having decreased oral intake with decreased urination as per daughter. She denies any room spinning sensation. She has chronic tinnitus and mild hearing loss in both ears.     #Acute on chronic dizziness vs Lightheadedness ( Electrolytes  vs Cardiac cause vs pulmonary cause)   #SOB  -cxray 10/18: Left lung opacities/left lower lobe consolidation, increased. Stable cardiomegaly.  -no active wheezing on exam but pt has crackles on left side   -pulm consulted  Obtain full RVP, Start Pulmicort Neb, and Brovana Neb  - RVP ordered 10/19   - CTA PE ordered 10/19  RAH 10/13: Hyperdynamic global left ventricular systolic function. Left ventricular ejection fraction, by visual estimation, is >75%.    #Small cell lung cancer  # lymphoma  # thyroid   # kidney cancer (in remission),  - Follows Dr. Soares as outpatinet   - On Tagrisso OD. Continue     # COPD not on home O2  - not wheezing   - On symbicort and albuterol   - Follows Dr. Brian Lake   - fluticasone nasal spray and cetrizine added 10/19    # Severe hyponatremia ( Dietary vs SIADH 2/2 Lung cancer) - Na improved   # Decreased PO intake:   - came to the ED for lightheadness and Decreased PO intake   - VVS  - Na 118 on admission   - 10/14 Frida 123  - IVF: 75cc/hr LR 1L  - Orthostatics check 10/13: pos for DPB drop 16  - Fluid restriction  - Urine studies: Frida 123  - SOsm - 256 - low solute 2/2 SIADH vs low solute syndrome  - TSH 1.72 + AM cortisol 15.3 wnl   - nephro consulted - dc IVF at NA >130 -> Na 132 10/16, fluids dc'd  - PT   - nutrition consulted 10/17-> recs appreciated    #questionable mobile echodensity on aortic valve   -differential includes leaflet thicekening, but cannot exclude vegetation  -Care d/w the cardiology. Recommended to repeat blood cx. If 3rd blood cx is negative and pt is afebrile and has normal WBC count will not RAH.     # NEwly Diagnosed Paroxysmal A fib:   - CHADVASC 5   - HASBLED 2   - Rate control : Metoprolol tartrate 25 mg BID   - Anticoagulation : Discussed with the patient and daughter about bleeding risk ; Agreeable to AC   - Lovenox 60 mg BID.   - Monitor on Telemetry ; might have heart block leading to lightheadedness, dizziness.   - RAH 10/13: Hyperdynamic global left ventricular systolic function. Left ventricular ejection fraction, by visual estimation, is >75%. Asymmetric basal septal hypertrophy. Severely enlarged left atrium + right atrium. Mild mitral stenosis. Moderate mitral valve regurgitation. Moderate-severe tricuspid regurgitation. mobile echodensity on the aortic valve - rec RAH  Mild pulmonic valve regurgitation. mild pulmonary hypertension.  - Follows Dr Gamble OP - BCx x3  - BNP 1399  - blood cultures -> following  -cardio scheduled RAH 10/21 but will follow blood cx, if negative will cancel       # Lower abdominal pain likely UTI   - Lower abdominal pain with burning sensation ; ( symptomatic )   - UA weakly positive 10/12 -> 10/13 neg  - UCx 10/12, 10/13 - pos for enterococcus + GP cocci and pairs  - Rocephin 1 gm OD for 3 days. 10/13->10/15   -ID consulted for ucx findings  - theodore put in 10/16  - urology consulted for urinary rentention 10/17-> rec to start flomax 0.4 daily and Op urology for urodynamics  - ID rec monitor off abx, rec to obtain RAH 10/17      # HTN  # HLD  # Diabetes mellitus type 2:   - Hold antihypertensive for now   - Monitor blood glucose   - Insulin sliding scale.     # Hx of Kidney stones  # AAA   - Outpatient work up     - DVT : Lovenox AC  - GI : PPI   - Activity : Ambulate as tolerated   - Diet: Fluid restriction

## 2024-10-20 DIAGNOSIS — E46 UNSPECIFIED PROTEIN-CALORIE MALNUTRITION: ICD-10-CM

## 2024-10-20 DIAGNOSIS — R53.81 OTHER MALAISE: ICD-10-CM

## 2024-10-20 DIAGNOSIS — Z51.5 ENCOUNTER FOR PALLIATIVE CARE: ICD-10-CM

## 2024-10-20 DIAGNOSIS — C34.90 MALIGNANT NEOPLASM OF UNSPECIFIED PART OF UNSPECIFIED BRONCHUS OR LUNG: ICD-10-CM

## 2024-10-20 LAB
ALBUMIN SERPL ELPH-MCNC: 3.2 G/DL — LOW (ref 3.5–5.2)
ALP SERPL-CCNC: 87 U/L — SIGNIFICANT CHANGE UP (ref 30–115)
ALT FLD-CCNC: 25 U/L — SIGNIFICANT CHANGE UP (ref 0–41)
ANION GAP SERPL CALC-SCNC: 7 MMOL/L — SIGNIFICANT CHANGE UP (ref 7–14)
AST SERPL-CCNC: 18 U/L — SIGNIFICANT CHANGE UP (ref 0–41)
BASOPHILS # BLD AUTO: 0.04 K/UL — SIGNIFICANT CHANGE UP (ref 0–0.2)
BASOPHILS NFR BLD AUTO: 1 % — SIGNIFICANT CHANGE UP (ref 0–1)
BILIRUB SERPL-MCNC: 0.4 MG/DL — SIGNIFICANT CHANGE UP (ref 0.2–1.2)
BUN SERPL-MCNC: 21 MG/DL — HIGH (ref 10–20)
CALCIUM SERPL-MCNC: 9.6 MG/DL — SIGNIFICANT CHANGE UP (ref 8.4–10.5)
CHLORIDE SERPL-SCNC: 100 MMOL/L — SIGNIFICANT CHANGE UP (ref 98–110)
CO2 SERPL-SCNC: 28 MMOL/L — SIGNIFICANT CHANGE UP (ref 17–32)
CREAT SERPL-MCNC: 0.7 MG/DL — SIGNIFICANT CHANGE UP (ref 0.7–1.5)
CULTURE RESULTS: SIGNIFICANT CHANGE UP
EGFR: 86 ML/MIN/1.73M2 — SIGNIFICANT CHANGE UP
EOSINOPHIL # BLD AUTO: 0.21 K/UL — SIGNIFICANT CHANGE UP (ref 0–0.7)
EOSINOPHIL NFR BLD AUTO: 5.4 % — SIGNIFICANT CHANGE UP (ref 0–8)
GLUCOSE SERPL-MCNC: 95 MG/DL — SIGNIFICANT CHANGE UP (ref 70–99)
HCT VFR BLD CALC: 37.8 % — SIGNIFICANT CHANGE UP (ref 37–47)
HGB BLD-MCNC: 12.3 G/DL — SIGNIFICANT CHANGE UP (ref 12–16)
IMM GRANULOCYTES NFR BLD AUTO: 0.3 % — SIGNIFICANT CHANGE UP (ref 0.1–0.3)
LYMPHOCYTES # BLD AUTO: 1.3 K/UL — SIGNIFICANT CHANGE UP (ref 1.2–3.4)
LYMPHOCYTES # BLD AUTO: 33.4 % — SIGNIFICANT CHANGE UP (ref 20.5–51.1)
MAGNESIUM SERPL-MCNC: 1.7 MG/DL — LOW (ref 1.8–2.4)
MCHC RBC-ENTMCNC: 29.5 PG — SIGNIFICANT CHANGE UP (ref 27–31)
MCHC RBC-ENTMCNC: 32.5 G/DL — SIGNIFICANT CHANGE UP (ref 32–37)
MCV RBC AUTO: 90.6 FL — SIGNIFICANT CHANGE UP (ref 81–99)
MONOCYTES # BLD AUTO: 0.49 K/UL — SIGNIFICANT CHANGE UP (ref 0.1–0.6)
MONOCYTES NFR BLD AUTO: 12.6 % — HIGH (ref 1.7–9.3)
NEUTROPHILS # BLD AUTO: 1.84 K/UL — SIGNIFICANT CHANGE UP (ref 1.4–6.5)
NEUTROPHILS NFR BLD AUTO: 47.3 % — SIGNIFICANT CHANGE UP (ref 42.2–75.2)
NRBC # BLD: 0 /100 WBCS — SIGNIFICANT CHANGE UP (ref 0–0)
PHOSPHATE SERPL-MCNC: 3.2 MG/DL — SIGNIFICANT CHANGE UP (ref 2.1–4.9)
PLATELET # BLD AUTO: 109 K/UL — LOW (ref 130–400)
PMV BLD: 11.9 FL — HIGH (ref 7.4–10.4)
POTASSIUM SERPL-MCNC: 4.6 MMOL/L — SIGNIFICANT CHANGE UP (ref 3.5–5)
POTASSIUM SERPL-SCNC: 4.6 MMOL/L — SIGNIFICANT CHANGE UP (ref 3.5–5)
PROT SERPL-MCNC: 4.9 G/DL — LOW (ref 6–8)
RBC # BLD: 4.17 M/UL — LOW (ref 4.2–5.4)
RBC # FLD: 14.1 % — SIGNIFICANT CHANGE UP (ref 11.5–14.5)
SODIUM SERPL-SCNC: 135 MMOL/L — SIGNIFICANT CHANGE UP (ref 135–146)
SPECIMEN SOURCE: SIGNIFICANT CHANGE UP
WBC # BLD: 3.89 K/UL — LOW (ref 4.8–10.8)
WBC # FLD AUTO: 3.89 K/UL — LOW (ref 4.8–10.8)

## 2024-10-20 PROCEDURE — 99232 SBSQ HOSP IP/OBS MODERATE 35: CPT

## 2024-10-20 RX ADMIN — PANTOPRAZOLE SODIUM 40 MILLIGRAM(S): 40 TABLET, DELAYED RELEASE ORAL at 05:34

## 2024-10-20 RX ADMIN — OSIMERTINIB 80 MILLIGRAM(S): 80 TABLET, FILM COATED ORAL at 21:19

## 2024-10-20 RX ADMIN — FLUTICASONE PROPIONATE 1 SPRAY(S): 50 SPRAY, METERED NASAL at 17:18

## 2024-10-20 RX ADMIN — Medication 1: at 11:46

## 2024-10-20 RX ADMIN — CETIRIZINE HCL 10 MILLIGRAM(S): 10 TABLET ORAL at 11:20

## 2024-10-20 RX ADMIN — Medication 0.4 MILLIGRAM(S): at 21:19

## 2024-10-20 RX ADMIN — Medication 60 MILLIGRAM(S): at 17:18

## 2024-10-20 RX ADMIN — CHLORHEXIDINE GLUCONATE 1 APPLICATION(S): 40 SOLUTION TOPICAL at 05:37

## 2024-10-20 RX ADMIN — Medication 12.5 MILLIGRAM(S): at 05:34

## 2024-10-20 RX ADMIN — FLUTICASONE PROPIONATE 1 SPRAY(S): 50 SPRAY, METERED NASAL at 05:35

## 2024-10-20 RX ADMIN — Medication 60 MILLIGRAM(S): at 05:34

## 2024-10-20 RX ADMIN — Medication 12.5 MILLIGRAM(S): at 17:17

## 2024-10-20 NOTE — PROGRESS NOTE ADULT - SUBJECTIVE AND OBJECTIVE BOX
Patient is a 84y old  Female who presents with a chief complaint of hyponatremia (10-19-24)      Pt seen and examined at bedside. No CP or SOB.          PAST MEDICAL & SURGICAL HISTORY:  Hypertension, unspecified type    Type 2 diabetes mellitus with complication, without long-term current use of insulin    Hyperlipidemia, unspecified hyperlipidemia type    Diverticulitis    Obesity    COPD (chronic obstructive pulmonary disease)    SOB (shortness of breath)    GERD (gastroesophageal reflux disease)    Lung cancer    ARTIE (obstructive sleep apnea)  NO CPAP MACHINE PER PT.    Cancer of kidney    Cancer of thyroid    Former smoker    NHL (non-Hodgkin's lymphoma)  "low grade" per heme/ onc note    History of abdominal aortic aneurysm (AAA)    Kidney stones    [place] (hard of hearing)    History of surgery  LEFT LOWER LOBECTOMY    History of surgery  BILATERAL KNEE REPLACEMENT    History of surgery  CATARACT RIGHT EYE    History of surgery  TUBAL LIGATION    History of surgery  CYST REMOVED  RIGHT BREAST    History of surgery  CHOLECYSTECOMY    History of surgery  RIGHT PARTIAL NEPHRECTOMY    History of surgery  BILATERAL CATARACT SURGERY    History of back surgery    H/O lithotripsy    H/O partial thyroidectomy        VITAL SIGNS (Last 24 hrs):  T(C): 36.3 (10-20-24 @ 12:52), Max: 36.7 (10-19-24 @ 20:52)  HR: 77 (10-20-24 @ 12:52) (73 - 83)  BP: 123/75 (10-20-24 @ 12:52) (117/69 - 133/86)  RR: 18 (10-20-24 @ 12:52) (18 - 18)  SpO2: 94% (10-20-24 @ 04:19) (94% - 94%)  Wt(kg): --  Daily     Daily Weight in k (20 Oct 2024 06:40)    I&O's Summary    19 Oct 2024 07:  -  20 Oct 2024 07:00  --------------------------------------------------------  IN: 700 mL / OUT: 1700 mL / NET: -1000 mL    20 Oct 2024 07:  -  20 Oct 2024 14:54  --------------------------------------------------------  IN: 210 mL / OUT: 0 mL / NET: 210 mL        PHYSICAL EXAM:  GENERAL: NAD, well-developed  HEAD:  Atraumatic, Normocephalic  EYES: EOMI, PERRLA, conjunctiva and sclera clear  NECK: Supple, No JVD  CHEST/LUNG: Clear to auscultation bilaterally; No wheeze  HEART: Regular rate and rhythm; No murmurs, rubs, or gallops  ABDOMEN: Soft, Nontender, Nondistended; Bowel sounds present  EXTREMITIES:  2+ Peripheral Pulses, No clubbing, cyanosis, or edema  PSYCH: AAOx3  NEUROLOGY: non-focal  SKIN: No rashes or lesions    Labs Reviewed  Spoke to patient in regards to abnormal labs.    CBC Full  -  ( 20 Oct 2024 07:12 )  WBC Count : 3.89 K/uL  Hemoglobin : 12.3 g/dL  Hematocrit : 37.8 %  Platelet Count - Automated : 109 K/uL  Mean Cell Volume : 90.6 fL  Mean Cell Hemoglobin : 29.5 pg  Mean Cell Hemoglobin Concentration : 32.5 g/dL  Auto Neutrophil # : 1.84 K/uL  Auto Lymphocyte # : 1.30 K/uL  Auto Monocyte # : 0.49 K/uL  Auto Eosinophil # : 0.21 K/uL  Auto Basophil # : 0.04 K/uL  Auto Neutrophil % : 47.3 %  Auto Lymphocyte % : 33.4 %  Auto Monocyte % : 12.6 %  Auto Eosinophil % : 5.4 %  Auto Basophil % : 1.0 %    BMP:    10-20 @ 07:12    Blood Urea Nitrogen - 21  Calcium - 9.6  Carbond Dioxide - 28  Chloride - 100  Creatinine - 0.7  Glucose - 95  Potassium - 4.6  Sodium - 135      Hemoglobin A1c -     Urine Culture:  10-17 @ 05:50 Urine culture: --    Culture Results:   No growth at 72 Hours  Method Type: --  Organism: --  Organism Identification: --  Specimen Source: .Blood BLOOD  10-16 @ 06:34 Urine culture: --    Culture Results:   No growth at 4 days  Method Type: --  Organism: --  Organism Identification: --  Specimen Source: .Blood BLOOD        COVID Labs  CRP:    Procalcitonin: 0.07 ng/mL (10-18-24 @ 18:16)    D-Dimer:            Imaging reviewed independently and reviewed read        MEDICATIONS  (STANDING):  budesonide 160 MICROgram(s)/formoterol 4.5 MICROgram(s) Inhaler 2 Puff(s) Inhalation two times a day  cetirizine 10 milliGRAM(s) Oral daily  chlorhexidine 2% Cloths 1 Application(s) Topical <User Schedule>  dextrose 5%. 1000 milliLiter(s) (100 mL/Hr) IV Continuous <Continuous>  dextrose 5%. 1000 milliLiter(s) (50 mL/Hr) IV Continuous <Continuous>  dextrose 50% Injectable 25 Gram(s) IV Push once  dextrose 50% Injectable 25 Gram(s) IV Push once  dextrose 50% Injectable 12.5 Gram(s) IV Push once  enoxaparin Injectable 60 milliGRAM(s) SubCutaneous every 12 hours  fluticasone propionate 50 MICROgram(s)/spray Nasal Spray 1 Spray(s) Both Nostrils two times a day  glucagon  Injectable 1 milliGRAM(s) IntraMuscular once  insulin lispro (ADMELOG) corrective regimen sliding scale   SubCutaneous three times a day before meals  metoprolol tartrate 12.5 milliGRAM(s) Oral every 12 hours  osimertinib 80 milliGRAM(s) Oral daily  pantoprazole    Tablet 40 milliGRAM(s) Oral before breakfast  tamsulosin 0.4 milliGRAM(s) Oral at bedtime    MEDICATIONS  (PRN):  albuterol    90 MICROgram(s) HFA Inhaler 2 Puff(s) Inhalation every 6 hours PRN Bronchospasm  dextrose Oral Gel 15 Gram(s) Oral once PRN Blood Glucose LESS THAN 70 milliGRAM(s)/deciliter  meclizine 25 milliGRAM(s) Oral daily PRN for dizziness  melatonin 3 milliGRAM(s) Oral at bedtime PRN Insomnia  trimethobenzamide Injectable 200 milliGRAM(s) IntraMuscular every 8 hours PRN Nausea and/or Vomiting       I performed the initial face to face bedside interview with this patient regarding history of present illness, review of symptoms and past medical, social and family history.  I completed an independent physical examination.  I was the initial provider who evaluated this patient.  The history, review of symptoms and examination was documented by the scribe in my presence and I attest to the accuracy of the documentation.  I have signed out the follow up of any pending tests (i.e. labs, radiological studies) to the PA/ resident.  I have discussed the patient’s plan of care and disposition with the PA/ resident. SONDRA Contreras: spoke with cards, cards advised for pt to stay over night for monitoring, advised cards will see pt. pt placed in obs SONDRA Contreras: spoke with cards, cards advised for pt to stay over night for monitoring, advised cards will see pt. pt placed in obs. sBP has been in 160s throughout ED stay, ordered Metoprolol

## 2024-10-20 NOTE — PROGRESS NOTE ADULT - ASSESSMENT
84-year-old female past medical history small cell lung cancer, lymphoma, thyroid and kidney cancer (in remission), COPD not on home O2, HTN, HLD, diabetes, kidney stones, AAA, presents with lightheadedness for 2-3 days. Pt and daughter reported she has a hx of lightheadness and takes Meclizine 25mg PRN, not sure the etiology. However, since yesterday she is feeling more lightheaded, gets worse when sitting or standing or walking. She is therefore having difficulty ambulating. She also had an episode of vomiting today. Took meclizine 25mg PRN yesterday but didnt help her much. She is also having decreased oral intake with decreased urination as per daughter. She denies any room spinning sensation. She has chronic tinnitus and mild hearing loss in both ears.     # acute on chronic lightheadedness, SOB, Newly Diagnosed Paroxysmal A fib, questionable vegetation  -cxray 10/18: Left lung opacities/left lower lobe consolidation, increased. Stable cardiomegaly.  -no active wheezing on exam but pt has crackles on left side   - CHADVASC 5   - HASBLED 2   - BNP 1399  - Rate control : Metoprolol tartrate 25 mg BID   - Anticoagulation : Discussed with the patient and daughter about bleeding risk ; Agreeable to AC   - Lovenox 60 mg BID.   - RAH 10/13: Hyperdynamic global left ventricular systolic function. Left ventricular ejection fraction, by visual estimation, is >75%. Asymmetric basal septal hypertrophy. Severely enlarged left atrium + right atrium. Mild mitral stenosis. Moderate mitral valve regurgitation. Moderate-severe tricuspid regurgitation. mobile echodensity on the aortic valve - rec RAH  Mild pulmonic valve regurgitation. mild pulmonary hypertension.  -differential includes leaflet thicekening, but cannot exclude vegetation  - Follows Dr Gamble OP   - blood cultures -> following  -cardio scheduled RAH 10/21 (with dr. love). he will follow up on 10/21, plan was if 3 negative blood culture, we would not pursue the RAH    chronic thromboembolism  -diagnosed on 10/20 with CTA PE  -discussed with Dr. Sweet - no need for anticoagulation but pt is already on lovenox for a fib     #Small cell lung cancer  # lymphoma  # thyroid   # kidney cancer (in remission),  - Follows Dr. Soares as outpatinet   - On Tagrisso OD. Continue     # COPD not on home O2  - not wheezing   - On symbicort and albuterol   - Follows Dr. Brian Lake   - fluticasone nasal spray and cetrizine added 10/19    # Severe hyponatremia ( Dietary vs SIADH 2/2 Lung cancer) - Na improved   # Decreased PO intake:   - came to the ED for lightheadness and Decreased PO intake   - VVS  - Na 118 on admission - now at baseline of 135  - 10/14 Frida 123  - Orthostatics check 10/13: pos for DPB drop 16  - Urine studies: Frida 123  - SOsm - 256 - low solute 2/2 SIADH vs low solute syndrome  - TSH 1.72 + AM cortisol 15.3 wnl   - nephro consulted - dc IVF at NA >130 -> Na 132 10/16, fluids dc'd  - PT   - nutrition consulted 10/17-> recs appreciated      # Lower abdominal pain likely UTI   - Lower abdominal pain with burning sensation ; ( symptomatic )   - UA weakly positive 10/12 -> 10/13 neg  - UCx 10/12, 10/13 - pos for enterococcus + GP cocci and pairs  - Rocephin 1 gm OD for 3 days. 10/13->10/15   -ID consulted for ucx findings  - theodore put in 10/16  - urology consulted for urinary rentention 10/17-> rec to start flomax 0.4 daily and Op urology for urodynamics  - ID rec monitor off abx    # HTN  # HLD  # Diabetes mellitus type 2:   - Hold antihypertensive for now   - Monitor blood glucose   - Insulin sliding scale.     # Hx of Kidney stones  # AAA   - Outpatient work up     - DVT : Lovenox AC  - GI : PPI   - Activity : Ambulate as tolerated   - Diet: Fluid restriction  - dispo planning - awaiting dr. munoz plan for RAH on 10/21. if no plan, then pt can be DC    I personally reviewed labs and imaging and ordered necessary testing/medications  I discussed care of the patient with licensed providers  I personally reviewed chart and consultant recommendations  Pt has complex medical issues that require extensive diagnosis and intervention / threat to life  I personally spent 35 minutes in care of patient.

## 2024-10-21 ENCOUNTER — TRANSCRIPTION ENCOUNTER (OUTPATIENT)
Age: 84
End: 2024-10-21

## 2024-10-21 ENCOUNTER — NON-APPOINTMENT (OUTPATIENT)
Age: 84
End: 2024-10-21

## 2024-10-21 VITALS
SYSTOLIC BLOOD PRESSURE: 131 MMHG | DIASTOLIC BLOOD PRESSURE: 72 MMHG | RESPIRATION RATE: 18 BRPM | TEMPERATURE: 97 F | HEART RATE: 60 BPM

## 2024-10-21 LAB
ALBUMIN SERPL ELPH-MCNC: 3.3 G/DL — LOW (ref 3.5–5.2)
ALP SERPL-CCNC: 86 U/L — SIGNIFICANT CHANGE UP (ref 30–115)
ALT FLD-CCNC: 24 U/L — SIGNIFICANT CHANGE UP (ref 0–41)
ANION GAP SERPL CALC-SCNC: 8 MMOL/L — SIGNIFICANT CHANGE UP (ref 7–14)
APTT BLD: 32.1 SEC — SIGNIFICANT CHANGE UP (ref 27–39.2)
AST SERPL-CCNC: 16 U/L — SIGNIFICANT CHANGE UP (ref 0–41)
BASOPHILS # BLD AUTO: 0.03 K/UL — SIGNIFICANT CHANGE UP (ref 0–0.2)
BASOPHILS NFR BLD AUTO: 0.7 % — SIGNIFICANT CHANGE UP (ref 0–1)
BILIRUB SERPL-MCNC: 0.4 MG/DL — SIGNIFICANT CHANGE UP (ref 0.2–1.2)
BUN SERPL-MCNC: 20 MG/DL — SIGNIFICANT CHANGE UP (ref 10–20)
CALCIUM SERPL-MCNC: 9.3 MG/DL — SIGNIFICANT CHANGE UP (ref 8.4–10.5)
CHLORIDE SERPL-SCNC: 99 MMOL/L — SIGNIFICANT CHANGE UP (ref 98–110)
CO2 SERPL-SCNC: 26 MMOL/L — SIGNIFICANT CHANGE UP (ref 17–32)
CREAT SERPL-MCNC: 0.7 MG/DL — SIGNIFICANT CHANGE UP (ref 0.7–1.5)
CULTURE RESULTS: SIGNIFICANT CHANGE UP
EGFR: 86 ML/MIN/1.73M2 — SIGNIFICANT CHANGE UP
EOSINOPHIL # BLD AUTO: 0.21 K/UL — SIGNIFICANT CHANGE UP (ref 0–0.7)
EOSINOPHIL NFR BLD AUTO: 5.2 % — SIGNIFICANT CHANGE UP (ref 0–8)
GLUCOSE BLDC GLUCOMTR-MCNC: 94 MG/DL — SIGNIFICANT CHANGE UP (ref 70–99)
GLUCOSE SERPL-MCNC: 93 MG/DL — SIGNIFICANT CHANGE UP (ref 70–99)
HCT VFR BLD CALC: 37.8 % — SIGNIFICANT CHANGE UP (ref 37–47)
HGB BLD-MCNC: 12.2 G/DL — SIGNIFICANT CHANGE UP (ref 12–16)
IMM GRANULOCYTES NFR BLD AUTO: 0.5 % — HIGH (ref 0.1–0.3)
INR BLD: 1.04 RATIO — SIGNIFICANT CHANGE UP (ref 0.65–1.3)
LYMPHOCYTES # BLD AUTO: 1.21 K/UL — SIGNIFICANT CHANGE UP (ref 1.2–3.4)
LYMPHOCYTES # BLD AUTO: 30 % — SIGNIFICANT CHANGE UP (ref 20.5–51.1)
MAGNESIUM SERPL-MCNC: 1.8 MG/DL — SIGNIFICANT CHANGE UP (ref 1.8–2.4)
MCHC RBC-ENTMCNC: 28.7 PG — SIGNIFICANT CHANGE UP (ref 27–31)
MCHC RBC-ENTMCNC: 32.3 G/DL — SIGNIFICANT CHANGE UP (ref 32–37)
MCV RBC AUTO: 88.9 FL — SIGNIFICANT CHANGE UP (ref 81–99)
MONOCYTES # BLD AUTO: 0.5 K/UL — SIGNIFICANT CHANGE UP (ref 0.1–0.6)
MONOCYTES NFR BLD AUTO: 12.4 % — HIGH (ref 1.7–9.3)
NEUTROPHILS # BLD AUTO: 2.07 K/UL — SIGNIFICANT CHANGE UP (ref 1.4–6.5)
NEUTROPHILS NFR BLD AUTO: 51.2 % — SIGNIFICANT CHANGE UP (ref 42.2–75.2)
NRBC # BLD: 0 /100 WBCS — SIGNIFICANT CHANGE UP (ref 0–0)
PLATELET # BLD AUTO: 111 K/UL — LOW (ref 130–400)
PMV BLD: 11 FL — HIGH (ref 7.4–10.4)
POTASSIUM SERPL-MCNC: 4.8 MMOL/L — SIGNIFICANT CHANGE UP (ref 3.5–5)
POTASSIUM SERPL-SCNC: 4.8 MMOL/L — SIGNIFICANT CHANGE UP (ref 3.5–5)
PROT SERPL-MCNC: 5 G/DL — LOW (ref 6–8)
PROTHROM AB SERPL-ACNC: 11.9 SEC — SIGNIFICANT CHANGE UP (ref 9.95–12.87)
RBC # BLD: 4.25 M/UL — SIGNIFICANT CHANGE UP (ref 4.2–5.4)
RBC # FLD: 13.8 % — SIGNIFICANT CHANGE UP (ref 11.5–14.5)
SODIUM SERPL-SCNC: 133 MMOL/L — LOW (ref 135–146)
SPECIMEN SOURCE: SIGNIFICANT CHANGE UP
WBC # BLD: 4.04 K/UL — LOW (ref 4.8–10.8)
WBC # FLD AUTO: 4.04 K/UL — LOW (ref 4.8–10.8)

## 2024-10-21 PROCEDURE — 99239 HOSP IP/OBS DSCHRG MGMT >30: CPT

## 2024-10-21 RX ORDER — OXYBUTYNIN CHLORIDE 5 MG/1
1 TABLET ORAL
Refills: 0 | DISCHARGE

## 2024-10-21 RX ORDER — APIXABAN 5 MG/1
2.5 TABLET, FILM COATED ORAL EVERY 12 HOURS
Refills: 0 | Status: DISCONTINUED | OUTPATIENT
Start: 2024-10-21 | End: 2024-10-21

## 2024-10-21 RX ORDER — APIXABAN 5 MG/1
1 TABLET, FILM COATED ORAL
Qty: 0 | Refills: 0 | DISCHARGE
Start: 2024-10-21

## 2024-10-21 RX ORDER — TAMSULOSIN HCL 0.4 MG
1 CAPSULE ORAL
Qty: 30 | Refills: 3
Start: 2024-10-21 | End: 2025-02-17

## 2024-10-21 RX ORDER — FLUTICASONE PROPIONATE 50 UG/1
1 SPRAY, METERED NASAL
Qty: 1 | Refills: 2
Start: 2024-10-21 | End: 2025-01-18

## 2024-10-21 RX ORDER — POLYETHYLENE GLYCOL 3350 17 G/17G
17 POWDER, FOR SOLUTION ORAL DAILY
Refills: 0 | Status: DISCONTINUED | OUTPATIENT
Start: 2024-10-21 | End: 2024-10-21

## 2024-10-21 RX ORDER — TAMSULOSIN HCL 0.4 MG
1 CAPSULE ORAL
Qty: 0 | Refills: 0 | DISCHARGE
Start: 2024-10-21

## 2024-10-21 RX ORDER — ALBUTEROL 90 MCG
2 AEROSOL (GRAM) INHALATION
Qty: 1 | Refills: 2
Start: 2024-10-21 | End: 2025-01-18

## 2024-10-21 RX ORDER — SENNA 187 MG
2 TABLET ORAL AT BEDTIME
Refills: 0 | Status: DISCONTINUED | OUTPATIENT
Start: 2024-10-21 | End: 2024-10-21

## 2024-10-21 RX ORDER — METOPROLOL TARTRATE 50 MG
1 TABLET ORAL
Qty: 30 | Refills: 2
Start: 2024-10-21 | End: 2025-01-18

## 2024-10-21 RX ORDER — ALBUTEROL 90 MCG
2 AEROSOL (GRAM) INHALATION
Qty: 0 | Refills: 0 | DISCHARGE
Start: 2024-10-21

## 2024-10-21 RX ORDER — APIXABAN 5 MG/1
1 TABLET, FILM COATED ORAL
Qty: 60 | Refills: 2
Start: 2024-10-21 | End: 2025-01-18

## 2024-10-21 RX ORDER — MECLIZINE HCL 25 MG
1 TABLET ORAL
Qty: 0 | Refills: 0 | DISCHARGE
Start: 2024-10-21

## 2024-10-21 RX ORDER — METOPROLOL SUCCINATE 50 MG/1
2 TABLET, EXTENDED RELEASE ORAL
Qty: 30 | Refills: 2 | DISCHARGE
Start: 2024-10-21 | End: 2025-01-18

## 2024-10-21 RX ORDER — METOPROLOL TARTRATE 50 MG
0.5 TABLET ORAL
Qty: 0 | Refills: 0 | DISCHARGE

## 2024-10-21 RX ORDER — MECLIZINE HCL 25 MG
1 TABLET ORAL
Qty: 30 | Refills: 2
Start: 2024-10-21 | End: 2025-01-18

## 2024-10-21 RX ORDER — FLUTICASONE PROPIONATE 50 UG/1
1 SPRAY, METERED NASAL
Qty: 0 | Refills: 0 | DISCHARGE
Start: 2024-10-21

## 2024-10-21 RX ADMIN — CETIRIZINE HCL 10 MILLIGRAM(S): 10 TABLET ORAL at 11:15

## 2024-10-21 RX ADMIN — Medication 25 MILLIGRAM(S): at 09:05

## 2024-10-21 RX ADMIN — Medication 60 MILLIGRAM(S): at 05:20

## 2024-10-21 RX ADMIN — Medication 12.5 MILLIGRAM(S): at 05:20

## 2024-10-21 RX ADMIN — PANTOPRAZOLE SODIUM 40 MILLIGRAM(S): 40 TABLET, DELAYED RELEASE ORAL at 05:20

## 2024-10-21 RX ADMIN — FLUTICASONE PROPIONATE 1 SPRAY(S): 50 SPRAY, METERED NASAL at 05:51

## 2024-10-21 RX ADMIN — CHLORHEXIDINE GLUCONATE 1 APPLICATION(S): 40 SOLUTION TOPICAL at 05:51

## 2024-10-21 NOTE — DISCHARGE NOTE PROVIDER - PROVIDER TOKENS
PROVIDER:[TOKEN:[[id number]]],PROVIDER:[TOKEN:[[id number]]],PROVIDER:[TOKEN:[[id number]]],PROVIDER:[TOKEN:[[id number]]],PROVIDER:[TOKEN:[[id number]]]

## 2024-10-21 NOTE — PROGRESS NOTE ADULT - PROVIDER SPECIALTY LIST ADULT
Hospitalist
Internal Medicine
Internal Medicine
Nephrology
Nephrology
Internal Medicine
Internal Medicine
Cardiology
Hospitalist
Internal Medicine
Internal Medicine
Infectious Disease

## 2024-10-21 NOTE — DISCHARGE NOTE PROVIDER - CARE PROVIDERS DIRECT ADDRESSES
[email or web address]

## 2024-10-21 NOTE — DISCHARGE NOTE PROVIDER - CARE PROVIDER_API CALL
Eduard Dumont  Family Medicine  [address] [place] [address]  Phone: [phone]  Fax: [phone]  Follow Up Time:     Anirudh Gamble  Interventional Cardiology  [address], [address]  [place] [address]  Phone: [phone]  Fax: [phone]  Follow Up Time:     Cody Sweet  Pulmonary Disease  [address] [place] [address]  Phone: [phone]  Fax: [phone]  Follow Up Time:     Gian Guerrero  Internal Medicine  [address] [address]  Phone: [phone]  Fax: [phone]  Follow Up Time:     Prosper Cobb  Urology  [address] [place] [address]  Phone: [phone]  Fax: [phone]  Follow Up Time:

## 2024-10-21 NOTE — DISCHARGE NOTE PROVIDER - HOSPITAL COURSE
84-year-old female past medical history small cell lung cancer, lymphoma, thyroid and kidney cancer (in remission), COPD not on home O2, HTN, HLD, diabetes, kidney stones, AAA, presents with lightheadedness for 2-3 days. Pt and daughter reported she has a hx of lightheadness and takes Meclizine 25mg PRN, not sure the etiology. However, since yesterday she is feeling more lightheaded, gets worse when sitting or standing or walking. She is therefore having difficulty ambulating. She also had an episode of vomiting today. Took meclizine 25mg PRN yesterday but didnt help her much. She is also having decreased oral intake with decreased urination as per daughter. She denies any room spinning sensation. She has chronic tinnitus and mild hearing loss in both ears.     Denies any focal weakness, HA, trauma.     ED course:   Vitals : /74 mmHg; HR 63 ; RR 18 ; Afebrile ; maintaining saturation on RA.     Labs:   WBC 5.10 Hb 13.0 MCV 85.7 Platelets 116 k   Na 118 K 4.5  BUN/Creatinine 21/0.8   ALT 43  Magnesium 1.5   Troponin 27    Imaging:   CT Angio Head:No large vessel occlusion, aneurysm, or vascular malformation.  CT head : No acute intracranial pathology.  CT Abdomen and Pelvis : No evidence of acute abdominal pathology.Stable infrarenal abdominal aortic aneurysm measuring up to 5.4 cm.  s/p  x1 ; meclizine ; magnesium 2 gm x1.       # acute on chronic lightheadedness, SOB, Newly Diagnosed Paroxysmal A fib - stable  -cxray 10/18: Left lung opacities/left lower lobe consolidation, increased. Stable cardiomegaly.  - CHADVASC 5   - HASBLED 2   - BNP 1399  - Rate control : Metoprolol tartrate 25 mg BID   - Anticoagulation : Discussed with the patient and daughter about bleeding risk ; Agreeable to AC   - was on Lovenox 60 mg BID --> switch to eliquis 2.5 BID on DC  - RAH 10/13: Hyperdynamic global left ventricular systolic function. Left ventricular ejection fraction, by visual estimation, is >75%. Asymmetric basal septal hypertrophy. Severely enlarged left atrium + right atrium. Mild mitral stenosis. Moderate mitral valve regurgitation. Moderate-severe tricuspid regurgitation. mobile echodensity on the aortic valve - rec RAH  Mild pulmonic valve regurgitation. mild pulmonary hypertension.  -differential includes leaflet thicekening, but cannot exclude vegetation  - Follows Dr Gamble OP   - blood cultures all were negative --> RAH deemed not needed    #chronic thromboembolism- stable  -diagnosed on 10/20 with CTA PE in right upper yarelis artery  -discussed with Dr. Sweet - no need for anticoagulation     #Small cell lung cancer- stable  # lymphoma  # thyroid   # kidney cancer (in remission),  - Follows Dr. Soares as outpatinet   - On Tagrisso OD. Continue     # COPD not on home O2- stable  - On symbicort and albuterol   - Follows Dr. Brian Lake   - fluticasone nasal spray and cetrizine added 10/19    # Severe hyponatremia ( Dietary vs SIADH 2/2 Lung cancer) - Na improved- stable   # Decreased PO intake:   - came to the ED for lightheadness and Decreased PO intake   - Na 118 on admission - now at baseline of 135  - 10/14 Urine Na 123  - Orthostatics check 10/13: pos for DPB drop 16  - SOsm - 256 - low solute 2/2 SIADH vs low solute syndrome  - TSH 1.72 + AM cortisol 15.3 wnl     # Lower abdominal pain likely UTI- stable  - Lower abdominal pain with burning sensation ( symptomatic )   - UA weakly positive 10/12 -> 10/13 neg  - UCx 10/12, 10/13 - pos for enterococcus + GP cocci and pairs  - Rocephin 1 gm OD for 3 days. 10/13->10/15  - urology consulted for urinary rentention 10/17-> rec to start flomax 0.4 daily and Op urology for urodynamics    # HTN- stable  # HLD  # Diabetes mellitus type 2:   - antihypertensive were held   - Insulin sliding scale.     # Hx of Kidney stones- stable  # AAA   - Outpatient work up

## 2024-10-21 NOTE — DISCHARGE NOTE PROVIDER - NSDCFUSCHEDAPPT_GEN_ALL_CORE_FT
Unknown, Doctor  [facility] PreAdmits  Scheduled Appointment: 11/11/2024    [facility]  [facility] [address]  Scheduled Appointment: 11/11/2024    Kassandra Pickard  [facility]Admits  Scheduled Appointment: 11/14/2024    Kassandra Pickard  [facility]  [address]  [address]  Scheduled Appointment: 11/14/2024

## 2024-10-21 NOTE — DISCHARGE NOTE PROVIDER - NSDCMRMEDTOKEN_GEN_ALL_CORE_FT
amLODIPine 2.5 mg oral tablet: 1 tab(s) orally once a day (in the evening)  losartan 100 mg oral tablet: 1 tab(s) orally once a day  metoprolol succinate 50 mg oral tablet, extended release: 1 tab(s) orally once a day  omeprazole 40 mg oral delayed release capsule: 1 cap(s) orally once a day  oxyBUTYnin 15 mg/24 hr oral tablet, extended release: 1 tab(s) orally once a day  Symbicort 160 mcg-4.5 mcg/inh inhalation aerosol: 2 puff(s) inhaled 2 times a day  Tagrisso 80 mg oral tablet: 80 milligram(s) orally once a day  Trulicity Pen 1.5 mg/0.5 mL subcutaneous solution: 1.5 milligram(s) subcutaneous once a week  Vitamin D3 25 mcg (1000 intl units) oral tablet: 1 tab(s) orally once a day   albuterol 90 mcg/inh inhalation aerosol: 2 puff(s) inhaled every 6 hours as needed for Bronchospasm  apixaban 2.5 mg oral tablet: 1 tab(s) orally every 12 hours  fluticasone 50 mcg/inh nasal spray: 1 spray(s) nasal 2 times a day  meclizine 25 mg oral tablet: 1 tab(s) orally once a day as needed for  dizziness  metoprolol succinate 25 mg oral tablet, extended release: 1 tab(s) orally once a day  omeprazole 40 mg oral delayed release capsule: 1 cap(s) orally once a day  Symbicort 160 mcg-4.5 mcg/inh inhalation aerosol: 2 puff(s) inhaled 2 times a day  Tagrisso 80 mg oral tablet: 80 milligram(s) orally once a day  tamsulosin 0.4 mg oral capsule: 1 cap(s) orally once a day (at bedtime)  Trulicity Pen 1.5 mg/0.5 mL subcutaneous solution: 1.5 milligram(s) subcutaneous once a week  Vitamin D3 25 mcg (1000 intl units) oral tablet: 1 tab(s) orally once a day

## 2024-10-21 NOTE — PROGRESS NOTE ADULT - SUBJECTIVE AND OBJECTIVE BOX
FERMIN DIAZ  84y Female    CHIEF COMPLAINT:    Patient is a 84y old  Female who presents with a chief complaint of hyponatremia (19 Oct 2024 11:54)      INTERVAL HPI/OVERNIGHT EVENTS:    Patient seen and examined. No sob. No new complaint.     ROS: All other systems are negative.    Vital Signs:    T(F): 98.2 (10-21-24 @ 04:10), Max: 98.3 (10-20-24 @ 20:56)  HR: 60 (10-21-24 @ 04:10) (60 - 77)  BP: 143/74 (10-21-24 @ 04:10) (122/78 - 143/74)  RR: 18 (10-21-24 @ 04:10) (18 - 18)  SpO2: 94% (10-21-24 @ 04:10) (94% - 94%)  I&O's Summary    20 Oct 2024 07:01  -  21 Oct 2024 07:00  --------------------------------------------------------  IN: 886 mL / OUT: 700 mL / NET: 186 mL      Daily     Daily   CAPILLARY BLOOD GLUCOSE      POCT Blood Glucose.: 105 mg/dL (21 Oct 2024 07:35)  POCT Blood Glucose.: 136 mg/dL (20 Oct 2024 21:48)  POCT Blood Glucose.: 133 mg/dL (20 Oct 2024 16:54)  POCT Blood Glucose.: 153 mg/dL (20 Oct 2024 11:34)      PHYSICAL EXAM:    GENERAL:  NAD  SKIN: No rashes or lesions  HENT: Atraumatic. Normocephalic. PERRL. Moist membranes.  NECK: Supple, No JVD. No lymphadenopathy.  PULMONARY: +ve rales in L middle chest post. No wheezing.   CVS: Normal S1, S2. Rate and Rhythm are regular. No murmurs.  ABDOMEN/GI: Soft, Nontender, Nondistended; BS present  EXTREMITIES: Peripheral pulses intact. No edema B/L LE.  NEUROLOGIC:  No motor or sensory deficit.  PSYCH: Alert & oriented x 3    Consultant(s) Notes Reviewed:  [x ] YES  [ ] NO  Care Discussed with Consultants/Other Providers [ x] YES  [ ] NO    EKG reviewed  Telemetry reviewed    LABS:                        12.2   4.04  )-----------( 111      ( 21 Oct 2024 05:42 )             37.8     10-21    133[L]  |  99  |  20  ----------------------------<  93  4.8   |  26  |  0.7    Ca    9.3      21 Oct 2024 05:42  Phos  3.2     10-20  Mg     1.8     10-21    TPro  5.0[L]  /  Alb  3.3[L]  /  TBili  0.4  /  DBili  x   /  AST  16  /  ALT  24  /  AlkPhos  86  10-21    PT/INR - ( 21 Oct 2024 05:42 )   PT: 11.90 sec;   INR: 1.04 ratio         PTT - ( 21 Oct 2024 05:42 )  PTT:32.1 sec          RADIOLOGY & ADDITIONAL TESTS:      Imaging or report Personally Reviewed:  [ ] YES  [ ] NO    Medications:  Standing  apixaban 2.5 milliGRAM(s) Oral every 12 hours  budesonide 160 MICROgram(s)/formoterol 4.5 MICROgram(s) Inhaler 2 Puff(s) Inhalation two times a day  cetirizine 10 milliGRAM(s) Oral daily  chlorhexidine 2% Cloths 1 Application(s) Topical <User Schedule>  dextrose 5%. 1000 milliLiter(s) IV Continuous <Continuous>  dextrose 5%. 1000 milliLiter(s) IV Continuous <Continuous>  dextrose 50% Injectable 25 Gram(s) IV Push once  dextrose 50% Injectable 25 Gram(s) IV Push once  dextrose 50% Injectable 12.5 Gram(s) IV Push once  fluticasone propionate 50 MICROgram(s)/spray Nasal Spray 1 Spray(s) Both Nostrils two times a day  glucagon  Injectable 1 milliGRAM(s) IntraMuscular once  insulin lispro (ADMELOG) corrective regimen sliding scale   SubCutaneous three times a day before meals  metoprolol tartrate 12.5 milliGRAM(s) Oral every 12 hours  osimertinib 80 milliGRAM(s) Oral daily  pantoprazole    Tablet 40 milliGRAM(s) Oral before breakfast  polyethylene glycol 3350 17 Gram(s) Oral daily  senna 2 Tablet(s) Oral at bedtime  tamsulosin 0.4 milliGRAM(s) Oral at bedtime    PRN Meds  albuterol    90 MICROgram(s) HFA Inhaler 2 Puff(s) Inhalation every 6 hours PRN  dextrose Oral Gel 15 Gram(s) Oral once PRN  meclizine 25 milliGRAM(s) Oral daily PRN  melatonin 3 milliGRAM(s) Oral at bedtime PRN  trimethobenzamide Injectable 200 milliGRAM(s) IntraMuscular every 8 hours PRN      Case discussed with resident    Care discussed with pt/family

## 2024-10-21 NOTE — PROGRESS NOTE ADULT - ASSESSMENT
84-year-old female past medical history small cell lung cancer, lymphoma, thyroid and kidney cancer (in remission), COPD not on home O2, HTN, HLD, diabetes, kidney stones, AAA, presents with difficulty in urinating. Pt states that she was nauseous for the last three days and did not take anything. Gives h/o lightheadedness and takes Meclizine for that.     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   H/O Lymphoma  H/O Thyroid and kidney cancer (In remission)  DM-2 / HTN / DL  Persistent A-fib  COPD               PLAN:    ·	Tele reviewed by me. In A-fib again. Bradycardia has been resolved.   ·	On discharge Metoprolol Succinate 25 mg po daily  ·	EKG on admission: Atrial flutter with variable block 69/min. RBBB (Interpreted by me)  ·	Had urinary retention. S/P Reynoso catheter. More than 600 cc urine came out  ·	Care d/w the Urology. Pt is having h/o urinary retention and follow up with Dr. Cobb. Recommended to keep Reynoso in and on d/c to cont Reynoso. Also recommended to d/c Oxybutynin and start her on Flomax 0.4 mg po daily  ·	Hyponatremia has resolved.   ·	ECHO reviewed. EF is >75%. Mod to severe TR. There is a mobile echodensity on the aortic valve. Differential includes leaflet thickening, however, cannot exclude vegetation  ·	Blood cx x 4 is negative. Urine cx is not significant   ·	ID recommended no Abx   ·	Care d/w the cardiology. Recommended to repeat blood cx. If 3rd blood cx is negative and pt is afebrile and has normal WBC count will not need RAH. Can be discharged home.   Care d/w the daughter at length on telephone today [phone], [phone] (Work). Also discussed the discharge planning.     * D/C home. Med reviewed. Plan of care d/w the pt and her daughter. Med rec also d/w the daughter. Will cont to hold her antihypertensives as the BP runs low.

## 2024-10-25 RX ORDER — METOPROLOL SUCCINATE 25 MG/1
25 TABLET, EXTENDED RELEASE ORAL DAILY
Refills: 0 | Status: ACTIVE | COMMUNITY
Start: 2024-10-25

## 2024-10-25 RX ORDER — APIXABAN 2.5 MG/1
2.5 TABLET, FILM COATED ORAL TWICE DAILY
Qty: 60 | Refills: 3 | Status: ACTIVE | COMMUNITY
Start: 2024-10-25

## 2024-10-25 RX ORDER — TAMSULOSIN HYDROCHLORIDE 0.4 MG/1
0.4 CAPSULE ORAL
Qty: 30 | Refills: 0 | Status: ACTIVE | COMMUNITY
Start: 2024-10-25

## 2024-11-04 ENCOUNTER — RX RENEWAL (OUTPATIENT)
Age: 84
End: 2024-11-04

## 2024-11-04 NOTE — PRE-ANESTHESIA EVALUATION ADULT - WEIGHT IN KG
Patient referred [facility]  Phone: [phone]  Fax: [phone]      [facility] [phone] [address], [place] [address]  Fax: [phone]  Sent fax on 08/08/2024  .  
74.4

## 2024-11-11 ENCOUNTER — RESULT REVIEW (OUTPATIENT)
Age: 84
End: 2024-11-11

## 2024-11-11 ENCOUNTER — OUTPATIENT (OUTPATIENT)
Dept: OUTPATIENT SERVICES | Facility: HOSPITAL | Age: 84
LOS: 1 days | End: 2024-11-11
Payer: MEDICARE

## 2024-11-11 DIAGNOSIS — Z00.8 ENCOUNTER FOR OTHER GENERAL EXAMINATION: ICD-10-CM

## 2024-11-11 DIAGNOSIS — Z98.890 OTHER SPECIFIED POSTPROCEDURAL STATES: Chronic | ICD-10-CM

## 2024-11-11 DIAGNOSIS — C34.90 MALIGNANT NEOPLASM OF UNSPECIFIED PART OF UNSPECIFIED BRONCHUS OR LUNG: ICD-10-CM

## 2024-11-11 DIAGNOSIS — E89.0 POSTPROCEDURAL HYPOTHYROIDISM: Chronic | ICD-10-CM

## 2024-11-11 LAB — GLUCOSE BLDC GLUCOMTR-MCNC: 88 MG/DL — SIGNIFICANT CHANGE UP (ref 70–99)

## 2024-11-11 PROCEDURE — 78815 PET IMAGE W/CT SKULL-THIGH: CPT | Mod: PS

## 2024-11-11 PROCEDURE — A9552: CPT

## 2024-11-11 PROCEDURE — 82962 GLUCOSE BLOOD TEST: CPT

## 2024-11-11 PROCEDURE — 78815 PET IMAGE W/CT SKULL-THIGH: CPT | Mod: 26,PS,MH

## 2024-11-12 DIAGNOSIS — C34.90 MALIGNANT NEOPLASM OF UNSPECIFIED PART OF UNSPECIFIED BRONCHUS OR LUNG: ICD-10-CM

## 2024-11-12 RX ORDER — SENNOSIDES 8.6 MG TABLETS 8.6 MG/1
8.6 TABLET ORAL
Qty: 60 | Refills: 1 | Status: ACTIVE | COMMUNITY
Start: 2024-11-12 | End: 1900-01-01

## 2024-11-14 ENCOUNTER — APPOINTMENT (OUTPATIENT)
Age: 84
End: 2024-11-14

## 2024-11-14 ENCOUNTER — LABORATORY RESULT (OUTPATIENT)
Age: 84
End: 2024-11-14

## 2024-11-14 ENCOUNTER — OUTPATIENT (OUTPATIENT)
Dept: OUTPATIENT SERVICES | Facility: HOSPITAL | Age: 84
LOS: 1 days | End: 2024-11-14
Payer: MEDICARE

## 2024-11-14 DIAGNOSIS — Z98.890 OTHER SPECIFIED POSTPROCEDURAL STATES: Chronic | ICD-10-CM

## 2024-11-14 DIAGNOSIS — E89.0 POSTPROCEDURAL HYPOTHYROIDISM: Chronic | ICD-10-CM

## 2024-11-14 DIAGNOSIS — Z51.11 ENCOUNTER FOR ANTINEOPLASTIC CHEMOTHERAPY: ICD-10-CM

## 2024-11-14 DIAGNOSIS — D69.59 OTHER SECONDARY THROMBOCYTOPENIA: ICD-10-CM

## 2024-11-14 DIAGNOSIS — Z85.528 PERSONAL HISTORY OF OTHER MALIGNANT NEOPLASM OF KIDNEY: ICD-10-CM

## 2024-11-14 DIAGNOSIS — D50.9 IRON DEFICIENCY ANEMIA, UNSPECIFIED: ICD-10-CM

## 2024-11-14 DIAGNOSIS — C34.90 MALIGNANT NEOPLASM OF UNSPECIFIED PART OF UNSPECIFIED BRONCHUS OR LUNG: ICD-10-CM

## 2024-11-14 DIAGNOSIS — L70.0 ACNE VULGARIS: ICD-10-CM

## 2024-11-14 DIAGNOSIS — T45.1X5A OTHER SECONDARY THROMBOCYTOPENIA: ICD-10-CM

## 2024-11-14 DIAGNOSIS — C64.9 MALIGNANT NEOPLASM OF UNSPECIFIED KIDNEY, EXCEPT RENAL PELVIS: ICD-10-CM

## 2024-11-14 LAB
HCT VFR BLD CALC: 38.7 %
HGB BLD-MCNC: 12.5 G/DL
MCHC RBC-ENTMCNC: 28.3 PG
MCHC RBC-ENTMCNC: 32.3 G/DL
MCV RBC AUTO: 87.6 FL
PLATELET # BLD AUTO: 106 K/UL
PMV BLD: 11.8 FL
RBC # BLD: 4.42 M/UL
RBC # FLD: 14.2 %
WBC # FLD AUTO: 4.26 K/UL

## 2024-11-14 PROCEDURE — 85027 COMPLETE CBC AUTOMATED: CPT

## 2024-11-14 PROCEDURE — G2211 COMPLEX E/M VISIT ADD ON: CPT

## 2024-11-14 PROCEDURE — 84443 ASSAY THYROID STIM HORMONE: CPT

## 2024-11-14 PROCEDURE — 99214 OFFICE O/P EST MOD 30 MIN: CPT

## 2024-11-14 PROCEDURE — 80053 COMPREHEN METABOLIC PANEL: CPT

## 2024-11-15 ENCOUNTER — APPOINTMENT (OUTPATIENT)
Dept: PULMONOLOGY | Facility: CLINIC | Age: 84
End: 2024-11-15
Payer: MEDICARE

## 2024-11-15 VITALS
RESPIRATION RATE: 15 BRPM | HEIGHT: 61 IN | BODY MASS INDEX: 25.11 KG/M2 | HEART RATE: 86 BPM | DIASTOLIC BLOOD PRESSURE: 80 MMHG | OXYGEN SATURATION: 99 % | SYSTOLIC BLOOD PRESSURE: 130 MMHG | WEIGHT: 133 LBS

## 2024-11-15 DIAGNOSIS — C85.90 NON-HODGKIN LYMPHOMA, UNSPECIFIED, UNSPECIFIED SITE: ICD-10-CM

## 2024-11-15 DIAGNOSIS — G47.33 OBSTRUCTIVE SLEEP APNEA (ADULT) (PEDIATRIC): ICD-10-CM

## 2024-11-15 DIAGNOSIS — J44.9 CHRONIC OBSTRUCTIVE PULMONARY DISEASE, UNSPECIFIED: ICD-10-CM

## 2024-11-15 DIAGNOSIS — C64.9 MALIGNANT NEOPLASM OF UNSPECIFIED KIDNEY, EXCEPT RENAL PELVIS: ICD-10-CM

## 2024-11-15 DIAGNOSIS — R22.2 LOCALIZED SWELLING, MASS AND LUMP, TRUNK: ICD-10-CM

## 2024-11-15 PROCEDURE — 99213 OFFICE O/P EST LOW 20 MIN: CPT

## 2024-11-15 PROCEDURE — G2211 COMPLEX E/M VISIT ADD ON: CPT

## 2024-11-18 LAB
ALBUMIN SERPL ELPH-MCNC: 4 G/DL
ALP BLD-CCNC: 83 U/L
ALT SERPL-CCNC: 8 U/L
ANION GAP SERPL CALC-SCNC: 14 MMOL/L
AST SERPL-CCNC: 14 U/L
BILIRUB SERPL-MCNC: 0.5 MG/DL
BUN SERPL-MCNC: 25 MG/DL
CALCIUM SERPL-MCNC: 9.9 MG/DL
CHLORIDE SERPL-SCNC: 102 MMOL/L
CO2 SERPL-SCNC: 23 MMOL/L
CREAT SERPL-MCNC: 1.1 MG/DL
EGFR: 50 ML/MIN/1.73M2
GLUCOSE SERPL-MCNC: 105 MG/DL
POTASSIUM SERPL-SCNC: 4 MMOL/L
PROT SERPL-MCNC: 6.2 G/DL
SODIUM SERPL-SCNC: 139 MMOL/L
TSH SERPL-ACNC: 1.47 UIU/ML

## 2024-11-21 PROBLEM — D69.59 CHEMOTHERAPY-INDUCED THROMBOCYTOPENIA: Status: ACTIVE | Noted: 2024-11-21

## 2024-11-21 PROBLEM — D50.9 MICROCYTIC ANEMIA: Status: RESOLVED | Noted: 2021-09-15 | Resolved: 2024-11-21

## 2024-11-21 PROBLEM — Z85.528 HISTORY OF MALIGNANT NEOPLASM OF KIDNEY: Status: RESOLVED | Noted: 2017-05-25 | Resolved: 2024-11-21

## 2024-11-21 PROBLEM — L70.0 OPEN COMEDONE: Status: RESOLVED | Noted: 2023-10-30 | Resolved: 2024-11-21

## 2024-12-09 ENCOUNTER — APPOINTMENT (OUTPATIENT)
Age: 84
End: 2024-12-09
Payer: MEDICARE

## 2024-12-09 ENCOUNTER — OUTPATIENT (OUTPATIENT)
Dept: OUTPATIENT SERVICES | Facility: HOSPITAL | Age: 84
LOS: 1 days | End: 2024-12-09
Payer: MEDICARE

## 2024-12-09 ENCOUNTER — LABORATORY RESULT (OUTPATIENT)
Age: 84
End: 2024-12-09

## 2024-12-09 VITALS
RESPIRATION RATE: 16 BRPM | TEMPERATURE: 97.9 F | BODY MASS INDEX: 24.55 KG/M2 | OXYGEN SATURATION: 98 % | SYSTOLIC BLOOD PRESSURE: 136 MMHG | DIASTOLIC BLOOD PRESSURE: 80 MMHG | HEIGHT: 61 IN | WEIGHT: 130 LBS | HEART RATE: 73 BPM

## 2024-12-09 DIAGNOSIS — Z98.890 OTHER SPECIFIED POSTPROCEDURAL STATES: Chronic | ICD-10-CM

## 2024-12-09 DIAGNOSIS — C34.90 MALIGNANT NEOPLASM OF UNSPECIFIED PART OF UNSPECIFIED BRONCHUS OR LUNG: ICD-10-CM

## 2024-12-09 DIAGNOSIS — Z51.81 ENCOUNTER FOR THERAPEUTIC DRUG LVL MONITORING: ICD-10-CM

## 2024-12-09 DIAGNOSIS — E89.0 POSTPROCEDURAL HYPOTHYROIDISM: Chronic | ICD-10-CM

## 2024-12-09 DIAGNOSIS — C64.9 MALIGNANT NEOPLASM OF UNSPECIFIED KIDNEY, EXCEPT RENAL PELVIS: ICD-10-CM

## 2024-12-09 LAB
HCT VFR BLD CALC: 35.4 %
HGB BLD-MCNC: 11.3 G/DL
MCHC RBC-ENTMCNC: 27.8 PG
MCHC RBC-ENTMCNC: 31.9 G/DL
MCV RBC AUTO: 87 FL
PLATELET # BLD AUTO: 104 K/UL
PMV BLD: 10.7 FL
RBC # BLD: 4.07 M/UL
RBC # FLD: 14.1 %
WBC # FLD AUTO: 3.86 K/UL

## 2024-12-09 PROCEDURE — G2211 COMPLEX E/M VISIT ADD ON: CPT

## 2024-12-09 PROCEDURE — 99214 OFFICE O/P EST MOD 30 MIN: CPT

## 2024-12-09 PROCEDURE — 80053 COMPREHEN METABOLIC PANEL: CPT

## 2024-12-09 PROCEDURE — 85027 COMPLETE CBC AUTOMATED: CPT

## 2024-12-10 DIAGNOSIS — C64.9 MALIGNANT NEOPLASM OF UNSPECIFIED KIDNEY, EXCEPT RENAL PELVIS: ICD-10-CM

## 2024-12-10 LAB
ALBUMIN SERPL ELPH-MCNC: 3.9 G/DL
ALP BLD-CCNC: 73 U/L
ALT SERPL-CCNC: 9 U/L
ANION GAP SERPL CALC-SCNC: 12 MMOL/L
AST SERPL-CCNC: 12 U/L
BILIRUB SERPL-MCNC: 0.4 MG/DL
BUN SERPL-MCNC: 34 MG/DL
CALCIUM SERPL-MCNC: 9.4 MG/DL
CHLORIDE SERPL-SCNC: 100 MMOL/L
CO2 SERPL-SCNC: 26 MMOL/L
CREAT SERPL-MCNC: 1.3 MG/DL
EGFR: 41 ML/MIN/1.73M2
GLUCOSE SERPL-MCNC: 101 MG/DL
POTASSIUM SERPL-SCNC: 3.7 MMOL/L
PROT SERPL-MCNC: 5.9 G/DL
SODIUM SERPL-SCNC: 138 MMOL/L

## 2024-12-18 ENCOUNTER — APPOINTMENT (OUTPATIENT)
Dept: NEUROSURGERY | Facility: CLINIC | Age: 84
End: 2024-12-18
Payer: MEDICARE

## 2024-12-18 ENCOUNTER — NON-APPOINTMENT (OUTPATIENT)
Age: 84
End: 2024-12-18

## 2024-12-18 VITALS — HEIGHT: 61 IN | WEIGHT: 130 LBS | BODY MASS INDEX: 24.55 KG/M2

## 2024-12-18 DIAGNOSIS — M47.812 SPONDYLOSIS W/OUT MYELOPATHY OR RADICULOPATHY, CERVICAL REGION: ICD-10-CM

## 2024-12-18 PROCEDURE — 99211 OFF/OP EST MAY X REQ PHY/QHP: CPT

## 2025-01-09 ENCOUNTER — OUTPATIENT (OUTPATIENT)
Dept: OUTPATIENT SERVICES | Facility: HOSPITAL | Age: 85
LOS: 1 days | End: 2025-01-09
Payer: MEDICARE

## 2025-01-09 DIAGNOSIS — Z98.890 OTHER SPECIFIED POSTPROCEDURAL STATES: Chronic | ICD-10-CM

## 2025-01-09 DIAGNOSIS — E89.0 POSTPROCEDURAL HYPOTHYROIDISM: Chronic | ICD-10-CM

## 2025-01-09 DIAGNOSIS — M54.2 CERVICALGIA: ICD-10-CM

## 2025-01-09 DIAGNOSIS — Z00.8 ENCOUNTER FOR OTHER GENERAL EXAMINATION: ICD-10-CM

## 2025-01-09 PROCEDURE — 72141 MRI NECK SPINE W/O DYE: CPT

## 2025-01-09 PROCEDURE — 72141 MRI NECK SPINE W/O DYE: CPT | Mod: 26

## 2025-01-10 DIAGNOSIS — M54.2 CERVICALGIA: ICD-10-CM

## 2025-01-28 ENCOUNTER — OUTPATIENT (OUTPATIENT)
Dept: OUTPATIENT SERVICES | Facility: HOSPITAL | Age: 85
LOS: 1 days | End: 2025-01-28
Payer: MEDICARE

## 2025-01-28 ENCOUNTER — APPOINTMENT (OUTPATIENT)
Age: 85
End: 2025-01-28
Payer: MEDICARE

## 2025-01-28 ENCOUNTER — LABORATORY RESULT (OUTPATIENT)
Age: 85
End: 2025-01-28

## 2025-01-28 DIAGNOSIS — Z98.890 OTHER SPECIFIED POSTPROCEDURAL STATES: Chronic | ICD-10-CM

## 2025-01-28 DIAGNOSIS — D50.9 IRON DEFICIENCY ANEMIA, UNSPECIFIED: ICD-10-CM

## 2025-01-28 DIAGNOSIS — E89.0 POSTPROCEDURAL HYPOTHYROIDISM: Chronic | ICD-10-CM

## 2025-01-28 DIAGNOSIS — C64.9 MALIGNANT NEOPLASM OF UNSPECIFIED KIDNEY, EXCEPT RENAL PELVIS: ICD-10-CM

## 2025-01-28 DIAGNOSIS — T45.1X5A OTHER SECONDARY THROMBOCYTOPENIA: ICD-10-CM

## 2025-01-28 DIAGNOSIS — D69.59 OTHER SECONDARY THROMBOCYTOPENIA: ICD-10-CM

## 2025-01-28 DIAGNOSIS — Z51.11 ENCOUNTER FOR ANTINEOPLASTIC CHEMOTHERAPY: ICD-10-CM

## 2025-01-28 DIAGNOSIS — C34.90 MALIGNANT NEOPLASM OF UNSPECIFIED PART OF UNSPECIFIED BRONCHUS OR LUNG: ICD-10-CM

## 2025-01-28 LAB
ALBUMIN SERPL ELPH-MCNC: 4.4 G/DL
ALP BLD-CCNC: 81 U/L
ALT SERPL-CCNC: 19 U/L
ANION GAP SERPL CALC-SCNC: 10 MMOL/L
AST SERPL-CCNC: 14 U/L
BILIRUB SERPL-MCNC: 0.6 MG/DL
BUN SERPL-MCNC: 46 MG/DL
CALCIUM SERPL-MCNC: 9.8 MG/DL
CHLORIDE SERPL-SCNC: 102 MMOL/L
CO2 SERPL-SCNC: 26 MMOL/L
CREAT SERPL-MCNC: 1.1 MG/DL
EGFR: 50 ML/MIN/1.73M2
GLUCOSE SERPL-MCNC: 140 MG/DL
HCT VFR BLD CALC: 38.5 %
HGB BLD-MCNC: 12.2 G/DL
MCHC RBC-ENTMCNC: 27.1 PG
MCHC RBC-ENTMCNC: 31.7 G/DL
MCV RBC AUTO: 85.6 FL
PLATELET # BLD AUTO: 120 K/UL
PMV BLD: 11.7 FL
POTASSIUM SERPL-SCNC: 4.3 MMOL/L
PROT SERPL-MCNC: 6.1 G/DL
RBC # BLD: 4.5 M/UL
RBC # FLD: 15 %
SODIUM SERPL-SCNC: 138 MMOL/L
WBC # FLD AUTO: 6.67 K/UL

## 2025-01-28 PROCEDURE — 84443 ASSAY THYROID STIM HORMONE: CPT

## 2025-01-28 PROCEDURE — 85027 COMPLETE CBC AUTOMATED: CPT

## 2025-01-28 PROCEDURE — 80053 COMPREHEN METABOLIC PANEL: CPT

## 2025-01-28 PROCEDURE — 99214 OFFICE O/P EST MOD 30 MIN: CPT

## 2025-01-28 PROCEDURE — 36415 COLL VENOUS BLD VENIPUNCTURE: CPT

## 2025-01-29 DIAGNOSIS — C64.9 MALIGNANT NEOPLASM OF UNSPECIFIED KIDNEY, EXCEPT RENAL PELVIS: ICD-10-CM

## 2025-01-29 LAB — TSH SERPL-ACNC: 1.06 UIU/ML

## 2025-02-05 ENCOUNTER — NON-APPOINTMENT (OUTPATIENT)
Age: 85
End: 2025-02-05

## 2025-02-11 ENCOUNTER — NON-APPOINTMENT (OUTPATIENT)
Age: 85
End: 2025-02-11

## 2025-02-12 ENCOUNTER — OUTPATIENT (OUTPATIENT)
Dept: OUTPATIENT SERVICES | Facility: HOSPITAL | Age: 85
LOS: 1 days | End: 2025-02-12
Payer: MEDICARE

## 2025-02-12 ENCOUNTER — RESULT REVIEW (OUTPATIENT)
Age: 85
End: 2025-02-12

## 2025-02-12 DIAGNOSIS — Z98.890 OTHER SPECIFIED POSTPROCEDURAL STATES: Chronic | ICD-10-CM

## 2025-02-12 DIAGNOSIS — E89.0 POSTPROCEDURAL HYPOTHYROIDISM: Chronic | ICD-10-CM

## 2025-02-12 DIAGNOSIS — Z00.8 ENCOUNTER FOR OTHER GENERAL EXAMINATION: ICD-10-CM

## 2025-02-12 DIAGNOSIS — C85.90 NON-HODGKIN LYMPHOMA, UNSPECIFIED, UNSPECIFIED SITE: ICD-10-CM

## 2025-02-12 LAB — GLUCOSE BLDC GLUCOMTR-MCNC: 87 MG/DL — SIGNIFICANT CHANGE UP (ref 70–99)

## 2025-02-12 PROCEDURE — A9552: CPT

## 2025-02-12 PROCEDURE — 78815 PET IMAGE W/CT SKULL-THIGH: CPT | Mod: 26,PS

## 2025-02-12 PROCEDURE — 82962 GLUCOSE BLOOD TEST: CPT

## 2025-02-12 PROCEDURE — 78815 PET IMAGE W/CT SKULL-THIGH: CPT | Mod: PS

## 2025-02-13 DIAGNOSIS — C85.90 NON-HODGKIN LYMPHOMA, UNSPECIFIED, UNSPECIFIED SITE: ICD-10-CM

## 2025-02-20 ENCOUNTER — OUTPATIENT (OUTPATIENT)
Dept: OUTPATIENT SERVICES | Facility: HOSPITAL | Age: 85
LOS: 1 days | End: 2025-02-20
Payer: MEDICARE

## 2025-02-20 ENCOUNTER — APPOINTMENT (OUTPATIENT)
Age: 85
End: 2025-02-20
Payer: MEDICARE

## 2025-02-20 DIAGNOSIS — Z51.11 ENCOUNTER FOR ANTINEOPLASTIC CHEMOTHERAPY: ICD-10-CM

## 2025-02-20 DIAGNOSIS — E89.0 POSTPROCEDURAL HYPOTHYROIDISM: Chronic | ICD-10-CM

## 2025-02-20 DIAGNOSIS — Z98.890 OTHER SPECIFIED POSTPROCEDURAL STATES: Chronic | ICD-10-CM

## 2025-02-20 DIAGNOSIS — D69.59 OTHER SECONDARY THROMBOCYTOPENIA: ICD-10-CM

## 2025-02-20 DIAGNOSIS — T45.1X5A OTHER SECONDARY THROMBOCYTOPENIA: ICD-10-CM

## 2025-02-20 DIAGNOSIS — C34.90 MALIGNANT NEOPLASM OF UNSPECIFIED PART OF UNSPECIFIED BRONCHUS OR LUNG: ICD-10-CM

## 2025-02-20 DIAGNOSIS — D50.9 IRON DEFICIENCY ANEMIA, UNSPECIFIED: ICD-10-CM

## 2025-02-20 DIAGNOSIS — C64.9 MALIGNANT NEOPLASM OF UNSPECIFIED KIDNEY, EXCEPT RENAL PELVIS: ICD-10-CM

## 2025-02-20 DIAGNOSIS — C85.90 NON-HODGKIN LYMPHOMA, UNSPECIFIED, UNSPECIFIED SITE: ICD-10-CM

## 2025-02-20 LAB
ALBUMIN SERPL ELPH-MCNC: 4.2 G/DL
ALP BLD-CCNC: 79 U/L
ALT SERPL-CCNC: 18 U/L
ANION GAP SERPL CALC-SCNC: 9 MMOL/L
AST SERPL-CCNC: 15 U/L
AUTO BASOPHILS #: 0.01 K/UL
AUTO BASOPHILS %: 0.2 %
AUTO EOSINOPHILS #: 0.04 K/UL
AUTO EOSINOPHILS %: 0.8 %
AUTO IMMATURE GRANULOCYTES #: 0.01 K/UL
AUTO LYMPHOCYTES #: 0.9 K/UL
AUTO LYMPHOCYTES %: 17.1 %
AUTO MONOCYTES #: 0.54 K/UL
AUTO MONOCYTES %: 10.2 %
AUTO NEUTROPHILS #: 3.77 K/UL
AUTO NEUTROPHILS %: 71.5 %
AUTO NRBC #: 0 K/UL
BILIRUB SERPL-MCNC: 0.5 MG/DL
BUN SERPL-MCNC: 37 MG/DL
CALCIUM SERPL-MCNC: 9.9 MG/DL
CHLORIDE SERPL-SCNC: 103 MMOL/L
CO2 SERPL-SCNC: 29 MMOL/L
CREAT SERPL-MCNC: 1.1 MG/DL
EGFR: 50 ML/MIN/1.73M2
GLUCOSE SERPL-MCNC: 121 MG/DL
HCT VFR BLD CALC: 38.9 %
HGB BLD-MCNC: 12.3 G/DL
IMM GRANULOCYTES NFR BLD AUTO: 0.2 %
IMMATURE PLATELET FRACTION #: 10.6 K/UL
IMMATURE PLATELET FRACTION %: 10 %
MAN DIFF?: NORMAL
MCHC RBC-ENTMCNC: 27.4 PG
MCHC RBC-ENTMCNC: 31.6 G/DL
MCV RBC AUTO: 86.6 FL
PLATELET # BLD AUTO: 106 K/UL
PMV BLD AUTO: 0 /100 WBCS
PMV BLD: 11.1 FL
POTASSIUM SERPL-SCNC: 4.4 MMOL/L
PROT SERPL-MCNC: 5.9 G/DL
RBC # BLD: 4.49 M/UL
RBC # FLD: 16 %
SODIUM SERPL-SCNC: 141 MMOL/L
WBC # FLD AUTO: 5.27 K/UL

## 2025-02-20 PROCEDURE — 80053 COMPREHEN METABOLIC PANEL: CPT

## 2025-02-20 PROCEDURE — 85025 COMPLETE CBC W/AUTO DIFF WBC: CPT

## 2025-02-20 PROCEDURE — 99214 OFFICE O/P EST MOD 30 MIN: CPT

## 2025-02-21 DIAGNOSIS — C64.9 MALIGNANT NEOPLASM OF UNSPECIFIED KIDNEY, EXCEPT RENAL PELVIS: ICD-10-CM

## 2025-02-24 NOTE — ED ADULT TRIAGE NOTE - HEIGHT IN FEET
Today :We administered ferumoxytol (Feraheme) 510 mg in sodium chloride 0.9% 117 mL IV.     For:   1. Iron deficiency         Your next appointment is due in:  TO BE DECIDED        Please read the  Medication Guide that was given to you and reviewed during todays visit.     (Tell all doctors including dentists that you are taking this medication)     Go to the emergency room or call 911 if:  -You have signs of allergic reaction:   -Rash, hives, itching.   -Swollen, blistered, peeling skin.   -Swelling of face, lips, mouth, tongue or throat.   -Tightness of chest, trouble breathing, swallowing or talking     Call your doctor:  - If IV / injection site gets red, warm, swollen, itchy or leaks fluid or pus.     (Leave dressing on your IV site for at least 2 hours and keep area clean and dry  - If you get sick or have symptoms of infection or are not feeling well for any reason.    (Wash your hands often, stay away from people who are sick)  - If you have side effects from your medication that do not go away or are bothersome.     (Refer to the teaching your nurse gave you for side effects to call your doctor about)    - Common side effects may include:  stuffy nose, headache, feeling tired, muscle aches, upset stomach  - Before receiving any vaccines     - Call the [facility] at   If:  - You get sick, are on antibiotics, have had a recent vaccine, have surgery or dental work and your doctor wants your visit rescheduled.  - You need to cancel and reschedule your visit for any reason. Call at least 2 days before your visit if you need to cancel.   - Your insurance changes before your next visit.    (We will need to get approval from your new insurance. This can take up to two weeks.)     The [facility] is opened Monday thru Friday. We are closed on weekends and holidays.   Voice mail will take your call if the center is closed. If you leave a message please allow 24 hours for a call back  during weekdays. If you leave a message on a weekend/holiday, we will call you back the next business day.    A pharmacist is available Monday - Friday from 8:30AM to 3:30PM to help answer any questions you may have about your prescriptions(s). Please call pharmacy at:    [facility]: [phone]  [facility]: [phone]  [facility]: [phone]               5

## 2025-02-27 ENCOUNTER — RX RENEWAL (OUTPATIENT)
Age: 85
End: 2025-02-27

## 2025-03-17 ENCOUNTER — APPOINTMENT (OUTPATIENT)
Age: 85
End: 2025-03-17
Payer: MEDICARE

## 2025-03-17 ENCOUNTER — OUTPATIENT (OUTPATIENT)
Dept: OUTPATIENT SERVICES | Facility: HOSPITAL | Age: 85
LOS: 1 days | End: 2025-03-17
Payer: MEDICARE

## 2025-03-17 VITALS
SYSTOLIC BLOOD PRESSURE: 139 MMHG | HEART RATE: 84 BPM | HEIGHT: 61 IN | OXYGEN SATURATION: 100 % | DIASTOLIC BLOOD PRESSURE: 82 MMHG | RESPIRATION RATE: 16 BRPM

## 2025-03-17 DIAGNOSIS — Z98.890 OTHER SPECIFIED POSTPROCEDURAL STATES: Chronic | ICD-10-CM

## 2025-03-17 DIAGNOSIS — E89.0 POSTPROCEDURAL HYPOTHYROIDISM: Chronic | ICD-10-CM

## 2025-03-17 DIAGNOSIS — C64.9 MALIGNANT NEOPLASM OF UNSPECIFIED KIDNEY, EXCEPT RENAL PELVIS: ICD-10-CM

## 2025-03-17 DIAGNOSIS — Z51.11 ENCOUNTER FOR ANTINEOPLASTIC CHEMOTHERAPY: ICD-10-CM

## 2025-03-17 LAB
ALBUMIN SERPL ELPH-MCNC: 3.8 G/DL
ALP BLD-CCNC: 67 U/L
ALT SERPL-CCNC: 9 U/L
ANION GAP SERPL CALC-SCNC: 12 MMOL/L
AST SERPL-CCNC: 12 U/L
AUTO BASOPHILS #: 0.03 K/UL
AUTO BASOPHILS %: 0.6 %
AUTO EOSINOPHILS #: 0.1 K/UL
AUTO EOSINOPHILS %: 2 %
AUTO IMMATURE GRANULOCYTES #: 0.02 K/UL
AUTO LYMPHOCYTES #: 1.11 K/UL
AUTO LYMPHOCYTES %: 22.6 %
AUTO MONOCYTES #: 0.6 K/UL
AUTO MONOCYTES %: 12.2 %
AUTO NEUTROPHILS #: 3.05 K/UL
AUTO NEUTROPHILS %: 62.2 %
AUTO NRBC #: 0 K/UL
BILIRUB SERPL-MCNC: 0.3 MG/DL
BUN SERPL-MCNC: 29 MG/DL
CALCIUM SERPL-MCNC: 9.7 MG/DL
CHLORIDE SERPL-SCNC: 103 MMOL/L
CO2 SERPL-SCNC: 25 MMOL/L
CREAT SERPL-MCNC: 1.2 MG/DL
EGFRCR SERPLBLD CKD-EPI 2021: 45 ML/MIN/1.73M2
GLUCOSE SERPL-MCNC: 115 MG/DL
HCT VFR BLD CALC: 37.7 %
HGB BLD-MCNC: 12.2 G/DL
IMM GRANULOCYTES NFR BLD AUTO: 0.4 %
MAN DIFF?: NORMAL
MCHC RBC-ENTMCNC: 27.7 PG
MCHC RBC-ENTMCNC: 32.4 G/DL
MCV RBC AUTO: 85.5 FL
PLATELET # BLD AUTO: 127 K/UL
PMV BLD AUTO: 0 /100 WBCS
PMV BLD: 10.2 FL
POTASSIUM SERPL-SCNC: 4.3 MMOL/L
PROT SERPL-MCNC: 5.9 G/DL
RBC # BLD: 4.41 M/UL
RBC # FLD: 15.3 %
SODIUM SERPL-SCNC: 140 MMOL/L
WBC # FLD AUTO: 4.91 K/UL

## 2025-03-17 PROCEDURE — G2211 COMPLEX E/M VISIT ADD ON: CPT

## 2025-03-17 PROCEDURE — 85025 COMPLETE CBC W/AUTO DIFF WBC: CPT

## 2025-03-17 PROCEDURE — 99214 OFFICE O/P EST MOD 30 MIN: CPT

## 2025-03-17 PROCEDURE — 84443 ASSAY THYROID STIM HORMONE: CPT

## 2025-03-17 PROCEDURE — 80053 COMPREHEN METABOLIC PANEL: CPT

## 2025-03-18 DIAGNOSIS — C64.9 MALIGNANT NEOPLASM OF UNSPECIFIED KIDNEY, EXCEPT RENAL PELVIS: ICD-10-CM

## 2025-03-18 LAB — TSH SERPL-ACNC: 1.52 UIU/ML

## 2025-03-24 ENCOUNTER — APPOINTMENT (OUTPATIENT)
Dept: PULMONOLOGY | Facility: CLINIC | Age: 85
End: 2025-03-24
Payer: MEDICARE

## 2025-03-24 VITALS
HEART RATE: 78 BPM | DIASTOLIC BLOOD PRESSURE: 80 MMHG | RESPIRATION RATE: 14 BRPM | WEIGHT: 120 LBS | BODY MASS INDEX: 22.66 KG/M2 | OXYGEN SATURATION: 97 % | SYSTOLIC BLOOD PRESSURE: 126 MMHG | HEIGHT: 61 IN

## 2025-03-24 DIAGNOSIS — J44.9 CHRONIC OBSTRUCTIVE PULMONARY DISEASE, UNSPECIFIED: ICD-10-CM

## 2025-03-24 DIAGNOSIS — R91.1 SOLITARY PULMONARY NODULE: ICD-10-CM

## 2025-03-24 DIAGNOSIS — I26.99 OTHER PULMONARY EMBOLISM W/OUT ACUTE COR PULMONALE: ICD-10-CM

## 2025-03-24 PROCEDURE — G2211 COMPLEX E/M VISIT ADD ON: CPT

## 2025-03-24 PROCEDURE — 99214 OFFICE O/P EST MOD 30 MIN: CPT

## 2025-04-09 NOTE — ASU PATIENT PROFILE, ADULT - PAIN SCALE PREFERRED, PROFILE
The patient's goals for the shift include      The clinical goals for the shift include Pt will be free from fall or injury throughout shift      Problem: Pain - Adult  Goal: Verbalizes/displays adequate comfort level or baseline comfort level  Outcome: Progressing     Problem: Safety - Adult  Goal: Free from fall injury  Outcome: Progressing     Problem: Discharge Planning  Goal: Discharge to home or other facility with appropriate resources  Outcome: Progressing     Problem: Chronic Conditions and Co-morbidities  Goal: Patient's chronic conditions and co-morbidity symptoms are monitored and maintained or improved  Outcome: Progressing     Problem: Nutrition  Goal: Nutrient intake appropriate for maintaining nutritional needs  Outcome: Progressing      numerical 0-10

## 2025-04-29 ENCOUNTER — APPOINTMENT (OUTPATIENT)
Age: 85
End: 2025-04-29

## 2025-05-12 ENCOUNTER — OUTPATIENT (OUTPATIENT)
Dept: OUTPATIENT SERVICES | Facility: HOSPITAL | Age: 85
LOS: 1 days | End: 2025-05-12
Payer: MEDICARE

## 2025-05-12 DIAGNOSIS — Z98.890 OTHER SPECIFIED POSTPROCEDURAL STATES: Chronic | ICD-10-CM

## 2025-05-12 DIAGNOSIS — N20.0 CALCULUS OF KIDNEY: ICD-10-CM

## 2025-05-12 DIAGNOSIS — E89.0 POSTPROCEDURAL HYPOTHYROIDISM: Chronic | ICD-10-CM

## 2025-05-12 DIAGNOSIS — Z00.8 ENCOUNTER FOR OTHER GENERAL EXAMINATION: ICD-10-CM

## 2025-05-12 PROCEDURE — 76775 US EXAM ABDO BACK WALL LIM: CPT

## 2025-05-12 PROCEDURE — 76775 US EXAM ABDO BACK WALL LIM: CPT | Mod: 26

## 2025-05-13 DIAGNOSIS — N20.0 CALCULUS OF KIDNEY: ICD-10-CM

## 2025-05-15 NOTE — ASSESSMENT
Hospitalist Progress Note    NAME:   Janet Parikh   : 1947   MRN: [id number]     Date/Time: 5/15/2025 11:58 AM  Patient PCP: No primary care provider on file.    Estimated discharge date:  Barriers: , MRI of the brain pending creatinine improvement      Assessment / Plan:  Hypertensive urgency  Hypertensive encephalopathy  Acute metabolic encephalopathy  Clinically improved, alert oriented x 4, blood pressure trending down  Status post Cardene drip, continue with oral BP meds    UTI evaluation  Atypical upper lobe pneumonia concern  Thyroid nodules  UA positive, follow-up urine cultures  Continue with ceftriaxone and Zithromax  -CT head was negative for acute intracranial pathology  - CXR shows mild diffuse interstitial prominence/infiltrates upper/mid lungs  -CT head/neck negative for acute intracranial process at [facility] 2025  - CTA chest no acute finding at [facility] 2025  - CT abd/pelv negative for acute solid organ/bowel injury, negative for free fluid at [facility] 2025  -CT cervical spine negative for fractures at [facility] 2025  - Tropes WNL, lactic acid WNL  Procalcitonin 0.06  -Patient's symptoms likely multifactorial due to hypertensive urgency, hyperglycemia, possible UTI, possible PNA  Continue with insulin Lantus, sliding scale for hyperglycemia  Thyroid: Thyroid is heterogeneous in attenuation with multiple nodules, largest   discrete nodule measuring up to 12 mm with coarse calcification.   Will check thyroid cascade, outpatient thyroid ultrasound  MRI of the brain pending  UA positive for urine culture not sent, will continue ceftriaxone    DM2 with hyperglycemia POA  -Patient is on Lantus and metformin at home 37 units  Continue with sliding scale, Lantus 20 units, diabetic diet, ACHS fingersticks     CKD stage IV  Hyponatremia  Severe hypokalemia resolved  - Near baseline creatinine 1.5-1.6, current creatinine is 1.8  --monitor electrolytes, replace as needed, avoid  [Patient/Caregiver not ready to engage] : Patient/Caregiver not ready to engage [Full Code] : full code [FreeTextEntry1] : # 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 All the mediastinal lymph nodes are positive for adenocarcinoma of the lung I am not sure if this is related to her pleural-based plaques which she had for years and the biopsy was nondiagnostic, at this point pursue another biopsy we discussed was not an option for us, it is possible that these pleural-based nodules were indeed slow-growing adenocarcinoma this will make her stage IV. - We therefore decided to treat this like stage IV and to repeat imaging and if there is for example response in the mediastinal nodes but and pleural-based areas then I think at that point in time we will re-biopsy the pleural-based area - MR brain negative. - 06/2023 Started Tagrisso. Discussed side effects of Tagrisso including but not limited to rash, diarrhea, pneumonitis, keratitis, transaminitis and cytopenias. - 07/12/2023 Tagrisso is on hold due to development of rash - slowly improving on with holding Tagrisso for a few weeks and antibiotics, steroid and antifungal creams. - 07/24/2023 restarted Tagrisso. - 10/02/2023 PET/CT showed IL: Significant decrease in FDG uptake in the mediastinum and left pleura compared to prior exam, now only avid on nonattenuation corrected images. Anatomically, pleural nodularity in the left hemithorax is also decreased. - 1/15/24 PET/CT: ALIYAH  # Right upper back large open comedo measuring approximately 1 x 1 x 1 cm, possibly related to Tagrisso - RESOLVED.  # Uncontrolled HTN  - on losartan 100 mg and if needed will add amlodipine  # left posterior occipital scalp-based FDG avid lesion noted on PET/CT, pt had a laceration there leading to PET/CT finding.  # Pleural Nodules on scans. - these are not new, at least since 2018 But PET/CT now showed increase activity and new mediastinal node due to sarcoid?, progression of lymphoma? RCC? - CT guided FNA biopsy was non diagnostic of the pleural nodule.  # History of Lung Cancer 13 years ago. - Early-stage NSCLC s/p resection alone ~ 13 years ago.  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 - Left para-aortic lymph node. On observation since she is asymptomatic. - Recent diverticulitis imaging done at that time showed no lymphadenopathy as per pt. - No B symptoms. - 10/02/2023, 1/15/24 PET/CT showed ALIYAH.  # Anemia, mild, microcytic - resolved. - S/p 5 doses of Venofer in April. Ferritin improved 112, Iron sat 10%. - recommended to f/u with GI she did but not a candidate for colonoscopy due to previous peroration and will monitor.  # History of Thyroid cancer she states it was a partial thyroidectomy - This explains the presence of positive thyroglobulin 21 tumor marker  # Adnexal cyst. - PET/CT negative.  PLAN: - continue Tagrisso 80 mg PO QD. - Will repeat imaging in 3-4 months (March-April) - continue losartan 100 mg QD. - continue bowel regiment for constipation. - Continue Lotrisone for rash as needed.  - need PT for her arthritic and back pains. - Labs today: CBC, CMP.  RTC in 4 weeks with CBC CMP. [AdvancecareDate] : 12/15/2023 [FreeTextEntry2] : ROBERTA given for review on 12/15/23

## 2025-06-17 ENCOUNTER — INPATIENT (INPATIENT)
Facility: HOSPITAL | Age: 85
LOS: 13 days | Discharge: NURSING, REHAB OR LONG-TERM CARE | DRG: 291 | End: 2025-07-01
Attending: INTERNAL MEDICINE | Admitting: INTERNAL MEDICINE
Payer: MEDICARE

## 2025-06-17 VITALS
TEMPERATURE: 98 F | DIASTOLIC BLOOD PRESSURE: 79 MMHG | OXYGEN SATURATION: 92 % | WEIGHT: 110.01 LBS | SYSTOLIC BLOOD PRESSURE: 163 MMHG | HEART RATE: 115 BPM | RESPIRATION RATE: 25 BRPM

## 2025-06-17 DIAGNOSIS — Z98.890 OTHER SPECIFIED POSTPROCEDURAL STATES: Chronic | ICD-10-CM

## 2025-06-17 DIAGNOSIS — E89.0 POSTPROCEDURAL HYPOTHYROIDISM: Chronic | ICD-10-CM

## 2025-06-17 DIAGNOSIS — J96.01 ACUTE RESPIRATORY FAILURE WITH HYPOXIA: ICD-10-CM

## 2025-06-17 LAB
ACANTHOCYTES BLD QL SMEAR: SIGNIFICANT CHANGE UP
ALBUMIN SERPL ELPH-MCNC: 4 G/DL — SIGNIFICANT CHANGE UP (ref 3.5–5.2)
ALP SERPL-CCNC: 108 U/L — SIGNIFICANT CHANGE UP (ref 30–115)
ALT FLD-CCNC: 13 U/L — SIGNIFICANT CHANGE UP (ref 0–41)
ANION GAP SERPL CALC-SCNC: 13 MMOL/L — SIGNIFICANT CHANGE UP (ref 7–14)
ANISOCYTOSIS BLD QL: SLIGHT — SIGNIFICANT CHANGE UP
APPEARANCE UR: CLEAR — SIGNIFICANT CHANGE UP
AST SERPL-CCNC: 19 U/L — SIGNIFICANT CHANGE UP (ref 0–41)
BACTERIA # UR AUTO: NEGATIVE /HPF — SIGNIFICANT CHANGE UP
BASE EXCESS BLDV CALC-SCNC: 1.2 MMOL/L — SIGNIFICANT CHANGE UP (ref -2–3)
BASE EXCESS BLDV CALC-SCNC: 1.6 MMOL/L — SIGNIFICANT CHANGE UP (ref -2–3)
BASOPHILS # BLD AUTO: 0.06 K/UL — SIGNIFICANT CHANGE UP (ref 0–0.2)
BASOPHILS NFR BLD AUTO: 0.9 % — SIGNIFICANT CHANGE UP (ref 0–1)
BILIRUB SERPL-MCNC: 0.4 MG/DL — SIGNIFICANT CHANGE UP (ref 0.2–1.2)
BILIRUB UR-MCNC: NEGATIVE — SIGNIFICANT CHANGE UP
BLD GP AB SCN SERPL QL: SIGNIFICANT CHANGE UP
BUN SERPL-MCNC: 52 MG/DL — HIGH (ref 10–20)
CA-I SERPL-SCNC: 1.29 MMOL/L — SIGNIFICANT CHANGE UP (ref 1.15–1.33)
CA-I SERPL-SCNC: 1.33 MMOL/L — SIGNIFICANT CHANGE UP (ref 1.15–1.33)
CALCIUM SERPL-MCNC: 9.5 MG/DL — SIGNIFICANT CHANGE UP (ref 8.4–10.5)
CAST: 0 /LPF — SIGNIFICANT CHANGE UP (ref 0–4)
CHLORIDE SERPL-SCNC: 101 MMOL/L — SIGNIFICANT CHANGE UP (ref 98–110)
CO2 SERPL-SCNC: 25 MMOL/L — SIGNIFICANT CHANGE UP (ref 17–32)
COLOR SPEC: YELLOW — SIGNIFICANT CHANGE UP
CREAT SERPL-MCNC: 1.5 MG/DL — SIGNIFICANT CHANGE UP (ref 0.7–1.5)
D DIMER BLD IA.RAPID-MCNC: 1245 NG/ML DDU — HIGH
DIFF PNL FLD: ABNORMAL
EGFR: 34 ML/MIN/1.73M2 — LOW
EGFR: 34 ML/MIN/1.73M2 — LOW
EOSINOPHIL # BLD AUTO: 0.06 K/UL — SIGNIFICANT CHANGE UP (ref 0–0.7)
EOSINOPHIL NFR BLD AUTO: 0.9 % — SIGNIFICANT CHANGE UP (ref 0–8)
GAS PNL BLDA: SIGNIFICANT CHANGE UP
GAS PNL BLDV: 134 MMOL/L — LOW (ref 136–145)
GAS PNL BLDV: 135 MMOL/L — LOW (ref 136–145)
GAS PNL BLDV: SIGNIFICANT CHANGE UP
GIANT PLATELETS BLD QL SMEAR: PRESENT — SIGNIFICANT CHANGE UP
GLUCOSE BLDC GLUCOMTR-MCNC: 160 MG/DL — HIGH (ref 70–99)
GLUCOSE BLDC GLUCOMTR-MCNC: 210 MG/DL — HIGH (ref 70–99)
GLUCOSE BLDC GLUCOMTR-MCNC: 237 MG/DL — HIGH (ref 70–99)
GLUCOSE SERPL-MCNC: 160 MG/DL — HIGH (ref 70–99)
GLUCOSE UR QL: NEGATIVE MG/DL — SIGNIFICANT CHANGE UP
HCO3 BLDV-SCNC: 29 MMOL/L — SIGNIFICANT CHANGE UP (ref 22–29)
HCO3 BLDV-SCNC: 29 MMOL/L — SIGNIFICANT CHANGE UP (ref 22–29)
HCT VFR BLD CALC: 28.2 % — LOW (ref 37–47)
HCT VFR BLDA CALC: 27 % — LOW (ref 34.5–46.5)
HCT VFR BLDA CALC: 28 % — LOW (ref 34.5–46.5)
HGB BLD CALC-MCNC: 8.9 G/DL — LOW (ref 11.7–16.1)
HGB BLD CALC-MCNC: 9.4 G/DL — LOW (ref 11.7–16.1)
HGB BLD-MCNC: 8.8 G/DL — LOW (ref 12–16)
KETONES UR QL: NEGATIVE MG/DL — SIGNIFICANT CHANGE UP
LACTATE BLDV-MCNC: 1.4 MMOL/L — SIGNIFICANT CHANGE UP (ref 0.5–2)
LACTATE BLDV-MCNC: 1.7 MMOL/L — SIGNIFICANT CHANGE UP (ref 0.5–2)
LEUKOCYTE ESTERASE UR-ACNC: NEGATIVE — SIGNIFICANT CHANGE UP
LYMPHOCYTES # BLD AUTO: 0.56 K/UL — LOW (ref 1.2–3.4)
LYMPHOCYTES # BLD AUTO: 8.8 % — LOW (ref 20.5–51.1)
MACROCYTES BLD QL: SLIGHT — SIGNIFICANT CHANGE UP
MANUAL SMEAR VERIFICATION: SIGNIFICANT CHANGE UP
MCHC RBC-ENTMCNC: 27.3 PG — SIGNIFICANT CHANGE UP (ref 27–31)
MCHC RBC-ENTMCNC: 31.2 G/DL — LOW (ref 32–37)
MCV RBC AUTO: 87.6 FL — SIGNIFICANT CHANGE UP (ref 81–99)
MONOCYTES # BLD AUTO: 0.28 K/UL — SIGNIFICANT CHANGE UP (ref 0.1–0.6)
MONOCYTES NFR BLD AUTO: 4.4 % — SIGNIFICANT CHANGE UP (ref 1.7–9.3)
NEUTROPHILS # BLD AUTO: 5.34 K/UL — SIGNIFICANT CHANGE UP (ref 1.4–6.5)
NEUTROPHILS NFR BLD AUTO: 83.2 % — HIGH (ref 42.2–75.2)
NITRITE UR-MCNC: NEGATIVE — SIGNIFICANT CHANGE UP
NT-PROBNP SERPL-SCNC: 6050 PG/ML — HIGH (ref 0–300)
PCO2 BLDV: 60 MMHG — HIGH (ref 39–42)
PCO2 BLDV: 63 MMHG — HIGH (ref 39–42)
PH BLDV: 7.27 — LOW (ref 7.32–7.43)
PH BLDV: 7.29 — LOW (ref 7.32–7.43)
PH UR: 6 — SIGNIFICANT CHANGE UP (ref 5–8)
PLAT MORPH BLD: ABNORMAL
PLATELET # BLD AUTO: 99 K/UL — LOW (ref 130–400)
PMV BLD: 12.8 FL — HIGH (ref 7.4–10.4)
PO2 BLDV: 24 MMHG — LOW (ref 25–45)
PO2 BLDV: 41 MMHG — SIGNIFICANT CHANGE UP (ref 25–45)
POIKILOCYTOSIS BLD QL AUTO: SLIGHT — SIGNIFICANT CHANGE UP
POLYCHROMASIA BLD QL SMEAR: SLIGHT — SIGNIFICANT CHANGE UP
POTASSIUM BLDV-SCNC: 4.5 MMOL/L — SIGNIFICANT CHANGE UP (ref 3.5–5.1)
POTASSIUM BLDV-SCNC: 4.8 MMOL/L — SIGNIFICANT CHANGE UP (ref 3.5–5.1)
POTASSIUM SERPL-MCNC: 5 MMOL/L — SIGNIFICANT CHANGE UP (ref 3.5–5)
POTASSIUM SERPL-SCNC: 5 MMOL/L — SIGNIFICANT CHANGE UP (ref 3.5–5)
PROT SERPL-MCNC: 5.8 G/DL — LOW (ref 6–8)
PROT UR-MCNC: NEGATIVE MG/DL — SIGNIFICANT CHANGE UP
RBC # BLD: 3.22 M/UL — LOW (ref 4.2–5.4)
RBC # FLD: 15.7 % — HIGH (ref 11.5–14.5)
RBC BLD AUTO: ABNORMAL
RBC CASTS # UR COMP ASSIST: 139 /HPF — HIGH (ref 0–4)
SAO2 % BLDV: 31.3 % — LOW (ref 67–88)
SAO2 % BLDV: 68.8 % — SIGNIFICANT CHANGE UP (ref 67–88)
SMUDGE CELLS # BLD: PRESENT — SIGNIFICANT CHANGE UP
SODIUM SERPL-SCNC: 139 MMOL/L — SIGNIFICANT CHANGE UP (ref 135–146)
SP GR SPEC: 1.01 — SIGNIFICANT CHANGE UP (ref 1–1.03)
SQUAMOUS # UR AUTO: 0 /HPF — SIGNIFICANT CHANGE UP (ref 0–5)
TROPONIN T, HIGH SENSITIVITY RESULT: 49 NG/L — HIGH (ref 6–13)
TROPONIN T, HIGH SENSITIVITY RESULT: 52 NG/L — CRITICAL HIGH (ref 6–13)
UROBILINOGEN FLD QL: 0.2 MG/DL — SIGNIFICANT CHANGE UP (ref 0.2–1)
VARIANT LYMPHS # BLD: 1.8 % — SIGNIFICANT CHANGE UP (ref 0–5)
VARIANT LYMPHS NFR BLD MANUAL: 1.8 % — SIGNIFICANT CHANGE UP (ref 0–5)
WBC # BLD: 6.42 K/UL — SIGNIFICANT CHANGE UP (ref 4.8–10.8)
WBC # FLD AUTO: 6.42 K/UL — SIGNIFICANT CHANGE UP (ref 4.8–10.8)
WBC UR QL: 0 /HPF — SIGNIFICANT CHANGE UP (ref 0–5)

## 2025-06-17 PROCEDURE — 83036 HEMOGLOBIN GLYCOSYLATED A1C: CPT

## 2025-06-17 PROCEDURE — 76770 US EXAM ABDO BACK WALL COMP: CPT | Mod: 26

## 2025-06-17 PROCEDURE — 94640 AIRWAY INHALATION TREATMENT: CPT

## 2025-06-17 PROCEDURE — 85018 HEMOGLOBIN: CPT

## 2025-06-17 PROCEDURE — 80053 COMPREHEN METABOLIC PANEL: CPT

## 2025-06-17 PROCEDURE — 71250 CT THORAX DX C-: CPT

## 2025-06-17 PROCEDURE — 99291 CRITICAL CARE FIRST HOUR: CPT | Mod: FS

## 2025-06-17 PROCEDURE — 87641 MR-STAPH DNA AMP PROBE: CPT

## 2025-06-17 PROCEDURE — 85379 FIBRIN DEGRADATION QUANT: CPT

## 2025-06-17 PROCEDURE — 76770 US EXAM ABDO BACK WALL COMP: CPT

## 2025-06-17 PROCEDURE — 86850 RBC ANTIBODY SCREEN: CPT

## 2025-06-17 PROCEDURE — 36415 COLL VENOUS BLD VENIPUNCTURE: CPT

## 2025-06-17 PROCEDURE — 97530 THERAPEUTIC ACTIVITIES: CPT | Mod: GP

## 2025-06-17 PROCEDURE — 81001 URINALYSIS AUTO W/SCOPE: CPT

## 2025-06-17 PROCEDURE — 97162 PT EVAL MOD COMPLEX 30 MIN: CPT | Mod: GP

## 2025-06-17 PROCEDURE — 97110 THERAPEUTIC EXERCISES: CPT | Mod: GP

## 2025-06-17 PROCEDURE — 84132 ASSAY OF SERUM POTASSIUM: CPT

## 2025-06-17 PROCEDURE — 93970 EXTREMITY STUDY: CPT

## 2025-06-17 PROCEDURE — 82330 ASSAY OF CALCIUM: CPT

## 2025-06-17 PROCEDURE — 82962 GLUCOSE BLOOD TEST: CPT

## 2025-06-17 PROCEDURE — 93306 TTE W/DOPPLER COMPLETE: CPT

## 2025-06-17 PROCEDURE — 80048 BASIC METABOLIC PNL TOTAL CA: CPT

## 2025-06-17 PROCEDURE — 85027 COMPLETE CBC AUTOMATED: CPT

## 2025-06-17 PROCEDURE — 71045 X-RAY EXAM CHEST 1 VIEW: CPT

## 2025-06-17 PROCEDURE — 71045 X-RAY EXAM CHEST 1 VIEW: CPT | Mod: 26

## 2025-06-17 PROCEDURE — 87640 STAPH A DNA AMP PROBE: CPT

## 2025-06-17 PROCEDURE — 83605 ASSAY OF LACTIC ACID: CPT

## 2025-06-17 PROCEDURE — 85025 COMPLETE CBC W/AUTO DIFF WBC: CPT

## 2025-06-17 PROCEDURE — 82803 BLOOD GASES ANY COMBINATION: CPT

## 2025-06-17 PROCEDURE — 84295 ASSAY OF SERUM SODIUM: CPT

## 2025-06-17 PROCEDURE — 86901 BLOOD TYPING SEROLOGIC RH(D): CPT

## 2025-06-17 PROCEDURE — 94660 CPAP INITIATION&MGMT: CPT

## 2025-06-17 PROCEDURE — 93010 ELECTROCARDIOGRAM REPORT: CPT

## 2025-06-17 PROCEDURE — 80061 LIPID PANEL: CPT

## 2025-06-17 PROCEDURE — 99291 CRITICAL CARE FIRST HOUR: CPT

## 2025-06-17 PROCEDURE — 83880 ASSAY OF NATRIURETIC PEPTIDE: CPT

## 2025-06-17 PROCEDURE — 84100 ASSAY OF PHOSPHORUS: CPT

## 2025-06-17 PROCEDURE — 85014 HEMATOCRIT: CPT

## 2025-06-17 PROCEDURE — 86900 BLOOD TYPING SEROLOGIC ABO: CPT

## 2025-06-17 PROCEDURE — 97163 PT EVAL HIGH COMPLEX 45 MIN: CPT | Mod: GP

## 2025-06-17 PROCEDURE — 84145 PROCALCITONIN (PCT): CPT

## 2025-06-17 PROCEDURE — 83735 ASSAY OF MAGNESIUM: CPT

## 2025-06-17 RX ORDER — SODIUM CHLORIDE 9 G/1000ML
1000 INJECTION, SOLUTION INTRAVENOUS
Refills: 0 | Status: DISCONTINUED | OUTPATIENT
Start: 2025-06-17 | End: 2025-07-01

## 2025-06-17 RX ORDER — SENNA 187 MG
1 TABLET ORAL
Refills: 0 | DISCHARGE

## 2025-06-17 RX ORDER — AZITHROMYCIN 250 MG
500 CAPSULE ORAL ONCE
Refills: 0 | Status: COMPLETED | OUTPATIENT
Start: 2025-06-17 | End: 2025-06-17

## 2025-06-17 RX ORDER — ASPIRIN 325 MG
81 TABLET ORAL DAILY
Refills: 0 | Status: DISCONTINUED | OUTPATIENT
Start: 2025-06-17 | End: 2025-07-01

## 2025-06-17 RX ORDER — MAGNESIUM SULFATE 500 MG/ML
2 SYRINGE (ML) INJECTION ONCE
Refills: 0 | Status: COMPLETED | OUTPATIENT
Start: 2025-06-17 | End: 2025-06-17

## 2025-06-17 RX ORDER — OXYBUTYNIN CHLORIDE 5 MG/1
5 TABLET, FILM COATED, EXTENDED RELEASE ORAL AT BEDTIME
Refills: 0 | Status: DISCONTINUED | OUTPATIENT
Start: 2025-06-17 | End: 2025-06-18

## 2025-06-17 RX ORDER — AMLODIPINE BESYLATE 10 MG/1
5 TABLET ORAL DAILY
Refills: 0 | Status: DISCONTINUED | OUTPATIENT
Start: 2025-06-17 | End: 2025-06-18

## 2025-06-17 RX ORDER — DEXTROSE 50 % IN WATER 50 %
25 SYRINGE (ML) INTRAVENOUS ONCE
Refills: 0 | Status: DISCONTINUED | OUTPATIENT
Start: 2025-06-17 | End: 2025-07-01

## 2025-06-17 RX ORDER — ALBUTEROL SULFATE 2.5 MG/3ML
2 VIAL, NEBULIZER (ML) INHALATION EVERY 6 HOURS
Refills: 0 | Status: DISCONTINUED | OUTPATIENT
Start: 2025-06-17 | End: 2025-07-01

## 2025-06-17 RX ORDER — GLUCAGON 3 MG/1
1 POWDER NASAL ONCE
Refills: 0 | Status: DISCONTINUED | OUTPATIENT
Start: 2025-06-17 | End: 2025-07-01

## 2025-06-17 RX ORDER — FUROSEMIDE 10 MG/ML
40 INJECTION INTRAMUSCULAR; INTRAVENOUS EVERY 12 HOURS
Refills: 0 | Status: DISCONTINUED | OUTPATIENT
Start: 2025-06-17 | End: 2025-06-19

## 2025-06-17 RX ORDER — PIPERACILLIN-TAZO-DEXTROSE,ISO 3.375G/5
3.38 IV SOLUTION, PIGGYBACK PREMIX FROZEN(ML) INTRAVENOUS EVERY 8 HOURS
Refills: 0 | Status: DISCONTINUED | OUTPATIENT
Start: 2025-06-17 | End: 2025-06-20

## 2025-06-17 RX ORDER — DEXAMETHASONE 0.5 MG/1
10 TABLET ORAL ONCE
Refills: 0 | Status: DISCONTINUED | OUTPATIENT
Start: 2025-06-17 | End: 2025-06-17

## 2025-06-17 RX ORDER — CEFTRIAXONE 500 MG/1
2000 INJECTION, POWDER, FOR SOLUTION INTRAMUSCULAR; INTRAVENOUS ONCE
Refills: 0 | Status: COMPLETED | OUTPATIENT
Start: 2025-06-17 | End: 2025-06-17

## 2025-06-17 RX ORDER — UMECLIDINIUM 62.5 UG/1
1 AEROSOL, POWDER ORAL
Refills: 0 | DISCHARGE

## 2025-06-17 RX ORDER — DOXYCYCLINE HYCLATE 100 MG
100 TABLET ORAL ONCE
Refills: 0 | Status: COMPLETED | OUTPATIENT
Start: 2025-06-17 | End: 2025-06-17

## 2025-06-17 RX ORDER — ASPIRIN 325 MG
1 TABLET ORAL
Refills: 0 | DISCHARGE

## 2025-06-17 RX ORDER — DEXTROSE 50 % IN WATER 50 %
15 SYRINGE (ML) INTRAVENOUS ONCE
Refills: 0 | Status: DISCONTINUED | OUTPATIENT
Start: 2025-06-17 | End: 2025-07-01

## 2025-06-17 RX ORDER — FUROSEMIDE 10 MG/ML
40 INJECTION INTRAMUSCULAR; INTRAVENOUS ONCE
Refills: 0 | Status: COMPLETED | OUTPATIENT
Start: 2025-06-17 | End: 2025-06-17

## 2025-06-17 RX ORDER — POLYETHYLENE GLYCOL 3350 17 G/17G
17 POWDER, FOR SOLUTION ORAL AT BEDTIME
Refills: 0 | Status: DISCONTINUED | OUTPATIENT
Start: 2025-06-17 | End: 2025-06-20

## 2025-06-17 RX ORDER — AZITHROMYCIN 250 MG
CAPSULE ORAL
Refills: 0 | Status: DISCONTINUED | OUTPATIENT
Start: 2025-06-17 | End: 2025-06-18

## 2025-06-17 RX ORDER — DOCUSATE SODIUM 100 MG
3 CAPSULE ORAL
Refills: 0 | DISCHARGE

## 2025-06-17 RX ORDER — POLYETHYLENE GLYCOL 3350 17 G/17G
17 POWDER, FOR SOLUTION ORAL
Refills: 0 | DISCHARGE

## 2025-06-17 RX ORDER — AZITHROMYCIN 250 MG
500 CAPSULE ORAL EVERY 24 HOURS
Refills: 0 | Status: DISCONTINUED | OUTPATIENT
Start: 2025-06-18 | End: 2025-06-18

## 2025-06-17 RX ORDER — IPRATROPIUM BROMIDE AND ALBUTEROL SULFATE .5; 2.5 MG/3ML; MG/3ML
3 SOLUTION RESPIRATORY (INHALATION)
Refills: 0 | Status: COMPLETED | OUTPATIENT
Start: 2025-06-17 | End: 2025-06-17

## 2025-06-17 RX ORDER — INSULIN LISPRO 100 U/ML
INJECTION, SOLUTION INTRAVENOUS; SUBCUTANEOUS
Refills: 0 | Status: DISCONTINUED | OUTPATIENT
Start: 2025-06-17 | End: 2025-06-18

## 2025-06-17 RX ORDER — AMLODIPINE BESYLATE 10 MG/1
1 TABLET ORAL
Refills: 0 | DISCHARGE

## 2025-06-17 RX ORDER — FLUTICASONE PROPIONATE 50 UG/1
1 SPRAY, METERED NASAL
Refills: 0 | Status: DISCONTINUED | OUTPATIENT
Start: 2025-06-17 | End: 2025-07-01

## 2025-06-17 RX ORDER — HALOPERIDOL 10 MG/1
5 TABLET ORAL ONCE
Refills: 0 | Status: COMPLETED | OUTPATIENT
Start: 2025-06-17 | End: 2025-06-17

## 2025-06-17 RX ORDER — OXYBUTYNIN CHLORIDE 5 MG/1
1 TABLET, FILM COATED, EXTENDED RELEASE ORAL
Refills: 0 | DISCHARGE

## 2025-06-17 RX ORDER — DEXTROSE 50 % IN WATER 50 %
12.5 SYRINGE (ML) INTRAVENOUS ONCE
Refills: 0 | Status: DISCONTINUED | OUTPATIENT
Start: 2025-06-17 | End: 2025-07-01

## 2025-06-17 RX ORDER — ONDANSETRON HCL/PF 4 MG/2 ML
4 VIAL (ML) INJECTION ONCE
Refills: 0 | Status: COMPLETED | OUTPATIENT
Start: 2025-06-17 | End: 2025-06-17

## 2025-06-17 RX ORDER — SENNA 187 MG
2 TABLET ORAL AT BEDTIME
Refills: 0 | Status: DISCONTINUED | OUTPATIENT
Start: 2025-06-17 | End: 2025-07-01

## 2025-06-17 RX ORDER — APIXABAN 2.5 MG/1
2.5 TABLET, FILM COATED ORAL EVERY 12 HOURS
Refills: 0 | Status: DISCONTINUED | OUTPATIENT
Start: 2025-06-17 | End: 2025-07-01

## 2025-06-17 RX ORDER — METHYLPREDNISOLONE ACETATE 80 MG/ML
125 INJECTION, SUSPENSION INTRA-ARTICULAR; INTRALESIONAL; INTRAMUSCULAR; SOFT TISSUE ONCE
Refills: 0 | Status: COMPLETED | OUTPATIENT
Start: 2025-06-17 | End: 2025-06-17

## 2025-06-17 RX ORDER — METOPROLOL SUCCINATE 50 MG/1
50 TABLET, EXTENDED RELEASE ORAL DAILY
Refills: 0 | Status: DISCONTINUED | OUTPATIENT
Start: 2025-06-17 | End: 2025-07-01

## 2025-06-17 RX ADMIN — HALOPERIDOL 5 MILLIGRAM(S): 10 TABLET ORAL at 17:25

## 2025-06-17 RX ADMIN — Medication 150 GRAM(S): at 08:00

## 2025-06-17 RX ADMIN — IPRATROPIUM BROMIDE AND ALBUTEROL SULFATE 3 MILLILITER(S): .5; 2.5 SOLUTION RESPIRATORY (INHALATION) at 08:10

## 2025-06-17 RX ADMIN — Medication 25 GRAM(S): at 21:18

## 2025-06-17 RX ADMIN — Medication 4 MILLIGRAM(S): at 08:00

## 2025-06-17 RX ADMIN — FUROSEMIDE 40 MILLIGRAM(S): 10 INJECTION INTRAMUSCULAR; INTRAVENOUS at 20:28

## 2025-06-17 RX ADMIN — Medication 250 MILLIGRAM(S): at 17:30

## 2025-06-17 RX ADMIN — CEFTRIAXONE 100 MILLIGRAM(S): 500 INJECTION, POWDER, FOR SOLUTION INTRAMUSCULAR; INTRAVENOUS at 09:02

## 2025-06-17 RX ADMIN — METHYLPREDNISOLONE ACETATE 125 MILLIGRAM(S): 80 INJECTION, SUSPENSION INTRA-ARTICULAR; INTRALESIONAL; INTRAMUSCULAR; SOFT TISSUE at 08:00

## 2025-06-17 RX ADMIN — INSULIN LISPRO 4: 100 INJECTION, SOLUTION INTRAVENOUS; SUBCUTANEOUS at 18:35

## 2025-06-17 RX ADMIN — Medication 100 MILLIGRAM(S): at 09:03

## 2025-06-17 RX ADMIN — IPRATROPIUM BROMIDE AND ALBUTEROL SULFATE 3 MILLILITER(S): .5; 2.5 SOLUTION RESPIRATORY (INHALATION) at 07:30

## 2025-06-17 RX ADMIN — IPRATROPIUM BROMIDE AND ALBUTEROL SULFATE 3 MILLILITER(S): .5; 2.5 SOLUTION RESPIRATORY (INHALATION) at 07:50

## 2025-06-17 RX ADMIN — FUROSEMIDE 40 MILLIGRAM(S): 10 INJECTION INTRAMUSCULAR; INTRAVENOUS at 08:39

## 2025-06-17 NOTE — ED PROVIDER NOTE - ATTENDING CONTRIBUTION TO CARE
I personally saw the patient. KEL Aquino and I provided critical care for a total of 45 minutes. I provided a substantive portion of the care and the majority of the critical care time.    84-year-old female past medical history of small cell lung cancer in remission A-fib on Eliquis COPD not on home O2 AAA repair April, diabetes hypertension hyperlipidemia recently treated for pneumonia and UTI, currently on torsemide every other day, presents with shortness of breath since yesterday.  Daughter states that patient has had a cough for 1 to 2 weeks and completed Augmentin.  No chest pain.  Has chronic leg swelling no change.  No fever.  Daughter states last hemoglobin was 8.2 and creatinine was 1.3 and BNP was 4199 a week ago.  Follows with Dr. Gonsales, allison wtat elsayegh. Full code.  Coming from [facility].    On exam, AFVSS, Well appearing, No acute distress, NCAT, EOMI, PERRLA, MMM, Neck supple, bilateral Rales and wheezing, increased work of breathing and tachypnea and accessory muscle use, hypoxic to 87% on nonrebreather, RRR nl s1s2 No mrg, Abdomen Soft NTND, AAOx3, No Focal Deficits, bilateral 2+ pitting LE edema, no erythema or warmth, soft compartments, no calf TTP,    A/P; acute hypoxic respiratory failure, likely multifactorial, CHF exacerbation, ACS, pneumonia, COPD exacerbation, B-lines on POCUS, respiratory called for BiPAP, will also add on nebs steroids mag antibiotics, labs EKG troponin BNP VBG, needs admission

## 2025-06-17 NOTE — PATIENT PROFILE ADULT - FALL HARM RISK - RISK INTERVENTIONS

## 2025-06-17 NOTE — H&P ADULT - NSHPLABSRESULTS_GEN_ALL_CORE
Labs:                        8.8    6.42  )-----------( 99       ( 17 Jun 2025 08:40 )             28.2     06-17    139  |  101  |  52[H]  ----------------------------<  160[H]  5.0   |  25  |  1.5    Ca    9.5      17 Jun 2025 08:40    TPro  5.8[L]  /  Alb  4.0  /  TBili  0.4  /  DBili  x   /  AST  19  /  ALT  13  /  AlkPhos  108  06-17      Urinalysis with Rflx Culture (collected 06-17-25 @ 07:39)    Culture - Urine (collected 10-13-24 @ 15:44)  Source: Clean Catch Clean Catch (Midstream)  Final Report (10-16-24 @ 19:48):    10,000 - 49,000 CFU/mL Enterococcus faecalis    <10,000 CFU/ml Normal Urogenital jolynn present  Organism: Enterococcus faecalis (10-16-24 @ 19:48)  Organism: Enterococcus faecalis (10-16-24 @ 19:48)    Sensitivities:      -  Levofloxacin: S 2      -  Nitrofurantoin: S <=32 Should not be used to treat pyelonephritis.      -  Vancomycin: S 2      -  Ciprofloxacin: I 2      -  Ampicillin: S <=2 Predicts results to ampicillin/sulbactam, amoxacillin-clavulanate and  piperacillin-tazobactam.      -  Tetracycline: R >8      Method Type: KYLAH    Culture - Urine (collected 10-12-24 @ 23:30)  Source: Clean Catch Clean Catch (Midstream)  Final Report (10-16-24 @ 22:28):    50,000 - 99,000 CFU/mL Enterococcus faecalis    10,000 - 49,000 CFU/mL Coag Negative Staphylococcus "Susceptibilities not    performed"  Organism: Enterococcus faecalis (10-16-24 @ 22:28)  Organism: Enterococcus faecalis (10-16-24 @ 22:28)    Sensitivities:      -  Levofloxacin: S 2      -  Nitrofurantoin: S <=32 Should not be used to treat pyelonephritis.      -  Vancomycin: S 2      -  Ciprofloxacin: I 2      -  Ampicillin: S <=2 Predicts results to ampicillin/sulbactam, amoxacillin-clavulanate and  piperacillin-tazobactam.      -  Tetracycline: R >8      Method Type: KYLAH      Creatinine: 1.5 mg/dL (06-17-25 @ 08:40)    WBC Count: 6.42 K/uL (06-17-25 @ 08:40)  Alkaline Phosphatase: 108 U/L (06-17-25 @ 08:40)  Alanine Aminotransferase (ALT/SGPT): 13 U/L (06-17-25 @ 08:40)  Aspartate Aminotransferase (AST/SGOT): 19 U/L (06-17-25 @ 08:40)  Bilirubin Total: 0.4 mg/dL (06-17-25 @ 08:40)

## 2025-06-17 NOTE — ED PROVIDER NOTE - PROGRESS NOTE DETAILS
pt received 3 duonebs, now on bipap. KEL Aquino getting ultrasound IV now. pt improving on bipap, tolerating it well. no longer hypoxic JS: Discussed case with ICU fellow, will admit patient to ICU for high-level care.

## 2025-06-17 NOTE — ED ADULT TRIAGE NOTE - CHIEF COMPLAINT QUOTE
EDWARD from [facility] with complaints of shortness of breath. Patient with history of COPD, not on home O2, +accessory muscle use.

## 2025-06-17 NOTE — H&P ADULT - ASSESSMENT
IMPRESSION:    Acute hypercapnic respiratory failure on BIPAP  Acute hypoxemic respiratory failure  Pulmonary edema   ADRIA  Can't RO pneumonitis secondary to Tagrisso   HO COPD  HO small cell lung ca on tagriso  HO left adenocarcinoma s/p LLL lobectomy  HO afib/PE on eliquis   HO thyroid cancer s/p partial thyroidectomy     PLAN:    CNS:  Avoid sedation    HEENT: Oral care    PULMONARY:  HOB at 45 degrees.  NIV on and off and during sleep for now.  Wean o2 as tolerated.  Continue home inhaler regimen.  Albuterol PRN.  Repeat CXR in am     CARDIOVASCULAR: continue lasix 40mg BID, obtain official echo. trop downtrending. BNP elevated.    GI: GI prophylaxis.  Feeding when off NIV.  Bowel regimen     RENAL:  Follow up lytes.  Correct as needed, obtain RBUS.  Monitor UO     INFECTIOUS DISEASE: Follow up cultures, procalcitonin, MRSA,  Zosyn and Azithromycin for now.  RVP.  DC ABX if Procal and cultures negative     HEMATOLOGICAL:  Eliquis . DIMER.  Monitor CBC     ENDOCRINE:  Follow up FS.  Insulin protocol if needed.    MUSCULOSKELETAL: bed chair position.  Off loading     MICU    GOC    IMPRESSION:    Acute hypercapnic respiratory failure on BIPAP  Acute hypoxemic respiratory failure  Pulmonary edema   ADRIA  Can't RO pneumonitis secondary to Tagrisso   HO COPD  HO small cell lung ca on tagriso  HO left adenocarcinoma s/p LLL lobectomy  HO afib/PE on eliquis   HO thyroid cancer s/p partial thyroidectomy     PLAN:    CNS:  Avoid sedation    HEENT: Oral care    PULMONARY:  HOB at 45 degrees.  NIV on and off and during sleep for now.  Wean o2 as tolerated.  Continue home inhaler regimen.  Albuterol PRN.  Repeat CXR in am     CARDIOVASCULAR: continue lasix 40mg BID, obtain official echo. trop downtrending. BNP elevated.    GI: GI prophylaxis.  Feeding when off NIV.  Bowel regimen     RENAL:  Follow up lytes.  Correct as needed, obtain RBUS.  Monitor UO     INFECTIOUS DISEASE: Follow up cultures, procalcitonin, MRSA,  Zosyn and Azithromycin for now.  RVP.  DC ABX if Procal and cultures negative     HEMATOLOGICAL:  Eliquis . DIMER.  Monitor CBC     ENDOCRINE:  Follow up FS.  Insulin protocol if needed.    MUSCULOSKELETAL: bed chair position.  Off loading     MICU    Full code

## 2025-06-17 NOTE — CONSULT NOTE ADULT - ATTENDING COMMENTS
IMPRESSION:  Acute hypercapnic respiratory failure on BIPAP  Acute hypoxemic respiratory failure  Pulmonary edema   ADRIA  Can't RO pneumonitis secondary to Tagrisso   HO COPD  HO small cell lung ca on tagriso  HO left adenocarcinoma s/p LLL lobectomy  HO afib/PE on eliquis     Plan as outlined above

## 2025-06-17 NOTE — ED ADULT NURSE NOTE - HIV OFFER
Problem: ALTERED NUTRIENT INTAKE - ADULT  Goal: Nutrient intake appropriate for improving, restoring or maintaining nutritional needs  INTERVENTIONS:  - Monitor nutritional status and recommend course of action  - Monitor  labs, and treatment plans  Adrianna Naidu Previously Declined (within the last year)

## 2025-06-17 NOTE — ED ADULT NURSE NOTE - NSFALLRISKINTERV_ED_ALL_ED
Assistance OOB with selected safe patient handling equipment if applicable/Communicate fall risk and risk factors to all staff, patient, and family/Provide visual cue: yellow wristband, yellow gown, etc/Reinforce activity limits and safety measures with patient and family/Toileting schedule using arm’s reach rule for commode and bathroom/Call bell, personal items and telephone in reach/Instruct patient to call for assistance before getting out of bed/chair/stretcher/Non-slip footwear applied when patient is off stretcher/[place] to call system/Physically safe environment - no spills, clutter or unnecessary equipment/Purposeful Proactive Rounding/Room/bathroom lighting operational, light cord in reach

## 2025-06-17 NOTE — H&P ADULT - CONVERSATION DETAILS
Spoke about goals of care.   Explained about her chronic medical conditions .     Daughter mentions that the patient wants everything to be done with resuscitative efforts. She is still of sound mind and would be ready to discuss further more if the situation was to change.     Full code

## 2025-06-17 NOTE — CONSULT NOTE ADULT - ASSESSMENT
IMPRESSION:  Acute hypercapnic resp failure on BIPAP  Acute hypoxemic resp failure  fluid overload  ADRIA  HO COPD  HO small cell lung ca on tagriso  HO left adenocarcinoma s/p LLL lobectomy  HO afib/PE on eliquis     PLAN:      CNS: avoid sedation    HEENT: Oral care    PULMONARY:  CXR noted with congestion, continue avaps for now, repeat ABG, if improving can transition to 4 on 4 off. continue home inhalers, albuterol PRN.     CARDIOVASCULAR: continue lasix 40mg BID, obtain official echo. trop downtrending. BNP elevated.    GI: GI prophylaxis.  Feeding when off NIV.      RENAL:  Follow up lytes.  Correct as needed, obtain RBUS.    INFECTIOUS DISEASE: Follow up cultures, procalcitonin, MRSA, ceftriaxone and azithro for now.    HEMATOLOGICAL:  DVT prophylaxis. DIMER    ENDOCRINE:  Follow up FS.  Insulin protocol if needed.    MUSCULOSKELETAL: bedrest for now    MICU             IMPRESSION:  Acute hypercapnic respiratory failure on BIPAP  Acute hypoxemic respiratory failure  Pulmonary edema   ADRIA  Can't RO pneumonitis secondary to Tagrisso   HO COPD  HO small cell lung ca on tagriso  HO left adenocarcinoma s/p LLL lobectomy  HO afib/PE on eliquis     PLAN:    CNS:  Avoid sedation    HEENT: Oral care    PULMONARY:  HOB at 45 degrees.  NIV on and off and during sleep for now.  Wean o2 as tolerated.  Continue home inhaler regimen.  Albuterol PRN.  Repeat CXR in am     CARDIOVASCULAR: continue lasix 40mg BID, obtain official echo. trop downtrending. BNP elevated.    GI: GI prophylaxis.  Feeding when off NIV.  Bowel regimen     RENAL:  Follow up lytes.  Correct as needed, obtain RBUS.  Monitor UO     INFECTIOUS DISEASE: Follow up cultures, procalcitonin, MRSA,  Zosyn and Azithromycin for now.  RVP.  DC ABX if Procal and cultures negative     HEMATOLOGICAL:  DVT prophylaxis. DIMER.  Monitor CBC     ENDOCRINE:  Follow up FS.  Insulin protocol if needed.    MUSCULOSKELETAL: bed chair position.  Off loading     MICU    GOC

## 2025-06-17 NOTE — CONSULT NOTE ADULT - SUBJECTIVE AND OBJECTIVE BOX
Patient is a 84y old  Female who presents with a chief complaint of     HPI:  84-year-old female with past medical history of small cell lung cancer on tagrisso in remission, ho left lung adenocarcinoma s/p LLL lobectomy, PE on Eliquis, COPD not on home O2, AAA, diabetes, hypertension, hyperlipidemia, diagnosis of pneumonia UTI status post Augmentin and Levaquin presents for evaluation of shortness of breath x 2 days acutely worsening this morning.  No fever, chills or chest pain.  Daughter at bedside providing additional history, reports that she thinks patient never cleared pneumonia    PAST MEDICAL & SURGICAL HISTORY:  Hypertension, unspecified type      Type 2 diabetes mellitus with complication, without long-term current use of insulin      Hyperlipidemia, unspecified hyperlipidemia type      Diverticulitis      Obesity      COPD (chronic obstructive pulmonary disease)      SOB (shortness of breath)      GERD (gastroesophageal reflux disease)      Lung cancer      ARTIE (obstructive sleep apnea)  NO CPAP MACHINE PER PT.      Cancer of kidney      Cancer of thyroid      Former smoker      NHL (non-Hodgkin's lymphoma)  "low grade" per heme/ onc note      History of abdominal aortic aneurysm (AAA)      Kidney stones      [place] (hard of hearing)      History of surgery  LEFT LOWER LOBECTOMY      History of surgery  BILATERAL KNEE REPLACEMENT      History of surgery  CATARACT RIGHT EYE      History of surgery  TUBAL LIGATION      History of surgery  CYST REMOVED  RIGHT BREAST      History of surgery  CHOLECYSTECOMY      History of surgery  RIGHT PARTIAL NEPHRECTOMY      History of surgery  BILATERAL CATARACT SURGERY      History of back surgery      H/O lithotripsy      H/O partial thyroidectomy          SOCIAL HX:   Smoking                         ETOH                            Other    FAMILY HISTORY:  Family history of dementia (Mother)    Family history of cancer (Father, Grandparent)    :  No known cardiovacular family hisotry     Review Of Systems:     All ROS are negative except per HPI       Allergies    No Known Allergies    Intolerances          PHYSICAL EXAM    ICU Vital Signs Last 24 Hrs  T(C): 36.8 (17 Jun 2025 06:56), Max: 36.8 (17 Jun 2025 06:56)  T(F): 98.2 (17 Jun 2025 06:56), Max: 98.2 (17 Jun 2025 06:56)  HR: 92 (17 Jun 2025 07:51) (92 - 115)  BP: 163/79 (17 Jun 2025 06:56) (163/79 - 163/79)  BP(mean): --  ABP: --  ABP(mean): --  RR: 25 (17 Jun 2025 06:56) (25 - 25)  SpO2: 100% (17 Jun 2025 12:37) (92% - 100%)    O2 Parameters below as of 17 Jun 2025 06:56  Patient On (Oxygen Delivery Method): mask, nonrebreather  O2 Flow (L/min): 8          CONSTITUTIONAL  NAD    ENT:   Airway patent,   Mouth with normal mucosa.   No thrush      CARDIAC:   Elevated rate,   Iregular rhythm.    systolic murmer  Edema    RESPIRATORY:   No wheezing  basilar crackles  Not tachypneic,  No use of accessory muscles    GASTROINTESTINAL:  Abdomen soft,   Non-tender,   No guarding,   + BS    NEUROLOGICAL:   Alert and oriented   No motor deficits.    SKIN:   Skin normal color for race,   No evidence of rash.              LABS:                          8.8    6.42  )-----------( 99       ( 17 Jun 2025 08:40 )             28.2                                               06-17    139  |  101  |  52[H]  ----------------------------<  160[H]  5.0   |  25  |  1.5    Ca    9.5      17 Jun 2025 08:40    TPro  5.8[L]  /  Alb  4.0  /  TBili  0.4  /  DBili  x   /  AST  19  /  ALT  13  /  AlkPhos  108  06-17                                             Urinalysis Basic - ( 17 Jun 2025 08:40 )    Color: x / Appearance: x / SG: x / pH: x  Gluc: 160 mg/dL / Ketone: x  / Bili: x / Urobili: x   Blood: x / Protein: x / Nitrite: x   Leuk Esterase: x / RBC: x / WBC x   Sq Epi: x / Non Sq Epi: x / Bacteria: x                                                  LIVER FUNCTIONS - ( 17 Jun 2025 08:40 )  Alb: 4.0 g/dL / Pro: 5.8 g/dL / ALK PHOS: 108 U/L / ALT: 13 U/L / AST: 19 U/L / GGT: x                                                  Urinalysis with Rflx Culture (collected 17 Jun 2025 07:39)                                                                                           X-Rays noted    MEDICATIONS  (STANDING):    MEDICATIONS  (PRN):         Patient is a 84y old  Female who presents with a chief complaint of     HPI:  84-year-old female with past medical history of small cell lung cancer on tagrisso in remission, ho left lung adenocarcinoma s/p LLL lobectomy, PE on Eliquis, COPD not on home O2, AAA, diabetes, hypertension, hyperlipidemia, diagnosis of pneumonia UTI status post Augmentin and Levaquin presents for evaluation of shortness of breath x 2 days acutely worsening this morning.  No fever, chills or chest pain.  Daughter at bedside providing additional history, reports that she thinks patient never cleared pneumonia    PAST MEDICAL & SURGICAL HISTORY:  Hypertension, unspecified type      Type 2 diabetes mellitus with complication, without long-term current use of insulin      Hyperlipidemia, unspecified hyperlipidemia type      Diverticulitis      Obesity      COPD (chronic obstructive pulmonary disease)      SOB (shortness of breath)      GERD (gastroesophageal reflux disease)      Lung cancer      ARTIE (obstructive sleep apnea)  NO CPAP MACHINE PER PT.      Cancer of kidney      Cancer of thyroid      Former smoker      NHL (non-Hodgkin's lymphoma)  "low grade" per heme/ onc note      History of abdominal aortic aneurysm (AAA)      Kidney stones      [place] (hard of hearing)      History of surgery  LEFT LOWER LOBECTOMY      History of surgery  BILATERAL KNEE REPLACEMENT      History of surgery  CATARACT RIGHT EYE      History of surgery  TUBAL LIGATION      History of surgery  CYST REMOVED  RIGHT BREAST      History of surgery  CHOLECYSTECOMY      History of surgery  RIGHT PARTIAL NEPHRECTOMY      History of surgery  BILATERAL CATARACT SURGERY      History of back surgery      H/O lithotripsy      H/O partial thyroidectomy          SOCIAL HX:   Smoking         quit 14 years ago                 ETOH                            Other    FAMILY HISTORY:  Family history of dementia (Mother)    Family history of cancer (Father, Grandparent)    :  No known cardiovacular family hisotry     Review Of Systems:     All ROS are negative except per HPI       Allergies    No Known Allergies    Intolerances          PHYSICAL EXAM    ICU Vital Signs Last 24 Hrs  T(C): 36.8 (17 Jun 2025 06:56), Max: 36.8 (17 Jun 2025 06:56)  T(F): 98.2 (17 Jun 2025 06:56), Max: 98.2 (17 Jun 2025 06:56)  HR: 92 (17 Jun 2025 07:51) (92 - 115)  BP: 163/79 (17 Jun 2025 06:56) (163/79 - 163/79)  BP(mean): --  ABP: --  ABP(mean): --  RR: 25 (17 Jun 2025 06:56) (25 - 25)  SpO2: 100% (17 Jun 2025 12:37) (92% - 100%)    O2 Parameters below as of 17 Jun 2025 06:56  Patient On (Oxygen Delivery Method): mask, nonrebreather  O2 Flow (L/min): 8          CONSTITUTIONAL  NAD    ENT:   Airway patent,   Mouth with normal mucosa.   No thrush      CARDIAC:   Elevated rate,   Iregular rhythm.    systolic murmer  Edema    RESPIRATORY:   No wheezing  basilar crackles  Not tachypneic,  No use of accessory muscles    GASTROINTESTINAL:  Abdomen soft,   Non-tender,   No guarding,   + BS    NEUROLOGICAL:   Alert and oriented   No motor deficits.    SKIN:   Skin normal color for race,   No evidence of rash.              LABS:                          8.8    6.42  )-----------( 99       ( 17 Jun 2025 08:40 )             28.2                                               06-17    139  |  101  |  52[H]  ----------------------------<  160[H]  5.0   |  25  |  1.5    Ca    9.5      17 Jun 2025 08:40    TPro  5.8[L]  /  Alb  4.0  /  TBili  0.4  /  DBili  x   /  AST  19  /  ALT  13  /  AlkPhos  108  06-17                                             Urinalysis Basic - ( 17 Jun 2025 08:40 )    Color: x / Appearance: x / SG: x / pH: x  Gluc: 160 mg/dL / Ketone: x  / Bili: x / Urobili: x   Blood: x / Protein: x / Nitrite: x   Leuk Esterase: x / RBC: x / WBC x   Sq Epi: x / Non Sq Epi: x / Bacteria: x                                                  LIVER FUNCTIONS - ( 17 Jun 2025 08:40 )  Alb: 4.0 g/dL / Pro: 5.8 g/dL / ALK PHOS: 108 U/L / ALT: 13 U/L / AST: 19 U/L / GGT: x                                                  Urinalysis with Rflx Culture (collected 17 Jun 2025 07:39)                                                                                           X-Rays noted    MEDICATIONS  (STANDING):    MEDICATIONS  (PRN):

## 2025-06-17 NOTE — ED PROVIDER NOTE - CLINICAL SUMMARY MEDICAL DECISION MAKING FREE TEXT BOX
Patient presents with hypoxic respiratory failure found to have CHF exacerbation and COPD/pneumonia, Lasix given improved significantly on BiPAP, nebs and steroids and IV antibiotics given culture sent ICU consulted and patient improved to ICU    Labs and EKG were ordered and reviewed.  Imaging was ordered and reviewed by me.  Appropriate medications for patient's presenting complaints were ordered and effects were reassessed.  Patient's records (prior hospital, ED visit, and/or nursing home notes if available) were reviewed.  Additional history was obtained from EMS, family, and/or PCP (where available).  Escalation to admission/observation was considered.  Patient requires inpatient hospitalization - monitored setting.

## 2025-06-17 NOTE — ED ADULT NURSE NOTE - CHIEF COMPLAINT QUOTE
EDWARD from [facility] with complaints of shortness of breath. Patient with history of COPD, not on home O2, +accessory muscle use.

## 2025-06-17 NOTE — H&P ADULT - NSHPPHYSICALEXAM_GEN_ALL_CORE
CONSTITUTIONAL: On BIpap   EYES: PERRLA and symmetric, EOMI,   ENMT: Oral mucosa with moist membranes.   NECK: Supple  RESP: BIlateral clear breath sounds  CV: RRR, +S1S2,murmur present ;  peripheral edema +3  GI: Soft, Non tender ; BS present.   MSK: No digital clubbing or cyanosis  SKIN: No rashes or ulcers noted;   NEURO: Grossly Intact

## 2025-06-17 NOTE — H&P ADULT - NSICDXPASTMEDICALHX_GEN_ALL_CORE_FT
PAST MEDICAL HISTORY:  Cancer of kidney     Cancer of thyroid     COPD (chronic obstructive pulmonary disease)     Diverticulitis     Former smoker     GERD (gastroesophageal reflux disease)     History of abdominal aortic aneurysm (AAA)     [place] (hard of hearing)     Hyperlipidemia, unspecified hyperlipidemia type     Hypertension, unspecified type     Kidney stones     Lung cancer     NHL (non-Hodgkin's lymphoma) "low grade" per heme/ onc note    Obesity     ARTIE (obstructive sleep apnea) NO CPAP MACHINE PER PT.    SOB (shortness of breath)     Type 2 diabetes mellitus with complication, without long-term current use of insulin

## 2025-06-17 NOTE — ED PROVIDER NOTE - OBJECTIVE STATEMENT
84-year-old female with past medical history of small cell lung cancer in remission, on Eliquis, COPD not on home O2, AAA, diabetes, hypertension, hyperlipidemia, diagnosis of pneumonia UTI status post Augmentin and Levaquin presents for evaluation of shortness of breath x 2 days acutely worsening this morning.  No fever, chills or chest pain.  Daughter at bedside providing additional history, reports that she thinks patient never cleared pneumonia.

## 2025-06-17 NOTE — ED PROVIDER NOTE - PHYSICAL EXAMINATION
CONST: Chronically ill appearing, uncomfortable appearing in mild distress.   EYES: PERRL, EOMI, Sclera and conjunctiva clear.   ENT: Oropharynx normal appearing, no erythema or exudates. Uvula midline.  CARD: Normal S1 S2; irregular rate and rhythm   RESP: respiratory distress, on non rebreather  GI: Soft, non-tender, non-distended.  MS: Normal ROM in all extremities. No midline spinal tenderness.  SKIN: 2+ pitting edema   NEURO: A&Ox3, No focal deficits.

## 2025-06-17 NOTE — H&P ADULT - HISTORY OF PRESENT ILLNESS
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    Patient has been decompensating since April after she underwent AAA stenting ( Has some renal artery leak). Was sent to nursing home. Was diagnosed with Ecoli/Pseudomonas UTIi treated with levaquin . Recently was having Shortness of breath/ cough ; treated with augmentin,. they checked theBNP 4199 ; followed Tye De Dios ; was started on Torsemide 20 mg other day.   Since yesterday had increased shortness of breath and anxiety. Nursing home gave her lasix which improved her breathing status but worsened since the morning  so was sent to the hospital.     No fever, chills or chest pain.  Daughter at bedside providing additional history, reports that she thinks patient never cleared pneumonia    In the ED:   Vitals: /79;  ; RR 25 ; afebrile ; 92 % on NRB     Labs:   WBC 6.42 Hb 8.8 Platelet 99K Neutro 83.2   Na 139 K 5.0   BUN/Creatinine 52/1.5   Pro BNP 6050     VBG ; Initial 7.29 CO2 60 HCO3 29 Lactate 1.4   Repeat pH 7.27 Co2 63 HCo3 29 lactate 1.7     Ua negative     US renal: mild bilateral hydronephrosis ; 0.5 non obstructive calculus     Chest Xray : Bilateral patchy opacities and left pleural effusion.    admitted to ICU.

## 2025-06-18 ENCOUNTER — RESULT REVIEW (OUTPATIENT)
Age: 85
End: 2025-06-18

## 2025-06-18 LAB
A1C WITH ESTIMATED AVERAGE GLUCOSE RESULT: 5.4 % — SIGNIFICANT CHANGE UP (ref 4–5.6)
ALBUMIN SERPL ELPH-MCNC: 3.2 G/DL — LOW (ref 3.5–5.2)
ALP SERPL-CCNC: 90 U/L — SIGNIFICANT CHANGE UP (ref 30–115)
ALT FLD-CCNC: 10 U/L — SIGNIFICANT CHANGE UP (ref 0–41)
ANION GAP SERPL CALC-SCNC: 12 MMOL/L — SIGNIFICANT CHANGE UP (ref 7–14)
AST SERPL-CCNC: 11 U/L — SIGNIFICANT CHANGE UP (ref 0–41)
BASE EXCESS BLDA CALC-SCNC: 5.3 MMOL/L — HIGH (ref -2–3)
BASOPHILS # BLD AUTO: 0.01 K/UL — SIGNIFICANT CHANGE UP (ref 0–0.2)
BASOPHILS NFR BLD AUTO: 0.2 % — SIGNIFICANT CHANGE UP (ref 0–1)
BILIRUB SERPL-MCNC: 0.4 MG/DL — SIGNIFICANT CHANGE UP (ref 0.2–1.2)
BUN SERPL-MCNC: 53 MG/DL — HIGH (ref 10–20)
CALCIUM SERPL-MCNC: 9.3 MG/DL — SIGNIFICANT CHANGE UP (ref 8.4–10.5)
CHLORIDE SERPL-SCNC: 102 MMOL/L — SIGNIFICANT CHANGE UP (ref 98–110)
CO2 SERPL-SCNC: 28 MMOL/L — SIGNIFICANT CHANGE UP (ref 17–32)
CREAT SERPL-MCNC: 1.7 MG/DL — HIGH (ref 0.7–1.5)
EGFR: 30 ML/MIN/1.73M2 — LOW
EGFR: 30 ML/MIN/1.73M2 — LOW
EOSINOPHIL # BLD AUTO: 0.01 K/UL — SIGNIFICANT CHANGE UP (ref 0–0.7)
EOSINOPHIL NFR BLD AUTO: 0.2 % — SIGNIFICANT CHANGE UP (ref 0–8)
ESTIMATED AVERAGE GLUCOSE: 108 MG/DL — SIGNIFICANT CHANGE UP (ref 68–114)
GAS PNL BLDA: SIGNIFICANT CHANGE UP
GLUCOSE BLDC GLUCOMTR-MCNC: 112 MG/DL — HIGH (ref 70–99)
GLUCOSE BLDC GLUCOMTR-MCNC: 113 MG/DL — HIGH (ref 70–99)
GLUCOSE BLDC GLUCOMTR-MCNC: 154 MG/DL — HIGH (ref 70–99)
GLUCOSE BLDC GLUCOMTR-MCNC: 329 MG/DL — HIGH (ref 70–99)
GLUCOSE SERPL-MCNC: 115 MG/DL — HIGH (ref 70–99)
HCO3 BLDA-SCNC: 31 MMOL/L — HIGH (ref 21–28)
HCT VFR BLD CALC: 24 % — LOW (ref 37–47)
HCT VFR BLD CALC: 24.5 % — LOW (ref 37–47)
HGB BLD-MCNC: 7.4 G/DL — LOW (ref 12–16)
HGB BLD-MCNC: 7.5 G/DL — LOW (ref 12–16)
HOROWITZ INDEX BLDA+IHG-RTO: 60 — SIGNIFICANT CHANGE UP
IMM GRANULOCYTES NFR BLD AUTO: 0.4 % — HIGH (ref 0.1–0.3)
LYMPHOCYTES # BLD AUTO: 0.8 K/UL — LOW (ref 1.2–3.4)
LYMPHOCYTES # BLD AUTO: 16 % — LOW (ref 20.5–51.1)
MAGNESIUM SERPL-MCNC: 2.2 MG/DL — SIGNIFICANT CHANGE UP (ref 1.8–2.4)
MCHC RBC-ENTMCNC: 26.3 PG — LOW (ref 27–31)
MCHC RBC-ENTMCNC: 26.4 PG — LOW (ref 27–31)
MCHC RBC-ENTMCNC: 30.6 G/DL — LOW (ref 32–37)
MCHC RBC-ENTMCNC: 30.8 G/DL — LOW (ref 32–37)
MCV RBC AUTO: 85.4 FL — SIGNIFICANT CHANGE UP (ref 81–99)
MCV RBC AUTO: 86.3 FL — SIGNIFICANT CHANGE UP (ref 81–99)
MONOCYTES # BLD AUTO: 0.68 K/UL — HIGH (ref 0.1–0.6)
MONOCYTES NFR BLD AUTO: 13.6 % — HIGH (ref 1.7–9.3)
MRSA PCR RESULT.: NEGATIVE — SIGNIFICANT CHANGE UP
NEUTROPHILS # BLD AUTO: 3.47 K/UL — SIGNIFICANT CHANGE UP (ref 1.4–6.5)
NEUTROPHILS NFR BLD AUTO: 69.6 % — SIGNIFICANT CHANGE UP (ref 42.2–75.2)
NRBC BLD AUTO-RTO: 0 /100 WBCS — SIGNIFICANT CHANGE UP (ref 0–0)
NRBC BLD AUTO-RTO: 0 /100 WBCS — SIGNIFICANT CHANGE UP (ref 0–0)
PCO2 BLDA: 49 MMHG — HIGH (ref 32–45)
PH BLDA: 7.41 — SIGNIFICANT CHANGE UP (ref 7.35–7.45)
PHOSPHATE SERPL-MCNC: 4.8 MG/DL — SIGNIFICANT CHANGE UP (ref 2.1–4.9)
PLATELET # BLD AUTO: 74 K/UL — LOW (ref 130–400)
PLATELET # BLD AUTO: 79 K/UL — LOW (ref 130–400)
PMV BLD: 12.5 FL — HIGH (ref 7.4–10.4)
PMV BLD: 13.1 FL — HIGH (ref 7.4–10.4)
PO2 BLDA: 139 MMHG — HIGH (ref 83–108)
POTASSIUM SERPL-MCNC: 4.5 MMOL/L — SIGNIFICANT CHANGE UP (ref 3.5–5)
POTASSIUM SERPL-SCNC: 4.5 MMOL/L — SIGNIFICANT CHANGE UP (ref 3.5–5)
PROCALCITONIN SERPL-MCNC: 0.16 NG/ML — HIGH (ref 0.02–0.1)
PROT SERPL-MCNC: 5 G/DL — LOW (ref 6–8)
RBC # BLD: 2.81 M/UL — LOW (ref 4.2–5.4)
RBC # BLD: 2.84 M/UL — LOW (ref 4.2–5.4)
RBC # FLD: 15.7 % — HIGH (ref 11.5–14.5)
RBC # FLD: 15.8 % — HIGH (ref 11.5–14.5)
SAO2 % BLDA: 98.6 % — HIGH (ref 94–98)
SODIUM SERPL-SCNC: 142 MMOL/L — SIGNIFICANT CHANGE UP (ref 135–146)
WBC # BLD: 4.99 K/UL — SIGNIFICANT CHANGE UP (ref 4.8–10.8)
WBC # BLD: 6.09 K/UL — SIGNIFICANT CHANGE UP (ref 4.8–10.8)
WBC # FLD AUTO: 4.99 K/UL — SIGNIFICANT CHANGE UP (ref 4.8–10.8)
WBC # FLD AUTO: 6.09 K/UL — SIGNIFICANT CHANGE UP (ref 4.8–10.8)

## 2025-06-18 PROCEDURE — 71045 X-RAY EXAM CHEST 1 VIEW: CPT | Mod: 26

## 2025-06-18 PROCEDURE — 99291 CRITICAL CARE FIRST HOUR: CPT

## 2025-06-18 PROCEDURE — 93306 TTE W/DOPPLER COMPLETE: CPT | Mod: 26

## 2025-06-18 PROCEDURE — 93970 EXTREMITY STUDY: CPT | Mod: 26

## 2025-06-18 RX ORDER — INSULIN LISPRO 100 U/ML
INJECTION, SOLUTION INTRAVENOUS; SUBCUTANEOUS
Refills: 0 | Status: DISCONTINUED | OUTPATIENT
Start: 2025-06-18 | End: 2025-06-29

## 2025-06-18 RX ORDER — METHYLPREDNISOLONE ACETATE 80 MG/ML
60 INJECTION, SUSPENSION INTRA-ARTICULAR; INTRALESIONAL; INTRAMUSCULAR; SOFT TISSUE DAILY
Refills: 0 | Status: DISCONTINUED | OUTPATIENT
Start: 2025-06-18 | End: 2025-06-21

## 2025-06-18 RX ORDER — HYDROXYZINE HYDROCHLORIDE 25 MG/1
25 TABLET, FILM COATED ORAL ONCE
Refills: 0 | Status: COMPLETED | OUTPATIENT
Start: 2025-06-18 | End: 2025-06-18

## 2025-06-18 RX ORDER — DOXYCYCLINE HYCLATE 100 MG
100 TABLET ORAL EVERY 12 HOURS
Refills: 0 | Status: DISCONTINUED | OUTPATIENT
Start: 2025-06-18 | End: 2025-06-20

## 2025-06-18 RX ORDER — DOXYCYCLINE HYCLATE 100 MG
100 TABLET ORAL EVERY 12 HOURS
Refills: 0 | Status: DISCONTINUED | OUTPATIENT
Start: 2025-06-18 | End: 2025-06-18

## 2025-06-18 RX ORDER — DOXYCYCLINE HYCLATE 100 MG
100 TABLET ORAL ONCE
Refills: 0 | Status: COMPLETED | OUTPATIENT
Start: 2025-06-18 | End: 2025-06-18

## 2025-06-18 RX ORDER — DOXYCYCLINE HYCLATE 100 MG
TABLET ORAL
Refills: 0 | Status: DISCONTINUED | OUTPATIENT
Start: 2025-06-18 | End: 2025-06-20

## 2025-06-18 RX ORDER — AMLODIPINE BESYLATE 10 MG/1
5 TABLET ORAL ONCE
Refills: 0 | Status: COMPLETED | OUTPATIENT
Start: 2025-06-18 | End: 2025-06-18

## 2025-06-18 RX ORDER — METHYLPREDNISOLONE ACETATE 80 MG/ML
125 INJECTION, SUSPENSION INTRA-ARTICULAR; INTRALESIONAL; INTRAMUSCULAR; SOFT TISSUE ONCE
Refills: 0 | Status: DISCONTINUED | OUTPATIENT
Start: 2025-06-18 | End: 2025-06-18

## 2025-06-18 RX ADMIN — Medication 81 MILLIGRAM(S): at 12:13

## 2025-06-18 RX ADMIN — Medication 25 GRAM(S): at 13:54

## 2025-06-18 RX ADMIN — POLYETHYLENE GLYCOL 3350 17 GRAM(S): 17 POWDER, FOR SOLUTION ORAL at 21:20

## 2025-06-18 RX ADMIN — Medication 25 GRAM(S): at 05:09

## 2025-06-18 RX ADMIN — Medication 100 MILLIGRAM(S): at 23:18

## 2025-06-18 RX ADMIN — FLUTICASONE PROPIONATE 1 SPRAY(S): 50 SPRAY, METERED NASAL at 05:11

## 2025-06-18 RX ADMIN — APIXABAN 2.5 MILLIGRAM(S): 2.5 TABLET, FILM COATED ORAL at 17:11

## 2025-06-18 RX ADMIN — INSULIN LISPRO 8: 100 INJECTION, SOLUTION INTRAVENOUS; SUBCUTANEOUS at 23:42

## 2025-06-18 RX ADMIN — Medication 100 MILLIGRAM(S): at 12:48

## 2025-06-18 RX ADMIN — FUROSEMIDE 40 MILLIGRAM(S): 10 INJECTION INTRAMUSCULAR; INTRAVENOUS at 05:09

## 2025-06-18 RX ADMIN — FUROSEMIDE 40 MILLIGRAM(S): 10 INJECTION INTRAMUSCULAR; INTRAVENOUS at 17:13

## 2025-06-18 RX ADMIN — Medication 2 TABLET(S): at 21:20

## 2025-06-18 RX ADMIN — METHYLPREDNISOLONE ACETATE 60 MILLIGRAM(S): 80 INJECTION, SUSPENSION INTRA-ARTICULAR; INTRALESIONAL; INTRAMUSCULAR; SOFT TISSUE at 12:13

## 2025-06-18 RX ADMIN — Medication 5 MILLIGRAM(S): at 23:17

## 2025-06-18 RX ADMIN — INSULIN LISPRO 2: 100 INJECTION, SOLUTION INTRAVENOUS; SUBCUTANEOUS at 16:03

## 2025-06-18 RX ADMIN — Medication 1 APPLICATION(S): at 05:09

## 2025-06-18 RX ADMIN — AMLODIPINE BESYLATE 5 MILLIGRAM(S): 10 TABLET ORAL at 21:19

## 2025-06-18 RX ADMIN — HYDROXYZINE HYDROCHLORIDE 25 MILLIGRAM(S): 25 TABLET, FILM COATED ORAL at 23:42

## 2025-06-18 RX ADMIN — Medication 25 GRAM(S): at 21:20

## 2025-06-18 RX ADMIN — FLUTICASONE PROPIONATE 1 SPRAY(S): 50 SPRAY, METERED NASAL at 17:13

## 2025-06-18 NOTE — ADVANCED PRACTICE NURSE CONSULT - ASSESSMENT
History of Present Illness:   84-year-old female with past medical history of small cell lung cancer on tagrisso in remission, 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    Patient has been decompensating since April after she underwent AAA stenting ( Has some renal artery leak). Was sent to nursing home. Was diagnosed with Ecoli/Pseudomonas UTIi treated with levaquin . Recently was having Shortness of breath/ cough ; treated with augmentin,. they checked theBNP 4199 ; followed Tye De Dios ; was started on Torsemide 20 mg other day. Since yesterday had increased shortness of breath and anxiety. Nursing home gave her lasix which improved her breathing status but worsened since the morning  so was sent to the hospital. No fever, chills or chest pain.  Daughter at bedside providing additional history, reports that she thinks patient never cleared pneumonia.        Patient received lying in bed. Awake. Limited mobility. High risk for pressure injury development and progression.    Type of Wound: Unstageble Pressure Injury  Location: Sacrum (present on admission, documented as stage 4 pressure injury, inappropriate documentation)   Measurements: ~5asw2lbu4.5cm  Tunneling/ Undermining: No  Wound bed: Yellow/tan with scant pink tissue present  Wound edges: Intact  Periwound: Dry hyperpigmentation  Wound exudate: None  Wound odor: No  Induration, erythema, warmth: No  Wound pain: No    Skin to bilateral heels intact at time of assessment.    Skin assessed with primary RN and ANM bedside.

## 2025-06-18 NOTE — PROGRESS NOTE ADULT - ASSESSMENT
IMPRESSION:    Acute hypercapnic respiratory failure   Acute hypoxemic respiratory failure  Pulmonary edema   ADRIA  Can't RO pneumonitis secondary to Tagrisso   HO COPD  HO small cell lung ca on tagriso  HO left adenocarcinoma s/p LLL lobectomy  HO afib/PE on eliquis     PLAN:    CNS:  Avoid sedation    HEENT: Oral care    PULMONARY:  HOB at 45 degrees.  NIV on and off and during sleep for now.  Wean o2 as tolerated.  Continue home inhaler regimen.  Albuterol PRN.  Repeat CXR in am.  Solumedrol 60 mg daily     CARDIOVASCULAR: continue Lasix 40mg BID, obtain official echo. trop downtrending. BNP elevated.    GI: GI prophylaxis.  Feeding when off NIV.  Bowel regimen     RENAL:  Follow up lytes.  Correct as needed, obtain RBUS.  Monitor UO     INFECTIOUS DISEASE: Follow up cultures, procalcitonin, MRSA,  Zosyn and Doxy for now.  RVP.  DC ABX if Procal and cultures negative     HEMATOLOGICAL:  DVT prophylaxis. DIMER noted.  LE duplex.  Monitor CBC     ENDOCRINE:  Follow up FS.  Insulin protocol if needed.      MUSCULOSKELETAL: bed chair position.  Off loading     MICU    GOC

## 2025-06-18 NOTE — ADVANCED PRACTICE NURSE CONSULT - RECOMMEDATIONS
Cleanse wound to sacrum with Vashe wound cleanser   Pat dry, apply Medihoney, cover with Xeroform and abdominal pad twice a day and prn for soiling.     Maintain pressure injury prevention.   Keep skin clean.   Maintain incontinence care.   Monitor skin for changes and notify provider   Case discussed with primary RN bedside

## 2025-06-18 NOTE — PROGRESS NOTE ADULT - SUBJECTIVE AND OBJECTIVE BOX
Patient is a 84y old  Female who presents with a chief complaint of Shortness of Breath (17 Jun 2025 15:31)        Over Night Events:  On NC 6 liters O2.  Tolerated NIV last night.  Off pressors.          ROS:     All ROS are negative except HPI         PHYSICAL EXAM    ICU Vital Signs Last 24 Hrs  T(C): 37.2 (18 Jun 2025 04:00), Max: 37.3 (17 Jun 2025 15:42)  T(F): 98.9 (18 Jun 2025 04:00), Max: 99.2 (17 Jun 2025 15:42)  HR: 81 (18 Jun 2025 08:00) (73 - 96)  BP: 150/80 (18 Jun 2025 08:00) (131/76 - 173/76)  BP(mean): 108 (18 Jun 2025 08:00) (94 - 121)  ABP: --  ABP(mean): --  RR: 23 (18 Jun 2025 08:00) (18 - 25)  SpO2: 93% (18 Jun 2025 08:10) (90% - 100%)    O2 Parameters below as of 18 Jun 2025 08:10  Patient On (Oxygen Delivery Method): nasal cannula w/ humidification  O2 Flow (L/min): 6          CONSTITUTIONAL:  Mild resp distress     ENT:   Airway patent,   Mouth with normal mucosa.       EYES:   Pupils equal,   Round and reactive to light.    CARDIAC:   Normal rate,   Irregular rhythm.          RESPIRATORY:   No wheezing  Bilateral crackles   Normal chest expansion  tachypneic,  use of accessory muscles    GASTROINTESTINAL:  Abdomen soft,   Non-tender,   No guarding,   + BS    MUSCULOSKELETAL:   Range of motion is not limited,  No clubbing, cyanosis    NEUROLOGICAL:   Alert and oriented   No motor  deficits.    SKIN:   Skin normal color for race,   Warm and dry  No evidence of rash.        06-17-25 @ 07:01  -  06-18-25 @ 07:00  --------------------------------------------------------  IN:    IV PiggyBack: 200 mL  Total IN: 200 mL    OUT:    Intermittent Catheterization - Urethral (mL): 500 mL    Voided (mL): 700 mL  Total OUT: 1200 mL    Total NET: -1000 mL          LABS:                            7.4    4.99  )-----------( 79       ( 18 Jun 2025 04:30 )             24.0                                               06-18    142  |  102  |  53[H]  ----------------------------<  115[H]  4.5   |  28  |  1.7[H]    Creatinine Trend  BUN 53, Cr 1.7, (06-18-25 @ 04:30)  Creatinine Trend  BUN 52, Cr 1.5, (06-17-25 @ 08:40)      Ca    9.3      18 Jun 2025 04:30  Phos  4.8     06-18  Mg     2.2     06-18    TPro  5.0[L]  /  Alb  3.2[L]  /  TBili  0.4  /  DBili  x   /  AST  11  /  ALT  10  /  AlkPhos  90  06-18                                             Urinalysis Basic - ( 18 Jun 2025 04:30 )    Color: x / Appearance: x / SG: x / pH: x  Gluc: 115 mg/dL / Ketone: x  / Bili: x / Urobili: x   Blood: x / Protein: x / Nitrite: x   Leuk Esterase: x / RBC: x / WBC x   Sq Epi: x / Non Sq Epi: x / Bacteria: x                                                  LIVER FUNCTIONS - ( 18 Jun 2025 04:30 )  Alb: 3.2 g/dL / Pro: 5.0 g/dL / ALK PHOS: 90 U/L / ALT: 10 U/L / AST: 11 U/L / GGT: x                                                  Urinalysis with Rflx Culture (collected 17 Jun 2025 07:39)                                                                                       ABG - ( 18 Jun 2025 04:44 )  pH, Arterial: 7.41  pH, Blood: x     /  pCO2: 49    /  pO2: 139   / HCO3: 31    / Base Excess: 5.3   /  SaO2: 98.6                MEDICATIONS  (STANDING):  amLODIPine   Tablet 5 milliGRAM(s) Oral daily  apixaban 2.5 milliGRAM(s) Oral every 12 hours  aspirin enteric coated 81 milliGRAM(s) Oral daily  azithromycin  IVPB      azithromycin  IVPB 500 milliGRAM(s) IV Intermittent every 24 hours  chlorhexidine 2% Cloths 1 Application(s) Topical <User Schedule>  dextrose 5%. 1000 milliLiter(s) (100 mL/Hr) IV Continuous <Continuous>  dextrose 5%. 1000 milliLiter(s) (50 mL/Hr) IV Continuous <Continuous>  dextrose 50% Injectable 25 Gram(s) IV Push once  dextrose 50% Injectable 12.5 Gram(s) IV Push once  dextrose 50% Injectable 25 Gram(s) IV Push once  fluticasone propionate 50 MICROgram(s)/spray Nasal Spray 1 Spray(s) Both Nostrils two times a day  furosemide   Injectable 40 milliGRAM(s) IV Push every 12 hours  glucagon  Injectable 1 milliGRAM(s) IntraMuscular once  insulin lispro (ADMELOG) corrective regimen sliding scale   SubCutaneous three times a day before meals  metoprolol succinate ER 50 milliGRAM(s) Oral daily  oxybutynin 5 milliGRAM(s) Oral at bedtime  pantoprazole    Tablet 40 milliGRAM(s) Oral before breakfast  piperacillin/tazobactam IVPB.. 3.375 Gram(s) IV Intermittent every 8 hours  polyethylene glycol 3350 17 Gram(s) Oral at bedtime  senna 2 Tablet(s) Oral at bedtime    MEDICATIONS  (PRN):  albuterol    90 MICROgram(s) HFA Inhaler 2 Puff(s) Inhalation every 6 hours PRN Bronchospasm  dextrose Oral Gel 15 Gram(s) Oral once PRN Blood Glucose LESS THAN 70 milliGRAM(s)/deciliter      New X-rays reviewed:                                                                                  ECHO

## 2025-06-19 LAB
ALBUMIN SERPL ELPH-MCNC: 3.7 G/DL — SIGNIFICANT CHANGE UP (ref 3.5–5.2)
ALP SERPL-CCNC: 95 U/L — SIGNIFICANT CHANGE UP (ref 30–115)
ALT FLD-CCNC: 14 U/L — SIGNIFICANT CHANGE UP (ref 0–41)
ANION GAP SERPL CALC-SCNC: 12 MMOL/L — SIGNIFICANT CHANGE UP (ref 7–14)
ANION GAP SERPL CALC-SCNC: 12 MMOL/L — SIGNIFICANT CHANGE UP (ref 7–14)
ANION GAP SERPL CALC-SCNC: 13 MMOL/L — SIGNIFICANT CHANGE UP (ref 7–14)
AST SERPL-CCNC: 16 U/L — SIGNIFICANT CHANGE UP (ref 0–41)
BILIRUB SERPL-MCNC: 0.4 MG/DL — SIGNIFICANT CHANGE UP (ref 0.2–1.2)
BUN SERPL-MCNC: 50 MG/DL — HIGH (ref 10–20)
BUN SERPL-MCNC: 51 MG/DL — HIGH (ref 10–20)
BUN SERPL-MCNC: 56 MG/DL — HIGH (ref 10–20)
CALCIUM SERPL-MCNC: 9.5 MG/DL — SIGNIFICANT CHANGE UP (ref 8.4–10.5)
CALCIUM SERPL-MCNC: 9.7 MG/DL — SIGNIFICANT CHANGE UP (ref 8.4–10.5)
CALCIUM SERPL-MCNC: 9.8 MG/DL — SIGNIFICANT CHANGE UP (ref 8.4–10.5)
CHLORIDE SERPL-SCNC: 95 MMOL/L — LOW (ref 98–110)
CHLORIDE SERPL-SCNC: 98 MMOL/L — SIGNIFICANT CHANGE UP (ref 98–110)
CHLORIDE SERPL-SCNC: 98 MMOL/L — SIGNIFICANT CHANGE UP (ref 98–110)
CO2 SERPL-SCNC: 30 MMOL/L — SIGNIFICANT CHANGE UP (ref 17–32)
CO2 SERPL-SCNC: 31 MMOL/L — SIGNIFICANT CHANGE UP (ref 17–32)
CO2 SERPL-SCNC: 31 MMOL/L — SIGNIFICANT CHANGE UP (ref 17–32)
CREAT SERPL-MCNC: 1.3 MG/DL — SIGNIFICANT CHANGE UP (ref 0.7–1.5)
CREAT SERPL-MCNC: 1.4 MG/DL — SIGNIFICANT CHANGE UP (ref 0.7–1.5)
CREAT SERPL-MCNC: 1.5 MG/DL — SIGNIFICANT CHANGE UP (ref 0.7–1.5)
EGFR: 34 ML/MIN/1.73M2 — LOW
EGFR: 34 ML/MIN/1.73M2 — LOW
EGFR: 37 ML/MIN/1.73M2 — LOW
EGFR: 37 ML/MIN/1.73M2 — LOW
EGFR: 41 ML/MIN/1.73M2 — LOW
EGFR: 41 ML/MIN/1.73M2 — LOW
GLUCOSE BLDC GLUCOMTR-MCNC: 175 MG/DL — HIGH (ref 70–99)
GLUCOSE BLDC GLUCOMTR-MCNC: 176 MG/DL — HIGH (ref 70–99)
GLUCOSE BLDC GLUCOMTR-MCNC: 189 MG/DL — HIGH (ref 70–99)
GLUCOSE BLDC GLUCOMTR-MCNC: 218 MG/DL — HIGH (ref 70–99)
GLUCOSE SERPL-MCNC: 106 MG/DL — HIGH (ref 70–99)
GLUCOSE SERPL-MCNC: 171 MG/DL — HIGH (ref 70–99)
GLUCOSE SERPL-MCNC: 191 MG/DL — HIGH (ref 70–99)
HCT VFR BLD CALC: 25.8 % — LOW (ref 37–47)
HGB BLD-MCNC: 8.1 G/DL — LOW (ref 12–16)
MAGNESIUM SERPL-MCNC: 2.5 MG/DL — HIGH (ref 1.8–2.4)
MCHC RBC-ENTMCNC: 26.3 PG — LOW (ref 27–31)
MCHC RBC-ENTMCNC: 31.4 G/DL — LOW (ref 32–37)
MCV RBC AUTO: 83.8 FL — SIGNIFICANT CHANGE UP (ref 81–99)
NRBC BLD AUTO-RTO: 0 /100 WBCS — SIGNIFICANT CHANGE UP (ref 0–0)
PHOSPHATE SERPL-MCNC: 3.2 MG/DL — SIGNIFICANT CHANGE UP (ref 2.1–4.9)
PLATELET # BLD AUTO: 101 K/UL — LOW (ref 130–400)
PMV BLD: 12.6 FL — HIGH (ref 7.4–10.4)
POTASSIUM SERPL-MCNC: 3.1 MMOL/L — LOW (ref 3.5–5)
POTASSIUM SERPL-MCNC: 4.2 MMOL/L — SIGNIFICANT CHANGE UP (ref 3.5–5)
POTASSIUM SERPL-MCNC: 4.5 MMOL/L — SIGNIFICANT CHANGE UP (ref 3.5–5)
POTASSIUM SERPL-SCNC: 3.1 MMOL/L — LOW (ref 3.5–5)
POTASSIUM SERPL-SCNC: 4.2 MMOL/L — SIGNIFICANT CHANGE UP (ref 3.5–5)
POTASSIUM SERPL-SCNC: 4.5 MMOL/L — SIGNIFICANT CHANGE UP (ref 3.5–5)
PROT SERPL-MCNC: 5.6 G/DL — LOW (ref 6–8)
RBC # BLD: 3.08 M/UL — LOW (ref 4.2–5.4)
RBC # FLD: 15.8 % — HIGH (ref 11.5–14.5)
SODIUM SERPL-SCNC: 138 MMOL/L — SIGNIFICANT CHANGE UP (ref 135–146)
SODIUM SERPL-SCNC: 141 MMOL/L — SIGNIFICANT CHANGE UP (ref 135–146)
SODIUM SERPL-SCNC: 141 MMOL/L — SIGNIFICANT CHANGE UP (ref 135–146)
WBC # BLD: 8.63 K/UL — SIGNIFICANT CHANGE UP (ref 4.8–10.8)
WBC # FLD AUTO: 8.63 K/UL — SIGNIFICANT CHANGE UP (ref 4.8–10.8)

## 2025-06-19 PROCEDURE — 71045 X-RAY EXAM CHEST 1 VIEW: CPT | Mod: 26

## 2025-06-19 PROCEDURE — 99291 CRITICAL CARE FIRST HOUR: CPT

## 2025-06-19 RX ORDER — AMLODIPINE BESYLATE 10 MG/1
5 TABLET ORAL EVERY 24 HOURS
Refills: 0 | Status: DISCONTINUED | OUTPATIENT
Start: 2025-06-19 | End: 2025-06-19

## 2025-06-19 RX ORDER — AMLODIPINE BESYLATE 10 MG/1
5 TABLET ORAL DAILY
Refills: 0 | Status: DISCONTINUED | OUTPATIENT
Start: 2025-06-19 | End: 2025-06-19

## 2025-06-19 RX ORDER — MELATONIN 5 MG
5 TABLET ORAL ONCE
Refills: 0 | Status: COMPLETED | OUTPATIENT
Start: 2025-06-19 | End: 2025-06-19

## 2025-06-19 RX ORDER — AMLODIPINE BESYLATE 10 MG/1
5 TABLET ORAL EVERY 24 HOURS
Refills: 0 | Status: DISCONTINUED | OUTPATIENT
Start: 2025-06-20 | End: 2025-06-21

## 2025-06-19 RX ORDER — DRONABINOL 10 MG/1
2.5 CAPSULE ORAL DAILY
Refills: 0 | Status: DISCONTINUED | OUTPATIENT
Start: 2025-06-19 | End: 2025-06-26

## 2025-06-19 RX ORDER — BUMETANIDE 1 MG/1
2 TABLET ORAL EVERY 12 HOURS
Refills: 0 | Status: DISCONTINUED | OUTPATIENT
Start: 2025-06-19 | End: 2025-06-21

## 2025-06-19 RX ADMIN — Medication 2 MILLIGRAM(S): at 15:33

## 2025-06-19 RX ADMIN — INSULIN LISPRO 4: 100 INJECTION, SOLUTION INTRAVENOUS; SUBCUTANEOUS at 21:55

## 2025-06-19 RX ADMIN — Medication 2 TABLET(S): at 21:50

## 2025-06-19 RX ADMIN — Medication 2 MILLIGRAM(S): at 11:06

## 2025-06-19 RX ADMIN — Medication 100 MILLIGRAM(S): at 17:52

## 2025-06-19 RX ADMIN — Medication 2 MILLIGRAM(S): at 11:21

## 2025-06-19 RX ADMIN — Medication 25 GRAM(S): at 14:12

## 2025-06-19 RX ADMIN — Medication 2 MILLIGRAM(S): at 15:48

## 2025-06-19 RX ADMIN — Medication 25 GRAM(S): at 06:18

## 2025-06-19 RX ADMIN — FLUTICASONE PROPIONATE 1 SPRAY(S): 50 SPRAY, METERED NASAL at 17:19

## 2025-06-19 RX ADMIN — Medication 40 MILLIGRAM(S): at 06:17

## 2025-06-19 RX ADMIN — METOPROLOL SUCCINATE 50 MILLIGRAM(S): 50 TABLET, EXTENDED RELEASE ORAL at 06:17

## 2025-06-19 RX ADMIN — Medication 5 MILLIGRAM(S): at 00:37

## 2025-06-19 RX ADMIN — Medication 1 APPLICATION(S): at 06:18

## 2025-06-19 RX ADMIN — APIXABAN 2.5 MILLIGRAM(S): 2.5 TABLET, FILM COATED ORAL at 17:52

## 2025-06-19 RX ADMIN — Medication 40 MILLIEQUIVALENT(S): at 14:12

## 2025-06-19 RX ADMIN — Medication 5 MILLIGRAM(S): at 00:49

## 2025-06-19 RX ADMIN — Medication 81 MILLIGRAM(S): at 11:06

## 2025-06-19 RX ADMIN — AMLODIPINE BESYLATE 5 MILLIGRAM(S): 10 TABLET ORAL at 21:52

## 2025-06-19 RX ADMIN — INSULIN LISPRO 2: 100 INJECTION, SOLUTION INTRAVENOUS; SUBCUTANEOUS at 16:10

## 2025-06-19 RX ADMIN — Medication 40 MILLIEQUIVALENT(S): at 11:06

## 2025-06-19 RX ADMIN — FLUTICASONE PROPIONATE 1 SPRAY(S): 50 SPRAY, METERED NASAL at 06:19

## 2025-06-19 RX ADMIN — INSULIN LISPRO 2: 100 INJECTION, SOLUTION INTRAVENOUS; SUBCUTANEOUS at 11:25

## 2025-06-19 RX ADMIN — POLYETHYLENE GLYCOL 3350 17 GRAM(S): 17 POWDER, FOR SOLUTION ORAL at 21:50

## 2025-06-19 RX ADMIN — Medication 5 MILLIGRAM(S): at 06:47

## 2025-06-19 RX ADMIN — Medication 10 MILLIGRAM(S): at 21:49

## 2025-06-19 RX ADMIN — Medication 25 GRAM(S): at 21:49

## 2025-06-19 RX ADMIN — Medication 100 MILLIGRAM(S): at 11:11

## 2025-06-19 RX ADMIN — METHYLPREDNISOLONE ACETATE 60 MILLIGRAM(S): 80 INJECTION, SUSPENSION INTRA-ARTICULAR; INTRALESIONAL; INTRAMUSCULAR; SOFT TISSUE at 06:17

## 2025-06-19 RX ADMIN — BUMETANIDE 2 MILLIGRAM(S): 1 TABLET ORAL at 16:11

## 2025-06-19 RX ADMIN — DRONABINOL 2.5 MILLIGRAM(S): 10 CAPSULE ORAL at 12:07

## 2025-06-19 RX ADMIN — INSULIN LISPRO 2: 100 INJECTION, SOLUTION INTRAVENOUS; SUBCUTANEOUS at 06:16

## 2025-06-19 RX ADMIN — APIXABAN 2.5 MILLIGRAM(S): 2.5 TABLET, FILM COATED ORAL at 06:17

## 2025-06-19 RX ADMIN — FUROSEMIDE 40 MILLIGRAM(S): 10 INJECTION INTRAMUSCULAR; INTRAVENOUS at 06:17

## 2025-06-19 RX ADMIN — Medication 5 MILLIGRAM(S): at 00:09

## 2025-06-19 NOTE — DIETITIAN INITIAL EVALUATION ADULT - OTHER INFO
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      Per MD note on 6/19:  Acute hypercapnic respiratory failure   Acute hypoxemic respiratory failure  Pulmonary edema   ADRIA  Can't RO pneumonitis secondary to Tagrisso     Pertinent Subjective Information: Pt states she is not hungry. Did not eat breakfast this morning. No chewing or swallowing difficulties reported. Pt states she feels nauseous today. No vomiting reported. Pt agreed to receive supplements to optimize kcal/pro intake.     Weight hx: #/54.5 KG -- checked 1 month ago per pt. Current dosing weight is 61.9 KG. Pt does not meet weight criteria for malnutrition at this time.

## 2025-06-19 NOTE — DIETITIAN INITIAL EVALUATION ADULT - PERTINENT MEDS FT
MEDICATIONS  (STANDING):  amLODIPine   Tablet 5 milliGRAM(s) Oral daily  apixaban 2.5 milliGRAM(s) Oral every 12 hours  aspirin enteric coated 81 milliGRAM(s) Oral daily  chlorhexidine 2% Cloths 1 Application(s) Topical <User Schedule>  dextrose 5%. 1000 milliLiter(s) (50 mL/Hr) IV Continuous <Continuous>  dextrose 5%. 1000 milliLiter(s) (100 mL/Hr) IV Continuous <Continuous>  dextrose 50% Injectable 25 Gram(s) IV Push once  dextrose 50% Injectable 12.5 Gram(s) IV Push once  dextrose 50% Injectable 25 Gram(s) IV Push once  doxycycline IVPB 100 milliGRAM(s) IV Intermittent every 12 hours  doxycycline IVPB      droNABinol 2.5 milliGRAM(s) Oral daily  fluticasone propionate 50 MICROgram(s)/spray Nasal Spray 1 Spray(s) Both Nostrils two times a day  furosemide   Injectable 40 milliGRAM(s) IV Push every 12 hours  glucagon  Injectable 1 milliGRAM(s) IntraMuscular once  insulin lispro (ADMELOG) corrective regimen sliding scale   SubCutaneous Before meals and at bedtime  methylPREDNISolone sodium succinate Injectable 60 milliGRAM(s) IV Push daily  metoprolol succinate ER 50 milliGRAM(s) Oral daily  pantoprazole    Tablet 40 milliGRAM(s) Oral before breakfast  piperacillin/tazobactam IVPB.. 3.375 Gram(s) IV Intermittent every 8 hours  polyethylene glycol 3350 17 Gram(s) Oral at bedtime  senna 2 Tablet(s) Oral at bedtime    MEDICATIONS  (PRN):  albuterol    90 MICROgram(s) HFA Inhaler 2 Puff(s) Inhalation every 6 hours PRN Bronchospasm  dextrose Oral Gel 15 Gram(s) Oral once PRN Blood Glucose LESS THAN 70 milliGRAM(s)/deciliter  morphine  - Injectable 2 milliGRAM(s) IV Push at bedtime PRN Dyspnea

## 2025-06-19 NOTE — DIETITIAN INITIAL EVALUATION ADULT - NAME AND PHONE
Maribel [phone] or TEAMS    Nutrition Interventions: Meals, snacks, supplements; Nutrition Monitoring: Diet order, PO intake, weights, labs, NFPF, body composition, BM and tolerance to medical food supplements

## 2025-06-19 NOTE — PROGRESS NOTE ADULT - ASSESSMENT
IMPRESSION:    Acute hypercapnic respiratory failure   Acute hypoxemic respiratory failure  Pulmonary edema   ADRIA  Can't RO pneumonitis secondary to Tagrisso   HO COPD  HO small cell lung ca on tagriso  HO left adenocarcinoma s/p LLL lobectomy  HO afib/PE on eliquis     PLAN:    CNS:  Avoid sedation. MSO4 PRN for comfort     HEENT: Oral care    PULMONARY:  HOB at 45 degrees.  NIV on and off and during sleep for now.  Wean o2 as tolerated.  Continue home inhaler regimen.  Albuterol PRN.  Repeat CXR in am.  Continue Solumedrol 60 mg daily     CARDIOVASCULAR:  Start Bumex 2 mg BID.  ECHO Noted.      GI: GI prophylaxis.  Feeding when off NIV.  Bowel regimen     RENAL:  Follow up lytes.  Correct as needed,   Monitor UO     INFECTIOUS DISEASE: Follow up cultures, procalcitonin noted, MRSA negative,  Zosyn and Doxy for now.  RVP.  DC ABX if cultures negative     HEMATOLOGICAL:  DVT prophylaxis. DIMER noted.  LE duplex negative.  Monitor CBC     ENDOCRINE:  Follow up FS.  Insulin protocol if needed.      MUSCULOSKELETAL: bed chair position.  Off loading     MICU    GOC

## 2025-06-19 NOTE — DIETITIAN INITIAL EVALUATION ADULT - ADD RECOMMEND
1. ADD Ensure Plus HP 2X/DAILY -- provides 700 kcal, 40g pro total  2. Continue with current diet order  3. Encourage PO intake     High risk f/u

## 2025-06-19 NOTE — DIETITIAN INITIAL EVALUATION ADULT - ORAL INTAKE PTA/DIET HISTORY
Pt reports having a good appetite prior to admit; consumed 2-3 meals daily. Pt drank Boost once daily. Denies taking vitamin or mineral supplements. NKFA; denies any restrictive cultural or [employer] food preferences. No chewing or swallowing difficulties noted with regular textured food.

## 2025-06-19 NOTE — PHARMACOTHERAPY INTERVENTION NOTE - COMMENTS
FERMIN DIAZ 84y Female    Assessed patient medications for high-risk medications:    Benzodiazepines  Opioids  High-anticholinergic medications  Sedatives/ sleep medications  Muscle relaxants  Tricyclic antidepressants  Antipsychotics    albuterol    90 MICROgram(s) HFA Inhaler 2 Puff(s) Inhalation every 6 hours PRN  apixaban 2.5 milliGRAM(s) Oral every 12 hours  aspirin enteric coated 81 milliGRAM(s) Oral daily  chlorhexidine 2% Cloths 1 Application(s) Topical <User Schedule>  dextrose 5%. 1000 milliLiter(s) IV Continuous <Continuous>  dextrose 5%. 1000 milliLiter(s) IV Continuous <Continuous>  dextrose 50% Injectable 25 Gram(s) IV Push once  dextrose 50% Injectable 12.5 Gram(s) IV Push once  dextrose 50% Injectable 25 Gram(s) IV Push once  dextrose Oral Gel 15 Gram(s) Oral once PRN  doxycycline IVPB 100 milliGRAM(s) IV Intermittent every 12 hours  doxycycline IVPB      fluticasone propionate 50 MICROgram(s)/spray Nasal Spray 1 Spray(s) Both Nostrils two times a day  furosemide   Injectable 40 milliGRAM(s) IV Push every 12 hours  glucagon  Injectable 1 milliGRAM(s) IntraMuscular once  insulin lispro (ADMELOG) corrective regimen sliding scale   SubCutaneous three times a day before meals  methylPREDNISolone sodium succinate Injectable 60 milliGRAM(s) IV Push daily  metoprolol succinate ER 50 milliGRAM(s) Oral daily  pantoprazole    Tablet 40 milliGRAM(s) Oral before breakfast  piperacillin/tazobactam IVPB.. 3.375 Gram(s) IV Intermittent every 8 hours  polyethylene glycol 3350 17 Gram(s) Oral at bedtime  senna 2 Tablet(s) Oral at bedtime      [  x  ] Team informed of high risk medications   [     ] None of the above medications identified.   [      ] Discussed with prescriber and no interventions at this time    Discussed with prescriber, if appropriate, to consider:   [  x   ]HOLDING -oxybutynin for now  [      ]REDUCING  [      ]CONTINUE  
QTc 550, d/w med team to evaluate azithromycin, changed to doxycycline 100mg IV q12h
doxycycline 100mg IV q12h-recommended now dose
bumetanide 2mg IV q12h, d/w team to d/c furosemide
FERMIN DIAZ 84y Female    Assessed patient medications for high-risk medications:    Benzodiazepines  Opioids  High-anticholinergic medications  Sedatives/ sleep medications  Muscle relaxants  Tricyclic antidepressants  Antipsychotics    albuterol    90 MICROgram(s) HFA Inhaler 2 Puff(s) Inhalation every 6 hours PRN  amLODIPine   Tablet 5 milliGRAM(s) Oral daily  apixaban 2.5 milliGRAM(s) Oral every 12 hours  aspirin enteric coated 81 milliGRAM(s) Oral daily  bumetanide Injectable 2 milliGRAM(s) IV Push every 12 hours  chlorhexidine 2% Cloths 1 Application(s) Topical <User Schedule>  dextrose 5%. 1000 milliLiter(s) IV Continuous <Continuous>  dextrose 5%. 1000 milliLiter(s) IV Continuous <Continuous>  dextrose 50% Injectable 25 Gram(s) IV Push once  dextrose 50% Injectable 12.5 Gram(s) IV Push once  dextrose 50% Injectable 25 Gram(s) IV Push once  dextrose Oral Gel 15 Gram(s) Oral once PRN  doxycycline IVPB 100 milliGRAM(s) IV Intermittent every 12 hours  doxycycline IVPB      droNABinol 2.5 milliGRAM(s) Oral daily  fluticasone propionate 50 MICROgram(s)/spray Nasal Spray 1 Spray(s) Both Nostrils two times a day  glucagon  Injectable 1 milliGRAM(s) IntraMuscular once  insulin lispro (ADMELOG) corrective regimen sliding scale   SubCutaneous Before meals and at bedtime  methylPREDNISolone sodium succinate Injectable 60 milliGRAM(s) IV Push daily  metoprolol succinate ER 50 milliGRAM(s) Oral daily  morphine  - Injectable 2 milliGRAM(s) IV Push every 6 hours PRN  pantoprazole    Tablet 40 milliGRAM(s) Oral before breakfast  piperacillin/tazobactam IVPB.. 3.375 Gram(s) IV Intermittent every 8 hours  polyethylene glycol 3350 17 Gram(s) Oral at bedtime  senna 2 Tablet(s) Oral at bedtime  zolpidem 10 milliGRAM(s) Oral at bedtime PRN      [  x ]  Team informed of high risk medications   [     ] None of the above medications identified.   [  x  ] Discussed with prescriber and no interventions at this time    Discussed with prescriber, if appropriate, to consider:   [      ]HOLDING   [      ]REDUCING  [  x   ]CONTINUE--morphine & zolpidem prn, will monitor for CAMILLE

## 2025-06-19 NOTE — DIETITIAN INITIAL EVALUATION ADULT - OTHER CALCULATIONS
Using ABW: ENERGY: 9465-3925 kcal/day (30-35 kcal/kg); PROTEIN: 80-99 g/day (1.3-1.6 g/kg); FLUID: 3330-9996 mL/day (20-25 mL/kg) -- with consideration for age, BMI, spcm

## 2025-06-19 NOTE — PROGRESS NOTE ADULT - ASSESSMENT
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 Pt was admitted to ICU for acute hypercapnic hypoxemic  resp failure.     #Acute hypercapnic respiratory failure   #Acute hypoxemic respiratory failure  #Pulmonary edema  #ADRIA on CKD (baseline creat 1.3)   # Rule out possible pneumonitis secondary to Tagrisso   HO COPD  HO small cell lung ca on tagriso  HO left adenocarcinoma s/p LLL lobectomy  - VBG on admission: 7.2 CO@ 60 on bipap-> 4 hours later-> ph 7.27, CO2 63 with AMS and agitation in the ED  - transferred to the MICU, bipap QHS with ABG showeing resolving hypercapnia to AB.4, CO2 43, PO2 56  -  plan: Bipap QHS + 4 on 2 off and while off: HFNC 50 L/ 60 FiO2.   - - CXR- increasing L sided opacities/ effusions- slightly increased pulmonary edema  - ISO pulmonary edema: currently on IV lasix 40 mg BID-> start on Bumex 2 mg IV BID, monitor I/Os, replete electrolytes as needed for diuresis  - ISO possible underlying COPD exacerbation: c/w Solumedrol 60 Q8 and Atrovent  - ISO possible PNA: procal noted at 0.16-> start on Doxycycline and Zosyn  - Blood cultures: pending  -if blood cultures are negative, can dc antibiotics  -monitor ABGs and daily CXRs and lung exam  - Dimer: 1245  - Duplex: neg  - For sxs of tachypnea with RR> 30,, new onset agitation/anxiety:  - add:  Morphine 2 mg Q6 PRN for agitation/Dyspnea (for 48 hours)/ Ambien 10 mg QHS        # AAA- last 5.9 cm, stable  # HFpEF   - Echo : 1. Left ventricular cavity is normal in size. Left ventricular systolic   function is normal with an ejection fraction of 67 % by Arzate's method   of disks. There are no regional wall motion abnormalities seen.   2. Mild left ventricular hypertrophy.   3. The left ventricular diastolic function is indeterminate due to   atrial flutter.   4. Normal right ventricular cavity size, with normal wall thickness, and   normal right ventricular systolic function.   5. Left atrium is severely dilated.   6. The right atrium is moderately dilated.   7. Trileaflet aortic valve with reduced systolic excursion. There is   mild calcification of the aortic valve leaflets. HEYDI 2.0 cm^2.   8. Thickened mitral valve leaflets.   9. There is moderate calcification of the mitral valve annulus.  10. Moderate mitral regurgitation.  11. Moderate tricuspid regurgitation.  12. Estimated pulmonary artery systolic pressure is 65 mmHg.  13. Trace pericardial effusion with no echocardiographic evidence of   tamponade physiology.  - monitor I/Os  - C/w diuresis: Bumex 2 mg BID  - c/w aspirin 81 mg QD  - replete electrolytes as needed-> pending 4pm BMP for K    #Decreased appetite, somnolence and Aniety  - start on Marinol 2.5 QD  - monitor appetite    #HO afib/PE on eliquis   - monitor rate and control  - c/w metoprolol XL 50 mg QD  - C/w home dose of Eliquis 2.5 mg BID    # HTN  # HLD  - c.w amlodipine  - not currently on any statins  - obtain lipid panel     #DM  - ISS   - FS goal: 140-160  - monitor BGs: recently 150-170s      Pending: C/w diuresis, C.w IV abx: Zosyn and Doxy, pending blood cultures, c/w solumedrol, RVP, monitor electrolytes BID; replete PRN

## 2025-06-19 NOTE — DIETITIAN INITIAL EVALUATION ADULT - PERTINENT LABORATORY DATA
06-19    141  |  98  |  56[H]  ----------------------------<  106[H]  3.1[L]   |  30  |  1.5    Ca    9.5      19 Jun 2025 07:59  Phos  3.2     06-19  Mg     2.5     06-19    TPro  5.6[L]  /  Alb  3.7  /  TBili  0.4  /  DBili  x   /  AST  16  /  ALT  14  /  AlkPhos  95  06-19  POCT Blood Glucose.: 176 mg/dL (06-19-25 @ 11:23)  A1C with Estimated Average Glucose Result: 5.4 % (06-18-25 @ 04:30)  A1C with Estimated Average Glucose Result: 5.6 % (10-14-24 @ 07:48)

## 2025-06-19 NOTE — DIETITIAN INITIAL EVALUATION ADULT - NSICDXPASTMEDICALHX_GEN_ALL_CORE_FT
PAST MEDICAL HISTORY:  Cancer of kidney     Cancer of thyroid     COPD (chronic obstructive pulmonary disease)     Diverticulitis     Former smoker     GERD (gastroesophageal reflux disease)     History of abdominal aortic aneurysm (AAA)     Pala (hard of hearing)     Hyperlipidemia, unspecified hyperlipidemia type     Hypertension, unspecified type     Kidney stones     Lung cancer     NHL (non-Hodgkin's lymphoma) "low grade" per heme/ onc note    Obesity     ARTIE (obstructive sleep apnea) NO CPAP MACHINE PER PT.    SOB (shortness of breath)     Type 2 diabetes mellitus with complication, without long-term current use of insulin

## 2025-06-19 NOTE — PROGRESS NOTE ADULT - SUBJECTIVE AND OBJECTIVE BOX
Patient is a 84y old  Female who presents with a chief complaint of Shortness of Breath (19 Jun 2025 14:17)      INTERVAL HPI/OVERNIGHT EVENTS: No overnight events. Afebrile, hemodynamically stable. Patient was agitated overnight and complained of anxiety. Patient required IV hydroxyzine and ambien. Tolerated NIV overnight    ICU Vital Signs Last 24 Hrs  T(C): 36.7 (19 Jun 2025 12:00), Max: 36.8 (19 Jun 2025 00:00)  T(F): 98 (19 Jun 2025 12:00), Max: 98.3 (19 Jun 2025 08:00)  HR: 77 (19 Jun 2025 13:00) (77 - 117)  BP: 148/83 (19 Jun 2025 13:00) (148/83 - 182/79)  BP(mean): 109 (19 Jun 2025 13:00) (94 - 126)  ABP: --  ABP(mean): --  RR: 17 (19 Jun 2025 13:00) (16 - 32)  SpO2: 98% (19 Jun 2025 13:00) (91% - 100%)    O2 Parameters below as of 19 Jun 2025 13:00  Patient On (Oxygen Delivery Method): nasal cannula, high flow  O2 Flow (L/min): 50  O2 Concentration (%): 40      I&O's Summary    18 Jun 2025 07:01  -  19 Jun 2025 07:00  --------------------------------------------------------  IN: 860 mL / OUT: 3100 mL / NET: -2240 mL    19 Jun 2025 07:01  -  19 Jun 2025 15:18  --------------------------------------------------------  IN: 100 mL / OUT: 950 mL / NET: -850 mL          LABS:                        8.1    8.63  )-----------( 101      ( 19 Jun 2025 07:59 )             25.8     06-19    141  |  98  |  56[H]  ----------------------------<  106[H]  3.1[L]   |  30  |  1.5    Ca    9.5      19 Jun 2025 07:59  Phos  3.2     06-19  Mg     2.5     06-19    TPro  5.6[L]  /  Alb  3.7  /  TBili  0.4  /  DBili  x   /  AST  16  /  ALT  14  /  AlkPhos  95  06-19      Urinalysis Basic - ( 19 Jun 2025 07:59 )    Color: x / Appearance: x / SG: x / pH: x  Gluc: 106 mg/dL / Ketone: x  / Bili: x / Urobili: x   Blood: x / Protein: x / Nitrite: x   Leuk Esterase: x / RBC: x / WBC x   Sq Epi: x / Non Sq Epi: x / Bacteria: x      CAPILLARY BLOOD GLUCOSE      POCT Blood Glucose.: 176 mg/dL (19 Jun 2025 11:23)  POCT Blood Glucose.: 175 mg/dL (19 Jun 2025 05:39)  POCT Blood Glucose.: 329 mg/dL (18 Jun 2025 23:20)  POCT Blood Glucose.: 154 mg/dL (18 Jun 2025 15:44)    ABG - ( 18 Jun 2025 04:44 )  pH, Arterial: 7.41  pH, Blood: x     /  pCO2: 49    /  pO2: 139   / HCO3: 31    / Base Excess: 5.3   /  SaO2: 98.6                RADIOLOGY & ADDITIONAL TESTS:    Consultant(s) Notes Reviewed:  [x ] YES  [ ] NO    MEDICATIONS  (STANDING):  amLODIPine   Tablet 5 milliGRAM(s) Oral daily  apixaban 2.5 milliGRAM(s) Oral every 12 hours  aspirin enteric coated 81 milliGRAM(s) Oral daily  bumetanide Injectable 2 milliGRAM(s) IV Push every 12 hours  chlorhexidine 2% Cloths 1 Application(s) Topical <User Schedule>  dextrose 5%. 1000 milliLiter(s) (50 mL/Hr) IV Continuous <Continuous>  dextrose 5%. 1000 milliLiter(s) (100 mL/Hr) IV Continuous <Continuous>  dextrose 50% Injectable 25 Gram(s) IV Push once  dextrose 50% Injectable 12.5 Gram(s) IV Push once  dextrose 50% Injectable 25 Gram(s) IV Push once  doxycycline IVPB 100 milliGRAM(s) IV Intermittent every 12 hours  doxycycline IVPB      droNABinol 2.5 milliGRAM(s) Oral daily  fluticasone propionate 50 MICROgram(s)/spray Nasal Spray 1 Spray(s) Both Nostrils two times a day  glucagon  Injectable 1 milliGRAM(s) IntraMuscular once  insulin lispro (ADMELOG) corrective regimen sliding scale   SubCutaneous Before meals and at bedtime  methylPREDNISolone sodium succinate Injectable 60 milliGRAM(s) IV Push daily  metoprolol succinate ER 50 milliGRAM(s) Oral daily  pantoprazole    Tablet 40 milliGRAM(s) Oral before breakfast  piperacillin/tazobactam IVPB.. 3.375 Gram(s) IV Intermittent every 8 hours  polyethylene glycol 3350 17 Gram(s) Oral at bedtime  senna 2 Tablet(s) Oral at bedtime    MEDICATIONS  (PRN):  albuterol    90 MICROgram(s) HFA Inhaler 2 Puff(s) Inhalation every 6 hours PRN Bronchospasm  dextrose Oral Gel 15 Gram(s) Oral once PRN Blood Glucose LESS THAN 70 milliGRAM(s)/deciliter  morphine  - Injectable 2 milliGRAM(s) IV Push at bedtime PRN Dyspnea  morphine  - Injectable 2 milliGRAM(s) IV Push every 6 hours PRN Dyspnea  zolpidem 10 milliGRAM(s) Oral at bedtime PRN Insomnia      PHYSICAL EXAM:  GENERAL: + anxious, +chronically ill appearing.   HEAD:  Atraumatic, Normocephalic +HFNC   EYES: EOMI, PERRLA, conjunctiva and sclera clear  NECK: Supple, No JVD, Normal thyroid, no enlarged nodes  NERVOUS SYSTEM:  Alert & Awake.   CHEST/LUNG:  + B/l crackles at the bases, more on the L>R  HEART: S1S2 normal, no S3, Regular rate and rhythm; No murmurs  ABDOMEN: Soft, Nontender, Nondistended; Bowel sounds present  EXTREMITIES:  2+ Peripheral Pulses, No clubbing, cyanosis. + b/l LE edema, 2+ pitting bilaterally.   LYMPH: No lymphadenopathy noted  SKIN: No rashes or lesions    Care Discussed with Consultants/Other Providers [ x] YES  [ ] NO

## 2025-06-19 NOTE — PROGRESS NOTE ADULT - SUBJECTIVE AND OBJECTIVE BOX
Patient is a 84y old  Female who presents with a chief complaint of Shortness of Breath (18 Jun 2025 10:26)        Over Night Events: Remains critically ill on HFNCO2.  Off pressors.          ROS:     All ROS are negative except HPI         PHYSICAL EXAM    ICU Vital Signs Last 24 Hrs  T(C): 36.8 (19 Jun 2025 08:00), Max: 36.8 (19 Jun 2025 00:00)  T(F): 98.3 (19 Jun 2025 08:00), Max: 98.3 (19 Jun 2025 08:00)  HR: 81 (19 Jun 2025 10:00) (78 - 117)  BP: 160/80 (19 Jun 2025 10:00) (142/80 - 182/79)  BP(mean): 113 (19 Jun 2025 10:00) (94 - 126)  ABP: --  ABP(mean): --  RR: 23 (19 Jun 2025 10:00) (17 - 32)  SpO2: 100% (19 Jun 2025 10:00) (91% - 100%)    O2 Parameters below as of 19 Jun 2025 10:00  Patient On (Oxygen Delivery Method): nasal cannula, high flow  O2 Flow (L/min): 50  O2 Concentration (%): 50        CONSTITUTIONAL:  Ill appearing     ENT:   Airway patent,   Mouth with normal mucosa.       EYES:   Pupils equal,   Round and reactive to light.    CARDIAC:   Normal rate,   Regular rhythm.        RESPIRATORY:   No wheezing  Bilateral crackles   Normal chest expansion  tachypneic,  No use of accessory muscles    GASTROINTESTINAL:  Abdomen soft,   Non-tender,   No guarding,   + BS    MUSCULOSKELETAL:   No clubbing, cyanosis    NEUROLOGICAL:   Alert and oriented   No motor  deficits.    SKIN:   Skin normal color for race,   Warm and dry   No evidence of rash.      06-18-25 @ 07:01  -  06-19-25 @ 07:00  --------------------------------------------------------  IN:    IV PiggyBack: 500 mL    Oral Fluid: 360 mL  Total IN: 860 mL    OUT:    Voided (mL): 3100 mL  Total OUT: 3100 mL    Total NET: -2240 mL      06-19-25 @ 07:01 - 06-19-25 @ 10:14  --------------------------------------------------------  IN:  Total IN: 0 mL    OUT:    Voided (mL): 900 mL  Total OUT: 900 mL    Total NET: -900 mL          LABS:                            8.1    8.63  )-----------( 101      ( 19 Jun 2025 07:59 )             25.8                                               06-19    141  |  98  |  56[H]  ----------------------------<  106[H]  3.1[L]   |  30  |  1.5    Creatinine Trend  BUN 56, Cr 1.5, (06-19-25 @ 07:59)  Creatinine Trend  BUN 53, Cr 1.7, (06-18-25 @ 04:30)  Creatinine Trend  BUN 52, Cr 1.5, (06-17-25 @ 08:40)      Ca    9.5      19 Jun 2025 07:59  Phos  3.2     06-19  Mg     2.5     06-19    TPro  5.6[L]  /  Alb  3.7  /  TBili  0.4  /  DBili  x   /  AST  16  /  ALT  14  /  AlkPhos  95  06-19                                             Urinalysis Basic - ( 19 Jun 2025 07:59 )    Color: x / Appearance: x / SG: x / pH: x  Gluc: 106 mg/dL / Ketone: x  / Bili: x / Urobili: x   Blood: x / Protein: x / Nitrite: x   Leuk Esterase: x / RBC: x / WBC x   Sq Epi: x / Non Sq Epi: x / Bacteria: x                                                  LIVER FUNCTIONS - ( 19 Jun 2025 07:59 )  Alb: 3.7 g/dL / Pro: 5.6 g/dL / ALK PHOS: 95 U/L / ALT: 14 U/L / AST: 16 U/L / GGT: x                                                  Culture - Blood (collected 17 Jun 2025 09:06)  Source: Blood Blood  Preliminary Report (18 Jun 2025 15:02):    No growth at 24 hours    Culture - Blood (collected 17 Jun 2025 09:06)  Source: Blood Blood  Preliminary Report (18 Jun 2025 15:02):    No growth at 24 hours    Urinalysis with Rflx Culture (collected 17 Jun 2025 07:39)                                                                                       ABG - ( 18 Jun 2025 04:44 )  pH, Arterial: 7.41  pH, Blood: x     /  pCO2: 49    /  pO2: 139   / HCO3: 31    / Base Excess: 5.3   /  SaO2: 98.6                MEDICATIONS  (STANDING):  amLODIPine   Tablet 5 milliGRAM(s) Oral daily  apixaban 2.5 milliGRAM(s) Oral every 12 hours  aspirin enteric coated 81 milliGRAM(s) Oral daily  chlorhexidine 2% Cloths 1 Application(s) Topical <User Schedule>  dextrose 5%. 1000 milliLiter(s) (100 mL/Hr) IV Continuous <Continuous>  dextrose 5%. 1000 milliLiter(s) (50 mL/Hr) IV Continuous <Continuous>  dextrose 50% Injectable 25 Gram(s) IV Push once  dextrose 50% Injectable 12.5 Gram(s) IV Push once  dextrose 50% Injectable 25 Gram(s) IV Push once  doxycycline IVPB 100 milliGRAM(s) IV Intermittent every 12 hours  doxycycline IVPB      fluticasone propionate 50 MICROgram(s)/spray Nasal Spray 1 Spray(s) Both Nostrils two times a day  furosemide   Injectable 40 milliGRAM(s) IV Push every 12 hours  glucagon  Injectable 1 milliGRAM(s) IntraMuscular once  insulin lispro (ADMELOG) corrective regimen sliding scale   SubCutaneous Before meals and at bedtime  methylPREDNISolone sodium succinate Injectable 60 milliGRAM(s) IV Push daily  metoprolol succinate ER 50 milliGRAM(s) Oral daily  pantoprazole    Tablet 40 milliGRAM(s) Oral before breakfast  piperacillin/tazobactam IVPB.. 3.375 Gram(s) IV Intermittent every 8 hours  polyethylene glycol 3350 17 Gram(s) Oral at bedtime  senna 2 Tablet(s) Oral at bedtime    MEDICATIONS  (PRN):  albuterol    90 MICROgram(s) HFA Inhaler 2 Puff(s) Inhalation every 6 hours PRN Bronchospasm  dextrose Oral Gel 15 Gram(s) Oral once PRN Blood Glucose LESS THAN 70 milliGRAM(s)/deciliter      New X-rays reviewed:                                                                                  ECHO

## 2025-06-20 LAB
ALBUMIN SERPL ELPH-MCNC: 3.5 G/DL — SIGNIFICANT CHANGE UP (ref 3.5–5.2)
ALP SERPL-CCNC: 85 U/L — SIGNIFICANT CHANGE UP (ref 30–115)
ALT FLD-CCNC: 13 U/L — SIGNIFICANT CHANGE UP (ref 0–41)
ANION GAP SERPL CALC-SCNC: 12 MMOL/L — SIGNIFICANT CHANGE UP (ref 7–14)
AST SERPL-CCNC: 12 U/L — SIGNIFICANT CHANGE UP (ref 0–41)
BASOPHILS # BLD AUTO: 0.01 K/UL — SIGNIFICANT CHANGE UP (ref 0–0.2)
BASOPHILS NFR BLD AUTO: 0.1 % — SIGNIFICANT CHANGE UP (ref 0–1)
BILIRUB SERPL-MCNC: 0.5 MG/DL — SIGNIFICANT CHANGE UP (ref 0.2–1.2)
BUN SERPL-MCNC: 51 MG/DL — HIGH (ref 10–20)
CALCIUM SERPL-MCNC: 9.7 MG/DL — SIGNIFICANT CHANGE UP (ref 8.4–10.5)
CHLORIDE SERPL-SCNC: 99 MMOL/L — SIGNIFICANT CHANGE UP (ref 98–110)
CHOLEST SERPL-MCNC: 162 MG/DL — SIGNIFICANT CHANGE UP
CO2 SERPL-SCNC: 33 MMOL/L — HIGH (ref 17–32)
CREAT SERPL-MCNC: 1.3 MG/DL — SIGNIFICANT CHANGE UP (ref 0.7–1.5)
EGFR: 41 ML/MIN/1.73M2 — LOW
EGFR: 41 ML/MIN/1.73M2 — LOW
EOSINOPHIL # BLD AUTO: 0.04 K/UL — SIGNIFICANT CHANGE UP (ref 0–0.7)
EOSINOPHIL NFR BLD AUTO: 0.6 % — SIGNIFICANT CHANGE UP (ref 0–8)
GLUCOSE BLDC GLUCOMTR-MCNC: 100 MG/DL — HIGH (ref 70–99)
GLUCOSE BLDC GLUCOMTR-MCNC: 217 MG/DL — HIGH (ref 70–99)
GLUCOSE BLDC GLUCOMTR-MCNC: 255 MG/DL — HIGH (ref 70–99)
GLUCOSE BLDC GLUCOMTR-MCNC: 286 MG/DL — HIGH (ref 70–99)
GLUCOSE SERPL-MCNC: 90 MG/DL — SIGNIFICANT CHANGE UP (ref 70–99)
HCT VFR BLD CALC: 26.4 % — LOW (ref 37–47)
HDLC SERPL-MCNC: 57 MG/DL — SIGNIFICANT CHANGE UP
HGB BLD-MCNC: 8.1 G/DL — LOW (ref 12–16)
IMM GRANULOCYTES NFR BLD AUTO: 0.4 % — HIGH (ref 0.1–0.3)
LDLC SERPL-MCNC: 94 MG/DL — SIGNIFICANT CHANGE UP
LIPID PNL WITH DIRECT LDL SERPL: 94 MG/DL — SIGNIFICANT CHANGE UP
LYMPHOCYTES # BLD AUTO: 0.84 K/UL — LOW (ref 1.2–3.4)
LYMPHOCYTES # BLD AUTO: 11.9 % — LOW (ref 20.5–51.1)
MAGNESIUM SERPL-MCNC: 2.1 MG/DL — SIGNIFICANT CHANGE UP (ref 1.8–2.4)
MCHC RBC-ENTMCNC: 26.4 PG — LOW (ref 27–31)
MCHC RBC-ENTMCNC: 30.7 G/DL — LOW (ref 32–37)
MCV RBC AUTO: 86 FL — SIGNIFICANT CHANGE UP (ref 81–99)
MONOCYTES # BLD AUTO: 0.95 K/UL — HIGH (ref 0.1–0.6)
MONOCYTES NFR BLD AUTO: 13.5 % — HIGH (ref 1.7–9.3)
NEUTROPHILS # BLD AUTO: 5.18 K/UL — SIGNIFICANT CHANGE UP (ref 1.4–6.5)
NEUTROPHILS NFR BLD AUTO: 73.5 % — SIGNIFICANT CHANGE UP (ref 42.2–75.2)
NONHDLC SERPL-MCNC: 105 MG/DL — SIGNIFICANT CHANGE UP
NRBC BLD AUTO-RTO: 0 /100 WBCS — SIGNIFICANT CHANGE UP (ref 0–0)
PHOSPHATE SERPL-MCNC: 4.1 MG/DL — SIGNIFICANT CHANGE UP (ref 2.1–4.9)
PLATELET # BLD AUTO: 85 K/UL — LOW (ref 130–400)
PMV BLD: 12.9 FL — HIGH (ref 7.4–10.4)
POTASSIUM SERPL-MCNC: 4.1 MMOL/L — SIGNIFICANT CHANGE UP (ref 3.5–5)
POTASSIUM SERPL-SCNC: 4.1 MMOL/L — SIGNIFICANT CHANGE UP (ref 3.5–5)
PROT SERPL-MCNC: 5.2 G/DL — LOW (ref 6–8)
RBC # BLD: 3.07 M/UL — LOW (ref 4.2–5.4)
RBC # FLD: 16 % — HIGH (ref 11.5–14.5)
SODIUM SERPL-SCNC: 144 MMOL/L — SIGNIFICANT CHANGE UP (ref 135–146)
TRIGL SERPL-MCNC: 53 MG/DL — SIGNIFICANT CHANGE UP
WBC # BLD: 7.05 K/UL — SIGNIFICANT CHANGE UP (ref 4.8–10.8)
WBC # FLD AUTO: 7.05 K/UL — SIGNIFICANT CHANGE UP (ref 4.8–10.8)

## 2025-06-20 PROCEDURE — 71045 X-RAY EXAM CHEST 1 VIEW: CPT | Mod: 26

## 2025-06-20 PROCEDURE — 99291 CRITICAL CARE FIRST HOUR: CPT

## 2025-06-20 PROCEDURE — 99223 1ST HOSP IP/OBS HIGH 75: CPT

## 2025-06-20 RX ORDER — BISACODYL 5 MG
10 TABLET, DELAYED RELEASE (ENTERIC COATED) ORAL ONCE
Refills: 0 | Status: COMPLETED | OUTPATIENT
Start: 2025-06-20 | End: 2025-06-20

## 2025-06-20 RX ORDER — POLYETHYLENE GLYCOL 3350 17 G/17G
17 POWDER, FOR SOLUTION ORAL EVERY 12 HOURS
Refills: 0 | Status: DISCONTINUED | OUTPATIENT
Start: 2025-06-20 | End: 2025-07-01

## 2025-06-20 RX ADMIN — Medication 2 MILLIGRAM(S): at 05:16

## 2025-06-20 RX ADMIN — METHYLPREDNISOLONE ACETATE 60 MILLIGRAM(S): 80 INJECTION, SUSPENSION INTRA-ARTICULAR; INTRALESIONAL; INTRAMUSCULAR; SOFT TISSUE at 05:10

## 2025-06-20 RX ADMIN — INSULIN LISPRO 6: 100 INJECTION, SOLUTION INTRAVENOUS; SUBCUTANEOUS at 18:20

## 2025-06-20 RX ADMIN — BUMETANIDE 2 MILLIGRAM(S): 1 TABLET ORAL at 05:10

## 2025-06-20 RX ADMIN — Medication 10 MILLIGRAM(S): at 18:21

## 2025-06-20 RX ADMIN — Medication 50 MILLIGRAM(S): at 12:59

## 2025-06-20 RX ADMIN — Medication 1 APPLICATION(S): at 05:10

## 2025-06-20 RX ADMIN — Medication 25 GRAM(S): at 05:12

## 2025-06-20 RX ADMIN — FLUTICASONE PROPIONATE 1 SPRAY(S): 50 SPRAY, METERED NASAL at 18:21

## 2025-06-20 RX ADMIN — AMLODIPINE BESYLATE 5 MILLIGRAM(S): 10 TABLET ORAL at 21:15

## 2025-06-20 RX ADMIN — APIXABAN 2.5 MILLIGRAM(S): 2.5 TABLET, FILM COATED ORAL at 18:21

## 2025-06-20 RX ADMIN — METOPROLOL SUCCINATE 50 MILLIGRAM(S): 50 TABLET, EXTENDED RELEASE ORAL at 05:11

## 2025-06-20 RX ADMIN — Medication 2 TABLET(S): at 21:15

## 2025-06-20 RX ADMIN — Medication 40 MILLIGRAM(S): at 06:35

## 2025-06-20 RX ADMIN — FLUTICASONE PROPIONATE 1 SPRAY(S): 50 SPRAY, METERED NASAL at 05:11

## 2025-06-20 RX ADMIN — Medication 100 MILLIGRAM(S): at 05:16

## 2025-06-20 RX ADMIN — APIXABAN 2.5 MILLIGRAM(S): 2.5 TABLET, FILM COATED ORAL at 05:11

## 2025-06-20 RX ADMIN — Medication 2 MILLIGRAM(S): at 05:31

## 2025-06-20 RX ADMIN — Medication 81 MILLIGRAM(S): at 12:14

## 2025-06-20 RX ADMIN — DRONABINOL 2.5 MILLIGRAM(S): 10 CAPSULE ORAL at 12:14

## 2025-06-20 RX ADMIN — BUMETANIDE 2 MILLIGRAM(S): 1 TABLET ORAL at 18:21

## 2025-06-20 RX ADMIN — INSULIN LISPRO 6: 100 INJECTION, SOLUTION INTRAVENOUS; SUBCUTANEOUS at 12:13

## 2025-06-20 RX ADMIN — INSULIN LISPRO 4: 100 INJECTION, SOLUTION INTRAVENOUS; SUBCUTANEOUS at 21:14

## 2025-06-20 NOTE — CONSULT NOTE ADULT - ASSESSMENT
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  Patient has been decompensating since April after she underwent AAA stenting ( Has some renal artery leak). Was sent to nursing home. Was diagnosed with Ecoli/Pseudomonas UTIi treated with levaquin . Recently was having Shortness of breath/ cough ; treated with augmentin,. they checked theBNP 4199 ; followed Tye De Dios ; was started on Torsemide 20 mg other day. Since yesterday had increased shortness of breath and anxiety. Nursing home gave her lasix which improved her breathing status but worsened since the morning  so was sent to the hospital.     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  - 07/24/2023 restarted Tagrisso.  - 10/02/2023 PET/CT showed [facility]: Significant decrease in FDG uptake in the mediastinum and left pleura compared to prior exam, now only avid on nonattenuation corrected images. Anatomically, pleural nodularity in the left hemithorax is also decreased.  - 02/12/2025 PET - No definite sites of abnormal FDG uptake to suggest biologic tumor activity. Abdominal aortic aneurysm measuring 5.7 cm.  ?  # History of Lung Cancer 13 years ago  Early-stage NSCLC s/p resection alone  ?  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  - Left para-aortic lymph node. On observation since she is asymptomatic.  - Recent diverticulitis imaging done at that time showed no lymphadenopathy as per pt.  - No B symptoms.  - 10/02/2023, 01/15/2024 PET/CT showed ALIYAH., PET.CT done on 8/7/2024 no evidence of biologic tumor activity.  ?  # Anemia, mild, microcytic - resolved.  - S/P 5 doses of Venofer in April. Ferritin improved 112, Iron sat 10%.  - recommended to f/u with GI she did but not a candidate for colonoscopy due to previous peroration and will monitor.  ?  # History of Thyroid cancer she states it was a partial thyroidectomy  - This explains the presence of positive thyroglobulin 21 tumor marker.  ?  # Adnexal cyst.  - PET/CT negative.  ? 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  Patient has been decompensating since April after she underwent AAA stenting ( Has some renal artery leak). Was sent to nursing home. Was diagnosed with Ecoli/Pseudomonas UTIi treated with levaquin . Recently was having Shortness of breath/ cough ; treated with augmentin,. they checked theBNP 4199 ; followed Tye De Dios ; was started on Torsemide 20 mg other day. Since yesterday had increased shortness of breath and anxiety. Nursing home gave her lasix which improved her breathing status but worsened since the morning  so was sent to the hospital.     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  - 07/24/2023 restarted Tagrisso.  - 10/02/2023 PET/CT showed [place]: Significant decrease in FDG uptake in the mediastinum and left pleura compared to prior exam, now only avid on nonattenuation corrected images. Anatomically, pleural nodularity in the left hemithorax is also decreased.  - 02/12/2025 PET - No definite sites of abnormal FDG uptake to suggest biologic tumor activity. Abdominal aortic aneurysm measuring 5.7 cm.  ?  # History of Lung Cancer 13 years ago  Early-stage NSCLC s/p resection alone  ?  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  - Left para-aortic lymph node. On observation since she is asymptomatic.  - 10/02/2023, 01/15/2024 PET/CT showed ALIYAH., PET.CT done on 8/7/2024 no evidence of biologic tumor activity.  ?  # History of Thyroid cancer she states it was a partial thyroidectomy    Recommendation:  Continue with iv diuretics  Recommend CT chest if doesn't improve  Cardiology evaluation    INOCOMPLETE NOTE  ? 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  Patient has been decompensating since April after she underwent AAA stenting ( Has some renal artery leak). Was sent to nursing home. Was diagnosed with Ecoli/Pseudomonas UTIi treated with levaquin . Recently was having Shortness of breath/ cough ; treated with augmentin,. they checked theBNP 4199 ; followed Tye De Dios ; was started on Torsemide 20 mg other day. Since yesterday had increased shortness of breath and anxiety. Nursing home gave her lasix which improved her breathing status but worsened since the morning  so was sent to the hospital.     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  - 07/24/2023 restarted Tagrisso.  - 10/02/2023 PET/CT showed [place]: Significant decrease in FDG uptake in the mediastinum and left pleura compared to prior exam, now only avid on nonattenuation corrected images. Anatomically, pleural nodularity in the left hemithorax is also decreased.  - 02/12/2025 PET - No definite sites of abnormal FDG uptake to suggest biologic tumor activity. Abdominal aortic aneurysm measuring 5.7 cm.  ?  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  - Left para-aortic lymph node. On observation since she is asymptomatic.  - 10/02/2023, 01/15/2024 PET/CT showed ALIYAH., PET.CT done on 8/7/2024 no evidence of biologic tumor activity.  ?  # History of Thyroid cancer she states it was a partial thyroidectomy    Recommendation:  Continue to hold Tagrisso for now, although clinical picture more suggestive of CHF.  Continue with iv diuretics  Recommend CT chest if doesn't improve  Cardiology evaluation  Recommend iron panel, ferritin, vitamin b12, folate, retics, LDH for work up of anemia.    ? 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  Patient has been decompensating since April after she underwent AAA stenting ( Has some renal artery leak). Was sent to nursing home. Was diagnosed with Ecoli/Pseudomonas UTIi treated with levaquin . Recently was having Shortness of breath/ cough ; treated with augmentin,. they checked theBNP 4199 ; followed Tye De Dios ; was started on Torsemide 20 mg other day. Since yesterday had increased shortness of breath and anxiety. Nursing home gave her lasix which improved her breathing status but worsened since the morning  so was sent to the hospital.     #Acute hypoxic hypercapnic respiratory failure:    Daughter reports she was not getting her torsemide in her rehab in May, when she found that she had significant LE swelling. Was seen by her cardiologist  recently and was resumed on Torsemide.  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  - 07/24/2023 restarted Tagrisso.  - 10/02/2023 PET/CT showed [place]: Significant decrease in FDG uptake in the mediastinum and left pleura compared to prior exam, now only avid on nonattenuation corrected images. Anatomically, pleural nodularity in the left hemithorax is also decreased.  - 02/12/2025 PET - No definite sites of abnormal FDG uptake to suggest biologic tumor activity. Abdominal aortic aneurysm measuring 5.7 cm.  ?  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  - Left para-aortic lymph node. On observation since she is asymptomatic.  - 10/02/2023, 01/15/2024 PET/CT showed ALIYAH., PET.CT done on 8/7/2024 no evidence of biologic tumor activity.  ?  # Abdominal artery anurysm repair done in April   Was done in [facility], surgery was complicated.  She has been in rehab since then    Recommendation:  Continue to hold Tagrisso for now, although clinical picture more suggestive of CHF.  Continue with iv diuretics  Recommend CT chest if doesn't improve  Cardiology evaluation  Recommend iron panel, ferritin, vitamin b12, folate, retics, LDH for work up of anemia.    ?

## 2025-06-20 NOTE — PROGRESS NOTE ADULT - SUBJECTIVE AND OBJECTIVE BOX
Detail Level: Simple Detail Level: Zone Patient is a 84y old  Female who presents with a chief complaint of Shortness of Breath (19 Jun 2025 15:16)        Over Night Events:  Remains on HFNCO2.  Off pressors.          ROS:     All ROS are negative except HPI         PHYSICAL EXAM    ICU Vital Signs Last 24 Hrs  T(C): 36.2 (20 Jun 2025 04:00), Max: 36.8 (19 Jun 2025 16:00)  T(F): 97.2 (20 Jun 2025 04:00), Max: 98.2 (19 Jun 2025 16:00)  HR: 78 (20 Jun 2025 07:00) (75 - 82)  BP: 163/86 (20 Jun 2025 07:00) (100/60 - 174/88)  BP(mean): 114 (20 Jun 2025 07:00) (73 - 125)  ABP: --  ABP(mean): --  RR: 12 (20 Jun 2025 07:00) (12 - 25)  SpO2: 100% (20 Jun 2025 07:00) (88% - 100%)    O2 Parameters below as of 20 Jun 2025 04:00  Patient On (Oxygen Delivery Method): nasal cannula, high flow  O2 Flow (L/min): 50  O2 Concentration (%): 55        CONSTITUTIONAL:  NAD    ENT:   Airway patent,   Mouth with normal mucosa.       EYES:   Pupils equal,   Round and reactive to light.    CARDIAC:   Normal rate,   Regular rhythm.        RESPIRATORY:   No wheezing  Bilateral crackles   Normal chest expansion  Not tachypneic,  No use of accessory muscles    GASTROINTESTINAL:  Abdomen soft,   Non-tender,   No guarding,   + BS    MUSCULOSKELETAL:   Range of motion is not limited,  No clubbing, cyanosis    NEUROLOGICAL:   Alert and oriented   No motor  deficits.    SKIN:   Skin normal color for race,   Warm and dry  No evidence of rash.        06-19-25 @ 07:01  -  06-20-25 @ 07:00  --------------------------------------------------------  IN:    IV PiggyBack: 400 mL    Oral Fluid: 180 mL  Total IN: 580 mL    OUT:    Voided (mL): 2550 mL  Total OUT: 2550 mL    Total NET: -1970 mL          LABS:                            8.1    7.05  )-----------( 85       ( 20 Jun 2025 04:50 )             26.4                                               06-20    144  |  99  |  51[H]  ----------------------------<  90  4.1   |  33[H]  |  1.3    Creatinine Trend  BUN 51, Cr 1.3, (06-20-25 @ 04:50)  Creatinine Trend  BUN 50, Cr 1.3, (06-19-25 @ 20:10)  Creatinine Trend  BUN 51, Cr 1.4, (06-19-25 @ 16:50)  Creatinine Trend  BUN 56, Cr 1.5, (06-19-25 @ 07:59)  Creatinine Trend  BUN 53, Cr 1.7, (06-18-25 @ 04:30)  Creatinine Trend  BUN 52, Cr 1.5, (06-17-25 @ 08:40)      Ca    9.7      20 Jun 2025 04:50  Phos  4.1     06-20  Mg     2.1     06-20    TPro  5.2[L]  /  Alb  3.5  /  TBili  0.5  /  DBili  x   /  AST  12  /  ALT  13  /  AlkPhos  85  06-20                                             Urinalysis Basic - ( 20 Jun 2025 04:50 )    Color: x / Appearance: x / SG: x / pH: x  Gluc: 90 mg/dL / Ketone: x  / Bili: x / Urobili: x   Blood: x / Protein: x / Nitrite: x   Leuk Esterase: x / RBC: x / WBC x   Sq Epi: x / Non Sq Epi: x / Bacteria: x                                                  LIVER FUNCTIONS - ( 20 Jun 2025 04:50 )  Alb: 3.5 g/dL / Pro: 5.2 g/dL / ALK PHOS: 85 U/L / ALT: 13 U/L / AST: 12 U/L / GGT: x                                                                                                                                       MEDICATIONS  (STANDING):  amLODIPine   Tablet 5 milliGRAM(s) Oral every 24 hours  apixaban 2.5 milliGRAM(s) Oral every 12 hours  aspirin enteric coated 81 milliGRAM(s) Oral daily  bumetanide Injectable 2 milliGRAM(s) IV Push every 12 hours  chlorhexidine 2% Cloths 1 Application(s) Topical <User Schedule>  dextrose 5%. 1000 milliLiter(s) (100 mL/Hr) IV Continuous <Continuous>  dextrose 5%. 1000 milliLiter(s) (50 mL/Hr) IV Continuous <Continuous>  dextrose 50% Injectable 25 Gram(s) IV Push once  dextrose 50% Injectable 12.5 Gram(s) IV Push once  dextrose 50% Injectable 25 Gram(s) IV Push once  doxycycline IVPB 100 milliGRAM(s) IV Intermittent every 12 hours  doxycycline IVPB      droNABinol 2.5 milliGRAM(s) Oral daily  fluticasone propionate 50 MICROgram(s)/spray Nasal Spray 1 Spray(s) Both Nostrils two times a day  glucagon  Injectable 1 milliGRAM(s) IntraMuscular once  insulin lispro (ADMELOG) corrective regimen sliding scale   SubCutaneous Before meals and at bedtime  methylPREDNISolone sodium succinate Injectable 60 milliGRAM(s) IV Push daily  metoprolol succinate ER 50 milliGRAM(s) Oral daily  pantoprazole    Tablet 40 milliGRAM(s) Oral before breakfast  piperacillin/tazobactam IVPB.. 3.375 Gram(s) IV Intermittent every 8 hours  polyethylene glycol 3350 17 Gram(s) Oral at bedtime  senna 2 Tablet(s) Oral at bedtime    MEDICATIONS  (PRN):  albuterol    90 MICROgram(s) HFA Inhaler 2 Puff(s) Inhalation every 6 hours PRN Bronchospasm  dextrose Oral Gel 15 Gram(s) Oral once PRN Blood Glucose LESS THAN 70 milliGRAM(s)/deciliter  morphine  - Injectable 2 milliGRAM(s) IV Push every 6 hours PRN Dyspnea  zolpidem 10 milliGRAM(s) Oral at bedtime PRN Insomnia      New X-rays reviewed:                                                                                  ECHO         Detail Level: Detailed

## 2025-06-20 NOTE — CONSULT NOTE ADULT - SUBJECTIVE AND OBJECTIVE BOX
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    Patient has been decompensating since April after she underwent AAA stenting ( Has some renal artery leak). Was sent to nursing home. Was diagnosed with Ecoli/Pseudomonas UTIi treated with levaquin . Recently was having Shortness of breath/ cough ; treated with augmentin,. they checked theBNP 4199 ; followed Tye De Dios ; was started on Torsemide 20 mg other day.   Since yesterday had increased shortness of breath and anxiety. Nursing home gave her lasix which improved her breathing status but worsened since the morning  so was sent to the hospital.     No fever, chills or chest pain.  Daughter at bedside providing additional history, reports that she thinks patient never cleared pneumonia    In the ED:   Vitals: /79;  ; RR 25 ; afebrile ; 92 % on NRB     Labs:   WBC 6.42 Hb 8.8 Platelet 99K Neutro 83.2   Na 139 K 5.0   BUN/Creatinine 52/1.5   Pro BNP 6050     VBG ; Initial 7.29 CO2 60 HCO3 29 Lactate 1.4   Repeat pH 7.27 Co2 63 HCo3 29 lactate 1.7     Ua negative     US renal: mild bilateral hydronephrosis ; 0.5 non obstructive calculus     Chest Xray : Bilateral patchy opacities and left pleural effusion.    admitted to ICU.  (17 Jun 2025 15:31)      Oncology Hx:      Allergies    No Known Allergies    Intolerances        MEDICATIONS  (STANDING):  amLODIPine   Tablet 5 milliGRAM(s) Oral every 24 hours  apixaban 2.5 milliGRAM(s) Oral every 12 hours  aspirin enteric coated 81 milliGRAM(s) Oral daily  bumetanide Injectable 2 milliGRAM(s) IV Push every 12 hours  chlorhexidine 2% Cloths 1 Application(s) Topical <User Schedule>  dextrose 5%. 1000 milliLiter(s) (100 mL/Hr) IV Continuous <Continuous>  dextrose 5%. 1000 milliLiter(s) (50 mL/Hr) IV Continuous <Continuous>  dextrose 50% Injectable 25 Gram(s) IV Push once  dextrose 50% Injectable 12.5 Gram(s) IV Push once  dextrose 50% Injectable 25 Gram(s) IV Push once  droNABinol 2.5 milliGRAM(s) Oral daily  fluticasone propionate 50 MICROgram(s)/spray Nasal Spray 1 Spray(s) Both Nostrils two times a day  glucagon  Injectable 1 milliGRAM(s) IntraMuscular once  insulin lispro (ADMELOG) corrective regimen sliding scale   SubCutaneous Before meals and at bedtime  methylPREDNISolone sodium succinate Injectable 60 milliGRAM(s) IV Push daily  metoprolol succinate ER 50 milliGRAM(s) Oral daily  pantoprazole    Tablet 40 milliGRAM(s) Oral before breakfast  polyethylene glycol 3350 17 Gram(s) Oral every 12 hours  senna 2 Tablet(s) Oral at bedtime    MEDICATIONS  (PRN):  albuterol    90 MICROgram(s) HFA Inhaler 2 Puff(s) Inhalation every 6 hours PRN Bronchospasm  dextrose Oral Gel 15 Gram(s) Oral once PRN Blood Glucose LESS THAN 70 milliGRAM(s)/deciliter  morphine  - Injectable 2 milliGRAM(s) IV Push every 6 hours PRN Dyspnea  zolpidem 10 milliGRAM(s) Oral at bedtime PRN Insomnia      PAST MEDICAL & SURGICAL HISTORY:  Hypertension, unspecified type  Type 2 diabetes mellitus with complication, without long-term current use of insulin  Hyperlipidemia, unspecified hyperlipidemia type  Diverticulitis  Obesity  COPD (chronic obstructive pulmonary disease)  SOB (shortness of breath)  GERD (gastroesophageal reflux disease)  Lung cancer  Cancer of kidney  Cancer of thyroid      Former smoker      NHL (non-Hodgkin's lymphoma)  "low grade" per heme/ onc note      History of abdominal aortic aneurysm (AAA)      Kidney stones      [place] (hard of hearing)      History of surgery  LEFT LOWER LOBECTOMY      History of surgery  BILATERAL KNEE REPLACEMENT      History of surgery  CATARACT RIGHT EYE      History of surgery  TUBAL LIGATION      History of surgery  CYST REMOVED  RIGHT BREAST      History of surgery  CHOLECYSTECOMY      History of surgery  RIGHT PARTIAL NEPHRECTOMY      History of surgery  BILATERAL CATARACT SURGERY      History of back surgery      H/O lithotripsy      H/O partial thyroidectomy          FAMILY HISTORY:  Family history of dementia (Mother)    Family history of cancer (Father, Grandparent)          REVIEW OF SYSTEMS:    Height (cm): 162.6 (06-19 @ 14:17)    T(F): 97 (06-20-25 @ 08:00), Max: 98.2 (06-19-25 @ 16:00)  HR: 83 (06-20-25 @ 09:00)  BP: 152/91 (06-20-25 @ 09:00)  RR: 48 (06-20-25 @ 09:00)  SpO2: 100% (06-20-25 @ 09:28)  Wt(kg): --    GENERAL: NAD, well-developed  CHEST/LUNG: Clear to auscultation bilaterally; No wheeze  HEART: Regular rate and rhythm; No murmurs, rubs, or gallops  ABDOMEN: Soft, Nontender, Nondistended; Bowel sounds present  EXTREMITIES:  2+ Peripheral Pulses, No clubbing, cyanosis, or edema  NEUROLOGY: non-focal  SKIN: No rashes or lesions                          8.1    7.05  )-----------( 85       ( 20 Jun 2025 04:50 )             26.4       06-20    144  |  99  |  51[H]  ----------------------------<  90  4.1   |  33[H]  |  1.3    Ca    9.7      20 Jun 2025 04:50  Phos  4.1     06-20  Mg     2.1     06-20    TPro  5.2[L]  /  Alb  3.5  /  TBili  0.5  /  DBili  x   /  AST  12  /  ALT  13  /  AlkPhos  85  06-20      Phosphorus: 4.1 mg/dL (06-20 @ 04:50)  Magnesium: 2.1 mg/dL (06-20 @ 04:50)       Hematology/Oncology Consult Note    HPI:  84-year-old female with past medical history of small cell lung cancer on tagrisso in remission, 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    Patient has been decompensating since April after she underwent AAA stenting ( Has some renal artery leak). Was sent to nursing home. Was diagnosed with Ecoli/Pseudomonas UTIi treated with levaquin . Recently was having Shortness of breath/ cough ; treated with augmentin,. they checked theBNP 4199 ; followed Tye De Dios ; was started on Torsemide 20 mg other day.   Since yesterday had increased shortness of breath and anxiety. Nursing home gave her lasix which improved her breathing status but worsened since the morning  so was sent to the hospital.     No fever, chills or chest pain.  Daughter at bedside providing additional history, reports that she thinks patient never cleared pneumonia    In the ED:   Vitals: /79;  ; RR 25 ; afebrile ; 92 % on NRB     Labs:   WBC 6.42 Hb 8.8 Platelet 99K Neutro 83.2   Na 139 K 5.0   BUN/Creatinine 52/1.5   Pro BNP 6050     VBG ; Initial 7.29 CO2 60 HCO3 29 Lactate 1.4   Repeat pH 7.27 Co2 63 HCo3 29 lactate 1.7     Ua negative     US renal: mild bilateral hydronephrosis ; 0.5 non obstructive calculus     Chest Xray : Bilateral patchy opacities and left pleural effusion.    admitted to ICU.  (17 Jun 2025 15:31)      Oncology Hx:      Allergies    No Known Allergies    Intolerances        MEDICATIONS  (STANDING):  amLODIPine   Tablet 5 milliGRAM(s) Oral every 24 hours  apixaban 2.5 milliGRAM(s) Oral every 12 hours  aspirin enteric coated 81 milliGRAM(s) Oral daily  bumetanide Injectable 2 milliGRAM(s) IV Push every 12 hours  chlorhexidine 2% Cloths 1 Application(s) Topical <User Schedule>  dextrose 5%. 1000 milliLiter(s) (100 mL/Hr) IV Continuous <Continuous>  dextrose 5%. 1000 milliLiter(s) (50 mL/Hr) IV Continuous <Continuous>  dextrose 50% Injectable 25 Gram(s) IV Push once  dextrose 50% Injectable 12.5 Gram(s) IV Push once  dextrose 50% Injectable 25 Gram(s) IV Push once  droNABinol 2.5 milliGRAM(s) Oral daily  fluticasone propionate 50 MICROgram(s)/spray Nasal Spray 1 Spray(s) Both Nostrils two times a day  glucagon  Injectable 1 milliGRAM(s) IntraMuscular once  insulin lispro (ADMELOG) corrective regimen sliding scale   SubCutaneous Before meals and at bedtime  methylPREDNISolone sodium succinate Injectable 60 milliGRAM(s) IV Push daily  metoprolol succinate ER 50 milliGRAM(s) Oral daily  pantoprazole    Tablet 40 milliGRAM(s) Oral before breakfast  polyethylene glycol 3350 17 Gram(s) Oral every 12 hours  senna 2 Tablet(s) Oral at bedtime    MEDICATIONS  (PRN):  albuterol    90 MICROgram(s) HFA Inhaler 2 Puff(s) Inhalation every 6 hours PRN Bronchospasm  dextrose Oral Gel 15 Gram(s) Oral once PRN Blood Glucose LESS THAN 70 milliGRAM(s)/deciliter  morphine  - Injectable 2 milliGRAM(s) IV Push every 6 hours PRN Dyspnea  zolpidem 10 milliGRAM(s) Oral at bedtime PRN Insomnia      PAST MEDICAL & SURGICAL HISTORY:  Hypertension, unspecified type  Type 2 diabetes mellitus with complication, without long-term current use of insulin  Hyperlipidemia, unspecified hyperlipidemia type  Diverticulitis  Obesity  COPD (chronic obstructive pulmonary disease)  SOB (shortness of breath)  GERD (gastroesophageal reflux disease)  Lung cancer  Cancer of kidney  Cancer of thyroid      Former smoker      NHL (non-Hodgkin's lymphoma)  "low grade" per heme/ onc note      History of abdominal aortic aneurysm (AAA)      Kidney stones      [place] (hard of hearing)      History of surgery  LEFT LOWER LOBECTOMY      History of surgery  BILATERAL KNEE REPLACEMENT      History of surgery  CATARACT RIGHT EYE      History of surgery  TUBAL LIGATION      History of surgery  CYST REMOVED  RIGHT BREAST      History of surgery  CHOLECYSTECOMY      History of surgery  RIGHT PARTIAL NEPHRECTOMY      History of surgery  BILATERAL CATARACT SURGERY      History of back surgery      H/O lithotripsy      H/O partial thyroidectomy          FAMILY HISTORY:  Family history of dementia (Mother)    Family history of cancer (Father, Grandparent)          REVIEW OF SYSTEMS:  Breathing slightly better today    Height (cm): 162.6 (06-19 @ 14:17)    T(F): 97 (06-20-25 @ 08:00), Max: 98.2 (06-19-25 @ 16:00)  HR: 83 (06-20-25 @ 09:00)  BP: 152/91 (06-20-25 @ 09:00)  RR: 48 (06-20-25 @ 09:00)  SpO2: 100% (06-20-25 @ 09:28)  Wt(kg): --    GENERAL: NAD, well-developed  CHEST/LUNG: Decreased breath sound on left lung base  HEART: Regular rate and rhythm; No murmurs, rubs, or gallops  ABDOMEN: Soft, Nontender, Nondistended; Bowel sounds present  EXTREMITIES:  2+ Peripheral Pulses, No clubbing, cyanosis, or edema  NEUROLOGY: non-focal  SKIN: No rashes or lesions                          8.1    7.05  )-----------( 85       ( 20 Jun 2025 04:50 )             26.4       06-20    144  |  99  |  51[H]  ----------------------------<  90  4.1   |  33[H]  |  1.3    Ca    9.7      20 Jun 2025 04:50  Phos  4.1     06-20  Mg     2.1     06-20    TPro  5.2[L]  /  Alb  3.5  /  TBili  0.5  /  DBili  x   /  AST  12  /  ALT  13  /  AlkPhos  85  06-20      Phosphorus: 4.1 mg/dL (06-20 @ 04:50)  Magnesium: 2.1 mg/dL (06-20 @ 04:50)

## 2025-06-20 NOTE — CDI QUERY NOTE - NSCDIOTHERTXTBX_GEN_ALL_CORE_HH
Clinical documentation and/or evidence of the patient’s presentation, evaluation, and medical management, as evidenced below, may support a diagnosis that is not documented in the medical record.  In order to ensure accurate coding and accuracy of the clinical record, the documentation in this patient’s medical record requires additional clarification.  If you think the supporting documentation and/or clinical evidence supports a more specific diagnosis, please include more specific documentation of a diagnosis associated with these findings in your progress note and/or discharge summary.  	  Please clarify if the patient was found to have one of the following:    	  •Patient is being treated for severe protein calorie malnutrition.  •Other (specify)    Supporting documentation and/or clinical evidence:     6/19 Dietitian Initial Evaluation Adult-   Reason/Indicator for Assessment	Sacral Ulcer; Pressure injury stage II or more  Skin	Stage IV to sacrum  Patient meets criteria for malnutrition	yes  Etiology-Basis	Chronic illness  Nutrition Diagnosis	yes...  Nutrition Diagnositc Terminology #1	Malnutrition...  Malnutrition	Severe malnutrition the context of chronic illness  Etiology	COPD on home O2  Signs/Symptoms	Severe muscle mass and body fat loss  Goal/Expected Outcome	Pt to meet at least 51-75% of estimated needs within 4 days  Additional Recommendations	1. ADD Ensure Plus HP 2X/DAILY -- provides 700 kcal, 40g pro total  2. Continue with current diet order  3. Encourage PO intake     Diet order 6/19-  DASH/TLC supplement Ensure plus high protein cans two times a day.    Thank you,  Brandy Ward RN  [phone]
Clinical documentation and/or evidence in the medical record indicates that this patient has CHF.  In order to ensure accurate coding and accuracy of the clinical record, the documentation in this patient’s medical record requires additional clarification.      Please include the acuity of HFpEF in your progress note and/or discharge summary.    Specify Acuity:    •Acute   •Chronic  •Acute on Chronic  •Other, please specify:    Supporting documentation and/or clinical evidence:     ProBNP 6050    ED provider note-84-year-old female...  for evaluation of shortness of breath x 2 days acutely worsening this morning.  No fever, chills or chest pain. RESP: respiratory distress, on non rebreather, SKIN: 2+ pitting edema     6/17 H&P ADULT- RESP: BIlateral clear breath sounds, CV: peripheral edema +3; A/P: Pulmonary edema, ADRIA; HOB at 45 degrees. Wean o2 as tolerated. Repeat CXR in am; Continue lasix 40mg BID, obtain official echo. trop downtrending. BNP elevated.    6/18 Progress note adult critical care attending- Bilateral crackles, tachypneic, use of accessory muscles; A/P: Pulmonary edema, ADRIA, Can't RO pneumonitis secondary to Tagrisso     6/19 Progress Note Adult-[facility] Resident -# HFpEF, # HTN, - monitor I/Os, - C/w diuresis: Bumex 2 mg BID    CXRs:  6/17- Bilateral patchy opacities and left pleural effusion.  6/18- Bilateral opacities, unchanged.  6/19- Unchanged patchy bilateral opacities.    Lasix 40mg IVP once, admin 6/17  Lasix 40mg IVP Q12, admin 6/17-6/19  Bumex 2mg IVP Q12, admin 6/19-6/20    Thank you,  Brandy Ward, RN  [phone]

## 2025-06-20 NOTE — PROGRESS NOTE ADULT - ASSESSMENT
IMPRESSION:    Acute hypercapnic respiratory failure   Acute hypoxemic respiratory failure  Pulmonary edema   ADRIA  Can't RO pneumonitis secondary to Tagrisso   HO COPD  HO small cell lung ca on tagriso  HO left adenocarcinoma s/p LLL lobectomy  HO afib/PE on eliquis     PLAN:    CNS:  Avoid sedation. MSO4 PRN for comfort     HEENT: Oral care    PULMONARY:  HOB at 45 degrees.  NIV during sleep and PRN during the day.  Wean o2 as tolerated.  Continue home inhaler regimen.  Albuterol PRN.  Repeat CXR in am.  Continue Solumedrol 60 mg daily     CARDIOVASCULAR:  Continue Bumex 2 mg BID.  ECHO Noted.      GI: GI prophylaxis.  Feeding when off NIV.  Bowel regimen     RENAL:  Follow up lytes.  Correct as needed,   Monitor UO     INFECTIOUS DISEASE: Follow up cultures, procalcitonin noted, MRSA negative,  DC ABX.       HEMATOLOGICAL:  DVT prophylaxis. DIMER noted.  LE duplex negative.  Monitor CBC     ENDOCRINE:  Follow up FS.  Insulin protocol if needed.      MUSCULOSKELETAL: bed chair position.  Off loading     SDU     GOC

## 2025-06-20 NOTE — CONSULT NOTE ADULT - ATTENDING COMMENTS
This is a 84-year-old female who is well-known to me with multiple medical issues recently had AAA stenting in April since then has had some complications including UTI and pneumonia fluid overload.  She is now admitted with respiratory failure possibly due to heart failure we were asked to rule out Tagrisso induced pneumonitis.  She is on Tagrisso for stage IV lung cancer previous imaging was [facility]    Plan  - Agree to hold Tagrisso   - Suggest CT chest noncontrast to evaluate for infiltrates  - Continue diuresis    Thrombocytopenia likely multifactorial including possibly medication such as Tagrisso versus consumption versus other medication effect we will just monitor for now platelet count is 85,000 and does fluctuate  - Continue with apixaban and aspirin if not bleeding and platelet count greater than 50,000    Anemia multifactorial likely due to chronic disease we could send anemia panel as above  - Can transfuse to keep hemoglobin greater than 7 g/dL

## 2025-06-20 NOTE — PROGRESS NOTE ADULT - SUBJECTIVE AND OBJECTIVE BOX
Kaitlynn Holly is a 83 y/o F who presents with shortness of breath and admitted to the ICU for acute hypercapnic respiratory failure.    Overnight: Was hypertensive overnight reported to be 164/91 mmHg where patient was given       MEDICATIONS  STANDING MEDICATIONS  amLODIPine   Tablet 5 milliGRAM(s) Oral every 24 hours  apixaban 2.5 milliGRAM(s) Oral every 12 hours  aspirin enteric coated 81 milliGRAM(s) Oral daily  bumetanide Injectable 2 milliGRAM(s) IV Push every 12 hours  chlorhexidine 2% Cloths 1 Application(s) Topical <User Schedule>  dextrose 5%. 1000 milliLiter(s) IV Continuous <Continuous>  dextrose 5%. 1000 milliLiter(s) IV Continuous <Continuous>  dextrose 50% Injectable 25 Gram(s) IV Push once  dextrose 50% Injectable 12.5 Gram(s) IV Push once  dextrose 50% Injectable 25 Gram(s) IV Push once  droNABinol 2.5 milliGRAM(s) Oral daily  fluticasone propionate 50 MICROgram(s)/spray Nasal Spray 1 Spray(s) Both Nostrils two times a day  glucagon  Injectable 1 milliGRAM(s) IntraMuscular once  insulin lispro (ADMELOG) corrective regimen sliding scale   SubCutaneous Before meals and at bedtime  methylPREDNISolone sodium succinate Injectable 60 milliGRAM(s) IV Push daily  metoprolol succinate ER 50 milliGRAM(s) Oral daily  pantoprazole    Tablet 40 milliGRAM(s) Oral before breakfast  polyethylene glycol 3350 17 Gram(s) Oral every 12 hours  senna 2 Tablet(s) Oral at bedtime    PRN MEDICATIONS  albuterol    90 MICROgram(s) HFA Inhaler 2 Puff(s) Inhalation every 6 hours PRN  dextrose Oral Gel 15 Gram(s) Oral once PRN  morphine  - Injectable 2 milliGRAM(s) IV Push every 6 hours PRN  zolpidem 10 milliGRAM(s) Oral at bedtime PRN    VITALS  T(F): 97 (06-20-25 @ 08:00), Max: 98.2 (06-19-25 @ 16:00)  HR: 80 (06-20-25 @ 11:00) (75 - 83)  BP: 160/87 (06-20-25 @ 11:00) (100/60 - 174/88)  RR: 20 (06-20-25 @ 11:00) (12 - 25)  SpO2: 96% (06-20-25 @ 11:00) (88% - 100%)  POCT Blood Glucose.: 100 mg/dL (06-20-25 @ 05:08)  POCT Blood Glucose.: 218 mg/dL (06-19-25 @ 21:54)  POCT Blood Glucose.: 189 mg/dL (06-19-25 @ 16:07)    PHYSICAL EXAM    Constitutional: NAD, well appearing  HEENT: EOMI, conjunctiva and sclera clear  Pulmonary: CTA b/l, no wheezes or rhonhi  Cardiac: +S1/S2, no murmurs, +pedal pulses  Abdomen: no TTP, nondistended, no organomegaly  Extremities: no edema  Nerurological: AAOx4, neurovascularly intact    LABS             8.1    7.05  )-----------( 85       ( 06-20-25 @ 04:50 )             26.4     144  |  99  |  51  -------------------------<  90   06-20-25 @ 04:50  4.1  |  33  |  1.3    Ca      9.7     06-20-25 @ 04:50  Phos   4.1     06-20-25 @ 04:50  Mg     2.1     06-20-25 @ 04:50    TPro  5.2  /  Alb  3.5  /  TBili  0.5  /  DBili  x   /  AST  12  /  ALT  13  /  AlkPhos  85  /  GGT  x     06-20-25 @ 04:50        Urinalysis Basic - ( 20 Jun 2025 04:50 )    Color: x / Appearance: x / SG: x / pH: x  Gluc: 90 mg/dL / Ketone: x  / Bili: x / Urobili: x   Blood: x / Protein: x / Nitrite: x   Leuk Esterase: x / RBC: x / WBC x   Sq Epi: x / Non Sq Epi: x / Bacteria: x          IMAGING Kaitlynn Holly is a 85 y/o F who presents with shortness of breath and admitted to the ICU for acute hypercapnic respiratory failure.    Overnight: Was hypertensive overnight reported to be 164/91 mmHg where patient was given amlodipine 5 mg.    Today:  Patient appears to be alert. No acute distress. States that breathing was improve this morning with HFNC at 50L and FiO2 of 50%. Did not have bowel movement for the past 3 days. No chest pain, SOB, dizziness, lightheadedness, abdominal pain, nausea and vomiting.       MEDICATIONS  STANDING MEDICATIONS  amLODIPine   Tablet 5 milliGRAM(s) Oral every 24 hours  apixaban 2.5 milliGRAM(s) Oral every 12 hours  aspirin enteric coated 81 milliGRAM(s) Oral daily  bumetanide Injectable 2 milliGRAM(s) IV Push every 12 hours  chlorhexidine 2% Cloths 1 Application(s) Topical <User Schedule>  dextrose 5%. 1000 milliLiter(s) IV Continuous <Continuous>  dextrose 5%. 1000 milliLiter(s) IV Continuous <Continuous>  dextrose 50% Injectable 25 Gram(s) IV Push once  dextrose 50% Injectable 12.5 Gram(s) IV Push once  dextrose 50% Injectable 25 Gram(s) IV Push once  droNABinol 2.5 milliGRAM(s) Oral daily  fluticasone propionate 50 MICROgram(s)/spray Nasal Spray 1 Spray(s) Both Nostrils two times a day  glucagon  Injectable 1 milliGRAM(s) IntraMuscular once  insulin lispro (ADMELOG) corrective regimen sliding scale   SubCutaneous Before meals and at bedtime  methylPREDNISolone sodium succinate Injectable 60 milliGRAM(s) IV Push daily  metoprolol succinate ER 50 milliGRAM(s) Oral daily  pantoprazole    Tablet 40 milliGRAM(s) Oral before breakfast  polyethylene glycol 3350 17 Gram(s) Oral every 12 hours  senna 2 Tablet(s) Oral at bedtime    PRN MEDICATIONS  albuterol    90 MICROgram(s) HFA Inhaler 2 Puff(s) Inhalation every 6 hours PRN  dextrose Oral Gel 15 Gram(s) Oral once PRN  morphine  - Injectable 2 milliGRAM(s) IV Push every 6 hours PRN  zolpidem 10 milliGRAM(s) Oral at bedtime PRN    VITALS  T(F): 97 (06-20-25 @ 08:00), Max: 98.2 (06-19-25 @ 16:00)  HR: 80 (06-20-25 @ 11:00) (75 - 83)  BP: 160/87 (06-20-25 @ 11:00) (100/60 - 174/88)  RR: 20 (06-20-25 @ 11:00) (12 - 25)  SpO2: 96% (06-20-25 @ 11:00) (88% - 100%)  POCT Blood Glucose.: 100 mg/dL (06-20-25 @ 05:08)  POCT Blood Glucose.: 218 mg/dL (06-19-25 @ 21:54)  POCT Blood Glucose.: 189 mg/dL (06-19-25 @ 16:07)    PHYSICAL EXAM    Constitutional: NAD, well appearing  HEENT: Scleral clear without jaundice  Pulmonary: CTA b/l, no wheezes or rhonchi  Cardiac: +S1/S2, no murmurs, gallops or rups  Abdomen: nontender to palpation, bowel sounds are normal, nondistended  Extremities: no pedal edema  Nerurological: alert and oriented    LABS             8.1    7.05  )-----------( 85       ( 06-20-25 @ 04:50 )             26.4     144  |  99  |  51  -------------------------<  90   06-20-25 @ 04:50  4.1  |  33  |  1.3    Ca      9.7     06-20-25 @ 04:50  Phos   4.1     06-20-25 @ 04:50  Mg     2.1     06-20-25 @ 04:50    TPro  5.2  /  Alb  3.5  /  TBili  0.5  /  DBili  x   /  AST  12  /  ALT  13  /  AlkPhos  85  /  GGT  x     06-20-25 @ 04:50        Urinalysis Basic - ( 20 Jun 2025 04:50 )          IMAGING Kaitlynn Holly is a 85 y/o F who presents with shortness of breath and admitted to the ICU for acute hypercapnic respiratory failure.    Overnight: Was hypertensive overnight reported to be 164/91 mmHg where patient was given amlodipine 5 mg.    Today:  Patient appears to be alert. No acute distress. States that breathing was improve this morning with HFNC at 50L and FiO2 of 50%. Did not have bowel movement for the past 3 days. No chest pain, SOB, dizziness, lightheadedness, abdominal pain, nausea and vomiting.       MEDICATIONS  STANDING MEDICATIONS  amLODIPine   Tablet 5 milliGRAM(s) Oral every 24 hours  apixaban 2.5 milliGRAM(s) Oral every 12 hours  aspirin enteric coated 81 milliGRAM(s) Oral daily  bumetanide Injectable 2 milliGRAM(s) IV Push every 12 hours  chlorhexidine 2% Cloths 1 Application(s) Topical <User Schedule>  dextrose 5%. 1000 milliLiter(s) IV Continuous <Continuous>  dextrose 5%. 1000 milliLiter(s) IV Continuous <Continuous>  dextrose 50% Injectable 25 Gram(s) IV Push once  dextrose 50% Injectable 12.5 Gram(s) IV Push once  dextrose 50% Injectable 25 Gram(s) IV Push once  droNABinol 2.5 milliGRAM(s) Oral daily  fluticasone propionate 50 MICROgram(s)/spray Nasal Spray 1 Spray(s) Both Nostrils two times a day  glucagon  Injectable 1 milliGRAM(s) IntraMuscular once  insulin lispro (ADMELOG) corrective regimen sliding scale   SubCutaneous Before meals and at bedtime  methylPREDNISolone sodium succinate Injectable 60 milliGRAM(s) IV Push daily  metoprolol succinate ER 50 milliGRAM(s) Oral daily  pantoprazole    Tablet 40 milliGRAM(s) Oral before breakfast  polyethylene glycol 3350 17 Gram(s) Oral every 12 hours  senna 2 Tablet(s) Oral at bedtime    PRN MEDICATIONS  albuterol    90 MICROgram(s) HFA Inhaler 2 Puff(s) Inhalation every 6 hours PRN  dextrose Oral Gel 15 Gram(s) Oral once PRN  morphine  - Injectable 2 milliGRAM(s) IV Push every 6 hours PRN  zolpidem 10 milliGRAM(s) Oral at bedtime PRN    VITALS  T(F): 97 (06-20-25 @ 08:00), Max: 98.2 (06-19-25 @ 16:00)  HR: 80 (06-20-25 @ 11:00) (75 - 83)  BP: 160/87 (06-20-25 @ 11:00) (100/60 - 174/88)  RR: 20 (06-20-25 @ 11:00) (12 - 25)  SpO2: 96% (06-20-25 @ 11:00) (88% - 100%)  POCT Blood Glucose.: 100 mg/dL (06-20-25 @ 05:08)  POCT Blood Glucose.: 218 mg/dL (06-19-25 @ 21:54)  POCT Blood Glucose.: 189 mg/dL (06-19-25 @ 16:07)    PHYSICAL EXAM    Constitutional: NAD, well appearing  HEENT: Scleral clear without jaundice  Pulmonary: CTA b/l, no wheezes or rhonchi  Cardiac: +S1/S2, no murmurs, gallops or rubs  Abdomen: nontender to palpation, bowel sounds are normal, nondistended  Extremities: no pedal edema  Nerurological: alert and oriented    LABS             8.1    7.05  )-----------( 85       ( 06-20-25 @ 04:50 )             26.4     144  |  99  |  51  -------------------------<  90   06-20-25 @ 04:50  4.1  |  33  |  1.3    Ca      9.7     06-20-25 @ 04:50  Phos   4.1     06-20-25 @ 04:50  Mg     2.1     06-20-25 @ 04:50    TPro  5.2  /  Alb  3.5  /  TBili  0.5  /  DBili  x   /  AST  12  /  ALT  13  /  AlkPhos  85  /  GGT  x     06-20-25 @ 04:50        Urinalysis Basic - ( 20 Jun 2025 04:50 )          IMAGING

## 2025-06-20 NOTE — PROGRESS NOTE ADULT - ASSESSMENT
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    Patient has been decompensating since April after she underwent AAA stenting ( Has some renal artery leak). Was sent to nursing home. Was diagnosed with Ecoli/Pseudomonas UTIi treated with levaquin . Recently was having Shortness of breath/ cough ; treated with augmentin,. they checked theBNP 4199 ; followed Tye De Dios ; was started on Torsemide 20 mg other day.   Since yesterday had increased shortness of breath and anxiety. Nursing home gave her lasix which improved her breathing status but worsened since the morning so was sent to the hospital.     #Acute Hypercapnic Respiratory Failure  #Acute Hypoxic Respiratory Failure  #Pulmonary Edema  #ADRIA  - ED Vitals: /79;  ; RR 25 ; afebrile ; 92 % on NRB   - ED Labs: WBC 6.42, Hb 8.8, Platelet 99K, Neutro 83.2, Na 139, K 5.0, BUN/Creatinine 52/1.5, Pro BNP 6050 ,  VBG (Initial 7.29 CO2 60 HCO3 29 Lactate 1.4, Repeat pH 7.27 Co2 63 HCo3 29 lactate 1.7)  - US Renal ( : mild bilateral hydronephrosis ; 0.5 non obstructive calculus     - CXR ( : Bilateral patchy opacities and left pleural effusion.    - Labs (6/20/202:   - Clinical Reasoning:  84-year-old female with past medical history of small cell lung cancer on tagrisso in remission, 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    Patient has been decompensating since April after she underwent AAA stenting ( Has some renal artery leak). Was sent to nursing home. Was diagnosed with Ecoli/Pseudomonas UTIi treated with levaquin . Recently was having Shortness of breath/ cough ; treated with augmentin,. they checked theBNP 4199 ; followed Tye De Dios ; was started on Torsemide 20 mg other day.   Since yesterday had increased shortness of breath and anxiety. Nursing home gave her lasix which improved her breathing status but worsened since the morning so was sent to the hospital.     #Acute Hypercapnic Respiratory Failure   #Acute Hypoxic Respiratory Failure  #Pulmonary Edema  #ADRIA (baseline creatinine ~1.3)  #Constipation  - ED Vitals: /79;  ; RR 25 ; afebrile ; 92 % on NRB   - ED Labs: WBC 6.42, Hb 8.8, Platelet 99K, Neutro 83.2, Na 139, K 5.0, BUN/Creatinine 52/1.5, Pro BNP 6050 ,  VBG (Initial 7.29 CO2 60 HCO3 29 Lactate 1.4, Repeat pH 7.27 Co2 63 HCo3 29 lactate 1.7)  - US Renal ( : mild bilateral hydronephrosis ; 0.5 non obstructive calculus   - CXR ( : Bilateral patchy opacities and left pleural effusion.   - Clinical Reasoning:     Plan  - continue head of bead elevation at 45 degrees  - continue bumetanide 2 mg q12h, albuterol 90 micrograms 2 puffs q6h,   - Will continue to wean off oxygen as tolerated  - Will continue bipap in the afternoon and on an as needed basis while using HFNC at 40L/40 FiO2  - Will start on senna 2 tablets orally and miralax 17 g q12 standing orders to stimulate bowel movement  - Follow up on repeat chest x-ray  - discontinue antibiotics (doxycycline 100 mg IV q12h and piperacillin/tazobactam 3.375 mg q8h IV)  - May consider transfer to step down unit pending clinical status    #Atrial Fibrillation  #H/o PE  #HTN  #Diabetes  #HLD  - continue apixaban 2.5 mg q12h, aspirin 81 mg, amlodipine 5 mg,   - continue sliding scale insulin lispro       #Anxiety/Insomnia  - continue zolpidem 10 mg     small cell lung cancer on tagrisso in remission, ho left lung adenocarcinoma s/p LLL lobectomy,  ; COPD on home O2, AAA, diabetes, hypertension, hyperlipidemia, AAA s/p stent     #Pain: morphine 2 mg q6h PRN  #DVT ppx: apixaban 2.5 mg q12h  #GI ppx: pantoprazole 40 mg  #Diet:  DASH/TLC  #Activity: encourage OOB as tolerated  #Code Status: Full Code  #Dispo: Medical Step-Down unit pending clinical status

## 2025-06-20 NOTE — CHART NOTE - NSCHARTNOTEFT_GEN_A_CORE
MICU DOWN GRADE NOTE      Patient is a 84y old  Female who presents with a chief complaint of Shortness of Breath (20 Jun 2025 11:52)      HPI:   84-year-old female with past medical history of small cell lung cancer on tagrisso in remission, 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  Patient has been decompensating since April after she underwent AAA stenting ( Has some renal artery leak). Was sent to nursing home. Was diagnosed with Ecoli/Pseudomonas UTIi treated with levaquin . Recently was having Shortness of breath/ cough ; treated with augmentin,. they checked theBNP 4199 ; followed Tye De Dios ; was started on Torsemide 20 mg other day. Since yesterday had increased shortness of breath and anxiety. Nursing home gave her lasix which improved her breathing status but worsened since the morning  so was sent to the hospital.     No fever, chills or chest pain.  Daughter at bedside providing additional history, reports that she thinks patient never cleared pneumonia    ICU Course:    Patient admitted to the MICU for AHRF s/p NIV. Patient was aggressively diuresed with Lasix IV 40 mg BID and then changed to Bumex 2 mg IV BID. Patient is still tachypneic with difficulty sleeping due to discomfort so morphine 2 mg PRN at bedtime was given. Pulmonary edema improved. Patient now stable on HFNC 40L at FiO2 of 40%. Zosyn and doxycycline was used for empiric of suspected pneumonia given elevated procalcitonin but was discontinued once blood cultures and urine cultures from 6/17/2025 came back negative. Dimer elevated at 1245 from 6/17/2025 but subsequent duplex ultrasound of the bilateral lower extremities were negative. Additionally, solumedrol 60 mg IV and atrovent was given due to concern for COPD exacerbation but was ruled out. Patient is stable for downgrade to Stepdown Unit.       MEDICATIONS:  albuterol    90 MICROgram(s) HFA Inhaler 2 Puff(s) Inhalation every 6 hours PRN  amLODIPine   Tablet 5 milliGRAM(s) Oral every 24 hours  apixaban 2.5 milliGRAM(s) Oral every 12 hours  aspirin enteric coated 81 milliGRAM(s) Oral daily  bumetanide Injectable 2 milliGRAM(s) IV Push every 12 hours  chlorhexidine 2% Cloths 1 Application(s) Topical <User Schedule>  dextrose 5%. 1000 milliLiter(s) IV Continuous <Continuous>  dextrose 5%. 1000 milliLiter(s) IV Continuous <Continuous>  dextrose 50% Injectable 25 Gram(s) IV Push once  dextrose 50% Injectable 12.5 Gram(s) IV Push once  dextrose 50% Injectable 25 Gram(s) IV Push once  dextrose Oral Gel 15 Gram(s) Oral once PRN  droNABinol 2.5 milliGRAM(s) Oral daily  fluticasone propionate 50 MICROgram(s)/spray Nasal Spray 1 Spray(s) Both Nostrils two times a day  glucagon  Injectable 1 milliGRAM(s) IntraMuscular once  hydrALAZINE 50 milliGRAM(s) Oral once  insulin lispro (ADMELOG) corrective regimen sliding scale   SubCutaneous Before meals and at bedtime  methylPREDNISolone sodium succinate Injectable 60 milliGRAM(s) IV Push daily  metoprolol succinate ER 50 milliGRAM(s) Oral daily  morphine  - Injectable 2 milliGRAM(s) IV Push every 6 hours PRN  pantoprazole    Tablet 40 milliGRAM(s) Oral before breakfast  polyethylene glycol 3350 17 Gram(s) Oral every 12 hours  senna 2 Tablet(s) Oral at bedtime  zolpidem 10 milliGRAM(s) Oral at bedtime PRN      T(C): 36.1 (06-20-25 @ 08:00), Max: 36.8 (06-19-25 @ 16:00)  HR: 80 (06-20-25 @ 11:00) (75 - 83)  BP: 160/87 (06-20-25 @ 11:00) (100/60 - 174/88)  RR: 20 (06-20-25 @ 11:00) (12 - 25)  SpO2: 96% (06-20-25 @ 11:00) (88% - 100%)  Wt(kg): --Vital Signs Last 24 Hrs  T(C): 36.1 (20 Jun 2025 08:00), Max: 36.8 (19 Jun 2025 16:00)  T(F): 97 (20 Jun 2025 08:00), Max: 98.2 (19 Jun 2025 16:00)  HR: 80 (20 Jun 2025 11:00) (75 - 83)  BP: 160/87 (20 Jun 2025 11:00) (100/60 - 174/88)  BP(mean): 117 (20 Jun 2025 11:00) (73 - 124)  RR: 20 (20 Jun 2025 11:00) (12 - 25)  SpO2: 96% (20 Jun 2025 11:00) (88% - 100%)    Parameters below as of 20 Jun 2025 11:00  Patient On (Oxygen Delivery Method): nasal cannula, high flow  O2 Flow (L/min): 40  O2 Concentration (%): 40    PHYSICAL EXAM:  Constitutional: NAD, well appearing  HEENT: Scleral clear without jaundice  Pulmonary: CTA b/l, no wheezes or rhonchi  Cardiac: +S1/S2, no murmurs, gallops or rubs  Abdomen: nontender to palpation, bowel sounds are normal, nondistended  Extremities: no pedal edema  Neurological: alert and oriented    Consultant(s) Notes Reviewed:  [x ] YES  [ ] NO  Care Discussed with Consultants/Other Providers [ x] YES  [ ] NO    LABS:                        8.1    7.05  )-----------( 85       ( 20 Jun 2025 04:50 )             26.4     06-20    144  |  99  |  51[H]  ----------------------------<  90  4.1   |  33[H]  |  1.3    Ca    9.7      20 Jun 2025 04:50  Phos  4.1     06-20  Mg     2.1     06-20    TPro  5.2[L]  /  Alb  3.5  /  TBili  0.5  /  DBili  x   /  AST  12  /  ALT  13  /  AlkPhos  85  06-20      Urinalysis Basic - ( 20 Jun 2025 04:50 )    Color: x / Appearance: x / SG: x / pH: x  Gluc: 90 mg/dL / Ketone: x  / Bili: x / Urobili: x   Blood: x / Protein: x / Nitrite: x   Leuk Esterase: x / RBC: x / WBC x   Sq Epi: x / Non Sq Epi: x / Bacteria: x      CAPILLARY BLOOD GLUCOSE      POCT Blood Glucose.: 255 mg/dL (20 Jun 2025 12:01)  POCT Blood Glucose.: 100 mg/dL (20 Jun 2025 05:08)  POCT Blood Glucose.: 218 mg/dL (19 Jun 2025 21:54)  POCT Blood Glucose.: 189 mg/dL (19 Jun 2025 16:07)        Urinalysis Basic - ( 20 Jun 2025 04:50 )    Color: x / Appearance: x / SG: x / pH: x  Gluc: 90 mg/dL / Ketone: x  / Bili: x / Urobili: x   Blood: x / Protein: x / Nitrite: x   Leuk Esterase: x / RBC: x / WBC x   Sq Epi: x / Non Sq Epi: x / Bacteria: x        RADIOLOGY & ADDITIONAL TESTS:    Imaging Personally Reviewed:  [x ] YES  [ ] NO      a/p  84-year-old female with past medical history of small cell lung cancer on tagrisso in remission, 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    Patient has been decompensating since April after she underwent AAA stenting ( Has some renal artery leak). Was sent to nursing home. Was diagnosed with Ecoli/Pseudomonas UTIi treated with levaquin . Recently was having Shortness of breath/ cough ; treated with augmentin,. they checked theBNP 4199 ; followed Tye De Dios ; was started on Torsemide 20 mg other day.   Since yesterday had increased shortness of breath and anxiety. Nursing home gave her lasix which improved her breathing status but worsened since the morning so was sent to the hospital.     #Acute Hypercapnic Respiratory Failure   #Acute Hypoxic Respiratory Failure  #Pulmonary Edema  #ADRIA (baseline creatinine ~1.3)  #Constipation  #Thrombocytopenia  #H/O Small Cell Lung Cancer on Tagrisso  #H/O left lung adenocarcinoma s/p LLL lobectomy  #H/O COPD  - ED Vitals: /79;  ; RR 25 ; afebrile ; 92 % on NRB   - ED Labs: WBC 6.42, Hb 8.8, Platelet 99K, Neutro 83.2, Na 139, K 5.0, BUN/Creatinine 52/1.5, Pro BNP 6050 ,  VBG (Initial 7.29 CO2 60 HCO3 29 Lactate 1.4, Repeat pH 7.27 Co2 63 HCo3 29 lactate 1.7)  - US Renal ( : mild bilateral hydronephrosis ; 0.5 non obstructive calculus   - CXR (6/17/2025): Bilateral patchy opacities and left pleural effusion.   Plan  - continue head of bead elevation at 45 degrees  - continue bumetanide 2 mg q12h, albuterol 90 micrograms 2 puffs q6h,   - Will continue to wean off oxygen as tolerated  - Will continue bipap in the afternoon and on an as needed basis while using HFNC at 40L/40 FiO2  - Will start on senna 2 tablets orally and miralax 17 g q12 standing orders to stimulate bowel movement  - Follow up on repeat chest x-ray  - discontinue antibiotics (doxycycline 100 mg IV q12h and piperacillin/tazobactam 3.375 mg q8h IV)  - For thrombocytopenia, hematology/oncology following with recommendations to recommend CT chest if it doesn't improve and cardiology evaluation    #Atrial Fibrillation  #H/O PE  #HTN  #Diabetes  #HLD  #AAA s/p stent   Plan  - continue apixaban 2.5 mg q12h, aspirin 81 mg, amlodipine 5 mg, metoprolol XL 50 mg daily  - continue sliding scale insulin lispro       #Anxiety/Insomnia (Chronic)  Plan  - continue zolpidem 10 mg, dronabinol 2.5 mg daily      #Pain: morphine 2 mg q6h PRN  #DVT ppx: apixaban 2.5 mg q12h  #GI ppx: pantoprazole 40 mg  #Diet:  DASH/TLC  #Activity: encourage OOB as tolerated  #Code Status: Full Code  #Dispo: Medical Step-Down unit pending clinical status

## 2025-06-21 LAB
ALBUMIN SERPL ELPH-MCNC: 3.4 G/DL — LOW (ref 3.5–5.2)
ALP SERPL-CCNC: 86 U/L — SIGNIFICANT CHANGE UP (ref 30–115)
ALT FLD-CCNC: 13 U/L — SIGNIFICANT CHANGE UP (ref 0–41)
ANION GAP SERPL CALC-SCNC: 11 MMOL/L — SIGNIFICANT CHANGE UP (ref 7–14)
AST SERPL-CCNC: 11 U/L — SIGNIFICANT CHANGE UP (ref 0–41)
BASOPHILS # BLD AUTO: 0.01 K/UL — SIGNIFICANT CHANGE UP (ref 0–0.2)
BASOPHILS NFR BLD AUTO: 0.1 % — SIGNIFICANT CHANGE UP (ref 0–1)
BILIRUB SERPL-MCNC: 0.6 MG/DL — SIGNIFICANT CHANGE UP (ref 0.2–1.2)
BUN SERPL-MCNC: 59 MG/DL — HIGH (ref 10–20)
CALCIUM SERPL-MCNC: 9.5 MG/DL — SIGNIFICANT CHANGE UP (ref 8.4–10.5)
CHLORIDE SERPL-SCNC: 97 MMOL/L — LOW (ref 98–110)
CO2 SERPL-SCNC: 33 MMOL/L — HIGH (ref 17–32)
CREAT SERPL-MCNC: 1.4 MG/DL — SIGNIFICANT CHANGE UP (ref 0.7–1.5)
EGFR: 37 ML/MIN/1.73M2 — LOW
EGFR: 37 ML/MIN/1.73M2 — LOW
EOSINOPHIL # BLD AUTO: 0.04 K/UL — SIGNIFICANT CHANGE UP (ref 0–0.7)
EOSINOPHIL NFR BLD AUTO: 0.5 % — SIGNIFICANT CHANGE UP (ref 0–8)
GLUCOSE BLDC GLUCOMTR-MCNC: 124 MG/DL — HIGH (ref 70–99)
GLUCOSE BLDC GLUCOMTR-MCNC: 165 MG/DL — HIGH (ref 70–99)
GLUCOSE BLDC GLUCOMTR-MCNC: 289 MG/DL — HIGH (ref 70–99)
GLUCOSE BLDC GLUCOMTR-MCNC: 423 MG/DL — HIGH (ref 70–99)
GLUCOSE SERPL-MCNC: 103 MG/DL — HIGH (ref 70–99)
HCT VFR BLD CALC: 28.5 % — LOW (ref 37–47)
HGB BLD-MCNC: 8.8 G/DL — LOW (ref 12–16)
IMM GRANULOCYTES NFR BLD AUTO: 0.4 % — HIGH (ref 0.1–0.3)
LYMPHOCYTES # BLD AUTO: 1.15 K/UL — LOW (ref 1.2–3.4)
LYMPHOCYTES # BLD AUTO: 13.6 % — LOW (ref 20.5–51.1)
MAGNESIUM SERPL-MCNC: 1.9 MG/DL — SIGNIFICANT CHANGE UP (ref 1.8–2.4)
MCHC RBC-ENTMCNC: 26.2 PG — LOW (ref 27–31)
MCHC RBC-ENTMCNC: 30.9 G/DL — LOW (ref 32–37)
MCV RBC AUTO: 84.8 FL — SIGNIFICANT CHANGE UP (ref 81–99)
MONOCYTES # BLD AUTO: 1.16 K/UL — HIGH (ref 0.1–0.6)
MONOCYTES NFR BLD AUTO: 13.8 % — HIGH (ref 1.7–9.3)
NEUTROPHILS # BLD AUTO: 6.04 K/UL — SIGNIFICANT CHANGE UP (ref 1.4–6.5)
NEUTROPHILS NFR BLD AUTO: 71.6 % — SIGNIFICANT CHANGE UP (ref 42.2–75.2)
NRBC BLD AUTO-RTO: 0 /100 WBCS — SIGNIFICANT CHANGE UP (ref 0–0)
PHOSPHATE SERPL-MCNC: 3.7 MG/DL — SIGNIFICANT CHANGE UP (ref 2.1–4.9)
PLATELET # BLD AUTO: 112 K/UL — LOW (ref 130–400)
PMV BLD: 12.5 FL — HIGH (ref 7.4–10.4)
POTASSIUM SERPL-MCNC: 3.8 MMOL/L — SIGNIFICANT CHANGE UP (ref 3.5–5)
POTASSIUM SERPL-SCNC: 3.8 MMOL/L — SIGNIFICANT CHANGE UP (ref 3.5–5)
PROT SERPL-MCNC: 5.1 G/DL — LOW (ref 6–8)
RBC # BLD: 3.36 M/UL — LOW (ref 4.2–5.4)
RBC # FLD: 15.7 % — HIGH (ref 11.5–14.5)
SODIUM SERPL-SCNC: 141 MMOL/L — SIGNIFICANT CHANGE UP (ref 135–146)
WBC # BLD: 8.43 K/UL — SIGNIFICANT CHANGE UP (ref 4.8–10.8)
WBC # FLD AUTO: 8.43 K/UL — SIGNIFICANT CHANGE UP (ref 4.8–10.8)

## 2025-06-21 PROCEDURE — 71045 X-RAY EXAM CHEST 1 VIEW: CPT | Mod: 26

## 2025-06-21 PROCEDURE — 99233 SBSQ HOSP IP/OBS HIGH 50: CPT

## 2025-06-21 PROCEDURE — 99291 CRITICAL CARE FIRST HOUR: CPT

## 2025-06-21 RX ORDER — AMLODIPINE BESYLATE 10 MG/1
5 TABLET ORAL ONCE
Refills: 0 | Status: COMPLETED | OUTPATIENT
Start: 2025-06-21 | End: 2025-06-21

## 2025-06-21 RX ORDER — BUMETANIDE 1 MG/1
2 TABLET ORAL DAILY
Refills: 0 | Status: DISCONTINUED | OUTPATIENT
Start: 2025-06-21 | End: 2025-06-23

## 2025-06-21 RX ORDER — METHYLPREDNISOLONE ACETATE 80 MG/ML
40 INJECTION, SUSPENSION INTRA-ARTICULAR; INTRALESIONAL; INTRAMUSCULAR; SOFT TISSUE DAILY
Refills: 0 | Status: DISCONTINUED | OUTPATIENT
Start: 2025-06-21 | End: 2025-06-25

## 2025-06-21 RX ORDER — AMLODIPINE BESYLATE 10 MG/1
10 TABLET ORAL EVERY 24 HOURS
Refills: 0 | Status: DISCONTINUED | OUTPATIENT
Start: 2025-06-21 | End: 2025-07-01

## 2025-06-21 RX ADMIN — POLYETHYLENE GLYCOL 3350 17 GRAM(S): 17 POWDER, FOR SOLUTION ORAL at 06:30

## 2025-06-21 RX ADMIN — Medication 40 MILLIGRAM(S): at 06:38

## 2025-06-21 RX ADMIN — METHYLPREDNISOLONE ACETATE 60 MILLIGRAM(S): 80 INJECTION, SUSPENSION INTRA-ARTICULAR; INTRALESIONAL; INTRAMUSCULAR; SOFT TISSUE at 06:31

## 2025-06-21 RX ADMIN — POLYETHYLENE GLYCOL 3350 17 GRAM(S): 17 POWDER, FOR SOLUTION ORAL at 17:22

## 2025-06-21 RX ADMIN — Medication 2 TABLET(S): at 21:04

## 2025-06-21 RX ADMIN — INSULIN LISPRO 6: 100 INJECTION, SOLUTION INTRAVENOUS; SUBCUTANEOUS at 21:03

## 2025-06-21 RX ADMIN — DRONABINOL 2.5 MILLIGRAM(S): 10 CAPSULE ORAL at 11:41

## 2025-06-21 RX ADMIN — AMLODIPINE BESYLATE 5 MILLIGRAM(S): 10 TABLET ORAL at 22:08

## 2025-06-21 RX ADMIN — INSULIN LISPRO 12: 100 INJECTION, SOLUTION INTRAVENOUS; SUBCUTANEOUS at 17:21

## 2025-06-21 RX ADMIN — INSULIN LISPRO 2: 100 INJECTION, SOLUTION INTRAVENOUS; SUBCUTANEOUS at 11:42

## 2025-06-21 RX ADMIN — Medication 1 APPLICATION(S): at 06:35

## 2025-06-21 RX ADMIN — METOPROLOL SUCCINATE 50 MILLIGRAM(S): 50 TABLET, EXTENDED RELEASE ORAL at 06:35

## 2025-06-21 RX ADMIN — BUMETANIDE 2 MILLIGRAM(S): 1 TABLET ORAL at 06:33

## 2025-06-21 RX ADMIN — AMLODIPINE BESYLATE 5 MILLIGRAM(S): 10 TABLET ORAL at 11:41

## 2025-06-21 RX ADMIN — APIXABAN 2.5 MILLIGRAM(S): 2.5 TABLET, FILM COATED ORAL at 17:21

## 2025-06-21 RX ADMIN — Medication 2 MILLIGRAM(S): at 11:53

## 2025-06-21 RX ADMIN — APIXABAN 2.5 MILLIGRAM(S): 2.5 TABLET, FILM COATED ORAL at 06:34

## 2025-06-21 RX ADMIN — Medication 81 MILLIGRAM(S): at 11:41

## 2025-06-21 RX ADMIN — FLUTICASONE PROPIONATE 1 SPRAY(S): 50 SPRAY, METERED NASAL at 17:27

## 2025-06-21 RX ADMIN — Medication 2 MILLIGRAM(S): at 21:11

## 2025-06-21 RX ADMIN — FLUTICASONE PROPIONATE 1 SPRAY(S): 50 SPRAY, METERED NASAL at 06:40

## 2025-06-21 NOTE — PROGRESS NOTE ADULT - ASSESSMENT
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    Patient has been decompensating since April after she underwent AAA stenting ( Has some renal artery leak). Was sent to nursing home. Was diagnosed with Ecoli/Pseudomonas UTIi treated with levaquin . Recently was having Shortness of breath/ cough ; treated with augmentin,. they checked theBNP 4199 ; followed Tye De Dios ; was started on Torsemide 20 mg other day.   Since yesterday had increased shortness of breath and anxiety. Nursing home gave her lasix which improved her breathing status but worsened since the morning so was sent to the hospital.     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   - TTE: EF 67%, mild LVH, severe LA dilation  - started on IV solumedrol and IV diuresis with Bumex  - stable on NC, tolerated NIV qhs  - blood cultures NGTD x2, procal negative. IV abx d/aixa  - per pulm/CC, decreased Bumex to 2mg IV q24hrs, Solumedrol 40mg IV daily  - consider CT chest if no clinical improvement and cardio eval    #ADRIA (baseline creatinine ~1.3)  - Cr 1.5 on admission, previously normal  - Renal US:  Interval resolution of hydronephrosis since 5/12/25  - UA noted, blood and RBC  - monitor lytes, BMP    #Constipation  - c/w miralax BID, senna qhs    #Thrombocytopenia, improved  - heme/onc eval 6/20 noted. continue to hold tagrisso and continue IV diuresis    #Atrial Fibrillation  #H/O PE  #HTN  #Diabetes  #HLD  #AAA s/p stent   Plan  - continue apixaban 2.5 mg q12h, aspirin 81 mg, amlodipine 5 mg, metoprolol XL 50 mg daily  - continue sliding scale insulin lispro       #Anxiety/Insomnia (Chronic)  Plan  - continue zolpidem 10 mg, dronabinol 2.5 mg daily    DVT ppx: Eliquis  GI ppx: Protonix  Code: Full

## 2025-06-21 NOTE — PROGRESS NOTE ADULT - SUBJECTIVE AND OBJECTIVE BOX
24H events:    Today is hospital day 4d. This morning patient was seen and examined at bedside, resting comfortably in bed.    No acute or major events overnight.    PAST MEDICAL & SURGICAL HISTORY  Hypertension, unspecified type    Type 2 diabetes mellitus with complication, without long-term current use of insulin    Hyperlipidemia, unspecified hyperlipidemia type    Diverticulitis    Obesity    COPD (chronic obstructive pulmonary disease)    SOB (shortness of breath)    GERD (gastroesophageal reflux disease)    Lung cancer    ARTIE (obstructive sleep apnea)  NO CPAP MACHINE PER PT.    Cancer of kidney    Cancer of thyroid    Former smoker    NHL (non-Hodgkin's lymphoma)  "low grade" per heme/ onc note    History of abdominal aortic aneurysm (AAA)    Kidney stones    [place] (hard of hearing)    History of surgery  LEFT LOWER LOBECTOMY    History of surgery  BILATERAL KNEE REPLACEMENT    History of surgery  CATARACT RIGHT EYE    History of surgery  TUBAL LIGATION    History of surgery  CYST REMOVED  RIGHT BREAST    History of surgery  CHOLECYSTECOMY    History of surgery  RIGHT PARTIAL NEPHRECTOMY    History of surgery  BILATERAL CATARACT SURGERY    History of back surgery    H/O lithotripsy    H/O partial thyroidectomy        SOCIAL HISTORY:  Social History:      ALLERGIES:  No Known Allergies      MEDICATIONS:  STANDING MEDICATIONS  amLODIPine   Tablet 10 milliGRAM(s) Oral every 24 hours  amLODIPine   Tablet 5 milliGRAM(s) Oral once  apixaban 2.5 milliGRAM(s) Oral every 12 hours  aspirin enteric coated 81 milliGRAM(s) Oral daily  bumetanide Injectable 2 milliGRAM(s) IV Push daily  chlorhexidine 2% Cloths 1 Application(s) Topical <User Schedule>  dextrose 5%. 1000 milliLiter(s) IV Continuous <Continuous>  dextrose 5%. 1000 milliLiter(s) IV Continuous <Continuous>  dextrose 50% Injectable 25 Gram(s) IV Push once  dextrose 50% Injectable 12.5 Gram(s) IV Push once  dextrose 50% Injectable 25 Gram(s) IV Push once  droNABinol 2.5 milliGRAM(s) Oral daily  fluticasone propionate 50 MICROgram(s)/spray Nasal Spray 1 Spray(s) Both Nostrils two times a day  glucagon  Injectable 1 milliGRAM(s) IntraMuscular once  insulin lispro (ADMELOG) corrective regimen sliding scale   SubCutaneous Before meals and at bedtime  methylPREDNISolone sodium succinate Injectable 40 milliGRAM(s) IV Push daily  metoprolol succinate ER 50 milliGRAM(s) Oral daily  pantoprazole    Tablet 40 milliGRAM(s) Oral before breakfast  polyethylene glycol 3350 17 Gram(s) Oral every 12 hours  senna 2 Tablet(s) Oral at bedtime    PRN MEDICATIONS  albuterol    90 MICROgram(s) HFA Inhaler 2 Puff(s) Inhalation every 6 hours PRN  dextrose Oral Gel 15 Gram(s) Oral once PRN  morphine  - Injectable 2 milliGRAM(s) IV Push every 6 hours PRN  zolpidem 10 milliGRAM(s) Oral at bedtime PRN      VITALS:   T(C): 36.1 (06-21-25 @ 20:00), Max: 36.2 (06-21-25 @ 04:00)  HR: 76 (06-21-25 @ 20:00) (74 - 82)  BP: 163/98 (06-21-25 @ 20:00) (147/101 - 171/112)  RR: 18 (06-21-25 @ 20:00) (18 - 22)  SpO2: 96% (06-21-25 @ 20:00) (92% - 100%)  I&O's Summary    20 Jun 2025 07:01 - 21 Jun 2025 07:00  --------------------------------------------------------  IN: 840 mL / OUT: 2550 mL / NET: -1710 mL    21 Jun 2025 07:01  -  21 Jun 2025 21:41  --------------------------------------------------------  IN: 0 mL / OUT: 1100 mL / NET: -1100 mL          PHYSICAL EXAM:  GENERAL: No acute distress  HEAD:  Atraumatic, Normocephalic  EYES: EOMI, PERRLA, conjunctiva and sclera clear  NECK: Supple, No JVD  NERVOUS SYSTEM:  Alert & Oriented X3,  motor and  sensory intact  CHEST/LUNG: B/L good air entry; No rales, rhonchi, or wheezing  HEART: S1S2 normal, no S3, Regular rate and rhythm; No murmurs heard  ABDOMEN: Soft, Nontender, Nondistended  EXTREMITIES:  No edema  SKIN: No rashes or lesions    LABS:                        8.8    8.43  )-----------( 112      ( 21 Jun 2025 05:00 )             28.5     06-21    141  |  97[L]  |  59[H]  ----------------------------<  103[H]  3.8   |  33[H]  |  1.4    Ca    9.5      21 Jun 2025 05:00  Phos  3.7     06-21  Mg     1.9     06-21    TPro  5.1[L]  /  Alb  3.4[L]  /  TBili  0.6  /  DBili  x   /  AST  11  /  ALT  13  /  AlkPhos  86  06-21      Urinalysis Basic - ( 21 Jun 2025 05:00 )    Color: x / Appearance: x / SG: x / pH: x  Gluc: 103 mg/dL / Ketone: x  / Bili: x / Urobili: x   Blood: x / Protein: x / Nitrite: x   Leuk Esterase: x / RBC: x / WBC x   Sq Epi: x / Non Sq Epi: x / Bacteria: x

## 2025-06-21 NOTE — PROGRESS NOTE ADULT - ASSESSMENT
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    Patient has been decompensating since April after she underwent AAA stenting ( Has some renal artery leak). Was sent to nursing home. Was diagnosed with Ecoli/Pseudomonas UTIi treated with levaquin . Recently was having Shortness of breath/ cough ; treated with augmentin,. they checked theBNP 4199 ; followed Tye De Dois ; was started on Torsemide 20 mg other day.   Since yesterday had increased shortness of breath and anxiety. Nursing home gave her lasix which improved her breathing status but worsened since the morning so was sent to the hospital.     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   - TTE: EF 67%, mild LVH, severe LA dilation  - started on IV solumedrol and IV diuresis with Bumex  - stable on NC, tolerated NIV qhs  - blood cultures NGTD x2, procal negative. IV abx d/aixa  - per pulm/CC, decreased Bumex to 2mg IV q24hrs, Solumedrol 40mg IV daily  - consider CT chest if no clinical improvement and cardio eval    #ADRIA (baseline creatinine ~1.3)  - Cr 1.5 on admission, previously normal  - Renal US:  Interval resolution of hydronephrosis since 5/12/25  - UA noted, blood and RBC  - monitor lytes, BMP    #Constipation  - c/w miralax BID, senna qhs    #Thrombocytopenia, improved  - heme/onc eval 6/20 noted. continue to hold tagrisso and continue IV diuresis    #Atrial Fibrillation  #H/O PE  #HTN  #Diabetes  #HLD  #AAA s/p stent   Plan  - continue apixaban 2.5 mg q12h, aspirin 81 mg, amlodipine 5 mg, metoprolol XL 50 mg daily  - continue sliding scale insulin lispro       #Anxiety/Insomnia (Chronic)  Plan  - continue zolpidem 10 mg, dronabinol 2.5 mg daily    DVT ppx: Eliquis  GI ppx: Protonix  Code: Full

## 2025-06-21 NOTE — PROGRESS NOTE ADULT - ASSESSMENT
IMPRESSION:    Acute hypercapnic respiratory failure   Acute hypoxemic respiratory failure  Pulmonary edema   ADRIA  Can't RO pneumonitis secondary to Tagrisso   HO COPD  HO small cell lung ca on Tagriso  HO left adenocarcinoma s/p LLL lobectomy  HO afib/PE on Eliquis     PLAN:    CNS:  Avoid sedation.  MSO4 PRN for comfort     HEENT: Oral care    PULMONARY:  HOB at 45 degrees.  NIV during sleep and PRN during the day.  Wean o2 as tolerated.  Continue home inhaler regimen.  Albuterol PRN.  Repeat CXR in am.  Decrease Solumedrol to 40 mg daily     CARDIOVASCULAR:  Change Bumex 2 mg QD.  ECHO Noted.      GI: GI prophylaxis.  Feeding when off NIV.  Bowel regimen     RENAL:  Follow up lytes.  Correct as needed,   Monitor UO     INFECTIOUS DISEASE: Follow up cultures, procalcitonin noted, MRSA negative, Monitor VS      HEMATOLOGICAL:  DVT prophylaxis. DIMER noted.  LE duplex negative.  Monitor CBC     ENDOCRINE:  Follow up FS.  Insulin protocol if needed.      MUSCULOSKELETAL: bed chair position.  Off loading.  PT OT     SDU     GOC

## 2025-06-21 NOTE — PROGRESS NOTE ADULT - SUBJECTIVE AND OBJECTIVE BOX
Patient is a 84y old  Female who presents with a chief complaint of Shortness of Breath (20 Jun 2025 11:52)        Over Night Events:  Remains on HFNCO2.  Off pressors.          ROS:     All ROS are negative except HPI         PHYSICAL EXAM    ICU Vital Signs Last 24 Hrs  T(C): 36.2 (21 Jun 2025 04:00), Max: 36.9 (20 Jun 2025 20:00)  T(F): 97.2 (21 Jun 2025 04:00), Max: 98.5 (20 Jun 2025 20:00)  HR: 74 (21 Jun 2025 04:00) (74 - 84)  BP: 149/95 (21 Jun 2025 04:00) (120/64 - 179/99)  BP(mean): 116 (21 Jun 2025 04:00) (87 - 130)  ABP: --  ABP(mean): --  RR: 18 (21 Jun 2025 04:00) (12 - 79)  SpO2: 93% (21 Jun 2025 04:00) (90% - 100%)    O2 Parameters below as of 21 Jun 2025 00:10  Patient On (Oxygen Delivery Method): nasal cannula, high flow  O2 Flow (L/min): 40  O2 Concentration (%): 40        CONSTITUTIONAL:    NAD    ENT:   Airway patent,   Mouth with normal mucosa.       EYES:   Pupils equal,   Round and reactive to light.    CARDIAC:   Normal rate,   Regular rhythm.        RESPIRATORY:   No wheezing  Bilateral crackles   Normal chest expansion  Not tachypneic,  No use of accessory muscles    GASTROINTESTINAL:  Abdomen soft,   Non-tender,   No guarding,   + BS    MUSCULOSKELETAL:   Range of motion is not limited,  No clubbing, cyanosis    NEUROLOGICAL:   Alert and oriented   No motor  deficits.    SKIN:   Skin normal color for race,   Warm and dry  No evidence of rash.        06-19-25 @ 07:01  -  06-20-25 @ 07:00  --------------------------------------------------------  IN:    IV PiggyBack: 400 mL    Oral Fluid: 180 mL  Total IN: 580 mL    OUT:    Voided (mL): 2550 mL  Total OUT: 2550 mL    Total NET: -1970 mL      06-20-25 @ 07:01  -  06-21-25 @ 06:33  --------------------------------------------------------  IN:    Oral Fluid: 840 mL  Total IN: 840 mL    OUT:    Intermittent Catheterization - Urethral (mL): 200 mL    Voided (mL): 1400 mL  Total OUT: 1600 mL    Total NET: -760 mL          LABS:                            8.1    7.05  )-----------( 85       ( 20 Jun 2025 04:50 )             26.4                                               06-21    141  |  97[L]  |  59[H]  ----------------------------<  103[H]  3.8   |  33[H]  |  1.4    Creatinine Trend  BUN 59, Cr 1.4, (06-21-25 @ 05:00)  Creatinine Trend  BUN 51, Cr 1.3, (06-20-25 @ 04:50)  Creatinine Trend  BUN 50, Cr 1.3, (06-19-25 @ 20:10)  Creatinine Trend  BUN 51, Cr 1.4, (06-19-25 @ 16:50)  Creatinine Trend  BUN 56, Cr 1.5, (06-19-25 @ 07:59)  Creatinine Trend  BUN 53, Cr 1.7, (06-18-25 @ 04:30)  Creatinine Trend  BUN 52, Cr 1.5, (06-17-25 @ 08:40)      Ca    9.5      21 Jun 2025 05:00  Phos  3.7     06-21  Mg     1.9     06-21    TPro  5.1[L]  /  Alb  3.4[L]  /  TBili  0.6  /  DBili  x   /  AST  11  /  ALT  13  /  AlkPhos  86  06-21                                             Urinalysis Basic - ( 21 Jun 2025 05:00 )    Color: x / Appearance: x / SG: x / pH: x  Gluc: 103 mg/dL / Ketone: x  / Bili: x / Urobili: x   Blood: x / Protein: x / Nitrite: x   Leuk Esterase: x / RBC: x / WBC x   Sq Epi: x / Non Sq Epi: x / Bacteria: x                                                  LIVER FUNCTIONS - ( 21 Jun 2025 05:00 )  Alb: 3.4 g/dL / Pro: 5.1 g/dL / ALK PHOS: 86 U/L / ALT: 13 U/L / AST: 11 U/L / GGT: x                                                                                                                                       MEDICATIONS  (STANDING):  amLODIPine   Tablet 5 milliGRAM(s) Oral every 24 hours  apixaban 2.5 milliGRAM(s) Oral every 12 hours  aspirin enteric coated 81 milliGRAM(s) Oral daily  bumetanide Injectable 2 milliGRAM(s) IV Push every 12 hours  chlorhexidine 2% Cloths 1 Application(s) Topical <User Schedule>  dextrose 5%. 1000 milliLiter(s) (100 mL/Hr) IV Continuous <Continuous>  dextrose 5%. 1000 milliLiter(s) (50 mL/Hr) IV Continuous <Continuous>  dextrose 50% Injectable 25 Gram(s) IV Push once  dextrose 50% Injectable 12.5 Gram(s) IV Push once  dextrose 50% Injectable 25 Gram(s) IV Push once  droNABinol 2.5 milliGRAM(s) Oral daily  fluticasone propionate 50 MICROgram(s)/spray Nasal Spray 1 Spray(s) Both Nostrils two times a day  glucagon  Injectable 1 milliGRAM(s) IntraMuscular once  insulin lispro (ADMELOG) corrective regimen sliding scale   SubCutaneous Before meals and at bedtime  methylPREDNISolone sodium succinate Injectable 60 milliGRAM(s) IV Push daily  metoprolol succinate ER 50 milliGRAM(s) Oral daily  pantoprazole    Tablet 40 milliGRAM(s) Oral before breakfast  polyethylene glycol 3350 17 Gram(s) Oral every 12 hours  senna 2 Tablet(s) Oral at bedtime    MEDICATIONS  (PRN):  albuterol    90 MICROgram(s) HFA Inhaler 2 Puff(s) Inhalation every 6 hours PRN Bronchospasm  dextrose Oral Gel 15 Gram(s) Oral once PRN Blood Glucose LESS THAN 70 milliGRAM(s)/deciliter  morphine  - Injectable 2 milliGRAM(s) IV Push every 6 hours PRN Dyspnea  zolpidem 10 milliGRAM(s) Oral at bedtime PRN Insomnia      New X-rays reviewed:                                                                                  ECHO

## 2025-06-21 NOTE — PROGRESS NOTE ADULT - SUBJECTIVE AND OBJECTIVE BOX
FERMIN DIAZ 84y Female  MRN#: [id number]     SUBJECTIVE  Patient is a 84y old Female who presents with a chief complaint of Shortness of Breath (21 Jun 2025 06:33)    Interval/Overnight Events:    Today is hospital day 4d, and this morning she is lying in bed without distress.   No acute overnight events.     OBJECTIVE  PAST MEDICAL & SURGICAL HISTORY  Hypertension, unspecified type    Type 2 diabetes mellitus with complication, without long-term current use of insulin    Hyperlipidemia, unspecified hyperlipidemia type    Diverticulitis    Obesity    COPD (chronic obstructive pulmonary disease)    SOB (shortness of breath)    GERD (gastroesophageal reflux disease)    Lung cancer    ARTIE (obstructive sleep apnea)  NO CPAP MACHINE PER PT.    Cancer of kidney    Cancer of thyroid    Former smoker    NHL (non-Hodgkin's lymphoma)  "low grade" per heme/ onc note    History of abdominal aortic aneurysm (AAA)    Kidney stones    [place] (hard of hearing)    History of surgery  LEFT LOWER LOBECTOMY    History of surgery  BILATERAL KNEE REPLACEMENT    History of surgery  CATARACT RIGHT EYE    History of surgery  TUBAL LIGATION    History of surgery  CYST REMOVED  RIGHT BREAST    History of surgery  CHOLECYSTECOMY    History of surgery  RIGHT PARTIAL NEPHRECTOMY    History of surgery  BILATERAL CATARACT SURGERY    History of back surgery    H/O lithotripsy    H/O partial thyroidectomy      ALLERGIES:  No Known Allergies    MEDICATIONS:  STANDING MEDICATIONS  amLODIPine   Tablet 10 milliGRAM(s) Oral every 24 hours  apixaban 2.5 milliGRAM(s) Oral every 12 hours  aspirin enteric coated 81 milliGRAM(s) Oral daily  bumetanide Injectable 2 milliGRAM(s) IV Push daily  chlorhexidine 2% Cloths 1 Application(s) Topical <User Schedule>  dextrose 5%. 1000 milliLiter(s) IV Continuous <Continuous>  dextrose 5%. 1000 milliLiter(s) IV Continuous <Continuous>  dextrose 50% Injectable 25 Gram(s) IV Push once  dextrose 50% Injectable 12.5 Gram(s) IV Push once  dextrose 50% Injectable 25 Gram(s) IV Push once  droNABinol 2.5 milliGRAM(s) Oral daily  fluticasone propionate 50 MICROgram(s)/spray Nasal Spray 1 Spray(s) Both Nostrils two times a day  glucagon  Injectable 1 milliGRAM(s) IntraMuscular once  insulin lispro (ADMELOG) corrective regimen sliding scale   SubCutaneous Before meals and at bedtime  methylPREDNISolone sodium succinate Injectable 40 milliGRAM(s) IV Push daily  metoprolol succinate ER 50 milliGRAM(s) Oral daily  pantoprazole    Tablet 40 milliGRAM(s) Oral before breakfast  polyethylene glycol 3350 17 Gram(s) Oral every 12 hours  senna 2 Tablet(s) Oral at bedtime    PRN MEDICATIONS  albuterol    90 MICROgram(s) HFA Inhaler 2 Puff(s) Inhalation every 6 hours PRN  dextrose Oral Gel 15 Gram(s) Oral once PRN  morphine  - Injectable 2 milliGRAM(s) IV Push every 6 hours PRN  zolpidem 10 milliGRAM(s) Oral at bedtime PRN    HOME MEDICATIONS  Home Medications:  amLODIPine 5 mg oral tablet: 1 tab(s) orally once a day (17 Jun 2025 15:48)  aspirin 81 mg oral capsule: 1 cap(s) orally once a day (17 Jun 2025 15:48)  Ditropan 5 mg oral tablet: 1 tab(s) orally once a day (at bedtime) (17 Jun 2025 15:48)  docusate sodium 100 mg oral tablet: 3 tab(s) orally once a day (at bedtime) (17 Jun 2025 15:49)  Flonase 50 mcg/inh nasal spray: 1 spray(s) in each nostril 2 times a day (17 Jun 2025 15:49)  Incruse Ellipta 62.5 mcg/inh inhalation powder: 1 puff(s) inhaled once a day (17 Jun 2025 15:48)  metoprolol succinate 25 mg oral tablet, extended release: 2 tab(s) orally once a day (17 Jun 2025 15:42)  MiraLax oral powder for reconstitution: 17 gram(s) orally once a day (at bedtime) (17 Jun 2025 15:48)  omeprazole 40 mg oral delayed release capsule: 1 cap(s) orally once a day (17 Jun 2025 15:49)  potassium chloride 20 mEq oral tablet, extended release: 1 tab(s) orally 3 times a week (17 Jun 2025 15:48)  senna (sennosides) 8.6 mg oral tablet: 1 tab(s) orally once a day (17 Jun 2025 15:48)  Symbicort 160 mcg-4.5 mcg/inh inhalation aerosol: 2 puff(s) inhaled 2 times a day (17 Jun 2025 15:49)  Tagrisso 80 mg oral tablet: 80 milligram(s) orally once a day (17 Jun 2025 15:49)  torsemide 20 mg oral tablet: 1 tab(s) orally 3 times a week every other day (17 Jun 2025 15:48)  Trulicity Pen 1.5 mg/0.5 mL subcutaneous solution: 1.5 milligram(s) subcutaneous once a week (17 Jun 2025 15:49)  Venofer 20 mg/mL intravenous solution: 100 milligram(s) intravenously 3 times a week (17 Jun 2025 15:49)  Vitamin D3 25 mcg (1000 intl units) oral tablet: 1 tab(s) orally once a day (17 Jun 2025 15:49)      LABS:                        8.8    8.43  )-----------( 112      ( 21 Jun 2025 05:00 )             28.5     06-21    141  |  97[L]  |  59[H]  ----------------------------<  103[H]  3.8   |  33[H]  |  1.4    Ca    9.5      21 Jun 2025 05:00  Phos  3.7     06-21  Mg     1.9     06-21    TPro  5.1[L]  /  Alb  3.4[L]  /  TBili  0.6  /  DBili  x   /  AST  11  /  ALT  13  /  AlkPhos  86  06-21    LIVER FUNCTIONS - ( 21 Jun 2025 05:00 )  Alb: 3.4 g/dL / Pro: 5.1 g/dL / ALK PHOS: 86 U/L / ALT: 13 U/L / AST: 11 U/L / GGT: x             Urinalysis Basic - ( 21 Jun 2025 05:00 )    Color: x / Appearance: x / SG: x / pH: x  Gluc: 103 mg/dL / Ketone: x  / Bili: x / Urobili: x   Blood: x / Protein: x / Nitrite: x   Leuk Esterase: x / RBC: x / WBC x   Sq Epi: x / Non Sq Epi: x / Bacteria: x                CAPILLARY BLOOD GLUCOSE      POCT Blood Glucose.: 165 mg/dL (21 Jun 2025 11:34)      PHYSICAL EXAM:  T(C): 36.2 (06-21-25 @ 11:48), Max: 36.9 (06-20-25 @ 20:00)  HR: 79 (06-21-25 @ 11:48) (74 - 83)  BP: 157/103 (06-21-25 @ 11:48) (145/80 - 179/99)  RR: 20 (06-21-25 @ 11:48) (18 - 79)  SpO2: 100% (06-21-25 @ 11:48) (90% - 100%)    Constitutional: NAD  HEENT: Scleral clear  Pulmonary: CTA bilaterally  Cardiac: RRR  Abdomen: nontender to palpation, bowel sounds are normal, nondistended  Extremities: no pedal edema    ADMISSION SUMMARY  Patient is a 84y old Female who presents with a chief complaint of Shortness of Breath (21 Jun 2025 06:33)

## 2025-06-22 LAB
ALBUMIN SERPL ELPH-MCNC: 3.4 G/DL — LOW (ref 3.5–5.2)
ALP SERPL-CCNC: 88 U/L — SIGNIFICANT CHANGE UP (ref 30–115)
ALT FLD-CCNC: 18 U/L — SIGNIFICANT CHANGE UP (ref 0–41)
ANION GAP SERPL CALC-SCNC: 11 MMOL/L — SIGNIFICANT CHANGE UP (ref 7–14)
AST SERPL-CCNC: 15 U/L — SIGNIFICANT CHANGE UP (ref 0–41)
BASOPHILS # BLD AUTO: 0.01 K/UL — SIGNIFICANT CHANGE UP (ref 0–0.2)
BASOPHILS NFR BLD AUTO: 0.1 % — SIGNIFICANT CHANGE UP (ref 0–1)
BILIRUB SERPL-MCNC: 0.7 MG/DL — SIGNIFICANT CHANGE UP (ref 0.2–1.2)
BUN SERPL-MCNC: 63 MG/DL — CRITICAL HIGH (ref 10–20)
CALCIUM SERPL-MCNC: 9.4 MG/DL — SIGNIFICANT CHANGE UP (ref 8.4–10.5)
CHLORIDE SERPL-SCNC: 97 MMOL/L — LOW (ref 98–110)
CO2 SERPL-SCNC: 34 MMOL/L — HIGH (ref 17–32)
CREAT SERPL-MCNC: 1.3 MG/DL — SIGNIFICANT CHANGE UP (ref 0.7–1.5)
CULTURE RESULTS: SIGNIFICANT CHANGE UP
CULTURE RESULTS: SIGNIFICANT CHANGE UP
EGFR: 41 ML/MIN/1.73M2 — LOW
EGFR: 41 ML/MIN/1.73M2 — LOW
EOSINOPHIL # BLD AUTO: 0.06 K/UL — SIGNIFICANT CHANGE UP (ref 0–0.7)
EOSINOPHIL NFR BLD AUTO: 0.6 % — SIGNIFICANT CHANGE UP (ref 0–8)
GLUCOSE BLDC GLUCOMTR-MCNC: 138 MG/DL — HIGH (ref 70–99)
GLUCOSE BLDC GLUCOMTR-MCNC: 141 MG/DL — HIGH (ref 70–99)
GLUCOSE BLDC GLUCOMTR-MCNC: 266 MG/DL — HIGH (ref 70–99)
GLUCOSE BLDC GLUCOMTR-MCNC: 306 MG/DL — HIGH (ref 70–99)
GLUCOSE SERPL-MCNC: 99 MG/DL — SIGNIFICANT CHANGE UP (ref 70–99)
HCT VFR BLD CALC: 28.1 % — LOW (ref 37–47)
HGB BLD-MCNC: 8.6 G/DL — LOW (ref 12–16)
IMM GRANULOCYTES NFR BLD AUTO: 0.4 % — HIGH (ref 0.1–0.3)
LYMPHOCYTES # BLD AUTO: 1.26 K/UL — SIGNIFICANT CHANGE UP (ref 1.2–3.4)
LYMPHOCYTES # BLD AUTO: 13.5 % — LOW (ref 20.5–51.1)
MAGNESIUM SERPL-MCNC: 2 MG/DL — SIGNIFICANT CHANGE UP (ref 1.8–2.4)
MCHC RBC-ENTMCNC: 25.7 PG — LOW (ref 27–31)
MCHC RBC-ENTMCNC: 30.6 G/DL — LOW (ref 32–37)
MCV RBC AUTO: 83.9 FL — SIGNIFICANT CHANGE UP (ref 81–99)
MONOCYTES # BLD AUTO: 1.27 K/UL — HIGH (ref 0.1–0.6)
MONOCYTES NFR BLD AUTO: 13.6 % — HIGH (ref 1.7–9.3)
NEUTROPHILS # BLD AUTO: 6.71 K/UL — HIGH (ref 1.4–6.5)
NEUTROPHILS NFR BLD AUTO: 71.8 % — SIGNIFICANT CHANGE UP (ref 42.2–75.2)
NRBC BLD AUTO-RTO: 0 /100 WBCS — SIGNIFICANT CHANGE UP (ref 0–0)
PHOSPHATE SERPL-MCNC: 3.6 MG/DL — SIGNIFICANT CHANGE UP (ref 2.1–4.9)
PLATELET # BLD AUTO: 108 K/UL — LOW (ref 130–400)
PMV BLD: 13 FL — HIGH (ref 7.4–10.4)
POTASSIUM SERPL-MCNC: 4.3 MMOL/L — SIGNIFICANT CHANGE UP (ref 3.5–5)
POTASSIUM SERPL-SCNC: 4.3 MMOL/L — SIGNIFICANT CHANGE UP (ref 3.5–5)
PROT SERPL-MCNC: 5 G/DL — LOW (ref 6–8)
RBC # BLD: 3.35 M/UL — LOW (ref 4.2–5.4)
RBC # FLD: 15.6 % — HIGH (ref 11.5–14.5)
SODIUM SERPL-SCNC: 142 MMOL/L — SIGNIFICANT CHANGE UP (ref 135–146)
SPECIMEN SOURCE: SIGNIFICANT CHANGE UP
SPECIMEN SOURCE: SIGNIFICANT CHANGE UP
WBC # BLD: 9.35 K/UL — SIGNIFICANT CHANGE UP (ref 4.8–10.8)
WBC # FLD AUTO: 9.35 K/UL — SIGNIFICANT CHANGE UP (ref 4.8–10.8)

## 2025-06-22 PROCEDURE — 99232 SBSQ HOSP IP/OBS MODERATE 35: CPT

## 2025-06-22 PROCEDURE — 99233 SBSQ HOSP IP/OBS HIGH 50: CPT

## 2025-06-22 PROCEDURE — 71045 X-RAY EXAM CHEST 1 VIEW: CPT | Mod: 26

## 2025-06-22 RX ADMIN — INSULIN LISPRO 8: 100 INJECTION, SOLUTION INTRAVENOUS; SUBCUTANEOUS at 11:24

## 2025-06-22 RX ADMIN — Medication 81 MILLIGRAM(S): at 11:24

## 2025-06-22 RX ADMIN — METOPROLOL SUCCINATE 50 MILLIGRAM(S): 50 TABLET, EXTENDED RELEASE ORAL at 05:30

## 2025-06-22 RX ADMIN — APIXABAN 2.5 MILLIGRAM(S): 2.5 TABLET, FILM COATED ORAL at 05:31

## 2025-06-22 RX ADMIN — Medication 2 MILLIGRAM(S): at 13:33

## 2025-06-22 RX ADMIN — POLYETHYLENE GLYCOL 3350 17 GRAM(S): 17 POWDER, FOR SOLUTION ORAL at 05:29

## 2025-06-22 RX ADMIN — DRONABINOL 2.5 MILLIGRAM(S): 10 CAPSULE ORAL at 11:24

## 2025-06-22 RX ADMIN — Medication 2 TABLET(S): at 21:04

## 2025-06-22 RX ADMIN — FLUTICASONE PROPIONATE 1 SPRAY(S): 50 SPRAY, METERED NASAL at 17:28

## 2025-06-22 RX ADMIN — POLYETHYLENE GLYCOL 3350 17 GRAM(S): 17 POWDER, FOR SOLUTION ORAL at 17:26

## 2025-06-22 RX ADMIN — Medication 1 APPLICATION(S): at 06:45

## 2025-06-22 RX ADMIN — Medication 40 MILLIGRAM(S): at 07:06

## 2025-06-22 RX ADMIN — FLUTICASONE PROPIONATE 1 SPRAY(S): 50 SPRAY, METERED NASAL at 06:45

## 2025-06-22 RX ADMIN — METHYLPREDNISOLONE ACETATE 40 MILLIGRAM(S): 80 INJECTION, SUSPENSION INTRA-ARTICULAR; INTRALESIONAL; INTRAMUSCULAR; SOFT TISSUE at 05:29

## 2025-06-22 RX ADMIN — APIXABAN 2.5 MILLIGRAM(S): 2.5 TABLET, FILM COATED ORAL at 17:26

## 2025-06-22 RX ADMIN — BUMETANIDE 2 MILLIGRAM(S): 1 TABLET ORAL at 05:29

## 2025-06-22 RX ADMIN — AMLODIPINE BESYLATE 10 MILLIGRAM(S): 10 TABLET ORAL at 11:24

## 2025-06-22 RX ADMIN — INSULIN LISPRO 6: 100 INJECTION, SOLUTION INTRAVENOUS; SUBCUTANEOUS at 21:04

## 2025-06-22 NOTE — PROGRESS NOTE ADULT - ASSESSMENT
IMPRESSION:    Acute hypercapnic respiratory failure   Acute hypoxemic respiratory failure  Pulmonary edema   ADRIA  Can't RO pneumonitis secondary to Tagrisso   HO COPD  HO small cell lung ca on Tagriso  HO left adenocarcinoma s/p LLL lobectomy  HO afib/PE on Eliquis     PLAN:    CNS:  Avoid sedation.  MSO4 PRN for comfort     HEENT: Oral care    PULMONARY:  HOB at 45 degrees.  NIV during sleep and PRN during the day.  Wean o2 as tolerated.  Continue home inhaler regimen.  Albuterol PRN.  Repeat CXR noted.  Solumedrol to 40 mg daily     CARDIOVASCULAR:  Bumex 2 mg QD.  ECHO Noted.  Continue volume contraction as tolerated       GI: GI prophylaxis.  Feeding when off NIV.  Bowel regimen     RENAL:  Follow up lytes.  Correct as needed,   Monitor UO     INFECTIOUS DISEASE: Follow up cultures, procalcitonin noted, MRSA negative, Monitor VS      HEMATOLOGICAL:  DVT prophylaxis. DIMER noted.  LE duplex negative.  Monitor CBC     ENDOCRINE:  Follow up FS.  Insulin protocol if needed.      MUSCULOSKELETAL: bed chair position.  Off loading.  PT OT     SDU     GOC

## 2025-06-22 NOTE — PROGRESS NOTE ADULT - SUBJECTIVE AND OBJECTIVE BOX
FERMIN DIAZ 84y Female  MRN#: [id number]     SUBJECTIVE  Patient is a 84y old Female who presents with a chief complaint of Shortness of Breath (22 Jun 2025 07:37)    Interval/Overnight Events:    Today is hospital day 5d, and this morning she is lying in bed without distress.   No acute overnight events.     OBJECTIVE  PAST MEDICAL & SURGICAL HISTORY  Hypertension, unspecified type    Type 2 diabetes mellitus with complication, without long-term current use of insulin    Hyperlipidemia, unspecified hyperlipidemia type    Diverticulitis    Obesity    COPD (chronic obstructive pulmonary disease)    SOB (shortness of breath)    GERD (gastroesophageal reflux disease)    Lung cancer    ARTIE (obstructive sleep apnea)  NO CPAP MACHINE PER PT.    Cancer of kidney    Cancer of thyroid    Former smoker    NHL (non-Hodgkin's lymphoma)  "low grade" per heme/ onc note    History of abdominal aortic aneurysm (AAA)    Kidney stones    [place] (hard of hearing)    History of surgery  LEFT LOWER LOBECTOMY    History of surgery  BILATERAL KNEE REPLACEMENT    History of surgery  CATARACT RIGHT EYE    History of surgery  TUBAL LIGATION    History of surgery  CYST REMOVED  RIGHT BREAST    History of surgery  CHOLECYSTECOMY    History of surgery  RIGHT PARTIAL NEPHRECTOMY    History of surgery  BILATERAL CATARACT SURGERY    History of back surgery    H/O lithotripsy    H/O partial thyroidectomy      ALLERGIES:  No Known Allergies    MEDICATIONS:  STANDING MEDICATIONS  amLODIPine   Tablet 10 milliGRAM(s) Oral every 24 hours  apixaban 2.5 milliGRAM(s) Oral every 12 hours  aspirin enteric coated 81 milliGRAM(s) Oral daily  bumetanide Injectable 2 milliGRAM(s) IV Push daily  chlorhexidine 2% Cloths 1 Application(s) Topical <User Schedule>  dextrose 5%. 1000 milliLiter(s) IV Continuous <Continuous>  dextrose 5%. 1000 milliLiter(s) IV Continuous <Continuous>  dextrose 50% Injectable 25 Gram(s) IV Push once  dextrose 50% Injectable 12.5 Gram(s) IV Push once  dextrose 50% Injectable 25 Gram(s) IV Push once  droNABinol 2.5 milliGRAM(s) Oral daily  fluticasone propionate 50 MICROgram(s)/spray Nasal Spray 1 Spray(s) Both Nostrils two times a day  glucagon  Injectable 1 milliGRAM(s) IntraMuscular once  insulin lispro (ADMELOG) corrective regimen sliding scale   SubCutaneous Before meals and at bedtime  methylPREDNISolone sodium succinate Injectable 40 milliGRAM(s) IV Push daily  metoprolol succinate ER 50 milliGRAM(s) Oral daily  pantoprazole    Tablet 40 milliGRAM(s) Oral before breakfast  polyethylene glycol 3350 17 Gram(s) Oral every 12 hours  senna 2 Tablet(s) Oral at bedtime    PRN MEDICATIONS  albuterol    90 MICROgram(s) HFA Inhaler 2 Puff(s) Inhalation every 6 hours PRN  dextrose Oral Gel 15 Gram(s) Oral once PRN  morphine  - Injectable 2 milliGRAM(s) IV Push every 6 hours PRN  zolpidem 10 milliGRAM(s) Oral at bedtime PRN    HOME MEDICATIONS  Home Medications:  amLODIPine 5 mg oral tablet: 1 tab(s) orally once a day (17 Jun 2025 15:48)  aspirin 81 mg oral capsule: 1 cap(s) orally once a day (17 Jun 2025 15:48)  Ditropan 5 mg oral tablet: 1 tab(s) orally once a day (at bedtime) (17 Jun 2025 15:48)  docusate sodium 100 mg oral tablet: 3 tab(s) orally once a day (at bedtime) (17 Jun 2025 15:49)  Flonase 50 mcg/inh nasal spray: 1 spray(s) in each nostril 2 times a day (17 Jun 2025 15:49)  Incruse Ellipta 62.5 mcg/inh inhalation powder: 1 puff(s) inhaled once a day (17 Jun 2025 15:48)  metoprolol succinate 25 mg oral tablet, extended release: 2 tab(s) orally once a day (17 Jun 2025 15:42)  MiraLax oral powder for reconstitution: 17 gram(s) orally once a day (at bedtime) (17 Jun 2025 15:48)  omeprazole 40 mg oral delayed release capsule: 1 cap(s) orally once a day (17 Jun 2025 15:49)  potassium chloride 20 mEq oral tablet, extended release: 1 tab(s) orally 3 times a week (17 Jun 2025 15:48)  senna (sennosides) 8.6 mg oral tablet: 1 tab(s) orally once a day (17 Jun 2025 15:48)  Symbicort 160 mcg-4.5 mcg/inh inhalation aerosol: 2 puff(s) inhaled 2 times a day (17 Jun 2025 15:49)  Tagrisso 80 mg oral tablet: 80 milligram(s) orally once a day (17 Jun 2025 15:49)  torsemide 20 mg oral tablet: 1 tab(s) orally 3 times a week every other day (17 Jun 2025 15:48)  Trulicity Pen 1.5 mg/0.5 mL subcutaneous solution: 1.5 milligram(s) subcutaneous once a week (17 Jun 2025 15:49)  Venofer 20 mg/mL intravenous solution: 100 milligram(s) intravenously 3 times a week (17 Jun 2025 15:49)  Vitamin D3 25 mcg (1000 intl units) oral tablet: 1 tab(s) orally once a day (17 Jun 2025 15:49)      LABS:                        8.6    9.35  )-----------( 108      ( 22 Jun 2025 06:01 )             28.1     06-22    142  |  97[L]  |  63[HH]  ----------------------------<  99  4.3   |  34[H]  |  1.3    Ca    9.4      22 Jun 2025 06:01  Phos  3.6     06-22  Mg     2.0     06-22    TPro  5.0[L]  /  Alb  3.4[L]  /  TBili  0.7  /  DBili  x   /  AST  15  /  ALT  18  /  AlkPhos  88  06-22    LIVER FUNCTIONS - ( 22 Jun 2025 06:01 )  Alb: 3.4 g/dL / Pro: 5.0 g/dL / ALK PHOS: 88 U/L / ALT: 18 U/L / AST: 15 U/L / GGT: x             Urinalysis Basic - ( 22 Jun 2025 06:01 )    Color: x / Appearance: x / SG: x / pH: x  Gluc: 99 mg/dL / Ketone: x  / Bili: x / Urobili: x   Blood: x / Protein: x / Nitrite: x   Leuk Esterase: x / RBC: x / WBC x   Sq Epi: x / Non Sq Epi: x / Bacteria: x                CAPILLARY BLOOD GLUCOSE      POCT Blood Glucose.: 306 mg/dL (22 Jun 2025 11:21)      PHYSICAL EXAM:  T(C): 36.4 (06-22-25 @ 11:35), Max: 36.4 (06-22-25 @ 11:35)  HR: 76 (06-22-25 @ 11:35) (60 - 79)  BP: 147/87 (06-22-25 @ 12:20) (147/87 - 171/101)  RR: 20 (06-22-25 @ 11:35) (18 - 20)  SpO2: 98% (06-22-25 @ 11:35) (93% - 98%)    GENERAL: NAD  HEENT: Scleral clear  Pulmonary: CTA bilaterally  Cardiac: RRR  Abdomen: nontender to palpation, bowel sounds are normal, nondistended  Extremities: no pedal edema    ADMISSION SUMMARY  Patient is a 84y old Female who presents with a chief complaint of Shortness of Breath (22 Jun 2025 07:37)

## 2025-06-22 NOTE — PROGRESS NOTE ADULT - ASSESSMENT
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    Patient has been decompensating since April after she underwent AAA stenting ( Has some renal artery leak). Was sent to nursing home. Was diagnosed with Ecoli/Pseudomonas UTIi treated with levaquin . Recently was having Shortness of breath/ cough ; treated with augmentin,. they checked theBNP 4199 ; followed Tye De Dios ; was started on Torsemide 20 mg.  Since yesterday had increased shortness of breath and anxiety. Nursing home gave her lasix which improved her breathing status but worsened since the morning so was sent to the hospital.     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   - TTE: EF 67%, mild LVH, severe LA dilation  - started on IV solumedrol and IV diuresis with Bumex  - stable on NC, tolerated NIV qhs  - blood cultures NGTD x2, procal negative. IV abx d/aixa  - per pulm/CC, c/w Bumex to 2mg IV q24hrs, Solumedrol 40mg IV daily  - consider CT chest if no clinical improvement and cardio eval    #ADRIA (baseline creatinine ~1.3)  - Cr 1.5 on admission, previously normal  - Renal US:  Interval resolution of hydronephrosis since 5/12/25  - UA noted, blood and RBC  - monitor lytes, BMP    #Constipation  - c/w miralax BID, senna qhs    #Thrombocytopenia, improved  - heme/onc eval 6/20 noted. continue to hold tagrisso and continue IV diuresis    #Atrial Fibrillation  #H/O PE  #HTN  #Diabetes  #HLD  #AAA s/p stent   Plan  - continue apixaban 2.5 mg q12h, aspirin 81 mg, amlodipine 5 mg, metoprolol XL 50 mg daily  - continue sliding scale insulin lispro       #Anxiety/Insomnia (Chronic)  Plan  - continue zolpidem 10 mg, dronabinol 2.5 mg daily    DVT ppx: Eliquis  GI ppx: Protonix  Code: Full     SDU

## 2025-06-22 NOTE — PROGRESS NOTE ADULT - SUBJECTIVE AND OBJECTIVE BOX
Patient is a 84y old  Female who presents with a chief complaint of Shortness of Breath (21 Jun 2025 15:20)        Over Night Events:  remains on HFNCO2.  off pressors.          ROS:     All ROS are negative except HPI         PHYSICAL EXAM    ICU Vital Signs Last 24 Hrs  T(C): 36.2 (22 Jun 2025 04:00), Max: 36.2 (21 Jun 2025 11:48)  T(F): 97.1 (22 Jun 2025 04:00), Max: 97.2 (22 Jun 2025 00:00)  HR: 60 (22 Jun 2025 04:00) (60 - 82)  BP: 157/86 (22 Jun 2025 04:00) (147/93 - 171/112)  BP(mean): 116 (22 Jun 2025 04:00) (114 - 137)  ABP: --  ABP(mean): --  RR: 18 (22 Jun 2025 04:00) (18 - 20)  SpO2: 94% (22 Jun 2025 04:00) (93% - 100%)    O2 Parameters below as of 21 Jun 2025 08:09  Patient On (Oxygen Delivery Method): nasal cannula, high flow            CONSTITUTIONAL:  NAD    ENT:   Airway patent,   Mouth with normal mucosa.     EYES:   Pupils equal,   Round and reactive to light.    CARDIAC:   Normal rate,   Regular rhythm.        RESPIRATORY:   No wheezing  Bilateral crackles  Normal chest expansion  Not tachypneic,  No use of accessory muscles    GASTROINTESTINAL:  Abdomen soft,   Non-tender,   No guarding,   + BS    MUSCULOSKELETAL:   Range of motion is not limited,  No clubbing, cyanosis    NEUROLOGICAL:   Alert and oriented   No motor  deficits.    SKIN:   Skin normal color for race,   Warm and dry   No evidence of rash.        06-21-25 @ 07:01  -  06-22-25 @ 07:00  --------------------------------------------------------  IN:  Total IN: 0 mL    OUT:    Voided (mL): 2300 mL  Total OUT: 2300 mL    Total NET: -2300 mL          LABS:                            8.6    9.35  )-----------( 108      ( 22 Jun 2025 06:01 )             28.1                                               06-22    142  |  97[L]  |  63[HH]  ----------------------------<  99  4.3   |  34[H]  |  1.3    Creatinine Trend  BUN 63, Cr 1.3, (06-22-25 @ 06:01)  Creatinine Trend  BUN 59, Cr 1.4, (06-21-25 @ 05:00)  Creatinine Trend  BUN 51, Cr 1.3, (06-20-25 @ 04:50)  Creatinine Trend  BUN 50, Cr 1.3, (06-19-25 @ 20:10)  Creatinine Trend  BUN 51, Cr 1.4, (06-19-25 @ 16:50)  Creatinine Trend  BUN 56, Cr 1.5, (06-19-25 @ 07:59)  Creatinine Trend  BUN 53, Cr 1.7, (06-18-25 @ 04:30)  Creatinine Trend  BUN 52, Cr 1.5, (06-17-25 @ 08:40)      Ca    9.4      22 Jun 2025 06:01  Phos  3.6     06-22  Mg     2.0     06-22    TPro  5.0[L]  /  Alb  3.4[L]  /  TBili  0.7  /  DBili  x   /  AST  15  /  ALT  18  /  AlkPhos  88  06-22                                             Urinalysis Basic - ( 22 Jun 2025 06:01 )    Color: x / Appearance: x / SG: x / pH: x  Gluc: 99 mg/dL / Ketone: x  / Bili: x / Urobili: x   Blood: x / Protein: x / Nitrite: x   Leuk Esterase: x / RBC: x / WBC x   Sq Epi: x / Non Sq Epi: x / Bacteria: x                                                  LIVER FUNCTIONS - ( 22 Jun 2025 06:01 )  Alb: 3.4 g/dL / Pro: 5.0 g/dL / ALK PHOS: 88 U/L / ALT: 18 U/L / AST: 15 U/L / GGT: x                                                                                                                                       MEDICATIONS  (STANDING):  amLODIPine   Tablet 10 milliGRAM(s) Oral every 24 hours  apixaban 2.5 milliGRAM(s) Oral every 12 hours  aspirin enteric coated 81 milliGRAM(s) Oral daily  bumetanide Injectable 2 milliGRAM(s) IV Push daily  chlorhexidine 2% Cloths 1 Application(s) Topical <User Schedule>  dextrose 5%. 1000 milliLiter(s) (100 mL/Hr) IV Continuous <Continuous>  dextrose 5%. 1000 milliLiter(s) (50 mL/Hr) IV Continuous <Continuous>  dextrose 50% Injectable 25 Gram(s) IV Push once  dextrose 50% Injectable 12.5 Gram(s) IV Push once  dextrose 50% Injectable 25 Gram(s) IV Push once  droNABinol 2.5 milliGRAM(s) Oral daily  fluticasone propionate 50 MICROgram(s)/spray Nasal Spray 1 Spray(s) Both Nostrils two times a day  glucagon  Injectable 1 milliGRAM(s) IntraMuscular once  insulin lispro (ADMELOG) corrective regimen sliding scale   SubCutaneous Before meals and at bedtime  methylPREDNISolone sodium succinate Injectable 40 milliGRAM(s) IV Push daily  metoprolol succinate ER 50 milliGRAM(s) Oral daily  pantoprazole    Tablet 40 milliGRAM(s) Oral before breakfast  polyethylene glycol 3350 17 Gram(s) Oral every 12 hours  senna 2 Tablet(s) Oral at bedtime    MEDICATIONS  (PRN):  albuterol    90 MICROgram(s) HFA Inhaler 2 Puff(s) Inhalation every 6 hours PRN Bronchospasm  dextrose Oral Gel 15 Gram(s) Oral once PRN Blood Glucose LESS THAN 70 milliGRAM(s)/deciliter  morphine  - Injectable 2 milliGRAM(s) IV Push every 6 hours PRN Dyspnea  zolpidem 10 milliGRAM(s) Oral at bedtime PRN Insomnia      New X-rays reviewed:                                                                                  ECHO    CXR interpreted by me:  Improved edema

## 2025-06-23 DIAGNOSIS — Z51.5 ENCOUNTER FOR PALLIATIVE CARE: ICD-10-CM

## 2025-06-23 DIAGNOSIS — J96.01 ACUTE RESPIRATORY FAILURE WITH HYPOXIA: ICD-10-CM

## 2025-06-23 DIAGNOSIS — C34.90 MALIGNANT NEOPLASM OF UNSPECIFIED PART OF UNSPECIFIED BRONCHUS OR LUNG: ICD-10-CM

## 2025-06-23 LAB
ALBUMIN SERPL ELPH-MCNC: 3.4 G/DL — LOW (ref 3.5–5.2)
ALP SERPL-CCNC: 88 U/L — SIGNIFICANT CHANGE UP (ref 30–115)
ALT FLD-CCNC: 22 U/L — SIGNIFICANT CHANGE UP (ref 0–41)
ANION GAP SERPL CALC-SCNC: 8 MMOL/L — SIGNIFICANT CHANGE UP (ref 7–14)
AST SERPL-CCNC: 15 U/L — SIGNIFICANT CHANGE UP (ref 0–41)
BASOPHILS # BLD AUTO: 0 K/UL — SIGNIFICANT CHANGE UP (ref 0–0.2)
BASOPHILS NFR BLD AUTO: 0 % — SIGNIFICANT CHANGE UP (ref 0–1)
BILIRUB SERPL-MCNC: 0.6 MG/DL — SIGNIFICANT CHANGE UP (ref 0.2–1.2)
BUN SERPL-MCNC: 63 MG/DL — CRITICAL HIGH (ref 10–20)
CALCIUM SERPL-MCNC: 9.5 MG/DL — SIGNIFICANT CHANGE UP (ref 8.4–10.5)
CHLORIDE SERPL-SCNC: 97 MMOL/L — LOW (ref 98–110)
CO2 SERPL-SCNC: 37 MMOL/L — HIGH (ref 17–32)
CREAT SERPL-MCNC: 1.4 MG/DL — SIGNIFICANT CHANGE UP (ref 0.7–1.5)
EGFR: 37 ML/MIN/1.73M2 — LOW
EGFR: 37 ML/MIN/1.73M2 — LOW
EOSINOPHIL # BLD AUTO: 0.09 K/UL — SIGNIFICANT CHANGE UP (ref 0–0.7)
EOSINOPHIL NFR BLD AUTO: 1 % — SIGNIFICANT CHANGE UP (ref 0–8)
GLUCOSE BLDC GLUCOMTR-MCNC: 106 MG/DL — HIGH (ref 70–99)
GLUCOSE BLDC GLUCOMTR-MCNC: 166 MG/DL — HIGH (ref 70–99)
GLUCOSE BLDC GLUCOMTR-MCNC: 189 MG/DL — HIGH (ref 70–99)
GLUCOSE BLDC GLUCOMTR-MCNC: 203 MG/DL — HIGH (ref 70–99)
GLUCOSE BLDC GLUCOMTR-MCNC: 257 MG/DL — HIGH (ref 70–99)
GLUCOSE SERPL-MCNC: 98 MG/DL — SIGNIFICANT CHANGE UP (ref 70–99)
HCT VFR BLD CALC: 27.3 % — LOW (ref 37–47)
HGB BLD-MCNC: 8.5 G/DL — LOW (ref 12–16)
IMM GRANULOCYTES NFR BLD AUTO: 0.4 % — HIGH (ref 0.1–0.3)
LYMPHOCYTES # BLD AUTO: 1.26 K/UL — SIGNIFICANT CHANGE UP (ref 1.2–3.4)
LYMPHOCYTES # BLD AUTO: 13.4 % — LOW (ref 20.5–51.1)
MAGNESIUM SERPL-MCNC: 2.2 MG/DL — SIGNIFICANT CHANGE UP (ref 1.8–2.4)
MCHC RBC-ENTMCNC: 26.5 PG — LOW (ref 27–31)
MCHC RBC-ENTMCNC: 31.1 G/DL — LOW (ref 32–37)
MCV RBC AUTO: 85 FL — SIGNIFICANT CHANGE UP (ref 81–99)
MONOCYTES # BLD AUTO: 1.35 K/UL — HIGH (ref 0.1–0.6)
MONOCYTES NFR BLD AUTO: 14.4 % — HIGH (ref 1.7–9.3)
NEUTROPHILS # BLD AUTO: 6.63 K/UL — HIGH (ref 1.4–6.5)
NEUTROPHILS NFR BLD AUTO: 70.8 % — SIGNIFICANT CHANGE UP (ref 42.2–75.2)
NRBC BLD AUTO-RTO: 0 /100 WBCS — SIGNIFICANT CHANGE UP (ref 0–0)
NT-PROBNP SERPL-SCNC: 3745 PG/ML — HIGH (ref 0–300)
PHOSPHATE SERPL-MCNC: 4 MG/DL — SIGNIFICANT CHANGE UP (ref 2.1–4.9)
PLATELET # BLD AUTO: 116 K/UL — LOW (ref 130–400)
PMV BLD: 12.1 FL — HIGH (ref 7.4–10.4)
POTASSIUM SERPL-MCNC: 4.5 MMOL/L — SIGNIFICANT CHANGE UP (ref 3.5–5)
POTASSIUM SERPL-SCNC: 4.5 MMOL/L — SIGNIFICANT CHANGE UP (ref 3.5–5)
PROT SERPL-MCNC: 5 G/DL — LOW (ref 6–8)
RBC # BLD: 3.21 M/UL — LOW (ref 4.2–5.4)
RBC # FLD: 15.6 % — HIGH (ref 11.5–14.5)
SODIUM SERPL-SCNC: 142 MMOL/L — SIGNIFICANT CHANGE UP (ref 135–146)
WBC # BLD: 9.37 K/UL — SIGNIFICANT CHANGE UP (ref 4.8–10.8)
WBC # FLD AUTO: 9.37 K/UL — SIGNIFICANT CHANGE UP (ref 4.8–10.8)

## 2025-06-23 PROCEDURE — 99233 SBSQ HOSP IP/OBS HIGH 50: CPT

## 2025-06-23 PROCEDURE — 71045 X-RAY EXAM CHEST 1 VIEW: CPT | Mod: 26

## 2025-06-23 PROCEDURE — 99497 ADVNCD CARE PLAN 30 MIN: CPT | Mod: 25

## 2025-06-23 PROCEDURE — 99222 1ST HOSP IP/OBS MODERATE 55: CPT

## 2025-06-23 RX ORDER — BUMETANIDE 1 MG/1
2 TABLET ORAL
Refills: 0 | Status: DISCONTINUED | OUTPATIENT
Start: 2025-06-23 | End: 2025-06-24

## 2025-06-23 RX ORDER — MAGNESIUM HYDROXIDE 400 MG/5ML
30 SUSPENSION ORAL DAILY
Refills: 0 | Status: DISCONTINUED | OUTPATIENT
Start: 2025-06-23 | End: 2025-06-24

## 2025-06-23 RX ADMIN — INSULIN LISPRO 2: 100 INJECTION, SOLUTION INTRAVENOUS; SUBCUTANEOUS at 09:51

## 2025-06-23 RX ADMIN — Medication 2 TABLET(S): at 20:51

## 2025-06-23 RX ADMIN — Medication 1 APPLICATION(S): at 05:09

## 2025-06-23 RX ADMIN — Medication 81 MILLIGRAM(S): at 11:24

## 2025-06-23 RX ADMIN — INSULIN LISPRO 6: 100 INJECTION, SOLUTION INTRAVENOUS; SUBCUTANEOUS at 12:16

## 2025-06-23 RX ADMIN — Medication 10 MILLIGRAM(S): at 20:49

## 2025-06-23 RX ADMIN — Medication 2 MILLIGRAM(S): at 10:26

## 2025-06-23 RX ADMIN — APIXABAN 2.5 MILLIGRAM(S): 2.5 TABLET, FILM COATED ORAL at 18:47

## 2025-06-23 RX ADMIN — Medication 2 MILLIGRAM(S): at 09:56

## 2025-06-23 RX ADMIN — POLYETHYLENE GLYCOL 3350 17 GRAM(S): 17 POWDER, FOR SOLUTION ORAL at 05:09

## 2025-06-23 RX ADMIN — BUMETANIDE 2 MILLIGRAM(S): 1 TABLET ORAL at 14:36

## 2025-06-23 RX ADMIN — FLUTICASONE PROPIONATE 1 SPRAY(S): 50 SPRAY, METERED NASAL at 05:12

## 2025-06-23 RX ADMIN — DRONABINOL 2.5 MILLIGRAM(S): 10 CAPSULE ORAL at 12:17

## 2025-06-23 RX ADMIN — APIXABAN 2.5 MILLIGRAM(S): 2.5 TABLET, FILM COATED ORAL at 05:11

## 2025-06-23 RX ADMIN — METOPROLOL SUCCINATE 50 MILLIGRAM(S): 50 TABLET, EXTENDED RELEASE ORAL at 05:10

## 2025-06-23 RX ADMIN — MAGNESIUM HYDROXIDE 30 MILLILITER(S): 400 SUSPENSION ORAL at 12:16

## 2025-06-23 RX ADMIN — FLUTICASONE PROPIONATE 1 SPRAY(S): 50 SPRAY, METERED NASAL at 18:47

## 2025-06-23 RX ADMIN — BUMETANIDE 2 MILLIGRAM(S): 1 TABLET ORAL at 05:11

## 2025-06-23 RX ADMIN — AMLODIPINE BESYLATE 10 MILLIGRAM(S): 10 TABLET ORAL at 11:10

## 2025-06-23 RX ADMIN — METHYLPREDNISOLONE ACETATE 40 MILLIGRAM(S): 80 INJECTION, SUSPENSION INTRA-ARTICULAR; INTRALESIONAL; INTRAMUSCULAR; SOFT TISSUE at 05:11

## 2025-06-23 RX ADMIN — Medication 40 MILLIGRAM(S): at 06:53

## 2025-06-23 RX ADMIN — INSULIN LISPRO 2: 100 INJECTION, SOLUTION INTRAVENOUS; SUBCUTANEOUS at 22:29

## 2025-06-23 NOTE — CHART NOTE - NSCHARTNOTEFT_GEN_A_CORE
Registered Dietitian Follow-Up     Patient Profile Reviewed                           Yes [x]   No []     Nutrition History Previously Obtained        Yes [x]  No []       Pertinent Medical Interventions: Presented for evaluation of SOB x2 days. Acute Hypercapnic Respiratory & hypoxic respiratory failure noted. Pulmonary edema noted. Noted stable on NC per progress notes. Constipation - noted on bowel regimen.    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     Diet order: Diet, DASH/TLC:   Sodium & Cholesterol Restricted  1000mL Fluid Restriction ([id number])  Supplement Feeding Modality:  Oral  Ensure Plus High Protein Cans or Servings Per Day:  1       Frequency:  Two Times a day (25 @ 10:29)    Anthropometrics:  Height (cm): 162.6 (25 @ 14:17)  Weight (kg): 61.9 (25 @ 18:05)  BMI (kg/m2): 23.4 (25 @ 14:17)  IBW:   UBW:     Daily Weight in k (-), Weight in k (-), Weight in k.6 (-), Weight in k.2 (-20), Weight in k.4 (-19), Weight in k.9 (-18)    Wt fluctuations observed may be related to fluid shifts.    MEDICATIONS  (STANDING):  amLODIPine   Tablet 10 milliGRAM(s) Oral every 24 hours  apixaban 2.5 milliGRAM(s) Oral every 12 hours  aspirin enteric coated 81 milliGRAM(s) Oral daily  bumetanide Injectable 2 milliGRAM(s) IV Push two times a day  chlorhexidine 2% Cloths 1 Application(s) Topical <User Schedule>  dextrose 5%. 1000 milliLiter(s) (50 mL/Hr) IV Continuous <Continuous>  dextrose 5%. 1000 milliLiter(s) (100 mL/Hr) IV Continuous <Continuous>  dextrose 50% Injectable 25 Gram(s) IV Push once  dextrose 50% Injectable 12.5 Gram(s) IV Push once  dextrose 50% Injectable 25 Gram(s) IV Push once  droNABinol 2.5 milliGRAM(s) Oral daily  fluticasone propionate 50 MICROgram(s)/spray Nasal Spray 1 Spray(s) Both Nostrils two times a day  glucagon  Injectable 1 milliGRAM(s) IntraMuscular once  insulin lispro (ADMELOG) corrective regimen sliding scale   SubCutaneous Before meals and at bedtime  methylPREDNISolone sodium succinate Injectable 40 milliGRAM(s) IV Push daily  metoprolol succinate ER 50 milliGRAM(s) Oral daily  pantoprazole    Tablet 40 milliGRAM(s) Oral before breakfast  polyethylene glycol 3350 17 Gram(s) Oral every 12 hours  senna 2 Tablet(s) Oral at bedtime    MEDICATIONS  (PRN):  albuterol    90 MICROgram(s) HFA Inhaler 2 Puff(s) Inhalation every 6 hours PRN Bronchospasm  dextrose Oral Gel 15 Gram(s) Oral once PRN Blood Glucose LESS THAN 70 milliGRAM(s)/deciliter  magnesium hydroxide Suspension 30 milliLiter(s) Oral daily PRN Constipation  zolpidem 10 milliGRAM(s) Oral at bedtime PRN Insomnia    Pertinent Labs:  @ 05:19: Na 142, BUN 63[HH], Cr 1.4, BG 98, K+ 4.5, Phos 4.0, Mg 2.2, Alk Phos 88, ALT/SGPT 22, AST/SGOT 15    Finger Sticks:  POCT Blood Glucose.: 189 mg/dL ( @ 22:22)  POCT Blood Glucose.: 203 mg/dL ( @ 21:09)  POCT Blood Glucose.: 106 mg/dL ( @ 17:01)  POCT Blood Glucose.: 257 mg/dL ( @ 11:19)  POCT Blood Glucose.: 166 mg/dL ( @ 07:36)    Physical Findings:  - Appearance: alert & oriented  - GI function: last BM date unknown; constipation reported. LIP aware; bowel regimen initiated. No nausea/vomiting reported.  - Tubes: no feeding tubes  - Oral/Mouth cavity: no chewing/swallowing difficulty reported  - Skin: stage 4 pressure injury to sacrum  - Edema: no edema noted at this time     Nutrition Requirements:  Weight Used: 61.9 kg as per  assessment     Estimated Energy Needs    Continue [x]  Adjust []  1160-2749 kcal/day (30-35 kcal/kg)        Estimated Protein Needs    Continue [x]  Adjust []  80-99 g/day (1.3-1.6 g/kg)        Estimated Fluid Needs        Continue [x]  Adjust []  1804-9598 mL/day (20-25 mL/kg)     Nutrient Intake: Reportedly consuming 50% of meals at this time + 1-2 Ensure Plus High Protein daily. Declined additional nutrition intervention at this time. Po intake reportedly improved.    [x] Previous Nutrition Diagnosis: Severe malnutrition the context of chronic illness            [x] Ongoing          [] Resolved    Nutrition Education: Reviewed diet order, encouraged adequate po intake, discussed GI nutrition therapy concepts.     Goal/Expected Outcome: Pt to demonstrate tolerance to diet order, with at least 75% po intake achieved over next 5-7 days.    Pt at moderate nutrition risk.    Monitor: Skin, labs, BM, wt, nutrition focused physical findings, body composition, GI, po intake, diet order.    Recommendation: Liberalize diet by removing DASH/TLC diet restriction to encourage adequate po intake. Continue providing Ensure Plus High Protein twice daily (350 kcal, 20 g protein per serving). Monitor for BM and adjust bowel regimen as needed.

## 2025-06-23 NOTE — CHART NOTE - NSCHARTNOTEFT_GEN_A_CORE
Spoke with pharmacist Nav Najera  Informed that they are holding pt's medication Tagrisso  At this time, we are not administering it per Heme onc recommendations

## 2025-06-23 NOTE — CONSULT NOTE ADULT - CONVERSATION DETAILS
Introduced palliative care to the patient. She is aware of her cancer history and that she is presently dealing with respiratory issues. She states that she is afraid of dying, but at the very least, she hopes to live to her grandson's wedding next May. She has no questions at this time and was agreeable for me to speak to her daughter (Brandy) who is a nurse. I spoke to Brandy on the phone who reiterates the above. Brandy hopes that patient will be able to be weaned off HFNC and return home. She understands that with patient's medical issues, it is not guaranteed that patient will be able to recover. Brandy feels that she is the most realistic person between the patient, herself, and patient's other daughter (Qeuenie). Brandy additionally states that in the past, [facility] has not been very interested in having advance care planning discussions pertaining to the patient. Brandy is in agreement for me to speak with the patient directly about CPR and intubation.

## 2025-06-23 NOTE — PROGRESS NOTE ADULT - SUBJECTIVE AND OBJECTIVE BOX
24H events:    Today is hospital day 6d. This morning patient was seen and examined at bedside, resting comfortably in bed.    No acute or major events overnight.    PAST MEDICAL & SURGICAL HISTORY  Hypertension, unspecified type    Type 2 diabetes mellitus with complication, without long-term current use of insulin    Hyperlipidemia, unspecified hyperlipidemia type    Diverticulitis    Obesity    COPD (chronic obstructive pulmonary disease)    SOB (shortness of breath)    GERD (gastroesophageal reflux disease)    Lung cancer    ARTIE (obstructive sleep apnea)  NO CPAP MACHINE PER PT.    Cancer of kidney    Cancer of thyroid    Former smoker    NHL (non-Hodgkin's lymphoma)  "low grade" per heme/ onc note    History of abdominal aortic aneurysm (AAA)    Kidney stones    [place] (hard of hearing)    History of surgery  LEFT LOWER LOBECTOMY    History of surgery  BILATERAL KNEE REPLACEMENT    History of surgery  CATARACT RIGHT EYE    History of surgery  TUBAL LIGATION    History of surgery  CYST REMOVED  RIGHT BREAST    History of surgery  CHOLECYSTECOMY    History of surgery  RIGHT PARTIAL NEPHRECTOMY    History of surgery  BILATERAL CATARACT SURGERY    History of back surgery    H/O lithotripsy    H/O partial thyroidectomy        SOCIAL HISTORY:  Social History:      ALLERGIES:  No Known Allergies      MEDICATIONS:  STANDING MEDICATIONS  amLODIPine   Tablet 10 milliGRAM(s) Oral every 24 hours  apixaban 2.5 milliGRAM(s) Oral every 12 hours  aspirin enteric coated 81 milliGRAM(s) Oral daily  bumetanide Injectable 2 milliGRAM(s) IV Push two times a day  chlorhexidine 2% Cloths 1 Application(s) Topical <User Schedule>  dextrose 5%. 1000 milliLiter(s) IV Continuous <Continuous>  dextrose 5%. 1000 milliLiter(s) IV Continuous <Continuous>  dextrose 50% Injectable 25 Gram(s) IV Push once  dextrose 50% Injectable 12.5 Gram(s) IV Push once  dextrose 50% Injectable 25 Gram(s) IV Push once  droNABinol 2.5 milliGRAM(s) Oral daily  fluticasone propionate 50 MICROgram(s)/spray Nasal Spray 1 Spray(s) Both Nostrils two times a day  glucagon  Injectable 1 milliGRAM(s) IntraMuscular once  insulin lispro (ADMELOG) corrective regimen sliding scale   SubCutaneous Before meals and at bedtime  methylPREDNISolone sodium succinate Injectable 40 milliGRAM(s) IV Push daily  metoprolol succinate ER 50 milliGRAM(s) Oral daily  pantoprazole    Tablet 40 milliGRAM(s) Oral before breakfast  polyethylene glycol 3350 17 Gram(s) Oral every 12 hours  senna 2 Tablet(s) Oral at bedtime    PRN MEDICATIONS  albuterol    90 MICROgram(s) HFA Inhaler 2 Puff(s) Inhalation every 6 hours PRN  dextrose Oral Gel 15 Gram(s) Oral once PRN  magnesium hydroxide Suspension 30 milliLiter(s) Oral daily PRN  zolpidem 10 milliGRAM(s) Oral at bedtime PRN      VITALS:   T(C): 36.2 (06-23-25 @ 11:32), Max: 36.9 (06-22-25 @ 20:25)  HR: 79 (06-23-25 @ 11:32) (72 - 84)  BP: 133/78 (06-23-25 @ 11:32) (133/78 - 161/88)  RR: 20 (06-23-25 @ 11:32) (18 - 20)  SpO2: 93% (06-23-25 @ 11:32) (90% - 98%)  I&O's Summary    22 Jun 2025 07:01  -  23 Jun 2025 07:00  --------------------------------------------------------  IN: 0 mL / OUT: 1700 mL / NET: -1700 mL          PHYSICAL EXAM:  GENERAL: well built, well nourished  HEAD:  Atraumatic, Normocephalic  NERVOUS SYSTEM:  Alert & Oriented X3,  motor and  sensory intact  CHEST/LUNG: Decreased breathing sounds on left side; No rales, rhonchi, or wheezing  HEART: S1S2 normal, no S3, Regular rate and rhythm; No murmurs heard  ABDOMEN: Soft, Nontender, Nondistended; Bowel sounds present  EXTREMITIES:  2+ Peripheral Pulses, No edema  SKIN: No rashes or lesions    LABS:                        8.5    9.37  )-----------( 116      ( 23 Jun 2025 05:19 )             27.3     06-23    142  |  97[L]  |  63[HH]  ----------------------------<  98  4.5   |  37[H]  |  1.4    Ca    9.5      23 Jun 2025 05:19  Phos  4.0     06-23  Mg     2.2     06-23    TPro  5.0[L]  /  Alb  3.4[L]  /  TBili  0.6  /  DBili  x   /  AST  15  /  ALT  22  /  AlkPhos  88  06-23      Urinalysis Basic - ( 23 Jun 2025 05:19 )    Color: x / Appearance: x / SG: x / pH: x  Gluc: 98 mg/dL / Ketone: x  / Bili: x / Urobili: x   Blood: x / Protein: x / Nitrite: x   Leuk Esterase: x / RBC: x / WBC x   Sq Epi: x / Non Sq Epi: x / Bacteria: x                     24H events:    Today is hospital day 6d. This morning patient was seen and examined at bedside on [facility]. She is comfortable but does report constipation.   No acute or major events overnight.    PAST MEDICAL & SURGICAL HISTORY  Hypertension, unspecified type    Type 2 diabetes mellitus with complication, without long-term current use of insulin    Hyperlipidemia, unspecified hyperlipidemia type    Diverticulitis    Obesity    COPD (chronic obstructive pulmonary disease)    SOB (shortness of breath)    GERD (gastroesophageal reflux disease)    Lung cancer    ARTIE (obstructive sleep apnea)  NO CPAP MACHINE PER PT.    Cancer of kidney    Cancer of thyroid    Former smoker    NHL (non-Hodgkin's lymphoma)  "low grade" per heme/ onc note    History of abdominal aortic aneurysm (AAA)    Kidney stones    [place] (hard of hearing)    History of surgery  LEFT LOWER LOBECTOMY    History of surgery  BILATERAL KNEE REPLACEMENT    History of surgery  CATARACT RIGHT EYE    History of surgery  TUBAL LIGATION    History of surgery  CYST REMOVED  RIGHT BREAST    History of surgery  CHOLECYSTECOMY    History of surgery  RIGHT PARTIAL NEPHRECTOMY    History of surgery  BILATERAL CATARACT SURGERY    History of back surgery    H/O lithotripsy    H/O partial thyroidectomy        SOCIAL HISTORY:  Social History:      ALLERGIES:  No Known Allergies      MEDICATIONS:  STANDING MEDICATIONS  amLODIPine   Tablet 10 milliGRAM(s) Oral every 24 hours  apixaban 2.5 milliGRAM(s) Oral every 12 hours  aspirin enteric coated 81 milliGRAM(s) Oral daily  bumetanide Injectable 2 milliGRAM(s) IV Push two times a day  chlorhexidine 2% Cloths 1 Application(s) Topical <User Schedule>  dextrose 5%. 1000 milliLiter(s) IV Continuous <Continuous>  dextrose 5%. 1000 milliLiter(s) IV Continuous <Continuous>  dextrose 50% Injectable 25 Gram(s) IV Push once  dextrose 50% Injectable 12.5 Gram(s) IV Push once  dextrose 50% Injectable 25 Gram(s) IV Push once  droNABinol 2.5 milliGRAM(s) Oral daily  fluticasone propionate 50 MICROgram(s)/spray Nasal Spray 1 Spray(s) Both Nostrils two times a day  glucagon  Injectable 1 milliGRAM(s) IntraMuscular once  insulin lispro (ADMELOG) corrective regimen sliding scale   SubCutaneous Before meals and at bedtime  methylPREDNISolone sodium succinate Injectable 40 milliGRAM(s) IV Push daily  metoprolol succinate ER 50 milliGRAM(s) Oral daily  pantoprazole    Tablet 40 milliGRAM(s) Oral before breakfast  polyethylene glycol 3350 17 Gram(s) Oral every 12 hours  senna 2 Tablet(s) Oral at bedtime    PRN MEDICATIONS  albuterol    90 MICROgram(s) HFA Inhaler 2 Puff(s) Inhalation every 6 hours PRN  dextrose Oral Gel 15 Gram(s) Oral once PRN  magnesium hydroxide Suspension 30 milliLiter(s) Oral daily PRN  zolpidem 10 milliGRAM(s) Oral at bedtime PRN      VITALS:   T(C): 36.2 (06-23-25 @ 11:32), Max: 36.9 (06-22-25 @ 20:25)  HR: 79 (06-23-25 @ 11:32) (72 - 84)  BP: 133/78 (06-23-25 @ 11:32) (133/78 - 161/88)  RR: 20 (06-23-25 @ 11:32) (18 - 20)  SpO2: 93% (06-23-25 @ 11:32) (90% - 98%)  I&O's Summary    22 Jun 2025 07:01  -  23 Jun 2025 07:00  --------------------------------------------------------  IN: 0 mL / OUT: 1700 mL / NET: -1700 mL          PHYSICAL EXAM:  GENERAL: well built, well nourished  HEAD:  Atraumatic, Normocephalic  NERVOUS SYSTEM:  Alert & Oriented X3,  motor and  sensory intact  CHEST/LUNG: Decreased breathing sounds on left side; No rales, rhonchi, or wheezing  HEART: S1S2 normal, no S3, Regular rate and rhythm; No murmurs heard  ABDOMEN: Soft, Nontender, Nondistended; Bowel sounds present  EXTREMITIES:  2+ Peripheral Pulses, No edema  SKIN: No rashes or lesions    LABS:                        8.5    9.37  )-----------( 116      ( 23 Jun 2025 05:19 )             27.3     06-23    142  |  97[L]  |  63[HH]  ----------------------------<  98  4.5   |  37[H]  |  1.4    Ca    9.5      23 Jun 2025 05:19  Phos  4.0     06-23  Mg     2.2     06-23    TPro  5.0[L]  /  Alb  3.4[L]  /  TBili  0.6  /  DBili  x   /  AST  15  /  ALT  22  /  AlkPhos  88  06-23      Urinalysis Basic - ( 23 Jun 2025 05:19 )    Color: x / Appearance: x / SG: x / pH: x  Gluc: 98 mg/dL / Ketone: x  / Bili: x / Urobili: x   Blood: x / Protein: x / Nitrite: x   Leuk Esterase: x / RBC: x / WBC x   Sq Epi: x / Non Sq Epi: x / Bacteria: x

## 2025-06-23 NOTE — PROGRESS NOTE ADULT - ASSESSMENT
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    Patient has been decompensating since April after she underwent AAA stenting ( Has some renal artery leak). Was sent to nursing home. Was diagnosed with Ecoli/Pseudomonas UTIi treated with levaquin . Recently was having Shortness of breath/ cough ; treated with augmentin,. they checked theBNP 4199 ; followed Tye De Dios ; was started on Torsemide 20 mg.  PT developed increased shortness of breath and anxiety. Nursing home gave her lasix which improved her breathing status but worsened since the morning so was sent to the hospital.       A/P   #Acute Hypercapnic/ Hypoxic Respiratory Failure / multifactorial dyspnea/ acute on chronic CHF exacerbation   #H/O left lung adenocarcinoma s/p LLL lobectomy on Tagrisso  #H/O COPD  - CHF protocol, fluid restriction 1200 ml in 24 hours   - intake and output monitoring, daily weight  - Bumex dose was increased today to Q 12 hours, keep negative balance   - CXR (6/17/2025): Bilateral patchy opacities and left pleural effusion.   - TTE: EF 67%, mild LVH, severe LA dilation, TR/ PHTN   - pulmonary is following, recommendations noted:  - HOB at 45 degrees.  Encourage NIV during sleep and PRN during the day  -   Wean o2 as tolerated.  Continue home inhaler regimen.  Albuterol PRN  -  Repeat CXR noted  - Solumedrol to 40 mg daily   - lung adenocarcinoma is in remission, recent PET scan reviewed , Tagrisso held ( pt was taking this medications for two years)     #ADRIA (baseline creatinine ~1.3)  - Cr 1.5 on admission, previously normal  - Renal US:  Interval resolution of hydronephrosis since 5/12/25  - UA noted, blood and RBC  - monitor lytes, BMP    #Constipation  - high fiber diet   - c/w miralax BID, senna qhs    #Thrombocytopenia  - improved  - heme/onc eval 6/20 noted. continue to hold tagrisso and continue IV diuresis    #Atrial Fibrillation  #H/O PE  #HTN  #Diabetes  #HLD  #AAA s/p stent   - DASH/ CC diet  - monitor  F/s   - continue apixaban 2.5 mg q12h, aspirin 81 mg, amlodipine 5 mg, metoprolol XL 50 mg daily  - continue sliding scale insulin lispro       #Anxiety/Insomnia (Chronic)/ anorexia   - continue zolpidem 10 mg, dronabinol 2.5 mg daily    DVT ppx: Eliquis  GI ppx: Protonix  Code: Full     SDU    I spoke with pt's daughter, plan of care discussed, she agreed with a plan of care.

## 2025-06-23 NOTE — PROGRESS NOTE ADULT - SUBJECTIVE AND OBJECTIVE BOX
Patient is a 84y old  Female who presents with a chief complaint of Shortness of Breath (22 Jun 2025 14:39)        Over Night Events:  Remains on HFNCO2.  off pressors.  Afebrile         ROS:     All ROS are negative except HPI         PHYSICAL EXAM    ICU Vital Signs Last 24 Hrs  T(C): 36.6 (23 Jun 2025 04:00), Max: 36.9 (22 Jun 2025 20:25)  T(F): 97.9 (23 Jun 2025 04:00), Max: 98.4 (22 Jun 2025 20:25)  HR: 76 (23 Jun 2025 04:00) (72 - 78)  BP: 155/89 (23 Jun 2025 04:00) (145/78 - 171/101)  BP(mean): 116 (23 Jun 2025 04:00) (105 - 118)  ABP: --  ABP(mean): --  RR: 18 (23 Jun 2025 04:00) (18 - 20)  SpO2: 96% (23 Jun 2025 04:00) (94% - 98%)    O2 Parameters below as of 22 Jun 2025 20:32  Patient On (Oxygen Delivery Method): nasal cannula, high flow  O2 Flow (L/min): 50  O2 Concentration (%): 40        CONSTITUTIONAL:  NAD    ENT:   Airway patent,   Mouth with normal mucosa.     EYES:   Pupils equal,   Round and reactive to light.    CARDIAC:   Normal rate,   Regular rhythm.        RESPIRATORY:   No wheezing  Bilateral crackles   Normal chest expansion  Not tachypneic,  No use of accessory muscles    GASTROINTESTINAL:  Abdomen soft,   Non-tender,   No guarding,   + BS    MUSCULOSKELETAL:   Range of motion is not limited,  No clubbing, cyanosis    NEUROLOGICAL:   Alert and oriented   No motor  deficits.    SKIN:   Skin normal color for race,   Warm and dry   No evidence of rash.        06-22-25 @ 07:01  -  06-23-25 @ 07:00  --------------------------------------------------------  IN:  Total IN: 0 mL    OUT:    Voided (mL): 1700 mL  Total OUT: 1700 mL    Total NET: -1700 mL          LABS:                            8.5    9.37  )-----------( 116      ( 23 Jun 2025 05:19 )             27.3                                               06-23    142  |  97[L]  |  63[HH]  ----------------------------<  98  4.5   |  37[H]  |  1.4    Creatinine Trend  BUN 63, Cr 1.4, (06-23-25 @ 05:19)  Creatinine Trend  BUN 63, Cr 1.3, (06-22-25 @ 06:01)  Creatinine Trend  BUN 59, Cr 1.4, (06-21-25 @ 05:00)  Creatinine Trend  BUN 51, Cr 1.3, (06-20-25 @ 04:50)  Creatinine Trend  BUN 50, Cr 1.3, (06-19-25 @ 20:10)  Creatinine Trend  BUN 51, Cr 1.4, (06-19-25 @ 16:50)  Creatinine Trend  BUN 56, Cr 1.5, (06-19-25 @ 07:59)      Ca    9.5      23 Jun 2025 05:19  Phos  4.0     06-23  Mg     2.2     06-23    TPro  5.0[L]  /  Alb  3.4[L]  /  TBili  0.6  /  DBili  x   /  AST  15  /  ALT  22  /  AlkPhos  88  06-23                                             Urinalysis Basic - ( 23 Jun 2025 05:19 )    Color: x / Appearance: x / SG: x / pH: x  Gluc: 98 mg/dL / Ketone: x  / Bili: x / Urobili: x   Blood: x / Protein: x / Nitrite: x   Leuk Esterase: x / RBC: x / WBC x   Sq Epi: x / Non Sq Epi: x / Bacteria: x                                                  LIVER FUNCTIONS - ( 23 Jun 2025 05:19 )  Alb: 3.4 g/dL / Pro: 5.0 g/dL / ALK PHOS: 88 U/L / ALT: 22 U/L / AST: 15 U/L / GGT: x                                                                                                                                       MEDICATIONS  (STANDING):  amLODIPine   Tablet 10 milliGRAM(s) Oral every 24 hours  apixaban 2.5 milliGRAM(s) Oral every 12 hours  aspirin enteric coated 81 milliGRAM(s) Oral daily  bumetanide Injectable 2 milliGRAM(s) IV Push daily  chlorhexidine 2% Cloths 1 Application(s) Topical <User Schedule>  dextrose 5%. 1000 milliLiter(s) (100 mL/Hr) IV Continuous <Continuous>  dextrose 5%. 1000 milliLiter(s) (50 mL/Hr) IV Continuous <Continuous>  dextrose 50% Injectable 25 Gram(s) IV Push once  dextrose 50% Injectable 12.5 Gram(s) IV Push once  dextrose 50% Injectable 25 Gram(s) IV Push once  droNABinol 2.5 milliGRAM(s) Oral daily  fluticasone propionate 50 MICROgram(s)/spray Nasal Spray 1 Spray(s) Both Nostrils two times a day  glucagon  Injectable 1 milliGRAM(s) IntraMuscular once  insulin lispro (ADMELOG) corrective regimen sliding scale   SubCutaneous Before meals and at bedtime  methylPREDNISolone sodium succinate Injectable 40 milliGRAM(s) IV Push daily  metoprolol succinate ER 50 milliGRAM(s) Oral daily  pantoprazole    Tablet 40 milliGRAM(s) Oral before breakfast  polyethylene glycol 3350 17 Gram(s) Oral every 12 hours  senna 2 Tablet(s) Oral at bedtime    MEDICATIONS  (PRN):  albuterol    90 MICROgram(s) HFA Inhaler 2 Puff(s) Inhalation every 6 hours PRN Bronchospasm  dextrose Oral Gel 15 Gram(s) Oral once PRN Blood Glucose LESS THAN 70 milliGRAM(s)/deciliter  morphine  - Injectable 2 milliGRAM(s) IV Push every 6 hours PRN Dyspnea  zolpidem 10 milliGRAM(s) Oral at bedtime PRN Insomnia      New X-rays reviewed:                                                                                  ECHO    CXR interpreted by me:  Bilateral infiltrates

## 2025-06-23 NOTE — CONSULT NOTE ADULT - SUBJECTIVE AND OBJECTIVE BOX
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    Patient has been decompensating since April after she underwent AAA stenting ( Has some renal artery leak). Was sent to nursing home. Was diagnosed with Ecoli/Pseudomonas UTIi treated with levaquin . Recently was having Shortness of breath/ cough ; treated with augmentin,. they checked theBNP 4199 ; followed Tye De Dios ; was started on Torsemide 20 mg other day.   Since yesterday had increased shortness of breath and anxiety. Nursing home gave her lasix which improved her breathing status but worsened since the morning  so was sent to the hospital.     No fever, chills or chest pain.  Daughter at bedside providing additional history, reports that she thinks patient never cleared pneumonia    In the ED:   Vitals: /79;  ; RR 25 ; afebrile ; 92 % on NRB     Labs:   WBC 6.42 Hb 8.8 Platelet 99K Neutro 83.2   Na 139 K 5.0   BUN/Creatinine 52/1.5   Pro BNP 6050     VBG ; Initial 7.29 CO2 60 HCO3 29 Lactate 1.4   Repeat pH 7.27 Co2 63 HCo3 29 lactate 1.7     Ua negative     US renal: mild bilateral hydronephrosis ; 0.5 non obstructive calculus     Chest Xray : Bilateral patchy opacities and left pleural effusion.    admitted to ICU.  (17 Jun 2025 15:31)      ITEMS NOT CHECKED ARE NOT PRESENT    SOCIAL HISTORY:   Significant other/partner[ ]  Children[ ]  Shinto/Spirituality:  Substance hx:  [ ]   Tobacco hx:  [ ]   Alcohol hx: [ ]   Living Situation: [ ]Home  [ ]Long term care  [ ]Rehab [ ]Other  Home Services: [ ] HHA [ ] Visting RN [ ] Hospice  Occupation:  Home Opioid hx:  [ ] Y [x ] N   I-Stop Reference No: x      PDI	Current Rx	Drug Type	Rx Written	Rx Dispensed	Drug	Quantity	Days Supply	Prescriber Name	Prescriber EMERALD #	Payment Method	Dispenser  A	N	O	04/26/2025	04/26/2025	oxycodone hcl (ir) 5 mg tablet	30	5	Coral Peters MD	[id number]	Insurance	[employer]    ADVANCE DIRECTIVES:    [ ] Full Code [ ] DNR  MOLST  [ ]  Living Will  [ ]   DECISION MAKER(s):  [ ] Health Care Proxy(s)  [ ] Surrogate(s)  [ ] Guardian           Name(s): Phone Number(s):    BASELINE (I)ADL(s) (prior to admission):  [place]: [ ]Total  [ ] Moderate [ ]Dependent      Allergies    No Known Allergies    Intolerances    MEDICATIONS  (STANDING):  amLODIPine   Tablet 10 milliGRAM(s) Oral every 24 hours  apixaban 2.5 milliGRAM(s) Oral every 12 hours  aspirin enteric coated 81 milliGRAM(s) Oral daily  bumetanide Injectable 2 milliGRAM(s) IV Push two times a day  chlorhexidine 2% Cloths 1 Application(s) Topical <User Schedule>  dextrose 5%. 1000 milliLiter(s) (100 mL/Hr) IV Continuous <Continuous>  dextrose 5%. 1000 milliLiter(s) (50 mL/Hr) IV Continuous <Continuous>  dextrose 50% Injectable 25 Gram(s) IV Push once  dextrose 50% Injectable 12.5 Gram(s) IV Push once  dextrose 50% Injectable 25 Gram(s) IV Push once  droNABinol 2.5 milliGRAM(s) Oral daily  fluticasone propionate 50 MICROgram(s)/spray Nasal Spray 1 Spray(s) Both Nostrils two times a day  glucagon  Injectable 1 milliGRAM(s) IntraMuscular once  insulin lispro (ADMELOG) corrective regimen sliding scale   SubCutaneous Before meals and at bedtime  methylPREDNISolone sodium succinate Injectable 40 milliGRAM(s) IV Push daily  metoprolol succinate ER 50 milliGRAM(s) Oral daily  pantoprazole    Tablet 40 milliGRAM(s) Oral before breakfast  polyethylene glycol 3350 17 Gram(s) Oral every 12 hours  senna 2 Tablet(s) Oral at bedtime    MEDICATIONS  (PRN):  albuterol    90 MICROgram(s) HFA Inhaler 2 Puff(s) Inhalation every 6 hours PRN Bronchospasm  dextrose Oral Gel 15 Gram(s) Oral once PRN Blood Glucose LESS THAN 70 milliGRAM(s)/deciliter  magnesium hydroxide Suspension 30 milliLiter(s) Oral daily PRN Constipation  zolpidem 10 milliGRAM(s) Oral at bedtime PRN Insomnia      PRESENT SYMPTOMS: [ ]Unable to obtain due to poor mentation   Source if other than patient:  [ ]Family   [ ]Team     Pain: [ ]yes [ ]no  QOL impact -   Location -                    Aggravating factors -  Alleviating factors -   Quality -  Radiation -  Timing -   Severity (0-10 scale):  Minimal acceptable level (0-10 scale):     CPOT:    https://www.[facility].org/getattachment/[email or web address]/Critical-Care-Pain-Observation-Tool-(CPOT)    PAIN AD Score:   http://geriatrictoolkit.[facility]/cog/painad.pdf     Dyspnea:                           [ ]None[ ]Mild [ ]Moderate [ ]Severe     Respiratory Distress Observation Scale (RDOS):   A score of 0 to 2 signifies little or no respiratory distress, 3 signifies mild distress, scores 4 to 6 indicate moderate distress, and scores greater than 7 signify severe distress  https://www.[facility].ca/sites/default/files/PDFS/314487-zaiftziaulw-wxssscvb-xwogebhmosw-dbrxn.pdf    Anxiety:                             [ ]None[ ]Mild [ ]Moderate [ ]Severe   Fatigue:                             [ ]None[ ]Mild [ ]Moderate [ ]Severe   Nausea:                             [ ]None[ ]Mild [ ]Moderate [ ]Severe   Loss of appetite:              [ ]None[ ]Mild [ ]Moderate [ ]Severe   Constipation:                    [ ]None[ ]Mild [ ]Moderate [ ]Severe    Other Symptoms:  [ ]All other review of systems negative     PHYSICAL EXAM:    GENERAL:  NAD   PULMONARY:  Non labored breathing  NEUROLOGIC: Grossly intact  BEHAVIORAL/PSYCH:  Calm    Palliative Performance Status Version 2:         %    http://npcrc.org/files/news/palliative_performance_scale_ppsv2.pdf    LABS: I have reviewed daily labs, including                          8.5    9.37  )-----------( 116      ( 23 Jun 2025 05:19 )             27.3       06-23    142  |  97[L]  |  63[HH]  ----------------------------<  98  4.5   |  37[H]  |  1.4    Ca    9.5      23 Jun 2025 05:19  Phos  4.0     06-23  Mg     2.2     06-23    TPro  5.0[L]  /  Alb  3.4[L]  /  TBili  0.6  /  DBili  x   /  AST  15  /  ALT  22  /  AlkPhos  88  06-23          RADIOLOGY & ADDITIONAL STUDIES: I have independently reviewed new imaging, including    PROTEIN CALORIE MALNUTRITION PRESENT: [ ]mild [ ]moderate [ ]severe [ ]underweight [ ]morbid obesity  https://www.andeal.org/vault/2440/web/files/ONC/Table_Clinical%20Characteristics%20to%20Document%20Malnutrition-White%20JV%20et%20al%202012.pdf    Height (cm): 162.6 (06-19-25 @ 14:17), 180.3 (10-15-24 @ 21:45)  Weight (kg): 61.9 (06-17-25 @ 18:05), 58.8 (10-15-24 @ 21:45)  BMI (kg/m2): 23.4 (06-19-25 @ 14:17), 19 (06-17-25 @ 18:05), 18.1 (10-15-24 @ 21:45)    [ ]PPSV2 < or = to 30% [ ]significant weight loss  [ ]poor nutritional intake  [ ]anasarca      [ ]Artificial Nutrition      Palliative Care Spiritual/Emotional Screening Tool Question  Severity (0-4):                    OR                    [ x] Unable to determine. Will assess at later time if appropriate.  Score of 2 or greater indicates recommendation of Chaplaincy and/or SW referral  Chaplaincy Referral: [ ] Yes [ ] Refused [ ] Following     Caregiver [place]:  [ ] Yes [ ] No    OR    [x ] Unable to determine. Will assess at later time if appropriate.  Social Work Referral [ ]  Patient and Family Centered Care Referral [ ]    Anticipatory Grief Present: [ ] Yes [ ] No    OR     [ x] Unable to determine. Will assess at later time if appropriate.  Social Work Referral [ ]  Patient and Family Centered Care Referral [ ]    REFERRALS:   [ ]Chaplaincy  [ ]Hospice  [ ]Child Life  [ ]Social Work  [ ]Case management [ ]Holistic Therapy     Palliative care education provided to patient and/or family HPI:  84-year-old female with past medical history of small cell lung cancer on tagrisso in remission, 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    Patient has been decompensating since April after she underwent AAA stenting ( Has some renal artery leak). Was sent to nursing home. Was diagnosed with Ecoli/Pseudomonas UTIi treated with levaquin . Recently was having Shortness of breath/ cough ; treated with augmentin,. they checked theBNP 4199 ; followed Tye De Dios ; was started on Torsemide 20 mg other day.   Since yesterday had increased shortness of breath and anxiety. Nursing home gave her lasix which improved her breathing status but worsened since the morning  so was sent to the hospital.     No fever, chills or chest pain.  Daughter at bedside providing additional history, reports that she thinks patient never cleared pneumonia    In the ED:   Vitals: /79;  ; RR 25 ; afebrile ; 92 % on NRB     Labs:   WBC 6.42 Hb 8.8 Platelet 99K Neutro 83.2   Na 139 K 5.0   BUN/Creatinine 52/1.5   Pro BNP 6050     VBG ; Initial 7.29 CO2 60 HCO3 29 Lactate 1.4   Repeat pH 7.27 Co2 63 HCo3 29 lactate 1.7     Ua negative     US renal: mild bilateral hydronephrosis ; 0.5 non obstructive calculus     Chest Xray : Bilateral patchy opacities and left pleural effusion.    admitted to ICU.  (17 Jun 2025 15:31)    Patient complains of anxiety.     ITEMS NOT CHECKED ARE NOT PRESENT    SOCIAL HISTORY:   Significant other/partner[ ]  Children[ ]  Pentecostalism/Spirituality:  Substance hx:  [ ]   Tobacco hx:  [ ]   Alcohol hx: [ ]   Living Situation: [ ]Home  [ ]Long term care  [ ]Rehab [ ]Other  Home Services: [ ] HHA [ ] Gloria RN [ ] Hospice  Occupation:  Home Opioid hx:  [ ] Y [x ] N   I-Stop Reference No: x      PDI	Current Rx	Drug Type	Rx Written	Rx Dispensed	Drug	Quantity	Days Supply	Prescriber Name	Prescriber EMERALD #	Payment Method	Dispenser  A	N	O	04/26/2025	04/26/2025	oxycodone hcl (ir) 5 mg tablet	30	5	Coral Peters MD	[id number]	Insurance	[employer]    ADVANCE DIRECTIVES:    [ ] Full Code [ ] DNR  MOLST  [ ]  Living Will  [ ]   DECISION MAKER(s):  [ ] Health Care Proxy(s)  [ ] Surrogate(s)  [ ] Guardian           Name(s): Phone Number(s):    BASELINE (I)ADL(s) (prior to admission):  [place]: [ ]Total  [ ] Moderate [ ]Dependent      Allergies    No Known Allergies    Intolerances    MEDICATIONS  (STANDING):  amLODIPine   Tablet 10 milliGRAM(s) Oral every 24 hours  apixaban 2.5 milliGRAM(s) Oral every 12 hours  aspirin enteric coated 81 milliGRAM(s) Oral daily  bumetanide Injectable 2 milliGRAM(s) IV Push two times a day  chlorhexidine 2% Cloths 1 Application(s) Topical <User Schedule>  dextrose 5%. 1000 milliLiter(s) (100 mL/Hr) IV Continuous <Continuous>  dextrose 5%. 1000 milliLiter(s) (50 mL/Hr) IV Continuous <Continuous>  dextrose 50% Injectable 25 Gram(s) IV Push once  dextrose 50% Injectable 12.5 Gram(s) IV Push once  dextrose 50% Injectable 25 Gram(s) IV Push once  droNABinol 2.5 milliGRAM(s) Oral daily  fluticasone propionate 50 MICROgram(s)/spray Nasal Spray 1 Spray(s) Both Nostrils two times a day  glucagon  Injectable 1 milliGRAM(s) IntraMuscular once  insulin lispro (ADMELOG) corrective regimen sliding scale   SubCutaneous Before meals and at bedtime  methylPREDNISolone sodium succinate Injectable 40 milliGRAM(s) IV Push daily  metoprolol succinate ER 50 milliGRAM(s) Oral daily  pantoprazole    Tablet 40 milliGRAM(s) Oral before breakfast  polyethylene glycol 3350 17 Gram(s) Oral every 12 hours  senna 2 Tablet(s) Oral at bedtime    MEDICATIONS  (PRN):  albuterol    90 MICROgram(s) HFA Inhaler 2 Puff(s) Inhalation every 6 hours PRN Bronchospasm  dextrose Oral Gel 15 Gram(s) Oral once PRN Blood Glucose LESS THAN 70 milliGRAM(s)/deciliter  magnesium hydroxide Suspension 30 milliLiter(s) Oral daily PRN Constipation  zolpidem 10 milliGRAM(s) Oral at bedtime PRN Insomnia      PRESENT SYMPTOMS: [ ]Unable to obtain due to poor mentation   Source if other than patient:  [ ]Family   [ ]Team     Pain: [ ]yes [ x]no  QOL impact -   Location -                    Aggravating factors -  Alleviating factors -   Quality -  Radiation -  Timing -   Severity (0-10 scale):  Minimal acceptable level (0-10 scale):     CPOT:    https://www.[facility].org/getattachment/[email or web address]/Critical-Care-Pain-Observation-Tool-(CPOT)    PAIN AD Score:   http://geriatrictoolkit.[facility]/cog/painad.pdf     Dyspnea:                           [ ]None[ x]Mild [ ]Moderate [ ]Severe     Respiratory Distress Observation Scale (RDOS):   A score of 0 to 2 signifies little or no respiratory distress, 3 signifies mild distress, scores 4 to 6 indicate moderate distress, and scores greater than 7 signify severe distress  https://www.[facility].ca/sites/default/files/PDFS/012685-ditvgqajiil-fifexcve-iovujojnviz-hmvpt.pdf    Anxiety:                             [ ]None[ ]Mild [x ]Moderate [ ]Severe   Fatigue:                             [ ]None[ ]Mild [ ]Moderate [ ]Severe   Nausea:                             [ ]None[ ]Mild [ ]Moderate [ ]Severe   Loss of appetite:              [ ]None[ ]Mild [ ]Moderate [ ]Severe   Constipation:                    [ ]None[ ]Mild [ ]Moderate [ ]Severe    Other Symptoms:  [x ]All other review of systems negative     PHYSICAL EXAM:    GENERAL:  NAD   PULMONARY:  Non labored breathing  NEUROLOGIC: Grossly intact  BEHAVIORAL/PSYCH:  Calm    Palliative Performance Status Version 2:       30  %    http://npcrc.org/files/news/palliative_performance_scale_ppsv2.pdf    LABS: I have reviewed daily labs, including                          8.5    9.37  )-----------( 116      ( 23 Jun 2025 05:19 )             27.3       06-23    142  |  97[L]  |  63[HH]  ----------------------------<  98  4.5   |  37[H]  |  1.4    Ca    9.5      23 Jun 2025 05:19  Phos  4.0     06-23  Mg     2.2     06-23    TPro  5.0[L]  /  Alb  3.4[L]  /  TBili  0.6  /  DBili  x   /  AST  15  /  ALT  22  /  AlkPhos  88  06-23          RADIOLOGY & ADDITIONAL STUDIES: I have independently reviewed new imaging, including    CXR 6/23: b/l opacities    PROTEIN CALORIE MALNUTRITION PRESENT: [ ]mild [ ]moderate [ ]severe [ ]underweight [ ]morbid obesity  https://www.andeal.org/vault/2440/web/files/ONC/Table_Clinical%20Characteristics%20to%20Document%20Malnutrition-White%20JV%20et%20al%202012.pdf    Height (cm): 162.6 (06-19-25 @ 14:17), 180.3 (10-15-24 @ 21:45)  Weight (kg): 61.9 (06-17-25 @ 18:05), 58.8 (10-15-24 @ 21:45)  BMI (kg/m2): 23.4 (06-19-25 @ 14:17), 19 (06-17-25 @ 18:05), 18.1 (10-15-24 @ 21:45)    [ ]PPSV2 < or = to 30% [ ]significant weight loss  [ ]poor nutritional intake  [ ]anasarca      [ ]Artificial Nutrition      Palliative Care Spiritual/Emotional Screening Tool Question  Severity (0-4):                    OR                    [ x] Unable to determine. Will assess at later time if appropriate.  Score of 2 or greater indicates recommendation of Chaplaincy and/or SW referral  Chaplaincy Referral: [ ] Yes [ ] Refused [ ] Following     Caregiver [place]:  [ ] Yes [ ] No    OR    [x ] Unable to determine. Will assess at later time if appropriate.  Social Work Referral [ ]  Patient and Family Centered Care Referral [ ]    Anticipatory Grief Present: [ ] Yes [ ] No    OR     [ x] Unable to determine. Will assess at later time if appropriate.  Social Work Referral [ ]  Patient and Family Centered Care Referral [ ]    REFERRALS:   [ ]Chaplaincy  [ ]Hospice  [ ]Child Life  [ ]Social Work  [ ]Case management [ ]Holistic Therapy     Palliative care education provided to patient and/or family HPI:  84-year-old female with past medical history of small cell lung cancer on tagrisso in remission, 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    Patient has been decompensating since April after she underwent AAA stenting ( Has some renal artery leak). Was sent to nursing home. Was diagnosed with Ecoli/Pseudomonas UTIi treated with levaquin . Recently was having Shortness of breath/ cough ; treated with augmentin,. they checked theBNP 4199 ; followed Tye De Dios ; was started on Torsemide 20 mg other day.   Since yesterday had increased shortness of breath and anxiety. Nursing home gave her lasix which improved her breathing status but worsened since the morning  so was sent to the hospital.     No fever, chills or chest pain.  Daughter at bedside providing additional history, reports that she thinks patient never cleared pneumonia    In the ED:   Vitals: /79;  ; RR 25 ; afebrile ; 92 % on NRB     Labs:   WBC 6.42 Hb 8.8 Platelet 99K Neutro 83.2   Na 139 K 5.0   BUN/Creatinine 52/1.5   Pro BNP 6050     VBG ; Initial 7.29 CO2 60 HCO3 29 Lactate 1.4   Repeat pH 7.27 Co2 63 HCo3 29 lactate 1.7     Ua negative     US renal: mild bilateral hydronephrosis ; 0.5 non obstructive calculus     Chest Xray : Bilateral patchy opacities and left pleural effusion.    admitted to ICU.  (17 Jun 2025 15:31)    Patient complains of anxiety.     ITEMS NOT CHECKED ARE NOT PRESENT    SOCIAL HISTORY:   Significant other/partner[ ]  Children[x ]  Rastafarian/Spirituality:  Substance hx:  [ ]   Tobacco hx:  [ ]   Alcohol hx: [ ]   Living Situation: [ ]Home  [ ]Long term care  [x ]Rehab [ ]Other  Home Services: [ ] HHA [ ] Gloria RN [ ] Hospice  Occupation:  Home Opioid hx:  [ ] Y [x ] N   I-Stop Reference No: x      PDI	Current Rx	Drug Type	Rx Written	Rx Dispensed	Drug	Quantity	Days Supply	Prescriber Name	Prescriber EMERALD #	Payment Method	Dispenser  A	N	O	04/26/2025	04/26/2025	oxycodone hcl (ir) 5 mg tablet	30	5	Coral Peters MD	[id number]	Insurance	[employer]    ADVANCE DIRECTIVES:    [ ] Full Code [ ] DNR  MOLST  [ ]  Living Will  [ ]   DECISION MAKER(s):  [ ] Health Care Proxy(s)  [ ] Surrogate(s)  [ ] Guardian           Name(s): Phone Number(s):    BASELINE (I)ADL(s) (prior to admission):  [place]: [ ]Total  [ ] Moderate [ ]Dependent      Allergies    No Known Allergies    Intolerances    MEDICATIONS  (STANDING):  amLODIPine   Tablet 10 milliGRAM(s) Oral every 24 hours  apixaban 2.5 milliGRAM(s) Oral every 12 hours  aspirin enteric coated 81 milliGRAM(s) Oral daily  bumetanide Injectable 2 milliGRAM(s) IV Push two times a day  chlorhexidine 2% Cloths 1 Application(s) Topical <User Schedule>  dextrose 5%. 1000 milliLiter(s) (100 mL/Hr) IV Continuous <Continuous>  dextrose 5%. 1000 milliLiter(s) (50 mL/Hr) IV Continuous <Continuous>  dextrose 50% Injectable 25 Gram(s) IV Push once  dextrose 50% Injectable 12.5 Gram(s) IV Push once  dextrose 50% Injectable 25 Gram(s) IV Push once  droNABinol 2.5 milliGRAM(s) Oral daily  fluticasone propionate 50 MICROgram(s)/spray Nasal Spray 1 Spray(s) Both Nostrils two times a day  glucagon  Injectable 1 milliGRAM(s) IntraMuscular once  insulin lispro (ADMELOG) corrective regimen sliding scale   SubCutaneous Before meals and at bedtime  methylPREDNISolone sodium succinate Injectable 40 milliGRAM(s) IV Push daily  metoprolol succinate ER 50 milliGRAM(s) Oral daily  pantoprazole    Tablet 40 milliGRAM(s) Oral before breakfast  polyethylene glycol 3350 17 Gram(s) Oral every 12 hours  senna 2 Tablet(s) Oral at bedtime    MEDICATIONS  (PRN):  albuterol    90 MICROgram(s) HFA Inhaler 2 Puff(s) Inhalation every 6 hours PRN Bronchospasm  dextrose Oral Gel 15 Gram(s) Oral once PRN Blood Glucose LESS THAN 70 milliGRAM(s)/deciliter  magnesium hydroxide Suspension 30 milliLiter(s) Oral daily PRN Constipation  zolpidem 10 milliGRAM(s) Oral at bedtime PRN Insomnia      PRESENT SYMPTOMS: [ ]Unable to obtain due to poor mentation   Source if other than patient:  [ ]Family   [ ]Team     Pain: [ ]yes [ x]no  QOL impact -   Location -                    Aggravating factors -  Alleviating factors -   Quality -  Radiation -  Timing -   Severity (0-10 scale):  Minimal acceptable level (0-10 scale):     CPOT:    https://www.[facility].org/getattachment/[email or web address]/Critical-Care-Pain-Observation-Tool-(CPOT)    PAIN AD Score:   http://geriatrictoolkit.[facility]/cog/painad.pdf     Dyspnea:                           [ ]None[ x]Mild [ ]Moderate [ ]Severe     Respiratory Distress Observation Scale (RDOS):   A score of 0 to 2 signifies little or no respiratory distress, 3 signifies mild distress, scores 4 to 6 indicate moderate distress, and scores greater than 7 signify severe distress  https://www.[facility].ca/sites/default/files/PDFS/587356-seoyewuaqea-pupdposw-zzvovvcrnia-ixziq.pdf    Anxiety:                             [ ]None[ ]Mild [x ]Moderate [ ]Severe   Fatigue:                             [ ]None[ ]Mild [ ]Moderate [ ]Severe   Nausea:                             [ ]None[ ]Mild [ ]Moderate [ ]Severe   Loss of appetite:              [ ]None[ ]Mild [ ]Moderate [ ]Severe   Constipation:                    [ ]None[ ]Mild [ ]Moderate [ ]Severe    Other Symptoms:  [x ]All other review of systems negative     PHYSICAL EXAM:    GENERAL:  NAD   PULMONARY:  Non labored breathing  NEUROLOGIC: Grossly intact  BEHAVIORAL/PSYCH:  Calm    Palliative Performance Status Version 2:       30  %    http://npcrc.org/files/news/palliative_performance_scale_ppsv2.pdf    LABS: I have reviewed daily labs, including                          8.5    9.37  )-----------( 116      ( 23 Jun 2025 05:19 )             27.3       06-23    142  |  97[L]  |  63[HH]  ----------------------------<  98  4.5   |  37[H]  |  1.4    Ca    9.5      23 Jun 2025 05:19  Phos  4.0     06-23  Mg     2.2     06-23    TPro  5.0[L]  /  Alb  3.4[L]  /  TBili  0.6  /  DBili  x   /  AST  15  /  ALT  22  /  AlkPhos  88  06-23          RADIOLOGY & ADDITIONAL STUDIES: I have independently reviewed new imaging, including    CXR 6/23: b/l opacities    PROTEIN CALORIE MALNUTRITION PRESENT: [ ]mild [ ]moderate [ ]severe [ ]underweight [ ]morbid obesity  https://www.andeal.org/vault/2440/web/files/ONC/Table_Clinical%20Characteristics%20to%20Document%20Malnutrition-White%20JV%20et%20al%202012.pdf    Height (cm): 162.6 (06-19-25 @ 14:17), 180.3 (10-15-24 @ 21:45)  Weight (kg): 61.9 (06-17-25 @ 18:05), 58.8 (10-15-24 @ 21:45)  BMI (kg/m2): 23.4 (06-19-25 @ 14:17), 19 (06-17-25 @ 18:05), 18.1 (10-15-24 @ 21:45)    [ ]PPSV2 < or = to 30% [ ]significant weight loss  [ ]poor nutritional intake  [ ]anasarca      [ ]Artificial Nutrition      Palliative Care Spiritual/Emotional Screening Tool Question  Severity (0-4):                    OR                    [ x] Unable to determine. Will assess at later time if appropriate.  Score of 2 or greater indicates recommendation of Chaplaincy and/or SW referral  Chaplaincy Referral: [ ] Yes [ ] Refused [ ] Following     Caregiver [place]:  [ ] Yes [ ] No    OR    [x ] Unable to determine. Will assess at later time if appropriate.  Social Work Referral [ ]  Patient and Family Centered Care Referral [ ]    Anticipatory Grief Present: [ ] Yes [ ] No    OR     [ x] Unable to determine. Will assess at later time if appropriate.  Social Work Referral [ ]  Patient and Family Centered Care Referral [ ]    REFERRALS:   [ ]Chaplaincy  [ ]Hospice  [ ]Child Life  [ ]Social Work  [ ]Case management [ ]Holistic Therapy     Palliative care education provided to patient and/or family

## 2025-06-23 NOTE — PROGRESS NOTE ADULT - ASSESSMENT
IMPRESSION:    Acute hypercapnic respiratory failure   Acute hypoxemic respiratory failure  Pulmonary edema   ADRIA  Can't RO pneumonitis secondary to Tagrisso   HO COPD  HO small cell lung ca on Tagriso  HO left adenocarcinoma s/p LLL lobectomy  HO afib/PE on Eliquis     PLAN:    CNS:  Avoid sedation.  MSO4 PRN for comfort     HEENT: Oral care    PULMONARY:  HOB at 45 degrees.  Encourage NIV during sleep and PRN during the day.  Wean o2 as tolerated.  Continue home inhaler regimen.  Albuterol PRN.  Repeat CXR noted.  Solumedrol to 40 mg daily     CARDIOVASCULAR:  Bumex 2 mg QD.  ECHO Noted.  Continue volume contraction as tolerated       GI: GI prophylaxis.  Feeding when off NIV.  Bowel regimen     RENAL:  Follow up lytes.  Correct as needed,   Monitor UO     INFECTIOUS DISEASE: Follow up cultures, procalcitonin noted, MRSA negative, Monitor VS      HEMATOLOGICAL:  DVT prophylaxis. DIMER noted.  LE duplex negative.  Monitor CBC     ENDOCRINE:  Follow up FS.  Insulin protocol if needed.      MUSCULOSKELETAL: OOB as tolerated.  Off loading.  PT OT     SDU     GOC

## 2025-06-23 NOTE — CONSULT NOTE ADULT - ASSESSMENT
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 Patient found to have respiratory failure on is on HFNC. Palliative care consulted to assist with GOC.     Introduced palliative care to the patient. She is aware of her cancer history and that she is presently dealing with respiratory issues. She states that she is afraid of dying, but at the very least, she hopes to live to her grandson's wedding next May. She has no questions at this time and was agreeable for me to speak to her daughter (Brandy) who is a nurse. I spoke to Brandy on the phone who reiterates the above. Brandy hopes that patient will be able to be weaned off HFNC and return home. She understands that with patient's medical issues, it is not guaranteed that patient will be able to recover. Brandy feels that she is the most realistic person between the patient, herself, and patient's other daughter (Queenie). Brandy additionally states that in the past, Queenie has not been very interested in having advance care planning discussions pertaining to the patient. Brandy is in agreement for me to speak with the patient directly about CPR and intubation.     Plan:  - symptoms per primary team  - ongoing discussions as above    - will discuss advance care planning with patient on 6/25    >16 minutes spent F2F on advanced care planning with patient and family. Explained aspects of advanced care planning (code status, intubation).     Palliative care will continue to follow.   Please call [phone] with questions or concerns 24/7.     Reviewed documentation from: leah black  _____________  Edil Flanagan MD  Palliative Medicine  [facility]   of Geriatric [facility] Pall[facility]  [phone]

## 2025-06-23 NOTE — PROGRESS NOTE ADULT - ASSESSMENT
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    Patient has been decompensating since April after she underwent AAA stenting ( Has some renal artery leak). Was sent to nursing home. Was diagnosed with Ecoli/Pseudomonas UTIi treated with levaquin . Recently was having Shortness of breath/ cough ; treated with augmentin,. they checked theBNP 4199 ; followed Tye De Dios ; was started on Torsemide 20 mg.  Since yesterday had increased shortness of breath and anxiety. Nursing home gave her lasix which improved her breathing status but worsened since the morning so was sent to the hospital.     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   - TTE: EF 67%, mild LVH, severe LA dilation  - started on IV solumedrol and IV diuresis with Bumex  - stable on NC, tolerated NIV qhs  - blood cultures NGTD x2, procal negative. IV abx d/aixa  - per pulm/CC, c/w Bumex to 2mg IV q24hrs, Solumedrol 40mg IV daily  - consider CT chest if no clinical improvement and cardio eval    #ADRIA (baseline creatinine ~1.3)  - Cr 1.5 on admission, previously normal  - Renal US:  Interval resolution of hydronephrosis since 5/12/25  - UA noted, blood and RBC  - monitor lytes, BMP    #Constipation  - c/w miralax BID, senna qhs    #Thrombocytopenia, improved  - heme/onc eval 6/20 noted. 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    Patient has been decompensating since April after she underwent AAA stenting ( Has some renal artery leak). Was sent to nursing home. Was diagnosed with Ecoli/Pseudomonas UTIi treated with levaquin . Recently was having Shortness of breath/ cough ; treated with augmentin,. they checked theBNP 4199 ; followed Tye De Dios ; was started on Torsemide 20 mg.  Since yesterday had increased shortness of breath and anxiety. Nursing home gave her lasix which improved her breathing status but worsened since the morning so was sent to the hospital.     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   - TTE: EF 67%, mild LVH, severe LA dilation  - started on IV solumedrol and IV diuresis with Bumex  - stable on NC, tolerated NIV qhs  - blood cultures NGTD x2, procal negative. IV abx d/aixa  - per pulm/CC, c/w Bumex to 2mg IV q24hrs, Solumedrol 40mg IV daily  - consider CT chest if no clinical improvement and cardio eval    #ADRIA (baseline creatinine ~1.3)  - Cr 1.5 on admission, previously normal  - Renal US:  Interval resolution of hydronephrosis since 5/12/25  - UA noted, blood and RBC  - monitor lytes, BMP    #Constipation  - c/w miralax BID, senna qhs    #Thrombocytopenia, improved  - heme/onc eval 6/20 noted. continue to hold tagrisso and continue IV diuresis    #Atrial Fibrillation  #H/O PE  #HTN  #Diabetes  #HLD  #AAA s/p stent   Plan  - continue apixaban 2.5 mg q12h, aspirin 81 mg, amlodipine 5 mg, metoprolol XL 50 mg daily  - continue sliding scale insulin lispro       #Anxiety/Insomnia (Chronic)  Plan  - continue zolpidem 10 mg, dronabinol 2.5 mg daily    #MISC  -DVT ppx: Eliquis  -GI ppx: protonix  -DIET: DASH  -Activity: AAT  -Code Status: Full   -DIspo: SDU    -Pending: trend BNP, Bumex x2/day, wean HFNC, f/u with palliative for GOC, PT eval

## 2025-06-24 LAB
ALBUMIN SERPL ELPH-MCNC: 3.3 G/DL — LOW (ref 3.5–5.2)
ALP SERPL-CCNC: 92 U/L — SIGNIFICANT CHANGE UP (ref 30–115)
ALT FLD-CCNC: 22 U/L — SIGNIFICANT CHANGE UP (ref 0–41)
ANION GAP SERPL CALC-SCNC: 8 MMOL/L — SIGNIFICANT CHANGE UP (ref 7–14)
AST SERPL-CCNC: 14 U/L — SIGNIFICANT CHANGE UP (ref 0–41)
BASOPHILS # BLD AUTO: 0.01 K/UL — SIGNIFICANT CHANGE UP (ref 0–0.2)
BASOPHILS NFR BLD AUTO: 0.1 % — SIGNIFICANT CHANGE UP (ref 0–1)
BILIRUB SERPL-MCNC: 0.6 MG/DL — SIGNIFICANT CHANGE UP (ref 0.2–1.2)
BUN SERPL-MCNC: 55 MG/DL — HIGH (ref 10–20)
CALCIUM SERPL-MCNC: 9.3 MG/DL — SIGNIFICANT CHANGE UP (ref 8.4–10.5)
CHLORIDE SERPL-SCNC: 95 MMOL/L — LOW (ref 98–110)
CO2 SERPL-SCNC: 38 MMOL/L — HIGH (ref 17–32)
CREAT SERPL-MCNC: 1.4 MG/DL — SIGNIFICANT CHANGE UP (ref 0.7–1.5)
EGFR: 37 ML/MIN/1.73M2 — LOW
EGFR: 37 ML/MIN/1.73M2 — LOW
EOSINOPHIL # BLD AUTO: 0.07 K/UL — SIGNIFICANT CHANGE UP (ref 0–0.7)
EOSINOPHIL NFR BLD AUTO: 0.8 % — SIGNIFICANT CHANGE UP (ref 0–8)
GLUCOSE BLDC GLUCOMTR-MCNC: 177 MG/DL — HIGH (ref 70–99)
GLUCOSE BLDC GLUCOMTR-MCNC: 203 MG/DL — HIGH (ref 70–99)
GLUCOSE BLDC GLUCOMTR-MCNC: 304 MG/DL — HIGH (ref 70–99)
GLUCOSE BLDC GLUCOMTR-MCNC: 387 MG/DL — HIGH (ref 70–99)
GLUCOSE SERPL-MCNC: 148 MG/DL — HIGH (ref 70–99)
HCT VFR BLD CALC: 27.8 % — LOW (ref 37–47)
HGB BLD-MCNC: 8.5 G/DL — LOW (ref 12–16)
IMM GRANULOCYTES NFR BLD AUTO: 0.3 % — SIGNIFICANT CHANGE UP (ref 0.1–0.3)
LYMPHOCYTES # BLD AUTO: 1.13 K/UL — LOW (ref 1.2–3.4)
LYMPHOCYTES # BLD AUTO: 12.3 % — LOW (ref 20.5–51.1)
MAGNESIUM SERPL-MCNC: 2.5 MG/DL — HIGH (ref 1.8–2.4)
MCHC RBC-ENTMCNC: 26.2 PG — LOW (ref 27–31)
MCHC RBC-ENTMCNC: 30.6 G/DL — LOW (ref 32–37)
MCV RBC AUTO: 85.5 FL — SIGNIFICANT CHANGE UP (ref 81–99)
MONOCYTES # BLD AUTO: 0.91 K/UL — HIGH (ref 0.1–0.6)
MONOCYTES NFR BLD AUTO: 9.9 % — HIGH (ref 1.7–9.3)
NEUTROPHILS # BLD AUTO: 7.03 K/UL — HIGH (ref 1.4–6.5)
NEUTROPHILS NFR BLD AUTO: 76.6 % — HIGH (ref 42.2–75.2)
NRBC BLD AUTO-RTO: 0 /100 WBCS — SIGNIFICANT CHANGE UP (ref 0–0)
PHOSPHATE SERPL-MCNC: 3.5 MG/DL — SIGNIFICANT CHANGE UP (ref 2.1–4.9)
PLATELET # BLD AUTO: 108 K/UL — LOW (ref 130–400)
PMV BLD: 11.7 FL — HIGH (ref 7.4–10.4)
POTASSIUM SERPL-MCNC: 4.4 MMOL/L — SIGNIFICANT CHANGE UP (ref 3.5–5)
POTASSIUM SERPL-SCNC: 4.4 MMOL/L — SIGNIFICANT CHANGE UP (ref 3.5–5)
PROT SERPL-MCNC: 5 G/DL — LOW (ref 6–8)
RBC # BLD: 3.25 M/UL — LOW (ref 4.2–5.4)
RBC # FLD: 15.6 % — HIGH (ref 11.5–14.5)
SODIUM SERPL-SCNC: 141 MMOL/L — SIGNIFICANT CHANGE UP (ref 135–146)
WBC # BLD: 9.18 K/UL — SIGNIFICANT CHANGE UP (ref 4.8–10.8)
WBC # FLD AUTO: 9.18 K/UL — SIGNIFICANT CHANGE UP (ref 4.8–10.8)

## 2025-06-24 PROCEDURE — 99233 SBSQ HOSP IP/OBS HIGH 50: CPT

## 2025-06-24 PROCEDURE — 71045 X-RAY EXAM CHEST 1 VIEW: CPT | Mod: 26

## 2025-06-24 RX ORDER — INSULIN LISPRO 100 U/ML
3 INJECTION, SOLUTION INTRAVENOUS; SUBCUTANEOUS
Refills: 0 | Status: DISCONTINUED | OUTPATIENT
Start: 2025-06-24 | End: 2025-06-26

## 2025-06-24 RX ORDER — LACTULOSE 10 G/15ML
10 SOLUTION ORAL DAILY
Refills: 0 | Status: DISCONTINUED | OUTPATIENT
Start: 2025-06-24 | End: 2025-07-01

## 2025-06-24 RX ORDER — MAGNESIUM HYDROXIDE 400 MG/5ML
30 SUSPENSION ORAL DAILY
Refills: 0 | Status: DISCONTINUED | OUTPATIENT
Start: 2025-06-24 | End: 2025-07-01

## 2025-06-24 RX ORDER — INSULIN GLARGINE-YFGN 100 [IU]/ML
10 INJECTION, SOLUTION SUBCUTANEOUS AT BEDTIME
Refills: 0 | Status: DISCONTINUED | OUTPATIENT
Start: 2025-06-24 | End: 2025-07-01

## 2025-06-24 RX ORDER — BUMETANIDE 1 MG/1
2 TABLET ORAL DAILY
Refills: 0 | Status: DISCONTINUED | OUTPATIENT
Start: 2025-06-25 | End: 2025-06-26

## 2025-06-24 RX ORDER — BUMETANIDE 1 MG/1
2 TABLET ORAL DAILY
Refills: 0 | Status: DISCONTINUED | OUTPATIENT
Start: 2025-06-24 | End: 2025-06-24

## 2025-06-24 RX ADMIN — MAGNESIUM HYDROXIDE 30 MILLILITER(S): 400 SUSPENSION ORAL at 19:34

## 2025-06-24 RX ADMIN — APIXABAN 2.5 MILLIGRAM(S): 2.5 TABLET, FILM COATED ORAL at 05:08

## 2025-06-24 RX ADMIN — DRONABINOL 2.5 MILLIGRAM(S): 10 CAPSULE ORAL at 12:03

## 2025-06-24 RX ADMIN — INSULIN LISPRO 3 UNIT(S): 100 INJECTION, SOLUTION INTRAVENOUS; SUBCUTANEOUS at 21:55

## 2025-06-24 RX ADMIN — METOPROLOL SUCCINATE 50 MILLIGRAM(S): 50 TABLET, EXTENDED RELEASE ORAL at 05:08

## 2025-06-24 RX ADMIN — Medication 40 MILLIGRAM(S): at 05:08

## 2025-06-24 RX ADMIN — Medication 1 APPLICATION(S): at 05:09

## 2025-06-24 RX ADMIN — INSULIN LISPRO 4: 100 INJECTION, SOLUTION INTRAVENOUS; SUBCUTANEOUS at 11:49

## 2025-06-24 RX ADMIN — BUMETANIDE 2 MILLIGRAM(S): 1 TABLET ORAL at 05:07

## 2025-06-24 RX ADMIN — INSULIN LISPRO 8: 100 INJECTION, SOLUTION INTRAVENOUS; SUBCUTANEOUS at 16:19

## 2025-06-24 RX ADMIN — INSULIN LISPRO 2: 100 INJECTION, SOLUTION INTRAVENOUS; SUBCUTANEOUS at 08:20

## 2025-06-24 RX ADMIN — FLUTICASONE PROPIONATE 1 SPRAY(S): 50 SPRAY, METERED NASAL at 05:09

## 2025-06-24 RX ADMIN — METHYLPREDNISOLONE ACETATE 40 MILLIGRAM(S): 80 INJECTION, SUSPENSION INTRA-ARTICULAR; INTRALESIONAL; INTRAMUSCULAR; SOFT TISSUE at 05:07

## 2025-06-24 RX ADMIN — POLYETHYLENE GLYCOL 3350 17 GRAM(S): 17 POWDER, FOR SOLUTION ORAL at 17:08

## 2025-06-24 RX ADMIN — LACTULOSE 10 GRAM(S): 10 SOLUTION ORAL at 18:48

## 2025-06-24 RX ADMIN — Medication 81 MILLIGRAM(S): at 12:03

## 2025-06-24 RX ADMIN — FLUTICASONE PROPIONATE 1 SPRAY(S): 50 SPRAY, METERED NASAL at 17:09

## 2025-06-24 RX ADMIN — INSULIN LISPRO 10: 100 INJECTION, SOLUTION INTRAVENOUS; SUBCUTANEOUS at 21:54

## 2025-06-24 RX ADMIN — Medication 10 MILLIGRAM(S): at 22:49

## 2025-06-24 RX ADMIN — INSULIN GLARGINE-YFGN 10 UNIT(S): 100 INJECTION, SOLUTION SUBCUTANEOUS at 21:55

## 2025-06-24 RX ADMIN — AMLODIPINE BESYLATE 10 MILLIGRAM(S): 10 TABLET ORAL at 12:03

## 2025-06-24 RX ADMIN — APIXABAN 2.5 MILLIGRAM(S): 2.5 TABLET, FILM COATED ORAL at 17:08

## 2025-06-24 RX ADMIN — Medication 2 TABLET(S): at 21:55

## 2025-06-24 RX ADMIN — POLYETHYLENE GLYCOL 3350 17 GRAM(S): 17 POWDER, FOR SOLUTION ORAL at 05:07

## 2025-06-24 NOTE — PROGRESS NOTE ADULT - SUBJECTIVE AND OBJECTIVE BOX
FERMIN DIAZ 84y Female  MRN#: [id number]     SUBJECTIVE  Patient is a 84y old Female who presents with a chief complaint of Shortness of Breath (22 Jun 2025 07:37)    Interval/Overnight Events:    Today is hospital day 6d, pt is sleepy and weak, retained urine overnight, overall feels better, denies SOB at rest   No acute overnight events.     OBJECTIVE  PAST MEDICAL & SURGICAL HISTORY  Hypertension, unspecified type    Type 2 diabetes mellitus with complication, without long-term current use of insulin    Hyperlipidemia, unspecified hyperlipidemia type    Diverticulitis    Obesity    COPD (chronic obstructive pulmonary disease)    SOB (shortness of breath)    GERD (gastroesophageal reflux disease)    Lung cancer    ARTIE (obstructive sleep apnea)  NO CPAP MACHINE PER PT.    Cancer of kidney    Cancer of thyroid    Former smoker    NHL (non-Hodgkin's lymphoma)  "low grade" per heme/ onc note    History of abdominal aortic aneurysm (AAA)    Kidney stones    [place] (hard of hearing)    History of surgery  LEFT LOWER LOBECTOMY    History of surgery  BILATERAL KNEE REPLACEMENT    History of surgery  CATARACT RIGHT EYE    History of surgery  TUBAL LIGATION    History of surgery  CYST REMOVED  RIGHT BREAST    History of surgery  CHOLECYSTECOMY    History of surgery  RIGHT PARTIAL NEPHRECTOMY    History of surgery  BILATERAL CATARACT SURGERY    History of back surgery    H/O lithotripsy    H/O partial thyroidectomy      ALLERGIES:  No Known Allergies      HOME MEDICATIONS  Home Medications:  amLODIPine 5 mg oral tablet: 1 tab(s) orally once a day (17 Jun 2025 15:48)  aspirin 81 mg oral capsule: 1 cap(s) orally once a day (17 Jun 2025 15:48)  Ditropan 5 mg oral tablet: 1 tab(s) orally once a day (at bedtime) (17 Jun 2025 15:48)  docusate sodium 100 mg oral tablet: 3 tab(s) orally once a day (at bedtime) (17 Jun 2025 15:49)  Flonase 50 mcg/inh nasal spray: 1 spray(s) in each nostril 2 times a day (17 Jun 2025 15:49)  Incruse Ellipta 62.5 mcg/inh inhalation powder: 1 puff(s) inhaled once a day (17 Jun 2025 15:48)  metoprolol succinate 25 mg oral tablet, extended release: 2 tab(s) orally once a day (17 Jun 2025 15:42)  MiraLax oral powder for reconstitution: 17 gram(s) orally once a day (at bedtime) (17 Jun 2025 15:48)  omeprazole 40 mg oral delayed release capsule: 1 cap(s) orally once a day (17 Jun 2025 15:49)  potassium chloride 20 mEq oral tablet, extended release: 1 tab(s) orally 3 times a week (17 Jun 2025 15:48)  senna (sennosides) 8.6 mg oral tablet: 1 tab(s) orally once a day (17 Jun 2025 15:48)  Symbicort 160 mcg-4.5 mcg/inh inhalation aerosol: 2 puff(s) inhaled 2 times a day (17 Jun 2025 15:49)  Tagrisso 80 mg oral tablet: 80 milligram(s) orally once a day (17 Jun 2025 15:49)  torsemide 20 mg oral tablet: 1 tab(s) orally 3 times a week every other day (17 Jun 2025 15:48)  Trulicity Pen 1.5 mg/0.5 mL subcutaneous solution: 1.5 milligram(s) subcutaneous once a week (17 Jun 2025 15:49)  Venofer 20 mg/mL intravenous solution: 100 milligram(s) intravenously 3 times a week (17 Jun 2025 15:49)  Vitamin D3 25 mcg (1000 intl units) oral tablet: 1 tab(s) orally once a day (17 Jun 2025 15:49)      Vital Signs Last 24 Hrs  T(C): 36.6 (24 Jun 2025 16:11), Max: 36.6 (23 Jun 2025 20:00)  T(F): 97.9 (24 Jun 2025 16:11), Max: 97.9 (23 Jun 2025 20:00)  HR: 73 (24 Jun 2025 16:11) (64 - 75)  BP: 118/68 (24 Jun 2025 16:11) (118/68 - 145/79)  BP(mean): 93 (24 Jun 2025 11:00) (93 - 105)  RR: 20 (24 Jun 2025 16:11) (20 - 20)  SpO2: 97% (24 Jun 2025 16:11) (92% - 99%)    Parameters below as of 24 Jun 2025 16:11  Patient On (Oxygen Delivery Method): room air          PHYSICAL EXAM:  GENERAL: NAD, frail with temporal muscle waisting   NECK : supple, no JVD   HEENT: Scleral clear  Pulmonary: decreased BS b/l   CV: S1, S2   Abdomen: nontender to palpation, bowel sounds are normal, nondistended  Extremities: no pedal edema    LABS:                                                  8.5    9.18  )-----------( 108      ( 24 Jun 2025 05:30 )             27.8   06-24    141  |  95[L]  |  55[H]  ----------------------------<  148[H]  4.4   |  38[H]  |  1.4    Ca    9.3      24 Jun 2025 05:30  Phos  3.5     06-24  Mg     2.5     06-24    TPro  5.0[L]  /  Alb  3.3[L]  /  TBili  0.6  /  DBili  x   /  AST  14  /  ALT  22  /  AlkPhos  92  06-24        LIVER FUNCTIONS - ( 22 Jun 2025 06:01 )  Alb: 3.4 g/dL / Pro: 5.0 g/dL / ALK PHOS: 88 U/L / ALT: 18 U/L / AST: 15 U/L / GGT: x             Urinalysis Basic - ( 22 Jun 2025 06:01 )    Color: x / Appearance: x / SG: x / pH: x  Gluc: 99 mg/dL / Ketone: x  / Bili: x / Urobili: x   Blood: x / Protein: x / Nitrite: x   Leuk Esterase: x / RBC: x / WBC x   Sq Epi: x / Non Sq Epi: x / Bacteria: x    RADIOLOGY:     < from: Xray Chest 1 View- PORTABLE-Routine (06.23.25 @ 06:17) >  IMPRESSION:    Increased left lung opacities. Left pleural effusion. Stable right-sided   opacities.    < end of copied text >  < from: US Kidney and Bladder (06.17.25 @ 18:50) >    IMPRESSION:  Since retroperitoneal ultrasound performed on May 12, 2025;  Interval resolution of hydronephrosis.    Previously visualized right renal subcentimeter calculi are not   demonstrated on this examination.    < end of copied text >  < from: [facility] Duplex Lower Ext Vein Scan, Bilat (06.18.25 @ 17:06) >    IMPRESSION:  No evidence of deep venous thrombosis in either lower extremity.    < end of copied text >  < from: NM PET/CT Onc FDG Skull to Thigh, Subsq (02.12.25 @ 10:22) >  IMPRESSION:  1.  No definite sites of abnormal FDG uptake to suggest biologic tumor   activity.  2.  Abdominal aortic aneurysm measuring 5.7 cm.    < end of copied text >    < from: TTE Echo Complete w/o Contrast w/ Doppler (06.18.25 @ 12:29) >  CONCLUSIONS:     1. Left ventricular cavity is normal in size. Left ventricular systolic   function is normal with an ejection fraction of 67 % by Arzate's method   of disks. There are no regionalwall motion abnormalities seen.   2. Mild left ventricular hypertrophy.   3. The left ventricular diastolic function is indeterminate due to   atrial flutter.   4. Normal right ventricular cavity size, with normal wall thickness, and   normal right ventricular systolic function.   5. Left atrium is severely dilated.   6. The right atrium is moderately dilated.   7. Trileaflet aortic valve with reduced systolic excursion. There is   mild calcification of the aortic valve leaflets. HEYDI 2.0 cm^2.   8. Thickened mitral valve leaflets.   9. There is moderate calcification of the mitral valve annulus.  10. Moderate mitral regurgitation.  11. Moderate tricuspid regurgitation.  12. Estimated pulmonary artery systolic pressure is 65 mmHg.  13. Trace pericardial effusion with no echocardiographic evidence of   tamponade physiology.    < from: Xray Chest 1 View- PORTABLE-Routine (06.24.25 @ 06:56) >    IMPRESSION:    Decreased bilateral lung opacities, and decreased left pleural effusion.          MEDICATIONS  (STANDING):  amLODIPine   Tablet 10 milliGRAM(s) Oral every 24 hours  apixaban 2.5 milliGRAM(s) Oral every 12 hours  aspirin enteric coated 81 milliGRAM(s) Oral daily  chlorhexidine 2% Cloths 1 Application(s) Topical <User Schedule>  dextrose 5%. 1000 milliLiter(s) (50 mL/Hr) IV Continuous <Continuous>  dextrose 5%. 1000 milliLiter(s) (100 mL/Hr) IV Continuous <Continuous>  dextrose 50% Injectable 25 Gram(s) IV Push once  dextrose 50% Injectable 12.5 Gram(s) IV Push once  dextrose 50% Injectable 25 Gram(s) IV Push once  droNABinol 2.5 milliGRAM(s) Oral daily  fluticasone propionate 50 MICROgram(s)/spray Nasal Spray 1 Spray(s) Both Nostrils two times a day  glucagon  Injectable 1 milliGRAM(s) IntraMuscular once  insulin lispro (ADMELOG) corrective regimen sliding scale   SubCutaneous Before meals and at bedtime  methylPREDNISolone sodium succinate Injectable 40 milliGRAM(s) IV Push daily  metoprolol succinate ER 50 milliGRAM(s) Oral daily  pantoprazole    Tablet 40 milliGRAM(s) Oral before breakfast  polyethylene glycol 3350 17 Gram(s) Oral every 12 hours  senna 2 Tablet(s) Oral at bedtime    MEDICATIONS  (PRN):  albuterol    90 MICROgram(s) HFA Inhaler 2 Puff(s) Inhalation every 6 hours PRN Bronchospasm  dextrose Oral Gel 15 Gram(s) Oral once PRN Blood Glucose LESS THAN 70 milliGRAM(s)/deciliter  magnesium hydroxide Suspension 30 milliLiter(s) Oral daily PRN Constipation  zolpidem 10 milliGRAM(s) Oral at bedtime PRN Insomnia

## 2025-06-24 NOTE — PROGRESS NOTE ADULT - ASSESSMENT
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    Patient has been decompensating since April after she underwent AAA stenting ( Has some renal artery leak). Was sent to nursing home. Was diagnosed with Ecoli/Pseudomonas UTIi treated with levaquin . Recently was having Shortness of breath/ cough ; treated with augmentin,. they checked theBNP 4199 ; followed Tye De Dios ; was started on Torsemide 20 mg.  PT developed increased shortness of breath and anxiety. Nursing home gave her lasix which improved her breathing status but worsened since the morning so was sent to the hospital.       A/P   #Acute Hypercapnic/ Hypoxic Respiratory Failure / multifactorial dyspnea/ acute on chronic CHF exacerbation   #H/O left lung adenocarcinoma s/p LLL lobectomy on Tagrisso  #H/O COPD  - CHF protocol, fluid restriction 1200 ml in 24 hours   - intake and output monitoring, daily weight  - Bumex once daily  - CXR (6/17/2025): Bilateral patchy opacities and left pleural effusion.   - TTE: EF 67%, mild LVH, severe LA dilation, TR/ PHTN   - pulmonary is following, recommendations noted:  - HOB at 45 degrees.  Encourage NIV during sleep and PRN during the day  - Wean o2 as tolerated and try low-flow NC. Continue home inhaler regimen. Albuterol prn.  -  Repeat CXR noted  - Solumedrol to 40 mg daily   - Incentive spirometry   - lung adenocarcinoma is in remission, recent PET scan reviewed , Tagrisso held ( pt was taking this medications for two years)     #ADRIA (baseline creatinine ~1.3)  - Cr 1.5 on admission, previously normal  - Renal US:  Interval resolution of hydronephrosis since 5/12/25  - UA noted, blood and RBC  - monitor lytes, BMP    #Constipation  - high fiber diet   - c/w miralax BID, senna qhs    #Thrombocytopenia  - improved  - heme/onc eval 6/20 noted. 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    Patient has been decompensating since April after she underwent AAA stenting ( Has some renal artery leak). Was sent to nursing home. Was diagnosed with Ecoli/Pseudomonas UTIi treated with levaquin . Recently was having Shortness of breath/ cough ; treated with augmentin,. they checked theBNP 4199 ; followed Tye De Dios ; was started on Torsemide 20 mg.  PT developed increased shortness of breath and anxiety. Nursing home gave her lasix which improved her breathing status but worsened since the morning so was sent to the hospital.       A/P   #Acute Hypercapnic/ Hypoxic Respiratory Failure / multifactorial dyspnea/ acute on chronic CHF exacerbation   #H/O left lung adenocarcinoma s/p LLL lobectomy on Tagrisso  #H/O COPD  - CHF protocol, fluid restriction 1200 ml in 24 hours   - intake and output monitoring, daily weight  - Bumex once daily  - CXR (6/17/2025): Bilateral patchy opacities and left pleural effusion.   - TTE: EF 67%, mild LVH, severe LA dilation, TR/ PHTN   - pulmonary is following, recommendations noted:  - HOB at 45 degrees.  Encourage NIV during sleep and PRN during the day  - Wean o2 as tolerated and try low-flow NC. Continue home inhaler regimen. Albuterol prn.  -  Repeat CXR noted  - Solumedrol to 40 mg daily   - Incentive spirometry   - lung adenocarcinoma is in remission, recent PET scan reviewed , Tagrisso held ( pt was taking this medications for two years)     #ADRIA (baseline creatinine ~1.3)  - Cr 1.5 on admission, previously normal  - Renal US:  Interval resolution of hydronephrosis since 5/12/25  - UA noted, blood and RBC  - monitor lytes, BMP    #Constipation  - high fiber diet   - c/w miralax BID, senna qhs    #Thrombocytopenia  - improved  - heme/onc eval 6/20 noted. continue to hold tagrisso and continue IV diuresis    #Atrial Fibrillation  #H/O PE  #HTN  #Diabetes  #HLD  #AAA s/p stent   - DASH/ CC diet  - monitor  F/s   - continue apixaban 2.5 mg q12h, aspirin 81 mg, amlodipine 5 mg, metoprolol XL 50 mg daily  - continue sliding scale insulin lispro       #Anxiety/Insomnia (Chronic)/ anorexia   - continue zolpidem 10 mg, dronabinol 2.5 mg daily      #MISC  -DVT ppx: Eliquis   -GI ppx: protonix  -DIET: DASH/TLC  -Activity: AAT  -Code Status: Full   -DIspo: SDU    -Pending: try low-flow NC   84-year-old female with PMHx SCC lung cancer on tagrisso in remission, ho left lung adenocarcinoma s/p LLL lobectomy, PE on Eliquis, Atrial fibrillation ; COPD on home O2, AAA, CKD ( Baseline GFR 40s; creatinine 1.3 )  DM, HTN, HLd,, AAA s/p stent diagnosis of pneumonia UTI status post Augmentin and Levaquin presents for evaluation of shortness of breath x 2 days acutely worsening this morning.    Patient has been decompensating since April after she underwent AAA stenting ( Has some renal artery leak). Was sent to nursing home. Was diagnosed with Ecoli/Pseudomonas UTIi treated with levaquin . Recently was having Shortness of breath/ cough ; treated with augmentin,. they checked theBNP 4199 ; followed Tye De Dios ; was started on Torsemide 20 mg.  PT developed increased shortness of breath and anxiety. Nursing home gave her lasix which improved her breathing status but worsened since the morning so was sent to the hospital.       A/P   #Acute Hypercapnic/ Hypoxic Respiratory Failure / multifactorial dyspnea/ acute on chronic CHF exacerbation   #H/O left lung adenocarcinoma s/p LLL lobectomy on Tagrisso  #H/O COPD  - CHF protocol, fluid restriction 1200 ml in 24 hours   - intake and output monitoring, daily weight  - Bumex once daily  - CXR (6/17/2025): Bilateral patchy opacities and left pleural effusion.   - TTE: EF 67%, mild LVH, severe LA dilation, TR/ PHTN   - pulmonary is following, recommendations noted:  - HOB at 45 degrees.  Encourage NIV during sleep and PRN during the day  - Wean o2 as tolerated and try low-flow NC for now and overnight. Continue home inhaler regimen. Albuterol prn.  -  Repeat CXR noted  - Solumedrol to 40 mg daily   - Incentive spirometry   - lung adenocarcinoma is in remission, recent PET scan reviewed , Tagrisso held ( pt was taking this medications for two years)     #ADRIA (baseline creatinine ~1.3)  - Cr 1.5 on admission, previously normal  - Renal US:  Interval resolution of hydronephrosis since 5/12/25  - UA noted, blood and RBC  - monitor lytes, BMP    #Constipation  - high fiber diet   - c/w miralax BID, senna qhs    #Thrombocytopenia  - improved  - heme/onc eval 6/20 noted. continue to hold tagrisso and continue IV diuresis    #Atrial Fibrillation  #H/O PE  #HTN  #Diabetes  #HLD  #AAA s/p stent   - DASH/ CC diet  - monitor  F/s   - continue apixaban 2.5 mg q12h, aspirin 81 mg, amlodipine 5 mg, metoprolol XL 50 mg daily  - continue sliding scale insulin lispro       #Anxiety/Insomnia (Chronic)/ anorexia   - continue zolpidem 10 mg, dronabinol 2.5 mg daily      #MISC  -DVT ppx: Eliquis   -GI ppx: protonix  -DIET: DASH/TLC  -Activity: AAT  -Code Status: Full   -DIspo: SDU    -Pending: low-flow NC, PT   84-year-old female with PMHx SCC lung cancer on tagrisso in remission, ho left lung adenocarcinoma s/p LLL lobectomy, PE on Eliquis, Atrial fibrillation ; COPD on home O2, AAA, CKD ( Baseline GFR 40s; creatinine 1.3 )  DM, HTN, HLd,, AAA s/p stent diagnosis of pneumonia UTI status post Augmentin and Levaquin presents for evaluation of shortness of breath x 2 days acutely worsening this morning.    Patient has been decompensating since April after she underwent AAA stenting ( Has some renal artery leak). Was sent to nursing home. Was diagnosed with Ecoli/Pseudomonas UTIi treated with levaquin . Recently was having Shortness of breath/ cough ; treated with augmentin,. they checked the BNP 4199 ; followed Tye De Dios ; was started on Torsemide 20 mg.  PT developed increased shortness of breath and anxiety. Nursing home gave her lasix which improved her breathing status but worsened since the morning so was sent to the hospital.       A/P   #Acute Hypercapnic/ Hypoxic Respiratory Failure / multifactorial dyspnea/ acute on chronic CHF exacerbation   #H/O left lung adenocarcinoma s/p LLL lobectomy on Tagrisso  #H/O COPD  - CHF protocol, fluid restriction 1200 ml in 24 hours   - intake and output monitoring, daily weight  - Bumex once daily  - CXR (6/17/2025): Bilateral patchy opacities and left pleural effusion.   - TTE: EF 67%, mild LVH, severe LA dilation, TR/ PHTN   - pulmonary is following, recommendations noted:  - HOB at 45 degrees.  Encourage NIV during sleep and PRN during the day  - Wean o2 as tolerated and try low-flow NC for now and overnight. Continue home inhaler regimen. Albuterol prn.  -  Repeat CXR noted  - Solumedrol to 40 mg daily   - Incentive spirometry   - lung adenocarcinoma is in remission, recent PET scan reviewed , Tagrisso held ( pt was taking this medications for two years)     #ADRIA (baseline creatinine ~1.3)  - Cr 1.5 on admission, previously normal  - Renal US:  Interval resolution of hydronephrosis since 5/12/25  - UA noted, blood and RBC  - monitor lytes, BMP    #Constipation  - high fiber diet   - c/w miralax BID, senna qhs    #Thrombocytopenia  - improved  - heme/onc eval 6/20 noted. continue to hold tagrisso and continue IV diuresis    #Atrial Fibrillation  #H/O PE  #HTN  #Diabetes  #HLD  #AAA s/p stent   - DASH/ CC diet  - monitor  F/s   - continue apixaban 2.5 mg q12h, aspirin 81 mg, amlodipine 5 mg, metoprolol XL 50 mg daily  - continue sliding scale insulin lispro       #Anxiety/Insomnia (Chronic)/ anorexia   - continue zolpidem 10 mg, dronabinol 2.5 mg daily      #MISC  -DVT ppx: Eliquis   -GI ppx: protonix  -DIET: DASH/TLC  -Activity: AAT  -Code Status: Full   -DIspo: SDU    -Pending: low-flow NC, PT   Patient is a 83 yo female with a history of left lung adenocarcinoma s/p LLL lobectomy on Tagrisso in remission, PE, AFIVB, COPD on home O2, CKD, diabetes, HTN, HLD, AAA s/p stent placement who presented at the ED for worsening SOB. She was later admitted to the ICU for AHRF.    #Acute hypercapnic respiratory failure  #Acute hypoxemic respiratory failure  #CHF exacerbation  #Hx COPD  #Hx left lung adenocarcinoma s/p LLL lobectomy on Tagrisso  - BNP 3745, initial BNP 6050  - TTE: EF 67%, mild LVH, severe LA dilation, moderate MR and TR, pulmonary HTN  - CXR 6/23: Increased left lung opacities. Left pleural effusion. Stable right-sided opacities.   - CXR 6/24: Decreased bilateral lung opacities, and decreased left pleural effusion.  - Bumex once daily  - Pulmonary following - recommended HOB 45 degrees, wean O2, keep on low-flow NC today and overnight, continue home inhaler regimen, albuterol PRN, repeat CXR, solumedrol 40 mg daily, incentive spirometry    #ADRIA  - recent Cr 1.4, baseline CR 1.3  - US kidney/bladder 6/17 - resolution of hydronephrosis from 5/2025  - trend BMP    #Constipation   - c/w miralax, senna    #Thrombocytopenia  - improved, continue holding tagrisso as per heme/onc    #AFIB  #Hx PE  #HTN  #Diabetes  #HLD  #AAA s/p stent   - c/w continue eliquis, aspirin, amlodipine, metoprolol succinate, sliding scale insulin lispro     #MISC  -DVT ppx: eliquis   -GI ppx: protonix  -DIET: DASH/TLC  -Activity: AAT  -Code Status: Full   -DIspo: SDU    -Pending: keep on low-flow NC and check for improvement, PT eval

## 2025-06-24 NOTE — PROGRESS NOTE ADULT - ASSESSMENT
Sunscreen Recommendations: Continual photo protection with zinc based sunscreen of at least SPF 30+  and photo protective clothing. 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    Patient has been decompensating since April after she underwent AAA stenting ( Has some renal artery leak). Was sent to nursing home. Was diagnosed with Ecoli/Pseudomonas UTIi treated with levaquin . Recently was having Shortness of breath/ cough ; treated with augmentin,. they checked theBNP 4199 ; followed Tye De Dios ; was started on Torsemide 20 mg.  PT developed increased shortness of breath and anxiety. Nursing home gave her lasix which improved her breathing status but worsened since the morning so was sent to the hospital.       A/P   #Acute Hypercapnic/ Hypoxic Respiratory Failure / multifactorial dyspnea/ acute on chronic CHF exacerbation   #H/O left lung adenocarcinoma s/p LLL lobectomy on Tagrisso  #H/O COPD  - CHF protocol, fluid restriction 1200 ml in 24 hours   - intake and output monitoring, daily weight  - Bumex dose was decreased today to Q 24 hours, reevaluate in 24 hours    - CXR (6/17/2025): Bilateral patchy opacities and left pleural effusion.   - TTE: EF 67%, mild LVH, severe LA dilation, TR/ PHTN   - pulmonary is following, recommendations noted:  - HOB at 45 degrees.  Encourage NIV during sleep and PRN during the day  -  Wean o2 as tolerated.  Continue home inhaler regimen.  Albuterol PRN  -  Repeat CXR noted, showed resolution of CHF   - Solumedrol to 40 mg daily   - lung adenocarcinoma is in remission, recent PET scan reviewed , Tagrisso held ( pt was taking this medications for two years)     #ADRIA (baseline creatinine ~1.3)  - Cr 1.5 on admission, previously normal  - Renal US:  Interval resolution of hydronephrosis since 5/12/25  - UA noted, blood and RBC  - monitor lytes, BMP    #Constipation  - high fiber diet   - c/w miralax BID, senna qhs    #Thrombocytopenia  - monitor platelets   - heme/onc eval 6/20 noted. continue to hold tagrisso and continue IV diuresis    #Atrial Fibrillation  #H/O PE  #HTN  #Diabetes  #HLD  #AAA s/p stent   - DASH/ CC diet  - monitor  F/s   - continue apixaban 2.5 mg q12h, aspirin 81 mg, amlodipine 5 mg, metoprolol XL 50 mg daily  - continue sliding scale insulin lispro   - start Lantus 10 units and Lispro 3 units with meals ( f/S noted)       #Anxiety/Insomnia (Chronic)/ anorexia   - continue zolpidem 10 mg, dronabinol 2.5 mg daily    DVT ppx: Eliquis  GI ppx: Protonix  Code: Full     #Progress Note Handoff  Pending (specify):   start Lantus 10 units and Lispro 3 units with meals ( f/S noted), reevaluate Bumex dose in 24 hours,  monitor platelets, pt was tapered off high flow oxygen this afternoon, monitor for 24 hours, NIV QHS, c/w supportive care, PT/.rehab eval, start incentive spirometry.   Family discussion: I spoke with pt's daughter on 6/23,  plan of care discussed, she agreed with a plan of care.   Disposition: SDU

## 2025-06-24 NOTE — PROGRESS NOTE ADULT - SUBJECTIVE AND OBJECTIVE BOX
Patient is a 84y old  Female who presents with a chief complaint of Shortness of Breath (23 Jun 2025 17:48)        Over Night Events:  remains on HFNCo2.  Off pressors.          ROS:     All ROS are negative except HPI         PHYSICAL EXAM    ICU Vital Signs Last 24 Hrs  T(C): 36 (24 Jun 2025 04:00), Max: 36.6 (23 Jun 2025 20:00)  T(F): 96.8 (24 Jun 2025 04:00), Max: 97.9 (23 Jun 2025 20:00)  HR: 70 (24 Jun 2025 04:00) (64 - 84)  BP: 127/76 (24 Jun 2025 04:00) (106/62 - 154/85)  BP(mean): 96 (24 Jun 2025 04:00) (96 - 113)  ABP: --  ABP(mean): --  RR: 20 (24 Jun 2025 04:00) (20 - 20)  SpO2: 95% (24 Jun 2025 04:00) (90% - 97%)    O2 Parameters below as of 23 Jun 2025 21:48  Patient On (Oxygen Delivery Method): nasal cannula, high flow  O2 Flow (L/min): 40  O2 Concentration (%): 40        CONSTITUTIONAL:  NAD    ENT:   Airway patent,   Mouth with normal mucosa.     EYES:   Pupils equal,   Round and reactive to light.    CARDIAC:   Normal rate,   Regular rhythm.    No edema    RESPIRATORY:   No wheezing  Bilateral crackles   Normal chest expansion  Not tachypneic,  No use of accessory muscles    GASTROINTESTINAL:  Abdomen soft,   Non-tender,   No guarding,   + BS    MUSCULOSKELETAL:   Range of motion is not limited,  No clubbing, cyanosis    NEUROLOGICAL:   Alert and oriented   No motor  deficits.    SKIN:   Skin normal color for race,   Warm and dry  No evidence of rash.        06-23-25 @ 07:01  -  06-24-25 @ 07:00  --------------------------------------------------------  IN:  Total IN: 0 mL    OUT:    Voided (mL): 280 mL  Total OUT: 280 mL    Total NET: -280 mL          LABS:                            8.5    9.18  )-----------( 108      ( 24 Jun 2025 05:30 )             27.8                                               06-24    141  |  95[L]  |  55[H]  ----------------------------<  148[H]  4.4   |  38[H]  |  1.4    Ca    9.3      24 Jun 2025 05:30  Phos  3.5     06-24  Mg     2.5     06-24    TPro  5.0[L]  /  Alb  3.3[L]  /  TBili  0.6  /  DBili  x   /  AST  14  /  ALT  22  /  AlkPhos  92  06-24                                             Urinalysis Basic - ( 24 Jun 2025 05:30 )    Color: x / Appearance: x / SG: x / pH: x  Gluc: 148 mg/dL / Ketone: x  / Bili: x / Urobili: x   Blood: x / Protein: x / Nitrite: x   Leuk Esterase: x / RBC: x / WBC x   Sq Epi: x / Non Sq Epi: x / Bacteria: x                                                  LIVER FUNCTIONS - ( 24 Jun 2025 05:30 )  Alb: 3.3 g/dL / Pro: 5.0 g/dL / ALK PHOS: 92 U/L / ALT: 22 U/L / AST: 14 U/L / GGT: x                                                                                                                                       MEDICATIONS  (STANDING):  amLODIPine   Tablet 10 milliGRAM(s) Oral every 24 hours  apixaban 2.5 milliGRAM(s) Oral every 12 hours  aspirin enteric coated 81 milliGRAM(s) Oral daily  bumetanide Injectable 2 milliGRAM(s) IV Push two times a day  chlorhexidine 2% Cloths 1 Application(s) Topical <User Schedule>  dextrose 5%. 1000 milliLiter(s) (50 mL/Hr) IV Continuous <Continuous>  dextrose 5%. 1000 milliLiter(s) (100 mL/Hr) IV Continuous <Continuous>  dextrose 50% Injectable 25 Gram(s) IV Push once  dextrose 50% Injectable 12.5 Gram(s) IV Push once  dextrose 50% Injectable 25 Gram(s) IV Push once  droNABinol 2.5 milliGRAM(s) Oral daily  fluticasone propionate 50 MICROgram(s)/spray Nasal Spray 1 Spray(s) Both Nostrils two times a day  glucagon  Injectable 1 milliGRAM(s) IntraMuscular once  insulin lispro (ADMELOG) corrective regimen sliding scale   SubCutaneous Before meals and at bedtime  methylPREDNISolone sodium succinate Injectable 40 milliGRAM(s) IV Push daily  metoprolol succinate ER 50 milliGRAM(s) Oral daily  pantoprazole    Tablet 40 milliGRAM(s) Oral before breakfast  polyethylene glycol 3350 17 Gram(s) Oral every 12 hours  senna 2 Tablet(s) Oral at bedtime    MEDICATIONS  (PRN):  albuterol    90 MICROgram(s) HFA Inhaler 2 Puff(s) Inhalation every 6 hours PRN Bronchospasm  dextrose Oral Gel 15 Gram(s) Oral once PRN Blood Glucose LESS THAN 70 milliGRAM(s)/deciliter  magnesium hydroxide Suspension 30 milliLiter(s) Oral daily PRN Constipation  zolpidem 10 milliGRAM(s) Oral at bedtime PRN Insomnia      New X-rays reviewed:                                                                                  ECHO

## 2025-06-24 NOTE — PHYSICAL THERAPY INITIAL EVALUATION ADULT - DID THE PATIENT HAVE SURGERY?
Pt unable to stay awake despite verbal/ tactile cues. Will f/u as appropriate.  Of note, pt came from [facility] for short term rehabilitation, was admitted due to acute resp failure.

## 2025-06-24 NOTE — PROGRESS NOTE ADULT - SUBJECTIVE AND OBJECTIVE BOX
24H events:    Today is hospital day 7d. This morning patient was seen and examined at bedside, resting comfortably in bed on NC 40:40. She had one episode overnight of coughing up specks of blood. No other acute or major events overnight. Morning CXR showed notable improvement.    PAST MEDICAL & SURGICAL HISTORY  Hypertension, unspecified type    Type 2 diabetes mellitus with complication, without long-term current use of insulin    Hyperlipidemia, unspecified hyperlipidemia type    Diverticulitis    Obesity    COPD (chronic obstructive pulmonary disease)    SOB (shortness of breath)    GERD (gastroesophageal reflux disease)    Lung cancer    ARTIE (obstructive sleep apnea)  NO CPAP MACHINE PER PT.    Cancer of kidney    Cancer of thyroid    Former smoker    NHL (non-Hodgkin's lymphoma)  "low grade" per heme/ onc note    History of abdominal aortic aneurysm (AAA)    Kidney stones    [place] (hard of hearing)    History of surgery  LEFT LOWER LOBECTOMY    History of surgery  BILATERAL KNEE REPLACEMENT    History of surgery  CATARACT RIGHT EYE    History of surgery  TUBAL LIGATION    History of surgery  CYST REMOVED  RIGHT BREAST    History of surgery  CHOLECYSTECOMY    History of surgery  RIGHT PARTIAL NEPHRECTOMY    History of surgery  BILATERAL CATARACT SURGERY    History of back surgery    H/O lithotripsy    H/O partial thyroidectomy        SOCIAL HISTORY:  Social History:      ALLERGIES:  No Known Allergies      MEDICATIONS:  STANDING MEDICATIONS  amLODIPine   Tablet 10 milliGRAM(s) Oral every 24 hours  apixaban 2.5 milliGRAM(s) Oral every 12 hours  aspirin enteric coated 81 milliGRAM(s) Oral daily  chlorhexidine 2% Cloths 1 Application(s) Topical <User Schedule>  dextrose 5%. 1000 milliLiter(s) IV Continuous <Continuous>  dextrose 5%. 1000 milliLiter(s) IV Continuous <Continuous>  dextrose 50% Injectable 25 Gram(s) IV Push once  dextrose 50% Injectable 12.5 Gram(s) IV Push once  dextrose 50% Injectable 25 Gram(s) IV Push once  droNABinol 2.5 milliGRAM(s) Oral daily  fluticasone propionate 50 MICROgram(s)/spray Nasal Spray 1 Spray(s) Both Nostrils two times a day  glucagon  Injectable 1 milliGRAM(s) IntraMuscular once  insulin lispro (ADMELOG) corrective regimen sliding scale   SubCutaneous Before meals and at bedtime  methylPREDNISolone sodium succinate Injectable 40 milliGRAM(s) IV Push daily  metoprolol succinate ER 50 milliGRAM(s) Oral daily  pantoprazole    Tablet 40 milliGRAM(s) Oral before breakfast  polyethylene glycol 3350 17 Gram(s) Oral every 12 hours  senna 2 Tablet(s) Oral at bedtime    PRN MEDICATIONS  albuterol    90 MICROgram(s) HFA Inhaler 2 Puff(s) Inhalation every 6 hours PRN  dextrose Oral Gel 15 Gram(s) Oral once PRN  magnesium hydroxide Suspension 30 milliLiter(s) Oral daily PRN  zolpidem 10 milliGRAM(s) Oral at bedtime PRN      VITALS:   T(C): 36.1 (06-24-25 @ 08:49), Max: 36.6 (06-23-25 @ 20:00)  HR: 73 (06-24-25 @ 08:49) (64 - 79)  BP: 145/79 (06-24-25 @ 08:49) (106/62 - 145/79)  RR: 20 (06-24-25 @ 08:49) (20 - 20)  SpO2: 96% (06-24-25 @ 08:49) (92% - 97%)  I&O's Summary    23 Jun 2025 07:01  -  24 Jun 2025 07:00  --------------------------------------------------------  IN: 0 mL / OUT: 280 mL / NET: -280 mL          PHYSICAL EXAM:  GENERAL: well built, well nourished  HEAD:  Atraumatic, Normocephalic  EYES: EOMI, PERRLA, conjunctiva and sclera clear  NECK: Supple, No JVD  NERVOUS SYSTEM:  Alert & Oriented X3,  motor and  sensory intact  CHEST/LUNG: B/L good air entry; No rales, rhonchi, or wheezing  HEART: S1S2 normal, no S3, Regular rate and rhythm; No murmurs heard  ABDOMEN: Soft, Nontender, Nondistended; Bowel sounds present  EXTREMITIES:  2+ Peripheral Pulses, No edema  SKIN: No rashes or lesions    LABS:                        8.5    9.18  )-----------( 108      ( 24 Jun 2025 05:30 )             27.8     06-24    141  |  95[L]  |  55[H]  ----------------------------<  148[H]  4.4   |  38[H]  |  1.4    Ca    9.3      24 Jun 2025 05:30  Phos  3.5     06-24  Mg     2.5     06-24    TPro  5.0[L]  /  Alb  3.3[L]  /  TBili  0.6  /  DBili  x   /  AST  14  /  ALT  22  /  AlkPhos  92  06-24      Urinalysis Basic - ( 24 Jun 2025 05:30 )    Color: x / Appearance: x / SG: x / pH: x  Gluc: 148 mg/dL / Ketone: x  / Bili: x / Urobili: x   Blood: x / Protein: x / Nitrite: x   Leuk Esterase: x / RBC: x / WBC x   Sq Epi: x / Non Sq Epi: x / Bacteria: x                     24H events:    Today is hospital day 7d. This morning patient was seen and examined at bedside, resting comfortably in bed on NC 40:40. She had one episode overnight of coughing up specks of blood. No other acute or major events overnight. Morning CXR showed notable improvement.    PAST MEDICAL & SURGICAL HISTORY  Hypertension, unspecified type    Type 2 diabetes mellitus with complication, without long-term current use of insulin    Hyperlipidemia, unspecified hyperlipidemia type    Diverticulitis    Obesity    COPD (chronic obstructive pulmonary disease)    SOB (shortness of breath)    GERD (gastroesophageal reflux disease)    Lung cancer    ARTIE (obstructive sleep apnea)  NO CPAP MACHINE PER PT.    Cancer of kidney    Cancer of thyroid    Former smoker    NHL (non-Hodgkin's lymphoma)  "low grade" per heme/ onc note    History of abdominal aortic aneurysm (AAA)    Kidney stones    [place] (hard of hearing)    History of surgery  LEFT LOWER LOBECTOMY    History of surgery  BILATERAL KNEE REPLACEMENT    History of surgery  CATARACT RIGHT EYE    History of surgery  TUBAL LIGATION    History of surgery  CYST REMOVED  RIGHT BREAST    History of surgery  CHOLECYSTECOMY    History of surgery  RIGHT PARTIAL NEPHRECTOMY    History of surgery  BILATERAL CATARACT SURGERY    History of back surgery    H/O lithotripsy    H/O partial thyroidectomy        SOCIAL HISTORY:  Social History:      ALLERGIES:  No Known Allergies      MEDICATIONS:  STANDING MEDICATIONS  amLODIPine   Tablet 10 milliGRAM(s) Oral every 24 hours  apixaban 2.5 milliGRAM(s) Oral every 12 hours  aspirin enteric coated 81 milliGRAM(s) Oral daily  chlorhexidine 2% Cloths 1 Application(s) Topical <User Schedule>  dextrose 5%. 1000 milliLiter(s) IV Continuous <Continuous>  dextrose 5%. 1000 milliLiter(s) IV Continuous <Continuous>  dextrose 50% Injectable 25 Gram(s) IV Push once  dextrose 50% Injectable 12.5 Gram(s) IV Push once  dextrose 50% Injectable 25 Gram(s) IV Push once  droNABinol 2.5 milliGRAM(s) Oral daily  fluticasone propionate 50 MICROgram(s)/spray Nasal Spray 1 Spray(s) Both Nostrils two times a day  glucagon  Injectable 1 milliGRAM(s) IntraMuscular once  insulin lispro (ADMELOG) corrective regimen sliding scale   SubCutaneous Before meals and at bedtime  methylPREDNISolone sodium succinate Injectable 40 milliGRAM(s) IV Push daily  metoprolol succinate ER 50 milliGRAM(s) Oral daily  pantoprazole    Tablet 40 milliGRAM(s) Oral before breakfast  polyethylene glycol 3350 17 Gram(s) Oral every 12 hours  senna 2 Tablet(s) Oral at bedtime    PRN MEDICATIONS  albuterol    90 MICROgram(s) HFA Inhaler 2 Puff(s) Inhalation every 6 hours PRN  dextrose Oral Gel 15 Gram(s) Oral once PRN  magnesium hydroxide Suspension 30 milliLiter(s) Oral daily PRN  zolpidem 10 milliGRAM(s) Oral at bedtime PRN      VITALS:   T(C): 36.1 (06-24-25 @ 08:49), Max: 36.6 (06-23-25 @ 20:00)  HR: 73 (06-24-25 @ 08:49) (64 - 79)  BP: 145/79 (06-24-25 @ 08:49) (106/62 - 145/79)  RR: 20 (06-24-25 @ 08:49) (20 - 20)  SpO2: 96% (06-24-25 @ 08:49) (92% - 97%)  I&O's Summary    23 Jun 2025 07:01  -  24 Jun 2025 07:00  --------------------------------------------------------  IN: 0 mL / OUT: 280 mL / NET: -280 mL          PHYSICAL EXAM:  GENERAL: lying in bed on NC, in no acute respiratory distress  NERVOUS SYSTEM:  Alert & Oriented X3,  motor and  sensory intact  CHEST/LUNG: Decreased breath sounds on left side; No rales, rhonchi, or wheezing  HEART: S1S2 normal, no S3, Regular rate and rhythm; No murmurs heard  ABDOMEN: Soft, Nontender, Nondistended; Bowel sounds present  EXTREMITIES:  2+ Peripheral Pulses, No edema  SKIN: No rashes or lesions    LABS:                        8.5    9.18  )-----------( 108      ( 24 Jun 2025 05:30 )             27.8     06-24    141  |  95[L]  |  55[H]  ----------------------------<  148[H]  4.4   |  38[H]  |  1.4    Ca    9.3      24 Jun 2025 05:30  Phos  3.5     06-24  Mg     2.5     06-24    TPro  5.0[L]  /  Alb  3.3[L]  /  TBili  0.6  /  DBili  x   /  AST  14  /  ALT  22  /  AlkPhos  92  06-24      Urinalysis Basic - ( 24 Jun 2025 05:30 )    Color: x / Appearance: x / SG: x / pH: x  Gluc: 148 mg/dL / Ketone: x  / Bili: x / Urobili: x   Blood: x / Protein: x / Nitrite: x   Leuk Esterase: x / RBC: x / WBC x   Sq Epi: x / Non Sq Epi: x / Bacteria: x                     24H events:    Today is hospital day 7d. This morning patient was seen and examined at bedside, resting comfortably in bed on HFNC 40:40. She had one episode overnight of coughing up specks of blood. No other acute or major events overnight. Morning CXR showed notable improvement.    PAST MEDICAL & SURGICAL HISTORY  Hypertension, unspecified type    Type 2 diabetes mellitus with complication, without long-term current use of insulin    Hyperlipidemia, unspecified hyperlipidemia type    Diverticulitis    Obesity    COPD (chronic obstructive pulmonary disease)    SOB (shortness of breath)    GERD (gastroesophageal reflux disease)    Lung cancer    ARTIE (obstructive sleep apnea)  NO CPAP MACHINE PER PT.    Cancer of kidney    Cancer of thyroid    Former smoker    NHL (non-Hodgkin's lymphoma)  "low grade" per heme/ onc note    History of abdominal aortic aneurysm (AAA)    Kidney stones    [place] (hard of hearing)    History of surgery  LEFT LOWER LOBECTOMY    History of surgery  BILATERAL KNEE REPLACEMENT    History of surgery  CATARACT RIGHT EYE    History of surgery  TUBAL LIGATION    History of surgery  CYST REMOVED  RIGHT BREAST    History of surgery  CHOLECYSTECOMY    History of surgery  RIGHT PARTIAL NEPHRECTOMY    History of surgery  BILATERAL CATARACT SURGERY    History of back surgery    H/O lithotripsy    H/O partial thyroidectomy        SOCIAL HISTORY:  Social History:      ALLERGIES:  No Known Allergies      MEDICATIONS:  STANDING MEDICATIONS  amLODIPine   Tablet 10 milliGRAM(s) Oral every 24 hours  apixaban 2.5 milliGRAM(s) Oral every 12 hours  aspirin enteric coated 81 milliGRAM(s) Oral daily  chlorhexidine 2% Cloths 1 Application(s) Topical <User Schedule>  dextrose 5%. 1000 milliLiter(s) IV Continuous <Continuous>  dextrose 5%. 1000 milliLiter(s) IV Continuous <Continuous>  dextrose 50% Injectable 25 Gram(s) IV Push once  dextrose 50% Injectable 12.5 Gram(s) IV Push once  dextrose 50% Injectable 25 Gram(s) IV Push once  droNABinol 2.5 milliGRAM(s) Oral daily  fluticasone propionate 50 MICROgram(s)/spray Nasal Spray 1 Spray(s) Both Nostrils two times a day  glucagon  Injectable 1 milliGRAM(s) IntraMuscular once  insulin lispro (ADMELOG) corrective regimen sliding scale   SubCutaneous Before meals and at bedtime  methylPREDNISolone sodium succinate Injectable 40 milliGRAM(s) IV Push daily  metoprolol succinate ER 50 milliGRAM(s) Oral daily  pantoprazole    Tablet 40 milliGRAM(s) Oral before breakfast  polyethylene glycol 3350 17 Gram(s) Oral every 12 hours  senna 2 Tablet(s) Oral at bedtime    PRN MEDICATIONS  albuterol    90 MICROgram(s) HFA Inhaler 2 Puff(s) Inhalation every 6 hours PRN  dextrose Oral Gel 15 Gram(s) Oral once PRN  magnesium hydroxide Suspension 30 milliLiter(s) Oral daily PRN  zolpidem 10 milliGRAM(s) Oral at bedtime PRN      VITALS:   T(C): 36.1 (06-24-25 @ 08:49), Max: 36.6 (06-23-25 @ 20:00)  HR: 73 (06-24-25 @ 08:49) (64 - 79)  BP: 145/79 (06-24-25 @ 08:49) (106/62 - 145/79)  RR: 20 (06-24-25 @ 08:49) (20 - 20)  SpO2: 96% (06-24-25 @ 08:49) (92% - 97%)  I&O's Summary    23 Jun 2025 07:01  -  24 Jun 2025 07:00  --------------------------------------------------------  IN: 0 mL / OUT: 280 mL / NET: -280 mL          PHYSICAL EXAM:  GENERAL: lying in bed on HFNC, in no acute respiratory distress  NERVOUS SYSTEM:  Alert & Oriented X3,  motor and  sensory intact  CHEST/LUNG: Decreased breath sounds on left side; No rales, rhonchi, or wheezing  HEART: S1S2 normal, no S3, Regular rate and rhythm; No murmurs heard  ABDOMEN: Soft, Nontender, Nondistended; Bowel sounds present  EXTREMITIES:  2+ Peripheral Pulses, No edema  SKIN: No rashes or lesions    LABS:                        8.5    9.18  )-----------( 108      ( 24 Jun 2025 05:30 )             27.8     06-24    141  |  95[L]  |  55[H]  ----------------------------<  148[H]  4.4   |  38[H]  |  1.4    Ca    9.3      24 Jun 2025 05:30  Phos  3.5     06-24  Mg     2.5     06-24    TPro  5.0[L]  /  Alb  3.3[L]  /  TBili  0.6  /  DBili  x   /  AST  14  /  ALT  22  /  AlkPhos  92  06-24      Urinalysis Basic - ( 24 Jun 2025 05:30 )    Color: x / Appearance: x / SG: x / pH: x  Gluc: 148 mg/dL / Ketone: x  / Bili: x / Urobili: x   Blood: x / Protein: x / Nitrite: x   Leuk Esterase: x / RBC: x / WBC x   Sq Epi: x / Non Sq Epi: x / Bacteria: x                     24H events:    Today is hospital day 7d. This morning patient was seen and examined at bedside, resting comfortably in bed on HFNC 40:40. She had one episode overnight of coughing up specks of blood. No other acute or major events overnight. Morning CXR showed notable improvement.    PAST MEDICAL & SURGICAL HISTORY  Hypertension, unspecified type    Type 2 diabetes mellitus with complication, without long-term current use of insulin    Hyperlipidemia, unspecified hyperlipidemia type    Diverticulitis    Obesity    COPD (chronic obstructive pulmonary disease)    SOB (shortness of breath)    GERD (gastroesophageal reflux disease)    Lung cancer    ARTIE (obstructive sleep apnea)  NO CPAP MACHINE PER PT.    Cancer of kidney    Cancer of thyroid    Former smoker    NHL (non-Hodgkin's lymphoma)  "low grade" per heme/ onc note    History of abdominal aortic aneurysm (AAA)    Kidney stones    [place] (hard of hearing)    History of surgery  LEFT LOWER LOBECTOMY    History of surgery  BILATERAL KNEE REPLACEMENT    History of surgery  CATARACT RIGHT EYE    History of surgery  TUBAL LIGATION    History of surgery  CYST REMOVED  RIGHT BREAST    History of surgery  CHOLECYSTECOMY    History of surgery  RIGHT PARTIAL NEPHRECTOMY    History of surgery  BILATERAL CATARACT SURGERY    History of back surgery    H/O lithotripsy    H/O partial thyroidectomy        SOCIAL HISTORY:  Social History:      ALLERGIES:  No Known Allergies      MEDICATIONS:  STANDING MEDICATIONS  amLODIPine   Tablet 10 milliGRAM(s) Oral every 24 hours  apixaban 2.5 milliGRAM(s) Oral every 12 hours  aspirin enteric coated 81 milliGRAM(s) Oral daily  chlorhexidine 2% Cloths 1 Application(s) Topical <User Schedule>  dextrose 5%. 1000 milliLiter(s) IV Continuous <Continuous>  dextrose 5%. 1000 milliLiter(s) IV Continuous <Continuous>  dextrose 50% Injectable 25 Gram(s) IV Push once  dextrose 50% Injectable 12.5 Gram(s) IV Push once  dextrose 50% Injectable 25 Gram(s) IV Push once  droNABinol 2.5 milliGRAM(s) Oral daily  fluticasone propionate 50 MICROgram(s)/spray Nasal Spray 1 Spray(s) Both Nostrils two times a day  glucagon  Injectable 1 milliGRAM(s) IntraMuscular once  insulin lispro (ADMELOG) corrective regimen sliding scale   SubCutaneous Before meals and at bedtime  methylPREDNISolone sodium succinate Injectable 40 milliGRAM(s) IV Push daily  metoprolol succinate ER 50 milliGRAM(s) Oral daily  pantoprazole    Tablet 40 milliGRAM(s) Oral before breakfast  polyethylene glycol 3350 17 Gram(s) Oral every 12 hours  senna 2 Tablet(s) Oral at bedtime    PRN MEDICATIONS  albuterol    90 MICROgram(s) HFA Inhaler 2 Puff(s) Inhalation every 6 hours PRN  dextrose Oral Gel 15 Gram(s) Oral once PRN  magnesium hydroxide Suspension 30 milliLiter(s) Oral daily PRN  zolpidem 10 milliGRAM(s) Oral at bedtime PRN      VITALS:   T(C): 36.1 (06-24-25 @ 08:49), Max: 36.6 (06-23-25 @ 20:00)  HR: 73 (06-24-25 @ 08:49) (64 - 79)  BP: 145/79 (06-24-25 @ 08:49) (106/62 - 145/79)  RR: 20 (06-24-25 @ 08:49) (20 - 20)  SpO2: 96% (06-24-25 @ 08:49) (92% - 97%)  I&O's Summary    23 Jun 2025 07:01  -  24 Jun 2025 07:00  --------------------------------------------------------  IN: 0 mL / OUT: 280 mL / NET: -280 mL          PHYSICAL EXAM:  GENERAL: lying in bed on HFNC, in no acute respiratory distress  NERVOUS SYSTEM:  Alert & Oriented X3,  motor and  sensory intact  CHEST/LUNG: Decreased breath sounds on left side; No rales, rhonchi, or wheezing  HEART: S1S2 normal, no S3, Regular rate and rhythm; No murmurs heard  ABDOMEN: Soft, Nontender, Nondistended; Decreased bowel sounds   EXTREMITIES:  2+ Peripheral Pulses, No edema  SKIN: No rashes or lesions    LABS:                        8.5    9.18  )-----------( 108      ( 24 Jun 2025 05:30 )             27.8     06-24    141  |  95[L]  |  55[H]  ----------------------------<  148[H]  4.4   |  38[H]  |  1.4    Ca    9.3      24 Jun 2025 05:30  Phos  3.5     06-24  Mg     2.5     06-24    TPro  5.0[L]  /  Alb  3.3[L]  /  TBili  0.6  /  DBili  x   /  AST  14  /  ALT  22  /  AlkPhos  92  06-24      Urinalysis Basic - ( 24 Jun 2025 05:30 )    Color: x / Appearance: x / SG: x / pH: x  Gluc: 148 mg/dL / Ketone: x  / Bili: x / Urobili: x   Blood: x / Protein: x / Nitrite: x   Leuk Esterase: x / RBC: x / WBC x   Sq Epi: x / Non Sq Epi: x / Bacteria: x                     24H events:    Today is hospital day 7d. This morning patient was seen and examined at bedside, resting comfortably in bed on low-flow NC. She had one episode overnight of coughing up specks of blood. No other acute or major events overnight. Morning CXR showed notable improvement.    PAST MEDICAL & SURGICAL HISTORY  Hypertension, unspecified type    Type 2 diabetes mellitus with complication, without long-term current use of insulin    Hyperlipidemia, unspecified hyperlipidemia type    Diverticulitis    Obesity    COPD (chronic obstructive pulmonary disease)    SOB (shortness of breath)    GERD (gastroesophageal reflux disease)    Lung cancer    ARTIE (obstructive sleep apnea)  NO CPAP MACHINE PER PT.    Cancer of kidney    Cancer of thyroid    Former smoker    NHL (non-Hodgkin's lymphoma)  "low grade" per heme/ onc note    History of abdominal aortic aneurysm (AAA)    Kidney stones    [place] (hard of hearing)    History of surgery  LEFT LOWER LOBECTOMY    History of surgery  BILATERAL KNEE REPLACEMENT    History of surgery  CATARACT RIGHT EYE    History of surgery  TUBAL LIGATION    History of surgery  CYST REMOVED  RIGHT BREAST    History of surgery  CHOLECYSTECOMY    History of surgery  RIGHT PARTIAL NEPHRECTOMY    History of surgery  BILATERAL CATARACT SURGERY    History of back surgery    H/O lithotripsy    H/O partial thyroidectomy        SOCIAL HISTORY:  Social History:      ALLERGIES:  No Known Allergies      MEDICATIONS:  STANDING MEDICATIONS  amLODIPine   Tablet 10 milliGRAM(s) Oral every 24 hours  apixaban 2.5 milliGRAM(s) Oral every 12 hours  aspirin enteric coated 81 milliGRAM(s) Oral daily  chlorhexidine 2% Cloths 1 Application(s) Topical <User Schedule>  dextrose 5%. 1000 milliLiter(s) IV Continuous <Continuous>  dextrose 5%. 1000 milliLiter(s) IV Continuous <Continuous>  dextrose 50% Injectable 25 Gram(s) IV Push once  dextrose 50% Injectable 12.5 Gram(s) IV Push once  dextrose 50% Injectable 25 Gram(s) IV Push once  droNABinol 2.5 milliGRAM(s) Oral daily  fluticasone propionate 50 MICROgram(s)/spray Nasal Spray 1 Spray(s) Both Nostrils two times a day  glucagon  Injectable 1 milliGRAM(s) IntraMuscular once  insulin lispro (ADMELOG) corrective regimen sliding scale   SubCutaneous Before meals and at bedtime  methylPREDNISolone sodium succinate Injectable 40 milliGRAM(s) IV Push daily  metoprolol succinate ER 50 milliGRAM(s) Oral daily  pantoprazole    Tablet 40 milliGRAM(s) Oral before breakfast  polyethylene glycol 3350 17 Gram(s) Oral every 12 hours  senna 2 Tablet(s) Oral at bedtime    PRN MEDICATIONS  albuterol    90 MICROgram(s) HFA Inhaler 2 Puff(s) Inhalation every 6 hours PRN  dextrose Oral Gel 15 Gram(s) Oral once PRN  magnesium hydroxide Suspension 30 milliLiter(s) Oral daily PRN  zolpidem 10 milliGRAM(s) Oral at bedtime PRN      VITALS:   T(C): 36.1 (06-24-25 @ 08:49), Max: 36.6 (06-23-25 @ 20:00)  HR: 73 (06-24-25 @ 08:49) (64 - 79)  BP: 145/79 (06-24-25 @ 08:49) (106/62 - 145/79)  RR: 20 (06-24-25 @ 08:49) (20 - 20)  SpO2: 96% (06-24-25 @ 08:49) (92% - 97%)  I&O's Summary    23 Jun 2025 07:01  -  24 Jun 2025 07:00  --------------------------------------------------------  IN: 0 mL / OUT: 280 mL / NET: -280 mL          PHYSICAL EXAM:  GENERAL: lying in bed on low-flow NC, in no acute respiratory distress  NERVOUS SYSTEM:  Alert & Oriented X3,  motor and  sensory intact  CHEST/LUNG: Decreased breath sounds on left side; No rales, rhonchi, or wheezing  HEART: S1S2 normal, no S3, Regular rate and rhythm; No murmurs heard  ABDOMEN: Soft, Nontender, Nondistended; Decreased bowel sounds   EXTREMITIES:  2+ Peripheral Pulses, No edema  SKIN: No rashes or lesions    LABS:                        8.5    9.18  )-----------( 108      ( 24 Jun 2025 05:30 )             27.8     06-24    141  |  95[L]  |  55[H]  ----------------------------<  148[H]  4.4   |  38[H]  |  1.4    Ca    9.3      24 Jun 2025 05:30  Phos  3.5     06-24  Mg     2.5     06-24    TPro  5.0[L]  /  Alb  3.3[L]  /  TBili  0.6  /  DBili  x   /  AST  14  /  ALT  22  /  AlkPhos  92  06-24      Urinalysis Basic - ( 24 Jun 2025 05:30 )    Color: x / Appearance: x / SG: x / pH: x  Gluc: 148 mg/dL / Ketone: x  / Bili: x / Urobili: x   Blood: x / Protein: x / Nitrite: x   Leuk Esterase: x / RBC: x / WBC x   Sq Epi: x / Non Sq Epi: x / Bacteria: x                     24H events:    Today is hospital day 7d. This morning patient was seen and examined at bedside, resting comfortably in bed on low-flow NC. She was initially agitated upon waking. She had one episode overnight of coughing up specks of blood. No other acute or major events overnight. Morning CXR showed notable improvement.    PAST MEDICAL & SURGICAL HISTORY  Hypertension, unspecified type    Type 2 diabetes mellitus with complication, without long-term current use of insulin    Hyperlipidemia, unspecified hyperlipidemia type    Diverticulitis    Obesity    COPD (chronic obstructive pulmonary disease)    SOB (shortness of breath)    GERD (gastroesophageal reflux disease)    Lung cancer    ARTIE (obstructive sleep apnea)  NO CPAP MACHINE PER PT.    Cancer of kidney    Cancer of thyroid    Former smoker    NHL (non-Hodgkin's lymphoma)  "low grade" per heme/ onc note    History of abdominal aortic aneurysm (AAA)    Kidney stones    [place] (hard of hearing)    History of surgery  LEFT LOWER LOBECTOMY    History of surgery  BILATERAL KNEE REPLACEMENT    History of surgery  CATARACT RIGHT EYE    History of surgery  TUBAL LIGATION    History of surgery  CYST REMOVED  RIGHT BREAST    History of surgery  CHOLECYSTECOMY    History of surgery  RIGHT PARTIAL NEPHRECTOMY    History of surgery  BILATERAL CATARACT SURGERY    History of back surgery    H/O lithotripsy    H/O partial thyroidectomy        SOCIAL HISTORY:  Social History:      ALLERGIES:  No Known Allergies      MEDICATIONS:  STANDING MEDICATIONS  amLODIPine   Tablet 10 milliGRAM(s) Oral every 24 hours  apixaban 2.5 milliGRAM(s) Oral every 12 hours  aspirin enteric coated 81 milliGRAM(s) Oral daily  chlorhexidine 2% Cloths 1 Application(s) Topical <User Schedule>  dextrose 5%. 1000 milliLiter(s) IV Continuous <Continuous>  dextrose 5%. 1000 milliLiter(s) IV Continuous <Continuous>  dextrose 50% Injectable 25 Gram(s) IV Push once  dextrose 50% Injectable 12.5 Gram(s) IV Push once  dextrose 50% Injectable 25 Gram(s) IV Push once  droNABinol 2.5 milliGRAM(s) Oral daily  fluticasone propionate 50 MICROgram(s)/spray Nasal Spray 1 Spray(s) Both Nostrils two times a day  glucagon  Injectable 1 milliGRAM(s) IntraMuscular once  insulin lispro (ADMELOG) corrective regimen sliding scale   SubCutaneous Before meals and at bedtime  methylPREDNISolone sodium succinate Injectable 40 milliGRAM(s) IV Push daily  metoprolol succinate ER 50 milliGRAM(s) Oral daily  pantoprazole    Tablet 40 milliGRAM(s) Oral before breakfast  polyethylene glycol 3350 17 Gram(s) Oral every 12 hours  senna 2 Tablet(s) Oral at bedtime    PRN MEDICATIONS  albuterol    90 MICROgram(s) HFA Inhaler 2 Puff(s) Inhalation every 6 hours PRN  dextrose Oral Gel 15 Gram(s) Oral once PRN  magnesium hydroxide Suspension 30 milliLiter(s) Oral daily PRN  zolpidem 10 milliGRAM(s) Oral at bedtime PRN      VITALS:   T(C): 36.1 (06-24-25 @ 08:49), Max: 36.6 (06-23-25 @ 20:00)  HR: 73 (06-24-25 @ 08:49) (64 - 79)  BP: 145/79 (06-24-25 @ 08:49) (106/62 - 145/79)  RR: 20 (06-24-25 @ 08:49) (20 - 20)  SpO2: 96% (06-24-25 @ 08:49) (92% - 97%)  I&O's Summary    23 Jun 2025 07:01  -  24 Jun 2025 07:00  --------------------------------------------------------  IN: 0 mL / OUT: 280 mL / NET: -280 mL          PHYSICAL EXAM:  GENERAL: lying in bed on low-flow NC, in no acute respiratory distress  NERVOUS SYSTEM:  Alert & Oriented X3,  motor and  sensory intact  CHEST/LUNG: Decreased breath sounds on left side; No rales, rhonchi, or wheezing  HEART: S1S2 normal, no S3, Regular rate and rhythm; No murmurs heard  ABDOMEN: Soft, Nontender, Nondistended; Decreased bowel sounds   EXTREMITIES:  2+ Peripheral Pulses, No edema  SKIN: No rashes or lesions    LABS:                        8.5    9.18  )-----------( 108      ( 24 Jun 2025 05:30 )             27.8     06-24    141  |  95[L]  |  55[H]  ----------------------------<  148[H]  4.4   |  38[H]  |  1.4    Ca    9.3      24 Jun 2025 05:30  Phos  3.5     06-24  Mg     2.5     06-24    TPro  5.0[L]  /  Alb  3.3[L]  /  TBili  0.6  /  DBili  x   /  AST  14  /  ALT  22  /  AlkPhos  92  06-24      Urinalysis Basic - ( 24 Jun 2025 05:30 )    Color: x / Appearance: x / SG: x / pH: x  Gluc: 148 mg/dL / Ketone: x  / Bili: x / Urobili: x   Blood: x / Protein: x / Nitrite: x   Leuk Esterase: x / RBC: x / WBC x   Sq Epi: x / Non Sq Epi: x / Bacteria: x                     24H events:    Today is hospital day 7d. This morning patient was seen and examined at bedside, resting comfortably in bed on low-flow NC. She was initially agitated upon waking. She had one episode overnight of coughing up specks of blood. No other acute or major events overnight. Morning CXR showed notable improvement from yesterday.    PAST MEDICAL & SURGICAL HISTORY  Hypertension, unspecified type    Type 2 diabetes mellitus with complication, without long-term current use of insulin    Hyperlipidemia, unspecified hyperlipidemia type    Diverticulitis    Obesity    COPD (chronic obstructive pulmonary disease)    SOB (shortness of breath)    GERD (gastroesophageal reflux disease)    Lung cancer    ARTIE (obstructive sleep apnea)  NO CPAP MACHINE PER PT.    Cancer of kidney    Cancer of thyroid    Former smoker    NHL (non-Hodgkin's lymphoma)  "low grade" per heme/ onc note    History of abdominal aortic aneurysm (AAA)    Kidney stones    [place] (hard of hearing)    History of surgery  LEFT LOWER LOBECTOMY    History of surgery  BILATERAL KNEE REPLACEMENT    History of surgery  CATARACT RIGHT EYE    History of surgery  TUBAL LIGATION    History of surgery  CYST REMOVED  RIGHT BREAST    History of surgery  CHOLECYSTECOMY    History of surgery  RIGHT PARTIAL NEPHRECTOMY    History of surgery  BILATERAL CATARACT SURGERY    History of back surgery    H/O lithotripsy    H/O partial thyroidectomy        SOCIAL HISTORY:  Social History:      ALLERGIES:  No Known Allergies      MEDICATIONS:  STANDING MEDICATIONS  amLODIPine   Tablet 10 milliGRAM(s) Oral every 24 hours  apixaban 2.5 milliGRAM(s) Oral every 12 hours  aspirin enteric coated 81 milliGRAM(s) Oral daily  chlorhexidine 2% Cloths 1 Application(s) Topical <User Schedule>  dextrose 5%. 1000 milliLiter(s) IV Continuous <Continuous>  dextrose 5%. 1000 milliLiter(s) IV Continuous <Continuous>  dextrose 50% Injectable 25 Gram(s) IV Push once  dextrose 50% Injectable 12.5 Gram(s) IV Push once  dextrose 50% Injectable 25 Gram(s) IV Push once  droNABinol 2.5 milliGRAM(s) Oral daily  fluticasone propionate 50 MICROgram(s)/spray Nasal Spray 1 Spray(s) Both Nostrils two times a day  glucagon  Injectable 1 milliGRAM(s) IntraMuscular once  insulin lispro (ADMELOG) corrective regimen sliding scale   SubCutaneous Before meals and at bedtime  methylPREDNISolone sodium succinate Injectable 40 milliGRAM(s) IV Push daily  metoprolol succinate ER 50 milliGRAM(s) Oral daily  pantoprazole    Tablet 40 milliGRAM(s) Oral before breakfast  polyethylene glycol 3350 17 Gram(s) Oral every 12 hours  senna 2 Tablet(s) Oral at bedtime    PRN MEDICATIONS  albuterol    90 MICROgram(s) HFA Inhaler 2 Puff(s) Inhalation every 6 hours PRN  dextrose Oral Gel 15 Gram(s) Oral once PRN  magnesium hydroxide Suspension 30 milliLiter(s) Oral daily PRN  zolpidem 10 milliGRAM(s) Oral at bedtime PRN      VITALS:   T(C): 36.1 (06-24-25 @ 08:49), Max: 36.6 (06-23-25 @ 20:00)  HR: 73 (06-24-25 @ 08:49) (64 - 79)  BP: 145/79 (06-24-25 @ 08:49) (106/62 - 145/79)  RR: 20 (06-24-25 @ 08:49) (20 - 20)  SpO2: 96% (06-24-25 @ 08:49) (92% - 97%)  I&O's Summary    23 Jun 2025 07:01  -  24 Jun 2025 07:00  --------------------------------------------------------  IN: 0 mL / OUT: 280 mL / NET: -280 mL          PHYSICAL EXAM:  GENERAL: lying in bed on low-flow NC, in no acute respiratory distress  NERVOUS SYSTEM:  Alert & Oriented X3,  motor and  sensory intact  CHEST/LUNG: Decreased breath sounds on left side; No rales, rhonchi, or wheezing  HEART: S1S2 normal, no S3, Regular rate and rhythm; No murmurs heard  ABDOMEN: Soft, Nontender, Nondistended; Decreased bowel sounds   EXTREMITIES:  2+ Peripheral Pulses, No edema  SKIN: No rashes or lesions    LABS:                        8.5    9.18  )-----------( 108      ( 24 Jun 2025 05:30 )             27.8     06-24    141  |  95[L]  |  55[H]  ----------------------------<  148[H]  4.4   |  38[H]  |  1.4    Ca    9.3      24 Jun 2025 05:30  Phos  3.5     06-24  Mg     2.5     06-24    TPro  5.0[L]  /  Alb  3.3[L]  /  TBili  0.6  /  DBili  x   /  AST  14  /  ALT  22  /  AlkPhos  92  06-24      Urinalysis Basic - ( 24 Jun 2025 05:30 )    Color: x / Appearance: x / SG: x / pH: x  Gluc: 148 mg/dL / Ketone: x  / Bili: x / Urobili: x   Blood: x / Protein: x / Nitrite: x   Leuk Esterase: x / RBC: x / WBC x   Sq Epi: x / Non Sq Epi: x / Bacteria: x

## 2025-06-24 NOTE — PROGRESS NOTE ADULT - ASSESSMENT
IMPRESSION:    Acute hypercapnic respiratory failure   Acute hypoxemic respiratory failure  Pulmonary edema   ADRIA  Can't RO pneumonitis secondary to Tagrisso   HO COPD  HO small cell lung ca on Tagriso  HO left adenocarcinoma s/p LLL lobectomy  HO afib/PE on Eliquis     PLAN:    CNS:  Avoid sedation.  MSO4 PRN for comfort     HEENT: Oral care    PULMONARY:  HOB at 45 degrees.  Encourage NIV during sleep and PRN during the day.  Wean o2 as tolerated.  Continue home inhaler regimen.  Albuterol PRN.  Repeat CXR noted.  Solumedrol to 40 mg daily.  Incentive spirometry     CARDIOVASCULAR:  Bumex 2 mg QD.  ECHO Noted.  Continue volume contraction as tolerated       GI: GI prophylaxis.  Feeding when off NIV.  Bowel regimen     RENAL:  Follow up lytes.  Correct as needed,   Monitor UO     INFECTIOUS DISEASE: Follow up cultures, procalcitonin noted, MRSA negative, Monitor VS      HEMATOLOGICAL:  DVT prophylaxis. DIMER noted.  LE duplex negative.  Monitor CBC     ENDOCRINE:  Follow up FS.  Insulin protocol if needed.      MUSCULOSKELETAL: OOB as tolerated.  Off loading.  PT OT     SDU     GOC

## 2025-06-25 LAB
ALBUMIN SERPL ELPH-MCNC: 3.3 G/DL — LOW (ref 3.5–5.2)
ALP SERPL-CCNC: 82 U/L — SIGNIFICANT CHANGE UP (ref 30–115)
ALT FLD-CCNC: 19 U/L — SIGNIFICANT CHANGE UP (ref 0–41)
ANION GAP SERPL CALC-SCNC: 7 MMOL/L — SIGNIFICANT CHANGE UP (ref 7–14)
AST SERPL-CCNC: 13 U/L — SIGNIFICANT CHANGE UP (ref 0–41)
BASOPHILS # BLD AUTO: 0.01 K/UL — SIGNIFICANT CHANGE UP (ref 0–0.2)
BASOPHILS NFR BLD AUTO: 0.1 % — SIGNIFICANT CHANGE UP (ref 0–1)
BILIRUB SERPL-MCNC: 0.4 MG/DL — SIGNIFICANT CHANGE UP (ref 0.2–1.2)
BUN SERPL-MCNC: 59 MG/DL — HIGH (ref 10–20)
CALCIUM SERPL-MCNC: 9 MG/DL — SIGNIFICANT CHANGE UP (ref 8.4–10.5)
CHLORIDE SERPL-SCNC: 96 MMOL/L — LOW (ref 98–110)
CO2 SERPL-SCNC: 39 MMOL/L — HIGH (ref 17–32)
CREAT SERPL-MCNC: 1.2 MG/DL — SIGNIFICANT CHANGE UP (ref 0.7–1.5)
EGFR: 45 ML/MIN/1.73M2 — LOW
EGFR: 45 ML/MIN/1.73M2 — LOW
EOSINOPHIL # BLD AUTO: 0.09 K/UL — SIGNIFICANT CHANGE UP (ref 0–0.7)
EOSINOPHIL NFR BLD AUTO: 1.1 % — SIGNIFICANT CHANGE UP (ref 0–8)
GLUCOSE BLDC GLUCOMTR-MCNC: 121 MG/DL — HIGH (ref 70–99)
GLUCOSE SERPL-MCNC: 126 MG/DL — HIGH (ref 70–99)
HCT VFR BLD CALC: 25.8 % — LOW (ref 37–47)
HGB BLD-MCNC: 7.8 G/DL — LOW (ref 12–16)
IMM GRANULOCYTES NFR BLD AUTO: 0.5 % — HIGH (ref 0.1–0.3)
LYMPHOCYTES # BLD AUTO: 0.76 K/UL — LOW (ref 1.2–3.4)
LYMPHOCYTES # BLD AUTO: 9.1 % — LOW (ref 20.5–51.1)
MAGNESIUM SERPL-MCNC: 2.7 MG/DL — HIGH (ref 1.8–2.4)
MCHC RBC-ENTMCNC: 25.6 PG — LOW (ref 27–31)
MCHC RBC-ENTMCNC: 30.2 G/DL — LOW (ref 32–37)
MCV RBC AUTO: 84.6 FL — SIGNIFICANT CHANGE UP (ref 81–99)
MONOCYTES # BLD AUTO: 0.94 K/UL — HIGH (ref 0.1–0.6)
MONOCYTES NFR BLD AUTO: 11.3 % — HIGH (ref 1.7–9.3)
NEUTROPHILS # BLD AUTO: 6.48 K/UL — SIGNIFICANT CHANGE UP (ref 1.4–6.5)
NEUTROPHILS NFR BLD AUTO: 77.9 % — HIGH (ref 42.2–75.2)
NRBC BLD AUTO-RTO: 0 /100 WBCS — SIGNIFICANT CHANGE UP (ref 0–0)
PHOSPHATE SERPL-MCNC: 3.4 MG/DL — SIGNIFICANT CHANGE UP (ref 2.1–4.9)
PLATELET # BLD AUTO: 107 K/UL — LOW (ref 130–400)
PMV BLD: 12.7 FL — HIGH (ref 7.4–10.4)
POTASSIUM SERPL-MCNC: 3.9 MMOL/L — SIGNIFICANT CHANGE UP (ref 3.5–5)
POTASSIUM SERPL-SCNC: 3.9 MMOL/L — SIGNIFICANT CHANGE UP (ref 3.5–5)
PROT SERPL-MCNC: 4.7 G/DL — LOW (ref 6–8)
RBC # BLD: 3.05 M/UL — LOW (ref 4.2–5.4)
RBC # FLD: 15.5 % — HIGH (ref 11.5–14.5)
SODIUM SERPL-SCNC: 142 MMOL/L — SIGNIFICANT CHANGE UP (ref 135–146)
WBC # BLD: 8.32 K/UL — SIGNIFICANT CHANGE UP (ref 4.8–10.8)
WBC # FLD AUTO: 8.32 K/UL — SIGNIFICANT CHANGE UP (ref 4.8–10.8)

## 2025-06-25 PROCEDURE — 71045 X-RAY EXAM CHEST 1 VIEW: CPT | Mod: 26

## 2025-06-25 PROCEDURE — 99233 SBSQ HOSP IP/OBS HIGH 50: CPT

## 2025-06-25 PROCEDURE — 99231 SBSQ HOSP IP/OBS SF/LOW 25: CPT

## 2025-06-25 RX ORDER — PREDNISONE 20 MG/1
TABLET ORAL
Refills: 0 | Status: DISCONTINUED | OUTPATIENT
Start: 2025-06-25 | End: 2025-06-26

## 2025-06-25 RX ORDER — PREDNISONE 20 MG/1
40 TABLET ORAL DAILY
Refills: 0 | Status: DISCONTINUED | OUTPATIENT
Start: 2025-06-25 | End: 2025-06-26

## 2025-06-25 RX ORDER — ACETAMINOPHEN 500 MG/5ML
650 LIQUID (ML) ORAL ONCE
Refills: 0 | Status: COMPLETED | OUTPATIENT
Start: 2025-06-25 | End: 2025-06-25

## 2025-06-25 RX ADMIN — APIXABAN 2.5 MILLIGRAM(S): 2.5 TABLET, FILM COATED ORAL at 05:48

## 2025-06-25 RX ADMIN — INSULIN LISPRO 3 UNIT(S): 100 INJECTION, SOLUTION INTRAVENOUS; SUBCUTANEOUS at 08:53

## 2025-06-25 RX ADMIN — INSULIN GLARGINE-YFGN 10 UNIT(S): 100 INJECTION, SOLUTION SUBCUTANEOUS at 21:58

## 2025-06-25 RX ADMIN — Medication 2 MILLIGRAM(S): at 01:00

## 2025-06-25 RX ADMIN — LACTULOSE 10 GRAM(S): 10 SOLUTION ORAL at 12:40

## 2025-06-25 RX ADMIN — Medication 81 MILLIGRAM(S): at 12:40

## 2025-06-25 RX ADMIN — BUMETANIDE 2 MILLIGRAM(S): 1 TABLET ORAL at 05:48

## 2025-06-25 RX ADMIN — Medication 2 MILLIGRAM(S): at 05:59

## 2025-06-25 RX ADMIN — INSULIN LISPRO 3 UNIT(S): 100 INJECTION, SOLUTION INTRAVENOUS; SUBCUTANEOUS at 12:39

## 2025-06-25 RX ADMIN — Medication 1 APPLICATION(S): at 05:49

## 2025-06-25 RX ADMIN — INSULIN LISPRO 3 UNIT(S): 100 INJECTION, SOLUTION INTRAVENOUS; SUBCUTANEOUS at 17:26

## 2025-06-25 RX ADMIN — AMLODIPINE BESYLATE 10 MILLIGRAM(S): 10 TABLET ORAL at 12:40

## 2025-06-25 RX ADMIN — FLUTICASONE PROPIONATE 1 SPRAY(S): 50 SPRAY, METERED NASAL at 05:50

## 2025-06-25 RX ADMIN — FLUTICASONE PROPIONATE 1 SPRAY(S): 50 SPRAY, METERED NASAL at 17:26

## 2025-06-25 RX ADMIN — Medication 2 MILLIGRAM(S): at 03:00

## 2025-06-25 RX ADMIN — INSULIN LISPRO 10: 100 INJECTION, SOLUTION INTRAVENOUS; SUBCUTANEOUS at 21:58

## 2025-06-25 RX ADMIN — MAGNESIUM HYDROXIDE 30 MILLILITER(S): 400 SUSPENSION ORAL at 12:40

## 2025-06-25 RX ADMIN — Medication 10 MILLIGRAM(S): at 23:33

## 2025-06-25 RX ADMIN — Medication 650 MILLIGRAM(S): at 16:48

## 2025-06-25 RX ADMIN — METOPROLOL SUCCINATE 50 MILLIGRAM(S): 50 TABLET, EXTENDED RELEASE ORAL at 05:48

## 2025-06-25 RX ADMIN — METHYLPREDNISOLONE ACETATE 40 MILLIGRAM(S): 80 INJECTION, SUSPENSION INTRA-ARTICULAR; INTRALESIONAL; INTRAMUSCULAR; SOFT TISSUE at 05:47

## 2025-06-25 RX ADMIN — Medication 40 MILLIGRAM(S): at 08:52

## 2025-06-25 RX ADMIN — DRONABINOL 2.5 MILLIGRAM(S): 10 CAPSULE ORAL at 12:54

## 2025-06-25 RX ADMIN — POLYETHYLENE GLYCOL 3350 17 GRAM(S): 17 POWDER, FOR SOLUTION ORAL at 05:48

## 2025-06-25 RX ADMIN — APIXABAN 2.5 MILLIGRAM(S): 2.5 TABLET, FILM COATED ORAL at 17:27

## 2025-06-25 RX ADMIN — Medication 650 MILLIGRAM(S): at 15:48

## 2025-06-25 RX ADMIN — INSULIN LISPRO 6: 100 INJECTION, SOLUTION INTRAVENOUS; SUBCUTANEOUS at 12:38

## 2025-06-25 NOTE — PROGRESS NOTE ADULT - SUBJECTIVE AND OBJECTIVE BOX
Patient is a 84y old  Female who presents with a chief complaint of Shortness of Breath (24 Jun 2025 17:26)      Over Night Events:        ROS:     All ROS are negative except HPI         PHYSICAL EXAM    ICU Vital Signs Last 24 Hrs  T(C): 36.6 (25 Jun 2025 04:22), Max: 36.7 (24 Jun 2025 20:00)  T(F): 97.8 (25 Jun 2025 04:22), Max: 98.1 (24 Jun 2025 20:00)  HR: 75 (25 Jun 2025 04:22) (73 - 76)  BP: 125/74 (25 Jun 2025 04:22) (118/68 - 150/76)  BP(mean): 93 (24 Jun 2025 11:00) (93 - 105)  ABP: --  ABP(mean): --  RR: 19 (25 Jun 2025 04:22) (19 - 20)  SpO2: 99% (25 Jun 2025 04:22) (95% - 99%)    O2 Parameters below as of 25 Jun 2025 04:22  Patient On (Oxygen Delivery Method): nasal cannula  O2 Flow (L/min): 3          CONSTITUTIONAL:  NAD    ENT:   Airway patent,   Mouth with normal mucosa.   No thrush    EYES:   Pupils equal,   Round and reactive to light.    CARDIAC:   Normal rate,   Regular rhythm.    No edema    RESPIRATORY:   No wheezing  Bilateral BS  Normal chest expansion  Not tachypneic,  No use of accessory muscles    GASTROINTESTINAL:  Abdomen soft,   Non-tender,   No guarding,   + BS    MUSCULOSKELETAL:   Range of motion is not limited,  No clubbing, cyanosis    NEUROLOGICAL:   Alert and oriented   No motor  deficits.    SKIN:   Skin normal color for race,   Warm and dry and intact.   No evidence of rash.        06-24-25 @ 07:01  -  06-25-25 @ 07:00  --------------------------------------------------------  IN:    Oral Fluid: 200 mL  Total IN: 200 mL    OUT:    Voided (mL): 1100 mL  Total OUT: 1100 mL    Total NET: -900 mL          LABS:                            7.8    8.32  )-----------( 107      ( 25 Jun 2025 05:38 )             25.8                                               06-25    142  |  96[L]  |  59[H]  ----------------------------<  126[H]  3.9   |  39[H]  |  1.2    Ca    9.0      25 Jun 2025 05:38  Phos  3.4     06-25  Mg     2.7     06-25    TPro  4.7[L]  /  Alb  3.3[L]  /  TBili  0.4  /  DBili  x   /  AST  13  /  ALT  19  /  AlkPhos  82  06-25                                             Urinalysis Basic - ( 25 Jun 2025 05:38 )    Color: x / Appearance: x / SG: x / pH: x  Gluc: 126 mg/dL / Ketone: x  / Bili: x / Urobili: x   Blood: x / Protein: x / Nitrite: x   Leuk Esterase: x / RBC: x / WBC x   Sq Epi: x / Non Sq Epi: x / Bacteria: x                                                  LIVER FUNCTIONS - ( 25 Jun 2025 05:38 )  Alb: 3.3 g/dL / Pro: 4.7 g/dL / ALK PHOS: 82 U/L / ALT: 19 U/L / AST: 13 U/L / GGT: x                                                                                                                                       MEDICATIONS  (STANDING):  amLODIPine   Tablet 10 milliGRAM(s) Oral every 24 hours  apixaban 2.5 milliGRAM(s) Oral every 12 hours  aspirin enteric coated 81 milliGRAM(s) Oral daily  bumetanide Injectable 2 milliGRAM(s) IV Push daily  chlorhexidine 2% Cloths 1 Application(s) Topical <User Schedule>  dextrose 5%. 1000 milliLiter(s) (100 mL/Hr) IV Continuous <Continuous>  dextrose 5%. 1000 milliLiter(s) (50 mL/Hr) IV Continuous <Continuous>  dextrose 50% Injectable 25 Gram(s) IV Push once  dextrose 50% Injectable 12.5 Gram(s) IV Push once  dextrose 50% Injectable 25 Gram(s) IV Push once  droNABinol 2.5 milliGRAM(s) Oral daily  fluticasone propionate 50 MICROgram(s)/spray Nasal Spray 1 Spray(s) Both Nostrils two times a day  glucagon  Injectable 1 milliGRAM(s) IntraMuscular once  insulin glargine Injectable (LANTUS) 10 Unit(s) SubCutaneous at bedtime  insulin lispro (ADMELOG) corrective regimen sliding scale   SubCutaneous Before meals and at bedtime  insulin lispro Injectable (ADMELOG) 3 Unit(s) SubCutaneous three times a day before meals  lactulose Syrup 10 Gram(s) Oral daily  magnesium hydroxide Suspension 30 milliLiter(s) Oral daily  methylPREDNISolone sodium succinate Injectable 40 milliGRAM(s) IV Push daily  metoprolol succinate ER 50 milliGRAM(s) Oral daily  pantoprazole    Tablet 40 milliGRAM(s) Oral before breakfast  polyethylene glycol 3350 17 Gram(s) Oral every 12 hours  senna 2 Tablet(s) Oral at bedtime    MEDICATIONS  (PRN):  albuterol    90 MICROgram(s) HFA Inhaler 2 Puff(s) Inhalation every 6 hours PRN Bronchospasm  dextrose Oral Gel 15 Gram(s) Oral once PRN Blood Glucose LESS THAN 70 milliGRAM(s)/deciliter  zolpidem 10 milliGRAM(s) Oral at bedtime PRN Insomnia      New X-rays reviewed:                                                                                  ECHO    CXR interpreted by me:       Patient is a 84y old  Female who presents with a chief complaint of Shortness of Breath (24 Jun 2025 17:26)      Over Night Events: On LFNC 3L. Sund[place] overnight        ROS:     All ROS are negative except HPI         PHYSICAL EXAM    ICU Vital Signs Last 24 Hrs  T(C): 36.6 (25 Jun 2025 04:22), Max: 36.7 (24 Jun 2025 20:00)  T(F): 97.8 (25 Jun 2025 04:22), Max: 98.1 (24 Jun 2025 20:00)  HR: 75 (25 Jun 2025 04:22) (73 - 76)  BP: 125/74 (25 Jun 2025 04:22) (118/68 - 150/76)  BP(mean): 93 (24 Jun 2025 11:00) (93 - 105)  ABP: --  ABP(mean): --  RR: 19 (25 Jun 2025 04:22) (19 - 20)  SpO2: 99% (25 Jun 2025 04:22) (95% - 99%)    O2 Parameters below as of 25 Jun 2025 04:22  Patient On (Oxygen Delivery Method): nasal cannula  O2 Flow (L/min): 3          CONSTITUTIONAL:  Elderly    ENT:   Airway patent,   Mouth with normal mucosa.   No thrush    EYES:   Pupils equal,   Round and reactive to light.    CARDIAC:   Normal rate,   Regular rhythm.    No edema    RESPIRATORY:   No wheezing  Bilateral BS  Normal chest expansion  Not tachypneic,  No use of accessory muscles    GASTROINTESTINAL:  Abdomen soft,   Non-tender    MUSCULOSKELETAL:   Range of motion is not limited,  No clubbing, cyanosis    NEUROLOGICAL:   Alert and oriented   No motor  deficits.    SKIN:   Skin normal color for race,   Warm and dry and intact.   No evidence of rash.        06-24-25 @ 07:01  -  06-25-25 @ 07:00  --------------------------------------------------------  IN:    Oral Fluid: 200 mL  Total IN: 200 mL    OUT:    Voided (mL): 1100 mL  Total OUT: 1100 mL    Total NET: -900 mL          LABS:                            7.8    8.32  )-----------( 107      ( 25 Jun 2025 05:38 )             25.8                                               06-25    142  |  96[L]  |  59[H]  ----------------------------<  126[H]  3.9   |  39[H]  |  1.2    Ca    9.0      25 Jun 2025 05:38  Phos  3.4     06-25  Mg     2.7     06-25    TPro  4.7[L]  /  Alb  3.3[L]  /  TBili  0.4  /  DBili  x   /  AST  13  /  ALT  19  /  AlkPhos  82  06-25                                             Urinalysis Basic - ( 25 Jun 2025 05:38 )    Color: x / Appearance: x / SG: x / pH: x  Gluc: 126 mg/dL / Ketone: x  / Bili: x / Urobili: x   Blood: x / Protein: x / Nitrite: x   Leuk Esterase: x / RBC: x / WBC x   Sq Epi: x / Non Sq Epi: x / Bacteria: x                                                  LIVER FUNCTIONS - ( 25 Jun 2025 05:38 )  Alb: 3.3 g/dL / Pro: 4.7 g/dL / ALK PHOS: 82 U/L / ALT: 19 U/L / AST: 13 U/L / GGT: x                                                                                                                                       MEDICATIONS  (STANDING):  amLODIPine   Tablet 10 milliGRAM(s) Oral every 24 hours  apixaban 2.5 milliGRAM(s) Oral every 12 hours  aspirin enteric coated 81 milliGRAM(s) Oral daily  bumetanide Injectable 2 milliGRAM(s) IV Push daily  chlorhexidine 2% Cloths 1 Application(s) Topical <User Schedule>  dextrose 5%. 1000 milliLiter(s) (100 mL/Hr) IV Continuous <Continuous>  dextrose 5%. 1000 milliLiter(s) (50 mL/Hr) IV Continuous <Continuous>  dextrose 50% Injectable 25 Gram(s) IV Push once  dextrose 50% Injectable 12.5 Gram(s) IV Push once  dextrose 50% Injectable 25 Gram(s) IV Push once  droNABinol 2.5 milliGRAM(s) Oral daily  fluticasone propionate 50 MICROgram(s)/spray Nasal Spray 1 Spray(s) Both Nostrils two times a day  glucagon  Injectable 1 milliGRAM(s) IntraMuscular once  insulin glargine Injectable (LANTUS) 10 Unit(s) SubCutaneous at bedtime  insulin lispro (ADMELOG) corrective regimen sliding scale   SubCutaneous Before meals and at bedtime  insulin lispro Injectable (ADMELOG) 3 Unit(s) SubCutaneous three times a day before meals  lactulose Syrup 10 Gram(s) Oral daily  magnesium hydroxide Suspension 30 milliLiter(s) Oral daily  methylPREDNISolone sodium succinate Injectable 40 milliGRAM(s) IV Push daily  metoprolol succinate ER 50 milliGRAM(s) Oral daily  pantoprazole    Tablet 40 milliGRAM(s) Oral before breakfast  polyethylene glycol 3350 17 Gram(s) Oral every 12 hours  senna 2 Tablet(s) Oral at bedtime    MEDICATIONS  (PRN):  albuterol    90 MICROgram(s) HFA Inhaler 2 Puff(s) Inhalation every 6 hours PRN Bronchospasm  dextrose Oral Gel 15 Gram(s) Oral once PRN Blood Glucose LESS THAN 70 milliGRAM(s)/deciliter  zolpidem 10 milliGRAM(s) Oral at bedtime PRN Insomnia      New X-rays reviewed:                                                                                  ECHO    CXR interpreted by me:       Patient is a 84y old  Female who presents with a chief complaint of Shortness of Breath (24 Jun 2025 17:26)      Over Night Events: On LFNC 3L.  Off pressors.          ROS:     All ROS are negative except HPI         PHYSICAL EXAM    ICU Vital Signs Last 24 Hrs  T(C): 36.6 (25 Jun 2025 04:22), Max: 36.7 (24 Jun 2025 20:00)  T(F): 97.8 (25 Jun 2025 04:22), Max: 98.1 (24 Jun 2025 20:00)  HR: 75 (25 Jun 2025 04:22) (73 - 76)  BP: 125/74 (25 Jun 2025 04:22) (118/68 - 150/76)  BP(mean): 93 (24 Jun 2025 11:00) (93 - 105)  ABP: --  ABP(mean): --  RR: 19 (25 Jun 2025 04:22) (19 - 20)  SpO2: 99% (25 Jun 2025 04:22) (95% - 99%)    O2 Parameters below as of 25 Jun 2025 04:22  Patient On (Oxygen Delivery Method): nasal cannula  O2 Flow (L/min): 3          CONSTITUTIONAL:  Elderly    ENT:   Airway patent,   Mouth with normal mucosa.   No thrush    EYES:   Pupils equal,   Round and reactive to light.    CARDIAC:   Normal rate,   Regular rhythm.    No edema    RESPIRATORY:   No wheezing  Bilateral BS  Normal chest expansion  Not tachypneic,  No use of accessory muscles    GASTROINTESTINAL:  Abdomen soft,   Non-tender    MUSCULOSKELETAL:   Range of motion is not limited,  No clubbing, cyanosis    NEUROLOGICAL:   Alert and oriented   No motor  deficits.    SKIN:   Skin normal color for race,   Warm and dry  No evidence of rash.        06-24-25 @ 07:01  -  06-25-25 @ 07:00  --------------------------------------------------------  IN:    Oral Fluid: 200 mL  Total IN: 200 mL    OUT:    Voided (mL): 1100 mL  Total OUT: 1100 mL    Total NET: -900 mL          LABS:                            7.8    8.32  )-----------( 107      ( 25 Jun 2025 05:38 )             25.8                                               06-25    142  |  96[L]  |  59[H]  ----------------------------<  126[H]  3.9   |  39[H]  |  1.2    Ca    9.0      25 Jun 2025 05:38  Phos  3.4     06-25  Mg     2.7     06-25    TPro  4.7[L]  /  Alb  3.3[L]  /  TBili  0.4  /  DBili  x   /  AST  13  /  ALT  19  /  AlkPhos  82  06-25                                             Urinalysis Basic - ( 25 Jun 2025 05:38 )    Color: x / Appearance: x / SG: x / pH: x  Gluc: 126 mg/dL / Ketone: x  / Bili: x / Urobili: x   Blood: x / Protein: x / Nitrite: x   Leuk Esterase: x / RBC: x / WBC x   Sq Epi: x / Non Sq Epi: x / Bacteria: x                                                  LIVER FUNCTIONS - ( 25 Jun 2025 05:38 )  Alb: 3.3 g/dL / Pro: 4.7 g/dL / ALK PHOS: 82 U/L / ALT: 19 U/L / AST: 13 U/L / GGT: x                                                                                                                                       MEDICATIONS  (STANDING):  amLODIPine   Tablet 10 milliGRAM(s) Oral every 24 hours  apixaban 2.5 milliGRAM(s) Oral every 12 hours  aspirin enteric coated 81 milliGRAM(s) Oral daily  bumetanide Injectable 2 milliGRAM(s) IV Push daily  chlorhexidine 2% Cloths 1 Application(s) Topical <User Schedule>  dextrose 5%. 1000 milliLiter(s) (100 mL/Hr) IV Continuous <Continuous>  dextrose 5%. 1000 milliLiter(s) (50 mL/Hr) IV Continuous <Continuous>  dextrose 50% Injectable 25 Gram(s) IV Push once  dextrose 50% Injectable 12.5 Gram(s) IV Push once  dextrose 50% Injectable 25 Gram(s) IV Push once  droNABinol 2.5 milliGRAM(s) Oral daily  fluticasone propionate 50 MICROgram(s)/spray Nasal Spray 1 Spray(s) Both Nostrils two times a day  glucagon  Injectable 1 milliGRAM(s) IntraMuscular once  insulin glargine Injectable (LANTUS) 10 Unit(s) SubCutaneous at bedtime  insulin lispro (ADMELOG) corrective regimen sliding scale   SubCutaneous Before meals and at bedtime  insulin lispro Injectable (ADMELOG) 3 Unit(s) SubCutaneous three times a day before meals  lactulose Syrup 10 Gram(s) Oral daily  magnesium hydroxide Suspension 30 milliLiter(s) Oral daily  methylPREDNISolone sodium succinate Injectable 40 milliGRAM(s) IV Push daily  metoprolol succinate ER 50 milliGRAM(s) Oral daily  pantoprazole    Tablet 40 milliGRAM(s) Oral before breakfast  polyethylene glycol 3350 17 Gram(s) Oral every 12 hours  senna 2 Tablet(s) Oral at bedtime    MEDICATIONS  (PRN):  albuterol    90 MICROgram(s) HFA Inhaler 2 Puff(s) Inhalation every 6 hours PRN Bronchospasm  dextrose Oral Gel 15 Gram(s) Oral once PRN Blood Glucose LESS THAN 70 milliGRAM(s)/deciliter  zolpidem 10 milliGRAM(s) Oral at bedtime PRN Insomnia      New X-rays reviewed:                                                                                  ECHO

## 2025-06-25 NOTE — PROGRESS NOTE ADULT - TIME BILLING
time spent on review of labs, imaging studies, old records, obtaining history, personally examining patient, counselling and communicating with patient, entering orders for medications/tests/etc, discussions with other health care providers, documentation in electronic health records, independent interpretation of labs, imaging/procedure results and care coordination.    Time spent excludes teaching
time spent on review of labs, imaging studies, old records, obtaining history, personally examining patient, counselling and communicating with patient, entering orders for medications/tests/etc, discussions with other health care providers, documentation in electronic health records, independent interpretation of labs, imaging/procedure results and care coordination.    Time spent excludes teaching
Time above includes time spent preparing to see the patient, obtaining and/or reviewing separately obtained history, performing a medically appropriate examination and/or evaluation, counseling and educating the patient/family/caregiver, referring and communicating with other health care professionals (when not separately reported), documenting clinical information in the electronic or other health record, independently interpreting results (not separately reported) and communicating results to the patient/family/caregiver, and/or care coordination (not separately reported).

## 2025-06-25 NOTE — PROGRESS NOTE ADULT - ATTENDING COMMENTS
IMPRESSION:    Acute hypercapnic/hypoxic respiratory failure - resolved  Pulmonary edema   ADRIA  Can't RO pneumonitis secondary to Tagrisso   HO COPD  HO small cell lung ca on Tagriso  HO left adenocarcinoma s/p LLL lobectomy  HO afib/PE on Eliquis     Plan as outlined above

## 2025-06-25 NOTE — PROGRESS NOTE ADULT - SUBJECTIVE AND OBJECTIVE BOX
SUBJECTIVE:   FERMIN DIAZ (84y Female) was seen at bedside.     PHYSICAL EXAM:  GENERAL: NAD, frail with temporal muscle waisting   NECK : supple, no JVD   HEENT: Scleral clear  Pulmonary: decreased BS b/l   CV: S1, S2   Abdomen: nontender to palpation, bowel sounds are normal, nondistended  Extremities: no pedal edema    Vital Signs Last 24 Hrs  T(C): 36.8 (25 Jun 2025 15:05), Max: 36.8 (25 Jun 2025 15:05)  T(F): 98.3 (25 Jun 2025 15:05), Max: 98.3 (25 Jun 2025 15:05)  HR: 82 (25 Jun 2025 15:05) (75 - 82)  BP: 129/70 (25 Jun 2025 15:05) (113/77 - 150/76)  BP(mean): 90 (25 Jun 2025 15:05) (86 - 108)  RR: 18 (25 Jun 2025 15:05) (18 - 20)  SpO2: 93% (25 Jun 2025 15:05) (91% - 99%)    Parameters below as of 25 Jun 2025 15:05  Patient On (Oxygen Delivery Method): nasal cannula  O2 Flow (L/min): 4    CAPILLARY BLOOD GLUCOSE  POCT Blood Glucose.: 279 mg/dL (25 Jun 2025 11:38)  POCT Blood Glucose.: 121 mg/dL (25 Jun 2025 07:43)  POCT Blood Glucose.: 387 mg/dL (24 Jun 2025 21:24)    MEDICATIONS  (STANDING):  amLODIPine   Tablet 10 milliGRAM(s) Oral every 24 hours  apixaban 2.5 milliGRAM(s) Oral every 12 hours  aspirin enteric coated 81 milliGRAM(s) Oral daily  bumetanide Injectable 2 milliGRAM(s) IV Push daily  chlorhexidine 2% Cloths 1 Application(s) Topical <User Schedule>  dextrose 5%. 1000 milliLiter(s) (100 mL/Hr) IV Continuous <Continuous>  dextrose 5%. 1000 milliLiter(s) (50 mL/Hr) IV Continuous <Continuous>  dextrose 50% Injectable 25 Gram(s) IV Push once  dextrose 50% Injectable 12.5 Gram(s) IV Push once  dextrose 50% Injectable 25 Gram(s) IV Push once  droNABinol 2.5 milliGRAM(s) Oral daily  fluticasone propionate 50 MICROgram(s)/spray Nasal Spray 1 Spray(s) Both Nostrils two times a day  glucagon  Injectable 1 milliGRAM(s) IntraMuscular once  insulin glargine Injectable (LANTUS) 10 Unit(s) SubCutaneous at bedtime  insulin lispro (ADMELOG) corrective regimen sliding scale   SubCutaneous Before meals and at bedtime  insulin lispro Injectable (ADMELOG) 3 Unit(s) SubCutaneous three times a day before meals  lactulose Syrup 10 Gram(s) Oral daily  magnesium hydroxide Suspension 30 milliLiter(s) Oral daily  metoprolol succinate ER 50 milliGRAM(s) Oral daily  pantoprazole    Tablet 40 milliGRAM(s) Oral before breakfast  polyethylene glycol 3350 17 Gram(s) Oral every 12 hours  predniSONE   Tablet   Oral   predniSONE   Tablet 40 milliGRAM(s) Oral daily  senna 2 Tablet(s) Oral at bedtime    MEDICATIONS  (PRN):  albuterol    90 MICROgram(s) HFA Inhaler 2 Puff(s) Inhalation every 6 hours PRN Bronchospasm  dextrose Oral Gel 15 Gram(s) Oral once PRN Blood Glucose LESS THAN 70 milliGRAM(s)/deciliter  zolpidem 10 milliGRAM(s) Oral at bedtime PRN Insomnia

## 2025-06-25 NOTE — CHART NOTE - NSCHARTNOTEFT_GEN_A_CORE
Transfer Note    Transfer from:  Transfer to:   Accepting physican:    HPI:    COURSE:          ASSESSMENT & PLAN:         PENDINGS:          Vital Signs Last 24 Hrs  T(C): 36 (25 Jun 2025 14:18), Max: 36.7 (24 Jun 2025 20:00)  T(F): 96.8 (25 Jun 2025 14:18), Max: 98.1 (24 Jun 2025 20:00)  HR: 78 (25 Jun 2025 14:18) (73 - 78)  BP: 115/67 (25 Jun 2025 14:18) (113/77 - 150/76)  BP(mean): 86 (25 Jun 2025 14:18) (86 - 108)  RR: 18 (25 Jun 2025 14:18) (18 - 20)  SpO2: 97% (25 Jun 2025 14:18) (91% - 99%)    Parameters below as of 25 Jun 2025 14:18  Patient On (Oxygen Delivery Method): nasal cannula      I&O's Summary    24 Jun 2025 07:01  -  25 Jun 2025 07:00  --------------------------------------------------------  IN: 200 mL / OUT: 1100 mL / NET: -900 mL    25 Jun 2025 07:01  -  25 Jun 2025 14:55  --------------------------------------------------------  IN: 0 mL / OUT: 600 mL / NET: -600 mL          MEDICATIONS  (STANDING):  amLODIPine   Tablet 10 milliGRAM(s) Oral every 24 hours  apixaban 2.5 milliGRAM(s) Oral every 12 hours  aspirin enteric coated 81 milliGRAM(s) Oral daily  bumetanide Injectable 2 milliGRAM(s) IV Push daily  chlorhexidine 2% Cloths 1 Application(s) Topical <User Schedule>  dextrose 5%. 1000 milliLiter(s) (100 mL/Hr) IV Continuous <Continuous>  dextrose 5%. 1000 milliLiter(s) (50 mL/Hr) IV Continuous <Continuous>  dextrose 50% Injectable 25 Gram(s) IV Push once  dextrose 50% Injectable 12.5 Gram(s) IV Push once  dextrose 50% Injectable 25 Gram(s) IV Push once  droNABinol 2.5 milliGRAM(s) Oral daily  fluticasone propionate 50 MICROgram(s)/spray Nasal Spray 1 Spray(s) Both Nostrils two times a day  glucagon  Injectable 1 milliGRAM(s) IntraMuscular once  insulin glargine Injectable (LANTUS) 10 Unit(s) SubCutaneous at bedtime  insulin lispro (ADMELOG) corrective regimen sliding scale   SubCutaneous Before meals and at bedtime  insulin lispro Injectable (ADMELOG) 3 Unit(s) SubCutaneous three times a day before meals  lactulose Syrup 10 Gram(s) Oral daily  magnesium hydroxide Suspension 30 milliLiter(s) Oral daily  metoprolol succinate ER 50 milliGRAM(s) Oral daily  pantoprazole    Tablet 40 milliGRAM(s) Oral before breakfast  polyethylene glycol 3350 17 Gram(s) Oral every 12 hours  predniSONE   Tablet   Oral   predniSONE   Tablet 40 milliGRAM(s) Oral daily  senna 2 Tablet(s) Oral at bedtime    MEDICATIONS  (PRN):  albuterol    90 MICROgram(s) HFA Inhaler 2 Puff(s) Inhalation every 6 hours PRN Bronchospasm  dextrose Oral Gel 15 Gram(s) Oral once PRN Blood Glucose LESS THAN 70 milliGRAM(s)/deciliter  zolpidem 10 milliGRAM(s) Oral at bedtime PRN Insomnia        LABS                                            7.8                   Neurophils% (auto):   x      (06-25 @ 05:38):    8.32 )-----------(107          Lymphocytes% (auto):  x                                             25.8                   Eosinphils% (auto):   x        Manual%: Neutrophils x    ; Lymphocytes x    ; Eosinophils x    ; Bands%: x    ; Blasts x                                    142    |  96     |  59                  Calcium: 9.0   / iCa: x      (06-25 @ 05:38)    ----------------------------<  126       Magnesium: 2.7                              3.9     |  39     |  1.2              Phosphorous: 3.4      TPro  4.7    /  Alb  3.3    /  TBili  0.4    /  DBili  x      /  AST  13     /  ALT  19     /  AlkPhos  82     25 Jun 2025 05:38 Transfer Note    Transfer from: SDU  Transfer to: med-surg    HPI:  84-year-old female with past medical history of small cell lung cancer on tagrisso in remission, 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    Patient has been decompensating since April after she underwent AAA stenting ( Has some renal artery leak). Was sent to nursing home. Was diagnosed with Ecoli/Pseudomonas UTIi treated with levaquin . Recently was having Shortness of breath/ cough ; treated with augmentin,. they checked theBNP 4199 ; followed Tye De Dios ; was started on Torsemide 20 mg other day.   Since yesterday had increased shortness of breath and anxiety. Nursing home gave her lasix which improved her breathing status but worsened since the morning  so was sent to the hospital. No fever, chills or chest pain.      COURSE:        Vital Signs Last 24 Hrs  T(C): 36 (25 Jun 2025 14:18), Max: 36.7 (24 Jun 2025 20:00)  T(F): 96.8 (25 Jun 2025 14:18), Max: 98.1 (24 Jun 2025 20:00)  HR: 78 (25 Jun 2025 14:18) (73 - 78)  BP: 115/67 (25 Jun 2025 14:18) (113/77 - 150/76)  BP(mean): 86 (25 Jun 2025 14:18) (86 - 108)  RR: 18 (25 Jun 2025 14:18) (18 - 20)  SpO2: 97% (25 Jun 2025 14:18) (91% - 99%)    Parameters below as of 25 Jun 2025 14:18  Patient On (Oxygen Delivery Method): nasal cannula      I&O's Summary    24 Jun 2025 07:01  -  25 Jun 2025 07:00  --------------------------------------------------------  IN: 200 mL / OUT: 1100 mL / NET: -900 mL    25 Jun 2025 07:01  -  25 Jun 2025 14:55  --------------------------------------------------------  IN: 0 mL / OUT: 600 mL / NET: -600 mL          MEDICATIONS  (STANDING):  amLODIPine   Tablet 10 milliGRAM(s) Oral every 24 hours  apixaban 2.5 milliGRAM(s) Oral every 12 hours  aspirin enteric coated 81 milliGRAM(s) Oral daily  bumetanide Injectable 2 milliGRAM(s) IV Push daily  chlorhexidine 2% Cloths 1 Application(s) Topical <User Schedule>  dextrose 5%. 1000 milliLiter(s) (100 mL/Hr) IV Continuous <Continuous>  dextrose 5%. 1000 milliLiter(s) (50 mL/Hr) IV Continuous <Continuous>  dextrose 50% Injectable 25 Gram(s) IV Push once  dextrose 50% Injectable 12.5 Gram(s) IV Push once  dextrose 50% Injectable 25 Gram(s) IV Push once  droNABinol 2.5 milliGRAM(s) Oral daily  fluticasone propionate 50 MICROgram(s)/spray Nasal Spray 1 Spray(s) Both Nostrils two times a day  glucagon  Injectable 1 milliGRAM(s) IntraMuscular once  insulin glargine Injectable (LANTUS) 10 Unit(s) SubCutaneous at bedtime  insulin lispro (ADMELOG) corrective regimen sliding scale   SubCutaneous Before meals and at bedtime  insulin lispro Injectable (ADMELOG) 3 Unit(s) SubCutaneous three times a day before meals  lactulose Syrup 10 Gram(s) Oral daily  magnesium hydroxide Suspension 30 milliLiter(s) Oral daily  metoprolol succinate ER 50 milliGRAM(s) Oral daily  pantoprazole    Tablet 40 milliGRAM(s) Oral before breakfast  polyethylene glycol 3350 17 Gram(s) Oral every 12 hours  predniSONE   Tablet   Oral   predniSONE   Tablet 40 milliGRAM(s) Oral daily  senna 2 Tablet(s) Oral at bedtime    MEDICATIONS  (PRN):  albuterol    90 MICROgram(s) HFA Inhaler 2 Puff(s) Inhalation every 6 hours PRN Bronchospasm  dextrose Oral Gel 15 Gram(s) Oral once PRN Blood Glucose LESS THAN 70 milliGRAM(s)/deciliter  zolpidem 10 milliGRAM(s) Oral at bedtime PRN Insomnia        LABS                                            7.8                   Neurophils% (auto):   x      (06-25 @ 05:38):    8.32 )-----------(107          Lymphocytes% (auto):  x                                             25.8                   Eosinphils% (auto):   x        Manual%: Neutrophils x    ; Lymphocytes x    ; Eosinophils x    ; Bands%: x    ; Blasts x                                    142    |  96     |  59                  Calcium: 9.0   / iCa: x      (06-25 @ 05:38)    ----------------------------<  126       Magnesium: 2.7                              3.9     |  39     |  1.2              Phosphorous: 3.4      TPro  4.7    /  Alb  3.3    /  TBili  0.4    /  DBili  x      /  AST  13     /  ALT  19     /  AlkPhos  82     25 Jun 2025 05:38        ASSESSMENT & PLAN:     Patient is a 85 yo female with a history of left lung adenocarcinoma s/p LLL lobectomy on Tagrisso in remission, PE, AFIVB, COPD on home O2, CKD, diabetes, HTN, HLD, AAA s/p stent placement who presented at the ED for worsening SOB. She was later admitted to the ICU for AHRF.    #Acute hypercapnic respiratory failure  #Acute hypoxemic respiratory failure  #CHF exacerbation  #Hx COPD  #Hx left lung adenocarcinoma s/p LLL lobectomy on Tagrisso  - BNP 3745, initial BNP 6050  - TTE: EF 67%, mild LVH, severe LA dilation, moderate MR and TR, pulmonary HTN  - CXR 6/23: Increased left lung opacities. Left pleural effusion. Stable right-sided opacities.   - CXR 6/24: Decreased bilateral lung opacities, and decreased left pleural effusion.  - Bumex once daily  - Pulmonary following - recommended HOB 45 degrees, wean O2, keep on low-flow NC today and overnight, continue home inhaler regimen, albuterol PRN, repeat CXR, solumedrol 40 mg daily, incentive spirometry    #ADRIA  - recent Cr 1.4, baseline CR 1.3  - US kidney/bladder 6/17 - resolution of hydronephrosis from 5/2025  - trend BMP    #Constipation   - c/w miralax, senna    #Thrombocytopenia  - improved, continue holding tagrisso as per heme/onc    #AFIB  #Hx PE  #HTN  #Diabetes  #HLD  #AAA s/p stent   - c/w continue eliquis, aspirin, amlodipine, metoprolol succinate, sliding scale insulin lispro     #MISC  -DVT ppx: eliquis   -GI ppx: protonix  -DIET: DASH/TLC  -Activity: AAT  -Code Status: Full   -DIspo: SDU    -Pending: keep on low-flow NC and check for improvement, PT eval      -Pending: Transfer Note    Transfer from: SDU  Transfer to: med-surg    HPI:  84-year-old female with past medical history of small cell lung cancer on tagrisso in remission, ho left lung adenocarcinoma s/p LLL lobectomy, PE on Eliquis, Atrial fibrillation ; COPD on home O2, AAA, CKD ( Baseline GFR 40s; creatinine 1.3 )  diabetes, hypertension, hyperlipidemia, AAA s/p stent diagnosis of pneumonia UTI status post Augmentin and Levaquin presents for evaluation of shortness of breath acutely worsening this morning. Patient has been decompensating since April after she underwent AAA stenting ( Has some renal artery leak). Was sent to nursing home. Was recently diagnosed with Ecoli/Pseudomonas UTIi treated with levaquin . Recently was having Shortness of breath/ cough ; treated with augmentin,. they checked theBNP 4199 ; followed Tye De Dios ; was started on Torsemide 20 mg other day. Nursing home gave her lasix which improved her breathing status but worsened since the morning  so was sent to the hospital. Denies any fever, chills or chest pain.      In the ED:   Vitals: /79;  ; RR 25 ; afebrile ; 92 % on NRB     Labs:   WBC 6.42 Hb 8.8 Platelet 99K Neutro 83.2   Na 139 K 5.0   BUN/Creatinine 52/1.5   Pro BNP 6050     VBG ; Initial 7.29 CO2 60 HCO3 29 Lactate 1.4   Repeat pH 7.27 Co2 63 HCo3 29 lactate 1.7     Ua negative     US renal: mild bilateral hydronephrosis ; 0.5 non obstructive calculus     Chest Xray : Bilateral patchy opacities and left pleural effusion.    admitted to ICU for acute hypercapnic hypoxemic  resp failure         COURSE:        Vital Signs Last 24 Hrs  T(C): 36 (25 Jun 2025 14:18), Max: 36.7 (24 Jun 2025 20:00)  T(F): 96.8 (25 Jun 2025 14:18), Max: 98.1 (24 Jun 2025 20:00)  HR: 78 (25 Jun 2025 14:18) (73 - 78)  BP: 115/67 (25 Jun 2025 14:18) (113/77 - 150/76)  BP(mean): 86 (25 Jun 2025 14:18) (86 - 108)  RR: 18 (25 Jun 2025 14:18) (18 - 20)  SpO2: 97% (25 Jun 2025 14:18) (91% - 99%)    Parameters below as of 25 Jun 2025 14:18  Patient On (Oxygen Delivery Method): nasal cannula      I&O's Summary    24 Jun 2025 07:01  -  25 Jun 2025 07:00  --------------------------------------------------------  IN: 200 mL / OUT: 1100 mL / NET: -900 mL    25 Jun 2025 07:01  -  25 Jun 2025 14:55  --------------------------------------------------------  IN: 0 mL / OUT: 600 mL / NET: -600 mL          MEDICATIONS  (STANDING):  amLODIPine   Tablet 10 milliGRAM(s) Oral every 24 hours  apixaban 2.5 milliGRAM(s) Oral every 12 hours  aspirin enteric coated 81 milliGRAM(s) Oral daily  bumetanide Injectable 2 milliGRAM(s) IV Push daily  chlorhexidine 2% Cloths 1 Application(s) Topical <User Schedule>  dextrose 5%. 1000 milliLiter(s) (100 mL/Hr) IV Continuous <Continuous>  dextrose 5%. 1000 milliLiter(s) (50 mL/Hr) IV Continuous <Continuous>  dextrose 50% Injectable 25 Gram(s) IV Push once  dextrose 50% Injectable 12.5 Gram(s) IV Push once  dextrose 50% Injectable 25 Gram(s) IV Push once  droNABinol 2.5 milliGRAM(s) Oral daily  fluticasone propionate 50 MICROgram(s)/spray Nasal Spray 1 Spray(s) Both Nostrils two times a day  glucagon  Injectable 1 milliGRAM(s) IntraMuscular once  insulin glargine Injectable (LANTUS) 10 Unit(s) SubCutaneous at bedtime  insulin lispro (ADMELOG) corrective regimen sliding scale   SubCutaneous Before meals and at bedtime  insulin lispro Injectable (ADMELOG) 3 Unit(s) SubCutaneous three times a day before meals  lactulose Syrup 10 Gram(s) Oral daily  magnesium hydroxide Suspension 30 milliLiter(s) Oral daily  metoprolol succinate ER 50 milliGRAM(s) Oral daily  pantoprazole    Tablet 40 milliGRAM(s) Oral before breakfast  polyethylene glycol 3350 17 Gram(s) Oral every 12 hours  predniSONE   Tablet   Oral   predniSONE   Tablet 40 milliGRAM(s) Oral daily  senna 2 Tablet(s) Oral at bedtime    MEDICATIONS  (PRN):  albuterol    90 MICROgram(s) HFA Inhaler 2 Puff(s) Inhalation every 6 hours PRN Bronchospasm  dextrose Oral Gel 15 Gram(s) Oral once PRN Blood Glucose LESS THAN 70 milliGRAM(s)/deciliter  zolpidem 10 milliGRAM(s) Oral at bedtime PRN Insomnia        LABS                                            7.8                   Neurophils% (auto):   x      (06-25 @ 05:38):    8.32 )-----------(107          Lymphocytes% (auto):  x                                             25.8                   Eosinphils% (auto):   x        Manual%: Neutrophils x    ; Lymphocytes x    ; Eosinophils x    ; Bands%: x    ; Blasts x                                    142    |  96     |  59                  Calcium: 9.0   / iCa: x      (06-25 @ 05:38)    ----------------------------<  126       Magnesium: 2.7                              3.9     |  39     |  1.2              Phosphorous: 3.4      TPro  4.7    /  Alb  3.3    /  TBili  0.4    /  DBili  x      /  AST  13     /  ALT  19     /  AlkPhos  82     25 Jun 2025 05:38        ASSESSMENT & PLAN:     Patient is a 85 yo female with a history of left lung adenocarcinoma s/p LLL lobectomy on Tagrisso in remission, PE, AFIVB, COPD on home O2, CKD, diabetes, HTN, HLD, AAA s/p stent placement who presented at the ED for worsening SOB. She was later admitted to the ICU for AHRF.    #Acute hypercapnic respiratory failure  #Acute hypoxemic respiratory failure  #CHF exacerbation  #Hx COPD  #Hx left lung adenocarcinoma s/p LLL lobectomy on Tagrisso  - BNP 3745, initial BNP 6050  - TTE: EF 67%, mild LVH, severe LA dilation, moderate MR and TR, pulmonary HTN  - CXR 6/23: Increased left lung opacities. Left pleural effusion. Stable right-sided opacities.   - CXR 6/24: Decreased bilateral lung opacities, and decreased left pleural effusion.  - Bumex once daily  - Pulmonary following - recommended HOB 45 degrees, wean O2, encourage NIV during sleep and prn during day, continue home inhaler regimen, albuterol PRN, repeat CXR, prednisone taper over few weeks, incentive spirometry    #ADRIA  - recent Cr 1.4, baseline CR 1.3  - US kidney/bladder 6/17 - resolution of hydronephrosis from 5/2025  - trend BMP    #Constipation   - c/w miralax, senna    #Thrombocytopenia  - improved, continue holding tagrisso as per heme/onc    #AFIB  #Hx PE  #HTN  #Diabetes  #HLD  #AAA s/p stent   - c/w continue eliquis, aspirin, amlodipine, metoprolol succinate, sliding scale insulin lispro     #MISC  -DVT ppx: eliquis   -GI ppx: protonix  -DIET: DASH/TLC  -Activity: AAT  -Code Status: Full   -DIspo: SDU    -Pending: keep on low-flow NC and check for improvement, PT eval Transfer Note    Transfer from: SDU  Transfer to: med-surg    HPI:  84-year-old female with past medical history of left lung adenocarcinoma s/p LLL lobectomy on tagrisso in remission, PE on Eliquis, Atrial fibrillation ; COPD on home O2, AAA, CKD ( Baseline GFR 40s; creatinine 1.3 )  diabetes, hypertension, hyperlipidemia, AAA s/p stent diagnosis of pneumonia UTI status post Augmentin and Levaquin presents for evaluation of shortness of breath acutely worsening this morning. Patient has been decompensating since April after she underwent AAA stenting ( Has some renal artery leak). Was sent to nursing home. Was recently diagnosed with Ecoli/Pseudomonas UTIi treated with levaquin . Recently was having Shortness of breath/ cough ; treated with augmentin,. they checked theBNP 4199 ; followed Tye De Dios ; was started on Torsemide 20 mg other day. Nursing home gave her lasix which improved her breathing status but worsened since the morning  so was sent to the hospital. Denies any fever, chills or chest pain.      In the ED:   Vitals: /79;  ; RR 25 ; afebrile ; 92 % on NRB     Labs:   WBC 6.42 Hb 8.8 Platelet 99K Neutro 83.2   Na 139 K 5.0   BUN/Creatinine 52/1.5   Pro BNP 6050     VBG ; Initial 7.29 CO2 60 HCO3 29 Lactate 1.4   Repeat pH 7.27 Co2 63 HCo3 29 lactate 1.7     Ua negative     US renal: mild bilateral hydronephrosis ; 0.5 non obstructive calculus     Chest Xray : Bilateral patchy opacities and left pleural effusion.    admitted to ICU for acute hypercapnic hypoxemic  resp failure         COURSE:  Patient was initially on HFNC and gradually weaned to NC 3L today. She is comfortable and reports her SOB has greatly improved but does still report constipation unaffected by current regimen. No acute or major events overnight.      Vital Signs Last 24 Hrs  T(C): 36 (25 Jun 2025 14:18), Max: 36.7 (24 Jun 2025 20:00)  T(F): 96.8 (25 Jun 2025 14:18), Max: 98.1 (24 Jun 2025 20:00)  HR: 78 (25 Jun 2025 14:18) (73 - 78)  BP: 115/67 (25 Jun 2025 14:18) (113/77 - 150/76)  BP(mean): 86 (25 Jun 2025 14:18) (86 - 108)  RR: 18 (25 Jun 2025 14:18) (18 - 20)  SpO2: 97% (25 Jun 2025 14:18) (91% - 99%)    Parameters below as of 25 Jun 2025 14:18  Patient On (Oxygen Delivery Method): nasal cannula      I&O's Summary    24 Jun 2025 07:01  -  25 Jun 2025 07:00  --------------------------------------------------------  IN: 200 mL / OUT: 1100 mL / NET: -900 mL    25 Jun 2025 07:01  -  25 Jun 2025 14:55  --------------------------------------------------------  IN: 0 mL / OUT: 600 mL / NET: -600 mL          MEDICATIONS  (STANDING):  amLODIPine   Tablet 10 milliGRAM(s) Oral every 24 hours  apixaban 2.5 milliGRAM(s) Oral every 12 hours  aspirin enteric coated 81 milliGRAM(s) Oral daily  bumetanide Injectable 2 milliGRAM(s) IV Push daily  chlorhexidine 2% Cloths 1 Application(s) Topical <User Schedule>  dextrose 5%. 1000 milliLiter(s) (100 mL/Hr) IV Continuous <Continuous>  dextrose 5%. 1000 milliLiter(s) (50 mL/Hr) IV Continuous <Continuous>  dextrose 50% Injectable 25 Gram(s) IV Push once  dextrose 50% Injectable 12.5 Gram(s) IV Push once  dextrose 50% Injectable 25 Gram(s) IV Push once  droNABinol 2.5 milliGRAM(s) Oral daily  fluticasone propionate 50 MICROgram(s)/spray Nasal Spray 1 Spray(s) Both Nostrils two times a day  glucagon  Injectable 1 milliGRAM(s) IntraMuscular once  insulin glargine Injectable (LANTUS) 10 Unit(s) SubCutaneous at bedtime  insulin lispro (ADMELOG) corrective regimen sliding scale   SubCutaneous Before meals and at bedtime  insulin lispro Injectable (ADMELOG) 3 Unit(s) SubCutaneous three times a day before meals  lactulose Syrup 10 Gram(s) Oral daily  magnesium hydroxide Suspension 30 milliLiter(s) Oral daily  metoprolol succinate ER 50 milliGRAM(s) Oral daily  pantoprazole    Tablet 40 milliGRAM(s) Oral before breakfast  polyethylene glycol 3350 17 Gram(s) Oral every 12 hours  predniSONE   Tablet   Oral   predniSONE   Tablet 40 milliGRAM(s) Oral daily  senna 2 Tablet(s) Oral at bedtime    MEDICATIONS  (PRN):  albuterol    90 MICROgram(s) HFA Inhaler 2 Puff(s) Inhalation every 6 hours PRN Bronchospasm  dextrose Oral Gel 15 Gram(s) Oral once PRN Blood Glucose LESS THAN 70 milliGRAM(s)/deciliter  zolpidem 10 milliGRAM(s) Oral at bedtime PRN Insomnia        LABS                                            7.8                   Neurophils% (auto):   x      (06-25 @ 05:38):    8.32 )-----------(107          Lymphocytes% (auto):  x                                             25.8                   Eosinphils% (auto):   x        Manual%: Neutrophils x    ; Lymphocytes x    ; Eosinophils x    ; Bands%: x    ; Blasts x                                    142    |  96     |  59                  Calcium: 9.0   / iCa: x      (06-25 @ 05:38)    ----------------------------<  126       Magnesium: 2.7                              3.9     |  39     |  1.2              Phosphorous: 3.4      TPro  4.7    /  Alb  3.3    /  TBili  0.4    /  DBili  x      /  AST  13     /  ALT  19     /  AlkPhos  82     25 Jun 2025 05:38        ASSESSMENT & PLAN:     Patient is a 85 yo female with a history of left lung adenocarcinoma s/p LLL lobectomy on Tagrisso in remission, PE, AFIVB, COPD on home O2, CKD, diabetes, HTN, HLD, AAA s/p stent placement who presented at the ED for worsening SOB. She was later admitted to the ICU for AHRF.    #Acute hypercapnic respiratory failure  #Acute hypoxemic respiratory failure  #CHF exacerbation  #Hx COPD  #Hx left lung adenocarcinoma s/p LLL lobectomy on Tagrisso  - BNP 3745, initial BNP 6050  - TTE: EF 67%, mild LVH, severe LA dilation, moderate MR and TR, pulmonary HTN  - CXR 6/23: Increased left lung opacities. Left pleural effusion. Stable right-sided opacities.   - CXR 6/24: Decreased bilateral lung opacities, and decreased left pleural effusion.  - Bumex once daily  - Pulmonary following - recommended HOB 45 degrees, wean O2, encourage NIV during sleep and prn during day, continue home inhaler regimen, albuterol PRN, repeat CXR, prednisone taper over few weeks, incentive spirometry    #ADRIA  - recent Cr 1.4, baseline CR 1.3  - US kidney/bladder 6/17 - resolution of hydronephrosis from 5/2025  - trend BMP    #Constipation   - c/w miralax, senna    #Thrombocytopenia  - improved, continue holding tagrisso as per heme/onc    #AFIB  #Hx PE  #HTN  #Diabetes  #HLD  #AAA s/p stent   - c/w continue eliquis, aspirin, amlodipine, metoprolol succinate, sliding scale insulin lispro     #MISC  -DVT ppx: eliquis   -GI ppx: protonix  -DIET: DASH/TLC  -Activity: AAT  -Code Status: Full   -DIspo: SDU    -Pending: keep on low-flow NC and check for improvement, PT eval Transfer Note    Transfer from: SDU  Transfer to: med-surg    HPI:  84-year-old female with past medical history of left lung adenocarcinoma s/p LLL lobectomy on tagrisso in remission, PE on Eliquis, Atrial fibrillation ; COPD on home O2, AAA, CKD ( Baseline GFR 40s; creatinine 1.3 )  diabetes, hypertension, hyperlipidemia, AAA s/p stent diagnosis of pneumonia UTI status post Augmentin and Levaquin presents for evaluation of shortness of breath acutely worsening this morning. Patient has been decompensating since April after she underwent AAA stenting ( Has some renal artery leak). Was sent to nursing home. Was recently diagnosed with Ecoli/Pseudomonas UTIi treated with levaquin . Recently was having Shortness of breath/ cough ; treated with augmentin,. they checked theBNP 4199 ; followed Tye De Dios ; was started on Torsemide 20 mg other day. Nursing home gave her lasix which improved her breathing status but worsened since the morning  so was sent to the hospital. Denies any fever, chills or chest pain.      In the ED:   Vitals: /79;  ; RR 25 ; afebrile ; 92 % on NRB     Labs:   WBC 6.42 Hb 8.8 Platelet 99K Neutro 83.2   Na 139 K 5.0   BUN/Creatinine 52/1.5   Pro BNP 6050     VBG ; Initial 7.29 CO2 60 HCO3 29 Lactate 1.4   Repeat pH 7.27 Co2 63 HCo3 29 lactate 1.7     Ua negative     US renal: mild bilateral hydronephrosis ; 0.5 non obstructive calculus     Chest Xray : Bilateral patchy opacities and left pleural effusion.    admitted to ICU for acute hypercapnic hypoxemic  resp failure         COURSE:  Patient was initially on HFNC and gradually weaned to NC 3L today. She is comfortable and reports her SOB has greatly improved but does still report constipation unaffected by current regimen. Recent CXR has improved since previous. No acute or major events overnight. She remains stable to be downgraded to floors.   Pulmonary has been following and recommended HOB 45 degrees, wean O2, encourage NIV during sleep and prn during day, continue home inhaler regimen, albuterol PRN, repeat CXR, prednisone taper over few weeks, incentive spirometry. CHF exacerbation was deemed to be the cause of her AHHRF, which was appropriately managed. She will remain on bumex once daily.      Vital Signs Last 24 Hrs  T(C): 36 (25 Jun 2025 14:18), Max: 36.7 (24 Jun 2025 20:00)  T(F): 96.8 (25 Jun 2025 14:18), Max: 98.1 (24 Jun 2025 20:00)  HR: 78 (25 Jun 2025 14:18) (73 - 78)  BP: 115/67 (25 Jun 2025 14:18) (113/77 - 150/76)  BP(mean): 86 (25 Jun 2025 14:18) (86 - 108)  RR: 18 (25 Jun 2025 14:18) (18 - 20)  SpO2: 97% (25 Jun 2025 14:18) (91% - 99%)    Parameters below as of 25 Jun 2025 14:18  Patient On (Oxygen Delivery Method): nasal cannula      I&O's Summary    24 Jun 2025 07:01  -  25 Jun 2025 07:00  --------------------------------------------------------  IN: 200 mL / OUT: 1100 mL / NET: -900 mL    25 Jun 2025 07:01  -  25 Jun 2025 14:55  --------------------------------------------------------  IN: 0 mL / OUT: 600 mL / NET: -600 mL          MEDICATIONS  (STANDING):  amLODIPine   Tablet 10 milliGRAM(s) Oral every 24 hours  apixaban 2.5 milliGRAM(s) Oral every 12 hours  aspirin enteric coated 81 milliGRAM(s) Oral daily  bumetanide Injectable 2 milliGRAM(s) IV Push daily  chlorhexidine 2% Cloths 1 Application(s) Topical <User Schedule>  dextrose 5%. 1000 milliLiter(s) (100 mL/Hr) IV Continuous <Continuous>  dextrose 5%. 1000 milliLiter(s) (50 mL/Hr) IV Continuous <Continuous>  dextrose 50% Injectable 25 Gram(s) IV Push once  dextrose 50% Injectable 12.5 Gram(s) IV Push once  dextrose 50% Injectable 25 Gram(s) IV Push once  droNABinol 2.5 milliGRAM(s) Oral daily  fluticasone propionate 50 MICROgram(s)/spray Nasal Spray 1 Spray(s) Both Nostrils two times a day  glucagon  Injectable 1 milliGRAM(s) IntraMuscular once  insulin glargine Injectable (LANTUS) 10 Unit(s) SubCutaneous at bedtime  insulin lispro (ADMELOG) corrective regimen sliding scale   SubCutaneous Before meals and at bedtime  insulin lispro Injectable (ADMELOG) 3 Unit(s) SubCutaneous three times a day before meals  lactulose Syrup 10 Gram(s) Oral daily  magnesium hydroxide Suspension 30 milliLiter(s) Oral daily  metoprolol succinate ER 50 milliGRAM(s) Oral daily  pantoprazole    Tablet 40 milliGRAM(s) Oral before breakfast  polyethylene glycol 3350 17 Gram(s) Oral every 12 hours  predniSONE   Tablet   Oral   predniSONE   Tablet 40 milliGRAM(s) Oral daily  senna 2 Tablet(s) Oral at bedtime    MEDICATIONS  (PRN):  albuterol    90 MICROgram(s) HFA Inhaler 2 Puff(s) Inhalation every 6 hours PRN Bronchospasm  dextrose Oral Gel 15 Gram(s) Oral once PRN Blood Glucose LESS THAN 70 milliGRAM(s)/deciliter  zolpidem 10 milliGRAM(s) Oral at bedtime PRN Insomnia        LABS                                            7.8                   Neurophils% (auto):   x      (06-25 @ 05:38):    8.32 )-----------(107          Lymphocytes% (auto):  x                                             25.8                   Eosinphils% (auto):   x        Manual%: Neutrophils x    ; Lymphocytes x    ; Eosinophils x    ; Bands%: x    ; Blasts x                                    142    |  96     |  59                  Calcium: 9.0   / iCa: x      (06-25 @ 05:38)    ----------------------------<  126       Magnesium: 2.7                              3.9     |  39     |  1.2              Phosphorous: 3.4      TPro  4.7    /  Alb  3.3    /  TBili  0.4    /  DBili  x      /  AST  13     /  ALT  19     /  AlkPhos  82     25 Jun 2025 05:38        ASSESSMENT & PLAN:     Patient is a 83 yo female with a history of left lung adenocarcinoma s/p LLL lobectomy on Tagrisso in remission, PE, AFIVB, COPD on home O2, CKD, diabetes, HTN, HLD, AAA s/p stent placement who presented at the ED for worsening SOB. She was later admitted to the ICU for AHRF.    #Acute hypercapnic respiratory failure  #Acute hypoxemic respiratory failure  #CHF exacerbation  #Hx COPD  #Hx left lung adenocarcinoma s/p LLL lobectomy on Tagrisso  - BNP 3745, initial BNP 6050  - TTE: EF 67%, mild LVH, severe LA dilation, moderate MR and TR, pulmonary HTN  - CXR 6/23: Increased left lung opacities. Left pleural effusion. Stable right-sided opacities.   - CXR 6/24: Decreased bilateral lung opacities, and decreased left pleural effusion.  - Bumex once daily  - Pulmonary following - recommended HOB 45 degrees, wean O2, encourage NIV during sleep and prn during day, continue home inhaler regimen, albuterol PRN, prednisone taper over few weeks, incentive spirometry    #ADRIA  - recent Cr 1.4, baseline CR 1.3  - US kidney/bladder 6/17 - resolution of hydronephrosis from 5/2025  - trend BMP    #Constipation   - c/w miralax, senna    #Thrombocytopenia  - improved, continue holding tagrisso as per heme/onc    #AFIB  #Hx PE  #HTN  #Diabetes  #HLD  #AAA s/p stent   - c/w continue eliquis, aspirin, amlodipine, metoprolol succinate, sliding scale insulin lispro     #MISC  -DVT ppx: eliquis   -GI ppx: protonix  -DIET: DASH/TLC  -Activity: AAT  -Code Status: Full   -DIspo: SDU    -Pending: keep on NC and check for improvement Transfer Note    Transfer from: SDU  Transfer to: med-surg    HPI:  84-year-old female with past medical history of left lung adenocarcinoma s/p LLL lobectomy on tagrisso in remission, PE on Eliquis, Atrial fibrillation ; COPD on home O2, AAA, CKD ( Baseline GFR 40s; creatinine 1.3 )  diabetes, hypertension, hyperlipidemia, AAA s/p stent diagnosis of pneumonia UTI status post Augmentin and Levaquin presents for evaluation of shortness of breath acutely worsening this morning. Patient has been decompensating since April after she underwent AAA stenting ( Has some renal artery leak). Was sent to nursing home. Was recently diagnosed with Ecoli/Pseudomonas UTIi treated with levaquin . Recently was having Shortness of breath/ cough ; treated with augmentin,. they checked theBNP 4199 ; followed Tye De Dios ; was started on Torsemide 20 mg other day. Nursing home gave her lasix which improved her breathing status but worsened since the morning  so was sent to the hospital. Denies any fever, chills or chest pain.      In the ED:   Vitals: /79;  ; RR 25 ; afebrile ; 92 % on NRB     Labs:   WBC 6.42 Hb 8.8 Platelet 99K Neutro 83.2   Na 139 K 5.0   BUN/Creatinine 52/1.5   Pro BNP 6050     VBG ; Initial 7.29 CO2 60 HCO3 29 Lactate 1.4   Repeat pH 7.27 Co2 63 HCo3 29 lactate 1.7     Ua negative     US renal: mild bilateral hydronephrosis ; 0.5 non obstructive calculus     Chest Xray : Bilateral patchy opacities and left pleural effusion.    admitted to ICU for acute hypercapnic hypoxemic  resp failure         COURSE:  Patient was initially on HFNC and gradually weaned to NC 3L today. She is comfortable and reports her SOB has greatly improved but does still report constipation unaffected by current regimen. Recent CXR has improved since previous. No acute or major events overnight. She remains stable to be downgraded to floors.   Pulmonary has been following and recommended HOB 45 degrees, wean O2, encourage NIV during sleep and prn during day, continue home inhaler regimen, albuterol PRN, repeat CXR, prednisone taper over few weeks, incentive spirometry. CHF exacerbation was deemed to be the cause of her AHHRF, which was appropriately managed. She will remain on bumex once daily.      Vital Signs Last 24 Hrs  T(C): 36 (25 Jun 2025 14:18), Max: 36.7 (24 Jun 2025 20:00)  T(F): 96.8 (25 Jun 2025 14:18), Max: 98.1 (24 Jun 2025 20:00)  HR: 78 (25 Jun 2025 14:18) (73 - 78)  BP: 115/67 (25 Jun 2025 14:18) (113/77 - 150/76)  BP(mean): 86 (25 Jun 2025 14:18) (86 - 108)  RR: 18 (25 Jun 2025 14:18) (18 - 20)  SpO2: 97% (25 Jun 2025 14:18) (91% - 99%)    Parameters below as of 25 Jun 2025 14:18  Patient On (Oxygen Delivery Method): nasal cannula      I&O's Summary    24 Jun 2025 07:01  -  25 Jun 2025 07:00  --------------------------------------------------------  IN: 200 mL / OUT: 1100 mL / NET: -900 mL    25 Jun 2025 07:01  -  25 Jun 2025 14:55  --------------------------------------------------------  IN: 0 mL / OUT: 600 mL / NET: -600 mL          MEDICATIONS  (STANDING):  amLODIPine   Tablet 10 milliGRAM(s) Oral every 24 hours  apixaban 2.5 milliGRAM(s) Oral every 12 hours  aspirin enteric coated 81 milliGRAM(s) Oral daily  bumetanide Injectable 2 milliGRAM(s) IV Push daily  chlorhexidine 2% Cloths 1 Application(s) Topical <User Schedule>  dextrose 5%. 1000 milliLiter(s) (100 mL/Hr) IV Continuous <Continuous>  dextrose 5%. 1000 milliLiter(s) (50 mL/Hr) IV Continuous <Continuous>  dextrose 50% Injectable 25 Gram(s) IV Push once  dextrose 50% Injectable 12.5 Gram(s) IV Push once  dextrose 50% Injectable 25 Gram(s) IV Push once  droNABinol 2.5 milliGRAM(s) Oral daily  fluticasone propionate 50 MICROgram(s)/spray Nasal Spray 1 Spray(s) Both Nostrils two times a day  glucagon  Injectable 1 milliGRAM(s) IntraMuscular once  insulin glargine Injectable (LANTUS) 10 Unit(s) SubCutaneous at bedtime  insulin lispro (ADMELOG) corrective regimen sliding scale   SubCutaneous Before meals and at bedtime  insulin lispro Injectable (ADMELOG) 3 Unit(s) SubCutaneous three times a day before meals  lactulose Syrup 10 Gram(s) Oral daily  magnesium hydroxide Suspension 30 milliLiter(s) Oral daily  metoprolol succinate ER 50 milliGRAM(s) Oral daily  pantoprazole    Tablet 40 milliGRAM(s) Oral before breakfast  polyethylene glycol 3350 17 Gram(s) Oral every 12 hours  predniSONE   Tablet   Oral   predniSONE   Tablet 40 milliGRAM(s) Oral daily  senna 2 Tablet(s) Oral at bedtime    MEDICATIONS  (PRN):  albuterol    90 MICROgram(s) HFA Inhaler 2 Puff(s) Inhalation every 6 hours PRN Bronchospasm  dextrose Oral Gel 15 Gram(s) Oral once PRN Blood Glucose LESS THAN 70 milliGRAM(s)/deciliter  zolpidem 10 milliGRAM(s) Oral at bedtime PRN Insomnia        LABS                                            7.8                   Neurophils% (auto):   x      (06-25 @ 05:38):    8.32 )-----------(107          Lymphocytes% (auto):  x                                             25.8                   Eosinphils% (auto):   x        Manual%: Neutrophils x    ; Lymphocytes x    ; Eosinophils x    ; Bands%: x    ; Blasts x                                    142    |  96     |  59                  Calcium: 9.0   / iCa: x      (06-25 @ 05:38)    ----------------------------<  126       Magnesium: 2.7                              3.9     |  39     |  1.2              Phosphorous: 3.4      TPro  4.7    /  Alb  3.3    /  TBili  0.4    /  DBili  x      /  AST  13     /  ALT  19     /  AlkPhos  82     25 Jun 2025 05:38        ASSESSMENT & PLAN:     Patient is a 85 yo female with a history of left lung adenocarcinoma s/p LLL lobectomy on Tagrisso in remission, PE, AFIVB, COPD on home O2, CKD, diabetes, HTN, HLD, AAA s/p stent placement who presented at the ED for worsening SOB. She was later admitted to the ICU for AHRF.    #Acute hypercapnic respiratory failure  #Acute hypoxemic respiratory failure  #CHF exacerbation  #Hx COPD  #Hx left lung adenocarcinoma s/p LLL lobectomy on Tagrisso  - BNP 3745, initial BNP 6050  - TTE: EF 67%, mild LVH, severe LA dilation, moderate MR and TR, pulmonary HTN  - CXR 6/23: Increased left lung opacities. Left pleural effusion. Stable right-sided opacities.   - CXR 6/24: Decreased bilateral lung opacities, and decreased left pleural effusion.  - Bumex once daily  - Pulmonary following - recommended HOB 45 degrees, wean O2, encourage NIV during sleep and prn during day, continue home inhaler regimen, albuterol PRN, prednisone taper over few weeks, incentive spirometry    #ADRIA  - recent Cr 1.4, baseline CR 1.3  - US kidney/bladder 6/17 - resolution of hydronephrosis from 5/2025  - trend BMP    #Constipation   - c/w miralax, senna    #Thrombocytopenia  - improved, continue holding tagrisso as per heme/onc    #AFIB  #Hx PE  #HTN  #Diabetes  #HLD  #AAA s/p stent   - c/w continue eliquis, aspirin, amlodipine, metoprolol succinate, sliding scale insulin lispro     #MISC  -DVT ppx: eliquis   -GI ppx: protonix  -DIET: DASH/TLC  -Activity: AAT  -Code Status: Full   -DIspo: DGTF    -Pending: keep on NC and check for improvement

## 2025-06-25 NOTE — PROGRESS NOTE ADULT - SUBJECTIVE AND OBJECTIVE BOX
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    Patient has been decompensating since April after she underwent AAA stenting ( Has some renal artery leak). Was sent to nursing home. Was diagnosed with Ecoli/Pseudomonas UTIi treated with levaquin . Recently was having Shortness of breath/ cough ; treated with augmentin,. they checked theBNP 4199 ; followed Tye De Dios ; was started on Torsemide 20 mg other day.   Since yesterday had increased shortness of breath and anxiety. Nursing home gave her lasix which improved her breathing status but worsened since the morning  so was sent to the hospital.       INTERVAL HISTORY:       ADVANCE DIRECTIVES:    [X ] Full Code [ ] DNR  MOLST  [ ]  Living Will  [ ]   DECISION MAKER(s):  [ ] Health Care Proxy(s)  [ ] Surrogate(s)  [ ] Guardian           Name(s): Phone Number(s):    Answers: Emergency Contact Name Brandy Parra,  Answers: Emergency Contact Phone # [phone],  Answers: Emergency Contact Relationship pt's daughter  BASELINE (I)ADL(s) (prior to admission):  [place]iler: [ ]Total  [X ] Moderate [ ]Dependent      Allergies    No Known Allergies    Intolerances  MEDICATIONS  (STANDING):  amLODIPine   Tablet 10 milliGRAM(s) Oral every 24 hours  apixaban 2.5 milliGRAM(s) Oral every 12 hours  aspirin enteric coated 81 milliGRAM(s) Oral daily  bumetanide Injectable 2 milliGRAM(s) IV Push daily  chlorhexidine 2% Cloths 1 Application(s) Topical <User Schedule>  dextrose 5%. 1000 milliLiter(s) (100 mL/Hr) IV Continuous <Continuous>  dextrose 5%. 1000 milliLiter(s) (50 mL/Hr) IV Continuous <Continuous>  dextrose 50% Injectable 25 Gram(s) IV Push once  dextrose 50% Injectable 12.5 Gram(s) IV Push once  dextrose 50% Injectable 25 Gram(s) IV Push once  droNABinol 2.5 milliGRAM(s) Oral daily  fluticasone propionate 50 MICROgram(s)/spray Nasal Spray 1 Spray(s) Both Nostrils two times a day  glucagon  Injectable 1 milliGRAM(s) IntraMuscular once  insulin glargine Injectable (LANTUS) 10 Unit(s) SubCutaneous at bedtime  insulin lispro (ADMELOG) corrective regimen sliding scale   SubCutaneous Before meals and at bedtime  insulin lispro Injectable (ADMELOG) 3 Unit(s) SubCutaneous three times a day before meals  lactulose Syrup 10 Gram(s) Oral daily  magnesium hydroxide Suspension 30 milliLiter(s) Oral daily  metoprolol succinate ER 50 milliGRAM(s) Oral daily  pantoprazole    Tablet 40 milliGRAM(s) Oral before breakfast  polyethylene glycol 3350 17 Gram(s) Oral every 12 hours  predniSONE   Tablet   Oral   predniSONE   Tablet 40 milliGRAM(s) Oral daily  senna 2 Tablet(s) Oral at bedtime    MEDICATIONS  (PRN):  albuterol    90 MICROgram(s) HFA Inhaler 2 Puff(s) Inhalation every 6 hours PRN Bronchospasm  dextrose Oral Gel 15 Gram(s) Oral once PRN Blood Glucose LESS THAN 70 milliGRAM(s)/deciliter  zolpidem 10 milliGRAM(s) Oral at bedtime PRN Insomnia    PRESENT SYMPTOMS: [ ]Unable to obtain due to poor mentation   Source if other than patient:  [ ]Family   [ ]Team     Pain: [ ]yes [x ]no  QOL impact -   Location -                    Aggravating factors -  Quality -  Radiation -  Timing-  Severity (0-10 scale):  Minimal acceptable level (0-10 scale):     CPOT:    https://www.[facility].org/getattachment/[email or web address]-[email or web address]-2238c2741q0v/Critical-Care-Pain-Observation-Tool-(CPOT)    PAIN AD Score:   [email or web address] (press ctrl +  left click to view)    Dyspnea:                           [ x]None[ ]Mild [ ]Moderate [ ]Severe     Respiratory Distress Observation Scale (RDOS):   A score of 0 to 2 signifies little or no respiratory distress, 3 signifies mild distress, scores 4 to 6 indicate moderate distress, and scores greater than 7 signify severe distress  [email or web address]    Anxiety:                             [ ]None[ ]Mild [ ]Moderate [ ]Severe   Fatigue:                             [ ]None[ ]Mild [ ]Moderate [ ]Severe   Nausea:                             [ ]None[ ]Mild [ ]Moderate [ ]Severe   Loss of appetite:              [ ]None[ ]Mild [ ]Moderate [ ]Severe   Constipation:                    [ ]None[ ]Mild [ ]Moderate [ ]Severe    Other Symptoms:  [x ]All other review of systems negative     Palliative Performance Status Version 2:         %    [email or web address]    PHYSICAL EXAM:  Vital Signs Last 24 Hrs  T(C): 36 (25 Jun 2025 14:18), Max: 36.7 (24 Jun 2025 20:00)  T(F): 96.8 (25 Jun 2025 14:18), Max: 98.1 (24 Jun 2025 20:00)  HR: 78 (25 Jun 2025 14:18) (73 - 78)  BP: 115/67 (25 Jun 2025 14:18) (113/77 - 150/76)  BP(mean): 86 (25 Jun 2025 14:18) (86 - 108)  RR: 18 (25 Jun 2025 14:18) (18 - 20)  SpO2: 97% (25 Jun 2025 14:18) (91% - 99%)    Parameters below as of 25 Jun 2025 14:18  Patient On (Oxygen Delivery Method): nasal cannula     I&O's Summary    24 Jun 2025 07:01  -  25 Jun 2025 07:00  --------------------------------------------------------  IN: 200 mL / OUT: 1100 mL / NET: -900 mL    25 Jun 2025 07:01  -  25 Jun 2025 14:32  --------------------------------------------------------  IN: 0 mL / OUT: 600 mL / NET: -600 mL        GENERAL:  [ x] No acute distress [ ]Lethargic  [ ]Unarousable  [ ]Verbal  [ ]Non-Verbal [ ]Cachexia    BEHAVIORAL/PSYCH:  [X ]Alert and Oriented x3  [ ] Anxiety [ ] Delirium [ ] Agitation [X ] Calm   EYES: [ x] No scleral icterus [ ] Scleral icterus [ ] Closed  ENMT:  [ ]Dry mouth  [ ]No external oral lesions [ ] No external ear or nose lesions  CARDIOVASCULAR:  [ ]Regular [ ]Irregular [ ]Tachy [ ]Not Tachy  [ ]Rafael [ ] Edema [ ] No edema  PULMONARY:  [ ]Tachypnea  [ ]Audible excessive secretions [ x] No labored breathing [ ] labored breathing  GASTROINTESTINAL: [ ]Soft  [ ]Distended  [ ]Not distended [ ]Non tender [ ]Tender  MUSCULOSKELETAL: [ ]No clubbing [ ] clubbing  [ ] No cyanosis [ ] cyanosis  NEUROLOGIC: [x ]No focal deficits  [ ]Follows commands  [ ]Does not follow commands  [ ]Cognitive impairment  [ ]Dysphagia  [ ]Dysarthria  [ ]Paresis   SKIN: [ ] Jaundiced [ ] Non-jaundiced [ ]Rash [ ]No Rash [ ] Warm [ ] Dry  MISC/LINES: [ ] ET tube [ ] Trach [ ]NGT/OGT [ ]PEG [ ]Reynoso    LABS: CBC/CMP reviewed by me                        7.8    8.32  )-----------( 107      ( 25 Jun 2025 05:38 )             25.8   06-25    142  |  96[L]  |  59[H]  ----------------------------<  126[H]  3.9   |  39[H]  |  1.2    Ca    9.0      25 Jun 2025 05:38  Phos  3.4     06-25  Mg     2.7     06-25    TPro  4.7[L]  /  Alb  3.3[L]  /  TBili  0.4  /  DBili  x   /  AST  13  /  ALT  19  /  AlkPhos  82  06-25      Urinalysis Basic - ( 25 Jun 2025 05:38 )    Color: x / Appearance: x / SG: x / pH: x  Gluc: 126 mg/dL / Ketone: x  / Bili: x / Urobili: x   Blood: x / Protein: x / Nitrite: x   Leuk Esterase: x / RBC: x / WBC x   Sq Epi: x / Non Sq Epi: x / Bacteria: x      RADIOLOGY & ADDITIONAL STUDIES: reviewed by me    EKG: reviewed by me      Palliative Care Spiritual/Emotional Screening Tool Question  Severity (0-4):0  Score of 2 or greater indicates recommendation of Chaplaincy referral  Chaplaincy Referral: [ ] Yes [ ] Refused [ ] Following    Caregiver [place]:  [ ] Yes [ ] No [X] Deferred  Social Work Referral [ ]  Patient and Family Centered Care Referral [ ]    Anticipatory Grief Present: [ ] Yes [ ] No [x ] Deferred  Social Work Referral [ ]  Patient and Family Centered Care Referral [ ]    Patient discussed with primary medical team MD  Palliative care education provided to patient and/or family

## 2025-06-25 NOTE — PROGRESS NOTE ADULT - ASSESSMENT
IMPRESSION:    Acute hypercapnic respiratory failure   Acute hypoxemic respiratory failure  Pulmonary edema   ADRIA  Can't RO pneumonitis secondary to Tagrisso   HO COPD  HO small cell lung ca on Tagriso  HO left adenocarcinoma s/p LLL lobectomy  HO afib/PE on Eliquis     PLAN:    CNS:  Avoid sedation.  MSO4 PRN for comfort     HEENT: Oral care    PULMONARY:  HOB at 45 degrees.  Encourage NIV during sleep and PRN during the day.  Wean o2 as tolerated.  Continue home inhaler regimen.  Albuterol PRN.  Repeat CXR noted.  Solumedrol to 40 mg daily.  Incentive spirometry     CARDIOVASCULAR:  Bumex 2 mg QD.  ECHO Noted.  Continue volume contraction as tolerated       GI: GI prophylaxis.  Feeding when off NIV.  Bowel regimen     RENAL:  Follow up lytes.  Correct as needed,   Monitor UO     INFECTIOUS DISEASE: Follow up cultures, procalcitonin noted, MRSA negative, Monitor VS      HEMATOLOGICAL:  DVT prophylaxis. DIMER noted.  LE duplex negative.  Monitor CBC     ENDOCRINE:  Follow up FS.  Insulin protocol if needed.      MUSCULOSKELETAL: OOB as tolerated.  Off loading.  PT OT     SDU     GOC          IMPRESSION:    Acute hypercapnic/hypoxic respiratory failure - resolved  Pulmonary edema   ADRIA  Can't RO pneumonitis secondary to Tagrisso   HO COPD  HO small cell lung ca on Tagriso  HO left adenocarcinoma s/p LLL lobectomy  HO afib/PE on Eliquis     PLAN:    CNS:  Avoid sedation.  MSO4 PRN for comfort     HEENT: Oral care    PULMONARY:  HOB at 45 degrees.  Encourage NIV during sleep and PRN during the day.  Wean o2 as tolerated.  Continue home inhaler regimen.  Albuterol PRN.  Prednisone taper.   Incentive spirometry     CARDIOVASCULAR:  ECHO Noted.  Even balance.  PO diuretics.       GI: GI prophylaxis.  Feeding when off NIV.  Bowel regimen     RENAL:  Follow up lytes.  Correct as needed,   Monitor UO     INFECTIOUS DISEASE: Follow up cultures, procalcitonin noted, MRSA negative, Monitor VS      HEMATOLOGICAL:  DVT prophylaxis. DIMER noted.  LE duplex negative.  Monitor CBC     ENDOCRINE:  Follow up FS.  Insulin protocol if needed.      MUSCULOSKELETAL: OOB as tolerated.  Off loading.  PT OT     DGTF  GOC  IMPRESSION:    Acute hypercapnic/hypoxic respiratory failure - resolved  Pulmonary edema   ADRIA  Can't RO pneumonitis secondary to Tagrisso   HO COPD  HO small cell lung ca on Tagriso  HO left adenocarcinoma s/p LLL lobectomy  HO afib/PE on Eliquis     PLAN:    CNS:  Avoid sedation.  MSO4 PRN for comfort     HEENT: Oral care    PULMONARY:  HOB at 45 degrees.  Encourage NIV during sleep and PRN during the day.  Wean o2 as tolerated.  Continue home inhaler regimen.  Albuterol PRN.  Prednisone taper over few weeks.   Incentive spirometry     CARDIOVASCULAR:  ECHO Noted.  Even balance.  PO diuretics.       GI: GI prophylaxis.  Feeding when off NIV.  Bowel regimen     RENAL:  Follow up lytes.  Correct as needed,   Monitor UO     INFECTIOUS DISEASE: Follow up cultures, procalcitonin noted, MRSA negative, Monitor VS      HEMATOLOGICAL:  DVT prophylaxis. DIMER noted.  LE duplex negative.  Monitor CBC     ENDOCRINE:  Follow up FS.  Insulin protocol if needed.      MUSCULOSKELETAL: OOB as tolerated.  Off loading.  PT OT     DGTF  GOC

## 2025-06-25 NOTE — PROGRESS NOTE ADULT - ASSESSMENT
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    Patient has been decompensating since April after she underwent AAA stenting ( Has some renal artery leak). Was sent to nursing home. Was diagnosed with Ecoli/Pseudomonas UTIi treated with levaquin . Recently was having Shortness of breath/ cough ; treated with augmentin,. they checked theBNP 4199 ; followed Tye De Dios ; was started on Torsemide 20 mg.  PT developed increased shortness of breath and anxiety. Nursing home gave her lasix which improved her breathing status but worsened since the morning so was sent to the hospital.     #Acute Hypercapnic/ Hypoxic Respiratory Failure / multifactorial dyspnea/ acute on chronic CHF exacerbation   #H/O left lung adenocarcinoma s/p LLL lobectomy on Tagrisso  #H/O COPD  - CHF protocol, fluid restriction 1200 ml in 24 hours   - intake and output monitoring, daily weight    - CXR (6/17/2025): Bilateral patchy opacities and left pleural effusion.   - TTE: EF 67%, mild LVH, severe LA dilation, TR/ PHTN   - pulmonary is following, recommendations noted:  - HOB at 45 degrees.  Encourage NIV during sleep and PRN during the day  -  Wean o2 as tolerated.  Continue home inhaler regimen.  Albuterol PRN  -  Repeat CXR noted, showed resolution of CHF   - Bumex dose was decreased to Q 24 hours today, reevaluate in 24 hours    - Solumedrol switched to PO Prednisone tapering dose today  - lung adenocarcinoma is in remission, recent PET scan reviewed , Tagrisso held ( pt was taking this medications for two years)     #ADRIA (baseline creatinine ~1.3)  - Cr 1.5 on admission, previously normal  - Renal US:  Interval resolution of hydronephrosis since 5/12/25  - UA noted, blood and RBC  - monitor lytes, BMP    #Constipation  - high fiber diet   - c/w miralax BID, senna qhs    #Thrombocytopenia  - monitor platelets   - heme/onc eval 6/20 noted. continue to hold tagrisso and continue IV diuresis    #Atrial Fibrillation  #H/O PE  #HTN  #Diabetes  #HLD  #AAA s/p stent   - DASH/ CC diet  - monitor  F/s   - continue apixaban 2.5 mg q12h, aspirin 81 mg, amlodipine 5 mg, metoprolol XL 50 mg daily  - continue sliding scale insulin lispro   - start Lantus 10 units and Lispro 3 units with meals ( f/S noted)     #Anxiety/Insomnia (Chronic)/ anorexia   - continue zolpidem 10 mg, dronabinol 2.5 mg daily    # Prophylactic measures   - DVT PPx: apixaban   - GI PPx: PPI    #Progress Note Handoff  - reevaluate Bumex dose in 24 hours,  monitor platelets, PT/.rehab eval, start incentive spirometry.   - Disposition: DGTF    55 minutes spent on total encounter. The necessity of the time spent during the encounter on this date of service was due to: time spent on review of labs, imaging studies, old records, obtaining history, personally examining patient, entering orders for medications/tests/etc, discussions with other health care providers, documentation in electronic health records, independent interpretation of labs, imaging/procedure results and care coordination.

## 2025-06-25 NOTE — PROGRESS NOTE ADULT - ASSESSMENT
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 Patient found to have respiratory failure on is on HFNC. Palliative care consulted to assist with GOC.      Patient working with physical therapy when seen. She has no complaints of pain or dyspnea. She was not short of breath with PT - on nasal cannula oxygen.     Plan:  - symptoms per primary team  - pt wishes to go to [facility] for PT  - ongoing discussions as above  - patient deferred discussion of advance directives today  f/u tomorrow, pt for d/g from step down unit      Palliative care will continue to follow.   Please call [phone] with questions or concerns 24/7.     Reviewed documentation from: leah black

## 2025-06-26 LAB
ALBUMIN SERPL ELPH-MCNC: 3.2 G/DL — LOW (ref 3.5–5.2)
ALP SERPL-CCNC: 87 U/L — SIGNIFICANT CHANGE UP (ref 30–115)
ALT FLD-CCNC: 25 U/L — SIGNIFICANT CHANGE UP (ref 0–41)
ANION GAP SERPL CALC-SCNC: 10 MMOL/L — SIGNIFICANT CHANGE UP (ref 7–14)
APPEARANCE UR: ABNORMAL
AST SERPL-CCNC: 18 U/L — SIGNIFICANT CHANGE UP (ref 0–41)
BASOPHILS # BLD AUTO: 0 K/UL — SIGNIFICANT CHANGE UP (ref 0–0.2)
BASOPHILS NFR BLD AUTO: 0 % — SIGNIFICANT CHANGE UP (ref 0–1)
BILIRUB SERPL-MCNC: 0.4 MG/DL — SIGNIFICANT CHANGE UP (ref 0.2–1.2)
BILIRUB UR-MCNC: NEGATIVE — SIGNIFICANT CHANGE UP
BUN SERPL-MCNC: 58 MG/DL — HIGH (ref 10–20)
CALCIUM SERPL-MCNC: 9.3 MG/DL — SIGNIFICANT CHANGE UP (ref 8.4–10.5)
CHLORIDE SERPL-SCNC: 93 MMOL/L — LOW (ref 98–110)
CO2 SERPL-SCNC: 37 MMOL/L — HIGH (ref 17–32)
COLOR SPEC: YELLOW — SIGNIFICANT CHANGE UP
CREAT SERPL-MCNC: 1.3 MG/DL — SIGNIFICANT CHANGE UP (ref 0.7–1.5)
DIFF PNL FLD: ABNORMAL
EGFR: 41 ML/MIN/1.73M2 — LOW
EGFR: 41 ML/MIN/1.73M2 — LOW
EOSINOPHIL # BLD AUTO: 0.03 K/UL — SIGNIFICANT CHANGE UP (ref 0–0.7)
EOSINOPHIL NFR BLD AUTO: 0.4 % — SIGNIFICANT CHANGE UP (ref 0–8)
GLUCOSE BLDC GLUCOMTR-MCNC: 240 MG/DL — HIGH (ref 70–99)
GLUCOSE BLDC GLUCOMTR-MCNC: 275 MG/DL — HIGH (ref 70–99)
GLUCOSE BLDC GLUCOMTR-MCNC: 317 MG/DL — HIGH (ref 70–99)
GLUCOSE SERPL-MCNC: 125 MG/DL — HIGH (ref 70–99)
GLUCOSE UR QL: NEGATIVE MG/DL — SIGNIFICANT CHANGE UP
HCT VFR BLD CALC: 27.7 % — LOW (ref 37–47)
HGB BLD-MCNC: 8.3 G/DL — LOW (ref 12–16)
IMM GRANULOCYTES NFR BLD AUTO: 0.5 % — HIGH (ref 0.1–0.3)
KETONES UR QL: NEGATIVE MG/DL — SIGNIFICANT CHANGE UP
LEUKOCYTE ESTERASE UR-ACNC: NEGATIVE — SIGNIFICANT CHANGE UP
LYMPHOCYTES # BLD AUTO: 0.66 K/UL — LOW (ref 1.2–3.4)
LYMPHOCYTES # BLD AUTO: 7.9 % — LOW (ref 20.5–51.1)
MAGNESIUM SERPL-MCNC: 3 MG/DL — HIGH (ref 1.8–2.4)
MCHC RBC-ENTMCNC: 25.9 PG — LOW (ref 27–31)
MCHC RBC-ENTMCNC: 30 G/DL — LOW (ref 32–37)
MCV RBC AUTO: 86.3 FL — SIGNIFICANT CHANGE UP (ref 81–99)
MONOCYTES # BLD AUTO: 0.79 K/UL — HIGH (ref 0.1–0.6)
MONOCYTES NFR BLD AUTO: 9.4 % — HIGH (ref 1.7–9.3)
NEUTROPHILS # BLD AUTO: 6.84 K/UL — HIGH (ref 1.4–6.5)
NEUTROPHILS NFR BLD AUTO: 81.8 % — HIGH (ref 42.2–75.2)
NITRITE UR-MCNC: NEGATIVE — SIGNIFICANT CHANGE UP
NRBC BLD AUTO-RTO: 0 /100 WBCS — SIGNIFICANT CHANGE UP (ref 0–0)
PH UR: 8 — SIGNIFICANT CHANGE UP (ref 5–8)
PHOSPHATE SERPL-MCNC: 3.4 MG/DL — SIGNIFICANT CHANGE UP (ref 2.1–4.9)
PLATELET # BLD AUTO: 121 K/UL — LOW (ref 130–400)
PMV BLD: 12.6 FL — HIGH (ref 7.4–10.4)
POTASSIUM SERPL-MCNC: 4 MMOL/L — SIGNIFICANT CHANGE UP (ref 3.5–5)
POTASSIUM SERPL-SCNC: 4 MMOL/L — SIGNIFICANT CHANGE UP (ref 3.5–5)
PROT SERPL-MCNC: 5.2 G/DL — LOW (ref 6–8)
PROT UR-MCNC: SIGNIFICANT CHANGE UP MG/DL
RBC # BLD: 3.21 M/UL — LOW (ref 4.2–5.4)
RBC # FLD: 15.2 % — HIGH (ref 11.5–14.5)
SODIUM SERPL-SCNC: 140 MMOL/L — SIGNIFICANT CHANGE UP (ref 135–146)
SP GR SPEC: 1.01 — SIGNIFICANT CHANGE UP (ref 1–1.03)
UROBILINOGEN FLD QL: 0.2 MG/DL — SIGNIFICANT CHANGE UP (ref 0.2–1)
WBC # BLD: 8.36 K/UL — SIGNIFICANT CHANGE UP (ref 4.8–10.8)
WBC # FLD AUTO: 8.36 K/UL — SIGNIFICANT CHANGE UP (ref 4.8–10.8)

## 2025-06-26 PROCEDURE — 99232 SBSQ HOSP IP/OBS MODERATE 35: CPT

## 2025-06-26 PROCEDURE — 71045 X-RAY EXAM CHEST 1 VIEW: CPT | Mod: 26

## 2025-06-26 PROCEDURE — 99233 SBSQ HOSP IP/OBS HIGH 50: CPT

## 2025-06-26 RX ORDER — PREDNISONE 20 MG/1
30 TABLET ORAL DAILY
Refills: 0 | Status: CANCELLED | OUTPATIENT
Start: 2025-07-04 | End: 2025-07-01

## 2025-06-26 RX ORDER — PREDNISONE 20 MG/1
40 TABLET ORAL
Refills: 0 | Status: DISCONTINUED | OUTPATIENT
Start: 2025-06-26 | End: 2025-07-01

## 2025-06-26 RX ORDER — PREDNISONE 20 MG/1
20 TABLET ORAL DAILY
Refills: 0 | Status: CANCELLED | OUTPATIENT
Start: 2025-07-11 | End: 2025-07-01

## 2025-06-26 RX ORDER — BUMETANIDE 1 MG/1
2 TABLET ORAL DAILY
Refills: 0 | Status: DISCONTINUED | OUTPATIENT
Start: 2025-06-27 | End: 2025-06-28

## 2025-06-26 RX ORDER — PREDNISONE 20 MG/1
10 TABLET ORAL DAILY
Refills: 0 | Status: CANCELLED | OUTPATIENT
Start: 2025-07-18 | End: 2025-07-01

## 2025-06-26 RX ORDER — PREDNISONE 20 MG/1
40 TABLET ORAL DAILY
Refills: 0 | Status: DISCONTINUED | OUTPATIENT
Start: 2025-06-26 | End: 2025-07-01

## 2025-06-26 RX ORDER — BISACODYL 5 MG
10 TABLET, DELAYED RELEASE (ENTERIC COATED) ORAL ONCE
Refills: 0 | Status: DISCONTINUED | OUTPATIENT
Start: 2025-06-26 | End: 2025-06-26

## 2025-06-26 RX ORDER — INSULIN LISPRO 100 U/ML
5 INJECTION, SOLUTION INTRAVENOUS; SUBCUTANEOUS
Refills: 0 | Status: DISCONTINUED | OUTPATIENT
Start: 2025-06-26 | End: 2025-07-01

## 2025-06-26 RX ADMIN — BUMETANIDE 2 MILLIGRAM(S): 1 TABLET ORAL at 07:46

## 2025-06-26 RX ADMIN — INSULIN LISPRO 8: 100 INJECTION, SOLUTION INTRAVENOUS; SUBCUTANEOUS at 11:14

## 2025-06-26 RX ADMIN — METOPROLOL SUCCINATE 50 MILLIGRAM(S): 50 TABLET, EXTENDED RELEASE ORAL at 06:01

## 2025-06-26 RX ADMIN — Medication 40 MILLIGRAM(S): at 06:12

## 2025-06-26 RX ADMIN — FLUTICASONE PROPIONATE 1 SPRAY(S): 50 SPRAY, METERED NASAL at 17:49

## 2025-06-26 RX ADMIN — INSULIN LISPRO 3 UNIT(S): 100 INJECTION, SOLUTION INTRAVENOUS; SUBCUTANEOUS at 11:14

## 2025-06-26 RX ADMIN — POLYETHYLENE GLYCOL 3350 17 GRAM(S): 17 POWDER, FOR SOLUTION ORAL at 06:00

## 2025-06-26 RX ADMIN — PREDNISONE 40 MILLIGRAM(S): 20 TABLET ORAL at 06:00

## 2025-06-26 RX ADMIN — Medication 81 MILLIGRAM(S): at 11:11

## 2025-06-26 RX ADMIN — DRONABINOL 2.5 MILLIGRAM(S): 10 CAPSULE ORAL at 11:12

## 2025-06-26 RX ADMIN — INSULIN LISPRO 6: 100 INJECTION, SOLUTION INTRAVENOUS; SUBCUTANEOUS at 17:48

## 2025-06-26 RX ADMIN — Medication 1 APPLICATION(S): at 06:03

## 2025-06-26 RX ADMIN — INSULIN LISPRO 4: 100 INJECTION, SOLUTION INTRAVENOUS; SUBCUTANEOUS at 21:17

## 2025-06-26 RX ADMIN — APIXABAN 2.5 MILLIGRAM(S): 2.5 TABLET, FILM COATED ORAL at 06:01

## 2025-06-26 RX ADMIN — INSULIN LISPRO 5 UNIT(S): 100 INJECTION, SOLUTION INTRAVENOUS; SUBCUTANEOUS at 17:49

## 2025-06-26 RX ADMIN — FLUTICASONE PROPIONATE 1 SPRAY(S): 50 SPRAY, METERED NASAL at 06:02

## 2025-06-26 RX ADMIN — INSULIN GLARGINE-YFGN 10 UNIT(S): 100 INJECTION, SOLUTION SUBCUTANEOUS at 21:16

## 2025-06-26 RX ADMIN — APIXABAN 2.5 MILLIGRAM(S): 2.5 TABLET, FILM COATED ORAL at 17:50

## 2025-06-26 RX ADMIN — AMLODIPINE BESYLATE 10 MILLIGRAM(S): 10 TABLET ORAL at 06:13

## 2025-06-26 NOTE — PROGRESS NOTE ADULT - SUBJECTIVE AND OBJECTIVE BOX
Patient is a 84y old  Female who presents with a chief complaint of Shortness of Breath (2025 13:30)    INTERVAL HPI/OVERNIGHT EVENTS: Patient was examined and seen at bedside. This morning pt is resting comfortably in bed and reports no new issues or overnight events. No complaints, feels better. Wants to go to rehab.  ROS: Denies CP, SOB, AP, new weakness  All other systems reviewed and are within normal limits.  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    Patient has been decompensating since April after she underwent AAA stenting ( Has some renal artery leak). Was sent to nursing home. Was diagnosed with Ecoli/Pseudomonas UTIi treated with levaquin . Recently was having Shortness of breath/ cough ; treated with augmentin,. they checked theBNP 4199 ; followed Tye De Dios ; was started on Torsemide 20 mg other day.   Since yesterday had increased shortness of breath and anxiety. Nursing home gave her lasix which improved her breathing status but worsened since the morning  so was sent to the hospital.     No fever, chills or chest pain.  Daughter at bedside providing additional history, reports that she thinks patient never cleared pneumonia    In the ED:   Vitals: /79;  ; RR 25 ; afebrile ; 92 % on NRB     Labs:   WBC 6.42 Hb 8.8 Platelet 99K Neutro 83.2   Na 139 K 5.0   BUN/Creatinine 52/1.5   Pro BNP 6050     VBG ; Initial 7.29 CO2 60 HCO3 29 Lactate 1.4   Repeat pH 7.27 Co2 63 HCo3 29 lactate 1.7     Ua negative     US renal: mild bilateral hydronephrosis ; 0.5 non obstructive calculus     Chest Xray : Bilateral patchy opacities and left pleural effusion.    admitted to ICU.  (2025 15:31)    PAST MEDICAL & SURGICAL HISTORY:  Hypertension, unspecified type      Type 2 diabetes mellitus with complication, without long-term current use of insulin      Hyperlipidemia, unspecified hyperlipidemia type      Diverticulitis      Obesity      COPD (chronic obstructive pulmonary disease)      SOB (shortness of breath)      GERD (gastroesophageal reflux disease)      Lung cancer      ARTIE (obstructive sleep apnea)  NO CPAP MACHINE PER PT.      Cancer of kidney      Cancer of thyroid      Former smoker      NHL (non-Hodgkin's lymphoma)  "low grade" per heme/ onc note      History of abdominal aortic aneurysm (AAA)      Kidney stones      [place] (hard of hearing)      History of surgery  LEFT LOWER LOBECTOMY      History of surgery  BILATERAL KNEE REPLACEMENT      History of surgery  CATARACT RIGHT EYE      History of surgery  TUBAL LIGATION      History of surgery  CYST REMOVED  RIGHT BREAST      History of surgery  CHOLECYSTECOMY      History of surgery  RIGHT PARTIAL NEPHRECTOMY      History of surgery  BILATERAL CATARACT SURGERY      History of back surgery      H/O lithotripsy      H/O partial thyroidectomy          General: NAD, AAOx2+, chronically ill appearing  HEENT:  EOMI, no LAD  CV: S1 S2  Resp: decreased breath sounds at bases  GI: NT/ND/S +BS  MS: no clubbing/cyanosis/edema, + pulses b/l  Neuro: nonfocal, +reflexes thruout    MEDICATIONS  (STANDING):  amLODIPine   Tablet 10 milliGRAM(s) Oral every 24 hours  apixaban 2.5 milliGRAM(s) Oral every 12 hours  aspirin enteric coated 81 milliGRAM(s) Oral daily  bumetanide Injectable 2 milliGRAM(s) IV Push daily  chlorhexidine 2% Cloths 1 Application(s) Topical <User Schedule>  dextrose 5%. 1000 milliLiter(s) (100 mL/Hr) IV Continuous <Continuous>  dextrose 5%. 1000 milliLiter(s) (50 mL/Hr) IV Continuous <Continuous>  dextrose 50% Injectable 25 Gram(s) IV Push once  dextrose 50% Injectable 12.5 Gram(s) IV Push once  dextrose 50% Injectable 25 Gram(s) IV Push once  fluticasone propionate 50 MICROgram(s)/spray Nasal Spray 1 Spray(s) Both Nostrils two times a day  glucagon  Injectable 1 milliGRAM(s) IntraMuscular once  insulin glargine Injectable (LANTUS) 10 Unit(s) SubCutaneous at bedtime  insulin lispro (ADMELOG) corrective regimen sliding scale   SubCutaneous Before meals and at bedtime  insulin lispro Injectable (ADMELOG) 5 Unit(s) SubCutaneous three times a day before meals  lactulose Syrup 10 Gram(s) Oral daily  magnesium hydroxide Suspension 30 milliLiter(s) Oral daily  metoprolol succinate ER 50 milliGRAM(s) Oral daily  pantoprazole    Tablet 40 milliGRAM(s) Oral before breakfast  polyethylene glycol 3350 17 Gram(s) Oral every 12 hours  predniSONE   Tablet   Oral   predniSONE   Tablet 40 milliGRAM(s) Oral daily  senna 2 Tablet(s) Oral at bedtime    MEDICATIONS  (PRN):  albuterol    90 MICROgram(s) HFA Inhaler 2 Puff(s) Inhalation every 6 hours PRN Bronchospasm  dextrose Oral Gel 15 Gram(s) Oral once PRN Blood Glucose LESS THAN 70 milliGRAM(s)/deciliter  zolpidem 10 milliGRAM(s) Oral at bedtime PRN Insomnia    Vital Signs Last 24 Hrs  T(C): 36.4 (2025 13:00), Max: 36.4 (2025 20:07)  T(F): 97.6 (2025 13:00), Max: 97.6 (2025 20:07)  HR: 75 (2025 13:00) (69 - 77)  BP: 126/73 (2025 13:00) (126/73 - 155/64)  BP(mean): 90 (2025 13:00) (90 - 102)  RR: 18 (2025 13:00) (18 - 18)  SpO2: 95% (2025 13:00) (95% - 100%)    Parameters below as of 2025 13:00  Patient On (Oxygen Delivery Method): room air      CAPILLARY BLOOD GLUCOSE      POCT Blood Glucose.: 275 mg/dL (2025 16:52)  POCT Blood Glucose.: 317 mg/dL (2025 11:07)  POCT Blood Glucose.: 116 mg/dL (2025 07:43)  POCT Blood Glucose.: 391 mg/dL (2025 21:30)                          8.3    8.36  )-----------( 121      ( 2025 07:45 )             27.7     06-    140  |  93[L]  |  58[H]  ----------------------------<  125[H]  4.0   |  37[H]  |  1.3    Ca    9.3      2025 07:45  Phos  3.4     -  Mg     3.0     -    TPro  5.2[L]  /  Alb  3.2[L]  /  TBili  0.4  /  DBili  x   /  AST  18  /  ALT  25  /  AlkPhos  87  -    LIVER FUNCTIONS - ( 2025 07:45 )  Alb: 3.2 g/dL / Pro: 5.2 g/dL / ALK PHOS: 87 U/L / ALT: 25 U/L / AST: 18 U/L / GGT: x                 Urinalysis Basic - ( 2025 08:45 )    Color: Yellow / Appearance: Cloudy / S.014 / pH: x  Gluc: x / Ketone: x  / Bili: Negative / Urobili: 0.2 mg/dL   Blood: x / Protein: Trace mg/dL / Nitrite: Negative   Leuk Esterase: Negative / RBC: 10 /HPF / WBC 0 /HPF   Sq Epi: x / Non Sq Epi: 0 /HPF / Bacteria: Negative /HPF              Chart, Consultant(s) Notes Reviewed:  [x ] YES  [ ] NO  Care Discussed with Consultants/Other Providers/ Housestaff [ x] YES  [ ] NO  Radiology, labs, old available records personally reviewed.

## 2025-06-26 NOTE — PROGRESS NOTE ADULT - ASSESSMENT
IMPRESSION:    Acute hypercapnic/hypoxic respiratory failure - resolved  Pulmonary edema   ADRIA  Can't RO pneumonitis secondary to Tagrisso   HO COPD  HO small cell lung ca on Tagriso  HO left adenocarcinoma s/p LLL lobectomy  HO afib/PE on Eliquis     PLAN:        PULMONARY:    POCUS shows trace pleural effusion - Pt is on room air and asymptomatic   No indication for thoracentesis   Incentive spirometry, and early ambulation   HOB at 45 degrees.    Encourage NIV during sleep and PRN during the day.    Continue home inhaler regimen.    Albuterol PRN.    Prednisone taper over few weeks.         IMPRESSION:    Acute hypercapnic/hypoxic respiratory failure - resolved  Pulmonary edema   ADRIA  Can't RO pneumonitis secondary to Tagrisso   HO COPD  HO small cell lung ca on Tagriso  HO left adenocarcinoma s/p LLL lobectomy  HO afib/PE on Eliquis     PLAN:    On room air - no respiratory symptoms   POCUS shows trace pleural effusion   Maintain euvolemia   Consider lowering diuretics   Echo with normal EF   Slow prednisone taper can lower by 10mg per week  On Eliquis - VTE unlikely   Albuterol nebs as needed   Incentive spirometry   Recall as needed

## 2025-06-26 NOTE — CHART NOTE - NSCHARTNOTEFT_GEN_A_CORE
Case d/w Dr Morton, he said no need to re-address goals of care, he has spoken to the patient himself and her goals haven't changed. She is planning to leave soon to [facility].     - Full code  - previous goals of care discussions  - palliative care will sign off, re-consult as needed x [phone]

## 2025-06-26 NOTE — PROGRESS NOTE ADULT - ASSESSMENT
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    Patient has been decompensating since April after she underwent AAA stenting ( Has some renal artery leak). Was sent to nursing home. Was diagnosed with Ecoli/Pseudomonas UTIi treated with levaquin . Recently was having Shortness of breath/ cough ; treated with augmentin,. they checked theBNP 4199 ; followed Tye De Dios ; was started on Torsemide 20 mg.  PT developed increased shortness of breath and anxiety. Nursing home gave her lasix which improved her breathing status but worsened since the morning so was sent to the hospital.     #Acute Hypercapnic/ Hypoxic Respiratory Failure / multifactorial dyspnea/ acute on chronic CHF exacerbation   #H/O left lung adenocarcinoma s/p LLL lobectomy on Tagrisso  #H/O COPD  #R/o Tagrisso induce pneumonitis  - CHF protocol, fluid restriction 1200 ml in 24 hours   - intake and output monitoring, daily weight  - CXR (6/17/2025): Bilateral patchy opacities and left pleural effusion.   - TTE: EF 67%, mild LVH, severe LA dilation, TR/ PHTN   - pulmonary is following, recommendations noted:  - HOB at 45 degrees.  Encourage NIV during sleep and PRN during the day  -  Wean o2 as tolerated.  Continue home inhaler regimen.  Albuterol PRN  -  Repeat CXR noted, showed resolution of CHF   -6/26 change Bumex to PO    - Prednisone tapering as per pulm  - lung adenocarcinoma is in remission, recent PET scan reviewed , Tagrisso held ( pt was taking this medications for two years)  - hold Tagrisso as per pulm/onc     #Mild ADRIA (baseline creatinine ~1.3)  - Cr 1.5 on admission, previously normal  - Renal US:  Interval resolution of hydronephrosis since 5/12/25  - UA noted, blood and RBC  - monitor lytes, BMP    #Constipation  - high fiber diet   - c/w miralax BID, senna qhs    #Thrombocytopenia  - monitor platelets   - heme/onc eval 6/20 noted. continue to hold tagrisso and continue diuresis    #Atrial Fibrillation  #H/O PE  #HTN  #Diabetes  #HLD  #AAA s/p stent   - DASH/ CC diet  - monitor  F/s   - continue apixaban 2.5 mg q12h, aspirin 81 mg, amlodipine 5 mg, metoprolol XL 50 mg daily  - continue sliding scale insulin lispro   -  Lantus 10 units and Lispro 5 units with meals ( f/S noted)     #Anxiety/Insomnia (Chronic)/ anorexia   - continue zolpidem 10 mg, dronabinol 2.5 mg daily    # Prophylactic measures   - DVT PPx: apixaban   - GI PPx: PPI    #Progress Note Handoff  Pending: Clinical improvement and stability__x___Anticipate d/c in am  Pt/Family discussion: Pt/son informed and agree with the current plan. Housestaff spoke to daughter  Disposition: Home__vs    Nursing Home    To be determined____x____    My note supersedes the residents note should a discrepancy arise.    Chart and notes personally reviewed.  Care Discussed with Consultants/Other Providers/ Housestaff [ x] YES [ ] NO   Radiology, labs, old records personally reviewed.    discussed w/ housestaff, nursing, case management, pulm, son    Attestation Statements:    Attestation Statements:  Risk Statement (NON-critical care).     On this date of service, level of risk to patient is considered: High.     Due to: pt with illness causing risk to life or bodily fxn    Time-based billing (NON-critical care).     50 minutes spent on total encounter. The necessity of the time spent during the encounter on this date of service was due to:     time spent on review of labs, imaging studies, old records, obtaining history, personally examining patient, counselling and communicating with patient/ family, entering orders for medications/tests/etc, discussions with other health care providers, documentation in electronic health records, independent interpretation of labs, imaging/procedure results and care coordination. This excludes teaching time and/or separately reported services.

## 2025-06-26 NOTE — PROGRESS NOTE ADULT - SUBJECTIVE AND OBJECTIVE BOX
Patient is a 84y old  Female who presents with a chief complaint of Shortness of Breath (2025 16:19)        SUBJECTIVE:  No complaints no shortness of  breath     REVIEW OF SYSTEMS:    All Pertinent ROS are negative except per HPI       PHYSICAL EXAM  Vital Signs Last 24 Hrs  T(C): 36.3 (2025 04:50), Max: 36.8 (2025 15:05)  T(F): 97.3 (2025 04:50), Max: 98.3 (2025 15:05)  HR: 73 (2025 06:15) (69 - 82)  BP: 143/64 (2025 06:15) (115/67 - 155/64)  BP(mean): 94 (2025 04:50) (86 - 102)  RR: 18 (2025 04:50) (18 - 18)  SpO2: 100% (2025 04:50) (93% - 100%)    Parameters below as of 2025 04:50  Patient On (Oxygen Delivery Method): nasal cannula        CONSTITUTIONAL:    NAD    ENT:   Airway patent,   No thrush    CARDIAC:   Normal rate,   regular rhythm.    no edema      RESPIRATORY:   NO Wheezing  Nnormal chest expansion  Nnot tachypneic,  No use of accessory muscles    GASTROINTESTINAL:  Abdomen soft,   non-tender,   no guarding,   + BS    MUSCULOSKELETAL:   range of motion is not limited,  no clubbing, cyanosis      No evidence of rash.      25 @ 07:  -  25 @ 07:00  --------------------------------------------------------  IN:  Total IN: 0 mL    OUT:    Voided (mL): 800 mL  Total OUT: 800 mL    Total NET: -800 mL      25 @ 07:  -  25 @ 13:32  --------------------------------------------------------  IN:    Oral Fluid: 400 mL  Total IN: 400 mL    OUT:  Total OUT: 0 mL    Total NET: 400 mL          LABS:                          8.3    8.36  )-----------( 121      ( 2025 07:45 )             27.7                                                   140  |  93[L]  |  58[H]  ----------------------------<  125[H]  4.0   |  37[H]  |  1.3    Ca    9.3      2025 07:45  Phos  3.4       Mg     3.0         TPro  5.2[L]  /  Alb  3.2[L]  /  TBili  0.4  /  DBili  x   /  AST  18  /  ALT  25  /  AlkPhos  87                                               Urinalysis Basic - ( 2025 08:45 )    Color: Yellow / Appearance: Cloudy / S.014 / pH: x  Gluc: x / Ketone: x  / Bili: Negative / Urobili: 0.2 mg/dL   Blood: x / Protein: Trace mg/dL / Nitrite: Negative   Leuk Esterase: Negative / RBC: 10 /HPF / WBC 0 /HPF   Sq Epi: x / Non Sq Epi: 0 /HPF / Bacteria: Negative /HPF                                                  LIVER FUNCTIONS - ( 2025 07:45 )  Alb: 3.2 g/dL / Pro: 5.2 g/dL / ALK PHOS: 87 U/L / ALT: 25 U/L / AST: 18 U/L / GGT: x                                                                                                MEDICATIONS  (STANDING):  amLODIPine   Tablet 10 milliGRAM(s) Oral every 24 hours  apixaban 2.5 milliGRAM(s) Oral every 12 hours  aspirin enteric coated 81 milliGRAM(s) Oral daily  bumetanide Injectable 2 milliGRAM(s) IV Push daily  chlorhexidine 2% Cloths 1 Application(s) Topical <User Schedule>  dextrose 5%. 1000 milliLiter(s) (100 mL/Hr) IV Continuous <Continuous>  dextrose 5%. 1000 milliLiter(s) (50 mL/Hr) IV Continuous <Continuous>  dextrose 50% Injectable 25 Gram(s) IV Push once  dextrose 50% Injectable 12.5 Gram(s) IV Push once  dextrose 50% Injectable 25 Gram(s) IV Push once  fluticasone propionate 50 MICROgram(s)/spray Nasal Spray 1 Spray(s) Both Nostrils two times a day  glucagon  Injectable 1 milliGRAM(s) IntraMuscular once  insulin glargine Injectable (LANTUS) 10 Unit(s) SubCutaneous at bedtime  insulin lispro (ADMELOG) corrective regimen sliding scale   SubCutaneous Before meals and at bedtime  insulin lispro Injectable (ADMELOG) 3 Unit(s) SubCutaneous three times a day before meals  lactulose Syrup 10 Gram(s) Oral daily  magnesium hydroxide Suspension 30 milliLiter(s) Oral daily  metoprolol succinate ER 50 milliGRAM(s) Oral daily  pantoprazole    Tablet 40 milliGRAM(s) Oral before breakfast  polyethylene glycol 3350 17 Gram(s) Oral every 12 hours  predniSONE   Tablet   Oral   predniSONE   Tablet 40 milliGRAM(s) Oral daily  senna 2 Tablet(s) Oral at bedtime    MEDICATIONS  (PRN):  albuterol    90 MICROgram(s) HFA Inhaler 2 Puff(s) Inhalation every 6 hours PRN Bronchospasm  dextrose Oral Gel 15 Gram(s) Oral once PRN Blood Glucose LESS THAN 70 milliGRAM(s)/deciliter  zolpidem 10 milliGRAM(s) Oral at bedtime PRN Insomnia

## 2025-06-27 ENCOUNTER — TRANSCRIPTION ENCOUNTER (OUTPATIENT)
Age: 85
End: 2025-06-27

## 2025-06-27 LAB
ALBUMIN SERPL ELPH-MCNC: 3.4 G/DL — LOW (ref 3.5–5.2)
ALP SERPL-CCNC: 85 U/L — SIGNIFICANT CHANGE UP (ref 30–115)
ALT FLD-CCNC: 24 U/L — SIGNIFICANT CHANGE UP (ref 0–41)
ANION GAP SERPL CALC-SCNC: 10 MMOL/L — SIGNIFICANT CHANGE UP (ref 7–14)
AST SERPL-CCNC: 17 U/L — SIGNIFICANT CHANGE UP (ref 0–41)
BASOPHILS # BLD AUTO: 0 K/UL — SIGNIFICANT CHANGE UP (ref 0–0.2)
BASOPHILS NFR BLD AUTO: 0 % — SIGNIFICANT CHANGE UP (ref 0–1)
BILIRUB SERPL-MCNC: 0.3 MG/DL — SIGNIFICANT CHANGE UP (ref 0.2–1.2)
BUN SERPL-MCNC: 59 MG/DL — HIGH (ref 10–20)
CALCIUM SERPL-MCNC: 9.4 MG/DL — SIGNIFICANT CHANGE UP (ref 8.4–10.5)
CHLORIDE SERPL-SCNC: 92 MMOL/L — LOW (ref 98–110)
CO2 SERPL-SCNC: 37 MMOL/L — HIGH (ref 17–32)
CREAT SERPL-MCNC: 1.3 MG/DL — SIGNIFICANT CHANGE UP (ref 0.7–1.5)
EGFR: 41 ML/MIN/1.73M2 — LOW
EGFR: 41 ML/MIN/1.73M2 — LOW
EOSINOPHIL # BLD AUTO: 0.05 K/UL — SIGNIFICANT CHANGE UP (ref 0–0.7)
EOSINOPHIL NFR BLD AUTO: 0.5 % — SIGNIFICANT CHANGE UP (ref 0–8)
GLUCOSE BLDC GLUCOMTR-MCNC: 122 MG/DL — HIGH (ref 70–99)
GLUCOSE BLDC GLUCOMTR-MCNC: 232 MG/DL — HIGH (ref 70–99)
GLUCOSE BLDC GLUCOMTR-MCNC: 280 MG/DL — HIGH (ref 70–99)
GLUCOSE BLDC GLUCOMTR-MCNC: 299 MG/DL — HIGH (ref 70–99)
GLUCOSE SERPL-MCNC: 83 MG/DL — SIGNIFICANT CHANGE UP (ref 70–99)
HCT VFR BLD CALC: 28.6 % — LOW (ref 37–47)
HGB BLD-MCNC: 9.3 G/DL — LOW (ref 12–16)
IMM GRANULOCYTES NFR BLD AUTO: 0.7 % — HIGH (ref 0.1–0.3)
LYMPHOCYTES # BLD AUTO: 0.94 K/UL — LOW (ref 1.2–3.4)
LYMPHOCYTES # BLD AUTO: 8.6 % — LOW (ref 20.5–51.1)
MAGNESIUM SERPL-MCNC: 2.6 MG/DL — HIGH (ref 1.8–2.4)
MCHC RBC-ENTMCNC: 27.7 PG — SIGNIFICANT CHANGE UP (ref 27–31)
MCHC RBC-ENTMCNC: 32.5 G/DL — SIGNIFICANT CHANGE UP (ref 32–37)
MCV RBC AUTO: 85.1 FL — SIGNIFICANT CHANGE UP (ref 81–99)
MONOCYTES # BLD AUTO: 1.22 K/UL — HIGH (ref 0.1–0.6)
MONOCYTES NFR BLD AUTO: 11.2 % — HIGH (ref 1.7–9.3)
NEUTROPHILS # BLD AUTO: 8.6 K/UL — HIGH (ref 1.4–6.5)
NEUTROPHILS NFR BLD AUTO: 79 % — HIGH (ref 42.2–75.2)
NRBC BLD AUTO-RTO: 0 /100 WBCS — SIGNIFICANT CHANGE UP (ref 0–0)
PHOSPHATE SERPL-MCNC: 2.9 MG/DL — SIGNIFICANT CHANGE UP (ref 2.1–4.9)
PLATELET # BLD AUTO: 156 K/UL — SIGNIFICANT CHANGE UP (ref 130–400)
PMV BLD: 12.7 FL — HIGH (ref 7.4–10.4)
POTASSIUM SERPL-MCNC: 4 MMOL/L — SIGNIFICANT CHANGE UP (ref 3.5–5)
POTASSIUM SERPL-SCNC: 4 MMOL/L — SIGNIFICANT CHANGE UP (ref 3.5–5)
PROT SERPL-MCNC: 5.1 G/DL — LOW (ref 6–8)
RBC # BLD: 3.36 M/UL — LOW (ref 4.2–5.4)
RBC # FLD: 15.5 % — HIGH (ref 11.5–14.5)
SODIUM SERPL-SCNC: 139 MMOL/L — SIGNIFICANT CHANGE UP (ref 135–146)
WBC # BLD: 10.89 K/UL — HIGH (ref 4.8–10.8)
WBC # FLD AUTO: 10.89 K/UL — HIGH (ref 4.8–10.8)

## 2025-06-27 PROCEDURE — 99233 SBSQ HOSP IP/OBS HIGH 50: CPT

## 2025-06-27 PROCEDURE — 71250 CT THORAX DX C-: CPT | Mod: 26

## 2025-06-27 RX ADMIN — Medication 81 MILLIGRAM(S): at 11:31

## 2025-06-27 RX ADMIN — INSULIN LISPRO 5 UNIT(S): 100 INJECTION, SOLUTION INTRAVENOUS; SUBCUTANEOUS at 07:51

## 2025-06-27 RX ADMIN — APIXABAN 2.5 MILLIGRAM(S): 2.5 TABLET, FILM COATED ORAL at 17:14

## 2025-06-27 RX ADMIN — POLYETHYLENE GLYCOL 3350 17 GRAM(S): 17 POWDER, FOR SOLUTION ORAL at 17:14

## 2025-06-27 RX ADMIN — FLUTICASONE PROPIONATE 1 SPRAY(S): 50 SPRAY, METERED NASAL at 17:14

## 2025-06-27 RX ADMIN — INSULIN LISPRO 5 UNIT(S): 100 INJECTION, SOLUTION INTRAVENOUS; SUBCUTANEOUS at 11:32

## 2025-06-27 RX ADMIN — INSULIN LISPRO 4: 100 INJECTION, SOLUTION INTRAVENOUS; SUBCUTANEOUS at 21:33

## 2025-06-27 RX ADMIN — INSULIN GLARGINE-YFGN 10 UNIT(S): 100 INJECTION, SOLUTION SUBCUTANEOUS at 21:33

## 2025-06-27 RX ADMIN — PREDNISONE 40 MILLIGRAM(S): 20 TABLET ORAL at 05:11

## 2025-06-27 RX ADMIN — AMLODIPINE BESYLATE 10 MILLIGRAM(S): 10 TABLET ORAL at 11:31

## 2025-06-27 RX ADMIN — Medication 40 MILLIGRAM(S): at 05:13

## 2025-06-27 RX ADMIN — METOPROLOL SUCCINATE 50 MILLIGRAM(S): 50 TABLET, EXTENDED RELEASE ORAL at 05:11

## 2025-06-27 RX ADMIN — Medication 1 APPLICATION(S): at 05:11

## 2025-06-27 RX ADMIN — INSULIN LISPRO 5 UNIT(S): 100 INJECTION, SOLUTION INTRAVENOUS; SUBCUTANEOUS at 16:51

## 2025-06-27 RX ADMIN — INSULIN LISPRO 6: 100 INJECTION, SOLUTION INTRAVENOUS; SUBCUTANEOUS at 11:30

## 2025-06-27 RX ADMIN — BUMETANIDE 2 MILLIGRAM(S): 1 TABLET ORAL at 05:11

## 2025-06-27 RX ADMIN — APIXABAN 2.5 MILLIGRAM(S): 2.5 TABLET, FILM COATED ORAL at 05:10

## 2025-06-27 RX ADMIN — INSULIN LISPRO 6: 100 INJECTION, SOLUTION INTRAVENOUS; SUBCUTANEOUS at 16:50

## 2025-06-27 RX ADMIN — FLUTICASONE PROPIONATE 1 SPRAY(S): 50 SPRAY, METERED NASAL at 05:12

## 2025-06-27 NOTE — PHYSICAL THERAPY INITIAL EVALUATION ADULT - ORIENTATION, REHAB EVAL
person/place/situation
but is confused at times about timeline/oriented to person, place, time and situation

## 2025-06-27 NOTE — DISCHARGE NOTE PROVIDER - HOSPITAL COURSE
This 84-year-old female with a history of SCLC in remission, left lung adenocarcinoma status-post LLL lobectomy, PE on apixaban, repaired AAA, diabetes, hypertension, hyperlipidemia, and recent pneumonia and UTI presented with acute hypoxic and hypercapnic respiratory failure. Her presentation was concerning for several possible etiologies, including CHF exacerbation, recurrent pneumonia, and Tagrisso-induced pneumonitis.  On arrival, she was hypoxic and required BiPAP, nebulizers, steroids, and IV diuretics.      She was admitted to the ICU for close monitoring and management. IV antibiotics were initiated for suspected pneumonia but discontinued after negative blood and urine cultures.  Her ADRIA, evidenced by an elevated creatinine, improved with diuresis. Hematology/Oncology was consulted due to concurrent thrombocytopenia and anemia.  Given the patient's ongoing Tagrisso treatment, there was concern for drug-induced myelosuppression and pneumonitis. Tagrisso was held during the hospitalization. Hematology/Oncology recommended monitoring the platelet count, continuing apixaban and aspirin if the count remained above 50,000 and the patient wasn't bleeding.     Her respiratory status improved with diuresis and supportive care.  She was weaned to room air and transitioned to oral diuretics. A prednisone taper was prescribed, and she was instructed to continue her home inhaler regimen. Close outpatient follow-up was arranged with her PCP and specialists, including pulmonology, cardiology, and hematology/oncology. She was discharged to a subacute rehabilitation facility as per physical therapy recommendation. Discussion of discharge plan of care, including discharge diagnoses, medication reconciliation, and follow-ups was conducted with Dr. Reynoso, and discharge was approved.    This 84-year-old female with a history of SCLC in remission, left lung adenocarcinoma status-post LLL lobectomy, PE on apixaban, repaired AAA, diabetes, hypertension, hyperlipidemia, and recent pneumonia and UTI presented with acute hypoxic and hypercapnic respiratory failure. Her presentation was concerning for several possible etiologies, including CHF exacerbation, recurrent pneumonia, and Tagrisso-induced pneumonitis.  On arrival, she was hypoxic and required BiPAP, nebulizers, steroids, and IV diuretics.      She was admitted to the ICU for close monitoring and management. IV antibiotics were initiated for suspected pneumonia but discontinued after negative blood and urine cultures.  Her ADRIA, evidenced by an elevated creatinine, improved with diuresis. Hematology/Oncology was consulted due to concurrent thrombocytopenia and anemia.  Given the patient's ongoing Tagrisso treatment, there was concern for drug-induced myelosuppression and pneumonitis. Tagrisso was held during the hospitalization. Hematology/Oncology recommended monitoring the platelet count, continuing apixaban and aspirin if the count remained above 50,000 and the patient wasn't bleeding.     Her respiratory status improved with diuresis and supportive care.  She was weaned to room air and transitioned to oral diuretics. A prednisone taper was prescribed, and she was instructed to continue her home inhaler regimen. Close outpatient follow-up was arranged with her PCP and specialists, including pulmonology, cardiology, and hematology/oncology. She was discharged to a subacute rehabilitation facility as per physical therapy recommendation. Discussion of discharge plan of care, including discharge diagnoses, medication reconciliation, and follow-ups was conducted with Dr. winchester, and discharge was approved.

## 2025-06-27 NOTE — PHYSICAL THERAPY INITIAL EVALUATION ADULT - BED MOBILITY TRAINING, PT EVAL
by discharge : supine > sit to supervision
Pt will participate in supine to sit and reverse using side rails with supervision by discharge to facilitate return to PLOF.

## 2025-06-27 NOTE — DISCHARGE NOTE PROVIDER - CARE PROVIDER_API CALL
Eduard Dumont  Family Medicine  [address] [place] [address]  Phone: [phone]  Fax: [phone]  Follow Up Time: 1 week    Gian Guerrero  Hematology & Oncology  [address] [place] [address]  Phone: [phone]  Fax: [phone]  Follow Up Time: 2 weeks    Anirudh Gamble  Interventional Cardiology  [address] [place] [address]  Phone: [phone]  Fax: [phone]  Follow Up Time: Routine    Cody Sweet  Critical Care Medicine  [address] [place] [id number]  Phone: [phone]  Fax: [phone]  Follow Up Time: 2 weeks

## 2025-06-27 NOTE — PROGRESS NOTE ADULT - ASSESSMENT
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    Patient has been decompensating since April after she underwent AAA stenting ( Has some renal artery leak). Was sent to nursing home. Was diagnosed with Ecoli/Pseudomonas UTIi treated with levaquin . Recently was having Shortness of breath/ cough ; treated with augmentin,. they checked theBNP 4199 ; followed Tye De Dios ; was started on Torsemide 20 mg.  PT developed increased shortness of breath and anxiety. Nursing home gave her lasix which improved her breathing status but worsened since the morning so was sent to the hospital.     #Acute Hypercapnic/ Hypoxic Respiratory Failure / multifactorial dyspnea/ acute on chronic CHF exacerbation   #H/O left lung adenocarcinoma s/p LLL lobectomy on Tagrisso  #H/O COPD  #R/o Tagrisso induce pneumonitis  - CHF protocol, fluid restriction 1200 ml in 24 hours   - intake and output monitoring, daily weight  - CXR -- increased lt sided opacities  --spoke with daughter and she wanted CT chest as suggested by DR Soares  - TTE: EF 67%, mild LVH, severe LA dilation, TR/ PHTN   - pulmonary is following, recommendations noted:  - HOB at 45 degrees.  Encourage NIV during sleep and PRN during the day  -  Wean o2 as tolerated.  Continue home inhaler regimen.  Albuterol PRN  --6/26 change Bumex to PO    - Prednisone tapering as per pulm  - lung adenocarcinoma is in remission, recent PET scan reviewed , Tagrisso held ( pt was taking this medications for two years)  - hold Tagrisso as per pulm/onc     #Mild ADRIA (baseline creatinine ~1.3)  - Cr 1.5--->1.3 on admission, previously normal  - Renal US:  Interval resolution of hydronephrosis since 5/12/25  - UA noted, blood and RBC  -  #Constipation-- had BM yesterday  - high fiber diet   - c/w miralax BID, senna qhs    #Thrombocytopenia  -156<----121<--106  - heme/onc eval 6/20 noted. continue to hold tagrisso and continue diuresis    #Atrial Fibrillation  #H/O PE  #HTN  #Diabetes  #HLD  #AAA s/p stent   - DASH/ CC diet  - monitor  F/s   - continue apixaban 2.5 mg q12h, aspirin 81 mg, amlodipine 5 mg, metoprolol XL 50 mg daily  - continue sliding scale insulin lispro   -  Lantus 10 units and Lispro 5 units with meals ( f/S noted)     #Anxiety/Insomnia (Chronic)/ anorexia   - continue zolpidem 10 mg, dronabinol 2.5 mg daily    # Prophylactic measures   - DVT PPx: apixaban   - GI PPx: PPI    #Progress Note Handoff  spoke with daughter and she wants CT chest-- as suggested by Dr Soares-- she also feels that patient not treated properly for UTI-- had pseudomonas at Dr Cobb's office--urology office  UA was clean-- and can be repeated later in 1 week to get true infection.   planning DC back to snf in 1-2 days  (84y Female) with PMH of SCC lung cancer on tagrisso in remission, ho left lung adenocarcinoma s/p LLL lobectomy, PE on Eliquis, Atrial fibrillation ; COPD on home O2, AAA, CKD ( Baseline GFR 40s; creatinine 1.3 )  DM, HTN, HLd,, AAA s/p stent diagnosis of pneumonia UTI status post Augmentin and Levaquin presents for evaluation of shortness of breath x 2 days acutely worsening this morning.    Patient has been decompensating since April after she underwent AAA stenting ( Has some renal artery leak). Was sent to nursing home. Was diagnosed with Ecoli/Pseudomonas UTIi treated with levaquin . Recently was having Shortness of breath/ cough ; treated with augmentin,. they checked theBNP 4199 ; followed Tye De Dios ; was started on Torsemide 20 mg.  PT developed increased shortness of breath and anxiety. Nursing home gave her lasix which improved her breathing status but worsened since the morning so was sent to the hospital.     #Acute Hypercapnic/ Hypoxic Respiratory Failure / multifactorial dyspnea/ acute on chronic CHF exacerbation   #H/O left lung adenocarcinoma s/p LLL lobectomy on Tagrisso  #H/O COPD  #R/o Tagrisso induce pneumonitis  - CHF protocol, fluid restriction 1200 ml in 24 hours   - intake and output monitoring, daily weight  - CXR -- increased lt sided opacities  --spoke with daughter and she wanted CT chest as suggested by DR Soares-- though patient has hx of pneumonectomy.  - TTE: EF 67%, mild LVH, severe LA dilation, TR/ PHTN   - pulmonary is following, recommendations noted:  - HOB at 45 degrees.  Encourage NIV during sleep and PRN during the day  -  Wean o2 as tolerated.  Continue home inhaler regimen.  Albuterol PRN  --6/26 change Bumex to PO    - Prednisone tapering as per pulm  - lung adenocarcinoma is in remission, recent PET scan reviewed , Tagrisso held ( pt was taking this medications for two years)  - hold Tagrisso as per pulm/onc     #Mild ADRIA (baseline creatinine ~1.3)  - Cr 1.5--->1.3 on admission, previously normal  - Renal US:  Interval resolution of hydronephrosis since 5/12/25  - UA noted, blood and RBC  -  #Constipation-- had BM yesterday  - high fiber diet   - c/w miralax BID, senna qhs    #Thrombocytopenia  -156<----121<--106  - heme/onc eval 6/20 noted. continue to hold tagrisso and continue diuresis    #Atrial Fibrillation  #H/O PE  #HTN  #Diabetes  #HLD  #AAA s/p stent   - DASH/ CC diet  - monitor  F/s   - continue apixaban 2.5 mg q12h, aspirin 81 mg, amlodipine 5 mg, metoprolol XL 50 mg daily  - continue sliding scale insulin lispro   -  Lantus 10 units and Lispro 5 units with meals ( f/S noted)     #Anxiety/Insomnia (Chronic)/ anorexia   - continue zolpidem 10 mg, dronabinol 2.5 mg daily    # Prophylactic measures   - DVT PPx: apixaban   - GI PPx: PPI    #Progress Note Handoff  spoke with daughter and she wants CT chest-- as suggested by Dr Soares-- she also feels that patient not treated properly for UTI-- had pseudomonas at Dr Cobb's office--urology office  UA was clean-- and can be repeated later in 1 week to get true infection.   planning DC back to snf in 1-2 days-- spent more than 50mins --excluding resident teaching.

## 2025-06-27 NOTE — PHYSICAL THERAPY INITIAL EVALUATION ADULT - DIAGNOSIS, PT EVAL
rehab of Acute Hypercapnic/ Hypoxic Respiratory Failure / multifactorial dyspnea/ acute on chronic CHF exacerbation
gait dysfunction 2/2 Acute Hypercapnic/ Hypoxic Respiratory Failure / multifactorial dyspnea/ acute on chronic CHF exacerbation

## 2025-06-27 NOTE — DISCHARGE NOTE PROVIDER - NSDCMRMEDTOKEN_GEN_ALL_CORE_FT
albuterol 90 mcg/inh inhalation aerosol: 2 puff(s) inhaled every 6 hours as needed for Bronchospasm  amLODIPine 5 mg oral tablet: 1 tab(s) orally once a day  apixaban 2.5 mg oral tablet: 1 tab(s) orally every 12 hours  aspirin 81 mg oral capsule: 1 cap(s) orally once a day  Ditropan 5 mg oral tablet: 1 tab(s) orally once a day (at bedtime)  docusate sodium 100 mg oral tablet: 3 tab(s) orally once a day (at bedtime)  Flonase 50 mcg/inh nasal spray: 1 spray(s) in each nostril 2 times a day  fluticasone 50 mcg/inh nasal spray: 1 spray(s) nasal 2 times a day  Incruse Ellipta 62.5 mcg/inh inhalation powder: 1 puff(s) inhaled once a day  meclizine 25 mg oral tablet: 1 tab(s) orally once a day as needed for  dizziness  metoprolol succinate 25 mg oral tablet, extended release: 2 tab(s) orally once a day  MiraLax oral powder for reconstitution: 17 gram(s) orally once a day (at bedtime)  omeprazole 40 mg oral delayed release capsule: 1 cap(s) orally once a day  potassium chloride 20 mEq oral tablet, extended release: 1 tab(s) orally 3 times a week  senna (sennosides) 8.6 mg oral tablet: 1 tab(s) orally once a day  Symbicort 160 mcg-4.5 mcg/inh inhalation aerosol: 2 puff(s) inhaled 2 times a day  Tagrisso 80 mg oral tablet: 80 milligram(s) orally once a day  tamsulosin 0.4 mg oral capsule: 1 cap(s) orally once a day (at bedtime)  torsemide 20 mg oral tablet: 1 tab(s) orally 3 times a week every other day  Trulicity Pen 1.5 mg/0.5 mL subcutaneous solution: 1.5 milligram(s) subcutaneous once a week  Venofer 20 mg/mL intravenous solution: 100 milligram(s) intravenously 3 times a week  Vitamin D3 25 mcg (1000 intl units) oral tablet: 1 tab(s) orally once a day   albuterol 90 mcg/inh inhalation aerosol: 2 puff(s) inhaled every 6 hours as needed for Bronchospasm  amLODIPine 5 mg oral tablet: 1 tab(s) orally once a day  apixaban 2.5 mg oral tablet: 1 tab(s) orally every 12 hours  aspirin 81 mg oral capsule: 1 cap(s) orally once a day  bumetanide 1 mg oral tablet: 1 tab(s) orally once a day  Ditropan 5 mg oral tablet: 1 tab(s) orally once a day (at bedtime)  docusate sodium 100 mg oral tablet: 3 tab(s) orally once a day (at bedtime)  fluticasone 50 mcg/inh nasal spray: 1 spray(s) nasal 2 times a day  Incruse Ellipta 62.5 mcg/inh inhalation powder: 1 puff(s) inhaled once a day  meclizine 25 mg oral tablet: 1 tab(s) orally once a day as needed for  dizziness  metoprolol succinate 25 mg oral tablet, extended release: 2 tab(s) orally once a day  MiraLax oral powder for reconstitution: 17 gram(s) orally once a day (at bedtime)  omeprazole 40 mg oral delayed release capsule: 1 cap(s) orally once a day  predniSONE 5 mg oral tablet: 1 tab(s) orally once a day 8 tablets daily till 7/02/25   6 tablets daily starting from 7/03/25 till 7/09/25 for 7 days   4 tablets daily starting from 7/10/25 till 7/16/25 for 7 days   then 2 tablets starting 7/17/25 till 7/24/25 for 7 days, then 1 tab daily for 7 days, then stop MDD: 40mg  senna (sennosides) 8.6 mg oral tablet: 1 tab(s) orally once a day  Symbicort 160 mcg-4.5 mcg/inh inhalation aerosol: 2 puff(s) inhaled 2 times a day  tamsulosin 0.4 mg oral capsule: 1 cap(s) orally once a day (at bedtime)  Trulicity Pen 1.5 mg/0.5 mL subcutaneous solution: 1.5 milligram(s) subcutaneous once a week  Vitamin D3 25 mcg (1000 intl units) oral tablet: 1 tab(s) orally once a day

## 2025-06-27 NOTE — PHYSICAL THERAPY INITIAL EVALUATION ADULT - TINETTI GAIT TEST, REHAB EVAL
Based on Tinetti score, Pt at high risk of falling
8/28 Based on this test pt is a high risk for fall

## 2025-06-27 NOTE — CHART NOTE - NSCHARTNOTEFT_GEN_A_CORE
We had seen Ms Holly last week, with concern for pneumonitis (Tagrisso induced)  She has now significantly improved, on room air mostly at rest.   Given her rapid improvement with iv diuretics, signficantly elevated BNP on admission, this is more consistent with CHF rather than pneumonitis.  As per pulmonary note they cannot rule out pneumonitis so are recommending low prednisone taper.  Primary team is getting a CT chest to r/o pneumonitis.  If CT is clear, Tagrisso may be resumed on discharge.  However if any concern of opacities, tagrisso can be held on discharge and will arrange for follow up with Dr. Guerrero after discharge. We had seen Ms Holly last week, with concern for pneumonitis (Tagrisso induced)  She has now significantly improved, on room air mostly at rest.   Given her rapid improvement with iv diuretics, signficantly elevated BNP on admission, this is more consistent with CHF rather than pneumonitis.  As per pulmonary note they cannot rule out pneumonitis so are recommending low prednisone taper.  Primary team is getting a CT chest to r/o pneumonitis.  As per Dr. Guerrero, If CT is clear, Tagrisso may be resumed on discharge.  However if any concern of opacities, tagrisso can be held on discharge and will arrange for follow up with Dr. Guerrero after discharge.

## 2025-06-27 NOTE — DISCHARGE NOTE PROVIDER - NSDCFUADDAPPT_GEN_ALL_CORE_FT
APPTS ARE READY TO BE MADE: [ ] YES    Best Family or Patient Contact (if needed):    Additional Information about above appointments (if needed):    1: Hem-Onc  2: Pulmonology  3: PCP    Other comments or requests:    APPTS ARE READY TO BE MADE: [ ] YES    Best Family or Patient Contact (if needed):    Additional Information about above appointments (if needed):    1: Hem-Onc  2: Pulmonology  3: PCP    Other comments or requests:   follow up with PCP and specialists as scheduled. go to ed in case of new or worsening symptoms

## 2025-06-27 NOTE — PHYSICAL THERAPY INITIAL EVALUATION ADULT - IMPAIRMENTS FOUND, PT EVAL
aerobic capacity/endurance/gait, locomotion, and balance/muscle strength
gait, locomotion, and balance

## 2025-06-27 NOTE — DISCHARGE NOTE PROVIDER - PROVIDER TOKENS
PROVIDER:[TOKEN:[[id number]],FOLLOWUP:[1 week]],PROVIDER:[TOKEN:[[id number]],FOLLOWUP:[2 weeks]],PROVIDER:[TOKEN:[[id number]],FOLLOWUP:[Routine]],PROVIDER:[TOKEN:[[id number]],FOLLOWUP:[2 weeks]]

## 2025-06-27 NOTE — PHYSICAL THERAPY INITIAL EVALUATION ADULT - GAIT DEVIATIONS NOTED, PT EVAL
decreased gunnar/decreased weight-shifting ability
decreased gunnar/decreased step length/decreased stride length

## 2025-06-27 NOTE — PROGRESS NOTE ADULT - SUBJECTIVE AND OBJECTIVE BOX
SUBJECTIVE:    Patient is a 84y old Female who presents with a chief complaint of Shortness of Breath (27 Jun 2025 11:04)    Currently admitted to medicine with the primary diagnosis of Hypoxic respiratory failure       Today is hospital day 10d.     PAST MEDICAL & SURGICAL HISTORY  Hypertension, unspecified type    Type 2 diabetes mellitus with complication, without long-term current use of insulin    Hyperlipidemia, unspecified hyperlipidemia type    Diverticulitis    Obesity    COPD (chronic obstructive pulmonary disease)    SOB (shortness of breath)    GERD (gastroesophageal reflux disease)    Lung cancer    ARTIE (obstructive sleep apnea)  NO CPAP MACHINE PER PT.    Cancer of kidney    Cancer of thyroid    Former smoker    NHL (non-Hodgkin's lymphoma)  "low grade" per heme/ onc note    History of abdominal aortic aneurysm (AAA)    Kidney stones    [place] (hard of hearing)    History of surgery  LEFT LOWER LOBECTOMY    History of surgery  BILATERAL KNEE REPLACEMENT    History of surgery  CATARACT RIGHT EYE    History of surgery  TUBAL LIGATION    History of surgery  CYST REMOVED  RIGHT BREAST    History of surgery  CHOLECYSTECOMY    History of surgery  RIGHT PARTIAL NEPHRECTOMY    History of surgery  BILATERAL CATARACT SURGERY    History of back surgery    H/O lithotripsy    H/O partial thyroidectomy      ALLERGIES:  No Known Allergies    MEDICATIONS:  STANDING MEDICATIONS  amLODIPine   Tablet 10 milliGRAM(s) Oral every 24 hours  apixaban 2.5 milliGRAM(s) Oral every 12 hours  aspirin enteric coated 81 milliGRAM(s) Oral daily  bumetanide 2 milliGRAM(s) Oral daily  chlorhexidine 2% Cloths 1 Application(s) Topical <User Schedule>  dextrose 5%. 1000 milliLiter(s) IV Continuous <Continuous>  dextrose 5%. 1000 milliLiter(s) IV Continuous <Continuous>  dextrose 50% Injectable 25 Gram(s) IV Push once  dextrose 50% Injectable 12.5 Gram(s) IV Push once  dextrose 50% Injectable 25 Gram(s) IV Push once  fluticasone propionate 50 MICROgram(s)/spray Nasal Spray 1 Spray(s) Both Nostrils two times a day  glucagon  Injectable 1 milliGRAM(s) IntraMuscular once  insulin glargine Injectable (LANTUS) 10 Unit(s) SubCutaneous at bedtime  insulin lispro (ADMELOG) corrective regimen sliding scale   SubCutaneous Before meals and at bedtime  insulin lispro Injectable (ADMELOG) 5 Unit(s) SubCutaneous three times a day before meals  lactulose Syrup 10 Gram(s) Oral daily  magnesium hydroxide Suspension 30 milliLiter(s) Oral daily  metoprolol succinate ER 50 milliGRAM(s) Oral daily  pantoprazole    Tablet 40 milliGRAM(s) Oral before breakfast  polyethylene glycol 3350 17 Gram(s) Oral every 12 hours  predniSONE   Tablet 40  Oral   predniSONE   Tablet 40 milliGRAM(s) Oral daily  senna 2 Tablet(s) Oral at bedtime    PRN MEDICATIONS  albuterol    90 MICROgram(s) HFA Inhaler 2 Puff(s) Inhalation every 6 hours PRN  dextrose Oral Gel 15 Gram(s) Oral once PRN    VITALS:   T(F): 98.9  HR: 76  BP: 142/63  RR: 18  SpO2: 99%    LABS:                        9.3    10.89 )-----------( 156      ( 27 Jun 2025 06:35 )             28.6     06-27    139  |  92[L]  |  59[H]  ----------------------------<  83  4.0   |  37[H]  |  1.3    Ca    9.4      27 Jun 2025 06:35  Phos  2.9     06-27  Mg     2.6     06-27    TPro  5.1[L]  /  Alb  3.4[L]  /  TBili  0.3  /  DBili  x   /  AST  17  /  ALT  24  /  AlkPhos  85  06-27      Urinalysis Basic - ( 27 Jun 2025 06:35 )    Color: x / Appearance: x / SG: x / pH: x  Gluc: 83 mg/dL / Ketone: x  / Bili: x / Urobili: x   Blood: x / Protein: x / Nitrite: x   Leuk Esterase: x / RBC: x / WBC x   Sq Epi: x / Non Sq Epi: x / Bacteria: x                RADIOLOGY:    PHYSICAL EXAM:  GEN: No acute distress  LUNGS: decreased lt side   HEART: S1/S2 present. RRR.   ABD/ GI: Soft, non-tender, non-distended. Bowel sounds present  EXT: NC/NC/NE/2+PP/KWONG  NEURO: AAOX3

## 2025-06-27 NOTE — PHYSICAL THERAPY INITIAL EVALUATION ADULT - PERTINENT HX OF CURRENT PROBLEM, REHAB EVAL
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    Patient has been decompensating since April after she underwent AAA stenting ( Has some renal artery leak). Was sent to nursing home. Was diagnosed with Ecoli/Pseudomonas UTIi treated with levaquin . Recently was having Shortness of breath/ cough ; treated with augmentin,. they checked theBNP 4199 ; followed Tye De Dios ; was started on Torsemide 20 mg other day.   Since yesterday had increased shortness of breath and anxiety. Nursing home gave her lasix which improved her breathing status but worsened since the morning  so was sent to the hospital.
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

## 2025-06-27 NOTE — PHYSICAL THERAPY INITIAL EVALUATION ADULT - GAIT TRAINING, PT EVAL
by discharge: 40 ft x 2 with RW supervision
Pt will ambulate using RW or least restrictive AD for 100 ft with supervision by discharge to facilitate return to PLOF.

## 2025-06-27 NOTE — PROGRESS NOTE ADULT - SUBJECTIVE AND OBJECTIVE BOX
SUBJECTIVE/OVERNIGHT EVENTS  Today is hospital day 10d. This morning patient was seen and examined at bedside, resting comfortably in bed. No acute or major events overnight.    MEDICATIONS  STANDING MEDICATIONS  amLODIPine   Tablet 10 milliGRAM(s) Oral every 24 hours  apixaban 2.5 milliGRAM(s) Oral every 12 hours  aspirin enteric coated 81 milliGRAM(s) Oral daily  bumetanide 2 milliGRAM(s) Oral daily  chlorhexidine 2% Cloths 1 Application(s) Topical <User Schedule>  dextrose 5%. 1000 milliLiter(s) IV Continuous <Continuous>  dextrose 5%. 1000 milliLiter(s) IV Continuous <Continuous>  dextrose 50% Injectable 25 Gram(s) IV Push once  dextrose 50% Injectable 12.5 Gram(s) IV Push once  dextrose 50% Injectable 25 Gram(s) IV Push once  fluticasone propionate 50 MICROgram(s)/spray Nasal Spray 1 Spray(s) Both Nostrils two times a day  glucagon  Injectable 1 milliGRAM(s) IntraMuscular once  insulin glargine Injectable (LANTUS) 10 Unit(s) SubCutaneous at bedtime  insulin lispro (ADMELOG) corrective regimen sliding scale   SubCutaneous Before meals and at bedtime  insulin lispro Injectable (ADMELOG) 5 Unit(s) SubCutaneous three times a day before meals  lactulose Syrup 10 Gram(s) Oral daily  magnesium hydroxide Suspension 30 milliLiter(s) Oral daily  metoprolol succinate ER 50 milliGRAM(s) Oral daily  pantoprazole    Tablet 40 milliGRAM(s) Oral before breakfast  polyethylene glycol 3350 17 Gram(s) Oral every 12 hours  predniSONE   Tablet 40  Oral   predniSONE   Tablet 40 milliGRAM(s) Oral daily  senna 2 Tablet(s) Oral at bedtime    PRN MEDICATIONS  albuterol    90 MICROgram(s) HFA Inhaler 2 Puff(s) Inhalation every 6 hours PRN  dextrose Oral Gel 15 Gram(s) Oral once PRN    VITALS  T(F): 97.6 (06-27-25 @ 13:38), Max: 98.9 (06-27-25 @ 04:22)  HR: 78 (06-27-25 @ 13:38) (72 - 78)  BP: 137/80 (06-27-25 @ 13:38) (137/80 - 142/73)  RR: 18 (06-27-25 @ 13:38) (18 - 18)  SpO2: 98% (06-27-25 @ 13:38) (94% - 99%)  POCT Blood Glucose.: 280 mg/dL (06-27-25 @ 11:20)  POCT Blood Glucose.: 122 mg/dL (06-27-25 @ 07:45)  POCT Blood Glucose.: 240 mg/dL (06-26-25 @ 20:50)  POCT Blood Glucose.: 275 mg/dL (06-26-25 @ 16:52)    PHYSICAL EXAM    General: NAD, well appearing  Cardio: +S1/S2, no murmurs  Pulm: CTA b/l, no wheezing  Abd: no TTP, nondistended, no organomegaly  Neuro: AAOx4, neurovascularly intact  Musculoskeletal no edema  Skin: no rashes    LABS             9.3    10.89 )-----------( 156      ( 06-27-25 @ 06:35 )             28.6     139  |  92  |  59  -------------------------<  83   06-27-25 @ 06:35  4.0  |  37  |  1.3    Ca      9.4     06-27-25 @ 06:35  Phos   2.9     06-27-25 @ 06:35  Mg     2.6     06-27-25 @ 06:35    TPro  5.1  /  Alb  3.4  /  TBili  0.3  /  DBili  x   /  AST  17  /  ALT  24  /  AlkPhos  85  /  GGT  x     06-27-25 @ 06:35        Urinalysis Basic - ( 27 Jun 2025 06:35 )    Color: x / Appearance: x / SG: x / pH: x  Gluc: 83 mg/dL / Ketone: x  / Bili: x / Urobili: x   Blood: x / Protein: x / Nitrite: x   Leuk Esterase: x / RBC: x / WBC x   Sq Epi: x / Non Sq Epi: x / Bacteria: x          IMAGING

## 2025-06-27 NOTE — PHYSICAL THERAPY INITIAL EVALUATION ADULT - GENERAL OBSERVATIONS, REHAB EVAL
9:55-10:36 Pt encounterded semifowler in bed in NAD. SPO2 on room air 92-95% at rest, 92-93% after short distance ambulation to b/s chair. Pt c/o of dizziness which improved but did not subside. BP supine: 117/67, seated: 135/73.
11:00-11:35. chart reviewed. Pt received semi-conn at B/S, [place] need increase volume, alert, oriented X 3, able to follow multi-step instructions and agreeable to PT evaluation. + IV, + O2 3 L via NC Spo2 t/o session 95-92%, + primaift, + monitoring, -ve orthostatic, + 1:1 observation. VSS, NAD.

## 2025-06-27 NOTE — PHYSICAL THERAPY INITIAL EVALUATION ADULT - TRANSFER TRAINING, PT EVAL
by discharge: sit <> stand to supervision with RW
Pt will transfer from bed to chair and reverse using RW with supervision to facilitate return to PLOF.

## 2025-06-27 NOTE — PHYSICAL THERAPY INITIAL EVALUATION ADULT - PHYSICAL ASSIST/NONPHYSICAL ASSIST: STAND/SIT, REHAB EVAL
Clinic Care Coordination Contact  [facility]/Voicemail     Clinical Data:  CC Outreach for SW CC, Nahed Truex  Outreach attempted on 3/6/24 ; total outreach attempts x 1:   Left message on parent's voicemail with call back information and requested return call for SW CC, Nahed Truex  Additional Information:    Status: Patient is on SW CC, Nahed Truex panel, status as enrolled.   Plan: [facility] CC to continue outreach attempts.      LITO Cast for Nahed Truex, LITO  , Care Coordination  [facility]  [phone]        
verbal cues
verbal cues/1 person assist

## 2025-06-27 NOTE — DISCHARGE NOTE PROVIDER - NSDCCPCAREPLAN_GEN_ALL_CORE_FT
PRINCIPAL DISCHARGE DIAGNOSIS  Diagnosis: Hypoxic respiratory failure  Assessment and Plan of Treatment: You presented with decreased oxygen saturation and required BiPAP, nebulizers, steroids, and IV diuretics. You were admitted to the ICU for close monitoring and management. IV antibiotics were initiated for suspected pneumonia and discontinued after negative blood and urine cultures. Hematology/Oncology was consulted due to concurrent thrombocytopenia and anemia.  Given your ongoing Tagrisso treatment, there was concern for drug-induced adverse effects on platelets and also concerns for pneumonitis(inflammation of your lung) Tagrisso was held during the hospitalization. Your respiratory status improved with diuresis and supportive care.  You were weaned to room air and transitioned to oral diuretics. You will be discharged on a oral prednsione taper to be decreased by 10 mg every week. Please follow up with your PCP and specialists, including pulmonology, cardiology, and hematology/oncology. Please continue taking all your medications.     PRINCIPAL DISCHARGE DIAGNOSIS  Diagnosis: Hypoxic respiratory failure  Assessment and Plan of Treatment: You presented with decreased oxygen saturation and required BiPAP, nebulizers, steroids, and IV diuretics. You were admitted to the ICU for close monitoring and management. IV antibiotics were initiated for suspected pneumonia and discontinued after negative blood and urine cultures. Hematology/Oncology was consulted due to concurrent thrombocytopenia and anemia.  Given your ongoing Tagrisso treatment, there was concern for drug-induced adverse effects on platelets and also concerns for pneumonitis(inflammation of your lung) Tagrisso was held during the hospitalization. Your respiratory status improved with diuresis and supportive care.  You were weaned to room air and transitioned to oral diuretics. You will be discharged on a oral prednsione taper to be decreased by 10 mg every week. Please follow up with your PCP and specialists, including pulmonology, cardiology, and hematology/oncology. Please continue taking all your medications. Do not resume Tagrisso until follow up with Dr. Guerrero.

## 2025-06-27 NOTE — PROGRESS NOTE ADULT - ASSESSMENT
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    Patient has been decompensating since April after she underwent AAA stenting ( Has some renal artery leak). Was sent to nursing home. Was diagnosed with Ecoli/Pseudomonas UTIi treated with levaquin . Recently was having Shortness of breath/ cough ; treated with augmentin,. they checked theBNP 4199 ; followed Tye De Dios ; was started on Torsemide 20 mg.  PT developed increased shortness of breath and anxiety. Nursing home gave her lasix which improved her breathing status but worsened since the morning so was sent to the hospital.     #Acute Hypercapnic/ Hypoxic Respiratory Failure / multifactorial dyspnea/ acute on chronic CHF exacerbation   #H/O left lung adenocarcinoma s/p LLL lobectomy on Tagrisso  #H/O COPD  #R/o Tagrisso induce pneumonitis  - CHF protocol, fluid restriction 1200 ml in 24 hours   - intake and output monitoring, daily weight  - CXR -- increased lt sided opacities  --spoke with daughter and she wanted CT chest as suggested by DR Soares-- though patient has hx of pneumonectomy.  - TTE: EF 67%, mild LVH, severe LA dilation, TR/ PHTN   - pulmonary is following, recommendations noted:  - HOB at 45 degrees.  Encourage NIV during sleep and PRN during the day  -  Wean o2 as tolerated.  Continue home inhaler regimen.  Albuterol PRN  - 6/26 change Bumex to PO    - Prednisone tapering as per pulm  - lung adenocarcinoma is in remission, recent PET scan reviewed , Tagrisso held ( pt was taking this medications for two years)  - hold Tagrisso as per pulm/onc   - F/U CT Chest non-contrast    #Mild ADRIA (baseline creatinine ~1.3)  - Cr 1.5--->1.3 on admission, previously normal  - Renal US:  Interval resolution of hydronephrosis since 5/12/25  - UA noted, blood and RBC    #Constipation-- had BM yesterday  - high fiber diet   - c/w miralax BID, senna qhs    #Thrombocytopenia  -156<----121<--106  - heme/onc eval 6/20 noted. continue to hold tagrisso and continue diuresis    #Atrial Fibrillation  #H/O PE  #HTN  #Diabetes  #HLD  #AAA s/p stent   - DASH/ CC diet  - monitor  F/s   - continue apixaban 2.5 mg q12h, aspirin 81 mg, amlodipine 5 mg, metoprolol XL 50 mg daily  - continue sliding scale insulin lispro   -  Lantus 10 units and Lispro 5 units with meals ( f/S noted)     #Anxiety/Insomnia (Chronic)/ anorexia   - continue zolpidem 10 mg, dronabinol 2.5 mg daily    # Prophylactic measures   - DVT PPx: apixaban   - GI PPx: PPI    #Progress Note Handoff  spoke with daughter and she wants CT chest-- as suggested by Dr Soares-- she also feels that patient not treated properly for UTI-- had pseudomonas at Dr Cobb's office--urology office  UA was clean-- and can be repeated later in 1 week to get true infection.   planning DC back to snf in 1-2 days-- spent more than 50mins --excluding resident teaching.

## 2025-06-27 NOTE — DISCHARGE NOTE PROVIDER - ATTENDING DISCHARGE PHYSICAL EXAMINATION:
Patient seen at bedside. d/w pulm and oncology, cleared for discharge. I talked ot daughter on the phone. She is also in agreement with discharge planning. patient to follow up with pcp and specialists as scheduled. advised to go to ed in case of new or worsening symptoms. daughter verbalized and agreed with discharge planning.   as per oncology hold tagrisso for now  and op follow up.

## 2025-06-27 NOTE — PHYSICAL THERAPY INITIAL EVALUATION ADULT - ADDITIONAL COMMENTS
Pt was admitted from NH for STR.
Pt admitted from [facility], required A x 1 for transfers and ambulation with RW

## 2025-06-27 NOTE — DISCHARGE NOTE PROVIDER - CARE PROVIDERS DIRECT ADDRESSES
General
,[email or web address] ([email or web address]

## 2025-06-28 LAB
ALBUMIN SERPL ELPH-MCNC: 3 G/DL — LOW (ref 3.5–5.2)
ALP SERPL-CCNC: 83 U/L — SIGNIFICANT CHANGE UP (ref 30–115)
ALT FLD-CCNC: 27 U/L — SIGNIFICANT CHANGE UP (ref 0–41)
ANION GAP SERPL CALC-SCNC: 10 MMOL/L — SIGNIFICANT CHANGE UP (ref 7–14)
AST SERPL-CCNC: 18 U/L — SIGNIFICANT CHANGE UP (ref 0–41)
BASOPHILS # BLD AUTO: 0.01 K/UL — SIGNIFICANT CHANGE UP (ref 0–0.2)
BASOPHILS NFR BLD AUTO: 0.1 % — SIGNIFICANT CHANGE UP (ref 0–1)
BILIRUB SERPL-MCNC: 0.3 MG/DL — SIGNIFICANT CHANGE UP (ref 0.2–1.2)
BUN SERPL-MCNC: 55 MG/DL — HIGH (ref 10–20)
CALCIUM SERPL-MCNC: 9.1 MG/DL — SIGNIFICANT CHANGE UP (ref 8.4–10.5)
CHLORIDE SERPL-SCNC: 90 MMOL/L — LOW (ref 98–110)
CO2 SERPL-SCNC: 36 MMOL/L — HIGH (ref 17–32)
CREAT SERPL-MCNC: 1.2 MG/DL — SIGNIFICANT CHANGE UP (ref 0.7–1.5)
EGFR: 45 ML/MIN/1.73M2 — LOW
EGFR: 45 ML/MIN/1.73M2 — LOW
EOSINOPHIL # BLD AUTO: 0.04 K/UL — SIGNIFICANT CHANGE UP (ref 0–0.7)
EOSINOPHIL NFR BLD AUTO: 0.4 % — SIGNIFICANT CHANGE UP (ref 0–8)
GLUCOSE BLDC GLUCOMTR-MCNC: 108 MG/DL — HIGH (ref 70–99)
GLUCOSE BLDC GLUCOMTR-MCNC: 208 MG/DL — HIGH (ref 70–99)
GLUCOSE BLDC GLUCOMTR-MCNC: 217 MG/DL — HIGH (ref 70–99)
GLUCOSE BLDC GLUCOMTR-MCNC: 325 MG/DL — HIGH (ref 70–99)
GLUCOSE SERPL-MCNC: 71 MG/DL — SIGNIFICANT CHANGE UP (ref 70–99)
HCT VFR BLD CALC: 28.1 % — LOW (ref 37–47)
HGB BLD-MCNC: 8.4 G/DL — LOW (ref 12–16)
IMM GRANULOCYTES NFR BLD AUTO: 0.4 % — HIGH (ref 0.1–0.3)
LYMPHOCYTES # BLD AUTO: 1.29 K/UL — SIGNIFICANT CHANGE UP (ref 1.2–3.4)
LYMPHOCYTES # BLD AUTO: 14.4 % — LOW (ref 20.5–51.1)
MAGNESIUM SERPL-MCNC: 2.4 MG/DL — SIGNIFICANT CHANGE UP (ref 1.8–2.4)
MCHC RBC-ENTMCNC: 25.4 PG — LOW (ref 27–31)
MCHC RBC-ENTMCNC: 29.9 G/DL — LOW (ref 32–37)
MCV RBC AUTO: 84.9 FL — SIGNIFICANT CHANGE UP (ref 81–99)
MONOCYTES # BLD AUTO: 1.17 K/UL — HIGH (ref 0.1–0.6)
MONOCYTES NFR BLD AUTO: 13.1 % — HIGH (ref 1.7–9.3)
NEUTROPHILS # BLD AUTO: 6.39 K/UL — SIGNIFICANT CHANGE UP (ref 1.4–6.5)
NEUTROPHILS NFR BLD AUTO: 71.6 % — SIGNIFICANT CHANGE UP (ref 42.2–75.2)
NRBC BLD AUTO-RTO: 0 /100 WBCS — SIGNIFICANT CHANGE UP (ref 0–0)
PHOSPHATE SERPL-MCNC: 2.8 MG/DL — SIGNIFICANT CHANGE UP (ref 2.1–4.9)
PLATELET # BLD AUTO: 150 K/UL — SIGNIFICANT CHANGE UP (ref 130–400)
PMV BLD: 12.7 FL — HIGH (ref 7.4–10.4)
POTASSIUM SERPL-MCNC: 3.9 MMOL/L — SIGNIFICANT CHANGE UP (ref 3.5–5)
POTASSIUM SERPL-SCNC: 3.9 MMOL/L — SIGNIFICANT CHANGE UP (ref 3.5–5)
PROT SERPL-MCNC: 4.6 G/DL — LOW (ref 6–8)
RBC # BLD: 3.31 M/UL — LOW (ref 4.2–5.4)
RBC # FLD: 15.3 % — HIGH (ref 11.5–14.5)
SODIUM SERPL-SCNC: 136 MMOL/L — SIGNIFICANT CHANGE UP (ref 135–146)
WBC # BLD: 8.94 K/UL — SIGNIFICANT CHANGE UP (ref 4.8–10.8)
WBC # FLD AUTO: 8.94 K/UL — SIGNIFICANT CHANGE UP (ref 4.8–10.8)

## 2025-06-28 PROCEDURE — 99232 SBSQ HOSP IP/OBS MODERATE 35: CPT

## 2025-06-28 RX ORDER — BUMETANIDE 1 MG/1
1 TABLET ORAL DAILY
Refills: 0 | Status: DISCONTINUED | OUTPATIENT
Start: 2025-06-28 | End: 2025-07-01

## 2025-06-28 RX ADMIN — Medication 40 MILLIGRAM(S): at 05:19

## 2025-06-28 RX ADMIN — INSULIN LISPRO 5 UNIT(S): 100 INJECTION, SOLUTION INTRAVENOUS; SUBCUTANEOUS at 17:30

## 2025-06-28 RX ADMIN — APIXABAN 2.5 MILLIGRAM(S): 2.5 TABLET, FILM COATED ORAL at 05:18

## 2025-06-28 RX ADMIN — INSULIN LISPRO 4: 100 INJECTION, SOLUTION INTRAVENOUS; SUBCUTANEOUS at 12:29

## 2025-06-28 RX ADMIN — AMLODIPINE BESYLATE 10 MILLIGRAM(S): 10 TABLET ORAL at 12:28

## 2025-06-28 RX ADMIN — INSULIN GLARGINE-YFGN 10 UNIT(S): 100 INJECTION, SOLUTION SUBCUTANEOUS at 21:26

## 2025-06-28 RX ADMIN — FLUTICASONE PROPIONATE 1 SPRAY(S): 50 SPRAY, METERED NASAL at 17:31

## 2025-06-28 RX ADMIN — INSULIN LISPRO 5 UNIT(S): 100 INJECTION, SOLUTION INTRAVENOUS; SUBCUTANEOUS at 08:18

## 2025-06-28 RX ADMIN — BUMETANIDE 2 MILLIGRAM(S): 1 TABLET ORAL at 05:20

## 2025-06-28 RX ADMIN — PREDNISONE 40 MILLIGRAM(S): 20 TABLET ORAL at 05:19

## 2025-06-28 RX ADMIN — FLUTICASONE PROPIONATE 1 SPRAY(S): 50 SPRAY, METERED NASAL at 05:20

## 2025-06-28 RX ADMIN — INSULIN LISPRO 4: 100 INJECTION, SOLUTION INTRAVENOUS; SUBCUTANEOUS at 17:30

## 2025-06-28 RX ADMIN — INSULIN LISPRO 8: 100 INJECTION, SOLUTION INTRAVENOUS; SUBCUTANEOUS at 21:26

## 2025-06-28 RX ADMIN — APIXABAN 2.5 MILLIGRAM(S): 2.5 TABLET, FILM COATED ORAL at 17:31

## 2025-06-28 RX ADMIN — METOPROLOL SUCCINATE 50 MILLIGRAM(S): 50 TABLET, EXTENDED RELEASE ORAL at 05:19

## 2025-06-28 RX ADMIN — INSULIN LISPRO 5 UNIT(S): 100 INJECTION, SOLUTION INTRAVENOUS; SUBCUTANEOUS at 12:29

## 2025-06-28 RX ADMIN — Medication 81 MILLIGRAM(S): at 12:28

## 2025-06-28 RX ADMIN — Medication 1 APPLICATION(S): at 05:34

## 2025-06-28 NOTE — PROGRESS NOTE ADULT - ASSESSMENT
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    Patient has been decompensating since April after she underwent AAA stenting ( Has some renal artery leak). Was sent to nursing home. Was diagnosed with Ecoli/Pseudomonas UTIi treated with levaquin . Recently was having Shortness of breath/ cough ; treated with augmentin,. they checked theBNP 4199 ; followed Tye De Dios ; was started on Torsemide 20 mg.  PT developed increased shortness of breath and anxiety. Nursing home gave her lasix which improved her breathing status but worsened since the morning so was sent to the hospital.     #Acute Hypercapnic/ Hypoxic Respiratory Failure / multifactorial dyspnea/ acute on chronic CHF exacerbation   #H/O left lung adenocarcinoma s/p LLL lobectomy on Tagrisso  #H/O COPD  #R/o Tagrisso induce pneumonitis  - CHF protocol, fluid restriction 1200 ml in 24 hours   - intake and output monitoring, daily weight  - CXR -- increased lt sided opacities  --spoke with daughter and she wanted CT chest as suggested by DR Soares-- though patient has hx of pneumonectomy.  - TTE: EF 67%, mild LVH, severe LA dilation, TR/ PHTN   - pulmonary is following, recommendations noted:  - HOB at 45 degrees.  Encourage NIV during sleep and PRN during the day  -  Wean o2 as tolerated.  Continue home inhaler regimen.  Albuterol PRN  --6/26 change Bumex to PO, will change to 1 mg qd for now as swelling has resolved and decreasing sodium  - Prednisone tapering as per pulm  - lung adenocarcinoma is in remission, recent PET scan reviewed , Tagrisso held ( pt was taking this medications for two years)  - hold Tagrisso as per pulm/onc  - f/u ABG, daughter and team encouraging patient to use bipap  - f/u CT chest read    #Mild ADRIA (baseline creatinine ~1.3)  - Cr 1.5--->1.3 on admission, previously normal  - Renal US:  Interval resolution of hydronephrosis since 5/12/25  - UA noted, blood and RBC  -  #Constipation-- had BM yesterday  - high fiber diet   - c/w miralax BID, senna qhs    #Thrombocytopenia  -156<----121<--106  - heme/onc eval 6/20 noted. continue to hold tagrisso and continue diuresis    #Atrial Fibrillation  #H/O PE  #HTN  #Diabetes  #HLD  #AAA s/p stent   - DASH/ CC diet  - monitor  F/s   - continue apixaban 2.5 mg q12h, aspirin 81 mg, amlodipine 5 mg, metoprolol XL 50 mg daily  - continue sliding scale insulin lispro   -  Lantus 10 units and Lispro 5 units with meals ( f/S noted)     #Anxiety/Insomnia (Chronic)/ anorexia   - continue zolpidem 10 mg, dronabinol 2.5 mg daily    # Prophylactic measures   - DVT PPx: apixaban   - GI PPx: PPI     planning DC back to snf in 1-2 days

## 2025-06-28 NOTE — PROGRESS NOTE ADULT - SUBJECTIVE AND OBJECTIVE BOX
SUBJECTIVE/OVERNIGHT EVENTS  Today is hospital day 11d. This morning patient was seen and examined at bedside, resting comfortably in bed. No acute or major events overnight.    MEDICATIONS  STANDING MEDICATIONS  amLODIPine   Tablet 10 milliGRAM(s) Oral every 24 hours  apixaban 2.5 milliGRAM(s) Oral every 12 hours  aspirin enteric coated 81 milliGRAM(s) Oral daily  bumetanide 1 milliGRAM(s) Oral daily  chlorhexidine 2% Cloths 1 Application(s) Topical <User Schedule>  dextrose 5%. 1000 milliLiter(s) IV Continuous <Continuous>  dextrose 5%. 1000 milliLiter(s) IV Continuous <Continuous>  dextrose 50% Injectable 25 Gram(s) IV Push once  dextrose 50% Injectable 12.5 Gram(s) IV Push once  dextrose 50% Injectable 25 Gram(s) IV Push once  fluticasone propionate 50 MICROgram(s)/spray Nasal Spray 1 Spray(s) Both Nostrils two times a day  glucagon  Injectable 1 milliGRAM(s) IntraMuscular once  insulin glargine Injectable (LANTUS) 10 Unit(s) SubCutaneous at bedtime  insulin lispro (ADMELOG) corrective regimen sliding scale   SubCutaneous Before meals and at bedtime  insulin lispro Injectable (ADMELOG) 5 Unit(s) SubCutaneous three times a day before meals  lactulose Syrup 10 Gram(s) Oral daily  magnesium hydroxide Suspension 30 milliLiter(s) Oral daily  metoprolol succinate ER 50 milliGRAM(s) Oral daily  pantoprazole    Tablet 40 milliGRAM(s) Oral before breakfast  polyethylene glycol 3350 17 Gram(s) Oral every 12 hours  predniSONE   Tablet 40  Oral   predniSONE   Tablet 40 milliGRAM(s) Oral daily  senna 2 Tablet(s) Oral at bedtime    PRN MEDICATIONS  albuterol    90 MICROgram(s) HFA Inhaler 2 Puff(s) Inhalation every 6 hours PRN  dextrose Oral Gel 15 Gram(s) Oral once PRN    VITALS  T(F): 97.9 (06-28-25 @ 13:43), Max: 97.9 (06-28-25 @ 04:50)  HR: 87 (06-28-25 @ 13:43) (70 - 87)  BP: 108/64 (06-28-25 @ 13:43) (108/64 - 132/73)  RR: 18 (06-28-25 @ 13:43) (18 - 18)  SpO2: 96% (06-28-25 @ 13:43) (96% - 98%)  POCT Blood Glucose.: 217 mg/dL (06-28-25 @ 16:52)  POCT Blood Glucose.: 208 mg/dL (06-28-25 @ 11:33)  POCT Blood Glucose.: 108 mg/dL (06-28-25 @ 07:47)  POCT Blood Glucose.: 232 mg/dL (06-27-25 @ 21:28)    PHYSICAL EXAM    General: NAD, well appearing  Cardio: +S1/S2, no murmurs  Pulm:  no wheezing  Abd: no TTP, nondistended, no organomegaly  Neuro: AAOx4, neurovascularly intact  Musculoskeletal no edema  Skin: no rashes    LABS             8.4    8.94  )-----------( 150      ( 06-28-25 @ 05:42 )             28.1     136  |  90  |  55  -------------------------<  71   06-28-25 @ 05:42  3.9  |  36  |  1.2    Ca      9.1     06-28-25 @ 05:42  Phos   2.8     06-28-25 @ 05:42  Mg     2.4     06-28-25 @ 05:42    TPro  4.6  /  Alb  3.0  /  TBili  0.3  /  DBili  x   /  AST  18  /  ALT  27  /  AlkPhos  83  /  GGT  x     06-28-25 @ 05:42        Urinalysis Basic - ( 28 Jun 2025 05:42 )    Color: x / Appearance: x / SG: x / pH: x  Gluc: 71 mg/dL / Ketone: x  / Bili: x / Urobili: x   Blood: x / Protein: x / Nitrite: x   Leuk Esterase: x / RBC: x / WBC x   Sq Epi: x / Non Sq Epi: x / Bacteria: x          IMAGING

## 2025-06-28 NOTE — PROGRESS NOTE ADULT - ASSESSMENT
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    Patient has been decompensating since April after she underwent AAA stenting ( Has some renal artery leak). Was sent to nursing home. Was diagnosed with Ecoli/Pseudomonas UTIi treated with levaquin . Recently was having Shortness of breath/ cough ; treated with augmentin,. they checked theBNP 4199 ; followed Tye De Dios ; was started on Torsemide 20 mg.  PT developed increased shortness of breath and anxiety. Nursing home gave her lasix which improved her breathing status but worsened since the morning so was sent to the hospital.     #Acute Hypercapnic/ Hypoxic Respiratory Failure / multifactorial dyspnea/ acute on chronic CHF exacerbation   #H/O left lung adenocarcinoma s/p LLL lobectomy on Tagrisso  #H/O COPD  #R/o Tagrisso induce pneumonitis  - CHF protocol, fluid restriction 1200 ml in 24 hours   - intake and output monitoring, daily weight  - CXR -- increased lt sided opacities  --spoke with daughter and she wanted CT chest as suggested by DR Soares-- though patient has hx of pneumonectomy.  - TTE: EF 67%, mild LVH, severe LA dilation, TR/ PHTN   - pulmonary is following, recommendations noted:  - HOB at 45 degrees.  Encourage NIV during sleep and PRN during the day  -  Wean o2 as tolerated.  Continue home inhaler regimen.  Albuterol PRN  --6/26 change Bumex to PO, will change to 1 mg qd for now as swelling has resolved and decreasing sodium  - Prednisone tapering as per pulm  - lung adenocarcinoma is in remission, recent PET scan reviewed , Tagrisso held ( pt was taking this medications for two years)  - hold Tagrisso as per pulm/onc  - f/u ABG, daughter and team encouraging patient to use bipap  - f/u CT chest read    #Mild ADRIA (baseline creatinine ~1.3)  - Cr 1.5--->1.3 on admission, previously normal  - Renal US:  Interval resolution of hydronephrosis since 5/12/25  - UA noted, blood and RBC  -  #Constipation-- had BM yesterday  - high fiber diet   - c/w miralax BID, senna qhs    #Thrombocytopenia  -156<----121<--106  - heme/onc eval 6/20 noted. continue to hold tagrisso and continue diuresis    #Atrial Fibrillation  #H/O PE  #HTN  #Diabetes  #HLD  #AAA s/p stent   - DASH/ CC diet  - monitor  F/s   - continue apixaban 2.5 mg q12h, aspirin 81 mg, amlodipine 5 mg, metoprolol XL 50 mg daily  - continue sliding scale insulin lispro   -  Lantus 10 units and Lispro 5 units with meals ( f/S noted)     #Anxiety/Insomnia (Chronic)/ anorexia   - continue zolpidem 10 mg, dronabinol 2.5 mg daily    # Prophylactic measures   - DVT PPx: apixaban   - GI PPx: PPI    #Progress Note Handoff  spoke with daughter f/u CT chest read-- as suggested by Dr Soares   planning DC back to snf in 1-2 days-- spent more than 50mins --excluding resident teaching.

## 2025-06-28 NOTE — PROGRESS NOTE ADULT - SUBJECTIVE AND OBJECTIVE BOX
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    Patient has been decompensating since April after she underwent AAA stenting ( Has some renal artery leak). Was sent to nursing home. Was diagnosed with Ecoli/Pseudomonas UTIi treated with levaquin . Recently was having Shortness of breath/ cough ; treated with augmentin,. they checked theBNP 4199 ; followed Tye De Dios ; was started on Torsemide 20 mg other day.   Since yesterday had increased shortness of breath and anxiety. Nursing home gave her lasix which improved her breathing status but worsened since the morning  so was sent to the hospital.     No fever, chills or chest pain.  Daughter at bedside providing additional history, reports that she thinks patient never cleared pneumonia    In the ED:   Vitals: /79;  ; RR 25 ; afebrile ; 92 % on NRB     Labs:   WBC 6.42 Hb 8.8 Platelet 99K Neutro 83.2   Na 139 K 5.0   BUN/Creatinine 52/1.5   Pro BNP 6050     VBG ; Initial 7.29 CO2 60 HCO3 29 Lactate 1.4   Repeat pH 7.27 Co2 63 HCo3 29 lactate 1.7     Ua negative     US renal: mild bilateral hydronephrosis ; 0.5 non obstructive calculus     Chest Xray : Bilateral patchy opacities and left pleural effusion.    admitted to ICU.  (17 Jun 2025 15:31)      Currently admitted to medicine with the primary diagnosis of Hypoxic respiratory failure     not feeling short of breath currently, at the moment no complaints    Besides the pertinent positives and negatives described above, the ROS was within normal limits.    PAST MEDICAL & SURGICAL HISTORY  Hypertension, unspecified type    Type 2 diabetes mellitus with complication, without long-term current use of insulin    Hyperlipidemia, unspecified hyperlipidemia type    Diverticulitis    Obesity    COPD (chronic obstructive pulmonary disease)    SOB (shortness of breath)    GERD (gastroesophageal reflux disease)    Lung cancer    ARTIE (obstructive sleep apnea)  NO CPAP MACHINE PER PT.    Cancer of kidney    Cancer of thyroid    Former smoker    NHL (non-Hodgkin's lymphoma)  "low grade" per heme/ onc note    History of abdominal aortic aneurysm (AAA)    Kidney stones    [place] (hard of hearing)    History of surgery  LEFT LOWER LOBECTOMY    History of surgery  BILATERAL KNEE REPLACEMENT    History of surgery  CATARACT RIGHT EYE    History of surgery  TUBAL LIGATION    History of surgery  CYST REMOVED  RIGHT BREAST    History of surgery  CHOLECYSTECOMY    History of surgery  RIGHT PARTIAL NEPHRECTOMY    History of surgery  BILATERAL CATARACT SURGERY    History of back surgery    H/O lithotripsy    H/O partial thyroidectomy      SOCIAL HISTORY:    ALLERGIES:  No Known Allergies    MEDICATIONS:  STANDING MEDICATIONS  amLODIPine   Tablet 10 milliGRAM(s) Oral every 24 hours  apixaban 2.5 milliGRAM(s) Oral every 12 hours  aspirin enteric coated 81 milliGRAM(s) Oral daily  bumetanide 1 milliGRAM(s) Oral daily  chlorhexidine 2% Cloths 1 Application(s) Topical <User Schedule>  dextrose 5%. 1000 milliLiter(s) IV Continuous <Continuous>  dextrose 5%. 1000 milliLiter(s) IV Continuous <Continuous>  dextrose 50% Injectable 25 Gram(s) IV Push once  dextrose 50% Injectable 12.5 Gram(s) IV Push once  dextrose 50% Injectable 25 Gram(s) IV Push once  fluticasone propionate 50 MICROgram(s)/spray Nasal Spray 1 Spray(s) Both Nostrils two times a day  glucagon  Injectable 1 milliGRAM(s) IntraMuscular once  insulin glargine Injectable (LANTUS) 10 Unit(s) SubCutaneous at bedtime  insulin lispro (ADMELOG) corrective regimen sliding scale   SubCutaneous Before meals and at bedtime  insulin lispro Injectable (ADMELOG) 5 Unit(s) SubCutaneous three times a day before meals  lactulose Syrup 10 Gram(s) Oral daily  magnesium hydroxide Suspension 30 milliLiter(s) Oral daily  metoprolol succinate ER 50 milliGRAM(s) Oral daily  pantoprazole    Tablet 40 milliGRAM(s) Oral before breakfast  polyethylene glycol 3350 17 Gram(s) Oral every 12 hours  predniSONE   Tablet 40  Oral   predniSONE   Tablet 40 milliGRAM(s) Oral daily  senna 2 Tablet(s) Oral at bedtime    PRN MEDICATIONS  albuterol    90 MICROgram(s) HFA Inhaler 2 Puff(s) Inhalation every 6 hours PRN  dextrose Oral Gel 15 Gram(s) Oral once PRN    VITALS:   T(F): 97.9  HR: 87  BP: 108/64  RR: 18  SpO2: 96%    LABS:                        8.4    8.94  )-----------( 150      ( 28 Jun 2025 05:42 )             28.1     06-28    136  |  90[L]  |  55[H]  ----------------------------<  71  3.9   |  36[H]  |  1.2    Ca    9.1      28 Jun 2025 05:42  Phos  2.8     06-28  Mg     2.4     06-28    TPro  4.6[L]  /  Alb  3.0[L]  /  TBili  0.3  /  DBili  x   /  AST  18  /  ALT  27  /  AlkPhos  83  06-28      Urinalysis Basic - ( 28 Jun 2025 05:42 )    Color: x / Appearance: x / SG: x / pH: x  Gluc: 71 mg/dL / Ketone: x  / Bili: x / Urobili: x   Blood: x / Protein: x / Nitrite: x   Leuk Esterase: x / RBC: x / WBC x   Sq Epi: x / Non Sq Epi: x / Bacteria: x                Hypoxic respiratory failure      RADIOLOGY:    PHYSICAL EXAM:  General: WN/WD NAD  Neurology: A&Ox3, nonfocal, KWONG x 4  Head:  Normocephalic, atraumatic  ENT:  Mucosa moist, no ulcerations  Neck:  Supple, no sinuses or palpable masses  Lymphatic:  No palpable cervical, supraclavicular, axillary or inguinal adenopathy  Respiratory: CTA B/L  CV: RRR, S1S2, no murmur  Abdominal: Soft, NT, ND no palpable mass  MSK: No edema, + peripheral pulses  Incisions: intact, no erythema or drainage    Intravenous access: yes  NG tube: no  Reynoso Catheter: no

## 2025-06-29 LAB
ALBUMIN SERPL ELPH-MCNC: 3 G/DL — LOW (ref 3.5–5.2)
ALP SERPL-CCNC: 86 U/L — SIGNIFICANT CHANGE UP (ref 30–115)
ALT FLD-CCNC: 30 U/L — SIGNIFICANT CHANGE UP (ref 0–41)
ANION GAP SERPL CALC-SCNC: 10 MMOL/L — SIGNIFICANT CHANGE UP (ref 7–14)
AST SERPL-CCNC: 19 U/L — SIGNIFICANT CHANGE UP (ref 0–41)
BASOPHILS # BLD AUTO: 0.01 K/UL — SIGNIFICANT CHANGE UP (ref 0–0.2)
BASOPHILS NFR BLD AUTO: 0.1 % — SIGNIFICANT CHANGE UP (ref 0–1)
BILIRUB SERPL-MCNC: 0.3 MG/DL — SIGNIFICANT CHANGE UP (ref 0.2–1.2)
BUN SERPL-MCNC: 60 MG/DL — HIGH (ref 10–20)
CALCIUM SERPL-MCNC: 9.1 MG/DL — SIGNIFICANT CHANGE UP (ref 8.4–10.5)
CHLORIDE SERPL-SCNC: 91 MMOL/L — LOW (ref 98–110)
CO2 SERPL-SCNC: 36 MMOL/L — HIGH (ref 17–32)
CREAT SERPL-MCNC: 1.3 MG/DL — SIGNIFICANT CHANGE UP (ref 0.7–1.5)
EGFR: 41 ML/MIN/1.73M2 — LOW
EGFR: 41 ML/MIN/1.73M2 — LOW
EOSINOPHIL # BLD AUTO: 0.06 K/UL — SIGNIFICANT CHANGE UP (ref 0–0.7)
EOSINOPHIL NFR BLD AUTO: 0.6 % — SIGNIFICANT CHANGE UP (ref 0–8)
GAS PNL BLDA: SIGNIFICANT CHANGE UP
GLUCOSE BLDC GLUCOMTR-MCNC: 191 MG/DL — HIGH (ref 70–99)
GLUCOSE BLDC GLUCOMTR-MCNC: 226 MG/DL — HIGH (ref 70–99)
GLUCOSE BLDC GLUCOMTR-MCNC: 237 MG/DL — HIGH (ref 70–99)
GLUCOSE BLDC GLUCOMTR-MCNC: 95 MG/DL — SIGNIFICANT CHANGE UP (ref 70–99)
GLUCOSE SERPL-MCNC: 51 MG/DL — CRITICAL LOW (ref 70–99)
HCT VFR BLD CALC: 29.1 % — LOW (ref 37–47)
HGB BLD-MCNC: 8.7 G/DL — LOW (ref 12–16)
IMM GRANULOCYTES NFR BLD AUTO: 0.4 % — HIGH (ref 0.1–0.3)
LYMPHOCYTES # BLD AUTO: 1.43 K/UL — SIGNIFICANT CHANGE UP (ref 1.2–3.4)
LYMPHOCYTES # BLD AUTO: 14 % — LOW (ref 20.5–51.1)
MAGNESIUM SERPL-MCNC: 2.4 MG/DL — SIGNIFICANT CHANGE UP (ref 1.8–2.4)
MCHC RBC-ENTMCNC: 25.4 PG — LOW (ref 27–31)
MCHC RBC-ENTMCNC: 29.9 G/DL — LOW (ref 32–37)
MCV RBC AUTO: 84.8 FL — SIGNIFICANT CHANGE UP (ref 81–99)
MONOCYTES # BLD AUTO: 1.22 K/UL — HIGH (ref 0.1–0.6)
MONOCYTES NFR BLD AUTO: 12 % — HIGH (ref 1.7–9.3)
NEUTROPHILS # BLD AUTO: 7.44 K/UL — HIGH (ref 1.4–6.5)
NEUTROPHILS NFR BLD AUTO: 72.9 % — SIGNIFICANT CHANGE UP (ref 42.2–75.2)
NRBC BLD AUTO-RTO: 0 /100 WBCS — SIGNIFICANT CHANGE UP (ref 0–0)
PHOSPHATE SERPL-MCNC: 3.3 MG/DL — SIGNIFICANT CHANGE UP (ref 2.1–4.9)
PLATELET # BLD AUTO: 162 K/UL — SIGNIFICANT CHANGE UP (ref 130–400)
PMV BLD: 12.1 FL — HIGH (ref 7.4–10.4)
POTASSIUM SERPL-MCNC: 4.1 MMOL/L — SIGNIFICANT CHANGE UP (ref 3.5–5)
POTASSIUM SERPL-SCNC: 4.1 MMOL/L — SIGNIFICANT CHANGE UP (ref 3.5–5)
PROT SERPL-MCNC: 4.6 G/DL — LOW (ref 6–8)
RBC # BLD: 3.43 M/UL — LOW (ref 4.2–5.4)
RBC # FLD: 15.5 % — HIGH (ref 11.5–14.5)
SODIUM SERPL-SCNC: 137 MMOL/L — SIGNIFICANT CHANGE UP (ref 135–146)
WBC # BLD: 10.2 K/UL — SIGNIFICANT CHANGE UP (ref 4.8–10.8)
WBC # FLD AUTO: 10.2 K/UL — SIGNIFICANT CHANGE UP (ref 4.8–10.8)

## 2025-06-29 PROCEDURE — 99232 SBSQ HOSP IP/OBS MODERATE 35: CPT

## 2025-06-29 RX ORDER — INSULIN LISPRO 100 U/ML
INJECTION, SOLUTION INTRAVENOUS; SUBCUTANEOUS
Refills: 0 | Status: DISCONTINUED | OUTPATIENT
Start: 2025-06-29 | End: 2025-07-01

## 2025-06-29 RX ADMIN — Medication 81 MILLIGRAM(S): at 12:01

## 2025-06-29 RX ADMIN — FLUTICASONE PROPIONATE 1 SPRAY(S): 50 SPRAY, METERED NASAL at 17:15

## 2025-06-29 RX ADMIN — BUMETANIDE 1 MILLIGRAM(S): 1 TABLET ORAL at 05:27

## 2025-06-29 RX ADMIN — PREDNISONE 40 MILLIGRAM(S): 20 TABLET ORAL at 05:27

## 2025-06-29 RX ADMIN — Medication 40 MILLIGRAM(S): at 05:27

## 2025-06-29 RX ADMIN — INSULIN LISPRO 5 UNIT(S): 100 INJECTION, SOLUTION INTRAVENOUS; SUBCUTANEOUS at 12:02

## 2025-06-29 RX ADMIN — INSULIN LISPRO 5 UNIT(S): 100 INJECTION, SOLUTION INTRAVENOUS; SUBCUTANEOUS at 17:15

## 2025-06-29 RX ADMIN — Medication 1 APPLICATION(S): at 05:36

## 2025-06-29 RX ADMIN — AMLODIPINE BESYLATE 10 MILLIGRAM(S): 10 TABLET ORAL at 12:01

## 2025-06-29 RX ADMIN — INSULIN LISPRO 5 UNIT(S): 100 INJECTION, SOLUTION INTRAVENOUS; SUBCUTANEOUS at 07:54

## 2025-06-29 RX ADMIN — INSULIN LISPRO 2: 100 INJECTION, SOLUTION INTRAVENOUS; SUBCUTANEOUS at 17:16

## 2025-06-29 RX ADMIN — METOPROLOL SUCCINATE 50 MILLIGRAM(S): 50 TABLET, EXTENDED RELEASE ORAL at 05:27

## 2025-06-29 RX ADMIN — Medication 2 TABLET(S): at 21:02

## 2025-06-29 RX ADMIN — INSULIN GLARGINE-YFGN 10 UNIT(S): 100 INJECTION, SOLUTION SUBCUTANEOUS at 21:01

## 2025-06-29 RX ADMIN — APIXABAN 2.5 MILLIGRAM(S): 2.5 TABLET, FILM COATED ORAL at 05:27

## 2025-06-29 RX ADMIN — FLUTICASONE PROPIONATE 1 SPRAY(S): 50 SPRAY, METERED NASAL at 05:28

## 2025-06-29 RX ADMIN — APIXABAN 2.5 MILLIGRAM(S): 2.5 TABLET, FILM COATED ORAL at 17:15

## 2025-06-29 NOTE — PROGRESS NOTE ADULT - SUBJECTIVE AND OBJECTIVE BOX
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    Patient has been decompensating since April after she underwent AAA stenting ( Has some renal artery leak). Was sent to nursing home. Was diagnosed with Ecoli/Pseudomonas UTIi treated with levaquin . Recently was having Shortness of breath/ cough ; treated with augmentin,. they checked theBNP 4199 ; followed Tye De Dios ; was started on Torsemide 20 mg other day.   Since yesterday had increased shortness of breath and anxiety. Nursing home gave her lasix which improved her breathing status but worsened since the morning  so was sent to the hospital.     No fever, chills or chest pain.  Daughter at bedside providing additional history, reports that she thinks patient never cleared pneumonia    In the ED:   Vitals: /79;  ; RR 25 ; afebrile ; 92 % on NRB     Labs:   WBC 6.42 Hb 8.8 Platelet 99K Neutro 83.2   Na 139 K 5.0   BUN/Creatinine 52/1.5   Pro BNP 6050     VBG ; Initial 7.29 CO2 60 HCO3 29 Lactate 1.4   Repeat pH 7.27 Co2 63 HCo3 29 lactate 1.7     Ua negative     US renal: mild bilateral hydronephrosis ; 0.5 non obstructive calculus     Chest Xray : Bilateral patchy opacities and left pleural effusion.    admitted to ICU.  (17 Jun 2025 15:31)      Currently admitted to medicine with the primary diagnosis of Hypoxic respiratory failure    no medical complaints, no shortness of breath    Besides the pertinent positives and negatives described above, the ROS was within normal limits.    PAST MEDICAL & SURGICAL HISTORY  Hypertension, unspecified type    Type 2 diabetes mellitus with complication, without long-term current use of insulin    Hyperlipidemia, unspecified hyperlipidemia type    Diverticulitis    Obesity    COPD (chronic obstructive pulmonary disease)    SOB (shortness of breath)    GERD (gastroesophageal reflux disease)    Lung cancer    ARTIE (obstructive sleep apnea)  NO CPAP MACHINE PER PT.    Cancer of kidney    Cancer of thyroid    Former smoker    NHL (non-Hodgkin's lymphoma)  "low grade" per heme/ onc note    History of abdominal aortic aneurysm (AAA)    Kidney stones    [place] (hard of hearing)    History of surgery  LEFT LOWER LOBECTOMY    History of surgery  BILATERAL KNEE REPLACEMENT    History of surgery  CATARACT RIGHT EYE    History of surgery  TUBAL LIGATION    History of surgery  CYST REMOVED  RIGHT BREAST    History of surgery  CHOLECYSTECOMY    History of surgery  RIGHT PARTIAL NEPHRECTOMY    History of surgery  BILATERAL CATARACT SURGERY    History of back surgery    H/O lithotripsy    H/O partial thyroidectomy      SOCIAL HISTORY:    ALLERGIES:  No Known Allergies    MEDICATIONS:  STANDING MEDICATIONS  amLODIPine   Tablet 10 milliGRAM(s) Oral every 24 hours  apixaban 2.5 milliGRAM(s) Oral every 12 hours  aspirin enteric coated 81 milliGRAM(s) Oral daily  bumetanide 1 milliGRAM(s) Oral daily  chlorhexidine 2% Cloths 1 Application(s) Topical <User Schedule>  dextrose 5%. 1000 milliLiter(s) IV Continuous <Continuous>  dextrose 5%. 1000 milliLiter(s) IV Continuous <Continuous>  dextrose 50% Injectable 25 Gram(s) IV Push once  dextrose 50% Injectable 12.5 Gram(s) IV Push once  dextrose 50% Injectable 25 Gram(s) IV Push once  fluticasone propionate 50 MICROgram(s)/spray Nasal Spray 1 Spray(s) Both Nostrils two times a day  glucagon  Injectable 1 milliGRAM(s) IntraMuscular once  insulin glargine Injectable (LANTUS) 10 Unit(s) SubCutaneous at bedtime  insulin lispro (ADMELOG) corrective regimen sliding scale   SubCutaneous Before meals and at bedtime  insulin lispro Injectable (ADMELOG) 5 Unit(s) SubCutaneous three times a day before meals  lactulose Syrup 10 Gram(s) Oral daily  magnesium hydroxide Suspension 30 milliLiter(s) Oral daily  metoprolol succinate ER 50 milliGRAM(s) Oral daily  pantoprazole    Tablet 40 milliGRAM(s) Oral before breakfast  polyethylene glycol 3350 17 Gram(s) Oral every 12 hours  predniSONE   Tablet 40 milliGRAM(s) Oral daily  predniSONE   Tablet 40  Oral   senna 2 Tablet(s) Oral at bedtime    PRN MEDICATIONS  albuterol    90 MICROgram(s) HFA Inhaler 2 Puff(s) Inhalation every 6 hours PRN  dextrose Oral Gel 15 Gram(s) Oral once PRN    VITALS:   T(F): 98.1  HR: 68  BP: 131/69  RR: 18  SpO2: 96%    LABS:                        8.7    10.20 )-----------( 162      ( 29 Jun 2025 05:49 )             29.1     06-29    137  |  91[L]  |  60[H]  ----------------------------<  51[LL]  4.1   |  36[H]  |  1.3    Ca    9.1      29 Jun 2025 05:49  Phos  3.3     06-29  Mg     2.4     06-29    TPro  4.6[L]  /  Alb  3.0[L]  /  TBili  0.3  /  DBili  x   /  AST  19  /  ALT  30  /  AlkPhos  86  06-29      Urinalysis Basic - ( 29 Jun 2025 05:49 )    Color: x / Appearance: x / SG: x / pH: x  Gluc: 51 mg/dL / Ketone: x  / Bili: x / Urobili: x   Blood: x / Protein: x / Nitrite: x   Leuk Esterase: x / RBC: x / WBC x   Sq Epi: x / Non Sq Epi: x / Bacteria: x                Hypoxic respiratory failure      RADIOLOGY:    PHYSICAL EXAM:  General: WN/WD NAD  Neurology: A&Ox3, nonfocal, KWONG x 4  Head:  Normocephalic, atraumatic  ENT:  Mucosa moist, no ulcerations  Neck:  Supple, no sinuses or palpable masses  Lymphatic:  No palpable cervical, supraclavicular, axillary or inguinal adenopathy  Respiratory: CTA B/L  CV: RRR, S1S2, no murmur  Abdominal: Soft, NT, ND no palpable mass  MSK: No edema, + peripheral pulses  Incisions: intact, no erythema or drainage    Intravenous access: yes  NG tube: no  Reynoso Catheter: no

## 2025-06-29 NOTE — PROGRESS NOTE ADULT - ASSESSMENT
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    Patient has been decompensating since April after she underwent AAA stenting ( Has some renal artery leak). Was sent to nursing home. Was diagnosed with Ecoli/Pseudomonas UTIi treated with levaquin . Recently was having Shortness of breath/ cough ; treated with augmentin,. they checked theBNP 4199 ; followed Tey De Dios ; was started on Torsemide 20 mg.  PT developed increased shortness of breath and anxiety. Nursing home gave her lasix which improved her breathing status but worsened since the morning so was sent to the hospital.     #Acute Hypercapnic/ Hypoxic Respiratory Failure / multifactorial dyspnea/ acute on chronic CHF exacerbation   #H/O left lung adenocarcinoma s/p LLL lobectomy on Tagrisso  #H/O COPD  #R/o Tagrisso induce pneumonitis  - CHF protocol, fluid restriction 1200 ml in 24 hours   - intake and output monitoring, daily weight  - CXR -- increased lt sided opacities  --spoke with daughter and she wanted CT chest as suggested by DR Soares-- though patient has hx of pneumonectomy.  - TTE: EF 67%, mild LVH, severe LA dilation, TR/ PHTN   - pulmonary is following, recommendations noted:  - HOB at 45 degrees.  Encourage NIV during sleep and PRN during the day  -  Wean o2 as tolerated.  Continue home inhaler regimen.  Albuterol PRN  --6/26 change Bumex to PO, will change to 1 mg qd for now as swelling has resolved and decreasing sodium  - Prednisone tapering as per pulm  - lung adenocarcinoma is in remission, recent PET scan reviewed , Tagrisso held ( pt was taking this medications for two years)  - hold Tagrisso as per pulm/onc  - CT chest read no mention of pneumonitis, more likely fluid / f/u pulm on utility of prednisone taper and possibility of restarting Tagrisso    #Mild ADRIA (baseline creatinine ~1.3)  - Cr 1.5--->1.3 on admission, previously normal  - Renal US:  Interval resolution of hydronephrosis since 5/12/25  - UA noted, blood and RBC  -  #Constipation  - high fiber diet   - c/w miralax BID, senna qhs    #Thrombocytopenia  -156<----121<--106  - heme/onc eval 6/20 noted. continue to hold tagrisso and continue diuresis    #Atrial Fibrillation  #H/O PE  #HTN  #Diabetes  #HLD  #AAA s/p stent   - DASH/ CC diet  - monitor  F/s   - continue apixaban 2.5 mg q12h, aspirin 81 mg, amlodipine 5 mg, metoprolol XL 50 mg daily  - continue sliding scale insulin lispro but hold if not eating  -  Lantus 10 units and Lispro 5 units with meals ( f/S noted)     #Anxiety/Insomnia (Chronic)/ anorexia   - continue zolpidem 10 mg, dronabinol 2.5 mg daily    # Prophylactic measures   - DVT PPx: apixaban   - GI PPx: PPI    #Progress Note Handoff  f/u pulm on restarting Tagrisso and utility of prednisone taper   planning DC back to snf in 1-2 days-- spent more than 50mins --excluding resident teaching.  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    Patient has been decompensating since April after she underwent AAA stenting ( Has some renal artery leak). Was sent to nursing home. Was diagnosed with Ecoli/Pseudomonas UTIi treated with levaquin . Recently was having Shortness of breath/ cough ; treated with augmentin,. they checked theBNP 4199 ; followed Tye De Dios ; was started on Torsemide 20 mg.  PT developed increased shortness of breath and anxiety. Nursing home gave her lasix which improved her breathing status but worsened since the morning so was sent to the hospital.     #Acute Hypercapnic/ Hypoxic Respiratory Failure / multifactorial dyspnea/ acute on chronic CHF exacerbation   #H/O left lung adenocarcinoma s/p LLL lobectomy on Tagrisso  #H/O COPD  #R/o Tagrisso induce pneumonitis  - CHF protocol, fluid restriction 1200 ml in 24 hours   - intake and output monitoring, daily weight  - CXR -- increased lt sided opacities  --spoke with daughter and she wanted CT chest as suggested by DR Soares-- though patient has hx of pneumonectomy.  - TTE: EF 67%, mild LVH, severe LA dilation, TR/ PHTN   - pulmonary is following, recommendations noted:  - HOB at 45 degrees.  Encourage NIV during sleep and PRN during the day  -  Wean o2 as tolerated.  Continue home inhaler regimen.  Albuterol PRN  --6/26 change Bumex to PO, will change to 1 mg qd for now as swelling has resolved and decreasing sodium  - Prednisone tapering as per pulm  - lung adenocarcinoma is in remission, recent PET scan reviewed , Tagrisso held ( pt was taking this medications for two years)  - hold Tagrisso as per pulm/onc  - CT chest read no mention of pneumonitis, more likely fluid / f/u pulm on utility of prednisone taper and possibility of restarting Tagrisso    #Mild ADRIA (baseline creatinine ~1.3)  - Cr 1.5--->1.3 on admission, previously normal  - Renal US:  Interval resolution of hydronephrosis since 5/12/25  - UA noted, blood and RBC  -  #Constipation  - high fiber diet   - c/w miralax BID, senna qhs    #Thrombocytopenia  -156<----121<--106  - heme/onc eval 6/20 noted. continue to hold tagrisso and continue diuresis    #Atrial Fibrillation  #H/O PE  #HTN  #Diabetes  #HLD  #AAA s/p stent   - DASH/ CC diet  - monitor  F/s   - continue apixaban 2.5 mg q12h, aspirin 81 mg, amlodipine 5 mg, metoprolol XL 50 mg daily  - continue sliding scale insulin lispro but hold if not eating (was hypoglycemic this AM likely from getting sliding scale dose 8 units lispro before bedtime, order changed for meals only)  -  Lantus 10 units and Lispro 5 units with meals    #Anxiety/Insomnia (Chronic)/ anorexia   - continue zolpidem 10 mg, dronabinol 2.5 mg daily    # Prophylactic measures   - DVT PPx: apixaban   - GI PPx: PPI    #Progress Note Handoff  f/u pulm on restarting Tagrisso and utility of prednisone taper   planning DC back to snf in 1-2 days-- spent more than 50mins --excluding resident teaching.

## 2025-06-30 LAB
ALBUMIN SERPL ELPH-MCNC: 3 G/DL — LOW (ref 3.5–5.2)
ALP SERPL-CCNC: 77 U/L — SIGNIFICANT CHANGE UP (ref 30–115)
ALT FLD-CCNC: 26 U/L — SIGNIFICANT CHANGE UP (ref 0–41)
ANION GAP SERPL CALC-SCNC: 10 MMOL/L — SIGNIFICANT CHANGE UP (ref 7–14)
AST SERPL-CCNC: 13 U/L — SIGNIFICANT CHANGE UP (ref 0–41)
BASOPHILS # BLD AUTO: 0.01 K/UL — SIGNIFICANT CHANGE UP (ref 0–0.2)
BASOPHILS NFR BLD AUTO: 0.1 % — SIGNIFICANT CHANGE UP (ref 0–1)
BILIRUB SERPL-MCNC: 0.3 MG/DL — SIGNIFICANT CHANGE UP (ref 0.2–1.2)
BUN SERPL-MCNC: 64 MG/DL — CRITICAL HIGH (ref 10–20)
CALCIUM SERPL-MCNC: 8.8 MG/DL — SIGNIFICANT CHANGE UP (ref 8.4–10.5)
CHLORIDE SERPL-SCNC: 92 MMOL/L — LOW (ref 98–110)
CO2 SERPL-SCNC: 34 MMOL/L — HIGH (ref 17–32)
CREAT SERPL-MCNC: 1.4 MG/DL — SIGNIFICANT CHANGE UP (ref 0.7–1.5)
EGFR: 37 ML/MIN/1.73M2 — LOW
EGFR: 37 ML/MIN/1.73M2 — LOW
EOSINOPHIL # BLD AUTO: 0.06 K/UL — SIGNIFICANT CHANGE UP (ref 0–0.7)
EOSINOPHIL NFR BLD AUTO: 0.6 % — SIGNIFICANT CHANGE UP (ref 0–8)
GLUCOSE BLDC GLUCOMTR-MCNC: 131 MG/DL — HIGH (ref 70–99)
GLUCOSE BLDC GLUCOMTR-MCNC: 215 MG/DL — HIGH (ref 70–99)
GLUCOSE BLDC GLUCOMTR-MCNC: 320 MG/DL — HIGH (ref 70–99)
GLUCOSE BLDC GLUCOMTR-MCNC: 339 MG/DL — HIGH (ref 70–99)
GLUCOSE SERPL-MCNC: 164 MG/DL — HIGH (ref 70–99)
HCT VFR BLD CALC: 27.8 % — LOW (ref 37–47)
HGB BLD-MCNC: 8.5 G/DL — LOW (ref 12–16)
IMM GRANULOCYTES NFR BLD AUTO: 0.5 % — HIGH (ref 0.1–0.3)
LYMPHOCYTES # BLD AUTO: 1.23 K/UL — SIGNIFICANT CHANGE UP (ref 1.2–3.4)
LYMPHOCYTES # BLD AUTO: 12.7 % — LOW (ref 20.5–51.1)
MAGNESIUM SERPL-MCNC: 2.2 MG/DL — SIGNIFICANT CHANGE UP (ref 1.8–2.4)
MCHC RBC-ENTMCNC: 25.4 PG — LOW (ref 27–31)
MCHC RBC-ENTMCNC: 30.6 G/DL — LOW (ref 32–37)
MCV RBC AUTO: 83.2 FL — SIGNIFICANT CHANGE UP (ref 81–99)
MONOCYTES # BLD AUTO: 1.21 K/UL — HIGH (ref 0.1–0.6)
MONOCYTES NFR BLD AUTO: 12.5 % — HIGH (ref 1.7–9.3)
NEUTROPHILS # BLD AUTO: 7.13 K/UL — HIGH (ref 1.4–6.5)
NEUTROPHILS NFR BLD AUTO: 73.6 % — SIGNIFICANT CHANGE UP (ref 42.2–75.2)
NRBC BLD AUTO-RTO: 0 /100 WBCS — SIGNIFICANT CHANGE UP (ref 0–0)
PHOSPHATE SERPL-MCNC: 3.4 MG/DL — SIGNIFICANT CHANGE UP (ref 2.1–4.9)
PLATELET # BLD AUTO: 158 K/UL — SIGNIFICANT CHANGE UP (ref 130–400)
PMV BLD: 12 FL — HIGH (ref 7.4–10.4)
POTASSIUM SERPL-MCNC: 3.9 MMOL/L — SIGNIFICANT CHANGE UP (ref 3.5–5)
POTASSIUM SERPL-SCNC: 3.9 MMOL/L — SIGNIFICANT CHANGE UP (ref 3.5–5)
PROT SERPL-MCNC: 4.5 G/DL — LOW (ref 6–8)
RBC # BLD: 3.34 M/UL — LOW (ref 4.2–5.4)
RBC # FLD: 15.4 % — HIGH (ref 11.5–14.5)
SODIUM SERPL-SCNC: 136 MMOL/L — SIGNIFICANT CHANGE UP (ref 135–146)
WBC # BLD: 9.69 K/UL — SIGNIFICANT CHANGE UP (ref 4.8–10.8)
WBC # FLD AUTO: 9.69 K/UL — SIGNIFICANT CHANGE UP (ref 4.8–10.8)

## 2025-06-30 PROCEDURE — 99232 SBSQ HOSP IP/OBS MODERATE 35: CPT

## 2025-06-30 PROCEDURE — 71045 X-RAY EXAM CHEST 1 VIEW: CPT | Mod: 26

## 2025-06-30 RX ORDER — PREDNISONE 20 MG/1
1 TABLET ORAL
Qty: 110 | Refills: 0
Start: 2025-06-30 | End: 2025-07-24

## 2025-06-30 RX ORDER — IRON SUCROSE 20 MG/ML
100 INJECTION, SOLUTION INTRAVENOUS
Refills: 0 | DISCHARGE

## 2025-06-30 RX ADMIN — INSULIN GLARGINE-YFGN 10 UNIT(S): 100 INJECTION, SOLUTION SUBCUTANEOUS at 21:29

## 2025-06-30 RX ADMIN — Medication 1 APPLICATION(S): at 05:33

## 2025-06-30 RX ADMIN — FLUTICASONE PROPIONATE 1 SPRAY(S): 50 SPRAY, METERED NASAL at 17:09

## 2025-06-30 RX ADMIN — INSULIN LISPRO 5 UNIT(S): 100 INJECTION, SOLUTION INTRAVENOUS; SUBCUTANEOUS at 07:57

## 2025-06-30 RX ADMIN — BUMETANIDE 1 MILLIGRAM(S): 1 TABLET ORAL at 05:33

## 2025-06-30 RX ADMIN — INSULIN LISPRO 5 UNIT(S): 100 INJECTION, SOLUTION INTRAVENOUS; SUBCUTANEOUS at 17:10

## 2025-06-30 RX ADMIN — INSULIN LISPRO 5 UNIT(S): 100 INJECTION, SOLUTION INTRAVENOUS; SUBCUTANEOUS at 11:20

## 2025-06-30 RX ADMIN — POLYETHYLENE GLYCOL 3350 17 GRAM(S): 17 POWDER, FOR SOLUTION ORAL at 05:33

## 2025-06-30 RX ADMIN — INSULIN LISPRO 8: 100 INJECTION, SOLUTION INTRAVENOUS; SUBCUTANEOUS at 17:10

## 2025-06-30 RX ADMIN — Medication 40 MILLIGRAM(S): at 05:33

## 2025-06-30 RX ADMIN — APIXABAN 2.5 MILLIGRAM(S): 2.5 TABLET, FILM COATED ORAL at 05:34

## 2025-06-30 RX ADMIN — PREDNISONE 40 MILLIGRAM(S): 20 TABLET ORAL at 05:34

## 2025-06-30 RX ADMIN — Medication 2 TABLET(S): at 21:29

## 2025-06-30 RX ADMIN — AMLODIPINE BESYLATE 10 MILLIGRAM(S): 10 TABLET ORAL at 11:21

## 2025-06-30 RX ADMIN — Medication 81 MILLIGRAM(S): at 11:21

## 2025-06-30 RX ADMIN — APIXABAN 2.5 MILLIGRAM(S): 2.5 TABLET, FILM COATED ORAL at 17:09

## 2025-06-30 RX ADMIN — FLUTICASONE PROPIONATE 1 SPRAY(S): 50 SPRAY, METERED NASAL at 05:40

## 2025-06-30 RX ADMIN — INSULIN LISPRO 4: 100 INJECTION, SOLUTION INTRAVENOUS; SUBCUTANEOUS at 11:20

## 2025-06-30 RX ADMIN — METOPROLOL SUCCINATE 50 MILLIGRAM(S): 50 TABLET, EXTENDED RELEASE ORAL at 05:34

## 2025-06-30 NOTE — PROGRESS NOTE ADULT - SUBJECTIVE AND OBJECTIVE BOX
Patient is a 84y old  Female who presents with a chief complaint of Shortness of Breath (29 Jun 2025 11:47)    we were asked to follow up on the steroid course given she is to resume tagrisso on dc    SUBJECTIVE:  Unchanged still feels well overall     REVIEW OF SYSTEMS:    All Pertinent ROS are negative except per HPI       PHYSICAL EXAM  Vital Signs Last 24 Hrs  T(C): 36.6 (29 Jun 2025 20:05), Max: 36.7 (29 Jun 2025 11:41)  T(F): 97.9 (29 Jun 2025 20:05), Max: 98 (29 Jun 2025 11:41)  HR: 69 (29 Jun 2025 20:05) (69 - 77)  BP: 119/69 (29 Jun 2025 20:05) (116/77 - 127/72)  BP(mean): 86 (29 Jun 2025 20:05) (86 - 91)  RR: 18 (29 Jun 2025 20:05) (18 - 18)  SpO2: 95% (29 Jun 2025 20:05) (94% - 97%)    Parameters below as of 29 Jun 2025 20:05  Patient On (Oxygen Delivery Method): room air        CONSTITUTIONAL:    NAD    ENT:   Airway patent,   No thrush    CARDIAC:   Normal rate,   regular rhythm.    no edema      RESPIRATORY:   NO Wheezing  Nnormal chest expansion  Nnot tachypneic,  No use of accessory muscles    GASTROINTESTINAL:  Abdomen soft,   non-tender,   no guarding,   + BS    MUSCULOSKELETAL:   range of motion is not limited,  no clubbing, cyanosis    NEUROLOGICAL:   Alert and oriented   no motor or deficits.    SKIN:   Skin normal color for race,   warm, dry   No evidence of rash.      06-29-25 @ 07:01  -  06-30-25 @ 07:00  --------------------------------------------------------  IN:    Oral Fluid: 300 mL  Total IN: 300 mL    OUT:    Voided (mL): 1600 mL  Total OUT: 1600 mL    Total NET: -1300 mL          LABS:                          8.5    9.69  )-----------( 158      ( 30 Jun 2025 05:14 )             27.8                                               06-30    136  |  92[L]  |  64[HH]  ----------------------------<  164[H]  3.9   |  34[H]  |  1.4    Ca    8.8      30 Jun 2025 05:14  Phos  3.4     06-30  Mg     2.2     06-30    TPro  4.5[L]  /  Alb  3.0[L]  /  TBili  0.3  /  DBili  x   /  AST  13  /  ALT  26  /  AlkPhos  77  06-30                                             Urinalysis Basic - ( 30 Jun 2025 05:14 )    Color: x / Appearance: x / SG: x / pH: x  Gluc: 164 mg/dL / Ketone: x  / Bili: x / Urobili: x   Blood: x / Protein: x / Nitrite: x   Leuk Esterase: x / RBC: x / WBC x   Sq Epi: x / Non Sq Epi: x / Bacteria: x                                                  LIVER FUNCTIONS - ( 30 Jun 2025 05:14 )  Alb: 3.0 g/dL / Pro: 4.5 g/dL / ALK PHOS: 77 U/L / ALT: 26 U/L / AST: 13 U/L / GGT: x                                                                                            ABG - ( 29 Jun 2025 16:21 )  pH, Arterial: 7.62  pH, Blood: x     /  pCO2: 37    /  pO2: 87    / HCO3: 38    / Base Excess: 15.5  /  SaO2: 99.1                MEDICATIONS  (STANDING):  amLODIPine   Tablet 10 milliGRAM(s) Oral every 24 hours  apixaban 2.5 milliGRAM(s) Oral every 12 hours  aspirin enteric coated 81 milliGRAM(s) Oral daily  bumetanide 1 milliGRAM(s) Oral daily  chlorhexidine 2% Cloths 1 Application(s) Topical <User Schedule>  dextrose 5%. 1000 milliLiter(s) (50 mL/Hr) IV Continuous <Continuous>  dextrose 5%. 1000 milliLiter(s) (100 mL/Hr) IV Continuous <Continuous>  dextrose 50% Injectable 25 Gram(s) IV Push once  dextrose 50% Injectable 12.5 Gram(s) IV Push once  dextrose 50% Injectable 25 Gram(s) IV Push once  fluticasone propionate 50 MICROgram(s)/spray Nasal Spray 1 Spray(s) Both Nostrils two times a day  glucagon  Injectable 1 milliGRAM(s) IntraMuscular once  insulin glargine Injectable (LANTUS) 10 Unit(s) SubCutaneous at bedtime  insulin lispro (ADMELOG) corrective regimen sliding scale   SubCutaneous three times a day before meals  insulin lispro Injectable (ADMELOG) 5 Unit(s) SubCutaneous three times a day before meals  lactulose Syrup 10 Gram(s) Oral daily  magnesium hydroxide Suspension 30 milliLiter(s) Oral daily  metoprolol succinate ER 50 milliGRAM(s) Oral daily  pantoprazole    Tablet 40 milliGRAM(s) Oral before breakfast  polyethylene glycol 3350 17 Gram(s) Oral every 12 hours  predniSONE   Tablet 40  Oral   predniSONE   Tablet 40 milliGRAM(s) Oral daily  senna 2 Tablet(s) Oral at bedtime    MEDICATIONS  (PRN):  albuterol    90 MICROgram(s) HFA Inhaler 2 Puff(s) Inhalation every 6 hours PRN Bronchospasm  dextrose Oral Gel 15 Gram(s) Oral once PRN Blood Glucose LESS THAN 70 milliGRAM(s)/deciliter

## 2025-06-30 NOTE — PROGRESS NOTE ADULT - SUBJECTIVE AND OBJECTIVE BOX
24H events:    Patient is a 84y old Female who presents with a chief complaint of Shortness of Breath (30 Jun 2025 13:01)    Primary diagnosis of Hypoxic respiratory failure    Today is 13d of hospitalization. This morning patient was seen and examined at bedside, resting comfortably in bed.    No acute or major events overnight. Patient reports no SOB or chest pain.     Code Status:    Family communication:  Contact date:  Name of person contacted:  Relationship to patient:  Communication details:  What matters most:    PAST MEDICAL & SURGICAL HISTORY  Hypertension, unspecified type    Type 2 diabetes mellitus with complication, without long-term current use of insulin    Hyperlipidemia, unspecified hyperlipidemia type    Diverticulitis    Obesity    COPD (chronic obstructive pulmonary disease)    SOB (shortness of breath)    GERD (gastroesophageal reflux disease)    Lung cancer    ARTIE (obstructive sleep apnea)  NO CPAP MACHINE PER PT.    Cancer of kidney    Cancer of thyroid    Former smoker    NHL (non-Hodgkin's lymphoma)  "low grade" per heme/ onc note    History of abdominal aortic aneurysm (AAA)    Kidney stones    [place] (hard of hearing)    History of surgery  LEFT LOWER LOBECTOMY    History of surgery  BILATERAL KNEE REPLACEMENT    History of surgery  CATARACT RIGHT EYE    History of surgery  TUBAL LIGATION    History of surgery  CYST REMOVED  RIGHT BREAST    History of surgery  CHOLECYSTECOMY    History of surgery  RIGHT PARTIAL NEPHRECTOMY    History of surgery  BILATERAL CATARACT SURGERY    History of back surgery    H/O lithotripsy    H/O partial thyroidectomy      SOCIAL HISTORY:  Social History:      ALLERGIES:  No Known Allergies    MEDICATIONS:  STANDING MEDICATIONS  amLODIPine   Tablet 10 milliGRAM(s) Oral every 24 hours  apixaban 2.5 milliGRAM(s) Oral every 12 hours  aspirin enteric coated 81 milliGRAM(s) Oral daily  bumetanide 1 milliGRAM(s) Oral daily  chlorhexidine 2% Cloths 1 Application(s) Topical <User Schedule>  dextrose 5%. 1000 milliLiter(s) IV Continuous <Continuous>  dextrose 5%. 1000 milliLiter(s) IV Continuous <Continuous>  dextrose 50% Injectable 25 Gram(s) IV Push once  dextrose 50% Injectable 12.5 Gram(s) IV Push once  dextrose 50% Injectable 25 Gram(s) IV Push once  fluticasone propionate 50 MICROgram(s)/spray Nasal Spray 1 Spray(s) Both Nostrils two times a day  glucagon  Injectable 1 milliGRAM(s) IntraMuscular once  insulin glargine Injectable (LANTUS) 10 Unit(s) SubCutaneous at bedtime  insulin lispro (ADMELOG) corrective regimen sliding scale   SubCutaneous three times a day before meals  insulin lispro Injectable (ADMELOG) 5 Unit(s) SubCutaneous three times a day before meals  lactulose Syrup 10 Gram(s) Oral daily  magnesium hydroxide Suspension 30 milliLiter(s) Oral daily  metoprolol succinate ER 50 milliGRAM(s) Oral daily  pantoprazole    Tablet 40 milliGRAM(s) Oral before breakfast  polyethylene glycol 3350 17 Gram(s) Oral every 12 hours  predniSONE   Tablet 40  Oral   predniSONE   Tablet 40 milliGRAM(s) Oral daily  senna 2 Tablet(s) Oral at bedtime    PRN MEDICATIONS  albuterol    90 MICROgram(s) HFA Inhaler 2 Puff(s) Inhalation every 6 hours PRN  dextrose Oral Gel 15 Gram(s) Oral once PRN    VITALS:   T(F): 97.8  HR: 78  BP: 128/74  RR: 18  SpO2: 95%    PHYSICAL EXAM:  CONSTITUTIONAL: Well groomed, no apparent distress  EYES: PERRLA and symmetric, EOMI, No conjunctival or scleral injection, non-icteric  ENMT: Oral mucosa with moist membranes. Normal dentition; no pharyngeal injection or exudates             NECK: Supple, symmetric and without tracheal deviation   RESP: No respiratory distress, no use of accessory muscles; CTA b/l, no WRR  CV: RRR, +S1S2, no MRG; no JVD; no peripheral edema  GI: Soft, NT, ND, no rebound, no guarding; no palpable masses; no hepatosplenomegaly; no hernia palpated  SKIN: No rashes or ulcers noted; no subcutaneous nodules or induration palpable  PSYCH: Appropriate insight/judgment; A+O x 3, mood and affect appropriate, recent/remote memory intact    (  ) Indwelling Reynoso Catheter:   Date insterted:    Reason (  ) Critical illness     (  ) urinary retention    (  ) Accurate Ins/Outs Monitoring     (  ) CMO patient    (  ) Central Line:   Date inserted:  Location: (  ) Right IJ     (  ) Left IJ     (  ) Right Fem     (  ) Left Fem    (  ) SPC        (  ) pigtail       (  ) PEG tube       (  ) colostomy       (  ) jejunostomy  (  ) U-Dall    LABS:                        8.5    9.69  )-----------( 158      ( 30 Jun 2025 05:14 )             27.8     06-30    136  |  92[L]  |  64[HH]  ----------------------------<  164[H]  3.9   |  34[H]  |  1.4    Ca    8.8      30 Jun 2025 05:14  Phos  3.4     06-30  Mg     2.2     06-30    TPro  4.5[L]  /  Alb  3.0[L]  /  TBili  0.3  /  DBili  x   /  AST  13  /  ALT  26  /  AlkPhos  77  06-30      Urinalysis Basic - ( 30 Jun 2025 05:14 )    Color: x / Appearance: x / SG: x / pH: x  Gluc: 164 mg/dL / Ketone: x  / Bili: x / Urobili: x   Blood: x / Protein: x / Nitrite: x   Leuk Esterase: x / RBC: x / WBC x   Sq Epi: x / Non Sq Epi: x / Bacteria: x      ABG - ( 29 Jun 2025 16:21 )  pH, Arterial: 7.62  pH, Blood: x     /  pCO2: 37    /  pO2: 87    / HCO3: 38    / Base Excess: 15.5  /  SaO2: 99.1

## 2025-06-30 NOTE — PROGRESS NOTE ADULT - ASSESSMENT
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    Patient has been decompensating since April after she underwent AAA stenting ( Has some renal artery leak). Was sent to nursing home. Was diagnosed with Ecoli/Pseudomonas UTIi treated with levaquin . Recently was having Shortness of breath/ cough ; treated with augmentin,. they checked theBNP 4199 ; followed Tye De Dios ; was started on Torsemide 20 mg.  PT developed increased shortness of breath and anxiety. Nursing home gave her lasix which improved her breathing status but worsened since the morning so was sent to the hospital.     #Acute Hypercapnic/ Hypoxic Respiratory Failure / multifactorial dyspnea/ acute on chronic CHF exacerbation   #H/O left lung adenocarcinoma s/p LLL lobectomy on Tagrisso  #H/O COPD  #R/o Tagrisso induce pneumonitis  - CHF protocol, fluid restriction 1200 ml in 24 hours   - intake and output monitoring, daily weight  - CXR -- increased lt sided opacities  --spoke with daughter and she wanted CT chest as suggested by DR Soares-- though patient has hx of pneumonectomy.  - TTE: EF 67%, mild LVH, severe LA dilation, TR/ PHTN   - pulmonary is following, recommendations noted:  - HOB at 45 degrees.  Encourage NIV during sleep and PRN during the day  -  Wean o2 as tolerated.  Continue home inhaler regimen.  Albuterol PRN  --6/26 change Bumex to PO, will change to 1 mg qd for now as swelling has resolved and decreasing sodium  - Prednisone tapering as per pulm  - lung adenocarcinoma is in remission, recent PET scan reviewed , Tagrisso held ( pt was taking this medications for two years)  - hold Tagrisso as per pulm/onc  - CT chest read no mention of pneumonitis, more likely fluid / f/u pulm on utility of prednisone taper and possibility of restarting Tagrisso  follow pulm , follow CXR today     #Mild ADRIA (baseline creatinine ~1.3)  - Cr 1.5--->1.3 on admission, previously normal  - Renal US:  Interval resolution of hydronephrosis since 5/12/25  - UA noted, blood and RBC  -  #Constipation  - high fiber diet   - c/w miralax BID, senna qhs  having bowel movement. wants ot hold enema , stopped     #Thrombocytopenia, improved   - heme/onc eval 6/20 noted. continue to hold tagrisso and continue diuresis    #Atrial Fibrillation  #H/O PE  #HTN  #Diabetes  #HLD  #AAA s/p stent   - DASH/ CC diet  - monitor  F/s   - continue apixaban 2.5 mg q12h, aspirin 81 mg, amlodipine 5 mg, metoprolol XL 50 mg daily  - continue sliding scale insulin lispro but hold if not eating (was hypoglycemic this AM likely from getting sliding scale dose 8 units lispro before bedtime, order changed for meals only)  -  Lantus 10 units and Lispro 5 units with meals    #Anxiety/Insomnia (Chronic)/ anorexia   - continue zolpidem 10 mg, dronabinol 2.5 mg daily    # Prophylactic measures   - DVT PPx: apixaban   - GI PPx: PPI    #Progress Note Handoff  f/u pulm on restarting Tagrisso and utility of prednisone taper   planning DC back to facility   follow oncology OP

## 2025-06-30 NOTE — PROGRESS NOTE ADULT - ASSESSMENT
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       #Acute Hypercapnic/ Hypoxic Respiratory Failure / multifactorial dyspnea/ acute on chronic CHF exacerbation   #H/O left lung adenocarcinoma s/p LLL lobectomy on Tagrisso  #H/O COPD  #R/o Tagrisso induce pneumonitis  - CHF protocol, fluid restriction 1200 ml in 24 hours   - intake and output monitoring, daily weight  - CXR -- increased lt sided opacities  --spoke with daughter and she wanted CT chest as suggested by DR Soares-- though patient has hx of pneumonectomy.  - TTE: EF 67%, mild LVH, severe LA dilation, TR/ PHTN   - pulmonary is following, recommendations noted:  - HOB at 45 degrees.  Encourage NIV during sleep and PRN during the day  -  Wean o2 as tolerated.  Continue home inhaler regimen.  Albuterol PRN  6/26 change Bumex to PO, will change to 1 mg qd for now as swelling has resolved and decreasing sodium  - Prednisone tapering as per pulm  - lung adenocarcinoma is in remission, recent PET scan reviewed , Tagrisso held ( pt was taking this medications for two years)  - hold Tagrisso as per pulm/onc  - f/u ABG, daughter and team encouraging patient to use bipap. Has been refusing bipap the last several nights  - f/u CT chest read  - Pulm could not r/o tagrisso induced pneumonitis  - Onc suggests holding tagrisso for now  - Pulm recommends prednisone taper     #Mild ADRIA (baseline creatinine ~1.3)  - Cr 1.5--->1.3 on admission, previously normal  - Renal US:  Interval resolution of hydronephrosis since 5/12/25  - UA noted, blood and RBC  - Cr stable at 1.4  -  #Constipation-- had BM yesterday  - high fiber diet   - c/w miralax BID, senna qhs    #Thrombocytopenia  -156<----121<--106  - heme/onc eval 6/20 noted. continue to hold tagrisso and continue diuresis    #Atrial Fibrillation  #H/O PE  #HTN  #Diabetes  #HLD  #AAA s/p stent   - DASH/ CC diet  - monitor  F/s   - continue apixaban 2.5 mg q12h, aspirin 81 mg, amlodipine 5 mg, metoprolol XL 50 mg daily  - continue sliding scale insulin lispro   -  Lantus 10 units and Lispro 5 units with meals ( f/S noted)     #Anxiety/Insomnia (Chronic)/ anorexia   - continue zolpidem 10 mg, dronabinol 2.5 mg daily    # Prophylactic measures   - DVT PPx: apixaban   - GI PPx: PPI    # Handoff  Hold Tagrisso  Prednisone taper  DC to subacute rehab tomorrow

## 2025-06-30 NOTE — PROGRESS NOTE ADULT - ASSESSMENT
IMPRESSION:    Acute hypercapnic/hypoxic respiratory failure - resolved  Pulmonary edema   ADRIA   Can't RO pneumonitis secondary to Tagrisso   HO COPD  HO small cell lung ca on Tagriso  HO left adenocarcinoma s/p LLL lobectomy  HO afib/PE on Eliquis     PLAN:    On room air - no respiratory symptoms   POCUS shows trace pleural effusion    OK to c/w Slow prednisone taper can lower by 10mg per week  On Eliquis - VTE unlikely   Albuterol nebs as needed   Incentive spirometry   Recall as needed

## 2025-06-30 NOTE — PROGRESS NOTE ADULT - SUBJECTIVE AND OBJECTIVE BOX
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    Patient has been decompensating since April after she underwent AAA stenting ( Has some renal artery leak). Was sent to nursing home. Was diagnosed with Ecoli/Pseudomonas UTIi treated with levaquin . Recently was having Shortness of breath/ cough ; treated with augmentin,. they checked theBNP 4199 ; followed Tye De Dios ; was started on Torsemide 20 mg other day.   Since yesterday had increased shortness of breath and anxiety. Nursing home gave her lasix which improved her breathing status but worsened since the morning  so was sent to the hospital.     No fever, chills or chest pain.  Daughter at bedside providing additional history, reports that she thinks patient never cleared pneumonia    In the ED:   Vitals: /79;  ; RR 25 ; afebrile ; 92 % on NRB     Labs:   WBC 6.42 Hb 8.8 Platelet 99K Neutro 83.2   Na 139 K 5.0   BUN/Creatinine 52/1.5   Pro BNP 6050     VBG ; Initial 7.29 CO2 60 HCO3 29 Lactate 1.4   Repeat pH 7.27 Co2 63 HCo3 29 lactate 1.7     Ua negative     US renal: mild bilateral hydronephrosis ; 0.5 non obstructive calculus     Chest Xray : Bilateral patchy opacities and left pleural effusion.    admitted to ICU.  (17 Jun 2025 15:31)      Currently admitted to medicine with the primary diagnosis of Hypoxic respiratory failure    no medical complaints, no shortness of breath    Besides the pertinent positives and negatives described above, the ROS was within normal limits.    PAST MEDICAL & SURGICAL HISTORY  Hypertension, unspecified type    Type 2 diabetes mellitus with complication, without long-term current use of insulin    Hyperlipidemia, unspecified hyperlipidemia type    Diverticulitis    Obesity    COPD (chronic obstructive pulmonary disease)    SOB (shortness of breath)    GERD (gastroesophageal reflux disease)    Lung cancer    ARTIE (obstructive sleep apnea)  NO CPAP MACHINE PER PT.    Cancer of kidney    Cancer of thyroid    Former smoker    NHL (non-Hodgkin's lymphoma)  "low grade" per heme/ onc note    History of abdominal aortic aneurysm (AAA)    Kidney stones    [place] (hard of hearing)    History of surgery  LEFT LOWER LOBECTOMY    History of surgery  BILATERAL KNEE REPLACEMENT    History of surgery  CATARACT RIGHT EYE    History of surgery  TUBAL LIGATION    History of surgery  CYST REMOVED  RIGHT BREAST    History of surgery  CHOLECYSTECOMY    History of surgery  RIGHT PARTIAL NEPHRECTOMY    History of surgery  BILATERAL CATARACT SURGERY    History of back surgery    H/O lithotripsy    H/O partial thyroidectomy      SOCIAL HISTORY:    ALLERGIES:  No Known Allergies    MEDICATIONS:  STANDING MEDICATIONS  amLODIPine   Tablet 10 milliGRAM(s) Oral every 24 hours  apixaban 2.5 milliGRAM(s) Oral every 12 hours  aspirin enteric coated 81 milliGRAM(s) Oral daily  bumetanide 1 milliGRAM(s) Oral daily  chlorhexidine 2% Cloths 1 Application(s) Topical <User Schedule>  dextrose 5%. 1000 milliLiter(s) IV Continuous <Continuous>  dextrose 5%. 1000 milliLiter(s) IV Continuous <Continuous>  dextrose 50% Injectable 25 Gram(s) IV Push once  dextrose 50% Injectable 12.5 Gram(s) IV Push once  dextrose 50% Injectable 25 Gram(s) IV Push once  fluticasone propionate 50 MICROgram(s)/spray Nasal Spray 1 Spray(s) Both Nostrils two times a day  glucagon  Injectable 1 milliGRAM(s) IntraMuscular once  insulin glargine Injectable (LANTUS) 10 Unit(s) SubCutaneous at bedtime  insulin lispro (ADMELOG) corrective regimen sliding scale   SubCutaneous Before meals and at bedtime  insulin lispro Injectable (ADMELOG) 5 Unit(s) SubCutaneous three times a day before meals  lactulose Syrup 10 Gram(s) Oral daily  magnesium hydroxide Suspension 30 milliLiter(s) Oral daily  metoprolol succinate ER 50 milliGRAM(s) Oral daily  pantoprazole    Tablet 40 milliGRAM(s) Oral before breakfast  polyethylene glycol 3350 17 Gram(s) Oral every 12 hours  predniSONE   Tablet 40 milliGRAM(s) Oral daily  predniSONE   Tablet 40  Oral   senna 2 Tablet(s) Oral at bedtime    PRN MEDICATIONS  albuterol    90 MICROgram(s) HFA Inhaler 2 Puff(s) Inhalation every 6 hours PRN  dextrose Oral Gel 15 Gram(s) Oral once PRN    VITALS:   Vital Signs Last 24 Hrs  T(C): 36.6 (29 Jun 2025 20:05), Max: 36.6 (29 Jun 2025 20:05)  T(F): 97.9 (29 Jun 2025 20:05), Max: 97.9 (29 Jun 2025 20:05)  HR: 76 (30 Jun 2025 11:21) (69 - 77)  BP: 134/74 (30 Jun 2025 11:21) (116/77 - 134/74)  BP(mean): 94 (30 Jun 2025 11:21) (86 - 94)  RR: 18 (29 Jun 2025 20:05) (18 - 18)  SpO2: 95% (29 Jun 2025 20:05) (95% - 97%)    Parameters below as of 29 Jun 2025 20:05  Patient On (Oxygen Delivery Method): room air        LABS:               LABS:                        8.5    9.69  )-----------( 158      ( 30 Jun 2025 05:14 )             27.8     06-30    136  |  92[L]  |  64[HH]  ----------------------------<  164[H]  3.9   |  34[H]  |  1.4    Ca    8.8      30 Jun 2025 05:14  Phos  3.4     06-30  Mg     2.2     06-30    TPro  4.5[L]  /  Alb  3.0[L]  /  TBili  0.3  /  DBili  x   /  AST  13  /  ALT  26  /  AlkPhos  77  06-30              Urinalysis Basic - ( 29 Jun 2025 05:49 )    Color: x / Appearance: x / SG: x / pH: x  Gluc: 51 mg/dL / Ketone: x  / Bili: x / Urobili: x   Blood: x / Protein: x / Nitrite: x   Leuk Esterase: x / RBC: x / WBC x   Sq Epi: x / Non Sq Epi: x / Bacteria: x                Hypoxic respiratory failure      RADIOLOGY:    PHYSICAL EXAM:  General: WN/WD NAD  Neurology: A&Ox3, nonfocal, KWOGN x 4  Head:  Normocephalic, atraumatic  ENT:  Mucosa moist, no ulcerations  Neck:  Supple, no sinuses or palpable masses  Lymphatic:  No palpable cervical, supraclavicular, axillary or inguinal adenopathy  Respiratory: CTA B/L  CV: RRR, S1S2, no murmur  Abdominal: Soft, NT, ND no palpable mass  MSK: No edema, + peripheral pulses  Incisions: intact, no erythema or drainage    Intravenous access: yes  NG tube: no  Reynoso Catheter: no

## 2025-07-01 ENCOUNTER — TRANSCRIPTION ENCOUNTER (OUTPATIENT)
Age: 85
End: 2025-07-01

## 2025-07-01 VITALS
TEMPERATURE: 98 F | HEART RATE: 66 BPM | RESPIRATION RATE: 18 BRPM | SYSTOLIC BLOOD PRESSURE: 122 MMHG | DIASTOLIC BLOOD PRESSURE: 72 MMHG

## 2025-07-01 LAB
ALBUMIN SERPL ELPH-MCNC: 3 G/DL — LOW (ref 3.5–5.2)
ALP SERPL-CCNC: 75 U/L — SIGNIFICANT CHANGE UP (ref 30–115)
ALT FLD-CCNC: 25 U/L — SIGNIFICANT CHANGE UP (ref 0–41)
ANION GAP SERPL CALC-SCNC: 12 MMOL/L — SIGNIFICANT CHANGE UP (ref 7–14)
AST SERPL-CCNC: 14 U/L — SIGNIFICANT CHANGE UP (ref 0–41)
BASOPHILS # BLD AUTO: 0.01 K/UL — SIGNIFICANT CHANGE UP (ref 0–0.2)
BASOPHILS NFR BLD AUTO: 0.1 % — SIGNIFICANT CHANGE UP (ref 0–1)
BILIRUB SERPL-MCNC: 0.3 MG/DL — SIGNIFICANT CHANGE UP (ref 0.2–1.2)
BUN SERPL-MCNC: 65 MG/DL — CRITICAL HIGH (ref 10–20)
CALCIUM SERPL-MCNC: 9 MG/DL — SIGNIFICANT CHANGE UP (ref 8.4–10.5)
CHLORIDE SERPL-SCNC: 90 MMOL/L — LOW (ref 98–110)
CO2 SERPL-SCNC: 33 MMOL/L — HIGH (ref 17–32)
CREAT SERPL-MCNC: 1.2 MG/DL — SIGNIFICANT CHANGE UP (ref 0.7–1.5)
EGFR: 45 ML/MIN/1.73M2 — LOW
EGFR: 45 ML/MIN/1.73M2 — LOW
EOSINOPHIL # BLD AUTO: 0.07 K/UL — SIGNIFICANT CHANGE UP (ref 0–0.7)
EOSINOPHIL NFR BLD AUTO: 0.7 % — SIGNIFICANT CHANGE UP (ref 0–8)
GLUCOSE BLDC GLUCOMTR-MCNC: 190 MG/DL — HIGH (ref 70–99)
GLUCOSE BLDC GLUCOMTR-MCNC: 98 MG/DL — SIGNIFICANT CHANGE UP (ref 70–99)
GLUCOSE SERPL-MCNC: 80 MG/DL — SIGNIFICANT CHANGE UP (ref 70–99)
HCT VFR BLD CALC: 27.1 % — LOW (ref 37–47)
HGB BLD-MCNC: 8.1 G/DL — LOW (ref 12–16)
IMM GRANULOCYTES NFR BLD AUTO: 0.4 % — HIGH (ref 0.1–0.3)
LYMPHOCYTES # BLD AUTO: 1.23 K/UL — SIGNIFICANT CHANGE UP (ref 1.2–3.4)
LYMPHOCYTES # BLD AUTO: 11.7 % — LOW (ref 20.5–51.1)
MAGNESIUM SERPL-MCNC: 2.2 MG/DL — SIGNIFICANT CHANGE UP (ref 1.8–2.4)
MCHC RBC-ENTMCNC: 24.8 PG — LOW (ref 27–31)
MCHC RBC-ENTMCNC: 29.9 G/DL — LOW (ref 32–37)
MCV RBC AUTO: 83.1 FL — SIGNIFICANT CHANGE UP (ref 81–99)
MONOCYTES # BLD AUTO: 1.12 K/UL — HIGH (ref 0.1–0.6)
MONOCYTES NFR BLD AUTO: 10.7 % — HIGH (ref 1.7–9.3)
NEUTROPHILS # BLD AUTO: 8.02 K/UL — HIGH (ref 1.4–6.5)
NEUTROPHILS NFR BLD AUTO: 76.4 % — HIGH (ref 42.2–75.2)
NRBC BLD AUTO-RTO: 0 /100 WBCS — SIGNIFICANT CHANGE UP (ref 0–0)
PLATELET # BLD AUTO: 170 K/UL — SIGNIFICANT CHANGE UP (ref 130–400)
PMV BLD: 11.9 FL — HIGH (ref 7.4–10.4)
POTASSIUM SERPL-MCNC: 3.8 MMOL/L — SIGNIFICANT CHANGE UP (ref 3.5–5)
POTASSIUM SERPL-SCNC: 3.8 MMOL/L — SIGNIFICANT CHANGE UP (ref 3.5–5)
PROT SERPL-MCNC: 4.6 G/DL — LOW (ref 6–8)
RBC # BLD: 3.26 M/UL — LOW (ref 4.2–5.4)
RBC # FLD: 15.3 % — HIGH (ref 11.5–14.5)
SODIUM SERPL-SCNC: 135 MMOL/L — SIGNIFICANT CHANGE UP (ref 135–146)
WBC # BLD: 10.49 K/UL — SIGNIFICANT CHANGE UP (ref 4.8–10.8)
WBC # FLD AUTO: 10.49 K/UL — SIGNIFICANT CHANGE UP (ref 4.8–10.8)

## 2025-07-01 PROCEDURE — 99239 HOSP IP/OBS DSCHRG MGMT >30: CPT

## 2025-07-01 RX ORDER — BUMETANIDE 1 MG/1
1 TABLET ORAL
Qty: 0 | Refills: 0 | DISCHARGE
Start: 2025-07-01

## 2025-07-01 RX ORDER — FLUTICASONE PROPIONATE 50 UG/1
1 SPRAY, METERED NASAL
Qty: 0 | Refills: 0 | DISCHARGE
Start: 2025-07-01

## 2025-07-01 RX ORDER — TORSEMIDE 20 MG/1
1 TABLET ORAL
Refills: 0 | DISCHARGE

## 2025-07-01 RX ORDER — PREDNISONE 20 MG/1
1 TABLET ORAL
Qty: 110 | Refills: 0
Start: 2025-07-01 | End: 2025-07-25

## 2025-07-01 RX ORDER — FLUTICASONE PROPIONATE 50 UG/1
1 SPRAY, METERED NASAL
Refills: 0 | DISCHARGE

## 2025-07-01 RX ADMIN — METOPROLOL SUCCINATE 50 MILLIGRAM(S): 50 TABLET, EXTENDED RELEASE ORAL at 05:27

## 2025-07-01 RX ADMIN — Medication 40 MILLIGRAM(S): at 05:27

## 2025-07-01 RX ADMIN — INSULIN LISPRO 2: 100 INJECTION, SOLUTION INTRAVENOUS; SUBCUTANEOUS at 11:55

## 2025-07-01 RX ADMIN — AMLODIPINE BESYLATE 10 MILLIGRAM(S): 10 TABLET ORAL at 11:21

## 2025-07-01 RX ADMIN — APIXABAN 2.5 MILLIGRAM(S): 2.5 TABLET, FILM COATED ORAL at 05:28

## 2025-07-01 RX ADMIN — PREDNISONE 40 MILLIGRAM(S): 20 TABLET ORAL at 05:27

## 2025-07-01 RX ADMIN — INSULIN LISPRO 5 UNIT(S): 100 INJECTION, SOLUTION INTRAVENOUS; SUBCUTANEOUS at 11:55

## 2025-07-01 RX ADMIN — BUMETANIDE 1 MILLIGRAM(S): 1 TABLET ORAL at 05:29

## 2025-07-01 RX ADMIN — INSULIN LISPRO 5 UNIT(S): 100 INJECTION, SOLUTION INTRAVENOUS; SUBCUTANEOUS at 08:16

## 2025-07-01 RX ADMIN — Medication 81 MILLIGRAM(S): at 11:21

## 2025-07-01 RX ADMIN — Medication 1 APPLICATION(S): at 05:28

## 2025-07-01 NOTE — DISCHARGE NOTE NURSING/CASE MANAGEMENT/SOCIAL WORK - NSDCFUADDAPPT_GEN_ALL_CORE_FT
APPTS ARE READY TO BE MADE: [ ] YES    Best Family or Patient Contact (if needed):    Additional Information about above appointments (if needed):    1: Hem-Onc  2: Pulmonology  3: PCP    Other comments or requests:   follow up with PCP and specialists as scheduled. go to ed in case of new or worsening symptoms

## 2025-07-01 NOTE — DISCHARGE NOTE NURSING/CASE MANAGEMENT/SOCIAL WORK - FINANCIAL ASSISTANCE
[facility] provides services at a reduced cost to those who are determined to be eligible through [facility]’s financial assistance program. Information regarding [facility]’s financial assistance program can be found by going to [email or web address] or by calling [phone].

## 2025-07-01 NOTE — PROGRESS NOTE ADULT - PROVIDER SPECIALTY LIST ADULT
Critical Care
Hospitalist
Internal Medicine
Hospitalist
Hospitalist
Internal Medicine
Internal Medicine
Critical Care
Hospitalist
MICU
Palliative Care
Pulmonology
Pulmonology
Critical Care
Hospitalist
Hospitalist
Internal Medicine
Critical Care

## 2025-07-01 NOTE — DISCHARGE NOTE NURSING/CASE MANAGEMENT/SOCIAL WORK - PATIENT PORTAL LINK FT
You can access the FollowMyHealth Patient Portal offered by [facility] by registering at the following website: [email or web address]. By joining [facility]’s FollowMyHealth portal, you will also be able to view your health information using other applications (apps) compatible with our system.

## 2025-07-01 NOTE — PROGRESS NOTE ADULT - REASON FOR ADMISSION

## 2025-07-01 NOTE — PROGRESS NOTE ADULT - SUBJECTIVE AND OBJECTIVE BOX
Patient is a 84y old  Female who presents with a chief complaint of Shortness of Breath (26 Jun 2025 13:30)    INTERVAL HPI/OVERNIGHT EVENTS: Patient was examined and seen at bedside. This morning pt is resting comfortably in bed and reports no new issues or overnight events. No complaints, feels better. Wants to go to rehab.  ROS: Denies CP, SOB, AP, new weakness  All other systems reviewed and are within normal limits.  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    Patient has been decompensating since April after she underwent AAA stenting ( Has some renal artery leak). Was sent to nursing home. Was diagnosed with Ecoli/Pseudomonas UTIi treated with levaquin . Recently was having Shortness of breath/ cough ; treated with augmentin,. they checked theBNP 4199 ; followed Tye De Dios ; was started on Torsemide 20 mg other day.   Since yesterday had increased shortness of breath and anxiety. Nursing home gave her lasix which improved her breathing status but worsened since the morning  so was sent to the hospital.     No fever, chills or chest pain.  Daughter at bedside providing additional history, reports that she thinks patient never cleared pneumonia    In the ED:   Vitals: /79;  ; RR 25 ; afebrile ; 92 % on NRB     Labs:   WBC 6.42 Hb 8.8 Platelet 99K Neutro 83.2   Na 139 K 5.0   BUN/Creatinine 52/1.5   Pro BNP 6050     VBG ; Initial 7.29 CO2 60 HCO3 29 Lactate 1.4   Repeat pH 7.27 Co2 63 HCo3 29 lactate 1.7     Ua negative     US renal: mild bilateral hydronephrosis ; 0.5 non obstructive calculus     Chest Xray : Bilateral patchy opacities and left pleural effusion.    admitted to ICU.  (17 Jun 2025 15:31)    PAST MEDICAL & SURGICAL HISTORY:  Hypertension, unspecified type      Type 2 diabetes mellitus with complication, without long-term current use of insulin      Hyperlipidemia, unspecified hyperlipidemia type      Diverticulitis      Obesity      COPD (chronic obstructive pulmonary disease)      SOB (shortness of breath)      GERD (gastroesophageal reflux disease)      Lung cancer      ARTIE (obstructive sleep apnea)  NO CPAP MACHINE PER PT.      Cancer of kidney      Cancer of thyroid      Former smoker      NHL (non-Hodgkin's lymphoma)  "low grade" per heme/ onc note      History of abdominal aortic aneurysm (AAA)      Kidney stones      [place] (hard of hearing)      History of surgery  LEFT LOWER LOBECTOMY      History of surgery  BILATERAL KNEE REPLACEMENT      History of surgery  CATARACT RIGHT EYE      History of surgery  TUBAL LIGATION      History of surgery  CYST REMOVED  RIGHT BREAST      History of surgery  CHOLECYSTECOMY      History of surgery  RIGHT PARTIAL NEPHRECTOMY      History of surgery  BILATERAL CATARACT SURGERY      History of back surgery      H/O lithotripsy      H/O partial thyroidectomy          General: NAD, AAOx2+, chronically ill appearing  HEENT:  EOMI, no LAD  CV: S1 S2  Resp: decreased breath sounds at bases  GI: NT/ND/S +BS  MS: no clubbing/cyanosis/edema, + pulses b/l  Neuro: nonfocal, +reflexes thruout          Home Medications:  amLODIPine 5 mg oral tablet: 1 tab(s) orally once a day (17 Jun 2025 15:48)  aspirin 81 mg oral capsule: 1 cap(s) orally once a day (17 Jun 2025 15:48)  bumetanide 1 mg oral tablet: 1 tab(s) orally once a day (01 Jul 2025 13:18)  Ditropan 5 mg oral tablet: 1 tab(s) orally once a day (at bedtime) (17 Jun 2025 15:48)  docusate sodium 100 mg oral tablet: 3 tab(s) orally once a day (at bedtime) (17 Jun 2025 15:49)  fluticasone 50 mcg/inh nasal spray: 1 spray(s) nasal 2 times a day (01 Jul 2025 13:18)  Incruse Ellipta 62.5 mcg/inh inhalation powder: 1 puff(s) inhaled once a day (17 Jun 2025 15:48)  metoprolol succinate 25 mg oral tablet, extended release: 2 tab(s) orally once a day (17 Jun 2025 15:42)  MiraLax oral powder for reconstitution: 17 gram(s) orally once a day (at bedtime) (17 Jun 2025 15:48)  omeprazole 40 mg oral delayed release capsule: 1 cap(s) orally once a day (17 Jun 2025 15:49)  senna (sennosides) 8.6 mg oral tablet: 1 tab(s) orally once a day (17 Jun 2025 15:48)  Symbicort 160 mcg-4.5 mcg/inh inhalation aerosol: 2 puff(s) inhaled 2 times a day (17 Jun 2025 15:49)  Trulicity Pen 1.5 mg/0.5 mL subcutaneous solution: 1.5 milligram(s) subcutaneous once a week (17 Jun 2025 15:49)  Vitamin D3 25 mcg (1000 intl units) oral tablet: 1 tab(s) orally once a day (17 Jun 2025 15:49)    MEDICATIONS  (STANDING):  amLODIPine   Tablet 10 milliGRAM(s) Oral every 24 hours  apixaban 2.5 milliGRAM(s) Oral every 12 hours  aspirin enteric coated 81 milliGRAM(s) Oral daily  bumetanide 1 milliGRAM(s) Oral daily  chlorhexidine 2% Cloths 1 Application(s) Topical <User Schedule>  dextrose 5%. 1000 milliLiter(s) (50 mL/Hr) IV Continuous <Continuous>  dextrose 5%. 1000 milliLiter(s) (100 mL/Hr) IV Continuous <Continuous>  dextrose 50% Injectable 25 Gram(s) IV Push once  dextrose 50% Injectable 12.5 Gram(s) IV Push once  dextrose 50% Injectable 25 Gram(s) IV Push once  fluticasone propionate 50 MICROgram(s)/spray Nasal Spray 1 Spray(s) Both Nostrils two times a day  glucagon  Injectable 1 milliGRAM(s) IntraMuscular once  insulin glargine Injectable (LANTUS) 10 Unit(s) SubCutaneous at bedtime  insulin lispro (ADMELOG) corrective regimen sliding scale   SubCutaneous three times a day before meals  insulin lispro Injectable (ADMELOG) 5 Unit(s) SubCutaneous three times a day before meals  lactulose Syrup 10 Gram(s) Oral daily  magnesium hydroxide Suspension 30 milliLiter(s) Oral daily  metoprolol succinate ER 50 milliGRAM(s) Oral daily  pantoprazole    Tablet 40 milliGRAM(s) Oral before breakfast  polyethylene glycol 3350 17 Gram(s) Oral every 12 hours  predniSONE   Tablet 40 milliGRAM(s) Oral daily  predniSONE   Tablet 40  Oral   senna 2 Tablet(s) Oral at bedtime    MEDICATIONS  (PRN):  albuterol    90 MICROgram(s) HFA Inhaler 2 Puff(s) Inhalation every 6 hours PRN Bronchospasm  dextrose Oral Gel 15 Gram(s) Oral once PRN Blood Glucose LESS THAN 70 milliGRAM(s)/deciliter    Vital Signs Last 24 Hrs  T(C): 36.8 (01 Jul 2025 13:08), Max: 37.1 (01 Jul 2025 05:13)  T(F): 98.2 (01 Jul 2025 13:08), Max: 98.8 (01 Jul 2025 05:13)  HR: 66 (01 Jul 2025 13:08) (66 - 74)  BP: 122/72 (01 Jul 2025 13:08) (122/72 - 130/76)  BP(mean): 94 (01 Jul 2025 05:13) (90 - 94)  RR: 18 (01 Jul 2025 13:08) (18 - 18)  SpO2: 99% (01 Jul 2025 05:13) (95% - 99%)    Parameters below as of 01 Jul 2025 13:08  Patient On (Oxygen Delivery Method): room air      CAPILLARY BLOOD GLUCOSE      POCT Blood Glucose.: 190 mg/dL (01 Jul 2025 11:27)  POCT Blood Glucose.: 98 mg/dL (01 Jul 2025 07:28)  POCT Blood Glucose.: 339 mg/dL (30 Jun 2025 20:57)    LABS:                        8.1    10.49 )-----------( 170      ( 01 Jul 2025 06:11 )             27.1     07-01    135  |  90[L]  |  65[HH]  ----------------------------<  80  3.8   |  33[H]  |  1.2    Ca    9.0      01 Jul 2025 06:11  Phos  3.4     06-30  Mg     2.2     07-01    TPro  4.6[L]  /  Alb  3.0[L]  /  TBili  0.3  /  DBili  x   /  AST  14  /  ALT  25  /  AlkPhos  75  07-01    LIVER FUNCTIONS - ( 01 Jul 2025 06:11 )  Alb: 3.0 g/dL / Pro: 4.6 g/dL / ALK PHOS: 75 U/L / ALT: 25 U/L / AST: 14 U/L / GGT: x                 Urinalysis Basic - ( 01 Jul 2025 06:11 )    Color: x / Appearance: x / SG: x / pH: x  Gluc: 80 mg/dL / Ketone: x  / Bili: x / Urobili: x   Blood: x / Protein: x / Nitrite: x   Leuk Esterase: x / RBC: x / WBC x   Sq Epi: x / Non Sq Epi: x / Bacteria: x              Consultant Notes Reviewed:  [x ] YES  [ ] NO  Care Discussed with Consultants/Other Providers/ Housestaff [ x] YES  [ ] NO  Radiology, labs, EKGs, new studies personally reviewed.

## 2025-07-01 NOTE — PROGRESS NOTE ADULT - ASSESSMENT
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    Patient has been decompensating since April after she underwent AAA stenting ( Has some renal artery leak). Was sent to nursing home. Was diagnosed with Ecoli/Pseudomonas UTIi treated with levaquin . Recently was having Shortness of breath/ cough ; treated with augmentin,. they checked theBNP 4199 ; followed Tye De Dios ; was started on Torsemide 20 mg.  PT developed increased shortness of breath and anxiety. Nursing home gave her lasix which improved her breathing status but worsened since the morning so was sent to the hospital.     #Acute Hypercapnic/ Hypoxic Respiratory Failure / multifactorial dyspnea/ acute on chronic CHF exacerbation HFpEF  #H/O left lung adenocarcinoma s/p LLL lobectomy on Tagrisso  #H/O COPD  #R/o Tagrisso induce pneumonitis  - CHF protocol, fluid restriction 1200 ml in 24 hours   - intake and output monitoring, daily weight  - CXR (6/17/2025): Bilateral patchy opacities and left pleural effusion.   - TTE: EF 67%, mild LVH, severe LA dilation, TR/ PHTN   - pulmonary is following, recommendations noted:  - HOB at 45 degrees.  Encourage NIV during sleep and PRN during the day  -  Continue home inhaler regimen.  Albuterol PRN  -  Repeat CXR noted, showed resolution of CHF   -6/26 changed Bumex to PO    - Prednisone tapering as per pulm  - lung adenocarcinoma is in remission, recent PET scan reviewed , Tagrisso held ( pt was taking this medications for two years)  - hold Tagrisso as per pulm/onc     #Mild ADRIA (baseline creatinine ~1.3)  - Cr 1.5 on admission, previously normal  - Renal US:  Interval resolution of hydronephrosis since 5/12/25  - UA noted, blood and RBC  - monitor lytes, BMP    #Constipation  - high fiber diet   - c/w miralax BID, senna qhs    #Thrombocytopenia  - monitor platelets   - heme/onc eval 6/20 noted. continue to hold tagrisso and continue diuresis    #Atrial Fibrillation  #H/O PE  #HTN  #Diabetes  #HLD  #AAA s/p stent   - DASH/ CC diet  - monitor  F/s   - continue apixaban 2.5 mg q12h, aspirin 81 mg, amlodipine 5 mg, metoprolol XL 50 mg daily  - continue sliding scale insulin lispro   -  Lantus 10 units and Lispro 5 units with meals ( f/S noted)     #Anxiety/Insomnia (Chronic)/ anorexia   - continue zolpidem 10 mg, dronabinol 2.5 mg daily    # Prophylactic measures   - DVT PPx: apixaban   - GI PPx: PPI    Disposition: Nursing Home        My note supersedes the residents note should a discrepancy arise.    Chart and notes personally reviewed.  Care Discussed with Consultants/Other Providers/ Housestaff [ x] YES [ ] NO   Radiology, labs, old records personally reviewed.    discussed w/ housestaff, nursing, case management    Time-based billing (NON-critical care).     50 minutes spent on total encounter. The necessity of the time spent during the encounter on this date of service was due to:     time spent on review of labs, imaging studies, old records, obtaining history, personally examining patient, counselling and communicating with patient/ family, entering orders for medications/tests/etc, discussions with other health care providers, documentation in electronic health records, independent interpretation of labs, imaging/procedure results and care coordination. This excludes teaching time and/or separately reported services.

## 2025-07-01 NOTE — DISCHARGE NOTE NURSING/CASE MANAGEMENT/SOCIAL WORK - NSDCVIVACCINE_GEN_ALL_CORE_FT
Tdap; 15-Sep-2018 18:40; Rachael Morales (RN); [employer]; [id number] (Exp. Date: 17-Jan-2020); IntraMuscular; Deltoid Left.; 0.5 milliLiter(s); VIS (VIS Published: 09-May-2013, VIS Presented: 15-Sep-2018);

## 2025-07-03 ENCOUNTER — INPATIENT (INPATIENT)
Facility: HOSPITAL | Age: 85
LOS: 4 days | Discharge: HOME | DRG: 270 | End: 2025-07-08
Attending: SURGERY | Admitting: SURGERY
Payer: MEDICARE

## 2025-07-03 VITALS
DIASTOLIC BLOOD PRESSURE: 73 MMHG | HEART RATE: 76 BPM | OXYGEN SATURATION: 88 % | SYSTOLIC BLOOD PRESSURE: 127 MMHG | RESPIRATION RATE: 16 BRPM | HEIGHT: 64 IN

## 2025-07-03 DIAGNOSIS — Z98.890 OTHER SPECIFIED POSTPROCEDURAL STATES: Chronic | ICD-10-CM

## 2025-07-03 DIAGNOSIS — E89.0 POSTPROCEDURAL HYPOTHYROIDISM: Chronic | ICD-10-CM

## 2025-07-03 DIAGNOSIS — I71.30 ABDOMINAL AORTIC ANEURYSM, RUPTURED, UNSPECIFIED: ICD-10-CM

## 2025-07-03 LAB
ALBUMIN SERPL ELPH-MCNC: 3.4 G/DL — LOW (ref 3.5–5.2)
ALP SERPL-CCNC: 78 U/L — SIGNIFICANT CHANGE UP (ref 30–115)
ALT FLD-CCNC: 29 U/L — SIGNIFICANT CHANGE UP (ref 0–41)
ANION GAP SERPL CALC-SCNC: 14 MMOL/L — SIGNIFICANT CHANGE UP (ref 7–14)
ANION GAP SERPL CALC-SCNC: 9 MMOL/L — SIGNIFICANT CHANGE UP (ref 7–14)
APTT BLD: 23.2 SEC — CRITICAL LOW (ref 27–39.2)
AST SERPL-CCNC: 18 U/L — SIGNIFICANT CHANGE UP (ref 0–41)
B-OH-BUTYR SERPL-SCNC: <0.2 MMOL/L — SIGNIFICANT CHANGE UP
BASOPHILS # BLD AUTO: 0 K/UL — SIGNIFICANT CHANGE UP (ref 0–0.2)
BASOPHILS # BLD AUTO: 0.02 K/UL — SIGNIFICANT CHANGE UP (ref 0–0.2)
BASOPHILS NFR BLD AUTO: 0 % — SIGNIFICANT CHANGE UP (ref 0–1)
BASOPHILS NFR BLD AUTO: 0.1 % — SIGNIFICANT CHANGE UP (ref 0–1)
BILIRUB SERPL-MCNC: 0.3 MG/DL — SIGNIFICANT CHANGE UP (ref 0.2–1.2)
BUN SERPL-MCNC: 51 MG/DL — HIGH (ref 10–20)
BUN SERPL-MCNC: 52 MG/DL — HIGH (ref 10–20)
CALCIUM SERPL-MCNC: 8.2 MG/DL — LOW (ref 8.4–10.5)
CALCIUM SERPL-MCNC: 8.8 MG/DL — SIGNIFICANT CHANGE UP (ref 8.4–10.5)
CHLORIDE SERPL-SCNC: 103 MMOL/L — SIGNIFICANT CHANGE UP (ref 98–110)
CHLORIDE SERPL-SCNC: 95 MMOL/L — LOW (ref 98–110)
CO2 SERPL-SCNC: 26 MMOL/L — SIGNIFICANT CHANGE UP (ref 17–32)
CO2 SERPL-SCNC: 27 MMOL/L — SIGNIFICANT CHANGE UP (ref 17–32)
CREAT SERPL-MCNC: 1.1 MG/DL — SIGNIFICANT CHANGE UP (ref 0.7–1.5)
CREAT SERPL-MCNC: 1.3 MG/DL — SIGNIFICANT CHANGE UP (ref 0.7–1.5)
EGFR: 41 ML/MIN/1.73M2 — LOW
EGFR: 41 ML/MIN/1.73M2 — LOW
EGFR: 50 ML/MIN/1.73M2 — LOW
EGFR: 50 ML/MIN/1.73M2 — LOW
EOSINOPHIL # BLD AUTO: 0 K/UL — SIGNIFICANT CHANGE UP (ref 0–0.7)
EOSINOPHIL # BLD AUTO: 0.01 K/UL — SIGNIFICANT CHANGE UP (ref 0–0.7)
EOSINOPHIL NFR BLD AUTO: 0 % — SIGNIFICANT CHANGE UP (ref 0–8)
EOSINOPHIL NFR BLD AUTO: 0 % — SIGNIFICANT CHANGE UP (ref 0–8)
GAS PNL BLDV: SIGNIFICANT CHANGE UP
GLUCOSE SERPL-MCNC: 172 MG/DL — HIGH (ref 70–99)
GLUCOSE SERPL-MCNC: 430 MG/DL — HIGH (ref 70–99)
HCT VFR BLD CALC: 25.7 % — LOW (ref 37–47)
HCT VFR BLD CALC: 30.4 % — LOW (ref 37–47)
HGB BLD-MCNC: 7.8 G/DL — LOW (ref 12–16)
HGB BLD-MCNC: 9.8 G/DL — LOW (ref 12–16)
IMM GRANULOCYTES NFR BLD AUTO: 0.9 % — HIGH (ref 0.1–0.3)
INR BLD: 1.15 RATIO — SIGNIFICANT CHANGE UP (ref 0.65–1.3)
LACTATE SERPL-SCNC: 1.6 MMOL/L — SIGNIFICANT CHANGE UP (ref 0.7–2)
LYMPHOCYTES # BLD AUTO: 0 % — LOW (ref 20.5–51.1)
LYMPHOCYTES # BLD AUTO: 0 K/UL — LOW (ref 1.2–3.4)
LYMPHOCYTES # BLD AUTO: 0.34 K/UL — LOW (ref 1.2–3.4)
LYMPHOCYTES # BLD AUTO: 1.7 % — LOW (ref 20.5–51.1)
MAGNESIUM SERPL-MCNC: 1.8 MG/DL — SIGNIFICANT CHANGE UP (ref 1.8–2.4)
MAGNESIUM SERPL-MCNC: 1.9 MG/DL — SIGNIFICANT CHANGE UP (ref 1.8–2.4)
MCHC RBC-ENTMCNC: 25.2 PG — LOW (ref 27–31)
MCHC RBC-ENTMCNC: 27.8 PG — SIGNIFICANT CHANGE UP (ref 27–31)
MCHC RBC-ENTMCNC: 30.4 G/DL — LOW (ref 32–37)
MCHC RBC-ENTMCNC: 32.2 G/DL — SIGNIFICANT CHANGE UP (ref 32–37)
MCV RBC AUTO: 83.2 FL — SIGNIFICANT CHANGE UP (ref 81–99)
MCV RBC AUTO: 86.4 FL — SIGNIFICANT CHANGE UP (ref 81–99)
MONOCYTES # BLD AUTO: 0.59 K/UL — SIGNIFICANT CHANGE UP (ref 0.1–0.6)
MONOCYTES # BLD AUTO: 0.61 K/UL — HIGH (ref 0.1–0.6)
MONOCYTES NFR BLD AUTO: 3 % — SIGNIFICANT CHANGE UP (ref 1.7–9.3)
MONOCYTES NFR BLD AUTO: 4.3 % — SIGNIFICANT CHANGE UP (ref 1.7–9.3)
NEUTROPHILS # BLD AUTO: 13.05 K/UL — HIGH (ref 1.4–6.5)
NEUTROPHILS # BLD AUTO: 19.03 K/UL — HIGH (ref 1.4–6.5)
NEUTROPHILS NFR BLD AUTO: 94.3 % — HIGH (ref 42.2–75.2)
NEUTROPHILS NFR BLD AUTO: 95.7 % — HIGH (ref 42.2–75.2)
NRBC BLD AUTO-RTO: 0 /100 WBCS — SIGNIFICANT CHANGE UP (ref 0–0)
PHOSPHATE SERPL-MCNC: 4.1 MG/DL — SIGNIFICANT CHANGE UP (ref 2.1–4.9)
PLATELET # BLD AUTO: 193 K/UL — SIGNIFICANT CHANGE UP (ref 130–400)
PMV BLD: 11.2 FL — HIGH (ref 7.4–10.4)
POTASSIUM SERPL-MCNC: 4.1 MMOL/L — SIGNIFICANT CHANGE UP (ref 3.5–5)
POTASSIUM SERPL-MCNC: 4.5 MMOL/L — SIGNIFICANT CHANGE UP (ref 3.5–5)
POTASSIUM SERPL-SCNC: 4.1 MMOL/L — SIGNIFICANT CHANGE UP (ref 3.5–5)
POTASSIUM SERPL-SCNC: 4.5 MMOL/L — SIGNIFICANT CHANGE UP (ref 3.5–5)
PROT SERPL-MCNC: 5 G/DL — LOW (ref 6–8)
PROTHROM AB SERPL-ACNC: 13.6 SEC — HIGH (ref 9.95–12.87)
RBC # BLD: 3.09 M/UL — LOW (ref 4.2–5.4)
RBC # BLD: 3.52 M/UL — LOW (ref 4.2–5.4)
RBC # FLD: 14.8 % — HIGH (ref 11.5–14.5)
RBC # FLD: 15 % — HIGH (ref 11.5–14.5)
SODIUM SERPL-SCNC: 135 MMOL/L — SIGNIFICANT CHANGE UP (ref 135–146)
SODIUM SERPL-SCNC: 139 MMOL/L — SIGNIFICANT CHANGE UP (ref 135–146)
TROPONIN T, HIGH SENSITIVITY RESULT: 42 NG/L — HIGH (ref 6–13)
TROPONIN T, HIGH SENSITIVITY RESULT: 45 NG/L — HIGH (ref 6–13)
TROPONIN T, HIGH SENSITIVITY RESULT: 46 NG/L — HIGH (ref 6–13)
WBC # BLD: 13.64 K/UL — HIGH (ref 4.8–10.8)
WBC # BLD: 20.19 K/UL — HIGH (ref 4.8–10.8)
WBC # FLD AUTO: 13.64 K/UL — HIGH (ref 4.8–10.8)
WBC # FLD AUTO: 20.19 K/UL — HIGH (ref 4.8–10.8)

## 2025-07-03 PROCEDURE — 85730 THROMBOPLASTIN TIME PARTIAL: CPT

## 2025-07-03 PROCEDURE — C2628: CPT

## 2025-07-03 PROCEDURE — 80048 BASIC METABOLIC PNL TOTAL CA: CPT

## 2025-07-03 PROCEDURE — 71045 X-RAY EXAM CHEST 1 VIEW: CPT | Mod: 26

## 2025-07-03 PROCEDURE — 85014 HEMATOCRIT: CPT

## 2025-07-03 PROCEDURE — C1894: CPT

## 2025-07-03 PROCEDURE — C1751: CPT

## 2025-07-03 PROCEDURE — 99291 CRITICAL CARE FIRST HOUR: CPT | Mod: 24,25

## 2025-07-03 PROCEDURE — 86850 RBC ANTIBODY SCREEN: CPT

## 2025-07-03 PROCEDURE — 84100 ASSAY OF PHOSPHORUS: CPT

## 2025-07-03 PROCEDURE — 74018 RADEX ABDOMEN 1 VIEW: CPT

## 2025-07-03 PROCEDURE — P9016: CPT

## 2025-07-03 PROCEDURE — 83605 ASSAY OF LACTIC ACID: CPT

## 2025-07-03 PROCEDURE — 84132 ASSAY OF SERUM POTASSIUM: CPT

## 2025-07-03 PROCEDURE — 99291 CRITICAL CARE FIRST HOUR: CPT

## 2025-07-03 PROCEDURE — 87640 STAPH A DNA AMP PROBE: CPT

## 2025-07-03 PROCEDURE — 71045 X-RAY EXAM CHEST 1 VIEW: CPT | Mod: 26,77

## 2025-07-03 PROCEDURE — 71275 CT ANGIOGRAPHY CHEST: CPT | Mod: 26

## 2025-07-03 PROCEDURE — 94640 AIRWAY INHALATION TREATMENT: CPT

## 2025-07-03 PROCEDURE — 84295 ASSAY OF SERUM SODIUM: CPT

## 2025-07-03 PROCEDURE — C1887: CPT

## 2025-07-03 PROCEDURE — 74174 CTA ABD&PLVS W/CONTRAST: CPT | Mod: 26

## 2025-07-03 PROCEDURE — 34713 PERQ ACCESS & CLSR FEM ART: CPT | Mod: 50,59

## 2025-07-03 PROCEDURE — 94660 CPAP INITIATION&MGMT: CPT

## 2025-07-03 PROCEDURE — 85025 COMPLETE CBC W/AUTO DIFF WBC: CPT

## 2025-07-03 PROCEDURE — 82330 ASSAY OF CALCIUM: CPT

## 2025-07-03 PROCEDURE — C1769: CPT

## 2025-07-03 PROCEDURE — 34708 EVASC RPR ILIO-ILIAC RPT: CPT

## 2025-07-03 PROCEDURE — 87641 MR-STAPH DNA AMP PROBE: CPT

## 2025-07-03 PROCEDURE — 94010 BREATHING CAPACITY TEST: CPT

## 2025-07-03 PROCEDURE — 97530 THERAPEUTIC ACTIVITIES: CPT | Mod: CQ,GP

## 2025-07-03 PROCEDURE — 85610 PROTHROMBIN TIME: CPT

## 2025-07-03 PROCEDURE — 82803 BLOOD GASES ANY COMBINATION: CPT

## 2025-07-03 PROCEDURE — 36430 TRANSFUSION BLD/BLD COMPNT: CPT

## 2025-07-03 PROCEDURE — 93005 ELECTROCARDIOGRAM TRACING: CPT

## 2025-07-03 PROCEDURE — 74174 CTA ABD&PLVS W/CONTRAST: CPT

## 2025-07-03 PROCEDURE — 71045 X-RAY EXAM CHEST 1 VIEW: CPT

## 2025-07-03 PROCEDURE — 82962 GLUCOSE BLOOD TEST: CPT

## 2025-07-03 PROCEDURE — 83735 ASSAY OF MAGNESIUM: CPT

## 2025-07-03 PROCEDURE — 85018 HEMOGLOBIN: CPT

## 2025-07-03 PROCEDURE — C1874: CPT

## 2025-07-03 PROCEDURE — 85027 COMPLETE CBC AUTOMATED: CPT

## 2025-07-03 PROCEDURE — 86901 BLOOD TYPING SEROLOGIC RH(D): CPT

## 2025-07-03 PROCEDURE — 97163 PT EVAL HIGH COMPLEX 45 MIN: CPT | Mod: GP

## 2025-07-03 PROCEDURE — 84484 ASSAY OF TROPONIN QUANT: CPT

## 2025-07-03 PROCEDURE — C1889: CPT

## 2025-07-03 PROCEDURE — 93010 ELECTROCARDIOGRAM REPORT: CPT

## 2025-07-03 PROCEDURE — 36415 COLL VENOUS BLD VENIPUNCTURE: CPT

## 2025-07-03 PROCEDURE — 86900 BLOOD TYPING SEROLOGIC ABO: CPT

## 2025-07-03 PROCEDURE — 37242 VASC EMBOLIZE/OCCLUDE ARTERY: CPT

## 2025-07-03 RX ORDER — CEFAZOLIN SODIUM IN 0.9 % NACL 3 G/100 ML
2000 INTRAVENOUS SOLUTION, PIGGYBACK (ML) INTRAVENOUS EVERY 8 HOURS
Refills: 0 | Status: COMPLETED | OUTPATIENT
Start: 2025-07-03 | End: 2025-07-04

## 2025-07-03 RX ORDER — ONDANSETRON HCL/PF 4 MG/2 ML
4 VIAL (ML) INJECTION ONCE
Refills: 0 | Status: COMPLETED | OUTPATIENT
Start: 2025-07-03 | End: 2025-07-03

## 2025-07-03 RX ADMIN — Medication 4 MILLIGRAM(S): at 17:10

## 2025-07-03 RX ADMIN — Medication 4 MILLIGRAM(S): at 15:25

## 2025-07-03 RX ADMIN — Medication 100 MILLIGRAM(S): at 22:38

## 2025-07-03 NOTE — CHART NOTE - NSCHARTNOTESELECT_GEN_ALL_CORE
Dietitian Follow-Up/Nutrition Services
Event Note
Transfer Note
Transfer Note
Event Note
Non-Clinical Appointment Info/Event Note
Oncology/Event Note
Pall Care Sign Off

## 2025-07-03 NOTE — PATIENT PROFILE ADULT - FALL HARM RISK - FACTORS NURSING JUDGEMENT
Patient : Lupe Fields Age: 28 year old Sex: female   MRN: [id number] Encounter Date: 3/22/2021      History     Chief Complaint   Patient presents with   • Flank Pain     HPI      Allergies   Allergen Reactions   • Singulair DIZZINESS     Mood swings.         Current Discharge Medication List      Prior to Admission Medications    Details   Prenatal Vit-Fe Fumarate-FA (PRENATAL VITAMIN PO)       fluticasone (FLONASE) 50 MCG/ACT nasal spray Spray 2 sprays in each nostril daily.  Qty: 1 Bottle, Refills: 11      albuterol 108 (90 BASE) MCG/ACT inhaler Inhale 2 puffs into the lungs every 4 hours as needed for Shortness of Breath or Wheezing.  Qty: 1 Inhaler, Refills: 6      cetirizine (ZYRTEC ALLERGY) 10 MG tablet Take 1 tablet by mouth daily.  Qty: 90 tablet, Refills: 3             Past Medical History:   Diagnosis Date   • Allergy    • Anemia     per pt.   • Anxiety    • Diabetes mellitus (CMS/HCC)     Gestational   • Mild intermittent asthma        Past Surgical History:   Procedure Laterality Date   •  SECTION, LOW TRANSVERSE     • TONSILLECTOMY AND ADENOIDECTOMY         Family History   Problem Relation Age of Onset   • High blood pressure Mother    • High cholesterol Mother    • Asthma Mother    • Osteoarthritis Mother    • Asthma Father    • Asthma Sister    • Depression Sister    • Thyroid Sister    • Bilateral breast cancer Maternal Grandmother         breast cancer/skin cancer   • Cancer, Skin Maternal Grandmother    • Other Sister         spina bifida   • Myocardial Infarction Paternal Grandfather        Social History     Tobacco Use   • Smoking status: Never Smoker   • Smokeless tobacco: Never Used   Substance Use Topics   • Alcohol use: Yes     Alcohol/week: 0.0 standard drinks   • Drug use: No       E-cigarette/Vaping   • E-Cigarette/Vaping Use Never Used      E-Cigarette/Vaping Substances & Devices       Review of Systems    Physical Exam     ED Triage Vitals [21 1247]   ED Triage  Vitals Group      Temp (!) 101 °F (38.3 °C)      Heart Rate (!) 132      Resp 16      /71      SpO2 97 %      EtCO2 mmHg       Height 5' 7\" (1.702 m)      Weight (!) 496 lb 0.6 oz (225 kg)      Weight Scale Used       BMI (Calculated) 77.69      IBW/kg (Calculated) 61.6       Physical Exam    ED Course     Procedures    Lab Results     Results for orders placed or performed during the hospital encounter of 03/22/21   Urinalysis & Reflex Microscopy With Culture If Indicated   Result Value Ref Range    COLOR, URINALYSIS Yellow     APPEARANCE, URINALYSIS Hazy     GLUCOSE, URINALYSIS Negative Negative mg/dL    BILIRUBIN, URINALYSIS Negative Negative    KETONES, URINALYSIS Negative Negative mg/dL    SPECIFIC GRAVITY, URINALYSIS 1.016 1.005 - 1.030    OCCULT BLOOD, URINALYSIS Moderate (A) Negative    PH, URINALYSIS 5.0 5.0 - 7.0    PROTEIN, URINALYSIS Negative Negative mg/dL    UROBILINOGEN, URINALYSIS 0.2 0.2, 1.0 mg/dL    NITRITE, URINALYSIS Positive (A) Negative    LEUKOCYTE ESTERASE, URINALYSIS Moderate (A) Negative    SQUAMOUS EPITHELIAL, URINALYSIS 1 to 5 None Seen, 1 to 5 /hpf    ERYTHROCYTES, URINALYSIS 11 to 25 (A) None Seen, 1 to 2 /hpf    LEUKOCYTES, URINALYSIS 26 to 100 (A) None Seen, 1 to 5 /hpf    BACTERIA, URINALYSIS Large (A) None Seen /hpf    HYALINE CASTS, URINALYSIS None Seen None Seen, 1 to 5 /lpf    MUCUS Present    Pregnancy Test, Urine   Result Value Ref Range    Pregnancy, Urine Negative Negative       EKG Results     EKG Interpretation  Rate: ***  Rhythm: {rhythm:05290}   Abnormality: {ABNORMAL:377195}    EKG tracing interpreted by ED physician    Radiology Results     Imaging Results    None         ED Medication Orders (From admission, onward)    None               MDM    Clinical Impression     No diagnosis found.    Disposition        There is no disposition no dispo time  There is no comment                   No

## 2025-07-03 NOTE — ED PROVIDER NOTE - ATTENDING CONTRIBUTION TO CARE
85 yo F with hx of SCLC in remission, L lung adenocarcinoma s/p LLL lobectomy, PE on eliquis, AAA s/p repair (4/2025 at [place]), HTN, HLD, DM, COPD on 4L home O2 who was BIBEMS from [facility] for syncopal episode that occurred prior to arrival. Per pt and daughter and chart review, pt was recently admitted for respiratory failure 2/2 CHF and possible pneumonia. She was discharged to rehab 2 days ago and was doing PT there. Today completed PT and was sitting in her wheelchair when she suddenly had severe diffuse abd pain. She then passed out in her wheelchair and was noted to have blue lips per NH staff. When EMS arrived, her BP was in the 80s which improved after given IVF. Currently pt complaining of abd pain and nausea. Denies preceding cp, sob, dizziness prior to syncopal event. No head trauma as she was slumped over in her wheelchair. No fever, cp, sob, diarrhea.    PMD Dr. Dumont    CONSTITUTIONAL: well developed, nontoxic appearing, in no acute distress, speaking in full sentences  SKIN: warm, dry, no rash, cap refill < 2 seconds  HEENT: normocephalic, atraumatic, no conjunctival erythema, dry mucous membranes, patent airway  NECK: supple  CV:  regular rate, regular rhythm, 2+ radial pulses bilaterally, 1+ DP pulses bilaterally  RESP: no wheezes, no rales, no rhonchi, normal work of breathing  ABD: soft, diffuse tenderness, nondistended, no rebound, no guarding  MSK: normal ROM, no cyanosis, no edema  NEURO: alert, oriented, grossly unremarkable  PSYCH: cooperative, appropriate    A&P:  Pt here with syncope preceded by severe abd pain in setting of prior AAA s/p repair. Hypotensive in the field but hemodynamically stable in ED. Has symmetrically decreased DP pulses. Low suspicion for worsening PE given no tachycardia, no tachypnea, no hypoxemia, compliant with eliquis. Plan for labs, ekg, imaging r/o dissection/ruptured AAA, ACS, arrhythmia, pancreatitis, electrolyte derangement, SBO, biliary disease, appendicitis, diverticulitis.

## 2025-07-03 NOTE — ED ADULT TRIAGE NOTE - CHIEF COMPLAINT QUOTE
biab from [facility] for being passed out in wheelchair , pt was found by staff ; when ems arrive pt was A&Ox3, given fluids pt now co abd pain  in triage

## 2025-07-03 NOTE — H&P ADULT - HISTORY OF PRESENT ILLNESS
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 Sathya Whitfield, diabetes, HTN, HLD, recent pneumonia and UTI, recent admission for acute hypoxic and hypercapnic respiratory failure presents to the ED for evaluation after syncopal episode.  Per daughter at bedside, patient was brought in from [facility] after a syncopal episode while seated in her wheelchair.  Patient was found by staff slumped over in her wheelchair and appeared cyanotic.  EMS was activated and upon EMS arrival patient was awake, A&O x 3 and hypotensive to 80s/40s.  Patient received a fluid bolus by EMS and was brought to the emergency department.  On ED arrival patient complaining of abdominal pain.  Daughter also states patient seemed more confused over the last few weeks and was not acting herself.  Patient denies chest pain, shortness of breath, diaphoresis, headache, dizziness, nausea, vomiting, diarrhea or any urinary symptoms.  CTA abdomen showed Findings are worrisome for a ruptured/leaking abdominal aortic aneurysm.

## 2025-07-03 NOTE — H&P ADULT - NSHPLABSRESULTS_GEN_ALL_CORE
< from: CT Angio Abdomen and Pelvis w/ IV Cont (07.03.25 @ 15:51) >      BOWEL: No bowel obstruction. Colonic diverticulosis. Appendix is not   visualized. Stool-filled colon.  PERITONEUM/RETROPERITONEUM: Extensive hemoperitoneum seen around the   aorta with extension into the right retroperitoneum.  VESSELS: Atherosclerotic changes. Status post aortobiiliac stent graft.   Additional stents seen in the superior mesenteric artery and bilateral   renal arteries. Native aneurysm sac measures 6.3 cm (series 7 image 72)   with visible endoleak at the aortic bifurcation (series 7 image 92)   beginning in the arterial phase. There is accumulation of contrast into   the left aspect of the aneurysm sac in the delayed phase (8/87).  LYMPH NODES: No lymphadenopathy.  ABDOMINAL WALL: Left inguinal hernia contains small amount of   nonobstructed small bowel.  BONES: Degenerative changes.    IMPRESSION:  Findings are worrisome for a ruptured/leaking abdominal aortic aneurysm.    < end of copied text >      LABS:  cret                        9.8    20.19 )-----------( x        ( 03 Jul 2025 22:19 )             30.4     07-03    139  |  103  |  51[H]  ----------------------------<  172[H]  4.1   |  27  |  1.1    Ca    8.2[L]      03 Jul 2025 22:19  Phos  4.1     07-03  Mg     1.8     07-03    TPro  5.0[L]  /  Alb  3.4[L]  /  TBili  0.3  /  DBili  x   /  AST  18  /  ALT  29  /  AlkPhos  78  07-03    PT/INR - ( 03 Jul 2025 17:15 )   PT: 13.60 sec;   INR: 1.15 ratio         PTT - ( 03 Jul 2025 17:15 )  PTT:23.2 sec    Albumin: 3.4 g/dL (07-03-25 @ 15:10)

## 2025-07-03 NOTE — CONSULT NOTE ADULT - SUBJECTIVE AND OBJECTIVE BOX
** PATIENT SEEN AND EXAMINED. FULL CONSULT NOTE TO FOLLOW **  FERMIN DIAZ   [id number]   [birth year]    84yF  ============================================================   DATE OF INITIAL SICU/SDU CONSULT: 07-04-25    INDICATION FOR SICU CONSULT:       SICU COURSE EVENTS :  07-04 - admitted to SICU service     24HOUR EVENTS  -Admission under SICU service    [X] A ten-point review of systems was negative except as expressed in note.  [X] History was obtained from patient. If unable to participate in their care, history obtained from review of the chart and collateral sources of information.  ============================================================      HPI   84-year-old female PMH SCLC in remission, left lung adenocarcinoma status post LLL lobectomy, PE on apixaban, juxtarenal AAA status post endovascular repair with Dr. Sathya Whitfield, diabetes, HTN, HLD, recent pneumonia and UTI, recent admission for acute hypoxic and hypercapnic respiratory failure presents to the ED for evaluation after syncopal episode.  Per daughter at bedside, patient was brought in from [facility] after a syncopal episode while seated in her wheelchair.  Patient was found by staff slumped over in her wheelchair and appeared cyanotic.  EMS was activated and upon EMS arrival patient was awake, A&O x 3 and hypotensive to 80s/40s.  Patient received a fluid bolus by EMS and was brought to the emergency department.  On ED arrival patient complaining of abdominal pain.  Daughter also states patient seemed more confused over the last few weeks and was not acting herself.  Patient denies chest pain, shortness of breath, diaphoresis, headache, dizziness, nausea, vomiting, diarrhea or any urinary symptoms.  CTA abdomen showed Findings are worrisome for a ruptured/leaking abdominal aortic aneurysm.   (03 Jul 2025 23:40)      Pertinent Imaging    < from: CT Angio Chest Aorta w/wo IV Cont (07.03.25 @ 15:51) >  IMPRESSION:  Findings are worrisome for a ruptured/leaking abdominal aortic aneurysm.  < end of copied text >      SICU Consult:  Patient received in SICU s/p insertion of R iliac artery stent for diagnosis of ruptured abdominal aortic aneurysm for findings of a type 3 endoleak. Procedure performed without general anesthesia. Patient brought to SICU directly. Patient hemodynamically stable, A&Ox3. Patient requires close BP monitoring and q1 hour vascular checks.       OR Stats for 07-04-25    OR Time :   IV Fluids:  EBL: 50cc  Blood Products: 3u PRBC   UOP: 50 UOP  Findings - Type 3 endoleak due to limb separation of the R iliac limb resulting in rupture. Final angiogram with sealed endoleak.    PAST MEDICAL & SURGICAL HISTORY  Hypertension, unspecified type  Type 2 diabetes mellitus with complication, without long-term current use of insulin  Hyperlipidemia, unspecified hyperlipidemia type  Diverticulitis  Obesity  COPD (chronic obstructive pulmonary disease)  SOB (shortness of breath)  GERD (gastroesophageal reflux disease)  Lung cancer  ARTIE (obstructive sleep apnea) NO CPAP MACHINE PER PT.  Cancer of kidney  Cancer of thyroid  History of abdominal aortic aneurysm  Former smoker  HL (non-Hodgkin's lymphoma) "low grade" per heme/ onc note  History of abdominal aortic aneurysm (AAA)  Kidney stones  [place] (hard of hearing)  DM (diabetes mellitus)  HLD (hyperlipidemia)  HTN (hypertension)      HOME MEDICATIONS    albuterol 90 mcg/inh inhalation aerosol: 2 puff(s) inhaled every 6 hours as needed for Bronchospasm  amLODIPine 5 mg oral tablet: 1 tab(s) orally once a day  apixaban 2.5 mg oral tablet: 1 tab(s) orally every 12 hours  aspirin 81 mg oral capsule: 1 cap(s) orally once a day  bumetanide 1 mg oral tablet: 1 tab(s) orally once a day  Ditropan 5 mg oral tablet: 1 tab(s) orally once a day (at bedtime)  docusate sodium 100 mg oral tablet: 3 tab(s) orally once a day (at bedtime)  fluticasone 50 mcg/inh nasal spray: 1 spray(s) nasal 2 times a day  Incruse Ellipta 62.5 mcg/inh inhalation powder: 1 puff(s) inhaled once a day  meclizine 25 mg oral tablet: 1 tab(s) orally once a day as needed for  dizziness  metoprolol succinate 25 mg oral tablet, extended release: 2 tab(s) orally once a day  MiraLax oral powder for reconstitution: 17 gram(s) orally once a day (at bedtime)  omeprazole 40 mg oral delayed release capsule: 1 cap(s) orally once a day  predniSONE 5 mg oral tablet: 1 tab(s) orally once a day 8 tablets daily till 7/02/25   6 tablets daily starting from 7/03/25 till 7/09/25 for 7 days   4 tablets daily starting from 7/10/25 till 7/16/25 for 7 days   then 2 tablets starting 7/17/25 till 7/24/25 for 7 days, then 1 tab daily for 7 days, then stop MDD: 40mg  senna (sennosides) 8.6 mg oral tablet: 1 tab(s) orally once a day  Symbicort 160 mcg-4.5 mcg/inh inhalation aerosol: 2 puff(s) inhaled 2 times a day  tamsulosin 0.4 mg oral capsule: 1 cap(s) orally once a day (at bedtime)  Trulicity Pen 1.5 mg/0.5 mL subcutaneous solution: 1.5 milligram(s) subcutaneous once a week  Vitamin D3 25 mcg (1000 intl units) oral tablet: 1 tab(s) orally once a day    ACTIVE MEDICATIONS    ceFAZolin   IVPB 2000 milliGRAM(s) IV Intermittent every 8 hours    ALLERGIES  No Known Allergies  Intolerances    VITAL SIGNS (Last 24Hours)  ICU Vital Signs Last 24 Hrs  HR: 82 (03 Jul 2025 18:12) (75 - 82)  BP: 134/87 (03 Jul 2025 18:12) (106/64 - 134/87)  RR: 16 (03 Jul 2025 18:12) (16 - 19)  SpO2: 97% (03 Jul 2025 18:12) (88% - 100%)    O2 Parameters below as of 03 Jul 2025 17:50    O2 Flow (L/min): 4          INTAKE/OUTPUT (Last 24Hours)  I&O's Summary    03 Jul 2025 07:01  -  04 Jul 2025 00:16  --------------------------------------------------------  IN: 0 mL / OUT: 100 mL / NET: -100 mL        LABS (Last 24Hours)  [07-03 @ 22:19:] Na 139 K 4.1 Cl 103 Mg 1.8 b>Phos 4.1 BUN/Cr 51/1.1>>>   [07-03 @ 15:10:] Na 135 K 4.5 Cl 95 Mg 1.9 b>Phos ___ BUN/Cr 52/1.3>>>     [07-03 @ 15:10] AST 18  ALT 29 DBili __  TBili 0.3 Alb 3.4  [07-01 @ 06:11] AST 14  ALT 25 DBili __  TBili 0.3 Alb 3.0      PHYSICAL EXAM   GCS:    15  Exam: A&Ox3, no focal deficits    RESPIRATORY:  Normal expansion/effort, CTAB/L    CARDIOVASCULAR:   non tachycardic  No peripheral edema    GASTROINTESTINAL:  Abdomen soft, non-tender, non-distended    MUSCULOSKELETAL:  Extremities cool (has known PAD), pink, well-perfused.  B/L groin access with swelling, minimal strike through on dressings   DP/PT signals bilaterally     DERM:  No skin breakdown     :   Exam: Reynoso catheter in place.     ----------------------------------------------------------------------------------------------------------  Tubes/Lines/Drains    [x] Peripheral IV    [ ] Urinary Catheter Reynoso  [ ]Present on Admission          Insertion Date:                                   [ ] Central Venous Line       [ ]IJ             [ ]Femoral     [ ]Subclavian   [ ]Left  [ ]Right      Insertion Date:          [ ] Arterial Line 	                [ ]Radial    [ ]Femoral     [ ]Axillary         [ ]Left  [ ]Right      Insertion Date:            FERMIN DIAZ   [id number]   [birth year]    84yF  ============================================================   DATE OF INITIAL SICU/SDU CONSULT: 07-04-25    INDICATION FOR SICU CONSULT:       SICU COURSE EVENTS :  07-04 - admitted to SICU service     24HOUR EVENTS  -Admission under SICU service    [X] A ten-point review of systems was negative except as expressed in note.  [X] History was obtained from patient. If unable to participate in their care, history obtained from review of the chart and collateral sources of information.  ============================================================      HPI   84-year-old female PMH SCLC in remission, left lung adenocarcinoma status post LLL lobectomy, PE on apixaban, juxtarenal AAA status post endovascular repair with Dr. Sathya Whitfield, diabetes, HTN, HLD, recent pneumonia and UTI, recent admission for acute hypoxic and hypercapnic respiratory failure presents to the ED for evaluation after syncopal episode.  Per daughter at bedside, patient was brought in from [facility] after a syncopal episode while seated in her wheelchair.  Patient was found by staff slumped over in her wheelchair and appeared cyanotic.  EMS was activated and upon EMS arrival patient was awake, A&O x 3 and hypotensive to 80s/40s.  Patient received a fluid bolus by EMS and was brought to the emergency department.  On ED arrival patient complaining of abdominal pain.  Daughter also states patient seemed more confused over the last few weeks and was not acting herself.  Patient denies chest pain, shortness of breath, diaphoresis, headache, dizziness, nausea, vomiting, diarrhea or any urinary symptoms.  CTA abdomen showed Findings are worrisome for a ruptured/leaking abdominal aortic aneurysm.   (03 Jul 2025 23:40)      Pertinent Imaging  < from: CT Angio Chest Aorta w/wo IV Cont (07.03.25 @ 15:51) >  IMPRESSION:  Findings are worrisome for a ruptured/leaking abdominal aortic aneurysm.  < end of copied text >      SICU Consult:  Patient received in SICU s/p insertion of R iliac artery stent for diagnosis of ruptured abdominal aortic aneurysm for findings of a type 3 endoleak. Procedure performed without general anesthesia. Patient brought to SICU directly. Patient hemodynamically stable, A&Ox3. Patient requires close BP monitoring and q1 hour vascular checks.       OR Stats for 07-04-25    OR Time :   IV Fluids:  EBL: 50cc  Blood Products: 3u PRBC   UOP: 50 UOP  Findings - Type 3 endoleak due to limb separation of the R iliac limb resulting in rupture. Final angiogram with sealed endoleak.    PAST MEDICAL & SURGICAL HISTORY  Hypertension, unspecified type  Type 2 diabetes mellitus with complication, without long-term current use of insulin  Hyperlipidemia, unspecified hyperlipidemia type  Diverticulitis  Obesity  COPD (chronic obstructive pulmonary disease)  SOB (shortness of breath)  GERD (gastroesophageal reflux disease)  Lung cancer  ARTIE (obstructive sleep apnea) NO CPAP MACHINE PER PT.  Cancer of kidney  Cancer of thyroid  History of abdominal aortic aneurysm  Former smoker  HL (non-Hodgkin's lymphoma) "low grade" per heme/ onc note  History of abdominal aortic aneurysm (AAA)  Kidney stones  [place] (hard of hearing)  DM (diabetes mellitus)  HLD (hyperlipidemia)  HTN (hypertension)      HOME MEDICATIONS    albuterol 90 mcg/inh inhalation aerosol: 2 puff(s) inhaled every 6 hours as needed for Bronchospasm  amLODIPine 5 mg oral tablet: 1 tab(s) orally once a day  apixaban 2.5 mg oral tablet: 1 tab(s) orally every 12 hours  aspirin 81 mg oral capsule: 1 cap(s) orally once a day  bumetanide 1 mg oral tablet: 1 tab(s) orally once a day  Ditropan 5 mg oral tablet: 1 tab(s) orally once a day (at bedtime)  docusate sodium 100 mg oral tablet: 3 tab(s) orally once a day (at bedtime)  fluticasone 50 mcg/inh nasal spray: 1 spray(s) nasal 2 times a day  Incruse Ellipta 62.5 mcg/inh inhalation powder: 1 puff(s) inhaled once a day  meclizine 25 mg oral tablet: 1 tab(s) orally once a day as needed for  dizziness  metoprolol succinate 25 mg oral tablet, extended release: 2 tab(s) orally once a day  MiraLax oral powder for reconstitution: 17 gram(s) orally once a day (at bedtime)  omeprazole 40 mg oral delayed release capsule: 1 cap(s) orally once a day  predniSONE 5 mg oral tablet: 1 tab(s) orally once a day 8 tablets daily till 7/02/25   6 tablets daily starting from 7/03/25 till 7/09/25 for 7 days   4 tablets daily starting from 7/10/25 till 7/16/25 for 7 days   then 2 tablets starting 7/17/25 till 7/24/25 for 7 days, then 1 tab daily for 7 days, then stop MDD: 40mg  senna (sennosides) 8.6 mg oral tablet: 1 tab(s) orally once a day  Symbicort 160 mcg-4.5 mcg/inh inhalation aerosol: 2 puff(s) inhaled 2 times a day  tamsulosin 0.4 mg oral capsule: 1 cap(s) orally once a day (at bedtime)  Trulicity Pen 1.5 mg/0.5 mL subcutaneous solution: 1.5 milligram(s) subcutaneous once a week  Vitamin D3 25 mcg (1000 intl units) oral tablet: 1 tab(s) orally once a day    ACTIVE MEDICATIONS    ceFAZolin   IVPB 2000 milliGRAM(s) IV Intermittent every 8 hours    ALLERGIES  No Known Allergies  Intolerances    VITAL SIGNS (Last 24Hours)  ICU Vital Signs Last 24 Hrs  HR: 82 (03 Jul 2025 18:12) (75 - 82)  BP: 134/87 (03 Jul 2025 18:12) (106/64 - 134/87)  RR: 16 (03 Jul 2025 18:12) (16 - 19)  SpO2: 97% (03 Jul 2025 18:12) (88% - 100%)    O2 Parameters below as of 03 Jul 2025 17:50    O2 Flow (L/min): 4          INTAKE/OUTPUT (Last 24Hours)  I&O's Summary    03 Jul 2025 07:01  -  04 Jul 2025 00:16  --------------------------------------------------------  IN: 0 mL / OUT: 100 mL / NET: -100 mL        LABS (Last 24Hours)  [07-03 @ 22:19:] Na 139 K 4.1 Cl 103 Mg 1.8 b>Phos 4.1 BUN/Cr 51/1.1>>>   [07-03 @ 15:10:] Na 135 K 4.5 Cl 95 Mg 1.9 b>Phos ___ BUN/Cr 52/1.3>>>     [07-03 @ 15:10] AST 18  ALT 29 DBili __  TBili 0.3 Alb 3.4  [07-01 @ 06:11] AST 14  ALT 25 DBili __  TBili 0.3 Alb 3.0      PHYSICAL EXAM   GCS:    15  Exam: A&Ox3, no focal deficits    RESPIRATORY:  Normal expansion/effort, CTAB/L    CARDIOVASCULAR:   non tachycardic  No peripheral edema    GASTROINTESTINAL:  Abdomen soft, non-tender, non-distended    MUSCULOSKELETAL:  Extremities cool (has known PAD), pink, well-perfused.  B/L groin access with swelling, minimal strike through on dressings   DP/PT signals bilaterally     DERM:  No skin breakdown     :   Exam: Reynoso catheter in place.     ----------------------------------------------------------------------------------------------------------  Tubes/Lines/Drains    [x] Peripheral IV    [ ] Urinary Catheter Reynoso  [ ]Present on Admission          Insertion Date:                                   [ ] Central Venous Line       [ ]IJ             [ ]Femoral     [ ]Subclavian   [ ]Left  [ ]Right      Insertion Date:          [ x] Arterial Line 	                [ ]Radial    [ ]Femoral     [ ]Axillary         [ ]Left  [x]Right brachial     Insertion Date: 7/3     FERMIN DIAZ   [id number]   [birth year]    84yF  ============================================================   DATE OF INITIAL SICU/SDU CONSULT: 07-04-25    INDICATION FOR SICU CONSULT:       SICU COURSE EVENTS :  07-04 - admitted to SICU service     24HOUR EVENTS  -Admission under SICU service    [X] A ten-point review of systems was negative except as expressed in note.  [X] History was obtained from patient. If unable to participate in their care, history obtained from review of the chart and collateral sources of information.  ============================================================      HPI   84-year-old female PMH SCLC in remission, left lung adenocarcinoma status post LLL lobectomy, PE on apixaban, juxtarenal AAA status post endovascular repair with Dr. Sathya Whitfield, diabetes, HTN, HLD, recent pneumonia and UTI, recent admission for acute hypoxic and hypercapnic respiratory failure presents to the ED for evaluation after syncopal episode.  Per daughter at bedside, patient was brought in from [facility] after a syncopal episode while seated in her wheelchair.  Patient was found by staff slumped over in her wheelchair and appeared cyanotic.  EMS was activated and upon EMS arrival patient was awake, A&O x 3 and hypotensive to 80s/40s.  Patient received a fluid bolus by EMS and was brought to the emergency department.  On ED arrival patient complaining of abdominal pain.  Daughter also states patient seemed more confused over the last few weeks and was not acting herself.  Patient denies chest pain, shortness of breath, diaphoresis, headache, dizziness, nausea, vomiting, diarrhea or any urinary symptoms.  CTA abdomen showed Findings are worrisome for a ruptured/leaking abdominal aortic aneurysm.   (03 Jul 2025 23:40)      Pertinent Imaging  < from: CT Angio Chest Aorta w/wo IV Cont (07.03.25 @ 15:51) >  IMPRESSION:  Findings are worrisome for a ruptured/leaking abdominal aortic aneurysm.  < end of copied text >      SICU Consult:  Patient received in SICU s/p insertion of R iliac artery stent for diagnosis of ruptured abdominal aortic aneurysm for findings of a type 3 endoleak. Procedure performed without general anesthesia. Patient brought to SICU directly. Patient hemodynamically stable, A&Ox3. Patient requires close BP monitoring and q1 hour vascular checks.       OR Stats for 07-04-25    OR Time :   IV Fluids:  EBL: 50cc  Blood Products: 3u PRBC   UOP: 50 UOP  Findings - Type 3 endoleak due to limb separation of the R iliac limb resulting in rupture. Final angiogram with sealed endoleak.    PAST MEDICAL & SURGICAL HISTORY  Hypertension, unspecified type  Type 2 diabetes mellitus with complication, without long-term current use of insulin  Hyperlipidemia, unspecified hyperlipidemia type  Diverticulitis  Obesity  COPD (chronic obstructive pulmonary disease)  SOB (shortness of breath)  GERD (gastroesophageal reflux disease)  Lung cancer  ARTIE (obstructive sleep apnea) NO CPAP MACHINE PER PT.  Cancer of kidney  Cancer of thyroid  History of abdominal aortic aneurysm  Former smoker  HL (non-Hodgkin's lymphoma) "low grade" per heme/ onc note  History of abdominal aortic aneurysm (AAA)  Kidney stones  [place] (hard of hearing)  DM (diabetes mellitus)  HLD (hyperlipidemia)  HTN (hypertension)      HOME MEDICATIONS    albuterol 90 mcg/inh inhalation aerosol: 2 puff(s) inhaled every 6 hours as needed for Bronchospasm  amLODIPine 5 mg oral tablet: 1 tab(s) orally once a day  apixaban 2.5 mg oral tablet: 1 tab(s) orally every 12 hours  aspirin 81 mg oral capsule: 1 cap(s) orally once a day  bumetanide 1 mg oral tablet: 1 tab(s) orally once a day  Ditropan 5 mg oral tablet: 1 tab(s) orally once a day (at bedtime)  docusate sodium 100 mg oral tablet: 3 tab(s) orally once a day (at bedtime)  fluticasone 50 mcg/inh nasal spray: 1 spray(s) nasal 2 times a day  Incruse Ellipta 62.5 mcg/inh inhalation powder: 1 puff(s) inhaled once a day  meclizine 25 mg oral tablet: 1 tab(s) orally once a day as needed for  dizziness  metoprolol succinate 25 mg oral tablet, extended release: 2 tab(s) orally once a day  MiraLax oral powder for reconstitution: 17 gram(s) orally once a day (at bedtime)  omeprazole 40 mg oral delayed release capsule: 1 cap(s) orally once a day  predniSONE 5 mg oral tablet: 1 tab(s) orally once a day 8 tablets daily till 7/02/25   6 tablets daily starting from 7/03/25 till 7/09/25 for 7 days   4 tablets daily starting from 7/10/25 till 7/16/25 for 7 days   then 2 tablets starting 7/17/25 till 7/24/25 for 7 days, then 1 tab daily for 7 days, then stop MDD: 40mg  senna (sennosides) 8.6 mg oral tablet: 1 tab(s) orally once a day  Symbicort 160 mcg-4.5 mcg/inh inhalation aerosol: 2 puff(s) inhaled 2 times a day  tamsulosin 0.4 mg oral capsule: 1 cap(s) orally once a day (at bedtime)  Trulicity Pen 1.5 mg/0.5 mL subcutaneous solution: 1.5 milligram(s) subcutaneous once a week  Vitamin D3 25 mcg (1000 intl units) oral tablet: 1 tab(s) orally once a day    ACTIVE MEDICATIONS    ceFAZolin   IVPB 2000 milliGRAM(s) IV Intermittent every 8 hours    ALLERGIES  No Known Allergies  Intolerances    VITAL SIGNS (Last 24Hours)  ICU Vital Signs Last 24 Hrs  HR: 82 (03 Jul 2025 18:12) (75 - 82)  BP: 134/87 (03 Jul 2025 18:12) (106/64 - 134/87)  RR: 16 (03 Jul 2025 18:12) (16 - 19)  SpO2: 97% (03 Jul 2025 18:12) (88% - 100%)  O2 Parameters below as of 03 Jul 2025 17:50  O2 Flow (L/min): 4      INTAKE/OUTPUT (Last 24Hours)  I&O's Summary    03 Jul 2025 07:01  -  04 Jul 2025 00:16  --------------------------------------------------------  IN: 0 mL / OUT: 100 mL / NET: -100 mL    LABS (Last 24Hours)  [07-03 @ 22:19:] Na 139 K 4.1 Cl 103 Mg 1.8 b>Phos 4.1 BUN/Cr 51/1.1>>>   [07-03 @ 15:10:] Na 135 K 4.5 Cl 95 Mg 1.9 b>Phos ___ BUN/Cr 52/1.3>>>     [07-03 @ 15:10] AST 18  ALT 29 DBili __  TBili 0.3 Alb 3.4  [07-01 @ 06:11] AST 14  ALT 25 DBili __  TBili 0.3 Alb 3.0      PHYSICAL EXAM   GCS:    15  Exam: A&Ox3, no focal deficits    RESPIRATORY:  Normal expansion/effort, CTAB/L    CARDIOVASCULAR:   non tachycardic  No peripheral edema    GASTROINTESTINAL:  Abdomen soft, non-tender, non-distended    MUSCULOSKELETAL:  Extremities cool (has known PAD), pink, well-perfused.  B/L groin access with swelling, minimal strike through on dressings   DP/PT signals bilaterally     DERM:  No skin breakdown     :   Exam: Reynoso catheter in place.     ----------------------------------------------------------------------------------------------------------  Tubes/Lines/Drains    [x] Peripheral IV    [ ] Urinary Catheter Reynoso  [ ]Present on Admission          Insertion Date:                                   [ ] Central Venous Line       [ ]IJ             [ ]Femoral     [ ]Subclavian   [ ]Left  [ ]Right      Insertion Date:          [ x] Arterial Line 	                [ ]Radial    [ ]Femoral     [ ]Axillary         [ ]Left  [x]Right brachial     Insertion Date: 7/3

## 2025-07-03 NOTE — CONSULT NOTE ADULT - ATTENDING COMMENTS
Seen on 7/3/25    Critical Care: 52440  This patient has a high probability of sudden, clinically significant deterioration, which requires the highest level of physician preparedness to intervene urgently. I managed/supervised life or organ supporting interventions that required frequent physician assessment. I devoted my full attention in the ICU to the direct care of this patient for the period of time indicated below. Time I spent with the family or surrogate(s) is included only if the patient was incapable of providing the necessary information or participating in medical decision making. Time devoted to teaching and to any procedures I billed separately is not included.     83yo F, s/p insertion of right iliac stent for type 3 endoleak resulting in rupture.    Patient is examined and evaluated at the bedside with SICU team. Treatment plan discussed with SICU team, nurses and primary team.   Chest X-ray and all relevant studies reviewed during rounds.  Will continue hemodynamic monitoring as per protocol in SICU.    I have personally and independently provided [ 35 ] minutes of critical care services. This excludes any time spent on separate procedures or teaching. I have reviewed and amended the note as needed. Treatment plan discussed with SICU team, nurses and primary team at the time of the multidisciplinary rounds. The above note is NOT written at the time of rounds and will reflect all changes throughout management of the patient for the day note is written for.    Juan Hawkins MD  Trauma/ACS/SICU Attending

## 2025-07-03 NOTE — ED CLERICAL - NS ED CLERK NOTE PRE-ARRIVAL INFORMATION; ADDITIONAL PRE-ARRIVAL INFORMATION
This patient is enrolled in the STAR readmission reduction initiative and has active care navigation. This patient can be followed up by the care navigation team within 24 hours.  To arrange close follow-up or to obtain additional clinical information, please call the contact number above. The on-call [facility] Hospitalist has been notified and will coordinate care in concert with the ED Physician including consults as necessary. Please reach out to the Hospitalist on-call directly (schedule on AMiON posted on the intranet). You may also call the Hospitalist Division at [phone] at either site. Consider CDU for management per guideline”

## 2025-07-03 NOTE — ED PROVIDER NOTE - CLINICAL SUMMARY MEDICAL DECISION MAKING FREE TEXT BOX
Pt here with syncope preceded by severe abd pain in setting of prior AAA s/p repair. Hypotensive in the field but hemodynamically stable in ED. Has symmetrically decreased DP pulses. Low suspicion for worsening PE given no tachycardia, no tachypnea, no hypoxemia, compliant with eliquis. Plan for labs, ekg, imaging r/o dissection/ruptured AAA, ACS, arrhythmia, pancreatitis, electrolyte derangement, SBO, biliary disease, appendicitis, diverticulitis. Called by radiology for ruptured AAA with active extrav. Pt upgraded to critical care area of ED. BP 100s/60s, HR 70s. I spoke with vascular attending Dr. Michel who will take pt to OR, says admit to his service. Pt requires admission for further management of ruptured AAA given risk for progression to hemorrhagic shock, death, etc if not closely monitored and tx'ed.

## 2025-07-03 NOTE — H&P ADULT - ASSESSMENT
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 Sathya Whitfield, diabetes, HTN, HLD, recent pneumonia and UTI, recent admission for acute hypoxic and hypercapnic respiratory failure presents to the ED for evaluation after syncopal episode.  Per daughter at bedside, patient was brought in from [facility] after a syncopal episode while seated in her wheelchair.  Patient was found by staff slumped over in her wheelchair and appeared cyanotic.  EMS was activated and upon EMS arrival patient was awake, A&O x 3 and hypotensive to 80s/40s.  Patient received a fluid bolus by EMS and was brought to the emergency department.  On ED arrival patient complaining of abdominal pain.  Daughter also states patient seemed more confused over the last few weeks and was not acting herself.  Patient denies chest pain, shortness of breath, diaphoresis, headache, dizziness, nausea, vomiting, diarrhea or any urinary symptoms.  CTA abdomen showed Findings are worrisome for a ruptured/leaking abdominal aortic aneurysm.    Plan:   Patient is going to OR for emergent endovascular repair of abdominal aortic aneurysm.   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 Sathya Whitfield, diabetes, HTN, HLD, recent pneumonia and UTI, recent admission for acute hypoxic and hypercapnic respiratory failure presents to the ED for evaluation after syncopal episode.  Per daughter at bedside, patient was brought in from [facility] after a syncopal episode while seated in her wheelchair.  Patient was found by staff slumped over in her wheelchair and appeared cyanotic.  EMS was activated and upon EMS arrival patient was awake, A&O x 3 and hypotensive to 80s/40s.  Patient received a fluid bolus by EMS and was brought to the emergency department.  On ED arrival patient complaining of abdominal pain.  Daughter also states patient seemed more confused over the last few weeks and was not acting herself.  Patient denies chest pain, shortness of breath, diaphoresis, headache, dizziness, nausea, vomiting, diarrhea or any urinary symptoms.  CTA abdomen showed Findings are worrisome for a ruptured/leaking abdominal aortic aneurysm.    Plan:   Patient s/p OR for emergent endovascular repair of abdominal aortic aneurysm.  SICU consult

## 2025-07-03 NOTE — ED ADULT NURSE NOTE - NSFALLCONCLUSION_ED_ALL_ED
Nonspecific Dermatitis  Dermatitis is a skin rash caused by something that touches the skin and makes it irritated and inflamed.  Your skin may be red, swollen, dry, and may be cracked. Blisters may form and ooze. The rash will itch.  Dermatitis can form on the face and neck, backs of hands, forearms, genitals, and lower legs. Dermatitis is not passed from person to person.  Talk with your health care provider about what may have caused the rash. Common things that cause skin allergies are metal in jewelry, plants like poison ivy or poison oak, and certain skin care products. You will need to avoid the source of your rash in the future to prevent it from coming back. In some cases, the cause of the dermatitis may not be found.  Treatment is done to relieve itching and prevent the rash from coming back. The rash should go away in a few days to a few weeks.  Home care  The health care provider may prescribe medications to relieve swelling and itching. Follow all instructions when using these medications.  · Avoid anything that heats up your skin, such as hot showers or baths, or direct sunlight. This can make itching worse.  · Stay away from whatever you think caused the rash.  · Bathe in warm, not hot, water. Apply a moisturizing lotion after bathing to prevent dry skin.  · Avoid skin irritants such as wool or silk clothing, grease, oils, harsh soaps, and detergents.  · Apply cold compresses to soothe your sores to help relieve your symptoms. Do this for 30 minutes 3 to 4 times a day. You can make a cold compress by soaking a cloth in cold water. Squeeze out excess water. You can add colloidal oatmeal to the water to help reduce itching. For severe itching in a small area, apply an ice pack wrapped in a thin towel. Do this for 20 minutes 3 to 4 times a day.  · You can also help relieve large areas of itching by taking a lukewarm bath with colloidal oatmeal added to the water.  · Use hydrocortisone cream for redness  and irritation, unless another medicine was prescribed. You can also use benzocaine anesthetic cream or spray.  · Use oral diphenhydramine to help reduce itching. This is an antihistamine you can buy at drug and grocery stores. It can make you sleepy, so use lower doses during the daytime. Or you can use loratadine. This is an antihistamine that will not make you sleepy. Dont use diphenhydramine if you have glaucoma or have trouble urinating because of an enlarged prostate.  · Wash your hands or use an antibacterial gel often to prevent the spread of the rash.  Follow-up care  Follow up with your health care provider. Make an appointment with your health care provider if your symptoms do not get better in the next 1 to 2 weeks.  When to seek medical advice  Call your health care provider right away if any of these occur:  · Spreading of the rash to other parts of your body  · Severe swelling of your face, eyelids, mouth, throat or tongue  · Trouble urinating due to swelling in the genital area  · Fever of 100.4°F (38°C) or higher  · Redness or swelling that gets worse  · Pain that gets worse  · Foul-smelling fluid leaking from the skin  · Yellow-brown crusts on the open blisters  · Joint pain   Date Last Reviewed: 7/23/2014  © 6841-0234 [employer]. [address] [place] [address], [place] [address]. All rights reserved. This information is not intended as a substitute for professional medical care. Always follow your healthcare professional's instructions.        Hypothyroidism       You have hypothyroidism. This means your thyroid gland is not making enough thyroid hormone. This hormone is vital to body growth and metabolism. If you dont make enough, many body processes slow down. This can cause symptoms throughout the body. Hypothyroidism can range from mild to severe. The most severe form is called myxedema.  There are a number of causes of hypothyroidism. A common cause is Hashimotos disease. This  disease causes the bodys own immune system to attack the thyroid gland. When you have certain treatments, such as surgery to remove the thyroid gland, this can also cause hypothyroidism.  Symptoms of hypothyroidism can include:  · Fatigue  · Trouble concentrating or thinking clearly; forgetfulness  · Dry skin  · Hair loss  · Weight gain  · Low tolerance to cold  · Constipation  · Depression  · Personality changes  · Tingling or prickling of the hands or feet  · Heavy, absent, or irregular periods (women only)  Older adults may sometimes have other symptoms. These can include:  · Muscle aches and weakness  · Confusion  · Incontinence (unable to control urine or stool)  · Trouble moving around  · Falling  Treatment for hypothyroidism involves taking thyroid hormone pills daily. These pills replace the hormone your thyroid doesnt make. You will likely need to take a daily pill for the rest of your life. Tips for taking this medicine are given below.  Home care  Tips for taking your medicine  · Take your thyroid hormone pills as prescribed by your healthcare provider. This is most often 1 pill a day on an empty stomach. Use a pillbox labeled with the days of the week. This will help you remember to take your pill each day.  · Dont take products that contain iron and calcium or antacids within 4 hours of taking your thyroid hormone pills.  · Dont take other medicines with your thyroid hormone pill without checking with your provider first.  · Tell your provider if you have any side effects from your medicines that bother you.  · Never change the dosage or stop taking your thyroid pills without talking to your provider first.  General care  · Always talk with your provider before trying other medicines or treatments for your thyroid problem.  · If you see other healthcare providers, be sure to let them know about your thyroid problem.  Follow-up care  See your healthcare provider for checkups as advised. You may need  regular tests to check the level of thyroid hormone in your blood.  When to seek medical advice  Call your healthcare provider right away if any of these occur:  · New symptoms develop  · Symptoms return, continue, or worsen even after treatment  · Extreme fatigue  · Puffy hands, face, or feet  · Fast or irregular heartbeat  · Confusion  Call 911  Call 911 right away if any of these occur:  · Fainting  · Chest pain  · Shortness of breath or trouble breathing  Date Last Reviewed: 8/24/2015 © 2000-2017 [employer]. [address] [place] [address], [place] [address]. All rights reserved. This information is not intended as a substitute for professional medical care. Always follow your healthcare professional's instructions.         Fall Risk

## 2025-07-03 NOTE — ED ADULT NURSE NOTE - CHIEF COMPLAINT QUOTE
biab from [facility] for being passed out in wheelchair , pt was found by staff ; when ems arrive pt was A&Ox3, given fluids pt now co abd pain  in triage

## 2025-07-03 NOTE — BRIEF OPERATIVE NOTE - OPERATION/FINDINGS
1. US guided access b/l CFA (16F sheath R, 8F sheath L)  2. Aortobi-iliac angiogram  3. Deployment Cook 16x74 R iliac limb, 11x90 extension into R EIA  4. 10x7 amplatz plug R hypogastric artery  5. Perclose b/l CFA    Findings: Type 3 endoleak due to limb separation of the R iliac limb resulting in rupture. Final angiogram with sealed endoleak.

## 2025-07-03 NOTE — ED PROVIDER NOTE - DIFFERENTIAL DIAGNOSIS
Differential Diagnosis differential dx includes but is not limited to:  dissection/ruptured AAA, ACS, arrhythmia, pancreatitis, electrolyte derangement, SBO, biliary disease, appendicitis, diverticulitis

## 2025-07-03 NOTE — ED PROVIDER NOTE - NSICDXPASTMEDICALHX_GEN_ALL_CORE_FT
PAST MEDICAL HISTORY:  Cancer of kidney     Cancer of thyroid     COPD (chronic obstructive pulmonary disease)     Diverticulitis     DM (diabetes mellitus)     Former smoker     GERD (gastroesophageal reflux disease)     History of abdominal aortic aneurysm (AAA)     HLD (hyperlipidemia)     [place] (hard of hearing)     HTN (hypertension)     Kidney stones     Lung cancer     NHL (non-Hodgkin's lymphoma) "low grade" per heme/ onc note    Obesity     ARTIE (obstructive sleep apnea) NO CPAP MACHINE PER PT.

## 2025-07-03 NOTE — ED PROVIDER NOTE - PROGRESS NOTE DETAILS
TC: Called by radiology for ruptured AAA with active extrav. Vascular consulted, will see pt TC: I spoke with vascular attending Dr. Michel who will take pt to OR, says admit to his service TC: Called by radiology for ruptured AAA with active extrav. Vascular consulted, will see pt. Pt upgraded to critical care area of ED. BP 100s/60s, HR 70s.

## 2025-07-03 NOTE — CONSULT NOTE ADULT - SUBJECTIVE AND OBJECTIVE BOX
VASCULAR SURGERY CONSULT NOTE      HPI:84-year-old female PMH SCLC in remission, left lung adenocarcinoma status post LLL lobectomy, PE on apixaban, juxtarenal AAA status post endovascular repair with Dr. Sathya Whitfield, diabetes, HTN, HLD, recent pneumonia and UTI, recent admission for acute hypoxic and hypercapnic respiratory failure presents to the ED for evaluation after syncopal episode.  Per daughter at bedside, patient was brought in from [facility] after a syncopal episode while seated in her wheelchair.  Patient was found by staff slumped over in her wheelchair and appeared cyanotic.  EMS was activated and upon EMS arrival patient was awake, A&O x 3 and hypotensive to 80s/40s.  Patient received a fluid bolus by EMS and was brought to the emergency department.  On ED arrival patient complaining of abdominal pain.  Daughter also states patient seemed more confused over the last few weeks and was not acting herself.  Patient denies chest pain, shortness of breath, diaphoresis, headache, dizziness, nausea, vomiting, diarrhea or any urinary symptoms.  CTA abdomen showed Findings are worrisome for a ruptured/leaking abdominal aortic aneurysm.      PAST MEDICAL & SURGICAL HISTORY:  Hypertension, unspecified type      Type 2 diabetes mellitus with complication, without long-term current use of insulin      Hyperlipidemia, unspecified hyperlipidemia type      Diverticulitis      Obesity      COPD (chronic obstructive pulmonary disease)      SOB (shortness of breath)      GERD (gastroesophageal reflux disease)      Lung cancer      ARTIE (obstructive sleep apnea)  NO CPAP MACHINE PER PT.      Cancer of kidney      Cancer of thyroid      Former smoker      NHL (non-Hodgkin's lymphoma)  "low grade" per heme/ onc note      History of abdominal aortic aneurysm (AAA)      Kidney stones      [place] (hard of hearing)      History of surgery  LEFT LOWER LOBECTOMY      History of surgery  BILATERAL KNEE REPLACEMENT      History of surgery  CATARACT RIGHT EYE      History of surgery  TUBAL LIGATION      History of surgery  CYST REMOVED  RIGHT BREAST      History of surgery  CHOLECYSTECOMY      History of surgery  RIGHT PARTIAL NEPHRECTOMY      History of surgery  BILATERAL CATARACT SURGERY      History of back surgery      H/O lithotripsy      H/O partial thyroidectomy        No Known Allergies    Home Medications:  amLODIPine 5 mg oral tablet: 1 tab(s) orally once a day (17 Jun 2025 15:48)  aspirin 81 mg oral capsule: 1 cap(s) orally once a day (17 Jun 2025 15:48)  bumetanide 1 mg oral tablet: 1 tab(s) orally once a day (01 Jul 2025 13:18)  Ditropan 5 mg oral tablet: 1 tab(s) orally once a day (at bedtime) (17 Jun 2025 15:48)  docusate sodium 100 mg oral tablet: 3 tab(s) orally once a day (at bedtime) (17 Jun 2025 15:49)  fluticasone 50 mcg/inh nasal spray: 1 spray(s) nasal 2 times a day (01 Jul 2025 13:18)  Incruse Ellipta 62.5 mcg/inh inhalation powder: 1 puff(s) inhaled once a day (17 Jun 2025 15:48)  metoprolol succinate 25 mg oral tablet, extended release: 2 tab(s) orally once a day (17 Jun 2025 15:42)  MiraLax oral powder for reconstitution: 17 gram(s) orally once a day (at bedtime) (17 Jun 2025 15:48)  omeprazole 40 mg oral delayed release capsule: 1 cap(s) orally once a day (17 Jun 2025 15:49)  senna (sennosides) 8.6 mg oral tablet: 1 tab(s) orally once a day (17 Jun 2025 15:48)  Symbicort 160 mcg-4.5 mcg/inh inhalation aerosol: 2 puff(s) inhaled 2 times a day (17 Jun 2025 15:49)  Trulicity Pen 1.5 mg/0.5 mL subcutaneous solution: 1.5 milligram(s) subcutaneous once a week (17 Jun 2025 15:49)  Vitamin D3 25 mcg (1000 intl units) oral tablet: 1 tab(s) orally once a day (17 Jun 2025 15:49)    No permtinent family history of PVD    REVIEW OF SYSTEMS:  GENERAL:                                         negative  SKIN:                                                 negative  OPTHALMOLOGIC:                          negative  ENMT:                                               negative  RESPIRATORY AND THORAX:        negative  CARDIOVASCULAR:                         negative  GASTROINTESTINAL:                       negative  NEPHROLOGY:                                  negative  MUSCULOSKELETAL:                       negative  NEUROLOGIC:                                   negative  PSYCHIATRIC:                                    negative  HEMATOLOGY/LYMPHATICS:         negative  ENDOCRINE:                                     negative  ALLERGIC/IMMUNOLOGIC:            negative    12 point ROS otherwise normal except as stated in HPI  FHx: none  SHX:  [ ]  smoking     [ ] alcohol use    Vital Signs Last 24 Hrs  T(C): --  T(F): --  HR: 78 (03 Jul 2025 17:10) (75 - 78)  BP: 109/71 (03 Jul 2025 17:10) (106/64 - 127/73)  BP(mean): --  RR: 19 (03 Jul 2025 17:10) (16 - 19)  SpO2: 100% (03 Jul 2025 17:10) (88% - 100%)    Parameters below as of 03 Jul 2025 17:10  Patient On (Oxygen Delivery Method): nasal cannula  O2 Flow (L/min): 4        PHYSICAL EXAM  Appearance: Normal	  HEENT:   Normal oral mucosa, PERRL, EOMI	  Neck: Supple,   Cardiovascular: Normal S1 S2, No JVD, No murmurs,   Respiratory: b/l chest rise and fall  Skin: No rashes, No ecchymoses, No cyanosis  Extremities: Normal range of motion, No clubbing, cyanosis or edema   Vascular:  2+ distal radial pulses b/l. Weak DP pulses b/l.  Neurologic: Non-focal  Psychiatry: A & O x 3, Mood & affect appropriate      MEDICATIONS:   MEDICATIONS  (STANDING):    MEDICATIONS  (PRN):      LAB/STUDIES:                        7.8    13.64 )-----------( 193      ( 03 Jul 2025 15:10 )             25.7     07-03    135  |  95[L]  |  52[H]  ----------------------------<  430[H]  4.5   |  26  |  1.3    Ca    8.8      03 Jul 2025 15:10  Mg     1.9     07-03    TPro  5.0[L]  /  Alb  3.4[L]  /  TBili  0.3  /  DBili  x   /  AST  18  /  ALT  29  /  AlkPhos  78  07-03      LIVER FUNCTIONS - ( 03 Jul 2025 15:10 )  Alb: 3.4 g/dL / Pro: 5.0 g/dL / ALK PHOS: 78 U/L / ALT: 29 U/L / AST: 18 U/L / GGT: x             Urinalysis Basic - ( 03 Jul 2025 15:10 )    Color: x / Appearance: x / SG: x / pH: x  Gluc: 430 mg/dL / Ketone: x  / Bili: x / Urobili: x   Blood: x / Protein: x / Nitrite: x   Leuk Esterase: x / RBC: x / WBC x   Sq Epi: x / Non Sq Epi: x / Bacteria: x        IMAGING:   EXAM:  CT ANGIO ABD PELV (W)AW IC   ORDERED BY: FAISAL BAH     ACC: [id number] EXAM:  CT ANGIO CHEST AORTA WAWIC   ORDERED BY: FAISAL BAH     PROCEDURE DATE:  07/03/2025          INTERPRETATION:  CLINICAL INFORMATION: Chest,abdominal pain. History of   non-Hodgkin's lymphoma.    COMPARISON: Chest CT on 6/27/2025. PET/CT on 2/12/2025. CT abdomen and   pelvis on 10/12/2024.    CONTRAST/COMPLICATIONS:  IV Contrast: Omnipaque 350 (accession [id number]), IV contrast documented  in unlinked concurrent exam (accession [id number])  100 cc administered   0   cc discarded  Oral Contrast: NONE.    PROCEDURE:  CT Angiography of the Chest, Abdomen and Pelvis.  Precontrast imaging was performed through the chest followed by arterial  and delay phase imaging of the chest, abdomen and pelvis.  Sagittal and coronal reformats were performed as well as 3D (MIP)   reconstructions.    FINDINGS:  CHEST:  LUNGS AND LARGE AIRWAYS: Patent central airways. Centrilobular emphysema.  PLEURA:Interval decrease in right-sided small pleural effusion with   adjacent atelectasis. Interval decrease in left-sided pleural-parenchymal   groundglass and consolidative opacities.  VESSELS: Aortic calcifications. Coronary artery calcifications. No   evidence of a dissection or intramural hematoma.  HEART: Cardiomegaly. No pericardial effusion.  MEDIASTINUM AND CULLEN: No lymphadenopathy.  CHEST WALL AND LOWER NECK: Absent left thyroid lobe. Correlate with   surgical history.    ABDOMEN AND PELVIS:  LIVER: Within normal limits.  BILE DUCTS: Normal caliber.  GALLBLADDER: Cholecystectomy.  SPLEEN: Within normal limits.  PANCREAS: Within normal limits.  ADRENALS: Within normal limits.  KIDNEYS/URETERS: Multiple bilateral renal cysts. Few nonobstructing renal   calculi bilaterally.    BLADDER: Within normal limits.  REPRODUCTIVE ORGANS: Atrophic uterus containing a few degenerated   calcified fibroids. Bilateral adnexal cystic lesions largest measuring   4.6 x 4.5 cm in the right ovary. Recommend outpatient pelvic ultrasound   for further evaluation.    BOWEL: No bowel obstruction. Colonic diverticulosis. Appendix is not   visualized. Stool-filled colon.  PERITONEUM/RETROPERITONEUM: Extensive hemoperitoneum seen around the   aorta with extension into the right retroperitoneum.  VESSELS: Atherosclerotic changes. Status post aortobiiliac stent graft.   Additional stents seen in the superior mesenteric artery and bilateral   renal arteries. Native aneurysm sac measures 6.3 cm (series 7 image 72)   with visible endoleak at the aortic bifurcation (series 7 image 92)   beginning in the arterial phase. There is accumulation of contrast into   the left aspect of the aneurysm sac in the delayed phase (8/87).  LYMPH NODES: No lymphadenopathy.  ABDOMINAL WALL: Left inguinal hernia contains small amount of   nonobstructed small bowel.  BONES: Degenerative changes.    IMPRESSION:  Findings are worrisome for a ruptured/leaking abdominal aortic aneurysm.    Case discussed with Dr. He at 4:40 PM on 7/3/2025.    --- End of Report ---

## 2025-07-03 NOTE — ED PROVIDER NOTE - PHYSICAL EXAMINATION
VITAL SIGNS: I have reviewed nursing notes and confirm.  CONSTITUTIONAL: non-toxic, NAD. Speaking in full sentences  SKIN: Warm dry, normal skin turgor  HEAD: NCAT  EYES: EOMI, PERRLA, no scleral icterus  ENT: Mildy dry mucous membranes, normal pharynx with no erythema or exudates  NECK: Supple; non tender. Full ROM.   CARD: RRR. 2+ distal radial pulses b/l. Weak DP pulses b/l.  RESP: clear to ausculation b/l.  No rales, rhonchi, or wheezing.  ABD: soft, moderate diffuse tenderness, non-distended, no rebound or guarding. No CVA tenderness  EXT: Full ROM, no pedal edema.  NEURO: A&O x3. No FND  PSYCH: Cooperative, appropriate.

## 2025-07-03 NOTE — PATIENT PROFILE ADULT - FALL HARM RISK - RISK INTERVENTIONS

## 2025-07-03 NOTE — PATIENT PROFILE ADULT - SURGICAL SITE DESCRIPTION
B/L timothy  Rifampin Counseling: I discussed with the patient the risks of rifampin including but not limited to liver damage, kidney damage, red-orange body fluids, nausea/vomiting and severe allergy.

## 2025-07-03 NOTE — ED ADULT NURSE NOTE - OBJECTIVE STATEMENT
Aox4 pt states she was told she synopsized in wheelchair after rehab today and now c/o abdominal pain. Placed on 3L o2 nasal cannula.

## 2025-07-03 NOTE — CONSULT NOTE ADULT - ASSESSMENT
84y Female  s/p     NEURO:  #Acute pain    -APAP prn, Gabapentin 100mg q8    -if intubated Fentanyl 12.5 mcg q4 prn    -if extubated remove all sedatives, fentanyl gtt/IV pushes     RESP:   #Oxygenation    -wean off NC to RA as tolerated  #Activity    -increase as tolerated  #if intubated document date of intubation/ETT size and LL    CARDS:   #  Imaging:   Labs:     GI/NUTR:   #         -if NG tube in place, document nare length and order q4 adherance    -aspiration precautions, HOB 30  #GI Prophylaxis    -not indicated  #Bowel regimen    -senna/miralax if indicated    -last bowel movement      /RENAL:   #urine output in critically ill    -indwelling theodore (placed     Labs          [07-03 @ 22:19]Na  139 // K  4.1 // Mg  1.8 // Phos  4.1  [07-03 @ 15:10]Na  135 // K  4.5 // Mg  1.9 // Phos  --         HEME/ONC:   #DVT prophylaxis     -SCDs    -if DVT prophylaxis to be held, document and place VTE order        Labs: Hb/Hct:  7.8/25.7  (07-03 @ 15:10)  -->,  9.8/30.4  (07-03 @ 22:19)  -->                      Plts:  193  -->,  125  -->                 PTT/INR:  23.2/1.15  --->          T&S Expires:   Blood Consent-obtain if acute anemia, q6 CBC    ID:  WBC- 10.49  --->>,  13.64  --->>,  20.19  --->>  Temp trend- 24hrs T(F): 96.7 (07-03 @ 21:15), Max: 96.7 (07-03 @ 21:15)  Current antibiotics-ceFAZolin   IVPB 2000 every 8 hours        ENDO:    -FSG q6 if NPO or Tube feeds    -Glucose goal 140-180. if above 180 start ISS    MSK:         LINES/DRAINS:  PIV, Theodore    ADVANCED DIRECTIVES:  Full Code    HCP/Emergency Contact-    INDICATION FOR SICU/SDU: Ruptured abdominal aortic aneurysm (AAA)             DISPO:   Case discussed with attending   84y Female  s/p insertion of right iliac stent for type 3 endoleak resulting in rupture.    NEURO:  #Acute pain    -APAP      RESP:   #h/o COPD; small cell lung cancer on tag[place] in remission s/p LLL lobectomy; recent admission for pneumonia/pneumonitis  #Oxygenation    -wean off NC to RA as tolerated  #Activity    -increase as tolerated  #if intubated document date of intubation/ETT size and LL    CARDS:   #h/o HTN; HLD  Imaging:   Labs:     GI/NUTR:   #         -if NG tube in place, document nare length and order q4 adherance    -aspiration precautions, HOB 30  #GI Prophylaxis; h/o GERD    -not indicated  #Bowel regimen    -senna/miralax if indicated    -last bowel movement    #h/o diverticulitis    /RENAL:   #urine output in critically ill    -indwelling theodore (placed   #h/o CKD3    Labs          [07-03 @ 22:19]Na  139 // K  4.1 // Mg  1.8 // Phos  4.1  [07-03 @ 15:10]Na  135 // K  4.5 // Mg  1.9 // Phos  --         HEME/ONC:   #DVT prophylaxis     -SCDs    -if DVT prophylaxis to be held, document and place VTE order        Labs: Hb/Hct:  7.8/25.7  (07-03 @ 15:10)  -->,  9.8/30.4  (07-03 @ 22:19)  -->                      Plts:  193  -->,  125  -->                 PTT/INR:  23.2/1.15  --->     #h/o Non Hodgkin lymphoma      T&S Expires:   Blood Consent-obtain if acute anemia, q6 CBC    ID:  WBC- 10.49  --->>,  13.64  --->>,  20.19  --->>  Temp trend- 24hrs T(F): 96.7 (07-03 @ 21:15), Max: 96.7 (07-03 @ 21:15)  Current antibiotics-ceFAZolin   IVPB 2000 every 8 hours        ENDO:    -FSG q6 if NPO or Tube feeds    -Glucose goal 140-180. if above 180 start ISS    MSK:         LINES/DRAINS:  PIV, Theodore    ADVANCED DIRECTIVES:  Full Code    HCP/Emergency Contact-    INDICATION FOR SICU/SDU: Ruptured abdominal aortic aneurysm (AAA)             DISPO:   Case discussed with attending   84y Female  s/p insertion of right iliac stent for type 3 endoleak resulting in rupture.    NEURO:  #Acute pain    -APAP 650 q6   #h/o anxiety/insomnia  - holding home ambien 10mg     RESP:   #h/o COPD (on home O2 1-2 months ago); small cell lung cancer on [place] in remission s/p LLL lobectomy; recent admission for hypoxic respiratory failure 2/2 pneumonia/pneumonitis/ acute on chronic CHF exacerbation   - was discharged on PO prednisone taper per pulmonology   #Oxygenation    - saturating well on RA   #Activity    - bed rest per vascular surgery     CARDS:   #h/o HTN; HLD; Afib   - holding amlodipine 5mg qD  - continue metoprolol 12.5 mg q12 (takes XL 50mg qD)  Imaging: Echo 6/18/25: EF 67%, moderate MR, moderate TR   EKG pre op : Afib with bifasicular block   [ ] Pending post op EKG   Labs: Troponin 45 > 46 > 42  Lactate 1.6 post op     VASCULAR:  #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   - q1 hour vascular checks - DP/PT signals bilaterally   - maintain normotension   - bed rest post op   #PAD     GI/NUTR:   #Diet, NPO except meds    -aspiration precautions, HOB 30  #GI Prophylaxis; h/o GERD    - protonix (takes omeprazole at home)  #Bowel regimen; h/o constipation     - senna     - miralax     -last bowel movement  - prior to admission   #h/o diverticulitis    /RENAL:   #urine output in critically ill    -indwelling theodore (placed   #h/o CKD3 (baseline creatinine 1.3)  - holding home bumex 1mg qD     Labs:          BUN/Cr- 52/1.3  -->,  51/1.1  -->          [07-03 @ 22:19]Na  139 // K  4.1 // Mg  1.8 // Phos  4.1  [07-03 @ 15:10]Na  135 // K  4.5 // Mg  1.9 // Phos  --         HEME/ONC:   #DVT prophylaxis     -SCDs    - holding dvt ppx per vascular surgery (received heparin in OR)    Labs: Hb/Hct:  7.8/25.7  -->,  9.8/30.4  -->                      Plts:  193  -->,  125  -->                 PTT/INR:  23.2/1.15  --->       #h/o Non Hodgkin lymphoma   #h/o PE on Eliquis   - holding home eliquis 2.5mg q12  - holding home ASA    T&S Expires: 7/6  Blood Consent-obtain if acute anemia, q6 CBC    ID:  WBC- 10.49  --->>,  13.64  --->>,  20.19  --->>  Temp trend- 24hrs T(F): 96.7 (07-03 @ 21:15), Max: 96.7 (07-03 @ 21:15)  Current antibiotics-ceFAZolin   IVPB 2000 every 8 hours x 24 hours         ENDO:  #h/o DM    -FSG q6 if NPO    - Insulin sliding scale     - holding home lantus 10 u and 5u lispro pre prandial     MSK:         LINES/DRAINS:  PIV, Theodore    ADVANCED DIRECTIVES:  Full Code    HCP/Emergency Contact-    INDICATION FOR SICU/SDU: Ruptured abdominal aortic aneurysm (AAA)             DISPO:   Case discussed with attending   84y Female  s/p insertion of right iliac stent for type 3 endoleak resulting in rupture.    NEURO:  #Acute pain    -APAP 650 q6   #h/o anxiety/insomnia  - holding home ambien 10mg     RESP:   #h/o COPD (on home O2 1-2 months ago); small cell lung cancer on [place] in remission s/p LLL lobectomy; recent admission for hypoxic respiratory failure 2/2 pneumonia/pneumonitis/ acute on chronic CHF exacerbation   - was discharged on PO prednisone taper per pulmonology   - continue advair   - continue spiriva   #Oxygenation    - saturating well on RA   #Activity    - bed rest per vascular surgery     CARDS:   #h/o HTN; HLD; Afib   - holding amlodipine 5mg qD  - continue metoprolol 12.5 mg q12 (takes XL 50mg qD)  Imaging: Echo 6/18/25: EF 67%, moderate MR, moderate TR   EKG pre op : Afib with bifasicular block   Post op EKG : Afib with bifasicular block qtc 474   Labs: Troponin 45 > 46 > 42  Lactate 1.6 post op     VASCULAR:  #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   - q1 hour vascular checks - DP/PT signals bilaterally   - maintain normotension   - bed rest post op   #PAD     GI/NUTR:   #Diet, NPO except meds    -aspiration precautions, HOB 30  #GI Prophylaxis; h/o GERD    - protonix (takes omeprazole at home)  #Bowel regimen; h/o constipation     - senna     - miralax     -last bowel movement  - prior to admission   #h/o diverticulitis    /RENAL:   #urine output in critically ill    -indwelling theodore (placed   #h/o CKD3 (baseline creatinine 1.3)  - holding home bumex 1mg qD     Labs:          BUN/Cr- 52/1.3  -->,  51/1.1  -->          [07-03 @ 22:19]Na  139 // K  4.1 // Mg  1.8 // Phos  4.1  [07-03 @ 15:10]Na  135 // K  4.5 // Mg  1.9 // Phos  --         HEME/ONC:   #DVT prophylaxis     -SCDs    - holding dvt ppx per vascular surgery (received heparin in OR)    Labs: Hb/Hct:  7.8/25.7  -->,  9.8/30.4  -->                      Plts:  193  -->,  125  -->                 PTT/INR:  23.2/1.15  --->       #h/o Non Hodgkin lymphoma   #h/o PE on Eliquis   - holding home eliquis 2.5mg q12  - holding home ASA    T&S Expires: 7/6  Blood Consent-obtain if acute anemia, q6 CBC    ID:  WBC- 10.49  --->>,  13.64  --->>,  20.19  --->>  Temp trend- 24hrs T(F): 96.7 (07-03 @ 21:15), Max: 96.7 (07-03 @ 21:15)  Current antibiotics-ceFAZolin   IVPB 2000 every 8 hours x 24 hours         ENDO:  #h/o DM    -FSG q6 if NPO    - Insulin sliding scale     - holding home lantus 10 u and 5u lispro pre prandial     MSK:         LINES/DRAINS:  Edgardo ORTEGA    ADVANCED DIRECTIVES:  Full Code    HCP/Emergency Contact-    INDICATION FOR SICU/SDU: Ruptured abdominal aortic aneurysm (AAA)             DISPO:   Case discussed with attending   84y Female  s/p insertion of right iliac stent for type 3 endoleak resulting in rupture.    NEURO:  #Acute pain    -APAP 650 q6   #h/o anxiety/insomnia  - holding home ambien 10mg     RESP:   #h/o COPD (on home O2 1-2 months ago); small cell lung cancer on [place] in remission s/p LLL lobectomy; recent admission for hypoxic respiratory failure 2/2 pneumonia/pneumonitis/ acute on chronic CHF exacerbation   - was discharged on PO prednisone taper per pulmonology   - continue advair   - continue spiriva   #Oxygenation    - saturating well on RA   #Activity    - bed rest per vascular surgery     CARDS:   #h/o HTN; HLD; Afib   - holding amlodipine 5mg qD  - continue metoprolol 12.5 mg q12 (takes XL 50mg qD)  Imaging: Echo 6/18/25: EF 67%, moderate MR, moderate TR   EKG pre op : Afib with bifasicular block   Post op EKG : Afib with bifasicular block qtc 474   Labs: Troponin 45 > 46 > 42  Lactate 1.6 post op     VASCULAR:  #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   - q1 hour vascular checks - DP/PT signals bilaterally   - maintain normotension   - bed rest post op   #PAD     GI/NUTR:   #Diet, NPO except meds    -aspiration precautions, HOB 30  #GI Prophylaxis; h/o GERD    - protonix (takes omeprazole at home)  #Bowel regimen; h/o constipation     - senna     - miralax     -last bowel movement  - prior to admission   #h/o diverticulitis    /RENAL:   #urine output in critically ill    -indwelling theodore (placed   #h/o CKD3 (baseline creatinine 1.3)  - holding home bumex 1mg qD     Labs:          BUN/Cr- 52/1.3  -->,  51/1.1  -->          [07-03 @ 22:19]Na  139 // K  4.1 // Mg  1.8 // Phos  4.1  [07-03 @ 15:10]Na  135 // K  4.5 // Mg  1.9 // Phos  --         HEME/ONC:   #DVT prophylaxis     -SCDs    - holding dvt ppx per vascular surgery (received heparin in OR)    Labs: Hb/Hct:  7.8/25.7  -->,  9.8/30.4  -->                      Plts:  193  -->,  125  -->                 PTT/INR:  23.2/1.15  --->       #h/o Non Hodgkin lymphoma   #h/o PE on Eliquis   - holding home eliquis 2.5mg q12  - holding home ASA    T&S Expires: 7/6  Blood Consent-obtain if acute anemia, q6 CBC    ID:  WBC- 10.49  --->>,  13.64  --->>,  20.19  --->>  Temp trend- 24hrs T(F): 96.7 (07-03 @ 21:15), Max: 96.7 (07-03 @ 21:15)  Current antibiotics-ceFAZolin   IVPB 2000 every 8 hours x 24 hours         ENDO:  #h/o DM    -FSG q6 if NPO    - Insulin sliding scale     - holding home lantus 10 u and 5u lispro pre prandial     MSK:  #DTI assessment pending     LINES/DRAINS:  PIV, Theodore, R brachial arterial line     ADVANCED DIRECTIVES:  Full Code    HCP/Emergency Contact-    INDICATION FOR SICU/SDU: Ruptured abdominal aortic aneurysm (AAA)      DISPO:   SICU - Case discussed with attending Dr. Hawkins

## 2025-07-03 NOTE — BRIEF OPERATIVE NOTE - NSICDXBRIEFPROCEDURE_GEN_ALL_CORE_FT
PROCEDURES:  Insertion, stent, artery, iliac, endovascular, femoral approach 03-Jul-2025 21:10:48  Fausto Long

## 2025-07-03 NOTE — ED PROVIDER NOTE - OBJECTIVE STATEMENT
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 Sathya Whitfield, diabetes, HTN, HLD, recent pneumonia and UTI, recent admission for acute hypoxic and hypercapnic respiratory failure presents to the ED for evaluation after syncopal episode.  Per daughter at bedside, patient was brought in from [facility] after a syncopal episode while seated in her wheelchair.  Patient was found by staff slumped over in her wheelchair and appeared cyanotic.  EMS was activated and upon EMS arrival patient was awake, A&O x 3 and hypotensive to 80s/40s.  Patient received a fluid bolus by EMS and was brought to the emergency department.  On ED arrival patient complaining of abdominal pain.  Daughter also states patient seemed more confused over the last few weeks and was not acting herself.  Patient denies chest pain, shortness of breath, diaphoresis, headache, dizziness, nausea, vomiting, diarrhea or any urinary symptoms.

## 2025-07-03 NOTE — CONSULT NOTE ADULT - ASSESSMENT
Assessment: 84-year-old female PMH SCLC in remission, left lung adenocarcinoma status post LLL lobectomy, PE on apixaban, juxtarenal AAA status post endovascular repair with Dr. Sathya Whitfield, diabetes, HTN, HLD, recent pneumonia and UTI, recent admission for acute hypoxic and hypercapnic respiratory failure presents to the ED for evaluation after syncopal episode.  Per daughter at bedside, patient was brought in from [facility] after a syncopal episode while seated in her wheelchair.  Patient was found by staff slumped over in her wheelchair and appeared cyanotic.  EMS was activated and upon EMS arrival patient was awake, A&O x 3 and hypotensive to 80s/40s.  Patient received a fluid bolus by EMS and was brought to the emergency department.  On ED arrival patient complaining of abdominal pain.  Daughter also states patient seemed more confused over the last few weeks and was not acting herself.  Patient denies chest pain, shortness of breath, diaphoresis, headache, dizziness, nausea, vomiting, diarrhea or any urinary symptoms.  CTA abdomen showed Findings are worrisome for a ruptured/leaking abdominal aortic aneurysm.    Plan:   Patient is going to OR for emergent endovascular repair of abdominal aortic aneurysm.

## 2025-07-03 NOTE — H&P ADULT - NSHPPHYSICALEXAM_GEN_ALL_CORE
PHYSICAL EXAM  Appearance: Normal	  HEENT:   Normal oral mucosa, PERRL, EOMI	  Neck: Supple,   Cardiovascular: Normal S1 S2, No JVD, No murmurs,   Respiratory: b/l chest rise and fall  Skin: No rashes, No ecchymoses, No cyanosis  Extremities: Normal range of motion, No clubbing, cyanosis or edema   Vascular:  2+ distal radial pulses b/l. Weak DP pulses b/l.  Neurologic: Non-focal  Psychiatry: A & O x 3, Mood & affect appropriate

## 2025-07-03 NOTE — CHART NOTE - NSCHARTNOTEFT_GEN_A_CORE
Pt could not hear me, tons of background noise 7/2- AC / left vm 7/3 - DK (HemOnc, Pulmo. & PCP Referral)

## 2025-07-04 ENCOUNTER — TRANSCRIPTION ENCOUNTER (OUTPATIENT)
Age: 85
End: 2025-07-04

## 2025-07-04 PROBLEM — E78.5 HYPERLIPIDEMIA, UNSPECIFIED: Chronic | Status: INACTIVE | Noted: 2018-07-30 | Resolved: 2025-07-03

## 2025-07-04 PROBLEM — I10 ESSENTIAL (PRIMARY) HYPERTENSION: Chronic | Status: INACTIVE | Noted: 2018-07-30 | Resolved: 2025-07-03

## 2025-07-04 PROBLEM — R06.02 SHORTNESS OF BREATH: Chronic | Status: INACTIVE | Noted: 2019-03-08 | Resolved: 2025-07-03

## 2025-07-04 LAB
ANION GAP SERPL CALC-SCNC: 10 MMOL/L — SIGNIFICANT CHANGE UP (ref 7–14)
ANION GAP SERPL CALC-SCNC: 12 MMOL/L — SIGNIFICANT CHANGE UP (ref 7–14)
APTT BLD: 24.4 SEC — LOW (ref 27–39.2)
BASOPHILS # BLD AUTO: 0.01 K/UL — SIGNIFICANT CHANGE UP (ref 0–0.2)
BASOPHILS # BLD AUTO: 0.02 K/UL — SIGNIFICANT CHANGE UP (ref 0–0.2)
BASOPHILS NFR BLD AUTO: 0.1 % — SIGNIFICANT CHANGE UP (ref 0–1)
BASOPHILS NFR BLD AUTO: 0.1 % — SIGNIFICANT CHANGE UP (ref 0–1)
BUN SERPL-MCNC: 53 MG/DL — HIGH (ref 10–20)
BUN SERPL-MCNC: 54 MG/DL — HIGH (ref 10–20)
CALCIUM SERPL-MCNC: 8.4 MG/DL — SIGNIFICANT CHANGE UP (ref 8.4–10.5)
CALCIUM SERPL-MCNC: 8.8 MG/DL — SIGNIFICANT CHANGE UP (ref 8.4–10.5)
CHLORIDE SERPL-SCNC: 100 MMOL/L — SIGNIFICANT CHANGE UP (ref 98–110)
CHLORIDE SERPL-SCNC: 97 MMOL/L — LOW (ref 98–110)
CO2 SERPL-SCNC: 27 MMOL/L — SIGNIFICANT CHANGE UP (ref 17–32)
CO2 SERPL-SCNC: 27 MMOL/L — SIGNIFICANT CHANGE UP (ref 17–32)
CREAT SERPL-MCNC: 1.4 MG/DL — SIGNIFICANT CHANGE UP (ref 0.7–1.5)
CREAT SERPL-MCNC: 1.4 MG/DL — SIGNIFICANT CHANGE UP (ref 0.7–1.5)
EGFR: 37 ML/MIN/1.73M2 — LOW
EOSINOPHIL # BLD AUTO: 0.01 K/UL — SIGNIFICANT CHANGE UP (ref 0–0.7)
EOSINOPHIL # BLD AUTO: 0.06 K/UL — SIGNIFICANT CHANGE UP (ref 0–0.7)
EOSINOPHIL NFR BLD AUTO: 0 % — SIGNIFICANT CHANGE UP (ref 0–8)
EOSINOPHIL NFR BLD AUTO: 0.4 % — SIGNIFICANT CHANGE UP (ref 0–8)
GAS PNL BLDA: SIGNIFICANT CHANGE UP
GLUCOSE BLDC GLUCOMTR-MCNC: 112 MG/DL — HIGH (ref 70–99)
GLUCOSE BLDC GLUCOMTR-MCNC: 202 MG/DL — HIGH (ref 70–99)
GLUCOSE BLDC GLUCOMTR-MCNC: 233 MG/DL — HIGH (ref 70–99)
GLUCOSE BLDC GLUCOMTR-MCNC: 88 MG/DL — SIGNIFICANT CHANGE UP (ref 70–99)
GLUCOSE SERPL-MCNC: 128 MG/DL — HIGH (ref 70–99)
GLUCOSE SERPL-MCNC: 172 MG/DL — HIGH (ref 70–99)
HCT VFR BLD CALC: 23.9 % — LOW (ref 37–47)
HCT VFR BLD CALC: 27.9 % — LOW (ref 37–47)
HGB BLD-MCNC: 7.6 G/DL — LOW (ref 12–16)
HGB BLD-MCNC: 8.9 G/DL — LOW (ref 12–16)
IMM GRANULOCYTES NFR BLD AUTO: 0.6 % — HIGH (ref 0.1–0.3)
IMM GRANULOCYTES NFR BLD AUTO: 0.9 % — HIGH (ref 0.1–0.3)
INR BLD: 1.05 RATIO — SIGNIFICANT CHANGE UP (ref 0.65–1.3)
LYMPHOCYTES # BLD AUTO: 0.62 K/UL — LOW (ref 1.2–3.4)
LYMPHOCYTES # BLD AUTO: 0.72 K/UL — LOW (ref 1.2–3.4)
LYMPHOCYTES # BLD AUTO: 3 % — LOW (ref 20.5–51.1)
LYMPHOCYTES # BLD AUTO: 5.1 % — LOW (ref 20.5–51.1)
MAGNESIUM SERPL-MCNC: 2.2 MG/DL — SIGNIFICANT CHANGE UP (ref 1.8–2.4)
MCHC RBC-ENTMCNC: 27 PG — SIGNIFICANT CHANGE UP (ref 27–31)
MCHC RBC-ENTMCNC: 27.3 PG — SIGNIFICANT CHANGE UP (ref 27–31)
MCHC RBC-ENTMCNC: 31.8 G/DL — LOW (ref 32–37)
MCHC RBC-ENTMCNC: 31.9 G/DL — LOW (ref 32–37)
MCV RBC AUTO: 84.8 FL — SIGNIFICANT CHANGE UP (ref 81–99)
MCV RBC AUTO: 85.6 FL — SIGNIFICANT CHANGE UP (ref 81–99)
MONOCYTES # BLD AUTO: 0.96 K/UL — HIGH (ref 0.1–0.6)
MONOCYTES # BLD AUTO: 1.13 K/UL — HIGH (ref 0.1–0.6)
MONOCYTES NFR BLD AUTO: 5.4 % — SIGNIFICANT CHANGE UP (ref 1.7–9.3)
MONOCYTES NFR BLD AUTO: 6.8 % — SIGNIFICANT CHANGE UP (ref 1.7–9.3)
MRSA PCR RESULT.: NEGATIVE — SIGNIFICANT CHANGE UP
MRSA PCR RESULT.: NEGATIVE — SIGNIFICANT CHANGE UP
NEUTROPHILS # BLD AUTO: 12.21 K/UL — HIGH (ref 1.4–6.5)
NEUTROPHILS # BLD AUTO: 18.97 K/UL — HIGH (ref 1.4–6.5)
NEUTROPHILS NFR BLD AUTO: 87 % — HIGH (ref 42.2–75.2)
NEUTROPHILS NFR BLD AUTO: 90.6 % — HIGH (ref 42.2–75.2)
NRBC BLD AUTO-RTO: 0 /100 WBCS — SIGNIFICANT CHANGE UP (ref 0–0)
NRBC BLD AUTO-RTO: 0 /100 WBCS — SIGNIFICANT CHANGE UP (ref 0–0)
PHOSPHATE SERPL-MCNC: 3.9 MG/DL — SIGNIFICANT CHANGE UP (ref 2.1–4.9)
PLATELET # BLD AUTO: 107 K/UL — LOW (ref 130–400)
PLATELET # BLD AUTO: 116 K/UL — LOW (ref 130–400)
PLATELET # BLD AUTO: 125 K/UL — LOW (ref 130–400)
PMV BLD: 11.5 FL — HIGH (ref 7.4–10.4)
PMV BLD: 12.1 FL — HIGH (ref 7.4–10.4)
PMV BLD: 12.2 FL — HIGH (ref 7.4–10.4)
POTASSIUM SERPL-MCNC: 4.7 MMOL/L — SIGNIFICANT CHANGE UP (ref 3.5–5)
POTASSIUM SERPL-MCNC: 4.8 MMOL/L — SIGNIFICANT CHANGE UP (ref 3.5–5)
POTASSIUM SERPL-SCNC: 4.7 MMOL/L — SIGNIFICANT CHANGE UP (ref 3.5–5)
POTASSIUM SERPL-SCNC: 4.8 MMOL/L — SIGNIFICANT CHANGE UP (ref 3.5–5)
PROTHROM AB SERPL-ACNC: 12.4 SEC — SIGNIFICANT CHANGE UP (ref 9.95–12.87)
RBC # BLD: 2.82 M/UL — LOW (ref 4.2–5.4)
RBC # BLD: 3.26 M/UL — LOW (ref 4.2–5.4)
RBC # FLD: 15 % — HIGH (ref 11.5–14.5)
RBC # FLD: 15.3 % — HIGH (ref 11.5–14.5)
SODIUM SERPL-SCNC: 134 MMOL/L — LOW (ref 135–146)
SODIUM SERPL-SCNC: 139 MMOL/L — SIGNIFICANT CHANGE UP (ref 135–146)
WBC # BLD: 14.05 K/UL — HIGH (ref 4.8–10.8)
WBC # BLD: 20.93 K/UL — HIGH (ref 4.8–10.8)
WBC # FLD AUTO: 14.05 K/UL — HIGH (ref 4.8–10.8)
WBC # FLD AUTO: 20.93 K/UL — HIGH (ref 4.8–10.8)

## 2025-07-04 PROCEDURE — 99232 SBSQ HOSP IP/OBS MODERATE 35: CPT

## 2025-07-04 PROCEDURE — 93010 ELECTROCARDIOGRAM REPORT: CPT

## 2025-07-04 PROCEDURE — 99223 1ST HOSP IP/OBS HIGH 75: CPT

## 2025-07-04 RX ORDER — ALBUTEROL SULFATE 2.5 MG/3ML
2 VIAL, NEBULIZER (ML) INHALATION EVERY 6 HOURS
Refills: 0 | Status: DISCONTINUED | OUTPATIENT
Start: 2025-07-04 | End: 2025-07-08

## 2025-07-04 RX ORDER — ACETAMINOPHEN 500 MG/5ML
650 LIQUID (ML) ORAL EVERY 6 HOURS
Refills: 0 | Status: DISCONTINUED | OUTPATIENT
Start: 2025-07-04 | End: 2025-07-08

## 2025-07-04 RX ORDER — DEXTROSE 50 % IN WATER 50 %
25 SYRINGE (ML) INTRAVENOUS ONCE
Refills: 0 | Status: DISCONTINUED | OUTPATIENT
Start: 2025-07-04 | End: 2025-07-04

## 2025-07-04 RX ORDER — METOPROLOL SUCCINATE 50 MG/1
12.5 TABLET, EXTENDED RELEASE ORAL EVERY 12 HOURS
Refills: 0 | Status: DISCONTINUED | OUTPATIENT
Start: 2025-07-04 | End: 2025-07-08

## 2025-07-04 RX ORDER — ONDANSETRON HCL/PF 4 MG/2 ML
4 VIAL (ML) INJECTION ONCE
Refills: 0 | Status: COMPLETED | OUTPATIENT
Start: 2025-07-04 | End: 2025-07-04

## 2025-07-04 RX ORDER — POLYETHYLENE GLYCOL 3350 17 G/17G
17 POWDER, FOR SOLUTION ORAL DAILY
Refills: 0 | Status: DISCONTINUED | OUTPATIENT
Start: 2025-07-04 | End: 2025-07-08

## 2025-07-04 RX ORDER — SODIUM CHLORIDE 9 G/1000ML
1000 INJECTION, SOLUTION INTRAVENOUS
Refills: 0 | Status: DISCONTINUED | OUTPATIENT
Start: 2025-07-04 | End: 2025-07-04

## 2025-07-04 RX ORDER — AMLODIPINE BESYLATE 10 MG/1
5 TABLET ORAL DAILY
Refills: 0 | Status: DISCONTINUED | OUTPATIENT
Start: 2025-07-04 | End: 2025-07-04

## 2025-07-04 RX ORDER — SODIUM CHLORIDE 9 G/1000ML
1000 INJECTION, SOLUTION INTRAVENOUS
Refills: 0 | Status: DISCONTINUED | OUTPATIENT
Start: 2025-07-04 | End: 2025-07-05

## 2025-07-04 RX ORDER — SENNA 187 MG
2 TABLET ORAL AT BEDTIME
Refills: 0 | Status: DISCONTINUED | OUTPATIENT
Start: 2025-07-04 | End: 2025-07-08

## 2025-07-04 RX ORDER — TAMSULOSIN HYDROCHLORIDE 0.4 MG/1
0.4 CAPSULE ORAL AT BEDTIME
Refills: 0 | Status: DISCONTINUED | OUTPATIENT
Start: 2025-07-04 | End: 2025-07-08

## 2025-07-04 RX ORDER — MAGNESIUM SULFATE 500 MG/ML
2 SYRINGE (ML) INJECTION ONCE
Refills: 0 | Status: COMPLETED | OUTPATIENT
Start: 2025-07-04 | End: 2025-07-04

## 2025-07-04 RX ORDER — INSULIN LISPRO 100 U/ML
INJECTION, SOLUTION INTRAVENOUS; SUBCUTANEOUS
Refills: 0 | Status: DISCONTINUED | OUTPATIENT
Start: 2025-07-04 | End: 2025-07-05

## 2025-07-04 RX ORDER — TIOTROPIUM BROMIDE INHALATION SPRAY 3.12 UG/1
2 SPRAY, METERED RESPIRATORY (INHALATION) DAILY
Refills: 0 | Status: DISCONTINUED | OUTPATIENT
Start: 2025-07-04 | End: 2025-07-08

## 2025-07-04 RX ORDER — MECLIZINE HCL 12.5 MG
25 TABLET ORAL
Refills: 0 | Status: DISCONTINUED | OUTPATIENT
Start: 2025-07-04 | End: 2025-07-04

## 2025-07-04 RX ORDER — BUMETANIDE 1 MG/1
1 TABLET ORAL DAILY
Refills: 0 | Status: DISCONTINUED | OUTPATIENT
Start: 2025-07-04 | End: 2025-07-08

## 2025-07-04 RX ADMIN — TAMSULOSIN HYDROCHLORIDE 0.4 MILLIGRAM(S): 0.4 CAPSULE ORAL at 22:13

## 2025-07-04 RX ADMIN — Medication 1 APPLICATION(S): at 06:06

## 2025-07-04 RX ADMIN — Medication 25 GRAM(S): at 00:14

## 2025-07-04 RX ADMIN — Medication 2 TABLET(S): at 22:13

## 2025-07-04 RX ADMIN — Medication 650 MILLIGRAM(S): at 23:23

## 2025-07-04 RX ADMIN — Medication 650 MILLIGRAM(S): at 05:32

## 2025-07-04 RX ADMIN — Medication 100 MILLIGRAM(S): at 14:26

## 2025-07-04 RX ADMIN — INSULIN LISPRO 4: 100 INJECTION, SOLUTION INTRAVENOUS; SUBCUTANEOUS at 18:29

## 2025-07-04 RX ADMIN — Medication 650 MILLIGRAM(S): at 18:26

## 2025-07-04 RX ADMIN — BUMETANIDE 1 MILLIGRAM(S): 1 TABLET ORAL at 14:26

## 2025-07-04 RX ADMIN — Medication 650 MILLIGRAM(S): at 12:34

## 2025-07-04 RX ADMIN — Medication 4 MILLIGRAM(S): at 12:34

## 2025-07-04 RX ADMIN — INSULIN LISPRO 4: 100 INJECTION, SOLUTION INTRAVENOUS; SUBCUTANEOUS at 08:59

## 2025-07-04 RX ADMIN — Medication 100 MILLIGRAM(S): at 05:32

## 2025-07-04 RX ADMIN — METOPROLOL SUCCINATE 12.5 MILLIGRAM(S): 50 TABLET, EXTENDED RELEASE ORAL at 05:32

## 2025-07-04 RX ADMIN — Medication 40 MILLIGRAM(S): at 06:06

## 2025-07-04 RX ADMIN — Medication 1 DOSE(S): at 09:27

## 2025-07-04 RX ADMIN — TIOTROPIUM BROMIDE INHALATION SPRAY 2 PUFF(S): 3.12 SPRAY, METERED RESPIRATORY (INHALATION) at 09:27

## 2025-07-04 RX ADMIN — Medication 1 DOSE(S): at 18:28

## 2025-07-04 RX ADMIN — Medication 650 MILLIGRAM(S): at 18:39

## 2025-07-04 RX ADMIN — SODIUM CHLORIDE 50 MILLILITER(S): 9 INJECTION, SOLUTION INTRAVENOUS at 18:39

## 2025-07-04 RX ADMIN — SODIUM CHLORIDE 95 MILLILITER(S): 9 INJECTION, SOLUTION INTRAVENOUS at 04:25

## 2025-07-04 RX ADMIN — METOPROLOL SUCCINATE 12.5 MILLIGRAM(S): 50 TABLET, EXTENDED RELEASE ORAL at 18:26

## 2025-07-04 NOTE — DIETITIAN NUTRITION RISK NOTIFICATION - TREATMENT: THE FOLLOWING DIET HAS BEEN RECOMMENDED
Diet, NPO:   Except Medications     Special Instructions for Nursing:  Except Medications (07-04-25 @ 02:37) [Active]

## 2025-07-04 NOTE — DIETITIAN INITIAL EVALUATION ADULT - NSICDXPASTMEDICALHX_GEN_ALL_CORE_FT
PAST MEDICAL HISTORY:  Cancer of kidney     Cancer of thyroid     COPD (chronic obstructive pulmonary disease)     Diverticulitis     DM (diabetes mellitus)     Former smoker     GERD (gastroesophageal reflux disease)     History of abdominal aortic aneurysm (AAA)     HLD (hyperlipidemia)     [place] (hard of hearing)     HTN (hypertension)     Kidney stones     Lung cancer     NHL (non-Hodgkin's lymphoma) "low grade" per heme/ onc note    Obesity     ARTIE (obstructive sleep apnea) NO CPAP MACHINE PER PT.

## 2025-07-04 NOTE — PROGRESS NOTE ADULT - SUBJECTIVE AND OBJECTIVE BOX
FERMIN DIAZ   [id number]   [birth year]    84yF  ============================================================   DATE OF INITIAL SICU/SDU CONSULT: 07-04-25    INDICATION FOR SICU CONSULT:       SICU COURSE EVENTS :  07-04 - admitted to SICU service     24HOUR EVENTS  -Admission under SICU service    [X] A ten-point review of systems was negative except as expressed in note.  [X] History was obtained from patient. If unable to participate in their care, history obtained from review of the chart and collateral sources of information.  ============================================================      FERMIN DIAZ   [id number]   [birth year]    84yF  ============================================================   DATE OF INITIAL SICU/SDU CONSULT: 25    INDICATION FOR SICU CONSULT:       SICU COURSE EVENTS :   - admitted to SICU service     24HOUR EVENTS  -Admission under SICU service    [X] A ten-point review of systems was negative except as expressed in note.  [X] History was obtained from patient. If unable to participate in their care, history obtained from review of the chart and collateral sources of information.  ============================================================   Daily Height in cm: 162.6 (2025 09:36)    Daily Weight in k.1 (2025 18:12)  Diet, DASH/TLC:   Sodium & Cholesterol Restricted  Consistent Carbohydrate No Snacks (CSTCHO)  Supplement Feeding Modality:  Oral  Ensure Surgery Cans or Servings Per Day:  2       Frequency:  Daily (25 @ 14:02)    CURRENT MEDS:  Neurologic Medications  acetaminophen     Tablet .. 650 milliGRAM(s) Oral every 6 hours    Respiratory Medications  albuterol    90 MICROgram(s) HFA Inhaler 2 Puff(s) Inhalation every 6 hours PRN Shortness of Breath and/or Wheezing  fluticasone propionate/ salmeterol 250-50 MICROgram(s) Diskus 1 Dose(s) Inhalation two times a day  tiotropium 2.5 MICROgram(s) Inhaler 2 Puff(s) Inhalation daily    Cardiovascular Medications  amLODIPine   Tablet 5 milliGRAM(s) Oral daily  bumetanide 1 milliGRAM(s) Oral daily  metoprolol tartrate 12.5 milliGRAM(s) Oral every 12 hours    Gastrointestinal Medications  pantoprazole    Tablet 40 milliGRAM(s) Oral before breakfast  polyethylene glycol 3350 17 Gram(s) Oral daily  senna 2 Tablet(s) Oral at bedtime    Genitourinary Medications  tamsulosin 0.4 milliGRAM(s) Oral at bedtime    Hematologic/Oncologic Medications    Antimicrobial/Immunologic Medications  ceFAZolin   IVPB 2000 milliGRAM(s) IV Intermittent every 8 hours    Endocrine/Metabolic Medications  insulin lispro (ADMELOG) corrective regimen sliding scale   SubCutaneous three times a day before meals    Topical/Other Medications  chlorhexidine 2% Cloths 1 Application(s) Topical <User Schedule>    ICU Vital Signs Last 24 Hrs  T(C): 36 (2025 08:00), Max: 36.6 (2025 00:00)  T(F): 96.8 (2025 08:00), Max: 97.8 (2025 00:00)  HR: 74 (2025 13:00) (72 - 84)  BP: 122/62 (2025 13:00) (106/64 - 134/87)  BP(mean): 86 (2025 13:00) (86 - 102)  ABP: 144/68 (2025 12:00) (-3/- - 151/70)  ABP(mean): 93 (2025 12:00) (-6 - 99)  RR: 27 (2025 13:00) (13 - 31)  SpO2: 96% (2025 13:00) (74% - 100%)    O2 Parameters below as of 2025 13:00  Patient On (Oxygen Delivery Method): room air            ABG - ( 2025 11:49 )  pH, Arterial: 7.53  pH, Blood: x     /  pCO2: 36    /  pO2: 76    / HCO3: 30    / Base Excess: 7.0   /  SaO2: 98.0              I&O's Summary    2025 07:01  -  2025 07:00  --------------------------------------------------------  IN: 525 mL / OUT: 415 mL / NET: 110 mL    2025 07:01  -  2025 14:06  --------------------------------------------------------  IN: 665 mL / OUT: 170 mL / NET: 495 mL      I&O's Detail    2025 07:01  -  2025 07:00  --------------------------------------------------------  IN:    IV PiggyBack: 50 mL    Lactated Ringers: 475 mL  Total IN: 525 mL    OUT:    Indwelling Catheter - Urethral (mL): 415 mL  Total OUT: 415 mL    Total NET: 110 mL      2025 07:01  -  2025 14:06  --------------------------------------------------------  IN:    Lactated Ringers: 665 mL  Total IN: 665 mL    OUT:    Indwelling Catheter - Urethral (mL): 170 mL  Total OUT: 170 mL    Total NET: 495 mL    PHYSICAL EXAM:     patient noncompliant with answering questions, states she only wants to sleep, states no pain  no acute distress  equal chest rise b/l, on room air  abdomen soft  b/l groins soft, no hematoma  extremities soft  b/l dopplerable DP and PT signals   urinary cath in place

## 2025-07-04 NOTE — DIETITIAN INITIAL EVALUATION ADULT - PERTINENT LABORATORY DATA
07-04    139  |  100  |  54[H]  ----------------------------<  172[H]  4.7   |  27  |  1.4    Ca    8.8      04 Jul 2025 08:33  Phos  4.1     07-03  Mg     1.8     07-03    TPro  5.0[L]  /  Alb  3.4[L]  /  TBili  0.3  /  DBili  x   /  AST  18  /  ALT  29  /  AlkPhos  78  07-03  POCT Blood Glucose.: 202 mg/dL (07-04-25 @ 08:13)  A1C with Estimated Average Glucose Result: 5.4 % (06-18-25 @ 04:30)  A1C with Estimated Average Glucose Result: 5.6 % (10-14-24 @ 07:48)

## 2025-07-04 NOTE — DIETITIAN INITIAL EVALUATION ADULT - ORAL INTAKE PTA/DIET HISTORY
Attempted nutrition assessment interview at time of visit this morning. Pt alert and oriented. Pt refused assessment. No family observed at bedside.    Obtained nutrition hx from previous admit. Per initial RD assessment on 6/19/25: Pt reports having a good appetite prior to admit; consumed 2-3 meals daily. Pt drank Boost once daily. Denies taking vitamin or mineral supplements. NKFA; denies any restrictive cultural or Nondenominational food preferences. No chewing or swallowing difficulties noted with regular textured food. Weight hx: #/54.5 KG -- checked 1 month ago per pt.

## 2025-07-04 NOTE — PROGRESS NOTE ADULT - ASSESSMENT
84y Female  s/p insertion of right iliac stent for type 3 endoleak resulting in rupture.    NEURO:  #Acute pain    -APAP 650 q6   #h/o anxiety/insomnia  - holding home ambien 10mg     RESP:   #h/o COPD (on home O2 1-2 months ago); small cell lung cancer on tagrisso in remission s/p LLL lobectomy; recent admission for hypoxic respiratory failure 2/2 pneumonia/pneumonitis/ acute on chronic CHF exacerbation   - was discharged on PO prednisone taper per pulmonology   - continue advair   - continue spiriva   #Oxygenation    - saturating well on RA   #Activity    - bed rest per vascular surgery     CARDS:   #h/o HTN; HLD; Afib   - holding amlodipine 5mg qD  - continue metoprolol 12.5 mg q12 (takes XL 50mg qD)  Imaging: Echo 6/18/25: EF 67%, moderate MR, moderate TR   EKG pre op : Afib with bifasicular block   Post op EKG : Afib with bifasicular block qtc 474   Labs: Troponin 45 > 46 > 42  Lactate 1.6 post op     VASCULAR:  #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   - q1 hour vascular checks - DP/PT signals bilaterally   - maintain normotension   - bed rest post op   #PAD     GI/NUTR:   #Diet, NPO except meds    -aspiration precautions, HOB 30  #GI Prophylaxis; h/o GERD    - protonix (takes omeprazole at home)  #Bowel regimen; h/o constipation     - senna     - miralax     -last bowel movement  - prior to admission   #h/o diverticulitis    /RENAL:   #urine output in critically ill    -indwelling theodore (placed   #h/o CKD3 (baseline creatinine 1.3)  - holding home bumex 1mg qD     Labs:          BUN/Cr- 52/1.3  -->,  51/1.1  -->          [07-03 @ 22:19]Na  139 // K  4.1 // Mg  1.8 // Phos  4.1  [07-03 @ 15:10]Na  135 // K  4.5 // Mg  1.9 // Phos  --         HEME/ONC:   #DVT prophylaxis     -SCDs    - holding dvt ppx per vascular surgery (received heparin in OR)    Labs: Hb/Hct:  7.8/25.7  -->,  9.8/30.4  -->                      Plts:  193  -->,  125  -->                 PTT/INR:  23.2/1.15  --->       #h/o Non Hodgkin lymphoma   #h/o PE on Eliquis   - holding home eliquis 2.5mg q12  - holding home ASA    T&S Expires: 7/6  Blood Consent-obtain if acute anemia, q6 CBC    ID:  WBC- 10.49  --->>,  13.64  --->>,  20.19  --->>  Temp trend- 24hrs T(F): 96.7 (07-03 @ 21:15), Max: 96.7 (07-03 @ 21:15)  Current antibiotics-ceFAZolin   IVPB 2000 every 8 hours x 24 hours         ENDO:  #h/o DM    -FSG q6 if NPO    - Insulin sliding scale     - holding home lantus 10 u and 5u lispro pre prandial     MSK:  #DTI assessment pending     LINES/DRAINS:  PIV, Theodore, R brachial arterial line     ADVANCED DIRECTIVES:  Full Code    HCP/Emergency Contact-    INDICATION FOR SICU/SDU: Ruptured abdominal aortic aneurysm (AAA)      DISPO:   SICU - Case discussed with attending Dr. Hawkins    84y Female  s/p insertion of right iliac stent for type 3 endoleak resulting in rupture.    NEURO:  #Acute pain    -APAP 650 q6   #h/o anxiety/insomnia  - continue home ambien 10mg     RESP:   #h/o COPD (on home O2 1-2 months ago); small cell lung cancer on tagrisso in remission s/p LLL lobectomy; recent admission for hypoxic respiratory failure 2/2 pneumonia/pneumonitis/ acute on chronic CHF exacerbation   - was discharged on PO prednisone taper per pulmonology   - continue advair   - continue spiriva   #Oxygenation    - saturating well on RA   #Activity    - bed rest per vascular surgery     CARDS:   #h/o HTN; HLD; Afib   - holding amlodipine 5mg qD  - continue metoprolol 12.5 mg q12 (takes XL 50mg qD)  Imaging: Echo 6/18/25: EF 67%, moderate MR, moderate TR   EKG pre op : Afib with bifasicular block   Post op EKG : Afib with bifasicular block qtc 474   Labs: Troponin 45 > 46 > 42  Lactate 1.6 post op     VASCULAR:  #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   - q1 hour vascular checks - DP/PT signals bilaterally   - maintain normotension   - bed rest post op until 9pm 7/4  #PAD     GI/NUTR:   #Diet, DASH/ CC   -aspiration precautions, HOB 30  #GI Prophylaxis; h/o GERD    - protonix (takes omeprazole at home)  #Bowel regimen; h/o constipation     - senna     - miralax     -last bowel movement  - prior to admission   #h/o diverticulitis    /RENAL:   #urine output in critically ill    -indwelling theodore (placed intraop 7/3)  #h/o CKD3 (baseline creatinine 1.3)  - continue home bumex 1mg qD     Labs:          BUN/Cr- 52/1.3  -->,  51/1.1  -->          [07-03 @ 22:19]Na  139 // K  4.1 // Mg  1.8 // Phos  4.1  #Hypomagnesemia    - 2g magnesium sulfate replenished    HEME/ONC:   #DVT prophylaxis     -SCDs    - holding dvt ppx per vascular surgery (received heparin in OR)    Labs: Hb/Hct:  7.8/25.7  -->,  9.8/30.4  -->                      Plts:  193  -->,  125  -->                 PTT/INR:  23.2/1.15  --->       #h/o Non Hodgkin lymphoma   #h/o PE on Eliquis   - holding home eliquis 2.5mg q12  - holding home ASA    T&S Expires: 7/6    ID:  WBC- 10.49  --->>,  13.64  --->>,  20.19  --->>  Temp trend- 24hrs T(F): 96.7 (07-03 @ 21:15), Max: 96.7 (07-03 @ 21:15)  Current antibiotics-ceFAZolin   IVPB 2000 every 8 hours x 24 hours     ENDO:  #h/o DM    -FSG q6 if NPO    - Insulin sliding scale     - holding home lantus 10 u and 5u lispro pre prandial     MSK:  #DTI assessment pending     LINES/DRAINS:  JORDAN, Theodore    ADVANCED DIRECTIVES:  Full Code    HCP/Emergency Contact-    INDICATION FOR SICU/SDU: Ruptured abdominal aortic aneurysm (AAA)    DISPO:   SICU - Case discussed with attending

## 2025-07-04 NOTE — CONSULT NOTE ADULT - SUBJECTIVE AND OBJECTIVE BOX
CC: Patient is a 84y old  Female who presents with a chief complaint of ruptured AAA (04 Jul 2025 06:20)      HPI:  84-year-old female PMH SCLC in remission, left lung adenocarcinoma status post LLL lobectomy, PE on apixaban, juxtarenal AAA status post endovascular repair with Dr. Sathya Whitfield, diabetes, HTN, HLD, recent pneumonia and UTI, recent admission for acute hypoxic and hypercapnic respiratory failure presents to the ED for evaluation after syncopal episode.  Per daughter at bedside, patient was brought in from [facility] after a syncopal episode while seated in her wheelchair.  Patient was found by staff slumped over in her wheelchair and appeared cyanotic.  EMS was activated and upon EMS arrival patient was awake, A&O x 3 and hypotensive to 80s/40s.  Patient received a fluid bolus by EMS and was brought to the emergency department.  On ED arrival patient complaining of abdominal pain.  Daughter also states patient seemed more confused over the last few weeks and was not acting herself.  Patient denies chest pain, shortness of breath, diaphoresis, headache, dizziness, nausea, vomiting, diarrhea or any urinary symptoms.  CTA abdomen showed Findings are worrisome for a ruptured/leaking abdominal aortic aneurysm.   (03 Jul 2025 23:40)      Patient seen and examined at bedside.   No acute overnight events.   No acute complaints this morning.   ROS otherwise negative on a 10-point assessment.     PAST MEDICAL & SURGICAL HISTORY:  Diverticulitis      Obesity      COPD (chronic obstructive pulmonary disease)      GERD (gastroesophageal reflux disease)      Lung cancer      ARTIE (obstructive sleep apnea)  NO CPAP MACHINE PER PT.      Cancer of kidney      Cancer of thyroid      Former smoker      NHL (non-Hodgkin's lymphoma)  "low grade" per heme/ onc note      History of abdominal aortic aneurysm (AAA)      Kidney stones      [place] (hard of hearing)      DM (diabetes mellitus)      HLD (hyperlipidemia)      HTN (hypertension)      History of surgery  LEFT LOWER LOBECTOMY      History of surgery  BILATERAL KNEE REPLACEMENT      History of surgery  CATARACT RIGHT EYE      History of surgery  TUBAL LIGATION      History of surgery  CYST REMOVED  RIGHT BREAST      History of surgery  CHOLECYSTECOMY      History of surgery  RIGHT PARTIAL NEPHRECTOMY      History of surgery  BILATERAL CATARACT SURGERY      History of back surgery      H/O lithotripsy      H/O partial thyroidectomy        SOCIAL HISTORY:  Tobacco Usage:  (   ) never smoked   (   ) former smoker   (   ) current smoker  (     ) pack years    Tobacco Quit Date:  Substance Use (Street drugs): (  ) never used  (  ) other:  Alcohol Usage:    Family history reviewed and otherwise non-contributory  ALLERGIES: No Known Allergies    MEDICATIONS:  MEDICATIONS  (STANDING):  acetaminophen     Tablet .. 650 milliGRAM(s) Oral every 6 hours  ceFAZolin   IVPB 2000 milliGRAM(s) IV Intermittent every 8 hours  chlorhexidine 2% Cloths 1 Application(s) Topical <User Schedule>  fluticasone propionate/ salmeterol 250-50 MICROgram(s) Diskus 1 Dose(s) Inhalation two times a day  insulin lispro (ADMELOG) corrective regimen sliding scale   SubCutaneous three times a day before meals  lactated ringers. 1000 milliLiter(s) (95 mL/Hr) IV Continuous <Continuous>  metoprolol tartrate 12.5 milliGRAM(s) Oral every 12 hours  pantoprazole    Tablet 40 milliGRAM(s) Oral before breakfast  polyethylene glycol 3350 17 Gram(s) Oral daily  senna 2 Tablet(s) Oral at bedtime  tamsulosin 0.4 milliGRAM(s) Oral at bedtime  tiotropium 2.5 MICROgram(s) Inhaler 2 Puff(s) Inhalation daily    MEDICATIONS  (PRN):  albuterol    90 MICROgram(s) HFA Inhaler 2 Puff(s) Inhalation every 6 hours PRN Shortness of Breath and/or Wheezing      Home Medications:  amLODIPine 5 mg oral tablet: 1 tab(s) orally once a day (17 Jun 2025 15:48)  aspirin 81 mg oral capsule: 1 cap(s) orally once a day (17 Jun 2025 15:48)  bumetanide 1 mg oral tablet: 1 tab(s) orally once a day (01 Jul 2025 13:18)  Ditropan 5 mg oral tablet: 1 tab(s) orally once a day (at bedtime) (17 Jun 2025 15:48)  docusate sodium 100 mg oral tablet: 3 tab(s) orally once a day (at bedtime) (17 Jun 2025 15:49)  fluticasone 50 mcg/inh nasal spray: 1 spray(s) nasal 2 times a day (01 Jul 2025 13:18)  Incruse Ellipta 62.5 mcg/inh inhalation powder: 1 puff(s) inhaled once a day (17 Jun 2025 15:48)  metoprolol succinate 25 mg oral tablet, extended release: 2 tab(s) orally once a day (17 Jun 2025 15:42)  MiraLax oral powder for reconstitution: 17 gram(s) orally once a day (at bedtime) (17 Jun 2025 15:48)  omeprazole 40 mg oral delayed release capsule: 1 cap(s) orally once a day (17 Jun 2025 15:49)  senna (sennosides) 8.6 mg oral tablet: 1 tab(s) orally once a day (17 Jun 2025 15:48)  Symbicort 160 mcg-4.5 mcg/inh inhalation aerosol: 2 puff(s) inhaled 2 times a day (17 Jun 2025 15:49)  Trulicity Pen 1.5 mg/0.5 mL subcutaneous solution: 1.5 milligram(s) subcutaneous once a week (17 Jun 2025 15:49)  Vitamin D3 25 mcg (1000 intl units) oral tablet: 1 tab(s) orally once a day (17 Jun 2025 15:49)      Vital Signs Last 24 Hrs  T(F): 96.8 (04 Jul 2025 08:00), Max: 97.8 (04 Jul 2025 00:00)  HR: 72 (04 Jul 2025 09:00) (72 - 84)  BP: 118/66 (04 Jul 2025 09:00) (106/64 - 134/87)  RR: 17 (04 Jul 2025 09:00) (13 - 31)  SpO2: 99% (04 Jul 2025 09:00) (74% - 100%)    I&O's Summary    03 Jul 2025 07:01  -  04 Jul 2025 07:00  --------------------------------------------------------  IN: 525 mL / OUT: 415 mL / NET: 110 mL    04 Jul 2025 07:01  -  04 Jul 2025 09:11  --------------------------------------------------------  IN: 285 mL / OUT: 45 mL / NET: 240 mL        PHYSICAL EXAM:  GENERAL: NAD	  HEENT: moist oral mucosa, PERRL, EOMI	  Neck: Supple  Cardiovascular: Normal S1/S2, No murmurs  Respiratory: b/l chest rise and fall  Skin: No rashes, No ecchymoses, No cyanosis  Extremities: Normal range of motion, No clubbing, cyanosis or edema   Vascular:  2+ distal radial pulses b/l. Weak DP pulses b/l.  Neurologic: Non-focal, AOx3    LABS:                        8.9    20.93 )-----------( 116      ( 04 Jul 2025 08:33 )             27.9     07-03    139  |  103  |  51  ----------------------------<  172  4.1   |  27  |  1.1    Ca    8.2      03 Jul 2025 22:19  Phos  4.1     07-03  Mg     1.8     07-03    TPro  5.0  /  Alb  3.4  /  TBili  0.3  /  DBili  x   /  AST  18  /  ALT  29  /  AlkPhos  78  07-03      PT/INR - ( 04 Jul 2025 01:44 )   PT: 12.40 sec;   INR: 1.05 ratio         PTT - ( 04 Jul 2025 01:44 )  PTT:24.4 sec  Urinalysis Basic - ( 03 Jul 2025 22:19 )    Color: x / Appearance: x / SG: x / pH: x  Gluc: 172 mg/dL / Ketone: x  / Bili: x / Urobili: x   Blood: x / Protein: x / Nitrite: x   Leuk Esterase: x / RBC: x / WBC x   Sq Epi: x / Non Sq Epi: x / Bacteria: x          Lactate, Blood: 1.6 mmol/L (07-03 @ 22:19)            15:27 - VBG - pH: 7.40  | pCO2: 51    | pO2: 30    | Lactate: 3.4            POCT Blood Glucose.: 202 mg/dL (04 Jul 2025 08:13)  POCT Blood Glucose.: 481 mg/dL (03 Jul 2025 13:43)          RADIOLOGY & ADDITIONAL TESTS:      Care Discussed with Consultants/Other Providers

## 2025-07-04 NOTE — DISCHARGE NOTE PROVIDER - HOSPITAL COURSE
84-year-old female PMH SCLC in remission, left lung adenocarcinoma status post LLL lobectomy, PE on apixaban, juxtarenal AAA status post endovascular repair, diabetes, HTN, HLD, recent pneumonia and UTI.  Patient was brought to the ED by EMS due to cyanosis, abdominal pain and hypotensive to 80s/40s.  Patient received a fluid bolus by EMS. CTA abdomen showed Findings are worrisome for a ruptured/leaking abdominal aortic aneurysm. Patient was taken to the OR and the rupture was repaired via EVAR. Post-op pt was shifted to the SICU for monitoring. Today, patient is doing well, tolerating orally, ambulating at baseline and spontaneoulsy voiding and is pain free. She is medically cleared for discharge.

## 2025-07-04 NOTE — DIETITIAN INITIAL EVALUATION ADULT - ADD RECOMMEND
1. Continue with current diet order until medically feasible to advance diet   - recommend consistent carbohydrate (dx DM) and DASH/TLC diet modifiers    High risk f/u 1. Continue with current diet order until medically feasible to advance diet  - recommend consistent carbohydrate (dx DM) and DASH/TLC diet modifiers  - add glucerna shake 2X/DAILY (440 kcal, 20g pro total)    High risk f/u

## 2025-07-04 NOTE — DIETITIAN INITIAL EVALUATION ADULT - OTHER CALCULATIONS
Using ABW: ENERGY: 8099-4948 kcal/day (30-35 kcal/kg); PROTEIN: 69-86 g/day (1.2-1.5 g/kg); FLUID: 3207-9376 mL/day (20-25 mL/kg) -- with consideration for age, BMI, spcm, surgery, critical care setting

## 2025-07-04 NOTE — PROGRESS NOTE ADULT - SUBJECTIVE AND OBJECTIVE BOX
GENERAL SURGERY PROGRESS NOTE    Patient: FERMIN DIAZ , 84y ([birth year])Female   MRN: [id number]  Location: [address]  Visit: 07-03-25 Inpatient  Date: 07-04-25 @ 23:54    Hospital Day #:1  Post-Op Day #:0    Procedure: S/P EVAR for AAA rupture (07/03/2025)    Events of past 24 hours: Patients has been vitally stable, no acute events.    PAST MEDICAL & SURGICAL HISTORY:  Diverticulitis      Obesity      COPD (chronic obstructive pulmonary disease)      GERD (gastroesophageal reflux disease)      Lung cancer      ARTIE (obstructive sleep apnea)  NO CPAP MACHINE PER PT.      Cancer of kidney      Cancer of thyroid      Former smoker      NHL (non-Hodgkin's lymphoma)  "low grade" per heme/ onc note      History of abdominal aortic aneurysm (AAA)      Kidney stones      [place] IRA (hard of hearing)      DM (diabetes mellitus)      HLD (hyperlipidemia)      HTN (hypertension)      History of surgery  LEFT LOWER LOBECTOMY      History of surgery  BILATERAL KNEE REPLACEMENT      History of surgery  CATARACT RIGHT EYE      History of surgery  TUBAL LIGATION      History of surgery  CYST REMOVED  RIGHT BREAST      History of surgery  CHOLECYSTECOMY      History of surgery  RIGHT PARTIAL NEPHRECTOMY      History of surgery  BILATERAL CATARACT SURGERY      History of back surgery      H/O lithotripsy      H/O partial thyroidectomy          Vitals:   T(F): 98 (07-04-25 @ 20:00), Max: 98.2 (07-04-25 @ 16:00)  HR: 76 (07-04-25 @ 20:00)  BP: 100/59 (07-04-25 @ 20:00)  RR: 14 (07-04-25 @ 20:00)  SpO2: 100% (07-04-25 @ 20:00)      Diet, DASH/TLC:   Sodium & Cholesterol Restricted  Consistent Carbohydrate No Snacks (CSTCHO)  Supplement Feeding Modality:  Oral  Ensure Surgery Cans or Servings Per Day:  2       Frequency:  Daily      Fluids: lactated ringers.: Solution, 1000 milliLiter(s) infuse at 50 mL/Hr      I & O's:    07-03-25 @ 07:01  -  07-04-25 @ 07:00  --------------------------------------------------------  IN:    IV PiggyBack: 50 mL    Lactated Ringers: 475 mL  Total IN: 525 mL    OUT:    Indwelling Catheter - Urethral (mL): 415 mL  Total OUT: 415 mL    Total NET: 110 mL    PHYSICAL EXAM:  General: NAD, AAOx3,   Cardiac: RRR  Respiratory: normal respiratory effort,   Neuro: grossly intact   Vascular: Pulses 2+  Skin: Warm/dry, normal color, no jaundice      MEDICATIONS  (STANDING):  acetaminophen     Tablet .. 650 milliGRAM(s) Oral every 6 hours  bumetanide 1 milliGRAM(s) Oral daily  chlorhexidine 2% Cloths 1 Application(s) Topical <User Schedule>  cholecalciferol 1000 Unit(s) Oral daily  fluticasone propionate/ salmeterol 250-50 MICROgram(s) Diskus 1 Dose(s) Inhalation two times a day  insulin lispro (ADMELOG) corrective regimen sliding scale   SubCutaneous three times a day before meals  lactated ringers. 1000 milliLiter(s) (50 mL/Hr) IV Continuous <Continuous>  metoprolol tartrate 12.5 milliGRAM(s) Oral every 12 hours  pantoprazole    Tablet 40 milliGRAM(s) Oral before breakfast  polyethylene glycol 3350 17 Gram(s) Oral daily  senna 2 Tablet(s) Oral at bedtime  tamsulosin 0.4 milliGRAM(s) Oral at bedtime  tiotropium 2.5 MICROgram(s) Inhaler 2 Puff(s) Inhalation daily    MEDICATIONS  (PRN):  albuterol    90 MICROgram(s) HFA Inhaler 2 Puff(s) Inhalation every 6 hours PRN Shortness of Breath and/or Wheezing  zolpidem 5 milliGRAM(s) Oral at bedtime PRN Insomnia  zolpidem 5 milliGRAM(s) Oral at bedtime PRN Insomnia      DVT PROPHYLAXIS:   GI PROPHYLAXIS: pantoprazole    Tablet 40 milliGRAM(s) Oral before breakfast    ANTICOAGULATION:   ANTIBIOTICS:            LAB/STUDIES:  Labs:  CAPILLARY BLOOD GLUCOSE      POCT Blood Glucose.: 112 mg/dL (04 Jul 2025 22:26)  POCT Blood Glucose.: 233 mg/dL (04 Jul 2025 18:25)  POCT Blood Glucose.: 88 mg/dL (04 Jul 2025 14:30)  POCT Blood Glucose.: 202 mg/dL (04 Jul 2025 08:13)                          7.6    14.05 )-----------( 107      ( 04 Jul 2025 20:23 )             23.9       Auto Immature Granulocyte %: 0.6 % (07-04-25 @ 20:23)  Auto Immature Granulocyte %: 0.9 % (07-04-25 @ 08:33)    07-04    134[L]  |  97[L]  |  53[H]  ----------------------------<  128[H]  4.8   |  27  |  1.4      Calcium: 8.4 mg/dL (07-04-25 @ 20:23)      LFTs:             5.0  | 0.3  | 18       ------------------[78      ( 03 Jul 2025 15:10 )  3.4  | x    | 29          Lipase:x      Amylase:x         Blood Gas Arterial, Lactate: 1.1 mmol/L (07-04-25 @ 11:49)  Lactate, Blood: 1.6 mmol/L (07-03-25 @ 22:19)  Blood Gas Venous - Lactate: 3.4 mmol/L (07-03-25 @ 15:27)    ABG - ( 04 Jul 2025 11:49 )  pH: 7.53  /  pCO2: 36    /  pO2: 76    / HCO3: 30    / Base Excess: 7.0   /  SaO2: 98.0            ABG - ( 29 Jun 2025 16:21 )  pH: 7.62  /  pCO2: 37    /  pO2: 87    / HCO3: 38    / Base Excess: 15.5  /  SaO2: 99.1              Coags:     12.40  ----< 1.05    ( 04 Jul 2025 01:44 )     24.4                Urinalysis Basic - ( 04 Jul 2025 20:23 )    Color: x / Appearance: x / SG: x / pH: x  Gluc: 128 mg/dL / Ketone: x  / Bili: x / Urobili: x   Blood: x / Protein: x / Nitrite: x   Leuk Esterase: x / RBC: x / WBC x   Sq Epi: x / Non Sq Epi: x / Bacteria: x                IMAGING:  < from: CT Angio Abdomen and Pelvis w/ IV Cont (07.03.25 @ 15:51) >  IMPRESSION: Findings are worrisome for a ruptured/leaking abdominal aortic aneurysm.

## 2025-07-04 NOTE — DIETITIAN NUTRITION RISK NOTIFICATION - ADDITIONAL COMMENTS/DIETITIAN RECOMMENDATIONS
1. Continue with current diet order until medically feasible to advance diet  - recommend consistent carbohydrate (dx DM) and DASH/TLC diet modifiers  - add glucerna shake 2X/DAILY (440 kcal, 20g pro total)

## 2025-07-04 NOTE — DISCHARGE NOTE PROVIDER - NSDCMRMEDTOKEN_GEN_ALL_CORE_FT
albuterol 90 mcg/inh inhalation aerosol: 2 puff(s) inhaled every 6 hours as needed for Bronchospasm  amLODIPine 5 mg oral tablet: 1 tab(s) orally once a day  apixaban 2.5 mg oral tablet: 1 tab(s) orally every 12 hours  aspirin 81 mg oral capsule: 1 cap(s) orally once a day  bumetanide 1 mg oral tablet: 1 tab(s) orally once a day  Ditropan 5 mg oral tablet: 1 tab(s) orally once a day (at bedtime)  docusate sodium 100 mg oral tablet: 3 tab(s) orally once a day (at bedtime)  fluticasone 50 mcg/inh nasal spray: 1 spray(s) nasal 2 times a day  Incruse Ellipta 62.5 mcg/inh inhalation powder: 1 puff(s) inhaled once a day  meclizine 25 mg oral tablet: 1 tab(s) orally once a day as needed for  dizziness  metoprolol succinate 25 mg oral tablet, extended release: 2 tab(s) orally once a day  MiraLax oral powder for reconstitution: 17 gram(s) orally once a day (at bedtime)  omeprazole 40 mg oral delayed release capsule: 1 cap(s) orally once a day  predniSONE 5 mg oral tablet: 1 tab(s) orally once a day 8 tablets daily till 7/02/25   6 tablets daily starting from 7/03/25 till 7/09/25 for 7 days   4 tablets daily starting from 7/10/25 till 7/16/25 for 7 days   then 2 tablets starting 7/17/25 till 7/24/25 for 7 days, then 1 tab daily for 7 days, then stop MDD: 40mg  senna (sennosides) 8.6 mg oral tablet: 1 tab(s) orally once a day  Symbicort 160 mcg-4.5 mcg/inh inhalation aerosol: 2 puff(s) inhaled 2 times a day  tamsulosin 0.4 mg oral capsule: 1 cap(s) orally once a day (at bedtime)  Trulicity Pen 1.5 mg/0.5 mL subcutaneous solution: 1.5 milligram(s) subcutaneous once a week  Vitamin D3 25 mcg (1000 intl units) oral tablet: 1 tab(s) orally once a day

## 2025-07-04 NOTE — PROGRESS NOTE ADULT - ASSESSMENT
ASSESSMENT:  84y F w/ PMHx of 84-year-old female PmHx SCLC, left lung adenocarcinoma status post LLL lobectomy, PE, juxtarenal AAA status post endovascular repair with Dr. Sathya Whitfield, diabetes, HTN, HLD. Presented to the ED after a syncopal episode. Pt reported abdominal pain and was hypotensive (80/60). CT angio was consistent with a ruptured AAA. EVAR was performed and pt was stabilized. Shifted to the SICU for monitoring.    PLAN:  - Advance diet  - keep main under control  - q1 vascular checks   -continue care as per SICU      VASCULAR TEAM SPECTRA: 1548

## 2025-07-04 NOTE — DIETITIAN INITIAL EVALUATION ADULT - OTHER INFO
Pertinent Medical Information: Per H&P, pt is a 84-year-old female PMH SCLC in remission, left lung adenocarcinoma status post LLL lobectomy, PE on apixaban, juxtarenal AAA status post endovascular repair with Dr. Sathya Whitfield, diabetes, HTN, HLD, recent pneumonia and UTI, recent admission for acute hypoxic and hypercapnic respiratory failure presents to the ED for evaluation after syncopal episode. Pt admitted to SICU and is s/p insertion of right iliac stent for type 3 endoleak resulting in AAA rupture.    Pertinent Subjective Information: Pt NPO at this time.     Weight hx: #/54.5 KG -- checked 1 month ago from 6/19 per initial RD assessment. Current dosing weight is 57.1 KG. Pt does not meet weight criteria for malnutrition at this time. Pt gained weight.

## 2025-07-04 NOTE — CONSULT NOTE ADULT - ASSESSMENT
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 Sathya Whitfield, diabetes, HTN, HLD, recent pneumonia and UTI, recent admission for acute hypoxic and hypercapnic respiratory failure presents to the ED for evaluation after syncopal episode. Pt admitted to SICU and is s/p insertion of right iliac stent for type 3 endoleak resulting in AAA rupture. [facility]       Problem list:  #Ruptured AAA type 3 endoleak s/p repair with R iliac stent   #Hx PAD  #Hx HTN/HLD  #Chronic Afib  #T2DM  #CKD3a  #Normocytic anemia   #Moderate MR  #Hx SCLC s/p LLL lobectomy (in remission)  #Hx Non Hodgkin lymphoma   #Hx COPD   #Hx PE         Plan:  Management per SICU  All imaging, labs, and vitals personally reviewed   EKG (7/3): Afib, rate vent rate 76, Bifascicular block  CXR (7/3): Unchanged small left basilar opacity/pleural effusion.  CTA A/P w/ IV con (7/3): ruptured/leaking abdominal aortic aneurysm.  - Continuous telemetry monitoring   - Currently with strict bedrest order; PT/OT/PMR when appropriate   - Multimodal pain regimen   - Bowel regimen PRN / stool count   - Monitor H&H and for signs of bleeding   - Monitor for fever and WBC trend   - Incentive spirometer 10x/hr while awake  - c/w inhalers, Duoneb q6h PRN for SOB/Wheezing, Supplemental oxygen PRN to maintain PO 90-92%  - Currently q1h BLE DP/PT assessments, per primary team / SBP goal ~100-140  - Holding AC/AP  - FG TIDAC/HS  - ISS for glucose >200 (target 110-180 while inpatient)  - Hypoglycemic protocol PRN  - Calculate 24H insulin requirement daily, if indicated   - monitor renal fxn, I/O's and electrolytes daily; K >4, Mg >2   - renally dose medications  - avoid nephrotoxic medications   - c/w Lopressor 12.5mg BID   - c/w Flomax   - DVT PPx held   - CHG      If any questions, please send a message or call on [employer] Teams.    Management per SICU, Medicine for co-management.    Total time spent to complete patient's bedside assessment, reviewed medical chart, discussed medical plan of care with team was more than 75 minutes with >50% of time spent face to face with patient, discussion with patient/family and/or coordination of care. This time excludes teaching time and/or separately reported services.

## 2025-07-04 NOTE — DIETITIAN INITIAL EVALUATION ADULT - PERTINENT MEDS FT
MEDICATIONS  (STANDING):  acetaminophen     Tablet .. 650 milliGRAM(s) Oral every 6 hours  ceFAZolin   IVPB 2000 milliGRAM(s) IV Intermittent every 8 hours  chlorhexidine 2% Cloths 1 Application(s) Topical <User Schedule>  fluticasone propionate/ salmeterol 250-50 MICROgram(s) Diskus 1 Dose(s) Inhalation two times a day  insulin lispro (ADMELOG) corrective regimen sliding scale   SubCutaneous three times a day before meals  lactated ringers. 1000 milliLiter(s) (95 mL/Hr) IV Continuous <Continuous>  metoprolol tartrate 12.5 milliGRAM(s) Oral every 12 hours  pantoprazole    Tablet 40 milliGRAM(s) Oral before breakfast  polyethylene glycol 3350 17 Gram(s) Oral daily  senna 2 Tablet(s) Oral at bedtime  tamsulosin 0.4 milliGRAM(s) Oral at bedtime  tiotropium 2.5 MICROgram(s) Inhaler 2 Puff(s) Inhalation daily    MEDICATIONS  (PRN):  albuterol    90 MICROgram(s) HFA Inhaler 2 Puff(s) Inhalation every 6 hours PRN Shortness of Breath and/or Wheezing

## 2025-07-04 NOTE — DISCHARGE NOTE PROVIDER - NSDCFUSCHEDAPPT_GEN_ALL_CORE_FT
Cody Sweet  [facility]  [id number]  Scheduled Appointment: 07/09/2025    Gian Guerrero  [facility]  Scheduled Appointment: 07/14/2025    Gian Guerrero  [facility]  [address]  Scheduled Appointment: 07/14/2025

## 2025-07-04 NOTE — DIETITIAN INITIAL EVALUATION ADULT - NS FNS DIET ORDER
Diet, NPO:   Except Medications     Special Instructions for Nursing:  Except Medications (07-04-25 @ 02:37)

## 2025-07-05 LAB
GLUCOSE BLDC GLUCOMTR-MCNC: 114 MG/DL — HIGH (ref 70–99)
GLUCOSE BLDC GLUCOMTR-MCNC: 185 MG/DL — HIGH (ref 70–99)
GLUCOSE BLDC GLUCOMTR-MCNC: 266 MG/DL — HIGH (ref 70–99)
GLUCOSE BLDC GLUCOMTR-MCNC: 289 MG/DL — HIGH (ref 70–99)
HCT VFR BLD CALC: 23.2 % — LOW (ref 37–47)
HCT VFR BLD CALC: 23.4 % — LOW (ref 37–47)
HGB BLD-MCNC: 7.3 G/DL — LOW (ref 12–16)
HGB BLD-MCNC: 7.3 G/DL — LOW (ref 12–16)
MCHC RBC-ENTMCNC: 27.4 PG — SIGNIFICANT CHANGE UP (ref 27–31)
MCHC RBC-ENTMCNC: 27.7 PG — SIGNIFICANT CHANGE UP (ref 27–31)
MCHC RBC-ENTMCNC: 31.2 G/DL — LOW (ref 32–37)
MCHC RBC-ENTMCNC: 31.5 G/DL — LOW (ref 32–37)
MCV RBC AUTO: 87.2 FL — SIGNIFICANT CHANGE UP (ref 81–99)
MCV RBC AUTO: 88.6 FL — SIGNIFICANT CHANGE UP (ref 81–99)
NRBC BLD AUTO-RTO: 0 /100 WBCS — SIGNIFICANT CHANGE UP (ref 0–0)
NRBC BLD AUTO-RTO: 0 /100 WBCS — SIGNIFICANT CHANGE UP (ref 0–0)
PLATELET # BLD AUTO: 109 K/UL — LOW (ref 130–400)
PLATELET # BLD AUTO: 109 K/UL — LOW (ref 130–400)
PMV BLD: 11.4 FL — HIGH (ref 7.4–10.4)
PMV BLD: 12.3 FL — HIGH (ref 7.4–10.4)
RBC # BLD: 2.64 M/UL — LOW (ref 4.2–5.4)
RBC # BLD: 2.66 M/UL — LOW (ref 4.2–5.4)
RBC # FLD: 15.6 % — HIGH (ref 11.5–14.5)
RBC # FLD: 15.7 % — HIGH (ref 11.5–14.5)
WBC # BLD: 12.46 K/UL — HIGH (ref 4.8–10.8)
WBC # BLD: 12.52 K/UL — HIGH (ref 4.8–10.8)
WBC # FLD AUTO: 12.46 K/UL — HIGH (ref 4.8–10.8)
WBC # FLD AUTO: 12.52 K/UL — HIGH (ref 4.8–10.8)

## 2025-07-05 PROCEDURE — 99233 SBSQ HOSP IP/OBS HIGH 50: CPT

## 2025-07-05 PROCEDURE — 71045 X-RAY EXAM CHEST 1 VIEW: CPT | Mod: 26

## 2025-07-05 PROCEDURE — 99232 SBSQ HOSP IP/OBS MODERATE 35: CPT

## 2025-07-05 RX ORDER — INSULIN GLARGINE-YFGN 100 [IU]/ML
10 INJECTION, SOLUTION SUBCUTANEOUS AT BEDTIME
Refills: 0 | Status: DISCONTINUED | OUTPATIENT
Start: 2025-07-05 | End: 2025-07-08

## 2025-07-05 RX ORDER — INSULIN LISPRO 100 U/ML
INJECTION, SOLUTION INTRAVENOUS; SUBCUTANEOUS
Refills: 0 | Status: DISCONTINUED | OUTPATIENT
Start: 2025-07-05 | End: 2025-07-08

## 2025-07-05 RX ORDER — PREDNISONE 20 MG/1
30 TABLET ORAL DAILY
Refills: 0 | Status: DISCONTINUED | OUTPATIENT
Start: 2025-07-05 | End: 2025-07-05

## 2025-07-05 RX ORDER — PREDNISONE 20 MG/1
40 TABLET ORAL DAILY
Refills: 0 | Status: DISCONTINUED | OUTPATIENT
Start: 2025-07-06 | End: 2025-07-08

## 2025-07-05 RX ORDER — PREDNISONE 20 MG/1
50 TABLET ORAL ONCE
Refills: 0 | Status: COMPLETED | OUTPATIENT
Start: 2025-07-05 | End: 2025-07-05

## 2025-07-05 RX ORDER — PREDNISONE 20 MG/1
20 TABLET ORAL DAILY
Refills: 0 | Status: CANCELLED | OUTPATIENT
Start: 2025-07-21 | End: 2025-07-08

## 2025-07-05 RX ORDER — PREDNISONE 20 MG/1
30 TABLET ORAL DAILY
Refills: 0 | Status: CANCELLED | OUTPATIENT
Start: 2025-07-14 | End: 2025-07-08

## 2025-07-05 RX ORDER — ASPIRIN 325 MG
81 TABLET ORAL DAILY
Refills: 0 | Status: DISCONTINUED | OUTPATIENT
Start: 2025-07-05 | End: 2025-07-08

## 2025-07-05 RX ORDER — PREDNISONE 20 MG/1
10 TABLET ORAL DAILY
Refills: 0 | Status: CANCELLED | OUTPATIENT
Start: 2025-07-28 | End: 2025-07-08

## 2025-07-05 RX ORDER — PREDNISONE 20 MG/1
TABLET ORAL
Refills: 0 | Status: DISCONTINUED | OUTPATIENT
Start: 2025-07-05 | End: 2025-07-08

## 2025-07-05 RX ORDER — HEPARIN SODIUM 1000 [USP'U]/ML
5000 INJECTION INTRAVENOUS; SUBCUTANEOUS EVERY 8 HOURS
Refills: 0 | Status: DISCONTINUED | OUTPATIENT
Start: 2025-07-05 | End: 2025-07-08

## 2025-07-05 RX ORDER — INSULIN LISPRO 100 U/ML
5 INJECTION, SOLUTION INTRAVENOUS; SUBCUTANEOUS
Refills: 0 | Status: DISCONTINUED | OUTPATIENT
Start: 2025-07-05 | End: 2025-07-08

## 2025-07-05 RX ORDER — PREDNISONE 20 MG/1
TABLET ORAL
Refills: 0 | Status: DISCONTINUED | OUTPATIENT
Start: 2025-07-05 | End: 2025-07-05

## 2025-07-05 RX ADMIN — Medication 650 MILLIGRAM(S): at 11:44

## 2025-07-05 RX ADMIN — POLYETHYLENE GLYCOL 3350 17 GRAM(S): 17 POWDER, FOR SOLUTION ORAL at 11:44

## 2025-07-05 RX ADMIN — TIOTROPIUM BROMIDE INHALATION SPRAY 2 PUFF(S): 3.12 SPRAY, METERED RESPIRATORY (INHALATION) at 09:47

## 2025-07-05 RX ADMIN — HEPARIN SODIUM 5000 UNIT(S): 1000 INJECTION INTRAVENOUS; SUBCUTANEOUS at 22:11

## 2025-07-05 RX ADMIN — Medication 1 DOSE(S): at 05:37

## 2025-07-05 RX ADMIN — INSULIN LISPRO 6: 100 INJECTION, SOLUTION INTRAVENOUS; SUBCUTANEOUS at 17:39

## 2025-07-05 RX ADMIN — INSULIN LISPRO 6: 100 INJECTION, SOLUTION INTRAVENOUS; SUBCUTANEOUS at 11:52

## 2025-07-05 RX ADMIN — SODIUM CHLORIDE 50 MILLILITER(S): 9 INJECTION, SOLUTION INTRAVENOUS at 09:46

## 2025-07-05 RX ADMIN — INSULIN GLARGINE-YFGN 10 UNIT(S): 100 INJECTION, SOLUTION SUBCUTANEOUS at 22:11

## 2025-07-05 RX ADMIN — Medication 1 APPLICATION(S): at 05:37

## 2025-07-05 RX ADMIN — PREDNISONE 50 MILLIGRAM(S): 20 TABLET ORAL at 13:31

## 2025-07-05 RX ADMIN — HEPARIN SODIUM 5000 UNIT(S): 1000 INJECTION INTRAVENOUS; SUBCUTANEOUS at 13:31

## 2025-07-05 RX ADMIN — Medication 650 MILLIGRAM(S): at 12:14

## 2025-07-05 RX ADMIN — Medication 650 MILLIGRAM(S): at 05:36

## 2025-07-05 RX ADMIN — Medication 1000 UNIT(S): at 13:30

## 2025-07-05 RX ADMIN — Medication 650 MILLIGRAM(S): at 17:39

## 2025-07-05 RX ADMIN — Medication 1 DOSE(S): at 17:39

## 2025-07-05 RX ADMIN — Medication 650 MILLIGRAM(S): at 00:30

## 2025-07-05 RX ADMIN — Medication 40 MILLIGRAM(S): at 06:22

## 2025-07-05 RX ADMIN — TAMSULOSIN HYDROCHLORIDE 0.4 MILLIGRAM(S): 0.4 CAPSULE ORAL at 22:11

## 2025-07-05 RX ADMIN — METOPROLOL SUCCINATE 12.5 MILLIGRAM(S): 50 TABLET, EXTENDED RELEASE ORAL at 17:39

## 2025-07-05 RX ADMIN — METOPROLOL SUCCINATE 12.5 MILLIGRAM(S): 50 TABLET, EXTENDED RELEASE ORAL at 05:36

## 2025-07-05 RX ADMIN — Medication 2 TABLET(S): at 22:11

## 2025-07-05 RX ADMIN — Medication 81 MILLIGRAM(S): at 11:44

## 2025-07-05 RX ADMIN — BUMETANIDE 1 MILLIGRAM(S): 1 TABLET ORAL at 05:36

## 2025-07-05 NOTE — PROGRESS NOTE ADULT - SUBJECTIVE AND OBJECTIVE BOX
GENERAL SURGERY PROGRESS NOTE    Patient: FERMIN DIAZ , 84y ([birth year])Female   MRN: [id number]  Location: [address]  Visit: 07-03-25 Inpatient  Date: 07-05-25 @ 03:44    Hospital Day #:2  Post-Op Day #:1    Procedure: S/P EVAR for AAA rupture     Events of past 24 hours: Patients has been vitally stable, no acute events. Q4 vital checks in place and started on regular diet    PAST MEDICAL & SURGICAL HISTORY:  Diverticulitis      Obesity      COPD (chronic obstructive pulmonary disease)      GERD (gastroesophageal reflux disease)      Lung cancer      ARTIE (obstructive sleep apnea)  NO CPAP MACHINE PER PT.      Cancer of kidney      Cancer of thyroid      Former smoker      NHL (non-Hodgkin's lymphoma)  "low grade" per heme/ onc note      History of abdominal aortic aneurysm (AAA)      Kidney stones      [place] (hard of hearing)      DM (diabetes mellitus)      HLD (hyperlipidemia)      HTN (hypertension)      History of surgery  LEFT LOWER LOBECTOMY      History of surgery  BILATERAL KNEE REPLACEMENT      History of surgery  CATARACT RIGHT EYE      History of surgery  TUBAL LIGATION      History of surgery  CYST REMOVED  RIGHT BREAST      History of surgery  CHOLECYSTECOMY      History of surgery  RIGHT PARTIAL NEPHRECTOMY      History of surgery  BILATERAL CATARACT SURGERY      History of back surgery      H/O lithotripsy      H/O partial thyroidectomy          Vitals:   T(F): 97.8 (07-05-25 @ 00:00), Max: 98.2 (07-04-25 @ 16:00)  HR: 75 (07-05-25 @ 02:00)  BP: 125/63 (07-05-25 @ 02:00)  RR: 12 (07-05-25 @ 02:00)  SpO2: 99% (07-05-25 @ 02:00)      Diet, DASH/TLC:   Sodium & Cholesterol Restricted  Consistent Carbohydrate No Snacks (CSTCHO)  Supplement Feeding Modality:  Oral  Ensure Surgery Cans or Servings Per Day:  2       Frequency:  Daily      Fluids: lactated ringers.: Solution, 1000 milliLiter(s) infuse at 50 mL/Hr      I & O's:    07-03-25 @ 07:01  -  07-04-25 @ 07:00  --------------------------------------------------------  IN:    IV PiggyBack: 50 mL    Lactated Ringers: 475 mL  Total IN: 525 mL    OUT:    Indwelling Catheter - Urethral (mL): 415 mL  Total OUT: 415 mL    Total NET: 110 mL        Bowel Movement: : [] YES [] NO  Flatus: : [] YES [] NO    PHYSICAL EXAM:  General: NAD, AAOx2  HEENT: NCAT,  Cardiac: RRR   Respiratory: normal respiratory effort,   Abdomen: Soft, non-distended,  Neuro: Sensation grossly intact  Vascular: Pulses detectable on doppler  Skin: Warm/dry, normal color, no jaundice    MEDICATIONS  (STANDING):  acetaminophen     Tablet .. 650 milliGRAM(s) Oral every 6 hours  bumetanide 1 milliGRAM(s) Oral daily  chlorhexidine 2% Cloths 1 Application(s) Topical <User Schedule>  cholecalciferol 1000 Unit(s) Oral daily  fluticasone propionate/ salmeterol 250-50 MICROgram(s) Diskus 1 Dose(s) Inhalation two times a day  insulin lispro (ADMELOG) corrective regimen sliding scale   SubCutaneous three times a day before meals  lactated ringers. 1000 milliLiter(s) (50 mL/Hr) IV Continuous <Continuous>  metoprolol tartrate 12.5 milliGRAM(s) Oral every 12 hours  pantoprazole    Tablet 40 milliGRAM(s) Oral before breakfast  polyethylene glycol 3350 17 Gram(s) Oral daily  senna 2 Tablet(s) Oral at bedtime  tamsulosin 0.4 milliGRAM(s) Oral at bedtime  tiotropium 2.5 MICROgram(s) Inhaler 2 Puff(s) Inhalation daily    MEDICATIONS  (PRN):  albuterol    90 MICROgram(s) HFA Inhaler 2 Puff(s) Inhalation every 6 hours PRN Shortness of Breath and/or Wheezing  zolpidem 5 milliGRAM(s) Oral at bedtime PRN Insomnia  zolpidem 5 milliGRAM(s) Oral at bedtime PRN Insomnia      DVT PROPHYLAXIS:   GI PROPHYLAXIS: pantoprazole    Tablet 40 milliGRAM(s) Oral before breakfast    ANTICOAGULATION:   ANTIBIOTICS:            LAB/STUDIES:  Labs:  CAPILLARY BLOOD GLUCOSE      POCT Blood Glucose.: 112 mg/dL (04 Jul 2025 22:26)  POCT Blood Glucose.: 233 mg/dL (04 Jul 2025 18:25)  POCT Blood Glucose.: 88 mg/dL (04 Jul 2025 14:30)  POCT Blood Glucose.: 202 mg/dL (04 Jul 2025 08:13)                          7.6    14.05 )-----------( 107      ( 04 Jul 2025 20:23 )             23.9       Auto Immature Granulocyte %: 0.6 % (07-04-25 @ 20:23)  Auto Immature Granulocyte %: 0.9 % (07-04-25 @ 08:33)    07-04    134[L]  |  97[L]  |  53[H]  ----------------------------<  128[H]  4.8   |  27  |  1.4      Calcium: 8.4 mg/dL (07-04-25 @ 20:23)      LFTs:             5.0  | 0.3  | 18       ------------------[78      ( 03 Jul 2025 15:10 )  3.4  | x    | 29          Lipase:x      Amylase:x         Blood Gas Arterial, Lactate: 1.1 mmol/L (07-04-25 @ 11:49)  Lactate, Blood: 1.6 mmol/L (07-03-25 @ 22:19)  Blood Gas Venous - Lactate: 3.4 mmol/L (07-03-25 @ 15:27)    ABG - ( 04 Jul 2025 11:49 )  pH: 7.53  /  pCO2: 36    /  pO2: 76    / HCO3: 30    / Base Excess: 7.0   /  SaO2: 98.0            ABG - ( 29 Jun 2025 16:21 )  pH: 7.62  /  pCO2: 37    /  pO2: 87    / HCO3: 38    / Base Excess: 15.5  /  SaO2: 99.1              Coags:     12.40  ----< 1.05    ( 04 Jul 2025 01:44 )     24.4                Urinalysis Basic - ( 04 Jul 2025 20:23 )    Color: x / Appearance: x / SG: x / pH: x  Gluc: 128 mg/dL / Ketone: x  / Bili: x / Urobili: x   Blood: x / Protein: x / Nitrite: x   Leuk Esterase: x / RBC: x / WBC x   Sq Epi: x / Non Sq Epi: x / Bacteria: x

## 2025-07-05 NOTE — PROGRESS NOTE ADULT - ASSESSMENT
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 Sathya Whitfield, diabetes, HTN, HLD, recent pneumonia and UTI, recent admission for acute hypoxic and hypercapnic respiratory failure presents to the ED for evaluation after syncopal episode. Pt admitted to SICU and is s/p insertion of right iliac stent for type 3 endoleak resulting in AAA rupture. Medicine consulted comanagement.       Problem list:  #Ruptured AAA type 3 endoleak s/p repair with R iliac stent   #Hx PAD  #Hx HTN/HLD  #Chronic Afib  #T2DM  #New ADRIA on CKD3a  #Normocytic anemia   #Moderate MR  #Hx SCLC s/p LLL lobectomy (in remission)  #Hx Non Hodgkin lymphoma   #Hx COPD   #Hx PE         Plan:  Management per SICU  All imaging, labs, and vitals personally reviewed   EKG (7/3): Afib, rate vent rate 76, Bifascicular block  CXR (7/3): Unchanged small left basilar opacity/pleural effusion.  CTA A/P w/ IV con (7/3): ruptured/leaking abdominal aortic aneurysm.  - Continuous telemetry monitoring   - PT/OT/PMR when appropriate   - Multimodal pain regimen   - Bowel regimen PRN / stool count   - Monitor H&H and for signs of bleeding   - Monitor for fever and WBC trend   - Incentive spirometer 10x/hr while awake  - c/w inhalers, Duoneb q6h PRN for SOB/Wheezing, Supplemental oxygen PRN to maintain PO 90-92%  - Currently q1h BLE DP/PT assessments, per primary team / SBP goal ~100-140  - Holding AC/AP  - FG TIDAC/HS  - ISS for glucose >200 (target 110-180 while inpatient)  - Hypoglycemic protocol PRN  - Calculate 24H insulin requirement daily, if indicated   - monitor renal fxn, I/O's and electrolytes daily; K >4, Mg >2   - renally dose medications  - avoid nephrotoxic medications   - encourage PO intake and avoid dehydration / IVF PRN  - c/w Lopressor 12.5mg BID   - c/w ASA qd  - c/w Flomax   - DVT PPx HSQ  - CHG      If any questions, please send a message or call on [employer] Teams.    Management per SICU, Medicine for co-management.    Total time spent to complete patient's bedside assessment, reviewed medical chart, discussed medical plan of care with team was more than 35 minutes with >50% of time spent face to face with patient, discussion with patient/family and/or coordination of care. This time excludes teaching time and/or separately reported services.

## 2025-07-05 NOTE — PROGRESS NOTE ADULT - ASSESSMENT
ASSESSMENT:  84y F w/ PmHx SCLC, left lung adenocarcinoma status post LLL lobectomy, PE, juxtarenal AAA status post endovascular repair, DM, HTN, HLD. Presented to the ED after a syncopal episode. Pt reported abdominal pain and was hypotensive (80/60). CT angio was consistent with a ruptured AAA. EVAR was performed and pt was stabilized. Shifted to the SICU for monitoring. Pt has been vitally stable and ventilting on room air. Pt taking regular diet.    PLAN:  - continue vascular checks   -continue care as per SICU        PLAN:  -  -  -      VASCULAR TEAM SPECTRA: [id number]

## 2025-07-05 NOTE — PROGRESS NOTE ADULT - SUBJECTIVE AND OBJECTIVE BOX
CC: Patient is a 84y old  Female who presents with a chief complaint of ruptured AAA (04 Jul 2025 06:20)      HPI:  84-year-old female PMH SCLC in remission, left lung adenocarcinoma status post LLL lobectomy, PE on apixaban, juxtarenal AAA status post endovascular repair with Dr. Sathya Whitfield, diabetes, HTN, HLD, recent pneumonia and UTI, recent admission for acute hypoxic and hypercapnic respiratory failure presents to the ED for evaluation after syncopal episode.  Per daughter at bedside, patient was brought in from [facility] after a syncopal episode while seated in her wheelchair.  Patient was found by staff slumped over in her wheelchair and appeared cyanotic.  EMS was activated and upon EMS arrival patient was awake, A&O x 3 and hypotensive to 80s/40s.  Patient received a fluid bolus by EMS and was brought to the emergency department.  On ED arrival patient complaining of abdominal pain.  Daughter also states patient seemed more confused over the last few weeks and was not acting herself.  Patient denies chest pain, shortness of breath, diaphoresis, headache, dizziness, nausea, vomiting, diarrhea or any urinary symptoms.  CTA abdomen showed Findings are worrisome for a ruptured/leaking abdominal aortic aneurysm.   (03 Jul 2025 23:40)      Patient seen and examined at bedside.   No acute overnight events.   No acute complaints this morning.   ROS otherwise negative on a 10-point assessment.     PAST MEDICAL & SURGICAL HISTORY:  Diverticulitis      Obesity      COPD (chronic obstructive pulmonary disease)      GERD (gastroesophageal reflux disease)      Lung cancer      ARTIE (obstructive sleep apnea)  NO CPAP MACHINE PER PT.      Cancer of kidney      Cancer of thyroid      Former smoker      NHL (non-Hodgkin's lymphoma)  "low grade" per heme/ onc note      History of abdominal aortic aneurysm (AAA)      Kidney stones      [place] (hard of hearing)      DM (diabetes mellitus)      HLD (hyperlipidemia)      HTN (hypertension)      History of surgery  LEFT LOWER LOBECTOMY      History of surgery  BILATERAL KNEE REPLACEMENT      History of surgery  CATARACT RIGHT EYE      History of surgery  TUBAL LIGATION      History of surgery  CYST REMOVED  RIGHT BREAST      History of surgery  CHOLECYSTECOMY      History of surgery  RIGHT PARTIAL NEPHRECTOMY      History of surgery  BILATERAL CATARACT SURGERY      History of back surgery      H/O lithotripsy      H/O partial thyroidectomy        SOCIAL HISTORY:  Tobacco Usage:  (   ) never smoked   (   ) former smoker   (   ) current smoker  (     ) pack years    Tobacco Quit Date:  Substance Use (Street drugs): (  ) never used  (  ) other:  Alcohol Usage:    Family history reviewed and otherwise non-contributory  ALLERGIES: No Known Allergies      MEDICATIONS  (STANDING):  acetaminophen     Tablet .. 650 milliGRAM(s) Oral every 6 hours  aspirin  chewable 81 milliGRAM(s) Oral daily  bumetanide 1 milliGRAM(s) Oral daily  chlorhexidine 2% Cloths 1 Application(s) Topical <User Schedule>  cholecalciferol 1000 Unit(s) Oral daily  fluticasone propionate/ salmeterol 250-50 MICROgram(s) Diskus 1 Dose(s) Inhalation two times a day  heparin   Injectable 5000 Unit(s) SubCutaneous every 8 hours  insulin lispro (ADMELOG) corrective regimen sliding scale   SubCutaneous three times a day before meals  metoprolol tartrate 12.5 milliGRAM(s) Oral every 12 hours  pantoprazole    Tablet 40 milliGRAM(s) Oral before breakfast  polyethylene glycol 3350 17 Gram(s) Oral daily  predniSONE   Tablet   Oral   predniSONE   Tablet 50 milliGRAM(s) Oral once  senna 2 Tablet(s) Oral at bedtime  tamsulosin 0.4 milliGRAM(s) Oral at bedtime  tiotropium 2.5 MICROgram(s) Inhaler 2 Puff(s) Inhalation daily    MEDICATIONS  (PRN):  albuterol    90 MICROgram(s) HFA Inhaler 2 Puff(s) Inhalation every 6 hours PRN Shortness of Breath and/or Wheezing  zolpidem 5 milliGRAM(s) Oral at bedtime PRN Insomnia  zolpidem 5 milliGRAM(s) Oral at bedtime PRN Insomnia      Home Medications:  amLODIPine 5 mg oral tablet: 1 tab(s) orally once a day (17 Jun 2025 15:48)  aspirin 81 mg oral capsule: 1 cap(s) orally once a day (17 Jun 2025 15:48)  bumetanide 1 mg oral tablet: 1 tab(s) orally once a day (01 Jul 2025 13:18)  Ditropan 5 mg oral tablet: 1 tab(s) orally once a day (at bedtime) (17 Jun 2025 15:48)  docusate sodium 100 mg oral tablet: 3 tab(s) orally once a day (at bedtime) (17 Jun 2025 15:49)  fluticasone 50 mcg/inh nasal spray: 1 spray(s) nasal 2 times a day (01 Jul 2025 13:18)  Incruse Ellipta 62.5 mcg/inh inhalation powder: 1 puff(s) inhaled once a day (17 Jun 2025 15:48)  metoprolol succinate 25 mg oral tablet, extended release: 2 tab(s) orally once a day (17 Jun 2025 15:42)  MiraLax oral powder for reconstitution: 17 gram(s) orally once a day (at bedtime) (17 Jun 2025 15:48)  omeprazole 40 mg oral delayed release capsule: 1 cap(s) orally once a day (17 Jun 2025 15:49)  senna (sennosides) 8.6 mg oral tablet: 1 tab(s) orally once a day (17 Jun 2025 15:48)  Symbicort 160 mcg-4.5 mcg/inh inhalation aerosol: 2 puff(s) inhaled 2 times a day (17 Jun 2025 15:49)  Trulicity Pen 1.5 mg/0.5 mL subcutaneous solution: 1.5 milligram(s) subcutaneous once a week (17 Jun 2025 15:49)  Vitamin D3 25 mcg (1000 intl units) oral tablet: 1 tab(s) orally once a day (17 Jun 2025 15:49)      Vital Signs Last 24 Hrs  T(C): 36.8 (05 Jul 2025 12:00), Max: 36.8 (04 Jul 2025 16:00)  T(F): 98.2 (05 Jul 2025 12:00), Max: 98.2 (04 Jul 2025 16:00)  HR: 75 (05 Jul 2025 13:00) (72 - 83)  BP: 116/57 (05 Jul 2025 13:00) (92/65 - 145/62)  BP(mean): 80 (05 Jul 2025 13:00) (73 - 97)  RR: 24 (05 Jul 2025 13:00) (4 - 33)  SpO2: 98% (05 Jul 2025 13:00) (92% - 100%)    Parameters below as of 05 Jul 2025 13:00  Patient On (Oxygen Delivery Method): nasal cannula  O2 Flow (L/min): 2    I&O's Summary    04 Jul 2025 07:01  -  05 Jul 2025 07:00  --------------------------------------------------------  IN: 1975 mL / OUT: 830 mL / NET: 1145 mL    05 Jul 2025 07:01  -  05 Jul 2025 13:19  --------------------------------------------------------  IN: 150 mL / OUT: 300 mL / NET: -150 mL            PHYSICAL EXAM:  GENERAL: NAD	  HEENT: moist oral mucosa, PERRL, EOMI	  Neck: Supple  Cardiovascular: Normal S1/S2, No murmurs  Respiratory: b/l chest rise and fall  Skin: No rashes, No ecchymoses, No cyanosis  Extremities: Normal range of motion, No clubbing, cyanosis or edema   Vascular:  2+ distal radial pulses b/l. Weak DP pulses b/l.  Neurologic: Non-focal, AOx3    LABS:                                   7.3    12.52 )-----------( 109      ( 05 Jul 2025 11:01 )             23.4     07-04    134  |  97  |  53  ----------------------------<  128  4.8   |  27  |  1.4    Ca    8.4      04 Jul 2025 20:23  Phos  3.9     07-04  Mg     2.2     07-04    TPro  5.0  /  Alb  3.4  /  TBili  0.3  /  DBili  x   /  AST  18  /  ALT  29  /  AlkPhos  78  07-03    eGFR: 37 mL/min/1.73m2 (04 Jul 2025 20:23)        PT/INR - ( 04 Jul 2025 01:44 )   PT: 12.40 sec;   INR: 1.05 ratio         PTT - ( 04 Jul 2025 01:44 )  PTT:24.4 sec  Urinalysis Basic - ( 03 Jul 2025 22:19 )    Color: x / Appearance: x / SG: x / pH: x  Gluc: 172 mg/dL / Ketone: x  / Bili: x / Urobili: x   Blood: x / Protein: x / Nitrite: x   Leuk Esterase: x / RBC: x / WBC x   Sq Epi: x / Non Sq Epi: x / Bacteria: x          Lactate, Blood: 1.6 mmol/L (07-03 @ 22:19)            15:27 - VBG - pH: 7.40  | pCO2: 51    | pO2: 30    | Lactate: 3.4            POCT Blood Glucose.: 202 mg/dL (04 Jul 2025 08:13)  POCT Blood Glucose.: 481 mg/dL (03 Jul 2025 13:43)          RADIOLOGY & ADDITIONAL TESTS:      Care Discussed with Consultants/Other Providers

## 2025-07-05 NOTE — PROGRESS NOTE ADULT - SUBJECTIVE AND OBJECTIVE BOX
FERMIN DIAZ   [id number]   [birth year]    84yF  ============================================================   DATE OF INITIAL SICU/SDU CONSULT: 07-04-25    INDICATION FOR SICU CONSULT:       SICU COURSE EVENTS :  07-04 - admitted to SICU service     24HOUR EVENTS  7/5  NIGHT  -DP/PT dopplerable b/l      7/4  DAY  - passed bedside swallow  - started diet  - restart home meds and ambien  - D/C khloe  - F/U ABG   - restart Bumex  - bedrest until 9pm      [X] A ten-point review of systems was negative except as expressed in note.  [X] History was obtained from patient. If unable to participate in their care, history obtained from review of the chart and collateral sources of information.  ============================================================    FERMIN DIAZ   [id number]   [birth year]    84yF  ============================================================   DATE OF INITIAL SICU/SDU CONSULT: 07-04-25    INDICATION FOR SICU CONSULT:       SICU COURSE EVENTS :  07-04 - admitted to SICU service     24HOUR EVENTS  7/5  NIGHT  -DP/PT dopplerable b/l      7/4  DAY  - passed bedside swallow  - started diet  - restart home meds and ambien  - D/C khloe  - F/U ABG   - restart Bumex  - bedrest until 9pm      [X] A ten-point review of systems was negative except as expressed in note.  [X] History was obtained from patient. If unable to participate in their care, history obtained from review of the chart and collateral sources of information.  ============================================================   Daily Height in cm: 162.6 (04 Jul 2025 09:36)    Daily   Diet, DASH/TLC:   Sodium & Cholesterol Restricted  Consistent Carbohydrate No Snacks (CSTCHO)  Supplement Feeding Modality:  Oral  Ensure Surgery Cans or Servings Per Day:  2       Frequency:  Daily (07-04-25 @ 14:02)    CURRENT MEDS:  Neurologic Medications  acetaminophen     Tablet .. 650 milliGRAM(s) Oral every 6 hours  zolpidem 5 milliGRAM(s) Oral at bedtime PRN Insomnia  zolpidem 5 milliGRAM(s) Oral at bedtime PRN Insomnia    Respiratory Medications  albuterol    90 MICROgram(s) HFA Inhaler 2 Puff(s) Inhalation every 6 hours PRN Shortness of Breath and/or Wheezing  fluticasone propionate/ salmeterol 250-50 MICROgram(s) Diskus 1 Dose(s) Inhalation two times a day  tiotropium 2.5 MICROgram(s) Inhaler 2 Puff(s) Inhalation daily    Cardiovascular Medications  bumetanide 1 milliGRAM(s) Oral daily  metoprolol tartrate 12.5 milliGRAM(s) Oral every 12 hours    Gastrointestinal Medications  cholecalciferol 1000 Unit(s) Oral daily  lactated ringers. 1000 milliLiter(s) IV Continuous <Continuous>  pantoprazole    Tablet 40 milliGRAM(s) Oral before breakfast  polyethylene glycol 3350 17 Gram(s) Oral daily  senna 2 Tablet(s) Oral at bedtime    Genitourinary Medications  tamsulosin 0.4 milliGRAM(s) Oral at bedtime    Hematologic/Oncologic Medications    Antimicrobial/Immunologic Medications    Endocrine/Metabolic Medications  insulin lispro (ADMELOG) corrective regimen sliding scale   SubCutaneous three times a day before meals    Topical/Other Medications  chlorhexidine 2% Cloths 1 Application(s) Topical <User Schedule>    ICU Vital Signs Last 24 Hrs  T(C): 36.6 (05 Jul 2025 08:00), Max: 36.8 (04 Jul 2025 16:00)  T(F): 97.8 (05 Jul 2025 08:00), Max: 98.2 (04 Jul 2025 16:00)  HR: 78 (05 Jul 2025 08:00) (72 - 83)  BP: 92/65 (05 Jul 2025 08:00) (92/65 - 145/62)  BP(mean): 73 (05 Jul 2025 08:00) (73 - 97)  ABP: 144/68 (04 Jul 2025 12:00) (128/58 - 144/68)  ABP(mean): 93 (04 Jul 2025 12:00) (81 - 93)  RR: 27 (05 Jul 2025 08:00) (4 - 33)  SpO2: 97% (05 Jul 2025 08:00) (92% - 100%)    O2 Parameters below as of 05 Jul 2025 08:00  Patient On (Oxygen Delivery Method): nasal cannula  O2 Flow (L/min): 2    ABG - ( 04 Jul 2025 11:49 )  pH, Arterial: 7.53  pH, Blood: x     /  pCO2: 36    /  pO2: 76    / HCO3: 30    / Base Excess: 7.0   /  SaO2: 98.0        I&O's Summary    04 Jul 2025 07:01  -  05 Jul 2025 07:00  --------------------------------------------------------  IN: 1975 mL / OUT: 830 mL / NET: 1145 mL      I&O's Detail    04 Jul 2025 07:01  -  05 Jul 2025 07:00  --------------------------------------------------------  IN:    IV PiggyBack: 50 mL    Lactated Ringers: 665 mL    Lactated Ringers: 650 mL    Oral Fluid: 610 mL  Total IN: 1975 mL    OUT:    Indwelling Catheter - Urethral (mL): 830 mL  Total OUT: 830 mL    Total NET: 1145 mL      PHYSICAL EXAM:     resting well, no acute distress  equal chest rise b/l  abdomen soft  extremities soft  b/l groin soft, dressings slightly saturated, no hematoma  b/l dopplerable DP and PT signals  urinary cath in place

## 2025-07-05 NOTE — CHART NOTE - NSCHARTNOTEFT_GEN_A_CORE
PACU ANESTHESIA ADMISSION NOTE      Procedure: Insertion, stent, artery, iliac, endovascular, femoral approach      Post op diagnosis:      ____  Intubated  TV:______       Rate: ______      FiO2: ______    _x___  Patent Airway    __x__  Full return of protective reflexes    __x__  Full recovery from anesthesia / back to baseline     Vitals:   T:  96.7         R:   15               BP: 127/87                 Sat:   98                P: 72      Mental Status:  _x___ Awake   _____ Alert   _____ Drowsy   _____ Sedated    Nausea/Vomiting:  _x___ NO  ______Yes,   See Post - Op Orders          Pain Scale (0-10):  ___x__    Treatment: ____ None    ____ See Post - Op/PCA Orders    Post - Operative Fluids:   ____ Oral   ____ See Post - Op Orders    Plan: Discharge:   ____Home       _____Floor    x_____Critical Care    _____  Other:_________________    Comments: tolerated procedure well
SICU Transfer Note  FERMIN DIAZ  84y (1940)  [id number]  Admitted to Hospital:07-03-25  HD: 2d    Transfer from: SICU  Transfer to: 4C    - Vascular     84y Female HD#2d  SICU CONSULTED FOR: q1 vascular checks    Patient received in SICU s/p insertion of R iliac artery stent for diagnosis of ruptured abdominal aortic aneurysm for findings of a type 3 endoleak. Procedure performed without general anesthesia. Patient brought to SICU directly. Patient hemodynamically stable, A&Ox3. Patient requires close BP monitoring and q1 hour vascular checks.       PAST MEDICAL & SURGICAL HISTORY:  Diverticulitis  Obesity  COPD (chronic obstructive pulmonary disease)  GERD (gastroesophageal reflux disease)  Lung cancer  ARTIE (obstructive sleep apnea)  NO CPAP MACHINE PER PT.  Cancer of kidney  Cancer of thyroid  Former smoker  NHL (non-Hodgkin's lymphoma)  "low grade" per heme/ onc note  History of abdominal aortic aneurysm (AAA)  Kidney stones  [place] (hard of hearing)  DM (diabetes mellitus)  HLD (hyperlipidemia)  HTN (hypertension)  istory of surgery  LEFT LOWER LOBECTOMY  History of surgery  BILATERAL KNEE REPLACEMENT  History of surgery  CATARACT RIGHT EYE  History of surgery  TUBAL LIGATION  History of surgery  CYS REMOVED  RIGHT BREAST  History of surgery  CHOLECYSTECOMY  History of surgery  RIGHT PARTIAL NEPHRECTOMY  story of surgery  BILATERAL CATARACT SURGERY  History of back surgery  H/O lithotripsy  H/O partial thyroidectomy    Allergies  No Known Allergies      MEDICATIONS  (STANDING):  acetaminophen     Tablet .. 650 milliGRAM(s) Oral every 6 hours  aspirin  chewable 81 milliGRAM(s) Oral daily  bumetanide 1 milliGRAM(s) Oral daily  chlorhexidine 2% Cloths 1 Application(s) Topical <User Schedule>  cholecalciferol 1000 Unit(s) Oral daily  fluticasone propionate/ salmeterol 250-50 MICROgram(s) Diskus 1 Dose(s) Inhalation two times a day  heparin   Injectable 5000 Unit(s) SubCutaneous every 8 hours  insulin lispro (ADMELOG) corrective regimen sliding scale   SubCutaneous three times a day before meals  metoprolol tartrate 12.5 milliGRAM(s) Oral every 12 hours  pantoprazole    Tablet 40 milliGRAM(s) Oral before breakfast  polyethylene glycol 3350 17 Gram(s) Oral daily  predniSONE   Tablet   Oral   senna 2 Tablet(s) Oral at bedtime  tamsulosin 0.4 milliGRAM(s) Oral at bedtime  tiotropium 2.5 MICROgram(s) Inhaler 2 Puff(s) Inhalation daily    MEDICATIONS  (PRN):  albuterol    90 MICROgram(s) HFA Inhaler 2 Puff(s) Inhalation every 6 hours PRN Shortness of Breath and/or Wheezing  zolpidem 5 milliGRAM(s) Oral at bedtime PRN Insomnia  zolpidem 5 milliGRAM(s) Oral at bedtime PRN Insomnia    Vital Signs Last 24 Hrs  T(C): 36.8 (05 Jul 2025 16:00), Max: 36.8 (05 Jul 2025 12:00)  T(F): 98.3 (05 Jul 2025 16:00), Max: 98.3 (05 Jul 2025 16:00)  HR: 71 (05 Jul 2025 18:00) (71 - 81)  BP: 136/71 (05 Jul 2025 18:00) (92/65 - 145/62)  BP(mean): 97 (05 Jul 2025 18:00) (73 - 97)  RR: 40 (05 Jul 2025 18:00) (4 - 40)  SpO2: 96% (05 Jul 2025 18:00) (92% - 100%)    Parameters below as of 05 Jul 2025 18:00  Patient On (Oxygen Delivery Method): nasal cannula  O2 Flow (L/min): 2    I&O's Summary    04 Jul 2025 07:01  -  05 Jul 2025 07:00  --------------------------------------------------------  IN: 1975 mL / OUT: 830 mL / NET: 1145 mL    05 Jul 2025 07:01  -  05 Jul 2025 19:14  --------------------------------------------------------  IN: 370 mL / OUT: 550 mL / NET: -180 mL        Assessment & Plan:  84y Female  s/p insertion of right iliac stent for type 3 endoleak resulting in rupture.    NEURO:  #Acute pain    -APAP 650 q6   #h/o anxiety/insomnia  - continue home ambien 10mg     RESP:   #h/o COPD (on home O2 1-2 months ago); small cell lung cancer on tag[place] in remission s/p LLL lobectomy; recent admission for hypoxic respiratory failure 2/2 pneumonia/pneumonitis/ acute on chronic CHF exacerbation   - was discharged on PO prednisone taper per pulmonology > continuing taper  - will f/u pulmonology outpatient  - continue advair   - continue spiriva   #Oxygenation    - saturating well on RA   #Undiagnosed ARTIE    - per patient/family > cpap at night 40% PS 8  #Activity    - increased as tolerated    CARDS:   #h/o HTN; HLD; Afib   - holding amlodipine 5mg qD  - continue metoprolol 12.5 mg q12 (takes XL 50mg qD)  Imaging: Echo 6/18/25: EF 67%, moderate MR, moderate TR   EKG pre op : Afib with bifasicular block   Post op EKG : Afib with bifasicular block qtc 474   Labs: Troponin 45 > 46 > 42  Lactate 1.6 post op     VASCULAR:  #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   - q4 hour vascular checks - DP/PT signals bilaterally   - maintain normotension   - monitor b/l groin sites  #PAD     GI/NUTR:   #Diet, DASH/ CC  #GI Prophylaxis; h/o GERD    - protonix (takes omeprazole at home)  #Bowel regimen; h/o constipation     - senna     - miralax     -last bowel movement  - prior to admission   #h/o diverticulitis    /RENAL:   #urine output in critically ill    -passed TOV  #h/o CKD3 (baseline creatinine 1.3)  - continue home bumex 1mg qD   - tamsulosoin 0.4mg HS    Labs:          BUN/Cr- 54/1.4  -->,  53/1.4  -->          [07-04 @ 20:23]Na  134 // K  4.8 // Mg  2.2 // Phos  3.9    HEME/ONC:   #DVT prophylaxis     -HSQ     -SCDs    Labs: Hb/Hct:  7.6/23.9  -->,  7.3/23.2  -->                      Plts:  107  -->,  109  -->                 PTT/INR:      T&S Expires: 7/6  #h/o Non Hodgkin lymphoma   #h/o PE on Eliquis   - holding home eliquis 2.5mg q12  - ASA 81mg   T&S Expires: 7/6    ID:  WBC- 20.93  --->>,  14.05  --->>,  12.46  --->>  Temp trend- 24hrs T(F): 97.8 (07-05 @ 08:00), Max: 98.2 (07-04 @ 16:00)  Current antibiotics - completed ceFAZolin   IVPB 2000 every 8 hours x 24 hours     ENDO:  #h/o DM    -FS ACHS    -lantus 10u    -preparandial 5u    MSK:  #DTI assessment     - 7/4: stage 3 right buttock wound > pending wound care    LINES/DRAINS:  PIV    ADVANCED DIRECTIVES:  Full Code    HCP/Emergency Contact-    INDICATION FOR SICU/SDU: Ruptured abdominal aortic aneurysm (AAA)        Follow Up:  -8pm routine labs  -f/u eliquis restart    Signed out to: Diana THORNE  Date: 7/5/2025  Time: 1917

## 2025-07-05 NOTE — PROGRESS NOTE ADULT - ASSESSMENT
84y Female  s/p insertion of right iliac stent for type 3 endoleak resulting in rupture.    NEURO:  #Acute pain    -APAP 650 q6   #h/o anxiety/insomnia  - continue home ambien 10mg     RESP:   #h/o COPD (on home O2 1-2 months ago); small cell lung cancer on tag[place] in remission s/p LLL lobectomy; recent admission for hypoxic respiratory failure 2/2 pneumonia/pneumonitis/ acute on chronic CHF exacerbation   - was discharged on PO prednisone taper per pulmonology   - continue advair   - continue spiriva   #Oxygenation    - saturating well on RA   #Activity    - bed rest per vascular surgery     CARDS:   #h/o HTN; HLD; Afib   - holding amlodipine 5mg qD  - continue metoprolol 12.5 mg q12 (takes XL 50mg qD)  Imaging: Echo 6/18/25: EF 67%, moderate MR, moderate TR   EKG pre op : Afib with bifasicular block   Post op EKG : Afib with bifasicular block qtc 474   Labs: Troponin 45 > 46 > 42  Lactate 1.6 post op     VASCULAR:  #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   - q1 hour vascular checks - DP/PT signals bilaterally   - maintain normotension   - bed rest post op until 9pm 7/4  #PAD     GI/NUTR:   #Diet, DASH/ CC  #GI Prophylaxis; h/o GERD    - protonix (takes omeprazole at home)  #Bowel regimen; h/o constipation     - senna     - miralax     -last bowel movement  - prior to admission   #h/o diverticulitis    /RENAL:   #urine output in critically ill    -indwelling theodore (placed intraop 7/3)  #h/o CKD3 (baseline creatinine 1.3)  - continue home bumex 1mg qD   - tamsulosoin 0.4mg HS  - LR @ 50cc/hr    Labs:          BUN/Cr- 54/1.4  -->,  53/1.4  -->          [07-04 @ 20:23]Na  134 // K  4.8 // Mg  2.2 // Phos  3.9  [07-04 @ 08:33]Na  139 // K  4.7 // Mg  -- // Phos  --      HEME/ONC:   #DVT prophylaxis     -SCDs    - holding dvt ppx per vascular surgery (received heparin in OR)     Labs: Hb/Hct:  8.9/27.9  -->,  7.6/23.9  -->                      Plts:  116  -->,  107  -->                 PTT/INR:          #h/o Non Hodgkin lymphoma   #h/o PE on Eliquis   - holding home eliquis 2.5mg q12  - holding home ASA    T&S Expires: 7/6    ID:  WBC- 20.19  --->>,  20.93  --->>,  14.05  --->>  Temp trend- 24hrs T(F): 98 (07-04 @ 20:00), Max: 98.2 (07-04 @ 16:00)  Current antibiotics-ceFAZolin   IVPB 2000 every 8 hours x 24 hours     ENDO:  #h/o DM    -FS ACHS    - Insulin sliding scale     - holding home lantus 10 u and 5u lispro pre prandial     MSK:  #DTI assessment pending     LINES/DRAINS:  PIV, Theodore    ADVANCED DIRECTIVES:  Full Code    HCP/Emergency Contact-    INDICATION FOR SICU/SDU: Ruptured abdominal aortic aneurysm (AAA)    DISPO:   SICU - Case discussed with attending 84y Female  s/p insertion of right iliac stent for type 3 endoleak resulting in rupture.    NEURO:  #Acute pain    -APAP 650 q6   #h/o anxiety/insomnia  - continue home ambien 10mg     RESP:   #h/o COPD (on home O2 1-2 months ago); small cell lung cancer on [place] in remission s/p LLL lobectomy; recent admission for hypoxic respiratory failure 2/2 pneumonia/pneumonitis/ acute on chronic CHF exacerbation   - was discharged on PO prednisone taper per pulmonology   - continue advair   - continue spiriva   #Oxygenation    - saturating well on RA   #Activity    - increased as tolerated    CARDS:   #h/o HTN; HLD; Afib   - holding amlodipine 5mg qD  - continue metoprolol 12.5 mg q12 (takes XL 50mg qD)  Imaging: Echo 6/18/25: EF 67%, moderate MR, moderate TR   EKG pre op : Afib with bifasicular block   Post op EKG : Afib with bifasicular block qtc 474   Labs: Troponin 45 > 46 > 42  Lactate 1.6 post op     VASCULAR:  #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   - q1 hour vascular checks - DP/PT signals bilaterally   - maintain normotension   - monitor b/l groin sites  #PAD     GI/NUTR:   #Diet, DASH/ CC  #GI Prophylaxis; h/o GERD    - protonix (takes omeprazole at home)  #Bowel regimen; h/o constipation     - senna     - miralax     -last bowel movement  - prior to admission   #h/o diverticulitis    /RENAL:   #urine output in critically ill    -indwelling theodore (placed intraop 7/3)  #h/o CKD3 (baseline creatinine 1.3)  - continue home bumex 1mg qD   - tamsulosoin 0.4mg HS  - LR @ 50cc/hr    Labs:          BUN/Cr- 54/1.4  -->,  53/1.4  -->          [07-04 @ 20:23]Na  134 // K  4.8 // Mg  2.2 // Phos  3.9    HEME/ONC:   #DVT prophylaxis     -SCDs    - holding dvt ppx per vascular surgery (received heparin in OR)     Labs: Hb/Hct:  7.6/23.9  -->,  7.3/23.2  -->                      Plts:  107  -->,  109  -->                 PTT/INR:      T&S Expires: 7/6  #h/o Non Hodgkin lymphoma   #h/o PE on Eliquis   - holding home eliquis 2.5mg q12  - holding home ASA    T&S Expires: 7/6    ID:  WBC- 20.93  --->>,  14.05  --->>,  12.46  --->>  Temp trend- 24hrs T(F): 97.8 (07-05 @ 08:00), Max: 98.2 (07-04 @ 16:00)  Current antibiotics - completed ceFAZolin   IVPB 2000 every 8 hours x 24 hours     ENDO:  #h/o DM    -FS ACHS    - Insulin sliding scale     - holding home lantus 10 u and 5u lispro pre prandial     MSK:  #DTI assessment     - 7/4: stage 3 right buttock wound > pending wound care    LINES/DRAINS:  PIV, Theodore    ADVANCED DIRECTIVES:  Full Code    HCP/Emergency Contact-    INDICATION FOR SICU/SDU: Ruptured abdominal aortic aneurysm (AAA)    DISPO:   SICU - Case discussed with attending 84y Female  s/p insertion of right iliac stent for type 3 endoleak resulting in rupture.    NEURO:  #Acute pain    -APAP 650 q6   #h/o anxiety/insomnia  - continue home ambien 10mg     RESP:   #h/o COPD (on home O2 1-2 months ago); small cell lung cancer on [place] in remission s/p LLL lobectomy; recent admission for hypoxic respiratory failure 2/2 pneumonia/pneumonitis/ acute on chronic CHF exacerbation   - was discharged on PO prednisone taper per pulmonology   - continue advair   - continue spiriva   #Oxygenation    - saturating well on RA   #Activity    - increased as tolerated    CARDS:   #h/o HTN; HLD; Afib   - holding amlodipine 5mg qD  - continue metoprolol 12.5 mg q12 (takes XL 50mg qD)  Imaging: Echo 6/18/25: EF 67%, moderate MR, moderate TR   EKG pre op : Afib with bifasicular block   Post op EKG : Afib with bifasicular block qtc 474   Labs: Troponin 45 > 46 > 42  Lactate 1.6 post op     VASCULAR:  #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   - q4 hour vascular checks - DP/PT signals bilaterally   - maintain normotension   - monitor b/l groin sites  #PAD     GI/NUTR:   #Diet, DASH/ CC  #GI Prophylaxis; h/o GERD    - protonix (takes omeprazole at home)  #Bowel regimen; h/o constipation     - senna     - miralax     -last bowel movement  - prior to admission   #h/o diverticulitis    /RENAL:   #urine output in critically ill    -d/c theodore, pending TOV  #h/o CKD3 (baseline creatinine 1.3)  - continue home bumex 1mg qD   - tamsulosoin 0.4mg HS    Labs:          BUN/Cr- 54/1.4  -->,  53/1.4  -->          [07-04 @ 20:23]Na  134 // K  4.8 // Mg  2.2 // Phos  3.9    HEME/ONC:   #DVT prophylaxis     -HSQ     -SCDs    Labs: Hb/Hct:  7.6/23.9  -->,  7.3/23.2  -->                      Plts:  107  -->,  109  -->                 PTT/INR:      T&S Expires: 7/6  #h/o Non Hodgkin lymphoma   #h/o PE on Eliquis   - holding home eliquis 2.5mg q12  - restarted home ASA    T&S Expires: 7/6    ID:  WBC- 20.93  --->>,  14.05  --->>,  12.46  --->>  Temp trend- 24hrs T(F): 97.8 (07-05 @ 08:00), Max: 98.2 (07-04 @ 16:00)  Current antibiotics - completed ceFAZolin   IVPB 2000 every 8 hours x 24 hours     ENDO:  #h/o DM    -FS ACHS    - Insulin sliding scale     - holding home lantus 10 u and 5u lispro pre prandial     MSK:  #DTI assessment     - 7/4: stage 3 right buttock wound > pending wound care    LINES/DRAINS:  PIV    ADVANCED DIRECTIVES:  Full Code    HCP/Emergency Contact-    INDICATION FOR SICU/SDU: Ruptured abdominal aortic aneurysm (AAA)    DISPO: 4C - Case discussed with attending

## 2025-07-06 LAB
ANION GAP SERPL CALC-SCNC: 11 MMOL/L — SIGNIFICANT CHANGE UP (ref 7–14)
ANION GAP SERPL CALC-SCNC: 9 MMOL/L — SIGNIFICANT CHANGE UP (ref 7–14)
BASOPHILS # BLD AUTO: 0.01 K/UL — SIGNIFICANT CHANGE UP (ref 0–0.2)
BASOPHILS # BLD AUTO: 0.01 K/UL — SIGNIFICANT CHANGE UP (ref 0–0.2)
BASOPHILS NFR BLD AUTO: 0.1 % — SIGNIFICANT CHANGE UP (ref 0–1)
BASOPHILS NFR BLD AUTO: 0.1 % — SIGNIFICANT CHANGE UP (ref 0–1)
BUN SERPL-MCNC: 44 MG/DL — HIGH (ref 10–20)
BUN SERPL-MCNC: 47 MG/DL — HIGH (ref 10–20)
CALCIUM SERPL-MCNC: 8.7 MG/DL — SIGNIFICANT CHANGE UP (ref 8.4–10.5)
CALCIUM SERPL-MCNC: 9 MG/DL — SIGNIFICANT CHANGE UP (ref 8.4–10.5)
CHLORIDE SERPL-SCNC: 97 MMOL/L — LOW (ref 98–110)
CHLORIDE SERPL-SCNC: 99 MMOL/L — SIGNIFICANT CHANGE UP (ref 98–110)
CO2 SERPL-SCNC: 28 MMOL/L — SIGNIFICANT CHANGE UP (ref 17–32)
CO2 SERPL-SCNC: 29 MMOL/L — SIGNIFICANT CHANGE UP (ref 17–32)
CREAT SERPL-MCNC: 1.1 MG/DL — SIGNIFICANT CHANGE UP (ref 0.7–1.5)
CREAT SERPL-MCNC: 1.4 MG/DL — SIGNIFICANT CHANGE UP (ref 0.7–1.5)
EGFR: 37 ML/MIN/1.73M2 — LOW
EGFR: 37 ML/MIN/1.73M2 — LOW
EGFR: 50 ML/MIN/1.73M2 — LOW
EGFR: 50 ML/MIN/1.73M2 — LOW
EOSINOPHIL # BLD AUTO: 0 K/UL — SIGNIFICANT CHANGE UP (ref 0–0.7)
EOSINOPHIL # BLD AUTO: 0.11 K/UL — SIGNIFICANT CHANGE UP (ref 0–0.7)
EOSINOPHIL NFR BLD AUTO: 0 % — SIGNIFICANT CHANGE UP (ref 0–8)
EOSINOPHIL NFR BLD AUTO: 1.1 % — SIGNIFICANT CHANGE UP (ref 0–8)
GLUCOSE BLDC GLUCOMTR-MCNC: 131 MG/DL — HIGH (ref 70–99)
GLUCOSE BLDC GLUCOMTR-MCNC: 133 MG/DL — HIGH (ref 70–99)
GLUCOSE BLDC GLUCOMTR-MCNC: 154 MG/DL — HIGH (ref 70–99)
GLUCOSE BLDC GLUCOMTR-MCNC: 203 MG/DL — HIGH (ref 70–99)
GLUCOSE SERPL-MCNC: 106 MG/DL — HIGH (ref 70–99)
GLUCOSE SERPL-MCNC: 139 MG/DL — HIGH (ref 70–99)
HCT VFR BLD CALC: 22.7 % — LOW (ref 37–47)
HCT VFR BLD CALC: 24.4 % — LOW (ref 37–47)
HGB BLD-MCNC: 7.2 G/DL — LOW (ref 12–16)
HGB BLD-MCNC: 7.5 G/DL — LOW (ref 12–16)
IMM GRANULOCYTES NFR BLD AUTO: 0.5 % — HIGH (ref 0.1–0.3)
IMM GRANULOCYTES NFR BLD AUTO: 0.7 % — HIGH (ref 0.1–0.3)
LYMPHOCYTES # BLD AUTO: 0.49 K/UL — LOW (ref 1.2–3.4)
LYMPHOCYTES # BLD AUTO: 0.56 K/UL — LOW (ref 1.2–3.4)
LYMPHOCYTES # BLD AUTO: 4.4 % — LOW (ref 20.5–51.1)
LYMPHOCYTES # BLD AUTO: 5.4 % — LOW (ref 20.5–51.1)
MAGNESIUM SERPL-MCNC: 2.1 MG/DL — SIGNIFICANT CHANGE UP (ref 1.8–2.4)
MAGNESIUM SERPL-MCNC: 2.1 MG/DL — SIGNIFICANT CHANGE UP (ref 1.8–2.4)
MCHC RBC-ENTMCNC: 27.2 PG — SIGNIFICANT CHANGE UP (ref 27–31)
MCHC RBC-ENTMCNC: 27.7 PG — SIGNIFICANT CHANGE UP (ref 27–31)
MCHC RBC-ENTMCNC: 30.7 G/DL — LOW (ref 32–37)
MCHC RBC-ENTMCNC: 31.7 G/DL — LOW (ref 32–37)
MCV RBC AUTO: 87.3 FL — SIGNIFICANT CHANGE UP (ref 81–99)
MCV RBC AUTO: 88.4 FL — SIGNIFICANT CHANGE UP (ref 81–99)
MONOCYTES # BLD AUTO: 0.43 K/UL — SIGNIFICANT CHANGE UP (ref 0.1–0.6)
MONOCYTES # BLD AUTO: 0.73 K/UL — HIGH (ref 0.1–0.6)
MONOCYTES NFR BLD AUTO: 3.9 % — SIGNIFICANT CHANGE UP (ref 1.7–9.3)
MONOCYTES NFR BLD AUTO: 7.1 % — SIGNIFICANT CHANGE UP (ref 1.7–9.3)
NEUTROPHILS # BLD AUTO: 10.16 K/UL — HIGH (ref 1.4–6.5)
NEUTROPHILS # BLD AUTO: 8.84 K/UL — HIGH (ref 1.4–6.5)
NEUTROPHILS NFR BLD AUTO: 85.6 % — HIGH (ref 42.2–75.2)
NEUTROPHILS NFR BLD AUTO: 91.1 % — HIGH (ref 42.2–75.2)
NRBC BLD AUTO-RTO: 0 /100 WBCS — SIGNIFICANT CHANGE UP (ref 0–0)
NRBC BLD AUTO-RTO: 0 /100 WBCS — SIGNIFICANT CHANGE UP (ref 0–0)
PHOSPHATE SERPL-MCNC: 3 MG/DL — SIGNIFICANT CHANGE UP (ref 2.1–4.9)
PHOSPHATE SERPL-MCNC: 4 MG/DL — SIGNIFICANT CHANGE UP (ref 2.1–4.9)
PLATELET # BLD AUTO: 108 K/UL — LOW (ref 130–400)
PLATELET # BLD AUTO: 123 K/UL — LOW (ref 130–400)
PMV BLD: 11.8 FL — HIGH (ref 7.4–10.4)
PMV BLD: 12.1 FL — HIGH (ref 7.4–10.4)
POTASSIUM SERPL-MCNC: 4.3 MMOL/L — SIGNIFICANT CHANGE UP (ref 3.5–5)
POTASSIUM SERPL-MCNC: 5 MMOL/L — SIGNIFICANT CHANGE UP (ref 3.5–5)
POTASSIUM SERPL-SCNC: 4.3 MMOL/L — SIGNIFICANT CHANGE UP (ref 3.5–5)
POTASSIUM SERPL-SCNC: 5 MMOL/L — SIGNIFICANT CHANGE UP (ref 3.5–5)
RBC # BLD: 2.6 M/UL — LOW (ref 4.2–5.4)
RBC # BLD: 2.76 M/UL — LOW (ref 4.2–5.4)
RBC # FLD: 15.5 % — HIGH (ref 11.5–14.5)
RBC # FLD: 15.6 % — HIGH (ref 11.5–14.5)
SODIUM SERPL-SCNC: 136 MMOL/L — SIGNIFICANT CHANGE UP (ref 135–146)
SODIUM SERPL-SCNC: 137 MMOL/L — SIGNIFICANT CHANGE UP (ref 135–146)
WBC # BLD: 10.32 K/UL — SIGNIFICANT CHANGE UP (ref 4.8–10.8)
WBC # BLD: 11.15 K/UL — HIGH (ref 4.8–10.8)
WBC # FLD AUTO: 10.32 K/UL — SIGNIFICANT CHANGE UP (ref 4.8–10.8)
WBC # FLD AUTO: 11.15 K/UL — HIGH (ref 4.8–10.8)

## 2025-07-06 PROCEDURE — 99232 SBSQ HOSP IP/OBS MODERATE 35: CPT

## 2025-07-06 PROCEDURE — 71045 X-RAY EXAM CHEST 1 VIEW: CPT | Mod: 26

## 2025-07-06 RX ADMIN — HEPARIN SODIUM 5000 UNIT(S): 1000 INJECTION INTRAVENOUS; SUBCUTANEOUS at 13:35

## 2025-07-06 RX ADMIN — METOPROLOL SUCCINATE 12.5 MILLIGRAM(S): 50 TABLET, EXTENDED RELEASE ORAL at 17:33

## 2025-07-06 RX ADMIN — INSULIN LISPRO 5 UNIT(S): 100 INJECTION, SOLUTION INTRAVENOUS; SUBCUTANEOUS at 17:36

## 2025-07-06 RX ADMIN — HEPARIN SODIUM 5000 UNIT(S): 1000 INJECTION INTRAVENOUS; SUBCUTANEOUS at 06:34

## 2025-07-06 RX ADMIN — Medication 650 MILLIGRAM(S): at 06:35

## 2025-07-06 RX ADMIN — INSULIN LISPRO 4: 100 INJECTION, SOLUTION INTRAVENOUS; SUBCUTANEOUS at 17:36

## 2025-07-06 RX ADMIN — Medication 40 MILLIGRAM(S): at 06:35

## 2025-07-06 RX ADMIN — Medication 1 APPLICATION(S): at 07:51

## 2025-07-06 RX ADMIN — Medication 650 MILLIGRAM(S): at 01:30

## 2025-07-06 RX ADMIN — Medication 1000 UNIT(S): at 11:29

## 2025-07-06 RX ADMIN — Medication 81 MILLIGRAM(S): at 11:29

## 2025-07-06 RX ADMIN — INSULIN GLARGINE-YFGN 10 UNIT(S): 100 INJECTION, SOLUTION SUBCUTANEOUS at 21:34

## 2025-07-06 RX ADMIN — Medication 650 MILLIGRAM(S): at 11:29

## 2025-07-06 RX ADMIN — INSULIN LISPRO 5 UNIT(S): 100 INJECTION, SOLUTION INTRAVENOUS; SUBCUTANEOUS at 12:33

## 2025-07-06 RX ADMIN — Medication 650 MILLIGRAM(S): at 17:33

## 2025-07-06 RX ADMIN — TIOTROPIUM BROMIDE INHALATION SPRAY 2 PUFF(S): 3.12 SPRAY, METERED RESPIRATORY (INHALATION) at 09:00

## 2025-07-06 RX ADMIN — METOPROLOL SUCCINATE 12.5 MILLIGRAM(S): 50 TABLET, EXTENDED RELEASE ORAL at 06:35

## 2025-07-06 RX ADMIN — INSULIN LISPRO 5 UNIT(S): 100 INJECTION, SOLUTION INTRAVENOUS; SUBCUTANEOUS at 09:01

## 2025-07-06 RX ADMIN — INSULIN LISPRO 2: 100 INJECTION, SOLUTION INTRAVENOUS; SUBCUTANEOUS at 12:33

## 2025-07-06 RX ADMIN — Medication 1 DOSE(S): at 09:00

## 2025-07-06 RX ADMIN — HEPARIN SODIUM 5000 UNIT(S): 1000 INJECTION INTRAVENOUS; SUBCUTANEOUS at 21:25

## 2025-07-06 RX ADMIN — Medication 5 MILLIGRAM(S): at 21:26

## 2025-07-06 RX ADMIN — Medication 650 MILLIGRAM(S): at 00:42

## 2025-07-06 RX ADMIN — Medication 2 TABLET(S): at 21:26

## 2025-07-06 RX ADMIN — BUMETANIDE 1 MILLIGRAM(S): 1 TABLET ORAL at 06:34

## 2025-07-06 RX ADMIN — POLYETHYLENE GLYCOL 3350 17 GRAM(S): 17 POWDER, FOR SOLUTION ORAL at 11:28

## 2025-07-06 RX ADMIN — TAMSULOSIN HYDROCHLORIDE 0.4 MILLIGRAM(S): 0.4 CAPSULE ORAL at 21:26

## 2025-07-06 NOTE — PHYSICAL THERAPY INITIAL EVALUATION ADULT - PERTINENT HX OF CURRENT PROBLEM, REHAB EVAL
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 Sathya Whitfield, diabetes, HTN, HLD, recent pneumonia and UTI, recent admission for acute hypoxic and hypercapnic respiratory failure presents to the ED for evaluation after syncopal episode.  Per daughter at bedside, patient was brought in from [facility] after a syncopal episode while seated in her wheelchair.  Patient was found by staff slumped over in her wheelchair and appeared cyanotic.  EMS was activated and upon EMS arrival patient was awake, A&O x 3 and hypotensive to 80s/40s.  Patient received a fluid bolus by EMS and was brought to the emergency department.  On ED arrival patient complaining of abdominal pain.  Daughter also states patient seemed more confused over the last few weeks and was not acting herself.  Patient denies chest pain, shortness of breath, diaphoresis, headache, dizziness, nausea, vomiting, diarrhea or any urinary symptoms.  CTA abdomen showed Findings are worrisome for a ruptured/leaking abdominal aortic aneurysm.

## 2025-07-06 NOTE — PHYSICAL THERAPY INITIAL EVALUATION ADULT - GENERAL OBSERVATIONS, REHAB EVAL
[id number] pm Chart reviewed. Pt. seen semirecline in bed , in No apparent distress , + O2 at 2L/min nc , IV lock , Prima fit device , Pt. alert and oriented X 4, Pt. agreed to activity/therapy.

## 2025-07-06 NOTE — PROGRESS NOTE ADULT - SUBJECTIVE AND OBJECTIVE BOX
CC: Patient is a 84y old  Female who presents with a chief complaint of ruptured AAA (04 Jul 2025 06:20)      HPI:  84-year-old female PMH SCLC in remission, left lung adenocarcinoma status post LLL lobectomy, PE on apixaban, juxtarenal AAA status post endovascular repair with Dr. Sathya Whitfield, diabetes, HTN, HLD, recent pneumonia and UTI, recent admission for acute hypoxic and hypercapnic respiratory failure presents to the ED for evaluation after syncopal episode.  Per daughter at bedside, patient was brought in from [facility] after a syncopal episode while seated in her wheelchair.  Patient was found by staff slumped over in her wheelchair and appeared cyanotic.  EMS was activated and upon EMS arrival patient was awake, A&O x 3 and hypotensive to 80s/40s.  Patient received a fluid bolus by EMS and was brought to the emergency department.  On ED arrival patient complaining of abdominal pain.  Daughter also states patient seemed more confused over the last few weeks and was not acting herself.  Patient denies chest pain, shortness of breath, diaphoresis, headache, dizziness, nausea, vomiting, diarrhea or any urinary symptoms.  CTA abdomen showed Findings are worrisome for a ruptured/leaking abdominal aortic aneurysm.   (03 Jul 2025 23:40)      Patient seen and examined at bedside.   No acute overnight events.   No acute complaints this morning.   ROS otherwise negative on a 10-point assessment.     PAST MEDICAL & SURGICAL HISTORY:  Diverticulitis      Obesity      COPD (chronic obstructive pulmonary disease)      GERD (gastroesophageal reflux disease)      Lung cancer      ARTIE (obstructive sleep apnea)  NO CPAP MACHINE PER PT.      Cancer of kidney      Cancer of thyroid      Former smoker      NHL (non-Hodgkin's lymphoma)  "low grade" per heme/ onc note      History of abdominal aortic aneurysm (AAA)      Kidney stones      [place] (hard of hearing)      DM (diabetes mellitus)      HLD (hyperlipidemia)      HTN (hypertension)      History of surgery  LEFT LOWER LOBECTOMY      History of surgery  BILATERAL KNEE REPLACEMENT      History of surgery  CATARACT RIGHT EYE      History of surgery  TUBAL LIGATION      History of surgery  CYST REMOVED  RIGHT BREAST      History of surgery  CHOLECYSTECOMY      History of surgery  RIGHT PARTIAL NEPHRECTOMY      History of surgery  BILATERAL CATARACT SURGERY      History of back surgery      H/O lithotripsy      H/O partial thyroidectomy        SOCIAL HISTORY:  Tobacco Usage:  (   ) never smoked   (   ) former smoker   (   ) current smoker  (     ) pack years    Tobacco Quit Date:  Substance Use (Street drugs): (  ) never used  (  ) other:  Alcohol Usage:    Family history reviewed and otherwise non-contributory  ALLERGIES: No Known Allergies      MEDICATIONS  (STANDING):  acetaminophen     Tablet .. 650 milliGRAM(s) Oral every 6 hours  aspirin  chewable 81 milliGRAM(s) Oral daily  bumetanide 1 milliGRAM(s) Oral daily  chlorhexidine 2% Cloths 1 Application(s) Topical <User Schedule>  cholecalciferol 1000 Unit(s) Oral daily  fluticasone propionate/ salmeterol 250-50 MICROgram(s) Diskus 1 Dose(s) Inhalation two times a day  heparin   Injectable 5000 Unit(s) SubCutaneous every 8 hours  insulin glargine Injectable (LANTUS) 10 Unit(s) SubCutaneous at bedtime  insulin lispro (ADMELOG) corrective regimen sliding scale   SubCutaneous three times a day before meals  insulin lispro Injectable (ADMELOG) 5 Unit(s) SubCutaneous three times a day with meals  metoprolol tartrate 12.5 milliGRAM(s) Oral every 12 hours  pantoprazole    Tablet 40 milliGRAM(s) Oral before breakfast  polyethylene glycol 3350 17 Gram(s) Oral daily  predniSONE   Tablet   Oral   predniSONE   Tablet 40 milliGRAM(s) Oral daily  senna 2 Tablet(s) Oral at bedtime  tamsulosin 0.4 milliGRAM(s) Oral at bedtime  tiotropium 2.5 MICROgram(s) Inhaler 2 Puff(s) Inhalation daily    MEDICATIONS  (PRN):  albuterol    90 MICROgram(s) HFA Inhaler 2 Puff(s) Inhalation every 6 hours PRN Shortness of Breath and/or Wheezing  zolpidem 5 milliGRAM(s) Oral at bedtime PRN Insomnia  zolpidem 5 milliGRAM(s) Oral at bedtime PRN Insomnia        Home Medications:  amLODIPine 5 mg oral tablet: 1 tab(s) orally once a day (17 Jun 2025 15:48)  aspirin 81 mg oral capsule: 1 cap(s) orally once a day (17 Jun 2025 15:48)  bumetanide 1 mg oral tablet: 1 tab(s) orally once a day (01 Jul 2025 13:18)  Ditropan 5 mg oral tablet: 1 tab(s) orally once a day (at bedtime) (17 Jun 2025 15:48)  docusate sodium 100 mg oral tablet: 3 tab(s) orally once a day (at bedtime) (17 Jun 2025 15:49)  fluticasone 50 mcg/inh nasal spray: 1 spray(s) nasal 2 times a day (01 Jul 2025 13:18)  Incruse Ellipta 62.5 mcg/inh inhalation powder: 1 puff(s) inhaled once a day (17 Jun 2025 15:48)  metoprolol succinate 25 mg oral tablet, extended release: 2 tab(s) orally once a day (17 Jun 2025 15:42)  MiraLax oral powder for reconstitution: 17 gram(s) orally once a day (at bedtime) (17 Jun 2025 15:48)  omeprazole 40 mg oral delayed release capsule: 1 cap(s) orally once a day (17 Jun 2025 15:49)  senna (sennosides) 8.6 mg oral tablet: 1 tab(s) orally once a day (17 Jun 2025 15:48)  Symbicort 160 mcg-4.5 mcg/inh inhalation aerosol: 2 puff(s) inhaled 2 times a day (17 Jun 2025 15:49)  Trulicity Pen 1.5 mg/0.5 mL subcutaneous solution: 1.5 milligram(s) subcutaneous once a week (17 Jun 2025 15:49)  Vitamin D3 25 mcg (1000 intl units) oral tablet: 1 tab(s) orally once a day (17 Jun 2025 15:49)      Vital Signs Last 24 Hrs  T(C): 36.4 (06 Jul 2025 08:10), Max: 36.8 (05 Jul 2025 12:00)  T(F): 97.6 (06 Jul 2025 08:10), Max: 98.3 (05 Jul 2025 16:00)  HR: 66 (06 Jul 2025 08:10) (66 - 75)  BP: 118/72 (06 Jul 2025 08:10) (106/59 - 143/66)  BP(mean): 77 (05 Jul 2025 20:00) (77 - 97)  RR: 17 (06 Jul 2025 08:10) (15 - 40)  SpO2: 96% (06 Jul 2025 08:10) (94% - 100%)    Parameters below as of 05 Jul 2025 21:25  Patient On (Oxygen Delivery Method): nasal cannula  O2 Flow (L/min): 2    I&O's Summary    05 Jul 2025 07:01  -  06 Jul 2025 07:00  --------------------------------------------------------  IN: 370 mL / OUT: 1250 mL / NET: -880 mL      CAPILLARY BLOOD GLUCOSE  POCT Blood Glucose.: 133 mg/dL (06 Jul 2025 08:16)  POCT Blood Glucose.: 185 mg/dL (05 Jul 2025 22:01)  POCT Blood Glucose.: 266 mg/dL (05 Jul 2025 17:30)      PHYSICAL EXAM:  GENERAL: NAD	  HEENT: moist oral mucosa, PERRL, EOMI	  Neck: Supple  Cardiovascular: Normal S1/S2, No murmurs  Respiratory: b/l chest rise and fall  Skin: No rashes, No ecchymoses, No cyanosis  Extremities: Normal range of motion, No clubbing, cyanosis or edema   Vascular:  2+ distal radial pulses b/l. Weak DP pulses b/l.  Neurologic: Non-focal, AOx3    LABS:                                   7.2    11.15 )-----------( 108      ( 06 Jul 2025 05:37 )             22.7     07-06    137  |  99  |  44  ----------------------------<  106  5.0   |  29  |  1.1    Ca    8.7      06 Jul 2025 05:37  Phos  4.0     07-06  Mg     2.1     07-06    TPro  5.0  /  Alb  3.4  /  TBili  0.3  /  DBili  x   /  AST  18  /  ALT  29  /  AlkPhos  78  07-03    eGFR: 50 mL/min/1.73m2 (06 Jul 2025 05:37)  eGFR: 37 mL/min/1.73m2 (04 Jul 2025 20:23)        PT/INR - ( 04 Jul 2025 01:44 )   PT: 12.40 sec;   INR: 1.05 ratio         PTT - ( 04 Jul 2025 01:44 )  PTT:24.4 sec  Urinalysis Basic - ( 03 Jul 2025 22:19 )    Color: x / Appearance: x / SG: x / pH: x  Gluc: 172 mg/dL / Ketone: x  / Bili: x / Urobili: x   Blood: x / Protein: x / Nitrite: x   Leuk Esterase: x / RBC: x / WBC x   Sq Epi: x / Non Sq Epi: x / Bacteria: x          Lactate, Blood: 1.6 mmol/L (07-03 @ 22:19)            15:27 - VBG - pH: 7.40  | pCO2: 51    | pO2: 30    | Lactate: 3.4            POCT Blood Glucose.: 202 mg/dL (04 Jul 2025 08:13)  POCT Blood Glucose.: 481 mg/dL (03 Jul 2025 13:43)          RADIOLOGY & ADDITIONAL TESTS:      Care Discussed with Consultants/Other Providers

## 2025-07-06 NOTE — PROGRESS NOTE ADULT - ASSESSMENT
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 Sathya Whitfield, diabetes, HTN, HLD, recent pneumonia and UTI, recent admission for acute hypoxic and hypercapnic respiratory failure presents to the ED for evaluation after syncopal episode. Pt admitted to SICU and is s/p insertion of right iliac stent for type 3 endoleak resulting in AAA rupture. Medicine consulted comanagement.       Problem list:  #Ruptured AAA type 3 endoleak s/p repair with R iliac stent   #Hx PAD  #Hx HTN/HLD  #Chronic Afib  #T2DM  #New ADRIA on CKD3a (improved)  #Normocytic anemia   #Moderate MR  #Thrombocytopenia (stable)  #Hx SCLC s/p LLL lobectomy (in remission)  #Hx Non Hodgkin lymphoma   #Hx COPD   #Hx PE         Plan:  Management per SICU  All imaging, labs, and vitals personally reviewed   EKG (7/3): Afib, rate vent rate 76, Bifascicular block  CXR (7/3): Unchanged small left basilar opacity/pleural effusion.  CTA A/P w/ IV con (7/3): ruptured/leaking abdominal aortic aneurysm.  - Continuous telemetry monitoring   - PT/OT/PMR when appropriate   - Multimodal pain regimen   - Bowel regimen PRN / stool count   - Monitor platelet count. H&H and for signs of bleeding   - Monitor for fever and WBC trend   - Incentive spirometer 10x/hr while awake  - c/w inhalers, Duoneb q6h PRN for SOB/Wheezing, Supplemental oxygen PRN to maintain PO 90-92%  - Currently q1h BLE DP/PT assessments, per primary team / SBP goal ~100-140  - Holding AC/AP  - FG TIDAC/HS  - ISS for glucose >200 (target 110-180 while inpatient)  - Hypoglycemic protocol PRN  - Calculate 24H insulin requirement daily, if indicated   - monitor renal fxn, I/O's and electrolytes daily; K >4, Mg >2   - renally dose medications  - avoid nephrotoxic medications   - encourage PO intake and avoid dehydration / IVF PRN  - c/w Lopressor 12.5mg BID   - c/w ASA qd  - c/w Flomax   - DVT PPx HSQ  - CHG      If any questions, please send a message or call on [employer] Teams.    Management per SICU, Medicine for co-management.    Total time spent to complete patient's bedside assessment, reviewed medical chart, discussed medical plan of care with team was more than 35 minutes with >50% of time spent face to face with patient, discussion with patient/family and/or coordination of care. This time excludes teaching time and/or separately reported services.  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 Sathya Whitfield, diabetes, HTN, HLD, recent pneumonia and UTI, recent admission for acute hypoxic and hypercapnic respiratory failure presents to the ED for evaluation after syncopal episode. Pt admitted to SICU and is s/p insertion of right iliac stent for type 3 endoleak resulting in AAA rupture. Medicine consulted comanagement.       Problem list:  #Ruptured AAA type 3 endoleak s/p repair with R iliac stent   #Hx PAD  #Hx HTN/HLD  #Chronic Afib  #T2DM  #New ADRIA on CKD3a (improved)  #Normocytic anemia   #Moderate MR  #Thrombocytopenia (stable)  #Hx SCLC s/p LLL lobectomy (in remission)  #Hx Non Hodgkin lymphoma   #Hx COPD   #Hx PE         Plan:  Management per SICU  All imaging, labs, and vitals personally reviewed   EKG (7/3): Afib, rate vent rate 76, Bifascicular block  CXR (7/3): Unchanged small left basilar opacity/pleural effusion.  CTA A/P w/ IV con (7/3): ruptured/leaking abdominal aortic aneurysm.  - Continuous telemetry monitoring   - PT/OT/PMR when appropriate   - Multimodal pain regimen   - Bowel regimen PRN / stool count   - Monitor platelet count. H&H and for signs of bleeding   - Monitor for fever and WBC trend   - Incentive spirometer 10x/hr while awake  - c/w inhalers, Duoneb q6h PRN for SOB/Wheezing, Supplemental oxygen PRN to maintain PO 90-92%  - c/w slow Prednisone taper (4 days are sufficient for each decrease by 10mg)  - Currently q4h BLE DP/PT assessments, per primary team / SBP goal ~100-140  - Holding AC/AP  - FG TIDAC/HS  - ISS for glucose >200 (target 110-180 while inpatient)  - Hypoglycemic protocol PRN  - Calculate 24H insulin requirement daily, if indicated   - monitor renal fxn, I/O's and electrolytes daily; K >4, Mg >2   - renally dose medications  - avoid nephrotoxic medications   - encourage PO intake and avoid dehydration / IVF PRN  - c/w Lopressor 12.5mg BID   - c/w ASA qd  - c/w Flomax   - DVT PPx HSQ  - CHG      If any questions, please send a message or call on [employer] Teams.    Management per SICU, Medicine for co-management.    Total time spent to complete patient's bedside assessment, reviewed medical chart, discussed medical plan of care with team was more than 35 minutes with >50% of time spent face to face with patient, discussion with patient/family and/or coordination of care. This time excludes teaching time and/or separately reported services.

## 2025-07-06 NOTE — PROGRESS NOTE ADULT - ASSESSMENT
ASSESSMENT  84y F w/ PMHx of 84-year-old female PmHx SCLC, left lung adenocarcinoma status post LLL lobectomy, PE, juxtarenal AAA status post endovascular repair with Dr. Sathya Whitfield, diabetes, HTN, HLD. Presented to the ED after a syncopal episode. Pt reported abdominal pain and was hypotensive (80/60). CT angio was consistent with a ruptured AAA. EVAR was performed and pt was stabilized. Shifted to the SICU for monitoring.  q4 hour vascular checks - DP/PT signals bilaterally   - maintain normotension   - monitor b/l groin sites ASSESSMENT  84y F w/ PMHx of 84-year-old female PmHx SCLC, left lung adenocarcinoma status post LLL lobectomy, PE, juxtarenal AAA status post endovascular repair with Dr. Sathya Whitfield, diabetes, HTN, HLD. Presented to the ED after a syncopal episode. Pt reported abdominal pain and was hypotensive (80/60). CT angio was consistent with a ruptured AAA. EVAR was performed and pt was stabilized. Shifted to the SICU for monitoring.  q4 hour vascular checks - DP/PT signals bilaterally   - maintain normotension   - monitor b/l groin sites  - remove groin site bandages  - followup with PT evaluation  - encourage ambulation

## 2025-07-06 NOTE — PROGRESS NOTE ADULT - NUTRITIONAL ASSESSMENT
This patient has been assessed with a concern for Malnutrition and has been determined to have a diagnosis/diagnoses of Severe protein-calorie malnutrition.    This patient is being managed with:   Diet DASH/TLC-  Sodium & Cholesterol Restricted  Consistent Carbohydrate {No Snacks} (CSTCHO)  Supplement Feeding Modality:  Oral  Ensure Surgery Cans or Servings Per Day:  2       Frequency:  Daily  Entered: Jul 4 2025  2:02PM  

## 2025-07-06 NOTE — PROGRESS NOTE ADULT - SUBJECTIVE AND OBJECTIVE BOX
GENERAL SURGERY PROGRESS NOTE    Patient: FERMIN DIAZ , 84y ([birth year])Female   MRN: [id number]  Location: [address]  Visit: 07-03-25 Inpatient  Date: 07-06-25 @ 02:46    Hospital Day #: 3  Post-Op Day #:    Procedure/Dx/Injuries:    Events of past 24 hours: Patient was downgraded from the ICU to the floors. Patient had no acute event today. Patient has dopperable pulses for PT and DP. Q1 vascular checks need to done with doppler.        PAST MEDICAL & SURGICAL HISTORY:  Diverticulitis      Obesity      COPD (chronic obstructive pulmonary disease)      GERD (gastroesophageal reflux disease)      Lung cancer      ARTIE (obstructive sleep apnea)  NO CPAP MACHINE PER PT.      Cancer of kidney      Cancer of thyroid      Former smoker      NHL (non-Hodgkin's lymphoma)  "low grade" per heme/ onc note      History of abdominal aortic aneurysm (AAA)      Kidney stones      [place] (hard of hearing)      DM (diabetes mellitus)      HLD (hyperlipidemia)      HTN (hypertension)      History of surgery  LEFT LOWER LOBECTOMY      History of surgery  BILATERAL KNEE REPLACEMENT      History of surgery  CATARACT RIGHT EYE      History of surgery  TUBAL LIGATION      History of surgery  CYST REMOVED  RIGHT BREAST      History of surgery  CHOLECYSTECOMY      History of surgery  RIGHT PARTIAL NEPHRECTOMY      History of surgery  BILATERAL CATARACT SURGERY      History of back surgery      H/O lithotripsy      H/O partial thyroidectomy          Vitals:   T(F): 97.9 (07-05-25 @ 23:35), Max: 98.3 (07-05-25 @ 16:00)  HR: 69 (07-05-25 @ 23:35)  BP: 122/65 (07-05-25 @ 23:35)  RR: 18 (07-05-25 @ 23:35)  SpO2: 94% (07-05-25 @ 23:35)      Diet, DASH/TLC:   Sodium & Cholesterol Restricted  Consistent Carbohydrate No Snacks (CSTCHO)  Supplement Feeding Modality:  Oral  Ensure Surgery Cans or Servings Per Day:  2       Frequency:  Daily      Fluids:     I & O's:    07-04-25 @ 07:01  -  07-05-25 @ 07:00  --------------------------------------------------------  IN:    IV PiggyBack: 50 mL    Lactated Ringers: 665 mL    Lactated Ringers: 650 mL    Oral Fluid: 610 mL  Total IN: 1975 mL    OUT:    Indwelling Catheter - Urethral (mL): 830 mL  Total OUT: 830 mL    Total NET: 1145 mL        Bowel Movement: : [] YES [] NO  Flatus: : [] YES [] NO    PHYSICAL EXAM:  General: NAD, AAOx3, calm and cooperative  HEENT: NCAT, MARIO, EOMI, Trachea ML, Neck supple  Cardiac: RRR S1, S2, no Murmurs, rubs or gallops  Respiratory: CTAB, normal respiratory effort, breath sounds equal BL, no wheeze, rhonchi or crackles  Abdomen: Soft, non-distended, non-tender, no rebound, no guarding. +BS.  Rectal: Good tone, +stool, no blood, no allan-anal masses/lesions, no fistulas, fissures, hemorrhoids  Musculoskeletal: Strength 5/5 BL UE/LE, ROM intact, compartments soft  Neuro: Sensation grossly intact and equal throughout, no focal deficits  Vascular: Pulses 2+ throughout, extremities well perfused  Skin: Warm/dry, normal color, no jaundice  Incision/wound: healing well, dressings in place, clean, dry and intact    MEDICATIONS  (STANDING):  acetaminophen     Tablet .. 650 milliGRAM(s) Oral every 6 hours  aspirin  chewable 81 milliGRAM(s) Oral daily  bumetanide 1 milliGRAM(s) Oral daily  chlorhexidine 2% Cloths 1 Application(s) Topical <User Schedule>  cholecalciferol 1000 Unit(s) Oral daily  fluticasone propionate/ salmeterol 250-50 MICROgram(s) Diskus 1 Dose(s) Inhalation two times a day  heparin   Injectable 5000 Unit(s) SubCutaneous every 8 hours  insulin glargine Injectable (LANTUS) 10 Unit(s) SubCutaneous at bedtime  insulin lispro (ADMELOG) corrective regimen sliding scale   SubCutaneous three times a day before meals  insulin lispro Injectable (ADMELOG) 5 Unit(s) SubCutaneous three times a day with meals  metoprolol tartrate 12.5 milliGRAM(s) Oral every 12 hours  pantoprazole    Tablet 40 milliGRAM(s) Oral before breakfast  polyethylene glycol 3350 17 Gram(s) Oral daily  predniSONE   Tablet   Oral   predniSONE   Tablet 40 milliGRAM(s) Oral daily  senna 2 Tablet(s) Oral at bedtime  tamsulosin 0.4 milliGRAM(s) Oral at bedtime  tiotropium 2.5 MICROgram(s) Inhaler 2 Puff(s) Inhalation daily    MEDICATIONS  (PRN):  albuterol    90 MICROgram(s) HFA Inhaler 2 Puff(s) Inhalation every 6 hours PRN Shortness of Breath and/or Wheezing  zolpidem 5 milliGRAM(s) Oral at bedtime PRN Insomnia  zolpidem 5 milliGRAM(s) Oral at bedtime PRN Insomnia      DVT PROPHYLAXIS: heparin   Injectable 5000 Unit(s) SubCutaneous every 8 hours    GI PROPHYLAXIS: pantoprazole    Tablet 40 milliGRAM(s) Oral before breakfast    ANTICOAGULATION:   ANTIBIOTICS:            LAB/STUDIES:  Labs:  CAPILLARY BLOOD GLUCOSE      POCT Blood Glucose.: 185 mg/dL (05 Jul 2025 22:01)  POCT Blood Glucose.: 266 mg/dL (05 Jul 2025 17:30)  POCT Blood Glucose.: 289 mg/dL (05 Jul 2025 11:29)  POCT Blood Glucose.: 114 mg/dL (05 Jul 2025 07:51)                          7.3    12.52 )-----------( 109      ( 05 Jul 2025 11:01 )             23.4         07-04    134[L]  |  97[L]  |  53[H]  ----------------------------<  128[H]  4.8   |  27  |  1.4          LFTs:     Blood Gas Arterial, Lactate: 1.1 mmol/L (07-04-25 @ 11:49)  Lactate, Blood: 1.6 mmol/L (07-03-25 @ 22:19)  Blood Gas Venous - Lactate: 3.4 mmol/L (07-03-25 @ 15:27)    ABG - ( 04 Jul 2025 11:49 )  pH: 7.53  /  pCO2: 36    /  pO2: 76    / HCO3: 30    / Base Excess: 7.0   /  SaO2: 98.0            ABG - ( 29 Jun 2025 16:21 )  pH: 7.62  /  pCO2: 37    /  pO2: 87    / HCO3: 38    / Base Excess: 15.5  /  SaO2: 99.1              Coags:            Urinalysis Basic - ( 04 Jul 2025 20:23 )    Color: x / Appearance: x / SG: x / pH: x  Gluc: 128 mg/dL / Ketone: x  / Bili: x / Urobili: x   Blood: x / Protein: x / Nitrite: x   Leuk Esterase: x / RBC: x / WBC x   Sq Epi: x / Non Sq Epi: x / Bacteria: x                IMAGING:    ACCESS/ DEVICES:  [ ] Peripheral IV  [ ] Central Venous Line	[ ] R	[ ] L	[ ] IJ	[ ] Fem	[ ] SC	Placed:   [ ] Arterial Line		[ ] R	[ ] L	[ ] Fem	[ ] Rad	[ ] Ax	Placed:   [ ] PICC:					[ ] Mediport  [ ] Urinary Catheter,  Date Placed:   [ ] Chest tube: [ ] Right, [ ] Left  [ ] NOE/Alcides Drains       GENERAL SURGERY PROGRESS NOTE    Patient: FERMIN DIAZ , 84y ([birth year])Female   MRN: [id number]  Location: [address]  Visit: 07-03-25 Inpatient  Date: 07-06-25 @ 02:46    Hospital Day #: 3  Post-Op Day #:    Procedure/Dx/Injuries: EVAR     Events of past 24 hours: Patient was downgraded from the ICU to the floors. Patient had no acute event today. Patient has dopperable pulses for PT and DP bilaterally. Q4 vascular checks need to done with doppler.        PAST MEDICAL & SURGICAL HISTORY:  Diverticulitis      Obesity      COPD (chronic obstructive pulmonary disease)      GERD (gastroesophageal reflux disease)      Lung cancer      ARTIE (obstructive sleep apnea)  NO CPAP MACHINE PER PT.      Cancer of kidney      Cancer of thyroid      Former smoker      NHL (non-Hodgkin's lymphoma)  "low grade" per heme/ onc note      History of abdominal aortic aneurysm (AAA)      Kidney stones      [place] (hard of hearing)      DM (diabetes mellitus)      HLD (hyperlipidemia)      HTN (hypertension)      History of surgery  LEFT LOWER LOBECTOMY      History of surgery  BILATERAL KNEE REPLACEMENT      History of surgery  CATARACT RIGHT EYE      History of surgery  TUBAL LIGATION      History of surgery  CYST REMOVED  RIGHT BREAST      History of surgery  CHOLECYSTECOMY      History of surgery  RIGHT PARTIAL NEPHRECTOMY      History of surgery  BILATERAL CATARACT SURGERY      History of back surgery      H/O lithotripsy      H/O partial thyroidectomy          Vitals:   T(F): 97.9 (07-05-25 @ 23:35), Max: 98.3 (07-05-25 @ 16:00)  HR: 69 (07-05-25 @ 23:35)  BP: 122/65 (07-05-25 @ 23:35)  RR: 18 (07-05-25 @ 23:35)  SpO2: 94% (07-05-25 @ 23:35)      Diet, DASH/TLC:   Sodium & Cholesterol Restricted  Consistent Carbohydrate No Snacks (CSTCHO)  Supplement Feeding Modality:  Oral  Ensure Surgery Cans or Servings Per Day:  2       Frequency:  Daily      Fluids:     I & O's:    07-04-25 @ 07:01  -  07-05-25 @ 07:00  --------------------------------------------------------  IN:    IV PiggyBack: 50 mL    Lactated Ringers: 665 mL    Lactated Ringers: 650 mL    Oral Fluid: 610 mL  Total IN: 1975 mL    OUT:    Indwelling Catheter - Urethral (mL): 830 mL  Total OUT: 830 mL    Total NET: 1145 mL        Bowel Movement: : [] YES [] NO  Flatus: : [] YES [] NO    PHYSICAL EXAM:  GENERAL: NAD, well-appearing  CHEST/LUNG:normal breathing efforts   HEART: Regular rate and rhythm  VASCULAR: dopperable DP and PT pulses bilaterally, warm and dry skin    MEDICATIONS  (STANDING):  acetaminophen     Tablet .. 650 milliGRAM(s) Oral every 6 hours  aspirin  chewable 81 milliGRAM(s) Oral daily  bumetanide 1 milliGRAM(s) Oral daily  chlorhexidine 2% Cloths 1 Application(s) Topical <User Schedule>  cholecalciferol 1000 Unit(s) Oral daily  fluticasone propionate/ salmeterol 250-50 MICROgram(s) Diskus 1 Dose(s) Inhalation two times a day  heparin   Injectable 5000 Unit(s) SubCutaneous every 8 hours  insulin glargine Injectable (LANTUS) 10 Unit(s) SubCutaneous at bedtime  insulin lispro (ADMELOG) corrective regimen sliding scale   SubCutaneous three times a day before meals  insulin lispro Injectable (ADMELOG) 5 Unit(s) SubCutaneous three times a day with meals  metoprolol tartrate 12.5 milliGRAM(s) Oral every 12 hours  pantoprazole    Tablet 40 milliGRAM(s) Oral before breakfast  polyethylene glycol 3350 17 Gram(s) Oral daily  predniSONE   Tablet   Oral   predniSONE   Tablet 40 milliGRAM(s) Oral daily  senna 2 Tablet(s) Oral at bedtime  tamsulosin 0.4 milliGRAM(s) Oral at bedtime  tiotropium 2.5 MICROgram(s) Inhaler 2 Puff(s) Inhalation daily    MEDICATIONS  (PRN):  albuterol    90 MICROgram(s) HFA Inhaler 2 Puff(s) Inhalation every 6 hours PRN Shortness of Breath and/or Wheezing  zolpidem 5 milliGRAM(s) Oral at bedtime PRN Insomnia  zolpidem 5 milliGRAM(s) Oral at bedtime PRN Insomnia      DVT PROPHYLAXIS: heparin   Injectable 5000 Unit(s) SubCutaneous every 8 hours    GI PROPHYLAXIS: pantoprazole    Tablet 40 milliGRAM(s) Oral before breakfast    ANTICOAGULATION:   ANTIBIOTICS:            LAB/STUDIES:  Labs:  CAPILLARY BLOOD GLUCOSE      POCT Blood Glucose.: 185 mg/dL (05 Jul 2025 22:01)  POCT Blood Glucose.: 266 mg/dL (05 Jul 2025 17:30)  POCT Blood Glucose.: 289 mg/dL (05 Jul 2025 11:29)  POCT Blood Glucose.: 114 mg/dL (05 Jul 2025 07:51)                          7.3    12.52 )-----------( 109      ( 05 Jul 2025 11:01 )             23.4         07-04    134[L]  |  97[L]  |  53[H]  ----------------------------<  128[H]  4.8   |  27  |  1.4          LFTs:     Blood Gas Arterial, Lactate: 1.1 mmol/L (07-04-25 @ 11:49)  Lactate, Blood: 1.6 mmol/L (07-03-25 @ 22:19)  Blood Gas Venous - Lactate: 3.4 mmol/L (07-03-25 @ 15:27)    ABG - ( 04 Jul 2025 11:49 )  pH: 7.53  /  pCO2: 36    /  pO2: 76    / HCO3: 30    / Base Excess: 7.0   /  SaO2: 98.0            ABG - ( 29 Jun 2025 16:21 )  pH: 7.62  /  pCO2: 37    /  pO2: 87    / HCO3: 38    / Base Excess: 15.5  /  SaO2: 99.1              Coags:            Urinalysis Basic - ( 04 Jul 2025 20:23 )    Color: x / Appearance: x / SG: x / pH: x  Gluc: 128 mg/dL / Ketone: x  / Bili: x / Urobili: x   Blood: x / Protein: x / Nitrite: x   Leuk Esterase: x / RBC: x / WBC x   Sq Epi: x / Non Sq Epi: x / Bacteria: x    IMAGING:    ACCESS/ DEVICES:  [ ] Peripheral IV  [ ] Central Venous Line	[ ] R	[ ] L	[ ] IJ	[ ] Fem	[ ] SC	Placed:   [ ] Arterial Line		[ ] R	[ ] L	[ ] Fem	[ ] Rad	[ ] Ax	Placed:   [ ] PICC:					[ ] Mediport  [ ] Urinary Catheter,  Date Placed:   [ ] Chest tube: [ ] Right, [ ] Left  [ ] NOE/Alcides Drains

## 2025-07-07 ENCOUNTER — TRANSCRIPTION ENCOUNTER (OUTPATIENT)
Age: 85
End: 2025-07-07

## 2025-07-07 PROBLEM — E11.9 TYPE 2 DIABETES MELLITUS WITHOUT COMPLICATIONS: Chronic | Status: ACTIVE | Noted: 2025-07-03

## 2025-07-07 PROBLEM — E78.5 HYPERLIPIDEMIA, UNSPECIFIED: Chronic | Status: ACTIVE | Noted: 2025-07-03

## 2025-07-07 PROBLEM — I10 ESSENTIAL (PRIMARY) HYPERTENSION: Chronic | Status: ACTIVE | Noted: 2025-07-03

## 2025-07-07 LAB
GLUCOSE BLDC GLUCOMTR-MCNC: 243 MG/DL — HIGH (ref 70–99)
GLUCOSE BLDC GLUCOMTR-MCNC: 245 MG/DL — HIGH (ref 70–99)
GLUCOSE BLDC GLUCOMTR-MCNC: 292 MG/DL — HIGH (ref 70–99)
GLUCOSE BLDC GLUCOMTR-MCNC: 321 MG/DL — HIGH (ref 70–99)
GLUCOSE BLDC GLUCOMTR-MCNC: 78 MG/DL — SIGNIFICANT CHANGE UP (ref 70–99)

## 2025-07-07 PROCEDURE — 99232 SBSQ HOSP IP/OBS MODERATE 35: CPT

## 2025-07-07 PROCEDURE — 74174 CTA ABD&PLVS W/CONTRAST: CPT | Mod: 26

## 2025-07-07 RX ORDER — TIOTROPIUM BROMIDE INHALATION SPRAY 3.12 UG/1
2 SPRAY, METERED RESPIRATORY (INHALATION)
Qty: 0 | Refills: 0 | DISCHARGE
Start: 2025-07-07

## 2025-07-07 RX ORDER — INSULIN LISPRO 100 U/ML
12 INJECTION, SOLUTION INTRAVENOUS; SUBCUTANEOUS ONCE
Refills: 0 | Status: DISCONTINUED | OUTPATIENT
Start: 2025-07-07 | End: 2025-07-08

## 2025-07-07 RX ORDER — PREDNISONE 20 MG/1
5 TABLET ORAL
Qty: 0 | Refills: 0 | DISCHARGE
Start: 2025-07-07

## 2025-07-07 RX ORDER — ACETAMINOPHEN 500 MG/5ML
2 LIQUID (ML) ORAL
Qty: 0 | Refills: 0 | DISCHARGE
Start: 2025-07-07

## 2025-07-07 RX ADMIN — METOPROLOL SUCCINATE 12.5 MILLIGRAM(S): 50 TABLET, EXTENDED RELEASE ORAL at 17:47

## 2025-07-07 RX ADMIN — INSULIN GLARGINE-YFGN 10 UNIT(S): 100 INJECTION, SOLUTION SUBCUTANEOUS at 21:40

## 2025-07-07 RX ADMIN — INSULIN LISPRO 5 UNIT(S): 100 INJECTION, SOLUTION INTRAVENOUS; SUBCUTANEOUS at 11:47

## 2025-07-07 RX ADMIN — HEPARIN SODIUM 5000 UNIT(S): 1000 INJECTION INTRAVENOUS; SUBCUTANEOUS at 21:38

## 2025-07-07 RX ADMIN — INSULIN LISPRO 10: 100 INJECTION, SOLUTION INTRAVENOUS; SUBCUTANEOUS at 11:46

## 2025-07-07 RX ADMIN — METOPROLOL SUCCINATE 12.5 MILLIGRAM(S): 50 TABLET, EXTENDED RELEASE ORAL at 05:27

## 2025-07-07 RX ADMIN — TAMSULOSIN HYDROCHLORIDE 0.4 MILLIGRAM(S): 0.4 CAPSULE ORAL at 21:39

## 2025-07-07 RX ADMIN — Medication 1 APPLICATION(S): at 05:33

## 2025-07-07 RX ADMIN — INSULIN LISPRO 5 UNIT(S): 100 INJECTION, SOLUTION INTRAVENOUS; SUBCUTANEOUS at 17:45

## 2025-07-07 RX ADMIN — HEPARIN SODIUM 5000 UNIT(S): 1000 INJECTION INTRAVENOUS; SUBCUTANEOUS at 05:27

## 2025-07-07 RX ADMIN — Medication 5 MILLIGRAM(S): at 21:39

## 2025-07-07 RX ADMIN — INSULIN LISPRO 8: 100 INJECTION, SOLUTION INTRAVENOUS; SUBCUTANEOUS at 17:44

## 2025-07-07 RX ADMIN — Medication 650 MILLIGRAM(S): at 05:36

## 2025-07-07 RX ADMIN — HEPARIN SODIUM 5000 UNIT(S): 1000 INJECTION INTRAVENOUS; SUBCUTANEOUS at 14:20

## 2025-07-07 RX ADMIN — Medication 1 DOSE(S): at 05:34

## 2025-07-07 RX ADMIN — Medication 40 MILLIGRAM(S): at 05:27

## 2025-07-07 RX ADMIN — Medication 1 DOSE(S): at 17:48

## 2025-07-07 RX ADMIN — Medication 650 MILLIGRAM(S): at 17:47

## 2025-07-07 RX ADMIN — BUMETANIDE 1 MILLIGRAM(S): 1 TABLET ORAL at 05:27

## 2025-07-07 RX ADMIN — PREDNISONE 40 MILLIGRAM(S): 20 TABLET ORAL at 05:27

## 2025-07-07 RX ADMIN — Medication 650 MILLIGRAM(S): at 11:48

## 2025-07-07 RX ADMIN — TIOTROPIUM BROMIDE INHALATION SPRAY 2 PUFF(S): 3.12 SPRAY, METERED RESPIRATORY (INHALATION) at 08:53

## 2025-07-07 RX ADMIN — POLYETHYLENE GLYCOL 3350 17 GRAM(S): 17 POWDER, FOR SOLUTION ORAL at 11:49

## 2025-07-07 RX ADMIN — Medication 50 MILLILITER(S): at 10:01

## 2025-07-07 RX ADMIN — Medication 81 MILLIGRAM(S): at 11:48

## 2025-07-07 RX ADMIN — Medication 650 MILLIGRAM(S): at 05:26

## 2025-07-07 RX ADMIN — Medication 1000 UNIT(S): at 11:48

## 2025-07-07 NOTE — OCCUPATIONAL THERAPY INITIAL EVALUATION ADULT - SPECIFY REASON(S)
Chart reviewed. Attempted bedside OT assessment, Pt encountered soiled in bed with staff assisting. OT to f/u at later time if time permits
Attempted to see pt for OT evaluation, on chart review, pt on bedrest per vascular team. Will hold and follow up
Pt encountered semi conn in bed, on the phone, therapist waited then attempted to see the pt, pt was very upset and told therapist to "get out of the room." Will f/u when appropriate.

## 2025-07-07 NOTE — DISCHARGE NOTE PROVIDER - HOSPITAL COURSE
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 Sathya Whitfield, diabetes, HTN, HLD, recent pneumonia and UTI, recent admission for acute hypoxic and hypercapnic respiratory failure presents to the ED for evaluation after syncopal episode.   CT Angio Abdomen and Pelvis w/ IV Cont (07.03.25 @ 15:51)      Atherosclerotic changes. Status post aortobiiliac stent graft.   Additional stents seen in the superior mesenteric artery and bilateral   renal arteries. Native aneurysm sac measures 6.3 cm (series 7 image 72)   with visible endoleak at the aortic bifurcation (series 7 image 92)   beginning in the arterial phase. There is accumulation of contrast into   the left aspect of the aneurysm sac in the delayed phase (8/87).    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 Pt underwent Right iliac stent. Final angiogram showed sealed endoleak.   Post op pt went to SICU for close observation.   Pt was evaluated by PT with recommendation for rehab. Downgraded to regular floor.   Repeat CT scan was performed prior to discharge.           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 Sathya Whitfield, diabetes, HTN, HLD, recent pneumonia and UTI, recent admission for acute hypoxic and hypercapnic respiratory failure presents to the ED for evaluation after syncopal episode.   CT Angio Abdomen and Pelvis w/ IV Cont (07.03.25 @ 15:51)      Atherosclerotic changes. Status post aortobiiliac stent graft.   Additional stents seen in the superior mesenteric artery and bilateral   renal arteries. Native aneurysm sac measures 6.3 cm (series 7 image 72)   with visible endoleak at the aortic bifurcation (series 7 image 92)   beginning in the arterial phase. There is accumulation of contrast into   the left aspect of the aneurysm sac in the delayed phase (8/87).    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 Pt underwent Right iliac stent. Final angiogram showed sealed endoleak.   Post op pt went to SICU for close observation.   Pt was evaluated by PT with recommendation for rehab. Downgraded to regular floor.   Repeat CT scan was performed prior to discharge.  Eliquis (home dose) was resumed.

## 2025-07-07 NOTE — DISCHARGE NOTE PROVIDER - NSDCFUSCHEDAPPT_GEN_ALL_CORE_FT
Cody Sweet  [facility]  PULMMED [address]  Scheduled Appointment: 07/09/2025    Gian Guerrero  [facility] PreAdmits  Scheduled Appointment: 07/14/2025    Gian Guerrero  [facility]  HEMONC  [address]  Scheduled Appointment: 07/14/2025     Cody Sweet  [facility]  PULMMED [address]  Scheduled Appointment: 07/09/2025    Gian Guerrero  [facility] PreAdmits  Scheduled Appointment: 07/14/2025    Gian Guerrero  [facility]  [address]MONC  [address]  Scheduled Appointment: 07/14/2025    Angelo Michel  [facility]  VASCULAR [address]  Scheduled Appointment: 07/22/2025

## 2025-07-07 NOTE — DISCHARGE NOTE PROVIDER - NSDCMRMEDTOKEN_GEN_ALL_CORE_FT
albuterol 90 mcg/inh inhalation aerosol: 2 puff(s) inhaled every 6 hours as needed for Bronchospasm  amLODIPine 5 mg oral tablet: 1 tab(s) orally once a day  apixaban 2.5 mg oral tablet: 1 tab(s) orally every 12 hours  aspirin 81 mg oral capsule: 1 cap(s) orally once a day  bumetanide 1 mg oral tablet: 1 tab(s) orally once a day  Ditropan 5 mg oral tablet: 1 tab(s) orally once a day (at bedtime)  docusate sodium 100 mg oral tablet: 3 tab(s) orally once a day (at bedtime)  fluticasone 50 mcg/inh nasal spray: 1 spray(s) nasal 2 times a day  Incruse Ellipta 62.5 mcg/inh inhalation powder: 1 puff(s) inhaled once a day  meclizine 25 mg oral tablet: 1 tab(s) orally once a day as needed for  dizziness  metoprolol succinate 25 mg oral tablet, extended release: 2 tab(s) orally once a day  MiraLax oral powder for reconstitution: 17 gram(s) orally once a day (at bedtime)  omeprazole 40 mg oral delayed release capsule: 1 cap(s) orally once a day  senna (sennosides) 8.6 mg oral tablet: 1 tab(s) orally once a day  Symbicort 160 mcg-4.5 mcg/inh inhalation aerosol: 2 puff(s) inhaled 2 times a day  tamsulosin 0.4 mg oral capsule: 1 cap(s) orally once a day (at bedtime)  tiotropium 2.5 mcg/inh inhalation aerosol: 2 puff(s) inhaled once a day  Trulicity Pen 1.5 mg/0.5 mL subcutaneous solution: 1.5 milligram(s) subcutaneous once a week  Vitamin D3 25 mcg (1000 intl units) oral tablet: 1 tab(s) orally once a day   acetaminophen 325 mg oral tablet: 2 tab(s) orally every 6 hours  albuterol 90 mcg/inh inhalation aerosol: 2 puff(s) inhaled every 6 hours as needed for Bronchospasm  amLODIPine 5 mg oral tablet: 1 tab(s) orally once a day  apixaban 2.5 mg oral tablet: 1 tab(s) orally every 12 hours  aspirin 81 mg oral capsule: 1 cap(s) orally once a day  bumetanide 1 mg oral tablet: 1 tab(s) orally once a day  Ditropan 5 mg oral tablet: 1 tab(s) orally once a day (at bedtime)  docusate sodium 100 mg oral tablet: 3 tab(s) orally once a day (at bedtime)  fluticasone 50 mcg/inh nasal spray: 1 spray(s) nasal 2 times a day  Incruse Ellipta 62.5 mcg/inh inhalation powder: 1 puff(s) inhaled once a day  meclizine 25 mg oral tablet: 1 tab(s) orally once a day as needed for  dizziness  metoprolol succinate 25 mg oral tablet, extended release: 2 tab(s) orally once a day  MiraLax oral powder for reconstitution: 17 gram(s) orally once a day (at bedtime)  omeprazole 40 mg oral delayed release capsule: 1 cap(s) orally once a day  predniSONE: 5 milligram(s) orally once a day predniSONE 5 mg oral tablet: 1 tab(s) orally once a day 8 tablets daily till 7/02/25   	6 tablets daily starting from 7/03/25 till 7/09/25 for 7 days   	4 tablets daily starting from 7/10/25 till 7/16/25 for 7 days   	then 2 tablets starting 7/17/25 till 7/24/25 for 7 days, then 1 tab daily for 7 days, then stop  senna (sennosides) 8.6 mg oral tablet: 1 tab(s) orally once a day  Symbicort 160 mcg-4.5 mcg/inh inhalation aerosol: 2 puff(s) inhaled 2 times a day  tamsulosin 0.4 mg oral capsule: 1 cap(s) orally once a day (at bedtime)  tiotropium 2.5 mcg/inh inhalation aerosol: 2 puff(s) inhaled once a day  Trulicity Pen 1.5 mg/0.5 mL subcutaneous solution: 1.5 milligram(s) subcutaneous once a week  Vitamin D3 25 mcg (1000 intl units) oral tablet: 1 tab(s) orally once a day

## 2025-07-07 NOTE — DISCHARGE NOTE PROVIDER - NSDCCPCAREPLAN_GEN_ALL_CORE_FT
PRINCIPAL DISCHARGE DIAGNOSIS  Diagnosis: Ruptured abdominal aortic aneurysm (AAA)  Assessment and Plan of Treatment: S/P right iliac limb stent. May shower. Ambulate. Avoid heavy lifting, driving, exercising until follow up with Dr. Michel in the office on Tuesday 7/22 at 9 AM.   Go to ED with abdominal pain, back pain, chest pain, shortness of breath, dizziness, syncope     PRINCIPAL DISCHARGE DIAGNOSIS  Diagnosis: Ruptured abdominal aortic aneurysm (AAA)  Assessment and Plan of Treatment: S/P right iliac limb stent. May shower. Ambulate. Avoid heavy lifting, driving, exercising until follow up with Dr. Michel in the office on Tuesday 7/22 at 9 AM.   You can resume Eliquis  Go to ED with abdominal pain, back pain, chest pain, shortness of breath, dizziness, syncope

## 2025-07-07 NOTE — PROGRESS NOTE ADULT - SUBJECTIVE AND OBJECTIVE BOX
MELANYFERMIN ESCOBEDO  84y Female    CHIEF COMPLAINT:    Patient is a 84y old  Female who presents with a chief complaint of ruptured AAA (07 Jul 2025 11:12)      INTERVAL HPI/OVERNIGHT EVENTS:    Patient seen and examined. No acute events overnight. last BM was 2 days ago, states she wants the sleep apnea machine at night.     ROS: All other systems are negative.    Vital Signs:    T(F): 97.6 (07-07-25 @ 08:08), Max: 97.8 (07-06-25 @ 16:10)  HR: 83 (07-07-25 @ 08:08) (67 - 86)  BP: 140/67 (07-07-25 @ 08:08) (123/67 - 142/72)  RR: 18 (07-07-25 @ 08:08) (18 - 18)  SpO2: 97% (07-07-25 @ 08:08) (94% - 99%)  I&O's Summary    06 Jul 2025 07:01  -  07 Jul 2025 07:00  --------------------------------------------------------  IN: 0 mL / OUT: 1525 mL / NET: -1525 mL      Daily     Daily   CAPILLARY BLOOD GLUCOSE      POCT Blood Glucose.: 292 mg/dL (07 Jul 2025 11:11)  POCT Blood Glucose.: 78 mg/dL (07 Jul 2025 07:36)  POCT Blood Glucose.: 131 mg/dL (06 Jul 2025 21:26)  POCT Blood Glucose.: 203 mg/dL (06 Jul 2025 16:51)      PHYSICAL EXAM:    GENERAL: NAD	  HEENT: moist oral mucosa,  Neck: Supple  Cardiovascular: Normal S1/S2,  Respiratory: b/l chest rise and fall  Skin: No rashes, No ecchymoses, No cyanosis  Extremities: Normal range of motion, No clubbing, cyanosis or edema   Vascular:  2+ distal radial pulses b/l. Weak DP pulses b/l.  Neurologic: Non-focal, AOx3      Consultant(s) Notes Reviewed:  [x ] YES  [ ] NO  Care Discussed with Consultants/Other Providers [ x] YES  [ ] NO    LABS:                        7.5    10.32 )-----------( 123      ( 06 Jul 2025 22:15 )             24.4     07-06    136  |  97[L]  |  47[H]  ----------------------------<  139[H]  4.3   |  28  |  1.4    Ca    9.0      06 Jul 2025 22:15  Phos  3.0     07-06  Mg     2.1     07-06                RADIOLOGY & ADDITIONAL TESTS:      Imaging or report Personally Reviewed:  [x] YES  [ ] NO  EKG reviewed: [x] YES  [ ] NO    Medications:  Standing  acetaminophen     Tablet .. 650 milliGRAM(s) Oral every 6 hours  aspirin  chewable 81 milliGRAM(s) Oral daily  bumetanide 1 milliGRAM(s) Oral daily  chlorhexidine 2% Cloths 1 Application(s) Topical <User Schedule>  cholecalciferol 1000 Unit(s) Oral daily  fluticasone propionate/ salmeterol 250-50 MICROgram(s) Diskus 1 Dose(s) Inhalation two times a day  heparin   Injectable 5000 Unit(s) SubCutaneous every 8 hours  insulin glargine Injectable (LANTUS) 10 Unit(s) SubCutaneous at bedtime  insulin lispro (ADMELOG) corrective regimen sliding scale   SubCutaneous three times a day before meals  insulin lispro Injectable (ADMELOG) 5 Unit(s) SubCutaneous three times a day with meals  metoprolol tartrate 12.5 milliGRAM(s) Oral every 12 hours  pantoprazole    Tablet 40 milliGRAM(s) Oral before breakfast  polyethylene glycol 3350 17 Gram(s) Oral daily  predniSONE   Tablet   Oral   predniSONE   Tablet 40 milliGRAM(s) Oral daily  senna 2 Tablet(s) Oral at bedtime  sodium chloride 0.9%. 1000 milliLiter(s) IV Continuous <Continuous>  tamsulosin 0.4 milliGRAM(s) Oral at bedtime  tiotropium 2.5 MICROgram(s) Inhaler 2 Puff(s) Inhalation daily    PRN Meds  albuterol    90 MICROgram(s) HFA Inhaler 2 Puff(s) Inhalation every 6 hours PRN  zolpidem 5 milliGRAM(s) Oral at bedtime PRN  zolpidem 5 milliGRAM(s) Oral at bedtime PRN

## 2025-07-07 NOTE — DISCHARGE NOTE PROVIDER - CARE PROVIDER_API CALL
Angelo Michel.  Vascular Surgery  [address] [place] [address], [address]  [place] [address]  Phone: [phone]  Fax: [phone]  Scheduled Appointment: 07/22/2025 09:00 AM

## 2025-07-07 NOTE — DISCHARGE NOTE PROVIDER - DETAILS OF MALNUTRITION DIAGNOSIS/DIAGNOSES
This patient has been assessed with a concern for Malnutrition and was treated during this hospitalization for the following Nutrition diagnosis/diagnoses:     -  07/04/2025: Severe protein-calorie malnutrition  
This patient has been assessed with a concern for Malnutrition and was treated during this hospitalization for the following Nutrition diagnosis/diagnoses:     -  07/04/2025: Severe protein-calorie malnutrition

## 2025-07-07 NOTE — ADVANCED PRACTICE NURSE CONSULT - ASSESSMENT
History of Present Illness:   84-year-old female PMH SCLC in remission, left lung adenocarcinoma status post LLL lobectomy, PE on apixaban, juxtarenal AAA status post endovascular repair with Dr. Sathya Whitfield, diabetes, HTN, HLD, recent pneumonia and UTI, recent admission for acute hypoxic and hypercapnic respiratory failure presents to the ED for evaluation after syncopal episode.  Per daughter at bedside, patient was brought in from [facility] after a syncopal episode while seated in her wheelchair.  Patient was found by staff slumped over in her wheelchair and appeared cyanotic.  EMS was activated and upon EMS arrival patient was awake, A&O x 3 and hypotensive to 80s/40s.  Patient received a fluid bolus by EMS and was brought to the emergency department.  On ED arrival patient complaining of abdominal pain.  Daughter also states patient seemed more confused over the last few weeks and was not acting herself.  Patient denies chest pain, shortness of breath, diaphoresis, headache, dizziness, nausea, vomiting, diarrhea or any urinary symptoms.         Patient received lying in bed. Alert and oriented. Limited mobility. High risk for pressure injury development and progression.    Type of Wound: Unstageable Pressure Injury  Location: Coccyx (present on admission, documented as right buttock stage 3, inappropriate documentation)  Measurements: ~1pbc9xm  Tunneling/ Undermining: No  Wound bed: Yellow adherent tissue with scant pink tissue present  Wound edges: Intact  Periwound: Macerated, hyperpigmented  Wound exudate: None  Wound odor: No  Induration, erythema, warmth: No  Wound pain: No    Skin to bilateral heels intact    Skin assessed with primary RN bedside

## 2025-07-07 NOTE — PROGRESS NOTE ADULT - ASSESSMENT
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 Sathya Whitfield, diabetes, HTN, HLD, recent pneumonia and UTI, recent admission for acute hypoxic and hypercapnic respiratory failure presents to the ED for evaluation after syncopal episode. Pt admitted to SICU and is s/p insertion of right iliac stent for type 3 endoleak resulting in AAA rupture. Medicine consulted comanagement.     # Ruptured AAA type 3 endoleak s/p repair with R iliac stent   #Hx PAD  #Hx HTN/HLD  #Chronic Afib  #T2DM  #New ADRIA on CKD3a (improved)  #Normocytic anemia   #Moderate MR  #Thrombocytopenia (stable)  #Hx SCLC s/p LLL lobectomy (in remission)  #Hx Non Hodgkin lymphoma   #Hx COPD   #Hx PE     Plan:  - Management per Surgery  EKG (7/3): Afib, rate vent rate 76, Bifascicular block  CXR (7/3): Unchanged small left basilar opacity/pleural effusion.  CTA A/P w/ IV con (7/3): ruptured/leaking abdominal aortic aneurysm.  - Continuous telemetry monitoring   - PT/OT/PMR when appropriate   - Multimodal pain regimen   - increase bowel regimen, miralax 17g PO bid  - Monitor platelet count. H&H and for signs of bleeding   - Monitor for fever and WBC trend   - Incentive spirometer 10x/hr while awake  - c/w inhalers, Duoneb q6h PRN for SOB/Wheezing, Supplemental oxygen PRN to maintain PO 90-92%  - c/w slow Prednisone taper (4 days are sufficient for each decrease by 10mg) (currently on prednisone 40mg po daily)  - Currently q4h BLE DP/PT assessments, per primary team / SBP goal ~100-140  - Holding AC/AP  - FG TIDAC/HS  - ISS for glucose >200 (target 110-180 while inpatient)  - Hypoglycemic protocol PRN  - Calculate 24H insulin requirement daily, if indicated   - monitor renal fxn, I/O's and electrolytes daily; K >4, Mg >2   - renally dose medications  - avoid nephrotoxic medications   - encourage PO intake and avoid dehydration / IVF PRN  - c/w Lopressor 12.5mg BID   - c/w ASA qd  - c/w Flomax   - DVT PPx HSQ        If any questions, please send a message or call on [employer] Teams.    Management per Surgery, Medicine for co-management.    Total time spent to complete patient's bedside assessment, reviewed medical chart, discussed medical plan of care with team was more than 35 minutes with >50% of time spent face to face with patient, discussion with patient/family and/or coordination of care. This time excludes teaching time and/or separately reported services.

## 2025-07-07 NOTE — PROGRESS NOTE ADULT - SUBJECTIVE AND OBJECTIVE BOX
GENERAL SURGERY PROGRESS NOTE    Patient: FERMIN DIAZ , 84y ([birth year])Female   MRN: [id number]  Location: [address]  Visit: 07-03-25 Inpatient  Date: 07-07-25 @ 00:38    Hospital Day #: 4  Post-Op Day #: 3    Procedure/Dx/Injuries: EVAR for ruptured abdominal aortic aneurysm    Events of past 24 hours: no acute events overnight. pt is eating/drinking. Groin bandages removed.    PAST MEDICAL & SURGICAL HISTORY:  Diverticulitis      Obesity      COPD (chronic obstructive pulmonary disease)      GERD (gastroesophageal reflux disease)      Lung cancer      ARTIE (obstructive sleep apnea)  NO CPAP MACHINE PER PT.      Cancer of kidney      Cancer of thyroid      Former smoker      NHL (non-Hodgkin's lymphoma)  "low grade" per heme/ onc note      History of abdominal aortic aneurysm (AAA)      Kidney stones      [place] (hard of hearing)      DM (diabetes mellitus)      HLD (hyperlipidemia)      HTN (hypertension)      History of surgery  LEFT LOWER LOBECTOMY      History of surgery  BILATERAL KNEE REPLACEMENT      History of surgery  CATARACT RIGHT EYE      History of surgery  TUBAL LIGATION      History of surgery  CYST REMOVED  RIGHT BREAST      History of surgery  CHOLECYSTECOMY      History of surgery  RIGHT PARTIAL NEPHRECTOMY      History of surgery  BILATERAL CATARACT SURGERY      History of back surgery      H/O lithotripsy      H/O partial thyroidectomy          Vitals:   T(F): 97.8 (07-06-25 @ 23:34), Max: 97.8 (07-06-25 @ 16:10)  HR: 67 (07-06-25 @ 23:34)  BP: 127/64 (07-06-25 @ 23:34)  RR: 18 (07-06-25 @ 23:34)  SpO2: 94% (07-06-25 @ 23:34)      Diet, DASH/TLC:   Sodium & Cholesterol Restricted  Consistent Carbohydrate No Snacks (CSTCHO)  Supplement Feeding Modality:  Oral  Ensure Surgery Cans or Servings Per Day:  2       Frequency:  Daily      Fluids:     I & O's:    07-05-25 @ 07:01  -  07-06-25 @ 07:00  --------------------------------------------------------  IN:    Lactated Ringers: 150 mL    Oral Fluid: 220 mL  Total IN: 370 mL    OUT:    Indwelling Catheter - Urethral (mL): 300 mL    Voided (mL): 950 mL  Total OUT: 1250 mL    Total NET: -880 mL        Bowel Movement: : [] YES [] NO  Flatus: : [] YES [] NO    PHYSICAL EXAM:  General: NAD, AAOx3, calm and cooperative  HEENT: NCAT, MARIO, EOMI, Trachea ML, Neck supple  Cardiac: RRR S1, S2, no Murmurs, rubs or gallops  Respiratory: CTAB, normal respiratory effort, breath sounds equal BL, no wheeze, rhonchi or crackles  Abdomen: Soft, non-distended, non-tender, no rebound, no guarding. +BS.  Rectal: Good tone, +stool, no blood, no allan-anal masses/lesions, no fistulas, fissures, hemorrhoids  Musculoskeletal: Strength 5/5 BL UE/LE, ROM intact, compartments soft  Neuro: Sensation grossly intact and equal throughout, no focal deficits  Vascular: Pulses 2+ throughout, extremities well perfused  Skin: Warm/dry, normal color, no jaundice  Incision/wound: healing well, dressings in place, clean, dry and intact    MEDICATIONS  (STANDING):  acetaminophen     Tablet .. 650 milliGRAM(s) Oral every 6 hours  aspirin  chewable 81 milliGRAM(s) Oral daily  bumetanide 1 milliGRAM(s) Oral daily  chlorhexidine 2% Cloths 1 Application(s) Topical <User Schedule>  cholecalciferol 1000 Unit(s) Oral daily  fluticasone propionate/ salmeterol 250-50 MICROgram(s) Diskus 1 Dose(s) Inhalation two times a day  heparin   Injectable 5000 Unit(s) SubCutaneous every 8 hours  insulin glargine Injectable (LANTUS) 10 Unit(s) SubCutaneous at bedtime  insulin lispro (ADMELOG) corrective regimen sliding scale   SubCutaneous three times a day before meals  insulin lispro Injectable (ADMELOG) 5 Unit(s) SubCutaneous three times a day with meals  metoprolol tartrate 12.5 milliGRAM(s) Oral every 12 hours  pantoprazole    Tablet 40 milliGRAM(s) Oral before breakfast  polyethylene glycol 3350 17 Gram(s) Oral daily  predniSONE   Tablet   Oral   predniSONE   Tablet 40 milliGRAM(s) Oral daily  senna 2 Tablet(s) Oral at bedtime  tamsulosin 0.4 milliGRAM(s) Oral at bedtime  tiotropium 2.5 MICROgram(s) Inhaler 2 Puff(s) Inhalation daily    MEDICATIONS  (PRN):  albuterol    90 MICROgram(s) HFA Inhaler 2 Puff(s) Inhalation every 6 hours PRN Shortness of Breath and/or Wheezing  zolpidem 5 milliGRAM(s) Oral at bedtime PRN Insomnia  zolpidem 5 milliGRAM(s) Oral at bedtime PRN Insomnia      DVT PROPHYLAXIS: heparin   Injectable 5000 Unit(s) SubCutaneous every 8 hours    GI PROPHYLAXIS: pantoprazole    Tablet 40 milliGRAM(s) Oral before breakfast    ANTICOAGULATION:   ANTIBIOTICS:        LAB/STUDIES:  Labs:  CAPILLARY BLOOD GLUCOSE      POCT Blood Glucose.: 131 mg/dL (06 Jul 2025 21:26)  POCT Blood Glucose.: 203 mg/dL (06 Jul 2025 16:51)  POCT Blood Glucose.: 154 mg/dL (06 Jul 2025 12:13)  POCT Blood Glucose.: 133 mg/dL (06 Jul 2025 08:16)                          7.5    10.32 )-----------( 123      ( 06 Jul 2025 22:15 )             24.4       Auto Immature Granulocyte %: 0.7 % (07-06-25 @ 22:15)  Auto Immature Granulocyte %: 0.5 % (07-06-25 @ 05:37)    07-06    136  |  97[L]  |  47[H]  ----------------------------<  139[H]  4.3   |  28  |  1.4      Calcium: 9.0 mg/dL (07-06-25 @ 22:15)      LFTs:     Blood Gas Arterial, Lactate: 1.1 mmol/L (07-04-25 @ 11:49)    ABG - ( 04 Jul 2025 11:49 )  pH: 7.53  /  pCO2: 36    /  pO2: 76    / HCO3: 30    / Base Excess: 7.0   /  SaO2: 98.0      Urinalysis Basic - ( 06 Jul 2025 22:15 )    Color: x / Appearance: x / SG: x / pH: x  Gluc: 139 mg/dL / Ketone: x  / Bili: x / Urobili: x   Blood: x / Protein: x / Nitrite: x   Leuk Esterase: x / RBC: x / WBC x   Sq Epi: x / Non Sq Epi: x / Bacteria: x GENERAL SURGERY PROGRESS NOTE    Patient: FERMIN DIAZ , 84y ([birth year])Female   MRN: [id number]  Location: [address]  Visit: 07-03-25 Inpatient  Date: 07-07-25 @ 00:38    Hospital Day #: 4  Post-Op Day #: 3    Procedure/Dx/Injuries: EVAR for ruptured abdominal aortic aneurysm    Events of past 24 hours: no acute events overnight. pt is eating/drinking. Groin bandages removed. Pt had difficulty getting up from chair to bed; required assistance of staff in addition to walker.    PAST MEDICAL & SURGICAL HISTORY:  Diverticulitis      Obesity      COPD (chronic obstructive pulmonary disease)      GERD (gastroesophageal reflux disease)      Lung cancer      ARTIE (obstructive sleep apnea)  NO CPAP MACHINE PER PT.      Cancer of kidney      Cancer of thyroid      Former smoker      NHL (non-Hodgkin's lymphoma)  "low grade" per heme/ onc note      History of abdominal aortic aneurysm (AAA)      Kidney stones      [place] (hard of hearing)      DM (diabetes mellitus)      HLD (hyperlipidemia)      HTN (hypertension)      History of surgery  LEFT LOWER LOBECTOMY      History of surgery  BILATERAL KNEE REPLACEMENT      History of surgery  CATARACT RIGHT EYE      History of surgery  TUBAL LIGATION      History of surgery  CYST REMOVED  RIGHT BREAST      History of surgery  CHOLECYSTECOMY      History of surgery  RIGHT PARTIAL NEPHRECTOMY      History of surgery  BILATERAL CATARACT SURGERY      History of back surgery      H/O lithotripsy      H/O partial thyroidectomy          Vitals:   T(F): 97.8 (07-06-25 @ 23:34), Max: 97.8 (07-06-25 @ 16:10)  HR: 67 (07-06-25 @ 23:34)  BP: 127/64 (07-06-25 @ 23:34)  RR: 18 (07-06-25 @ 23:34)  SpO2: 94% (07-06-25 @ 23:34)      Diet, DASH/TLC:   Sodium & Cholesterol Restricted  Consistent Carbohydrate No Snacks (CSTCHO)  Supplement Feeding Modality:  Oral  Ensure Surgery Cans or Servings Per Day:  2       Frequency:  Daily      Fluids:     I & O's:    07-05-25 @ 07:01  -  07-06-25 @ 07:00  --------------------------------------------------------  IN:    Lactated Ringers: 150 mL    Oral Fluid: 220 mL  Total IN: 370 mL    OUT:    Indwelling Catheter - Urethral (mL): 300 mL    Voided (mL): 950 mL  Total OUT: 1250 mL    Total NET: -880 mL      PHYSICAL EXAM:  General: NAD, AAOx3, calm and cooperative  HEENT: NCAT, MARIO, EOMI, Trachea ML, Neck supple  Cardiac: RRR S1, S2, no Murmurs, rubs or gallops  Respiratory: CTAB, normal respiratory effort, breath sounds equal BL, no wheeze, rhonchi or crackles  Abdomen: Soft, non-distended, non-tender, no rebound, no guarding. +BS.  Rectal: Good tone, +stool, no blood, no allan-anal masses/lesions, no fistulas, fissures, hemorrhoids  Musculoskeletal: Strength 5/5 BL UE/LE, ROM intact, compartments soft  Neuro: Sensation grossly intact and equal throughout, no focal deficits  Vascular: Pulses 2+ throughout, extremities well perfused  Skin: Warm/dry, normal color, no jaundice  Incision/wound: healing well, dressings in place, clean, dry and intact    MEDICATIONS  (STANDING):  acetaminophen     Tablet .. 650 milliGRAM(s) Oral every 6 hours  aspirin  chewable 81 milliGRAM(s) Oral daily  bumetanide 1 milliGRAM(s) Oral daily  chlorhexidine 2% Cloths 1 Application(s) Topical <User Schedule>  cholecalciferol 1000 Unit(s) Oral daily  fluticasone propionate/ salmeterol 250-50 MICROgram(s) Diskus 1 Dose(s) Inhalation two times a day  heparin   Injectable 5000 Unit(s) SubCutaneous every 8 hours  insulin glargine Injectable (LANTUS) 10 Unit(s) SubCutaneous at bedtime  insulin lispro (ADMELOG) corrective regimen sliding scale   SubCutaneous three times a day before meals  insulin lispro Injectable (ADMELOG) 5 Unit(s) SubCutaneous three times a day with meals  metoprolol tartrate 12.5 milliGRAM(s) Oral every 12 hours  pantoprazole    Tablet 40 milliGRAM(s) Oral before breakfast  polyethylene glycol 3350 17 Gram(s) Oral daily  predniSONE   Tablet   Oral   predniSONE   Tablet 40 milliGRAM(s) Oral daily  senna 2 Tablet(s) Oral at bedtime  tamsulosin 0.4 milliGRAM(s) Oral at bedtime  tiotropium 2.5 MICROgram(s) Inhaler 2 Puff(s) Inhalation daily    MEDICATIONS  (PRN):  albuterol    90 MICROgram(s) HFA Inhaler 2 Puff(s) Inhalation every 6 hours PRN Shortness of Breath and/or Wheezing  zolpidem 5 milliGRAM(s) Oral at bedtime PRN Insomnia  zolpidem 5 milliGRAM(s) Oral at bedtime PRN Insomnia      DVT PROPHYLAXIS: heparin   Injectable 5000 Unit(s) SubCutaneous every 8 hours    GI PROPHYLAXIS: pantoprazole    Tablet 40 milliGRAM(s) Oral before breakfast    ANTICOAGULATION:   ANTIBIOTICS:        LAB/STUDIES:  Labs:  CAPILLARY BLOOD GLUCOSE      POCT Blood Glucose.: 131 mg/dL (06 Jul 2025 21:26)  POCT Blood Glucose.: 203 mg/dL (06 Jul 2025 16:51)  POCT Blood Glucose.: 154 mg/dL (06 Jul 2025 12:13)  POCT Blood Glucose.: 133 mg/dL (06 Jul 2025 08:16)                          7.5    10.32 )-----------( 123      ( 06 Jul 2025 22:15 )             24.4       Auto Immature Granulocyte %: 0.7 % (07-06-25 @ 22:15)  Auto Immature Granulocyte %: 0.5 % (07-06-25 @ 05:37)    07-06    136  |  97[L]  |  47[H]  ----------------------------<  139[H]  4.3   |  28  |  1.4      Calcium: 9.0 mg/dL (07-06-25 @ 22:15)      LFTs:     Blood Gas Arterial, Lactate: 1.1 mmol/L (07-04-25 @ 11:49)    ABG - ( 04 Jul 2025 11:49 )  pH: 7.53  /  pCO2: 36    /  pO2: 76    / HCO3: 30    / Base Excess: 7.0   /  SaO2: 98.0      Urinalysis Basic - ( 06 Jul 2025 22:15 )    Color: x / Appearance: x / SG: x / pH: x  Gluc: 139 mg/dL / Ketone: x  / Bili: x / Urobili: x   Blood: x / Protein: x / Nitrite: x   Leuk Esterase: x / RBC: x / WBC x   Sq Epi: x / Non Sq Epi: x / Bacteria: x

## 2025-07-07 NOTE — OCCUPATIONAL THERAPY INITIAL EVALUATION ADULT - PATIENT PROFILE REVIEW, REHAB EVAL
yes
Eval Attempted: 12:55pm; pt chart thoroughly reviewed prior to OT evaluation/yes
Pt's chart reviewed prior to OT eval./yes

## 2025-07-07 NOTE — ADVANCED PRACTICE NURSE CONSULT - RECOMMEDATIONS
Cleanse wound and periwound to coccyx with soap and water.   Pat dry, apply Medihoney, cover with Xeroform and abdominal pad twice a day and prn for soiling.     Maintain pressure injury prevention.   Keep skin clean.   Maintain incontinence care.   Monitor skin for changes and notify provider   Case discussed with primary RN bedside

## 2025-07-07 NOTE — PROGRESS NOTE ADULT - ASSESSMENT
84y F w/ PMHx of 84-year-old female PmHx SCLC, left lung adenocarcinoma status post LLL lobectomy, PE, juxtarenal AAA status post endovascular repair with Dr. Sahtya Whitfield, diabetes, HTN, HLD. Presented to the ED after a syncopal episode. Pt reported abdominal pain and was hypotensive (80/60). CT angio was consistent with a ruptured AAA. EVAR was performed and pt was stabilized. Shifted to the SICU for monitoring.    Plan:  q4 hour vascular checks - DP/PT signals bilaterally   - maintain normotension   - monitor b/l groin sites  - Pt saw patient; recommend d/c to rehab facility  - encourage ambulation  - dispo pending

## 2025-07-08 ENCOUNTER — TRANSCRIPTION ENCOUNTER (OUTPATIENT)
Age: 85
End: 2025-07-08

## 2025-07-08 VITALS
SYSTOLIC BLOOD PRESSURE: 163 MMHG | TEMPERATURE: 97 F | RESPIRATION RATE: 17 BRPM | OXYGEN SATURATION: 95 % | HEART RATE: 71 BPM | DIASTOLIC BLOOD PRESSURE: 78 MMHG

## 2025-07-08 DIAGNOSIS — N13.30 UNSPECIFIED HYDRONEPHROSIS: ICD-10-CM

## 2025-07-08 DIAGNOSIS — D64.9 ANEMIA, UNSPECIFIED: ICD-10-CM

## 2025-07-08 DIAGNOSIS — E78.5 HYPERLIPIDEMIA, UNSPECIFIED: ICD-10-CM

## 2025-07-08 DIAGNOSIS — L89.154 PRESSURE ULCER OF SACRAL REGION, STAGE 4: ICD-10-CM

## 2025-07-08 DIAGNOSIS — E66.9 OBESITY, UNSPECIFIED: ICD-10-CM

## 2025-07-08 DIAGNOSIS — Z87.891 PERSONAL HISTORY OF NICOTINE DEPENDENCE: ICD-10-CM

## 2025-07-08 DIAGNOSIS — I71.40 ABDOMINAL AORTIC ANEURYSM, WITHOUT RUPTURE, UNSPECIFIED: ICD-10-CM

## 2025-07-08 DIAGNOSIS — I11.0 HYPERTENSIVE HEART DISEASE WITH HEART FAILURE: ICD-10-CM

## 2025-07-08 DIAGNOSIS — E43 UNSPECIFIED SEVERE PROTEIN-CALORIE MALNUTRITION: ICD-10-CM

## 2025-07-08 DIAGNOSIS — K59.00 CONSTIPATION, UNSPECIFIED: ICD-10-CM
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
## 2025-07-08 DIAGNOSIS — N17.9 ACUTE KIDNEY FAILURE, UNSPECIFIED: ICD-10-CM

## 2025-07-08 DIAGNOSIS — Z79.01 LONG TERM (CURRENT) USE OF ANTICOAGULANTS: ICD-10-CM

## 2025-07-08 DIAGNOSIS — J70.4 DRUG-INDUCED INTERSTITIAL LUNG DISORDERS, UNSPECIFIED: ICD-10-CM

## 2025-07-08 DIAGNOSIS — J44.9 CHRONIC OBSTRUCTIVE PULMONARY DISEASE, UNSPECIFIED: ICD-10-CM

## 2025-07-08 DIAGNOSIS — J96.01 ACUTE RESPIRATORY FAILURE WITH HYPOXIA: ICD-10-CM

## 2025-07-08 DIAGNOSIS — Z51.5 ENCOUNTER FOR PALLIATIVE CARE: ICD-10-CM

## 2025-07-08 DIAGNOSIS — I27.20 PULMONARY HYPERTENSION, UNSPECIFIED: ICD-10-CM

## 2025-07-08 DIAGNOSIS — D63.8 ANEMIA IN OTHER CHRONIC DISEASES CLASSIFIED ELSEWHERE: ICD-10-CM

## 2025-07-08 DIAGNOSIS — I50.33 ACUTE ON CHRONIC DIASTOLIC (CONGESTIVE) HEART FAILURE: ICD-10-CM

## 2025-07-08 DIAGNOSIS — Z85.118 PERSONAL HISTORY OF OTHER MALIGNANT NEOPLASM OF BRONCHUS AND LUNG: ICD-10-CM

## 2025-07-08 DIAGNOSIS — K21.9 GASTRO-ESOPHAGEAL REFLUX DISEASE WITHOUT ESOPHAGITIS: ICD-10-CM

## 2025-07-08 DIAGNOSIS — J96.02 ACUTE RESPIRATORY FAILURE WITH HYPERCAPNIA: ICD-10-CM

## 2025-07-08 DIAGNOSIS — E11.9 TYPE 2 DIABETES MELLITUS WITHOUT COMPLICATIONS: ICD-10-CM

## 2025-07-08 DIAGNOSIS — D69.59 OTHER SECONDARY THROMBOCYTOPENIA: ICD-10-CM

## 2025-07-08 DIAGNOSIS — Z86.718 PERSONAL HISTORY OF OTHER VENOUS THROMBOSIS AND EMBOLISM: ICD-10-CM

## 2025-07-08 LAB
ANION GAP SERPL CALC-SCNC: 9 MMOL/L — SIGNIFICANT CHANGE UP (ref 7–14)
BLD GP AB SCN SERPL QL: SIGNIFICANT CHANGE UP
BUN SERPL-MCNC: 41 MG/DL — HIGH (ref 10–20)
CALCIUM SERPL-MCNC: 9 MG/DL — SIGNIFICANT CHANGE UP (ref 8.4–10.5)
CHLORIDE SERPL-SCNC: 98 MMOL/L — SIGNIFICANT CHANGE UP (ref 98–110)
CO2 SERPL-SCNC: 28 MMOL/L — SIGNIFICANT CHANGE UP (ref 17–32)
CREAT SERPL-MCNC: 1 MG/DL — SIGNIFICANT CHANGE UP (ref 0.7–1.5)
EGFR: 56 ML/MIN/1.73M2 — LOW
EGFR: 56 ML/MIN/1.73M2 — LOW
GLUCOSE BLDC GLUCOMTR-MCNC: 222 MG/DL — HIGH (ref 70–99)
GLUCOSE BLDC GLUCOMTR-MCNC: 78 MG/DL — SIGNIFICANT CHANGE UP (ref 70–99)
GLUCOSE SERPL-MCNC: 55 MG/DL — LOW (ref 70–99)
HCT VFR BLD CALC: 26 % — LOW (ref 37–47)
HGB BLD-MCNC: 8.3 G/DL — LOW (ref 12–16)
MAGNESIUM SERPL-MCNC: 1.9 MG/DL — SIGNIFICANT CHANGE UP (ref 1.8–2.4)
MCHC RBC-ENTMCNC: 27.9 PG — SIGNIFICANT CHANGE UP (ref 27–31)
MCHC RBC-ENTMCNC: 31.9 G/DL — LOW (ref 32–37)
MCV RBC AUTO: 87.2 FL — SIGNIFICANT CHANGE UP (ref 81–99)
NRBC BLD AUTO-RTO: 0 /100 WBCS — SIGNIFICANT CHANGE UP (ref 0–0)
PLATELET # BLD AUTO: 146 K/UL — SIGNIFICANT CHANGE UP (ref 130–400)
PMV BLD: 11.6 FL — HIGH (ref 7.4–10.4)
POTASSIUM SERPL-MCNC: 4.2 MMOL/L — SIGNIFICANT CHANGE UP (ref 3.5–5)
POTASSIUM SERPL-SCNC: 4.2 MMOL/L — SIGNIFICANT CHANGE UP (ref 3.5–5)
RBC # BLD: 2.98 M/UL — LOW (ref 4.2–5.4)
RBC # FLD: 15.9 % — HIGH (ref 11.5–14.5)
SODIUM SERPL-SCNC: 135 MMOL/L — SIGNIFICANT CHANGE UP (ref 135–146)
WBC # BLD: 7.72 K/UL — SIGNIFICANT CHANGE UP (ref 4.8–10.8)
WBC # FLD AUTO: 7.72 K/UL — SIGNIFICANT CHANGE UP (ref 4.8–10.8)

## 2025-07-08 PROCEDURE — 99232 SBSQ HOSP IP/OBS MODERATE 35: CPT

## 2025-07-08 RX ORDER — INSULIN LISPRO 100 U/ML
5 INJECTION, SOLUTION INTRAVENOUS; SUBCUTANEOUS ONCE
Refills: 0 | Status: COMPLETED | OUTPATIENT
Start: 2025-07-08 | End: 2025-07-08

## 2025-07-08 RX ADMIN — INSULIN LISPRO 5 UNIT(S): 100 INJECTION, SOLUTION INTRAVENOUS; SUBCUTANEOUS at 11:59

## 2025-07-08 RX ADMIN — PREDNISONE 40 MILLIGRAM(S): 20 TABLET ORAL at 05:26

## 2025-07-08 RX ADMIN — TIOTROPIUM BROMIDE INHALATION SPRAY 2 PUFF(S): 3.12 SPRAY, METERED RESPIRATORY (INHALATION) at 08:21

## 2025-07-08 RX ADMIN — Medication 650 MILLIGRAM(S): at 06:19

## 2025-07-08 RX ADMIN — INSULIN LISPRO 5 UNIT(S): 100 INJECTION, SOLUTION INTRAVENOUS; SUBCUTANEOUS at 00:37

## 2025-07-08 RX ADMIN — Medication 1000 UNIT(S): at 11:59

## 2025-07-08 RX ADMIN — Medication 81 MILLIGRAM(S): at 11:59

## 2025-07-08 RX ADMIN — HEPARIN SODIUM 5000 UNIT(S): 1000 INJECTION INTRAVENOUS; SUBCUTANEOUS at 05:27

## 2025-07-08 RX ADMIN — Medication 650 MILLIGRAM(S): at 05:26

## 2025-07-08 RX ADMIN — Medication 40 MILLIGRAM(S): at 05:26

## 2025-07-08 RX ADMIN — POLYETHYLENE GLYCOL 3350 17 GRAM(S): 17 POWDER, FOR SOLUTION ORAL at 12:00

## 2025-07-08 RX ADMIN — Medication 1 APPLICATION(S): at 05:34

## 2025-07-08 RX ADMIN — INSULIN LISPRO 6: 100 INJECTION, SOLUTION INTRAVENOUS; SUBCUTANEOUS at 11:58

## 2025-07-08 RX ADMIN — Medication 1 DOSE(S): at 05:29

## 2025-07-08 RX ADMIN — BUMETANIDE 1 MILLIGRAM(S): 1 TABLET ORAL at 05:26

## 2025-07-08 RX ADMIN — Medication 650 MILLIGRAM(S): at 11:59

## 2025-07-08 RX ADMIN — METOPROLOL SUCCINATE 12.5 MILLIGRAM(S): 50 TABLET, EXTENDED RELEASE ORAL at 05:27

## 2025-07-08 NOTE — DISCHARGE NOTE NURSING/CASE MANAGEMENT/SOCIAL WORK - NSDCVIVACCINE_GEN_ALL_CORE_FT
Tdap; 15-Sep-2018 18:40; Rachael Morales (RN); [employer]; [id number] (Exp. Date: 17-Jan-2020); IntraMuscular; Deltoid Left.; 0.5 milliLiter(s); VIS (VIS Published: 09-May-2013, VIS Presented: 15-Sep-2018);    No

## 2025-07-08 NOTE — PROGRESS NOTE ADULT - ASSESSMENT
ASSESSMENT:  84y F w/ PMHx of  PmHx SCLC, left lung adenocarcinoma status post LLL lobectomy, PE, juxtarenal AAA status post endovascular repair with Dr. Sathya Whitfield, diabetes, HTN, HLD. Presented to the ED after a syncopal episode. Pt reported abdominal pain and was hypotensive (80/60). CT angio was consistent with a ruptured AAA. EVAR was performed and pt was stabilized. Shifted to the SICU for monitoring.      PLAN:  -q4 hour vascular checks - DP/PT signals bilaterally   - maintain normotension   - monitor b/l groin sites  - Pt saw patient; recommend d/c to rehab facility  - encourage ambulation  - dispo pending

## 2025-07-08 NOTE — DISCHARGE NOTE NURSING/CASE MANAGEMENT/SOCIAL WORK - FINANCIAL ASSISTANCE
[facility] provides services at a reduced cost to those who are determined to be eligible through [facility]’s financial assistance program. Information regarding [facility]’s financial assistance program can be found by going to [email or web address] or by calling [phone].

## 2025-07-08 NOTE — PROGRESS NOTE ADULT - ASSESSMENT
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 Sathya Whitfield, DM, HTN, HLD, recent pneumonia and UTI, recent admission for acute hypoxic and hypercapnic respiratory failure presents to the ED for evaluation after syncopal episode. Pt admitted to SICU and is s/p insertion of right iliac stent for type 3 endoleak resulting in AAA rupture.  now on 4C - Medicine consulted comanagement.     # Ruptured AAA type 3 endoleak s/p repair with R iliac stent   # Hx PAD  # Hx HTN/HLD  # Chronic Afib  # T2DM  # New ADRIA on CKD3a (improved)  # Normocytic anemia   # Moderate MR  # Thrombocytopenia (stable)  # Hx SCLC s/p LLL lobectomy (in remission)  # Hx Non Hodgkin lymphoma   #Hx COPD   #Hx PE     Plan:  - Management per Surgery  EKG (7/3): Afib, rate vent rate 76, Bifascicular block  CXR (7/3): Unchanged small left basilar opacity/pleural effusion.  CTA A/P w/ IV con (7/3): ruptured/leaking abdominal aortic aneurysm.   - c/w PT/OT  - Multimodal pain regimen   - increase bowel regimen, miralax 17g PO bid  - Monitor platelet count (146 today) and H&H and for signs of bleeding   - Monitor for fever and WBC trend   - Incentive spirometer 10x/hr while awake  - c/w inhalers, Duoneb q6h PRN for SOB/Wheezing, Supplemental oxygen PRN to maintain PO 90-92%  - c/w slow Prednisone taper (4 days are sufficient for each decrease by 10mg) (currently on prednisone 40mg po daily)  - Currently q4h BLE DP/PT assessments, per primary team / SBP goal ~100-140  - Holding AC/AP  - FG TIDAC/HS  - ISS for glucose >200 (target 110-180 while inpatient)  - Hypoglycemic protocol PRN  - Calculate 24H insulin requirement daily, if indicated   - monitor renal fxn, I/O's and electrolytes daily; K >4, Mg >2   - renally dose medications  - avoid nephrotoxic medications   - encourage PO intake and avoid dehydration / IVF PRN  - c/w Lopressor 12.5mg BID   - c/w ASA qd  - c/w Flomax   - DVT PPx HSQ        If any questions, please send a message or call on [employer] Teams.    Management per Surgery, Medicine for co-management.    Total time spent to complete patient's bedside assessment, reviewed medical chart, discussed medical plan of care with team was more than 35 minutes with >50% of time spent face to face with patient, discussion with patient/family and/or coordination of care. This time excludes teaching time and/or separately reported services.

## 2025-07-08 NOTE — PROGRESS NOTE ADULT - PROVIDER SPECIALTY LIST ADULT
Hospitalist
SICU
Vascular Surgery
Hospitalist
Surgery
Hospitalist
Vascular Surgery
Vascular Surgery
Hospitalist
SICU
Surgery

## 2025-07-08 NOTE — PROGRESS NOTE ADULT - SUBJECTIVE AND OBJECTIVE BOX
CELIAAIDANFERMIN MCCRARY  84y Female    CHIEF COMPLAINT:    Patient is a 84y old  Female who presents with a chief complaint of ruptured AAA (08 Jul 2025 04:35)      INTERVAL HPI/OVERNIGHT EVENTS:    Patient seen and examined. No acute events overnight.  feels good this morning. excited about leaving the hospital today. denies sob, fevers, chills, n/v/d/c.     ROS: All other systems are negative.    Vital Signs:    T(F): 97.4 (07-08-25 @ 08:25), Max: 97.8 (07-07-25 @ 16:54)  HR: 71 (07-08-25 @ 08:25) (69 - 85)  BP: 163/78 (07-08-25 @ 08:25) (156/82 - 174/80)  RR: 17 (07-08-25 @ 08:25) (17 - 18)  SpO2: 95% (07-08-25 @ 08:25) (95% - 100%)  I&O's Summary    08 Jul 2025 07:01  -  08 Jul 2025 10:56  --------------------------------------------------------  IN: 0 mL / OUT: 350 mL / NET: -350 mL      Daily     Daily   CAPILLARY BLOOD GLUCOSE      POCT Blood Glucose.: 78 mg/dL (08 Jul 2025 07:34)  POCT Blood Glucose.: 243 mg/dL (07 Jul 2025 23:39)  POCT Blood Glucose.: 321 mg/dL (07 Jul 2025 21:15)  POCT Blood Glucose.: 245 mg/dL (07 Jul 2025 16:48)  POCT Blood Glucose.: 292 mg/dL (07 Jul 2025 11:11)      PHYSICAL EXAM:    GENERAL: NAD	  HEENT: moist oral mucosa,  Neck: Supple  Cardiovascular: Normal S1/S2,  Respiratory: b/l chest rise and fall  Skin: No rashes, No ecchymoses, No cyanosis  Extremities: Normal range of motion, No clubbing, cyanosis or edema   Neurologic: Non-focal, AOx3      Consultant(s) Notes Reviewed:  [x ] YES  [ ] NO  Care Discussed with Consultants/Other Providers [ x] YES  [ ] NO    LABS:                        8.3    7.72  )-----------( 146      ( 08 Jul 2025 06:37 )             26.0     07-08    135  |  98  |  41[H]  ----------------------------<  55[L]  4.2   |  28  |  1.0    Ca    9.0      08 Jul 2025 06:37  Phos  3.0     07-06  Mg     1.9     07-08                RADIOLOGY & ADDITIONAL TESTS:      Imaging or report Personally Reviewed:  [x] YES  [ ] NO  EKG reviewed: [x] YES  [ ] NO    Medications:  Standing  acetaminophen     Tablet .. 650 milliGRAM(s) Oral every 6 hours  aspirin  chewable 81 milliGRAM(s) Oral daily  bumetanide 1 milliGRAM(s) Oral daily  chlorhexidine 2% Cloths 1 Application(s) Topical <User Schedule>  cholecalciferol 1000 Unit(s) Oral daily  fluticasone propionate/ salmeterol 250-50 MICROgram(s) Diskus 1 Dose(s) Inhalation two times a day  heparin   Injectable 5000 Unit(s) SubCutaneous every 8 hours  insulin glargine Injectable (LANTUS) 10 Unit(s) SubCutaneous at bedtime  insulin lispro (ADMELOG) corrective regimen sliding scale   SubCutaneous three times a day before meals  insulin lispro Injectable (ADMELOG) 5 Unit(s) SubCutaneous three times a day with meals  metoprolol tartrate 12.5 milliGRAM(s) Oral every 12 hours  pantoprazole    Tablet 40 milliGRAM(s) Oral before breakfast  polyethylene glycol 3350 17 Gram(s) Oral daily  predniSONE   Tablet   Oral   predniSONE   Tablet 40 milliGRAM(s) Oral daily  senna 2 Tablet(s) Oral at bedtime  sodium chloride 0.9%. 1000 milliLiter(s) IV Continuous <Continuous>  tamsulosin 0.4 milliGRAM(s) Oral at bedtime  tiotropium 2.5 MICROgram(s) Inhaler 2 Puff(s) Inhalation daily    PRN Meds  albuterol    90 MICROgram(s) HFA Inhaler 2 Puff(s) Inhalation every 6 hours PRN  zolpidem 5 milliGRAM(s) Oral at bedtime PRN  zolpidem 5 milliGRAM(s) Oral at bedtime PRN

## 2025-07-08 NOTE — PROGRESS NOTE ADULT - REASON FOR ADMISSION
ruptured AAA

## 2025-07-08 NOTE — DISCHARGE NOTE NURSING/CASE MANAGEMENT/SOCIAL WORK - PATIENT PORTAL LINK FT
You can access the FollowMyHealth Patient Portal offered by [facility] by registering at the following website: [email or web address]. By joining [facility]’s FollowMyHealth portal, you will also be able to view your health information using other applications (apps) compatible with our system.

## 2025-07-08 NOTE — PROGRESS NOTE ADULT - SUBJECTIVE AND OBJECTIVE BOX
GENERAL SURGERY PROGRESS NOTE    Patient: FERMIN DIAZ , 84y ([birth year])Female   MRN: [id number]  Location: [place] [address] A  Visit: 07-03-25 Inpatient  Date: 07-08-25 @ 04:36    Hospital Day #: 5  Post-Op Day #: 4    Procedure/Dx/Injuries: EVAR for ruptured abdominal aortic aneurysm      Events of past 24 hours: no events overnight. Patient in good spirits and ready for discharge.  Overnight patient's sugar was 327. Pt had received lantus 10 units at that time. Repeat blood glucose was 243. Patient subsequnetly received 5 units of lispro    PAST MEDICAL & SURGICAL HISTORY:  Diverticulitis      Obesity      COPD (chronic obstructive pulmonary disease)      GERD (gastroesophageal reflux disease)      Lung cancer      ARTIE (obstructive sleep apnea)  NO CPAP MACHINE PER PT.      Cancer of kidney      Cancer of thyroid      Former smoker      NHL (non-Hodgkin's lymphoma)  "low grade" per heme/ onc note      History of abdominal aortic aneurysm (AAA)      Kidney stones      [place] (hard of hearing)      DM (diabetes mellitus)      HLD (hyperlipidemia)      HTN (hypertension)      History of surgery  LEFT LOWER LOBECTOMY      History of surgery  BILATERAL KNEE REPLACEMENT      History of surgery  CATARACT RIGHT EYE      History of surgery  TUBAL LIGATION      History of surgery  CYST REMOVED  RIGHT BREAST      History of surgery  CHOLECYSTECOMY      History of surgery  RIGHT PARTIAL NEPHRECTOMY      History of surgery  BILATERAL CATARACT SURGERY      History of back surgery      H/O lithotripsy      H/O partial thyroidectomy          Vitals:   T(F): 97.5 (07-08-25 @ 00:05), Max: 97.8 (07-07-25 @ 16:54)  HR: 75 (07-08-25 @ 00:05)  BP: 156/82 (07-08-25 @ 00:05)  RR: 18 (07-08-25 @ 00:05)  SpO2: 95% (07-08-25 @ 00:05)      Diet, DASH/TLC:   Sodium & Cholesterol Restricted  Consistent Carbohydrate No Snacks (CSTCHO)  Supplement Feeding Modality:  Oral  Ensure Surgery Cans or Servings Per Day:  2       Frequency:  Daily      Fluids: sodium chloride 0.9%.: Solution, 1000 milliLiter(s) infuse at 50 mL/Hr, Stop After 5 Hours  Provider's Contact #: [phone]      I & O's:    07-06-25 @ 07:01  -  07-07-25 @ 07:00  --------------------------------------------------------  IN:  Total IN: 0 mL    OUT:    Voided (mL): 1525 mL  Total OUT: 1525 mL    Total NET: -1525 mL      PHYSICAL EXAM:  General: NAD, AAOx3, calm and cooperative  HEENT: NCAT  Cardiac: normal rate  Respiratory: normal respiratory effort  Vascular: Pulses 2+ throughout, extremities well perfused  Skin: Warm/dry, normal color, no jaundice  Incision/wound: healing well, dressings in place, clean, dry and intact    MEDICATIONS  (STANDING):  acetaminophen     Tablet .. 650 milliGRAM(s) Oral every 6 hours  aspirin  chewable 81 milliGRAM(s) Oral daily  bumetanide 1 milliGRAM(s) Oral daily  chlorhexidine 2% Cloths 1 Application(s) Topical <User Schedule>  cholecalciferol 1000 Unit(s) Oral daily  fluticasone propionate/ salmeterol 250-50 MICROgram(s) Diskus 1 Dose(s) Inhalation two times a day  heparin   Injectable 5000 Unit(s) SubCutaneous every 8 hours  insulin glargine Injectable (LANTUS) 10 Unit(s) SubCutaneous at bedtime  insulin lispro (ADMELOG) corrective regimen sliding scale   SubCutaneous three times a day before meals  insulin lispro Injectable (ADMELOG) 5 Unit(s) SubCutaneous three times a day with meals  metoprolol tartrate 12.5 milliGRAM(s) Oral every 12 hours  pantoprazole    Tablet 40 milliGRAM(s) Oral before breakfast  polyethylene glycol 3350 17 Gram(s) Oral daily  predniSONE   Tablet   Oral   predniSONE   Tablet 40 milliGRAM(s) Oral daily  senna 2 Tablet(s) Oral at bedtime  sodium chloride 0.9%. 1000 milliLiter(s) (50 mL/Hr) IV Continuous <Continuous>  tamsulosin 0.4 milliGRAM(s) Oral at bedtime  tiotropium 2.5 MICROgram(s) Inhaler 2 Puff(s) Inhalation daily    MEDICATIONS  (PRN):  albuterol    90 MICROgram(s) HFA Inhaler 2 Puff(s) Inhalation every 6 hours PRN Shortness of Breath and/or Wheezing  zolpidem 5 milliGRAM(s) Oral at bedtime PRN Insomnia  zolpidem 5 milliGRAM(s) Oral at bedtime PRN Insomnia      DVT PROPHYLAXIS: heparin   Injectable 5000 Unit(s) SubCutaneous every 8 hours    GI PROPHYLAXIS: pantoprazole    Tablet 40 milliGRAM(s) Oral before breakfast    Labs:  CAPILLARY BLOOD GLUCOSE      POCT Blood Glucose.: 243 mg/dL (07 Jul 2025 23:39)  POCT Blood Glucose.: 321 mg/dL (07 Jul 2025 21:15)  POCT Blood Glucose.: 245 mg/dL (07 Jul 2025 16:48)  POCT Blood Glucose.: 292 mg/dL (07 Jul 2025 11:11)  POCT Blood Glucose.: 78 mg/dL (07 Jul 2025 07:36)                          7.5    10.32 )-----------( 123      ( 06 Jul 2025 22:15 )             24.4         07-06    136  |  97[L]  |  47[H]  ----------------------------<  139[H]  4.3   |  28  |  1.4          LFTs:       ABG - ( 04 Jul 2025 11:49 )  pH: 7.53  /  pCO2: 36    /  pO2: 76    / HCO3: 30    / Base Excess: 7.0   /  SaO2: 98.0              Urinalysis Basic - ( 06 Jul 2025 22:15 )    Color: x / Appearance: x / SG: x / pH: x  Gluc: 139 mg/dL / Ketone: x  / Bili: x / Urobili: x   Blood: x / Protein: x / Nitrite: x   Leuk Esterase: x / RBC: x / WBC x   Sq Epi: x / Non Sq Epi: x / Bacteria: x

## 2025-07-09 ENCOUNTER — APPOINTMENT (OUTPATIENT)
Dept: PULMONOLOGY | Facility: CLINIC | Age: 85
End: 2025-07-09
Payer: MEDICARE

## 2025-07-09 VITALS
WEIGHT: 128 LBS | SYSTOLIC BLOOD PRESSURE: 132 MMHG | HEIGHT: 59 IN | OXYGEN SATURATION: 87 % | RESPIRATION RATE: 15 BRPM | BODY MASS INDEX: 25.8 KG/M2 | HEART RATE: 90 BPM | DIASTOLIC BLOOD PRESSURE: 80 MMHG

## 2025-07-09 PROCEDURE — 99214 OFFICE O/P EST MOD 30 MIN: CPT

## 2025-07-09 PROCEDURE — G2211 COMPLEX E/M VISIT ADD ON: CPT

## 2025-07-14 ENCOUNTER — APPOINTMENT (OUTPATIENT)
Age: 85
End: 2025-07-14
Payer: MEDICARE

## 2025-07-14 PROCEDURE — G2211 COMPLEX E/M VISIT ADD ON: CPT

## 2025-07-14 PROCEDURE — 99214 OFFICE O/P EST MOD 30 MIN: CPT

## 2025-07-18 DIAGNOSIS — Z87.440 PERSONAL HISTORY OF URINARY (TRACT) INFECTIONS: ICD-10-CM

## 2025-07-18 DIAGNOSIS — D64.9 ANEMIA, UNSPECIFIED: ICD-10-CM

## 2025-07-18 DIAGNOSIS — J44.9 CHRONIC OBSTRUCTIVE PULMONARY DISEASE, UNSPECIFIED: ICD-10-CM

## 2025-07-18 DIAGNOSIS — K21.9 GASTRO-ESOPHAGEAL REFLUX DISEASE WITHOUT ESOPHAGITIS: ICD-10-CM

## 2025-07-18 DIAGNOSIS — Z86.711 PERSONAL HISTORY OF PULMONARY EMBOLISM: ICD-10-CM

## 2025-07-18 DIAGNOSIS — I34.0 NONRHEUMATIC MITRAL (VALVE) INSUFFICIENCY: ICD-10-CM

## 2025-07-18 DIAGNOSIS — I48.20 CHRONIC ATRIAL FIBRILLATION, UNSPECIFIED: ICD-10-CM

## 2025-07-18 DIAGNOSIS — Z87.01 PERSONAL HISTORY OF PNEUMONIA (RECURRENT): ICD-10-CM

## 2025-07-18 DIAGNOSIS — N17.9 ACUTE KIDNEY FAILURE, UNSPECIFIED: ICD-10-CM

## 2025-07-18 DIAGNOSIS — I45.2 BIFASCICULAR BLOCK: ICD-10-CM

## 2025-07-18 DIAGNOSIS — Z90.2 ACQUIRED ABSENCE OF LUNG [PART OF]: ICD-10-CM

## 2025-07-18 DIAGNOSIS — T82.310A BREAKDOWN (MECHANICAL) OF AORTIC (BIFURCATION) GRAFT (REPLACEMENT), INITIAL ENCOUNTER: ICD-10-CM

## 2025-07-18 DIAGNOSIS — D69.6 THROMBOCYTOPENIA, UNSPECIFIED: ICD-10-CM

## 2025-07-18 DIAGNOSIS — Z85.118 PERSONAL HISTORY OF OTHER MALIGNANT NEOPLASM OF BRONCHUS AND LUNG: ICD-10-CM

## 2025-07-18 DIAGNOSIS — Z87.891 PERSONAL HISTORY OF NICOTINE DEPENDENCE: ICD-10-CM

## 2025-07-18 DIAGNOSIS — Z90.49 ACQUIRED ABSENCE OF OTHER SPECIFIED PARTS OF DIGESTIVE TRACT: ICD-10-CM

## 2025-07-18 DIAGNOSIS — E11.51 TYPE 2 DIABETES MELLITUS WITH DIABETIC PERIPHERAL ANGIOPATHY WITHOUT GANGRENE: ICD-10-CM

## 2025-07-18 DIAGNOSIS — I71.32: ICD-10-CM

## 2025-07-18 DIAGNOSIS — H91.90 UNSPECIFIED HEARING LOSS, UNSPECIFIED EAR: ICD-10-CM

## 2025-07-18 DIAGNOSIS — E89.0 POSTPROCEDURAL HYPOTHYROIDISM: ICD-10-CM

## 2025-07-18 DIAGNOSIS — G47.33 OBSTRUCTIVE SLEEP APNEA (ADULT) (PEDIATRIC): ICD-10-CM

## 2025-07-18 DIAGNOSIS — Z79.52 LONG TERM (CURRENT) USE OF SYSTEMIC STEROIDS: ICD-10-CM

## 2025-07-18 DIAGNOSIS — I50.9 HEART FAILURE, UNSPECIFIED: ICD-10-CM

## 2025-07-18 DIAGNOSIS — Z90.5 ACQUIRED ABSENCE OF KIDNEY: ICD-10-CM

## 2025-07-18 DIAGNOSIS — G47.00 INSOMNIA, UNSPECIFIED: ICD-10-CM

## 2025-07-18 DIAGNOSIS — Z85.72 PERSONAL HISTORY OF NON-HODGKIN LYMPHOMAS: ICD-10-CM

## 2025-07-18 DIAGNOSIS — Z79.82 LONG TERM (CURRENT) USE OF ASPIRIN: ICD-10-CM
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
## 2025-07-18 DIAGNOSIS — I13.0 HYPERTENSIVE HEART AND CHRONIC KIDNEY DISEASE WITH HEART FAILURE AND STAGE 1 THROUGH STAGE 4 CHRONIC KIDNEY DISEASE, OR UNSPECIFIED CHRONIC KIDNEY DISEASE: ICD-10-CM

## 2025-07-18 DIAGNOSIS — F41.9 ANXIETY DISORDER, UNSPECIFIED: ICD-10-CM

## 2025-07-18 DIAGNOSIS — Z99.81 DEPENDENCE ON SUPPLEMENTAL OXYGEN: ICD-10-CM

## 2025-07-18 DIAGNOSIS — E66.9 OBESITY, UNSPECIFIED: ICD-10-CM

## 2025-07-18 DIAGNOSIS — Z85.520 PERSONAL HISTORY OF MALIGNANT CARCINOID TUMOR OF KIDNEY: ICD-10-CM

## 2025-07-18 DIAGNOSIS — E43 UNSPECIFIED SEVERE PROTEIN-CALORIE MALNUTRITION: ICD-10-CM

## 2025-07-18 DIAGNOSIS — Z79.85 LONG-TERM (CURRENT) USE OF INJECTABLE NON-INSULIN ANTIDIABETIC DRUGS: ICD-10-CM

## 2025-07-18 DIAGNOSIS — Z96.653 PRESENCE OF ARTIFICIAL KNEE JOINT, BILATERAL: ICD-10-CM

## 2025-07-18 DIAGNOSIS — E78.5 HYPERLIPIDEMIA, UNSPECIFIED: ICD-10-CM

## 2025-07-18 DIAGNOSIS — Z79.51 LONG TERM (CURRENT) USE OF INHALED STEROIDS: ICD-10-CM

## 2025-07-22 ENCOUNTER — APPOINTMENT (OUTPATIENT)
Dept: VASCULAR SURGERY | Facility: CLINIC | Age: 85
End: 2025-07-22
Payer: MEDICARE

## 2025-07-22 VITALS
DIASTOLIC BLOOD PRESSURE: 78 MMHG | SYSTOLIC BLOOD PRESSURE: 169 MMHG | HEIGHT: 59 IN | BODY MASS INDEX: 23.59 KG/M2 | HEART RATE: 88 BPM | WEIGHT: 117 LBS

## 2025-07-22 DIAGNOSIS — I71.31: ICD-10-CM

## 2025-07-22 PROCEDURE — 99213 OFFICE O/P EST LOW 20 MIN: CPT | Mod: 24

## 2025-07-22 PROCEDURE — 93978 VASCULAR STUDY: CPT

## 2025-07-30 ENCOUNTER — TRANSCRIPTION ENCOUNTER (OUTPATIENT)
Age: 85
End: 2025-07-30

## 2025-08-18 ENCOUNTER — APPOINTMENT (OUTPATIENT)
Age: 85
End: 2025-08-18
Payer: MEDICARE

## 2025-08-18 VITALS
DIASTOLIC BLOOD PRESSURE: 71 MMHG | WEIGHT: 111.44 LBS | BODY MASS INDEX: 22.47 KG/M2 | HEART RATE: 59 BPM | OXYGEN SATURATION: 92 % | TEMPERATURE: 97.6 F | HEIGHT: 59 IN | RESPIRATION RATE: 15 BRPM | SYSTOLIC BLOOD PRESSURE: 155 MMHG

## 2025-08-18 DIAGNOSIS — Z51.11 ENCOUNTER FOR ANTINEOPLASTIC CHEMOTHERAPY: ICD-10-CM

## 2025-08-18 DIAGNOSIS — C85.90 NON-HODGKIN LYMPHOMA, UNSPECIFIED, UNSPECIFIED SITE: ICD-10-CM

## 2025-08-18 DIAGNOSIS — D50.9 IRON DEFICIENCY ANEMIA, UNSPECIFIED: ICD-10-CM

## 2025-08-18 DIAGNOSIS — I26.99 OTHER PULMONARY EMBOLISM W/OUT ACUTE COR PULMONALE: ICD-10-CM

## 2025-08-18 DIAGNOSIS — C34.90 MALIGNANT NEOPLASM OF UNSPECIFIED PART OF UNSPECIFIED BRONCHUS OR LUNG: ICD-10-CM

## 2025-08-18 PROCEDURE — G2211 COMPLEX E/M VISIT ADD ON: CPT

## 2025-08-18 PROCEDURE — 99214 OFFICE O/P EST MOD 30 MIN: CPT

## 2025-09-03 ENCOUNTER — RESULT REVIEW (OUTPATIENT)
Age: 85
End: 2025-09-03

## 2025-09-03 ENCOUNTER — NON-APPOINTMENT (OUTPATIENT)
Age: 85
End: 2025-09-03

## 2025-09-03 ENCOUNTER — OUTPATIENT (OUTPATIENT)
Dept: OUTPATIENT SERVICES | Facility: HOSPITAL | Age: 85
LOS: 1 days | End: 2025-09-03
Payer: MEDICARE

## 2025-09-03 ENCOUNTER — APPOINTMENT (OUTPATIENT)
Age: 85
End: 2025-09-03
Payer: MEDICARE

## 2025-09-03 DIAGNOSIS — C85.90 NON-HODGKIN LYMPHOMA, UNSPECIFIED, UNSPECIFIED SITE: ICD-10-CM

## 2025-09-03 DIAGNOSIS — Z98.890 OTHER SPECIFIED POSTPROCEDURAL STATES: Chronic | ICD-10-CM

## 2025-09-03 DIAGNOSIS — E89.0 POSTPROCEDURAL HYPOTHYROIDISM: Chronic | ICD-10-CM

## 2025-09-03 DIAGNOSIS — Z00.8 ENCOUNTER FOR OTHER GENERAL EXAMINATION: ICD-10-CM

## 2025-09-03 DIAGNOSIS — C34.90 MALIGNANT NEOPLASM OF UNSPECIFIED PART OF UNSPECIFIED BRONCHUS OR LUNG: ICD-10-CM

## 2025-09-03 LAB — GLUCOSE BLDC GLUCOMTR-MCNC: 62 MG/DL — LOW (ref 70–99)

## 2025-09-03 PROCEDURE — A9552: CPT

## 2025-09-03 PROCEDURE — 78815 PET IMAGE W/CT SKULL-THIGH: CPT | Mod: PS

## 2025-09-03 PROCEDURE — 78815 PET IMAGE W/CT SKULL-THIGH: CPT | Mod: 26,PS

## 2025-09-03 PROCEDURE — 82962 GLUCOSE BLOOD TEST: CPT

## 2025-09-04 DIAGNOSIS — C34.90 MALIGNANT NEOPLASM OF UNSPECIFIED PART OF UNSPECIFIED BRONCHUS OR LUNG: ICD-10-CM

## 2025-09-04 DIAGNOSIS — C85.90 NON-HODGKIN LYMPHOMA, UNSPECIFIED, UNSPECIFIED SITE: ICD-10-CM

## 2025-09-17 ENCOUNTER — APPOINTMENT (OUTPATIENT)
Dept: PULMONOLOGY | Facility: CLINIC | Age: 85
End: 2025-09-17